# Patient Record
Sex: FEMALE | Race: WHITE | NOT HISPANIC OR LATINO | Employment: FULL TIME | URBAN - METROPOLITAN AREA
[De-identification: names, ages, dates, MRNs, and addresses within clinical notes are randomized per-mention and may not be internally consistent; named-entity substitution may affect disease eponyms.]

---

## 2017-01-18 ENCOUNTER — LAB VISIT (OUTPATIENT)
Dept: LAB | Facility: HOSPITAL | Age: 52
End: 2017-01-18
Payer: COMMERCIAL

## 2017-01-18 DIAGNOSIS — Z76.82 AWAITING ORGAN TRANSPLANT STATUS: ICD-10-CM

## 2017-01-18 PROCEDURE — 86832 HLA CLASS I HIGH DEFIN QUAL: CPT | Mod: PO

## 2017-02-02 LAB — HPRA INTERPRETATION: NORMAL

## 2017-02-03 LAB
CLASS I ANTIBODIES - LUMINEX: NORMAL
CLASS I ANTIBODY COMMENTS - LUMINEX: NORMAL
CPRA %: 71
SERUM COLLECTION DT - LUMINEX CLASS I: NORMAL
SPCL1 TESTING DATE: NORMAL

## 2017-02-15 ENCOUNTER — LAB VISIT (OUTPATIENT)
Dept: LAB | Facility: HOSPITAL | Age: 52
End: 2017-02-15
Payer: COMMERCIAL

## 2017-02-15 DIAGNOSIS — Z76.82 AWAITING ORGAN TRANSPLANT STATUS: ICD-10-CM

## 2017-02-15 PROCEDURE — 86829 HLA CLASS I/II ANTIBODY QUAL: CPT | Mod: PO

## 2017-02-15 PROCEDURE — 86829 HLA CLASS I/II ANTIBODY QUAL: CPT | Mod: 91,PO

## 2017-02-23 LAB — HPRA INTERPRETATION: NORMAL

## 2017-03-17 ENCOUNTER — LAB VISIT (OUTPATIENT)
Dept: LAB | Facility: HOSPITAL | Age: 52
End: 2017-03-17
Payer: COMMERCIAL

## 2017-03-17 DIAGNOSIS — Z76.82 AWAITING ORGAN TRANSPLANT STATUS: ICD-10-CM

## 2017-03-22 PROCEDURE — 86832 HLA CLASS I HIGH DEFIN QUAL: CPT | Mod: PO

## 2017-03-22 PROCEDURE — 86833 HLA CLASS II HIGH DEFIN QUAL: CPT | Mod: PO

## 2017-03-27 LAB
CLASS I ANTIBODIES - LUMINEX: NORMAL
CLASS II ANTIBODIES - LUMINEX: NORMAL
CPRA %: 71
SERUM COLLECTION DT - LUMINEX CLASS I: NORMAL
SERUM COLLECTION DT - LUMINEX CLASS II: NORMAL
SPCL1 TESTING DATE: NORMAL
SPCL2 TESTING DATE: NORMAL

## 2017-03-28 ENCOUNTER — CLINICAL SUPPORT (OUTPATIENT)
Dept: INFECTIOUS DISEASES | Facility: CLINIC | Age: 52
End: 2017-03-28
Payer: COMMERCIAL

## 2017-03-28 DIAGNOSIS — Z76.82 KIDNEY TRANSPLANT CANDIDATE: ICD-10-CM

## 2017-03-28 DIAGNOSIS — Z23 IMMUNIZATION DUE: ICD-10-CM

## 2017-03-28 PROCEDURE — 90632 HEPA VACCINE ADULT IM: CPT | Mod: S$GLB,,, | Performed by: INTERNAL MEDICINE

## 2017-03-28 PROCEDURE — 99999 PR PBB SHADOW E&M-EST. PATIENT-LVL I: CPT | Mod: PBBFAC,,,

## 2017-03-28 PROCEDURE — 90471 IMMUNIZATION ADMIN: CPT | Mod: S$GLB,,, | Performed by: INTERNAL MEDICINE

## 2017-04-17 LAB — HPRA INTERPRETATION: NORMAL

## 2017-04-17 PROCEDURE — 86829 HLA CLASS I/II ANTIBODY QUAL: CPT | Mod: 91,PO,TXP

## 2017-04-17 PROCEDURE — 86829 HLA CLASS I/II ANTIBODY QUAL: CPT | Mod: PO,TXP

## 2017-04-24 ENCOUNTER — TELEPHONE (OUTPATIENT)
Dept: TRANSPLANT | Facility: CLINIC | Age: 52
End: 2017-04-24

## 2017-04-25 ENCOUNTER — LAB VISIT (OUTPATIENT)
Dept: LAB | Facility: HOSPITAL | Age: 52
End: 2017-04-25
Payer: COMMERCIAL

## 2017-04-25 DIAGNOSIS — Z76.82 AWAITING ORGAN TRANSPLANT STATUS: ICD-10-CM

## 2017-04-25 PROCEDURE — 86829 HLA CLASS I/II ANTIBODY QUAL: CPT | Mod: PO,TXP

## 2017-04-25 PROCEDURE — 86829 HLA CLASS I/II ANTIBODY QUAL: CPT | Mod: 91,PO,TXP

## 2017-04-28 DIAGNOSIS — Z76.82 ORGAN TRANSPLANT CANDIDATE: Primary | ICD-10-CM

## 2017-05-04 ENCOUNTER — LAB VISIT (OUTPATIENT)
Dept: LAB | Facility: HOSPITAL | Age: 52
End: 2017-05-04
Payer: COMMERCIAL

## 2017-05-04 DIAGNOSIS — Z76.82 ORGAN TRANSPLANT CANDIDATE: ICD-10-CM

## 2017-05-04 PROCEDURE — 86826 HLA X-MATCH NONCYTOTOXC ADDL: CPT | Mod: PO,TXP

## 2017-05-04 PROCEDURE — 86825 HLA X-MATH NON-CYTOTOXIC: CPT | Mod: PO,TXP

## 2017-05-04 PROCEDURE — 86825 HLA X-MATH NON-CYTOTOXIC: CPT | Mod: 91,PO,TXP

## 2017-05-04 PROCEDURE — 86826 HLA X-MATCH NONCYTOTOXC ADDL: CPT | Mod: 91,PO,TXP

## 2017-05-11 ENCOUNTER — LAB VISIT (OUTPATIENT)
Dept: LAB | Facility: HOSPITAL | Age: 52
End: 2017-05-11
Payer: COMMERCIAL

## 2017-05-11 DIAGNOSIS — Z76.82 AWAITING ORGAN TRANSPLANT STATUS: ICD-10-CM

## 2017-05-18 LAB
B CELL RESULTS - XM ALLO: POSITIVE
B MCS AVERAGE - XM ALLO: 119.5
B MCS AVERAGE - XM ALLO: 122.5
B MCS AVERAGE - XM ALLO: 125.5
DONOR MRN: NORMAL
FXMAL TESTING DATE: NORMAL
HLA FLOW CROSSMATCH (ALLO) INTERPRETATION: NORMAL
SERUM COLLECTION DT - XM ALLO: NORMAL
T CELL RESULTS - XM ALLO: POSITIVE
T MCS AVERAGE - XM ALLO: 187
T MCS AVERAGE - XM ALLO: 193.5
T MCS AVERAGE - XM ALLO: 194.5

## 2017-05-22 LAB — HPRA INTERPRETATION: NORMAL

## 2017-06-09 DIAGNOSIS — Z76.82 ORGAN TRANSPLANT CANDIDATE: Primary | ICD-10-CM

## 2017-07-07 LAB — HPRA INTERPRETATION: NORMAL

## 2017-07-07 PROCEDURE — 86833 HLA CLASS II HIGH DEFIN QUAL: CPT | Mod: PO,TXP

## 2017-07-07 PROCEDURE — 86832 HLA CLASS I HIGH DEFIN QUAL: CPT | Mod: PO,TXP

## 2017-07-19 LAB
CLASS I ANTIBODIES - LUMINEX: NORMAL
CLASS II ANTIBODY COMMENTS - LUMINEX: NORMAL
CPRA %: 71
SERUM COLLECTION DT - LUMINEX CLASS I: NORMAL
SERUM COLLECTION DT - LUMINEX CLASS II: NORMAL
SPCL1 TESTING DATE: NORMAL
SPCL2 TESTING DATE: NORMAL

## 2017-07-31 DIAGNOSIS — Z76.82 ORGAN TRANSPLANT CANDIDATE: ICD-10-CM

## 2017-08-14 ENCOUNTER — LAB VISIT (OUTPATIENT)
Dept: LAB | Facility: HOSPITAL | Age: 52
End: 2017-08-14
Payer: COMMERCIAL

## 2017-08-14 DIAGNOSIS — Z76.82 AWAITING ORGAN TRANSPLANT STATUS: ICD-10-CM

## 2017-08-14 PROCEDURE — 86832 HLA CLASS I HIGH DEFIN QUAL: CPT | Mod: PO,TXP

## 2017-08-14 PROCEDURE — 86833 HLA CLASS II HIGH DEFIN QUAL: CPT | Mod: PO,TXP

## 2017-08-29 ENCOUNTER — OFFICE VISIT (OUTPATIENT)
Dept: TRANSPLANT | Facility: CLINIC | Age: 52
End: 2017-08-29
Payer: COMMERCIAL

## 2017-08-29 ENCOUNTER — HOSPITAL ENCOUNTER (OUTPATIENT)
Dept: RADIOLOGY | Facility: HOSPITAL | Age: 52
Discharge: HOME | End: 2017-08-29
Attending: NURSE PRACTITIONER
Payer: COMMERCIAL

## 2017-08-29 ENCOUNTER — HOSPITAL ENCOUNTER (OUTPATIENT)
Dept: CARDIOLOGY | Facility: CLINIC | Age: 52
Discharge: HOME | End: 2017-08-29
Payer: COMMERCIAL

## 2017-08-29 VITALS
HEART RATE: 52 BPM | WEIGHT: 134.5 LBS | HEIGHT: 61 IN | BODY MASS INDEX: 25.39 KG/M2 | RESPIRATION RATE: 16 BRPM | TEMPERATURE: 98 F | DIASTOLIC BLOOD PRESSURE: 67 MMHG | OXYGEN SATURATION: 99 % | SYSTOLIC BLOOD PRESSURE: 143 MMHG

## 2017-08-29 DIAGNOSIS — I37.1 NONRHEUMATIC PULMONARY VALVE INSUFFICIENCY: ICD-10-CM

## 2017-08-29 DIAGNOSIS — Z76.82 ORGAN TRANSPLANT CANDIDATE: ICD-10-CM

## 2017-08-29 DIAGNOSIS — I10 ESSENTIAL HYPERTENSION: Chronic | ICD-10-CM

## 2017-08-29 DIAGNOSIS — Z99.2 ESRD ON DIALYSIS: Primary | Chronic | ICD-10-CM

## 2017-08-29 DIAGNOSIS — N18.9 ANEMIA OF RENAL DISEASE: Chronic | ICD-10-CM

## 2017-08-29 DIAGNOSIS — D63.1 ANEMIA OF RENAL DISEASE: Chronic | ICD-10-CM

## 2017-08-29 DIAGNOSIS — Z76.82 AWAITING ORGAN TRANSPLANT STATUS: ICD-10-CM

## 2017-08-29 DIAGNOSIS — N18.6 ESRD ON DIALYSIS: Primary | Chronic | ICD-10-CM

## 2017-08-29 DIAGNOSIS — N25.81 SECONDARY HYPERPARATHYROIDISM OF RENAL ORIGIN: Chronic | ICD-10-CM

## 2017-08-29 LAB
DIASTOLIC DYSFUNCTION: NO
ESTIMATED PA SYSTOLIC PRESSURE: 38.47
MITRAL VALVE MOBILITY: NORMAL
RETIRED EF AND QEF - SEE NOTES: 65 (ref 55–65)
TRICUSPID VALVE REGURGITATION: NORMAL

## 2017-08-29 PROCEDURE — 99999 PR PBB SHADOW E&M-EST. PATIENT-LVL III: CPT | Mod: PBBFAC,TXP,, | Performed by: INTERNAL MEDICINE

## 2017-08-29 PROCEDURE — 76770 US EXAM ABDO BACK WALL COMP: CPT | Mod: 26,TXP,, | Performed by: RADIOLOGY

## 2017-08-29 PROCEDURE — 76770 US EXAM ABDO BACK WALL COMP: CPT | Mod: TC,TXP

## 2017-08-29 PROCEDURE — 71020 XR CHEST PA AND LATERAL: CPT | Mod: TC,TXP

## 2017-08-29 PROCEDURE — 93306 TTE W/DOPPLER COMPLETE: CPT | Mod: S$GLB,TXP,, | Performed by: INTERNAL MEDICINE

## 2017-08-29 PROCEDURE — 99215 OFFICE O/P EST HI 40 MIN: CPT | Mod: S$GLB,TXP,, | Performed by: INTERNAL MEDICINE

## 2017-08-29 PROCEDURE — 71020 XR CHEST PA AND LATERAL: CPT | Mod: 26,TXP,, | Performed by: RADIOLOGY

## 2017-08-29 PROCEDURE — 3008F BODY MASS INDEX DOCD: CPT | Mod: S$GLB,TXP,, | Performed by: INTERNAL MEDICINE

## 2017-08-29 RX ORDER — LANTHANUM CARBONATE 1000 MG/1
1000 TABLET, CHEWABLE ORAL
COMMUNITY
End: 2018-08-30

## 2017-08-29 NOTE — LETTER
Date: 8/29/2017    To: Dialysis Unit  and Charge RN From: [facility] Workers and      Kidney Transplant Nurse Coordinators    RE: Tala Rivera, 1965, [id number]     At [facility], we conduct adherence checks as an important part of transplant care. Initial and listed patient assessments are not complete without adherence information.        Please complete the following information:     Current Dry Weight: ___________         Most Recent Pre-Treatment Weight: ___________ /date: _________                    Data from the last 1-3 months:  (data from last 3 months preferred):    Number of AMAs with dates, time, and reasons: ____________________________________________________    ______________________________________________________________________________________________    ______________________________________________________________________________________________    Number of No-Shows with dates and reasons: ______________________________________________________      ______________________________________________________________________________________________    Last intact PTH:  ___________/date: __________      Any concerns with Labs:  YES / NO      If yes, please explain:  ___________________________________________________________________________    ______________________________________________________________________________________________    Any concerns with Caregivers:  YES / NO    If yes, please explain:  ___________________________________________________________________________    ______________________________________________________________________________________________     Any concerns with Transportation:  YES / NO    If yes, please explain:  ___________________________________________________________________________    ______________________________________________________________________________________________    Any  Psychiatric and/or Psychosocial concerns:  YES / NO     If yes, please explain: ___________________________________________________________________________    ______________________________________________________________________________________________      PLEASE RETURN TO: FAX: [phone]     Thank you for collaborating with us in the care of this patient.           [address]  ?  [place] [address]  ?  phone [phone]  ?  fax [phone] ?  [email or web address]  Confidentiality notice: The accompanying facsimile is intended solely for the use of the recipient designated above. Document(s) transmitted herewith may contain information that is confidential and privileged. Delivery, distribution or dissemination of this communication other than to the intended recipient is strictly prohibited. If you have received this facsimile in error, please notify us immediately by telephone.

## 2017-08-29 NOTE — PROGRESS NOTES
LISTED PATIENT EDUCATION NOTE    Ms. Tala Rivera was seen in pre-kidney transplant clinic for evaluation for kidney, kidney/pancreas or pancreas only transplant.  The patient attended a group education session that discussed/reviewed the following aspects of transplantation: evaluation and selection committee process, UNOS waitlist management/multiple listings, types of organs offered (KDPI < 85%, KDPI > 85%, PHS increased risk, DCD), financial aspects, surgical procedures, dietary instruction pre- and post-transplant, health maintenance pre- and post-transplant, post-transplant hospitalization and outpatient follow-up, potential to participate in a research protocol, and medication management and side effects.  A question and answer session was provided after the presentation.    The patient was seen by all members of the multi-disciplinary team to include: Nephrologist/PA, Surgeon, , Transplant Coordinator, , Pharmacist and Dietician (if applicable).    The patient reviewed and signed all consents for evaluation which were witnessed and sent to scanning into the EPIC chart.    The patient was given an education book and plan for further evaluation based on her individual assessment.      The patient was encouraged to call with any questions or concerns.

## 2017-08-29 NOTE — LETTER
August 29, 2017        Elias Jon  [address] [place] [address]  Phone: [phone]  Fax: [phone]             [facility]  [address]  [facility] [address]  Phone: [phone]   Patient: Tala Rivera   MR Number: [id number]   YOB: 1965   Date of Visit: 8/29/2017       Dear Dr. Elias Jon    Thank you for referring Tala Rivera to me for evaluation. Attached you will find relevant portions of my assessment and plan of care.    If you have questions, please do not hesitate to call me. I look forward to following Tala Rivera along with you.    Sincerely,    Karena Colin MD    Enclosure    If you would like to receive this communication electronically, please contact [email or web address] or [phone] to request [employer] access.    [employer] Link is a tool which provides read-only access to select patient information with whom you have a relationship. Its easy to use and provides real time access to review your patients record including encounter summaries, notes, results, and demographic information.    If you feel you have received this communication in error or would no longer like to receive these types of communications, please e-mail [email or web address]

## 2017-08-29 NOTE — PATIENT INSTRUCTIONS
1. Continue to stay active   2. Have any potential living donors contact the living donor coordinator

## 2017-09-08 LAB — HPRA INTERPRETATION: NORMAL

## 2017-09-11 LAB
CLASS I ANTIBODIES - LUMINEX: NORMAL
CLASS II ANTIBODY COMMENTS - LUMINEX: NORMAL
CPRA %: 71
SERUM COLLECTION DT - LUMINEX CLASS I: NORMAL
SERUM COLLECTION DT - LUMINEX CLASS II: NORMAL
SPCL1 TESTING DATE: NORMAL
SPCL2 TESTING DATE: NORMAL
SPCLU TESTING DATE: NORMAL

## 2017-09-19 ENCOUNTER — LAB VISIT (OUTPATIENT)
Dept: LAB | Facility: HOSPITAL | Age: 52
End: 2017-09-19
Payer: COMMERCIAL

## 2017-09-19 DIAGNOSIS — Z76.82 AWAITING ORGAN TRANSPLANT STATUS: ICD-10-CM

## 2017-09-19 PROCEDURE — 99001 SPECIMEN HANDLING PT-LAB: CPT | Mod: PO,TXP

## 2017-09-28 DIAGNOSIS — Z76.82 ORGAN TRANSPLANT CANDIDATE: Primary | ICD-10-CM

## 2017-09-29 DIAGNOSIS — Z76.82 AWAITING ORGAN TRANSPLANT STATUS: Primary | ICD-10-CM

## 2017-10-04 ENCOUNTER — LAB VISIT (OUTPATIENT)
Dept: LAB | Facility: HOSPITAL | Age: 52
End: 2017-10-04
Payer: COMMERCIAL

## 2017-10-04 DIAGNOSIS — Z76.82 ORGAN TRANSPLANT CANDIDATE: ICD-10-CM

## 2017-10-04 PROCEDURE — 86825 HLA X-MATH NON-CYTOTOXIC: CPT | Mod: PO,TXP

## 2017-10-04 PROCEDURE — 86826 HLA X-MATCH NONCYTOTOXC ADDL: CPT | Mod: PO,TXP

## 2017-10-04 PROCEDURE — 86825 HLA X-MATH NON-CYTOTOXIC: CPT | Mod: 91,PO,TXP

## 2017-10-04 PROCEDURE — 86826 HLA X-MATCH NONCYTOTOXC ADDL: CPT | Mod: 91,PO,TXP

## 2017-10-05 LAB
B CELL RESULTS - XM ALLO: NEGATIVE
B CELL RESULTS - XM ALLO: NEGATIVE
B MCS AVERAGE - XM ALLO: 1.5
B MCS AVERAGE - XM ALLO: 6.5
DONOR MRN: NORMAL
DONOR MRN: NORMAL
FXMAL TESTING DATE: NORMAL
FXMAL TESTING DATE: NORMAL
HLA FLOW CROSSMATCH (ALLO) INTERPRETATION: NORMAL
SERUM COLLECTION DT - XM ALLO: NORMAL
SERUM COLLECTION DT - XM ALLO: NORMAL
T CELL RESULTS - XM ALLO: NEGATIVE
T CELL RESULTS - XM ALLO: NEGATIVE
T MCS AVERAGE - XM ALLO: 23
T MCS AVERAGE - XM ALLO: 24

## 2017-10-16 LAB — HPRA INTERPRETATION: NORMAL

## 2017-10-19 NOTE — PROGRESS NOTES
Transplant Recipient Adult Psychosocial Assessment (Last Assessment completed on 16)    Tala Rivera  [address]  [place] [address]    Telephone Information:   Mobile [phone]   Home  [phone] (home)  Work  [phone] (work)  E-mail  [email or web address]    Sex: female  YOB: 1965  Age: 51 y.o.    Encounter Date: 2017  U[place] Citizen: yes  Primary Language: English   Needed: no    Emergency Contact:  Name: Kathy Rivera Jr  Relationship:   Address: same as above  Phone Numbers:  [phone] (home), n/a (work), [phone] (mobile)    Family/Social Support:   Number of dependents/: Pt reports 2 minor sons: Simone and Gabo who would be cared for by their brother and his wife: Damien and Kayleigh or pt's step son and his wife: Anson and Fauzia  Marital history:  once for 22 yrs, three sons  Other family dynamics: Pt's parents and sister: Ghazala are .  She has two living sisters and one brother.  Claire [place] [phone],  [place] [phone]  Sonya Merazrer, number unavailable. Pt's son: Simone has Down's Syndrome.    Household Composition:  Name: Tala Rivera  Age: 51  Relationship: patient  Does person drive? yes    Name: Kathy Rivera  Age: 71  Relationship:   Does person drive? yes    Name: Simone and Gabo Rivera   Age: 15 and 14 (respectively)  Relationship: sons  Does person drive? no    Do you and your caregivers have access to reliable transportation? yes  PRIMARY CAREGIVER: Kathy Rivera (rigo) will be primary caregiver, phone number [phone].      provided in-depth information to patient and caregiver regarding pre- and post-transplant caregiver role.   strongly encourages patient and caregiver to have concrete plan regarding post-transplant care giving, including back-up caregiver(s) to ensure care giving needs are met as needed.    Patient and Caregiver states  understanding all aspects of caregiver role/commitment and is able/willing/committed to being caregiver to the fullest extent necessary.    Patient and Caregiver verbalizes understanding of the education provided today and caregiver responsibilities.         remains available. Patient and Caregiver agree to contact  in a timely manner if concerns arise.      Able to take time off work without financial concerns: yes.     Additional Significant Others who will Assist with Transplant:  Name: Damien Rivera  Age: 19  Phone: [phone] (home) [phone] (cell)  City: [place] State: [place]  Relationship: son  Does person drive? yes    Name: Dayami Rivera  Age: 19  Phone: [phone] (home) [phone]  City: [place] State: [place]  Relationship: daughter-in-law  Does person drive? yes    Name: Fauzia Rivera  Age: 38  Phone: [phone]  City: [place] State: [place]  Relationship: daughter-in-law  Does person drive? yes     Living Will: no Education and information provided   Healthcare Power of : no Education and information provided   Advance Directives on file: <<no information> per medical record.  Verbally reviewed LW/HCPA information.   provided patient with copy of LW/HCPA documents and provided education on completion of forms.    Living Donors: No. Education and resource information given to patient.    Highest Education Level: High School (9-12) or GED  Reading Ability: 12th grade  Reports difficulty with: Pt reports no difficulties  Learns Best By:  Pt reports pt learns best by a combination of verbal, written, and tactile instructions.     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    Spouse/Significant Other Employment: Pt's  reports that he is retired from the army and 100%disabled. He reports that he  can still act as caregiver.    Disabled: no    Monthly Income:  Salary/Wages: $2400.00  Other household members total: $4675.00     Able to afford all costs now and if transplanted, including medications: yes Pt's  reports that pt can get her medications through the [employer] as well  Patient and Caregiver verbalizes understanding of personal responsibilities related to transplant costs and the importance of having a financial plan to ensure that patients transplant costs are fully covered.       provided fundraising information/education. Patient and Caregiververbalizes understanding.   remains available.    Insurance:   Payor/Plan Subscr  Sex Relation Sub. Ins. ID Effective Group Num   1. [facility]* MAGO MERA 1965 Female  [id number]                                   [address]   2. [facility]* MAGO MERA 1965 Female  [id number]                                   [address] [address]     Primary Insurance (for UNOS reporting): Private Insurance  Secondary Insurance (for UNOS reporting): Public Insurance - Other Government ( per pt report)  Patient and Caregiver verbalizes clear understanding that patient may experience difficulty obtaining and/or be denied insurance coverage post-surgery. This includes and is not limited to disability insurance, life insurance, health insurance, burial insurance, long term care insurance, and other insurances.      Patient and Caregiver also reports understanding that future health concerns related to or unrelated to transplantation may not be covered by patient's insurance.  Resources and information provided and reviewed.     Patient and Caregiver provides verbal permission to release any necessary information to outside resources for patient care and discharge planning.  Resources and information provided are reviewed.      Dialysis Adherence: Patient reports being on hemodialysis  in center attending all dialysis appointments and staying for the entire course of treatment. SW received a faxed adherence form from pt's dialysis unit which indicates over last three months that the patient has had no AMAs, not had any no-shows, and  reports that pt has not had any issues with labs, fluid gains, or other psychosocial issues.    Infusion Service: patient utilizing? no  Home Health: patient utilizing? no  DME: yes BP Cuff and glucometer  Pulmonary/Cardiac Rehab: Pt denies   ADLS:  Pt states ability to independently accomplish all adls.    Adherence:   Pt states current and expected compliance with all healthcare recommendations..  Adherence education and counseling provided.     Per History Section:  Past Medical History:   Diagnosis Date    Anemia of renal disease     ESRD on dialysis      Essential hypertension     Secondary hyperparathyroidism of renal origin      Social History   Substance Use Topics    Smoking status: Never Smoker    Smokeless tobacco: Not on file    Alcohol use No     History   Drug Use No     History   Sexual Activity    Sexual activity: Not on file       Per Today's Psychosocial:  Tobacco: none, patient denies any use.  Alcohol: none, patient denies any use.  Illicit Drugs/Non-prescribed Medications: none, patient denies any use.    Patient and Caregiver states clear understanding of the potential impact of substance use as it relates to transplant candidacy and is aware of possible random substance screening.  Substance abstinence/cessation counseling, education and resources provided and reviewed.     Arrests/DWI/Treatment/Rehab: patient denies    Psychiatric History:    Mental Health: pt denies mental health concerns. Pt's  reports diminished sex drive. Pt reports having vaginal dryness and pain during sex. SW encouraged pt to discuss these concerns with her PCP. Pt reports that she is not ready yet.  Psychiatrist/Counselor: Pt denies seeing a mental health  professional and reports being open to seeing the psych department for talk therapy if necessary.  Medications: Pt denies taking medications for mental health reasons.  Suicide/Homicide Issues: Pt denies current or past suicidal/homicidal ideation.   Safety at home: Pt reports no current or history of safety concerns in household; including mental, physical, verbal, or sexual abuse.    Knowledge: Patient and Caregiver states having clear understanding and realistic expectations regarding the potential risks and potential benefits of organ transplantation and organ donation and agrees to discuss with health care team members and support system members, as well as to utilize available resources and express questions and/or concerns in order to further facilitate the pt informed decision-making.  Resources and information provided and reviewed.    Patient and Caregiver is aware of [facility]'s affiliation and/or partnership with agencies in home health care, LTAC, SNF, [facility], and other hospitals and clinics.    Understanding: Patient and Caregiver reports having a clear understanding of the many lifetime commitments involved with being a transplant recipient, including costs, compliance, medications, lab work, procedures, appointments, concrete and financial planning, preparedness, timely and appropriate communication of concerns, abstinence (ETOH, tobacco, illicit non-prescribed drugs), adherence to all health care team recommendations, support system and caregiver involvement, appropriate and timely resource utilization and follow-through, mental health counseling as needed/recommended, and patient and caregiver responsibilities.  Social Service Handbook, resources and detailed educational information provided and reviewed.  Educational information provided.    Patient and Caregiver also reports current and expected compliance with health care regime and states having a clear understanding of the importance of compliance.       Patient and Caregiver reports a clear understanding that risks and benefits may be involved with organ transplantation and with organ donation.       Patient and Caregiver also reports clear understanding that psychosocial risk factors may affect patient, and include but are not limited to feelings of depression, generalized anxiety, anxiety regarding dependence on others, post traumatic stress disorder, feelings of guilt and other emotional and/or mental concerns, and/or exacerbation of existing mental health concerns.  Detailed resources provided and discussed.      Patient and Caregiver agrees to access appropriate resources in a timely manner as needed and/or as recommended, and to communicate concerns appropriately.  Patient and Caregiver also reports a clear understanding of treatment options available.     Patient and Caregiver received education in a group setting.   reviewed education, provided additional information, and answered questions.    Feelings or Concerns: Patient and Caregiver did not express any concerns at this time.    Coping: Pt states her jose and Confucianism family help her cope, job, being around people, mowing the lawn, traveling when she can. Pt reports Confucianism home as [facility] in [place], [place] with Garrett Ayon  presiding.     Goals: Stop dialysis, continue to work, travel more freely.  Patient referred to Vocational Rehabilitation.    Interview Behavior: Patient and Caregiver presents as alert and oriented x 4, pleasant, good eye contact, well groomed, recall good, concentration/judgement good, average intelligence, calm, communicative, cooperative and asking and answering questions appropriately. She was accompanied by her  who was very talkative and appeared supportive of pt's pursuit of transplant.        Transplant Social Work - Candidacy  Assessment/Plan:     Psychosocial Suitability: Patient presents as a suitable candidate for kidney transplant  at this time. Based on psychosocial risk factors, patient presents as low risk, due to suitable caregiver plan for self and minor children, finnacial plan in place, adequate insurance, and suitable dialysis adherence.    Recommendations/Additional Comments: SW recommends that pt conduct fundraising to assist pt with pay for cost of medications, food, gas, and other transplant related needs. SW recommends that pt remain aware of potential mental health concerns and contact the team if any concerns arise. SW recommends that pt remain abstinent from tobacco, ETOH, and drug use. SW supports pt's continued dialysis adherence. SW remains available to answer any questions or concerns that arise as the pt moves through the transplant process.       Joselyn Jimenez, ARACELI

## 2017-10-27 ENCOUNTER — LAB VISIT (OUTPATIENT)
Dept: LAB | Facility: HOSPITAL | Age: 52
End: 2017-10-27
Payer: COMMERCIAL

## 2017-10-27 DIAGNOSIS — Z76.82 AWAITING ORGAN TRANSPLANT STATUS: ICD-10-CM

## 2017-10-27 PROCEDURE — 99001 SPECIMEN HANDLING PT-LAB: CPT | Mod: PO,TXP

## 2017-11-13 LAB
HLA FREEZE AND HOLD INTERPRETATION: NORMAL
HPRA INTERPRETATION: NORMAL

## 2017-11-16 ENCOUNTER — HOSPITAL ENCOUNTER (OUTPATIENT)
Dept: RADIOLOGY | Facility: HOSPITAL | Age: 52
Discharge: HOME | End: 2017-11-16
Attending: INTERNAL MEDICINE
Payer: COMMERCIAL

## 2017-11-16 ENCOUNTER — OFFICE VISIT (OUTPATIENT)
Dept: OBSTETRICS AND GYNECOLOGY | Facility: CLINIC | Age: 52
End: 2017-11-16
Payer: COMMERCIAL

## 2017-11-16 VITALS
HEIGHT: 61 IN | BODY MASS INDEX: 25.27 KG/M2 | DIASTOLIC BLOOD PRESSURE: 60 MMHG | WEIGHT: 133.81 LBS | SYSTOLIC BLOOD PRESSURE: 124 MMHG

## 2017-11-16 DIAGNOSIS — Z01.419 VISIT FOR GYNECOLOGIC EXAMINATION: Primary | ICD-10-CM

## 2017-11-16 DIAGNOSIS — Z78.0 POSTMENOPAUSE: ICD-10-CM

## 2017-11-16 DIAGNOSIS — N95.2 VAGINAL ATROPHY: ICD-10-CM

## 2017-11-16 DIAGNOSIS — Z76.82 AWAITING ORGAN TRANSPLANT STATUS: ICD-10-CM

## 2017-11-16 PROCEDURE — 77067 SCR MAMMO BI INCL CAD: CPT | Mod: TC,TXP

## 2017-11-16 PROCEDURE — 77067 SCR MAMMO BI INCL CAD: CPT | Mod: 26,TXP,, | Performed by: RADIOLOGY

## 2017-11-16 PROCEDURE — 99396 PREV VISIT EST AGE 40-64: CPT | Mod: S$GLB,,, | Performed by: NURSE PRACTITIONER

## 2017-11-16 PROCEDURE — 88175 CYTOPATH C/V AUTO FLUID REDO: CPT

## 2017-11-16 PROCEDURE — 99999 PR PBB SHADOW E&M-EST. PATIENT-LVL III: CPT | Mod: PBBFAC,,, | Performed by: NURSE PRACTITIONER

## 2017-11-16 NOTE — PROGRESS NOTES
HISTORY OF PRESENT ILLNESS:    Tala Rivera is a 51 y.o. female, , No LMP recorded (lmp unknown).,  presents with her  for a pre-kidney transplant routine exam and c/o burning with intercourse despite use of water based lubricants.  -Denies hot flashes and states no period since at least age 41.  -Wants a pap.   -Has 16 year old son with Down Syndrome with birthday today.     Past Medical History:   Diagnosis Date    Anemia of renal disease     ESRD on dialysis      Essential hypertension     Secondary hyperparathyroidism of renal origin        Past Surgical History:   Procedure Laterality Date    AV FISTULA PLACEMENT Left     never matured    AV graft Right 2015     SECTION      x3; 1997; , and        MEDICATIONS AND ALLERGIES:      Current Outpatient Prescriptions:     calcium acetate 667 mg (169 mg calcium)/5 mL Soln, Take 667 mg by mouth 3 (three) times daily after meals., Disp: , Rfl:     lanthanum (FOSRENOL) 1000 MG chewable tablet, Take 1,000 mg by mouth 3 (three) times daily with meals., Disp: , Rfl:     lidocaine-prilocaine (EMLA) cream, APPLY SMALL AMOUNT TO THE AFFECTED AREA PRIOR TO dialysis THREE TIMES A WEEK, Disp: , Rfl: 3    conjugated estrogens (PREMARIN) vaginal cream, 1/2 applicator vaginally twice weekly H.S., Disp: 45 g, Rfl: 12    Review of patient's allergies indicates:   Allergen Reactions    Sulfa (sulfonamide antibiotics) Hives       Family History   Problem Relation Age of Onset    Kidney disease Mother     Diabetes Mother     Heart disease Mother     Hypertension Mother     Lupus Sister     Down syndrome Son     Cancer Neg Hx     Stroke Neg Hx        Social History     Social History    Marital status:      Spouse name: N/A    Number of children: N/A    Years of education: N/A     Occupational History    Not on file.     Social History Main Topics    Smoking status: Never Smoker    Smokeless tobacco: Never Used     "Alcohol use No    Drug use: No    Sexual activity: Yes     Partners: Male     Other Topics Concern    Not on file     Social History Narrative    She lives with  and 2 dogs and 1 cat     She works as  at [employer]        OB HISTORY: Number of C/S:3    COMPREHENSIVE GYN HISTORY:  PAP History: Denies abnormal Paps. LAST PAP 9-15-16 NORMAL.  Infection History: Denies STDs. Denies PID.  Benign History: Denies uterine fibroids. Denies ovarian cysts. Denies endometriosis. Denies other conditions.  Cancer History: Denies cervical cancer. Denies uterine cancer or hyperplasia. Denies ovarian cancer. Denies vulvar cancer or pre-cancer. Denies vaginal cancer or pre-cancer. Denies breast cancer. Denies colon cancer.  Sexual Activity History: Reports currently being sexually active  Menstrual History: Denies menses. Pt is  age 41, not on HRT.      ROS:  GENERAL: No weight changes. No swelling. + FATIGUE. No fever.  CARDIOVASCULAR: No chest pain. No shortness of breath. No leg cramps.   NEUROLOGICAL: No headaches. No vision changes.  BREASTS: No pain. No lumps. No discharge.  ABDOMEN: No pain. No nausea. No vomiting. No diarrhea. No constipation.  REPRODUCTIVE: No abnormal bleeding.   VULVA: No pain. No lesions. No itching.  VAGINA: No relaxation. No itching. No odor. No discharge. No lesions.  URINARY: No incontinence. No nocturia. No frequency. No dysuria.    /60   Ht 5' 1" (1.549 m)   Wt 60.7 kg (133 lb 13.1 oz)   LMP  (LMP Unknown)   BMI 25.28 kg/m²     PE:  APPEARANCE: Well nourished, well developed, in no acute distress.  AFFECT: WNL, alert and oriented x 3.  SKIN: No acne or hirsutism.  NECK: Neck symmetric, without masses or thyromegaly.  NODES: No inguinal, cervical, axillary or femoral lymph node enlargement.  CHEST: Good respiratory effort.   ABDOMEN: Soft. No tenderness or masses. DISTENDED.  BREASTS: Symmetrical, no skin changes, visible lesions, palpable masses or nipple " discharge bilaterally.  PELVIC: ATROPHIC external female genitalia without lesions.  Female hair distribution. Adequate perineal body, Normal urethral meatus. VAGINA DRY / ATROPHIC without lesions or discharge.  No significant cystocele or rectocele present. Cervix pink without lesions, discharge or tenderness. Uterus is 8 week size, regular, mobile and nontender. Adnexa without masses or tenderness.  EXTREMITIES: No edema    DIAGNOSIS:  1. Visit for gynecologic examination    2. Postmenopause    3. Vaginal atrophy        PLAN:    Orders Placed This Encounter    Liquid-based pap smear, screening    conjugated estrogens (PREMARIN) vaginal cream   Had mammogram today - results pending    COUNSELING:  The patient was counseled today on:  -options for treatment of atrophic vaginitis with water based vaginal lubricants vs vaginal estrogen cream, tablets, ring vs Osphena and she chose cream;  -Premarin cream use and potential side effects;  -osteoporosis prevention, (to ask her transplant team about calcium supplementation), regular weight bearing exercise;  -A.C.S. Pap and pelvic exam guidelines (pap every 3 years), recomendations for yearly mammogram;  -to follow up with her PCP for other health maintenance.    FOLLOW-UP with me annually.

## 2017-11-16 NOTE — LETTER
November 16, 2017      Caity Cagle, CHAS  [address] [facility] [address]           [facility] - OB/GYN 5th Floor  [address]  [place] [address]  Phone: [phone]          Patient: Tala Rivera   MR Number: [id number]   YOB: 1965   Date of Visit: 11/16/2017       Dear Caity Cagle:    Thank you for referring Tala Rivera to me for evaluation. Attached you will find relevant portions of my assessment and plan of care.    If you have questions, please do not hesitate to call me. I look forward to following Tala Rivera along with you.    Sincerely,    GIUSEPPE Gonzalez NP    Enclosure  CC:  No Recipients    If you would like to receive this communication electronically, please contact [email or web address] or [phone] to request more information on [employer] Link access.    For providers and/or their staff who would like to refer a patient to [facility], please contact us through our one-stop-shop provider referral line, [facility], at [phone].    If you feel you have received this communication in error or would no longer like to receive these types of communications, please e-mail [email or web address]

## 2017-11-17 ENCOUNTER — LAB VISIT (OUTPATIENT)
Dept: LAB | Facility: HOSPITAL | Age: 52
End: 2017-11-17
Payer: COMMERCIAL

## 2017-11-17 DIAGNOSIS — Z76.82 AWAITING ORGAN TRANSPLANT STATUS: ICD-10-CM

## 2017-12-19 NOTE — PROGRESS NOTES
YEARLY LIST MANAGEMENT NOTE    Tala Nathjanell's kidney transplant listing status reviewed.  Patient is due for follow-up appointments on July 2018.  Appointments will be scheduled per protocol.

## 2017-12-20 LAB — HPRA INTERPRETATION: NORMAL

## 2017-12-20 PROCEDURE — 86833 HLA CLASS II HIGH DEFIN QUAL: CPT | Mod: PO,TXP

## 2017-12-20 PROCEDURE — 86832 HLA CLASS I HIGH DEFIN QUAL: CPT | Mod: PO,TXP

## 2018-01-02 LAB
CLASS I ANTIBODIES - LUMINEX: NORMAL
CLASS II ANTIBODIES - LUMINEX: NORMAL
CLASS II ANTIBODY COMMENTS - LUMINEX: NORMAL
CPRA %: 81
SERUM COLLECTION DT - LUMINEX CLASS I: NORMAL
SERUM COLLECTION DT - LUMINEX CLASS II: NORMAL
SPCL1 TESTING DATE: NORMAL
SPCL2 TESTING DATE: NORMAL
SPCLU TESTING DATE: NORMAL

## 2018-01-24 ENCOUNTER — LAB VISIT (OUTPATIENT)
Dept: LAB | Facility: HOSPITAL | Age: 53
End: 2018-01-24
Payer: COMMERCIAL

## 2018-01-24 DIAGNOSIS — Z76.82 AWAITING ORGAN TRANSPLANT STATUS: ICD-10-CM

## 2018-01-24 PROCEDURE — 99001 SPECIMEN HANDLING PT-LAB: CPT | Mod: PO,TXP

## 2018-01-31 LAB
HLA FREEZE AND HOLD INTERPRETATION: NORMAL
HLAFH COLLECTION DATE: NORMAL
HPRA INTERPRETATION: NORMAL

## 2018-03-23 ENCOUNTER — TELEPHONE (OUTPATIENT)
Dept: TRANSPLANT | Facility: CLINIC | Age: 53
End: 2018-03-23

## 2018-03-23 NOTE — TELEPHONE ENCOUNTER
"Spoke with  at length regarding Tala's wait time on the transplant list, at 25 months and her 81% PRA. MR Nicole verbalized understanding of the reason for a potentiallly longer wait time and also the reason for potential donors that "do not match"   "

## 2018-04-12 ENCOUNTER — LAB VISIT (OUTPATIENT)
Dept: LAB | Facility: HOSPITAL | Age: 53
End: 2018-04-12
Payer: COMMERCIAL

## 2018-04-12 DIAGNOSIS — Z76.82 ORGAN TRANSPLANT CANDIDATE: ICD-10-CM

## 2018-04-12 PROCEDURE — 86833 HLA CLASS II HIGH DEFIN QUAL: CPT | Mod: PO,TXP

## 2018-04-12 PROCEDURE — 86832 HLA CLASS I HIGH DEFIN QUAL: CPT | Mod: PO,TXP

## 2018-04-27 LAB — HPRA INTERPRETATION: NORMAL

## 2018-05-08 LAB
CLASS I ANTIBODIES - LUMINEX: NORMAL
CLASS I ANTIBODY COMMENTS - LUMINEX: NORMAL
CLASS II ANTIBODY COMMENTS - LUMINEX: NORMAL
CPRA %: 61
SERUM COLLECTION DT - LUMINEX CLASS I: NORMAL
SERUM COLLECTION DT - LUMINEX CLASS II: NORMAL
SPCL1 TESTING DATE: NORMAL
SPCL2 TESTING DATE: NORMAL
SPCLU TESTING DATE: NORMAL

## 2018-06-19 ENCOUNTER — LAB VISIT (OUTPATIENT)
Dept: LAB | Facility: HOSPITAL | Age: 53
End: 2018-06-19
Payer: COMMERCIAL

## 2018-06-19 DIAGNOSIS — Z76.82 ORGAN TRANSPLANT CANDIDATE: ICD-10-CM

## 2018-06-19 PROCEDURE — 99001 SPECIMEN HANDLING PT-LAB: CPT | Mod: PO,TXP

## 2018-07-18 ENCOUNTER — LAB VISIT (OUTPATIENT)
Dept: LAB | Facility: HOSPITAL | Age: 53
End: 2018-07-18
Payer: COMMERCIAL

## 2018-07-18 DIAGNOSIS — Z76.82 ORGAN TRANSPLANT CANDIDATE: ICD-10-CM

## 2018-07-18 PROCEDURE — 86832 HLA CLASS I HIGH DEFIN QUAL: CPT | Mod: PO,TXP

## 2018-07-18 PROCEDURE — 86833 HLA CLASS II HIGH DEFIN QUAL: CPT | Mod: PO,TXP

## 2018-08-02 LAB — HPRA INTERPRETATION: NORMAL

## 2018-08-09 DIAGNOSIS — Z76.82 ORGAN TRANSPLANT CANDIDATE: Primary | ICD-10-CM

## 2018-08-10 ENCOUNTER — LAB VISIT (OUTPATIENT)
Dept: LAB | Facility: HOSPITAL | Age: 53
End: 2018-08-10
Payer: COMMERCIAL

## 2018-08-10 DIAGNOSIS — Z76.82 ORGAN TRANSPLANT CANDIDATE: ICD-10-CM

## 2018-08-10 PROCEDURE — 99001 SPECIMEN HANDLING PT-LAB: CPT | Mod: PO,TXP

## 2018-08-15 LAB
CLASS I ANTIBODIES - LUMINEX: NORMAL
CLASS I ANTIBODY COMMENTS - LUMINEX: NORMAL
CLASS II ANTIBODY COMMENTS - LUMINEX: NORMAL
CPRA %: 0
CPRA %: 67
CPRA %: 67
SERUM COLLECTION DT - LUMINEX CLASS I: NORMAL
SERUM COLLECTION DT - LUMINEX CLASS II: NORMAL
SPCL1 TESTING DATE: NORMAL
SPCL2 TESTING DATE: NORMAL
SPCLU TESTING DATE: NORMAL

## 2018-09-13 ENCOUNTER — LAB VISIT (OUTPATIENT)
Dept: LAB | Facility: HOSPITAL | Age: 53
End: 2018-09-13
Payer: COMMERCIAL

## 2018-09-13 DIAGNOSIS — Z76.82 ORGAN TRANSPLANT CANDIDATE: ICD-10-CM

## 2018-09-13 PROCEDURE — 99001 SPECIMEN HANDLING PT-LAB: CPT | Mod: PO,TXP

## 2018-09-27 ENCOUNTER — HOSPITAL ENCOUNTER (OUTPATIENT)
Dept: RADIOLOGY | Facility: HOSPITAL | Age: 53
Discharge: HOME | End: 2018-09-27
Attending: NURSE PRACTITIONER
Payer: COMMERCIAL

## 2018-09-27 ENCOUNTER — OFFICE VISIT (OUTPATIENT)
Dept: TRANSPLANT | Facility: CLINIC | Age: 53
End: 2018-09-27
Payer: COMMERCIAL

## 2018-09-27 VITALS
DIASTOLIC BLOOD PRESSURE: 49 MMHG | WEIGHT: 142.88 LBS | OXYGEN SATURATION: 97 % | RESPIRATION RATE: 18 BRPM | BODY MASS INDEX: 26.98 KG/M2 | HEIGHT: 61 IN | HEART RATE: 62 BPM | SYSTOLIC BLOOD PRESSURE: 102 MMHG | TEMPERATURE: 98 F

## 2018-09-27 DIAGNOSIS — Z76.82 ORGAN TRANSPLANT CANDIDATE: ICD-10-CM

## 2018-09-27 DIAGNOSIS — Z76.82 AWAITING ORGAN TRANSPLANT STATUS: ICD-10-CM

## 2018-09-27 DIAGNOSIS — N18.9 ANEMIA OF RENAL DISEASE: Chronic | ICD-10-CM

## 2018-09-27 DIAGNOSIS — Z99.2 ESRD ON DIALYSIS: Primary | Chronic | ICD-10-CM

## 2018-09-27 DIAGNOSIS — D63.1 ANEMIA OF RENAL DISEASE: Chronic | ICD-10-CM

## 2018-09-27 DIAGNOSIS — I10 ESSENTIAL HYPERTENSION: Chronic | ICD-10-CM

## 2018-09-27 DIAGNOSIS — N18.6 ESRD ON DIALYSIS: Primary | Chronic | ICD-10-CM

## 2018-09-27 DIAGNOSIS — N25.81 SECONDARY HYPERPARATHYROIDISM OF RENAL ORIGIN: Chronic | ICD-10-CM

## 2018-09-27 PROCEDURE — 99999 PR PBB SHADOW E&M-EST. PATIENT-LVL IV: CPT | Mod: PBBFAC,TXP,, | Performed by: INTERNAL MEDICINE

## 2018-09-27 PROCEDURE — 76770 US EXAM ABDO BACK WALL COMP: CPT | Mod: 26,TXP,, | Performed by: RADIOLOGY

## 2018-09-27 PROCEDURE — 99215 OFFICE O/P EST HI 40 MIN: CPT | Mod: S$GLB,TXP,, | Performed by: INTERNAL MEDICINE

## 2018-09-27 PROCEDURE — 76770 US EXAM ABDO BACK WALL COMP: CPT | Mod: TC,TXP

## 2018-09-27 PROCEDURE — 71046 X-RAY EXAM CHEST 2 VIEWS: CPT | Mod: 26,TXP,, | Performed by: RADIOLOGY

## 2018-09-27 PROCEDURE — 71046 X-RAY EXAM CHEST 2 VIEWS: CPT | Mod: TC,TXP

## 2018-09-27 NOTE — PROGRESS NOTES
Transplant Recipient Adult Psychosocial Assessment (Update)    Tala Rivera  [address]  [place] [address]    Telephone Information:   Mobile [phone]   Home  [phone] (home)  Work  [phone] (work)  E-mail  [email or web address]    Sex: female  YOB: 1965  Age: 52 y.o.    Encounter Date: 2018  U[place] Citizen: yes  Primary Language: English   Needed: no    Emergency Contact:  Name: Kathy Rivera Jr  Relationship:   Address: same as above  Phone Numbers:  [phone] (home), n/a (work), [phone] (mobile)    Family/Social Support:   Number of dependents/: Pt reports 2 minor sons: Simone and Gabo who would be cared for by their brother and his wife: Damien and Kayleigh or pt's step son and his wife: Anson and Fauzia ([phone])  Marital history:  once for 22 yrs, three sons  Other family dynamics: Pt's parents and sister: Ghazala are .  She has two living sisters and one brother.  Claire Key [phone],  [place] [phone]  Sonya Alvarez Hasmukh, number unavailable. Pt's son: Simone has Down's Syndrome.    Household Composition:  Name: Tala Rivera  Age: 51  Relationship: patient  Does person drive? yes    Name: Kathy Rivera  Age: 71  Relationship:   Does person drive? yes    Name: Simone and Gabo Rivera   Age: 16 and 15 (respectively)  Relationship: sons  Does person drive? no    Do you and your caregivers have access to reliable transportation? yes  PRIMARY CAREGIVER: Kathy Rivera (rigo) will be primary caregiver, phone number [phone].      provided in-depth information to patient and caregiver regarding pre- and post-transplant caregiver role.   strongly encourages patient and caregiver to have concrete plan regarding post-transplant care giving, including back-up caregiver(s) to ensure care giving needs are met as needed.    Patient and Caregiver states understanding all  aspects of caregiver role/commitment and is able/willing/committed to being caregiver to the fullest extent necessary.    Patient and Caregiver verbalizes understanding of the education provided today and caregiver responsibilities.         remains available. Patient and Caregiver agree to contact  in a timely manner if concerns arise.      Able to take time off work without financial concerns: yes.     Additional Significant Others who will Assist with Transplant:  Name: Damien Rivera  Age: 19  Phone: [phone] (home) [phone] (cell)  City: [place] State: [place]  Relationship: son  Does person drive? yes    Name: Dayami Rivera  Age: 20  Phone: [phone] (home) [phone]  City: [place] State: [place]  Relationship: daughter-in-law  Does person drive? yes    Name: Fauzia Rivera  Age: 38  Phone: [phone]  City: [place] State: [place]  Relationship: daughter-in-law  Does person drive? yes     Living Will: no Education and information provided   Healthcare Power of : no Education and information provided   Advance Directives on file: <<no information> per medical record.  Verbally reviewed LW/HCPA information.   provided patient with copy of LW/HCPA documents and provided education on completion of forms.    Living Donors: No. Education and resource information given to patient.    Highest Education Level: High School (9-12) or GED  Reading Ability: 12th grade  Reports difficulty with: Pt reports no difficulties  Learns Best By:  Pt reports pt learns best by a combination of verbal, written, and tactile instructions.     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    Spouse/Significant Other Employment: Pt's  reports that he is retired from the army and 100%disabled. He reports that he can still act as  caregiver.    Disabled: no    Monthly Income:  Salary/Wages: $2400.00  Other household members total: $4675.00     Able to afford all costs now and if transplanted, including medications: yes Pt's  reports that pt can get her medications through the [employer] as well  Patient and Caregiver verbalizes understanding of personal responsibilities related to transplant costs and the importance of having a financial plan to ensure that patients transplant costs are fully covered.       provided fundraising information/education. Patient and Caregiververbalizes understanding.   remains available.    Insurance:   Payor/Plan Subscr  Sex Relation Sub. Ins. ID Effective Group Num   1. Basic6* MAGO MERA 1965 Female  [id number]                                   [address]   2. [place] - [place]* MAGO MERA 1965 Female  [id number]                                    [address]       Primary Insurance (for UNOS reporting): Private Insurance  Secondary Insurance (for UNOS reporting): Public Insurance - Other Government ( per pt report. Pt receives Spousal benefits) Pt also has Medicare. Pt reports that on  her Medicare will become primary.   Patient and Caregiver verbalizes clear understanding that patient may experience difficulty obtaining and/or be denied insurance coverage post-surgery. This includes and is not limited to disability insurance, life insurance, health insurance, burial insurance, long term care insurance, and other insurances.      Patient and Caregiver also reports understanding that future health concerns related to or unrelated to transplantation may not be covered by patient's insurance.  Resources and information provided and reviewed.     Patient and Caregiver provides verbal permission to release any necessary information to outside resources for patient care and discharge planning.  Resources and  information provided are reviewed.      Dialysis Adherence: Patient reports being on hemodialysis in center attending all dialysis appointments and staying for the entire course of treatment. SW received a faxed adherence form from pt's dialysis unit which indicates over last three months that the patient has had no AMAs, not had any no-shows, and  reports that pt has not had any issues with labs, fluid gains, or other psychosocial issues.    Infusion Service: patient utilizing? no  Home Health: patient utilizing? no  DME: yes BP Cuff and glucometer  Pulmonary/Cardiac Rehab: Pt denies   ADLS:  Pt states ability to independently accomplish all adls.    Adherence:   Pt states current and expected compliance with all healthcare recommendations..  Adherence education and counseling provided.     Per History Section:  Past Medical History:   Diagnosis Date    Anemia of renal disease     ESRD on dialysis      Essential hypertension     Secondary hyperparathyroidism of renal origin      Social History     Tobacco Use    Smoking status: Never Smoker    Smokeless tobacco: Never Used   Substance Use Topics    Alcohol use: No     Social History     Substance and Sexual Activity   Drug Use No     Social History     Substance and Sexual Activity   Sexual Activity Yes    Partners: Male       Per Today's Psychosocial:  Tobacco: none, patient denies any use.  Alcohol: none, patient denies any use.  Illicit Drugs/Non-prescribed Medications: none, patient denies any use.    Patient and Caregiver states clear understanding of the potential impact of substance use as it relates to transplant candidacy and is aware of possible random substance screening.  Substance abstinence/cessation counseling, education and resources provided and reviewed.     Arrests/DWI/Treatment/Rehab: patient denies    Psychiatric History:    Mental Health: pt denies any overwhelming feelings of sadness or anxiety at this time. Patient denies being dx  with any mental health disorders of any kind.  Psychiatrist/Counselor: Pt denies seeing a mental health professional and reports being open to seeing the psych department for talk therapy if necessary.  Medications: Pt denies taking medications for mental health reasons.  Suicide/Homicide Issues: Pt denies current or past suicidal/homicidal ideation.   Safety at home: Pt reports no current or history of safety concerns in household; including mental, physical, verbal, or sexual abuse.    Knowledge: Patient and Caregiver states having clear understanding and realistic expectations regarding the potential risks and potential benefits of organ transplantation and organ donation and agrees to discuss with health care team members and support system members, as well as to utilize available resources and express questions and/or concerns in order to further facilitate the pt informed decision-making.  Resources and information provided and reviewed.    Patient and Caregiver is aware of [facility]'s affiliation and/or partnership with agencies in home health care, LTAC, SNF, [facility], and other hospitals and clinics.    Understanding: Patient and Caregiver reports having a clear understanding of the many lifetime commitments involved with being a transplant recipient, including costs, compliance, medications, lab work, procedures, appointments, concrete and financial planning, preparedness, timely and appropriate communication of concerns, abstinence (ETOH, tobacco, illicit non-prescribed drugs), adherence to all health care team recommendations, support system and caregiver involvement, appropriate and timely resource utilization and follow-through, mental health counseling as needed/recommended, and patient and caregiver responsibilities.  Social Service Handbook, resources and detailed educational information provided and reviewed.  Educational information provided.    Patient and Caregiver also reports current and expected  compliance with health care regime and states having a clear understanding of the importance of compliance.      Patient and Caregiver reports a clear understanding that risks and benefits may be involved with organ transplantation and with organ donation.       Patient and Caregiver also reports clear understanding that psychosocial risk factors may affect patient, and include but are not limited to feelings of depression, generalized anxiety, anxiety regarding dependence on others, post traumatic stress disorder, feelings of guilt and other emotional and/or mental concerns, and/or exacerbation of existing mental health concerns.  Detailed resources provided and discussed.      Patient and Caregiver agrees to access appropriate resources in a timely manner as needed and/or as recommended, and to communicate concerns appropriately.  Patient and Caregiver also reports a clear understanding of treatment options available.     Patient and Caregiver received education in a group setting.   reviewed education, provided additional information, and answered questions.    Feelings or Concerns: Patient and Caregiver did not express any concerns at this time.    Coping: Pt states her jose and Rastafari family help her cope, job, being around people, mowing the lawn, traveling when she can. Pt reports Rastafari home as [facility] in [place], [place] with Garrett Ayon  presiding.     Goals: Stop dialysis, continue to work, travel more freely.  Patient referred to Vocational Rehabilitation.    Interview Behavior: Patient and Caregiver presents as alert and oriented x 4, pleasant, good eye contact, well groomed, recall good, concentration/judgement good, average intelligence, calm, communicative, cooperative and asking and answering questions appropriately. She was accompanied by her .        Transplant Social Work - Candidacy  Assessment/Plan:     Psychosocial Suitability: Patient presents as a suitable  candidate for kidney transplant at this time. Based on psychosocial risk factors, patient presents as low risk, due to suitable caregiver plan for self and minor children, finnacial plan in place, adequate insurance, and suitable dialysis adherence.    Recommendations/Additional Comments: SW recommends that pt conduct fundraising to assist pt with pay for cost of medications, food, gas, and other transplant related needs. SW recommends that pt remain aware of potential mental health concerns and contact the team if any concerns arise. SW recommends that pt remain abstinent from tobacco, ETOH, and drug use. SW supports pt's continued dialysis adherence. SW remains available to answer any questions or concerns that arise as the pt moves through the transplant process.       Paula Hall LMSW

## 2018-09-27 NOTE — PROGRESS NOTES
Kidney Transplant Recipient Reevalulation    Referring Physician: Elias Jon  Current Nephrologist: Elias Jon  Waitlist Status: active  Dialysis Start Date: 2/5/2016    Subjective:     CC:  Annual reassessment of kidney transplant candidacy.    HPI:  Ms. Rivera is a 52 y.o. year old White female with history of HTN, gastric bypass surgery on 7/28/16, ESRD presumed secondary to HTN.  She has been on the wait list for a kidney transplant at [facility] since 2/5/2016. Patient is currently on hemodialysis started on 2/5/16. Patient is dialyzing on MWF schedule.  Patient reports that she is tolerating dialysis well. States her BP will drop to the 80-90s systolic during dialysis. She has a RUE AV graft. Patient was last seen in listed RR clinic on 8/29/17. She denies any recent hospitalizations or ED visits. She had umbilical hernia repair (no mesh) and PD catheter placement on 9/4/18. States she is to start PD training on 10/3/18.      She had CXR and renal US earlier today which all acceptable.     TTE (8/23/18):      She hasn't gone to the gym for a while. No stairs to climb at home. She is the  at [employer] and works 40 hours a week. She will go grocery shopping and can walk around without difficulty. She will do household chores. With the activity that she does she denies symptoms of chest pain, SOB, claudication.     She is accompanied to visit by her     Review of Systems   Constitutional: Denies fever/chills, fatigue, night sweats  EENT: Denies vision problems, hearing problems, trouble swallowing.   Respiratory: Denies shortness of breath, dyspnea on exertion, orthopnea, wheezing, hemoptysis, denies known TB exposure or history of positive TB skin test  Cardiovascular: Denies chest pain, palpitations, history of MI, history of stroke, history of DVT  Gastrointestinal: +s/p gastric bypass 7/28/16; +ventral hernia repair and PD catheter on 9/4/18; Denies abdominal pain,  "nausea/vomiting/diarrhea/constipation. Denies history of GI bleeding or ulcers.   Genitourinary: +residual UOP >2L/day; Denies history of kidney stones, recurrent UTI's, history of urinary obstruction, hematuria, dysuria, urinary frequency, incontinence  Musculoskeletal: +leg cramps much improved; Denies trouble moving extremities, denies joint pain or swelling  Skin: Denies history of skin cancer, denies rash, ulcerations  Heme/onc: Denies any history of cancer, denies history of coagulopathies or bleeding disorders  Endocrine: +gained 8 lbs since last transplant clinic visit; ; Denies thyroid disease  Neurological: Denies tremors, seizures, dizziness, headaches, peripheral neuropathy  Psychiatric: Denies anxiety, depression. Denies hallucinations or delusions.    Potential Donors: no    Medications:  Current Outpatient Medications   Medication Sig Dispense Refill    lidocaine-prilocaine (EMLA) cream APPLY SMALL AMOUNT TO THE AFFECTED AREA PRIOR TO dialysis THREE TIMES A WEEK  3    VELPHORO 500 mg Chew Take 2 tablets by mouth 3 (three) times daily with meals.  3     Current Facility-Administered Medications   Medication Dose Route Frequency Provider Last Rate Last Dose    lidocaine (PF) 10 mg/ml (1%) injection 10 mg  1 mL Intradermal Once Hair Jimenez MD         Potential donors: son was seen earlier today for donor evaluation but he was denied secondary to weight     Objective:   body mass index is 27.32 kg/m².   BP (!) 102/49 (BP Location: Right arm, Patient Position: Sitting, BP Method: Medium (Automatic))   Pulse 62   Temp 97.8 °F (36.6 °C) (Oral)   Resp 18   Ht 5' 0.63" (1.54 m)   Wt 64.8 kg (142 lb 13.7 oz)   LMP  (LMP Unknown)   SpO2 97%   BMI 27.32 kg/m²     Physical Exam   General: No acute distress, well groomed, alert and oriented x 3  HEENT: Normocephalic, atraumatic, PERRLA, sclera anicteric, conjunctiva/corneas clear, EOM's intact bilaterally, external inspection of ears and nose normal, " moist mucous membranes, no oral ulcerations/lesions   Neck: Supple, symmetrical, trachea midline, no masses, no carotid bruits, no JVD, thyroid is normal without nodules or enlargement   Respiratory: Clear to auscultation bilaterally, respirations unlabored, no rales/rhonchi/wheezing   Cardiovacular: Regular rate and rhythm, S1, S2 normal, no murmurs, no rubs or gallops   Gastrointestinal: Soft, non-tender, bowel sounds normal, no masses, no palpable organomegaly; soft, incision healing from recent umbilical hernia repair  Extremities: No clubbing or cyanosis of upper extremities bilaterally, no pedal edema bilaterally; +2 bilateral radial pulses  Skin: warm and dry; no rash on exposed skin  Lymph nodes: Cervical and supraclavicular nodes normal   Neurologic: No focal neurologic deficits.   Musculoskeletal: moves all extremities without difficulty, FROM, 5/5 strength, ambulates without an assistive device  Psychiatric: Normal mood and affect. Responds appropriately to questions.  Access: RUE AVG +thrill/bruit; LUE AVF +thrill/bruit (per patient it is too deep for cannulation); PD catheter on right abdomen      Labs:  Lab Results   Component Value Date    WBC 4.17 07/19/2016    HGB 8.8 (L) 09/04/2018    HCT 37.3 07/19/2016     09/04/2018    K 4.8 09/04/2018     09/04/2018    CO2 21 (L) 09/04/2018    BUN 81 (H) 09/04/2018    CREATININE 7.29 (H) 09/04/2018    EGFRNONAA 6 (A) 09/04/2018    CALCIUM 8.9 09/04/2018    PHOS 2.7 07/19/2016    ALBUMIN 3.8 07/19/2016    AST 20 07/19/2016    ALT 26 07/19/2016    .0 (H) 09/27/2018       No results found for: PREALBUMIN, BILIRUBINUA, GGT, AMYLASE, LIPASE, PROTEINUA, NITRITE, RBCUA, WBCUA    No results found for: HLAABCTYPE    Lab Results   Component Value Date    CPRA 0 07/11/2018    CPRA 67 07/11/2018    CPRA 67 07/11/2018    LK9EKHW A1,B76,A23,A80,A24,A36,A11,A3 07/11/2018    CIABCLM WEAK A29 07/11/2018    CIIAB DR16,DP19,DP1,DR4,DP23 11/13/2017    ABCMT  ASHTYN-- YINA, DP6 07/11/2018       Labs were reviewed with the patient.    Pre-transplant Workup:   Reviewed with the patient.    Assessment:     1. ESRD on dialysis     2. Essential hypertension    3. Anemia of renal disease    4. Secondary hyperparathyroidism of renal origin    5. Awaiting organ transplant status        Plan:     Transplant Candidacy:   Ms. Rivera is a suitable kidney transplant candidate.  She remains in overall stable health, and will remain active on the transplant list. She needs updated cardiac stress test.     Patient is an acceptable candidate for high KDPI kidney from medical perspective if her BP is stable - clinic BP today 102/49. Would obtain BP logs from dialysis unit.     Discussed with patient and  regarding hypotension and risk for thrombosis - encouraged her to discussed with her nephrologist regarding possible need for midodrine/Florinef.     Exercise: reminded Tala of the importance of regular exercise for weight management, blood sugar and blood pressure management.  I also explained exercise has been shown to improve cardiovascular health, energy level, and sleep hygiene.  Lastly, I advised her that cardiovascular complications are leading cause of death and regular exercise can help lower this risk.    Encouraged her to have any other potential donors contact the living donor coordinator.     Karena Colin MD       Follow-up:   In addition to the tests noted in the plan, Ms. Rivera will continue to have reevaluation as per the standing pre-kidney transplant protocol:  1. Monthly blood for PRA  2. Annual return to clinic, except HIV positive, > 65 years of age, or pancreas transplant candidates who will be scheduled to see transplant every 6 months while in pre-transplant phase  3. Annual re-testing: CXR, EKG, yearly mammograms for women over 40 and PSA for males over 40, cardiology follow-up as recommended by initial cardiology pre-transplant evaluation  4. Renal  ultrasound every 2 years  5. Baseline colonoscopy after age 50 and repeated as recommended    UNOS Patient Status  Functional Status: 70% - Cares for self: unable to carry on normal activity or active work  Physical Capacity: No Limitations

## 2018-09-27 NOTE — PATIENT INSTRUCTIONS
1. Stay active   2. Have any potential donors contact the living donor coordinator   3. Talk to the living donor coordinator regarding the kidney paired exchange program   4. Discuss with your kidney doctor about the low blood pressure and whether you need a medication to boost up your blood pressure   5. Will need to get an updated stress test

## 2018-09-27 NOTE — LETTER
September 27, 2018        Elias Jon  [address] [place] [address]  Phone: [phone]  Fax: [phone]             [facility]  [address]  [facility] [address]  Phone: [phone]   Patient: Tala Rivera   MR Number: [id number]   YOB: 1965   Date of Visit: 9/27/2018       Dear Dr. Elias Jon    Thank you for referring Tala Rivera to me for evaluation. Attached you will find relevant portions of my assessment and plan of care.    If you have questions, please do not hesitate to call me. I look forward to following Tala Rivera along with you.    Sincerely,    Karena Colin MD    Enclosure    If you would like to receive this communication electronically, please contact [email or web address] or [phone] to request [employer] Link access.    [employer] Link is a tool which provides read-only access to select patient information with whom you have a relationship. Its easy to use and provides real time access to review your patients record including encounter summaries, notes, results, and demographic information.    If you feel you have received this communication in error or would no longer like to receive these types of communications, please e-mail [email or web address]

## 2018-10-04 ENCOUNTER — TELEPHONE (OUTPATIENT)
Dept: CARDIOLOGY | Facility: CLINIC | Age: 53
End: 2018-10-04

## 2018-10-04 DIAGNOSIS — Z76.82 ORGAN TRANSPLANT CANDIDATE: Primary | ICD-10-CM

## 2018-10-04 NOTE — TELEPHONE ENCOUNTER
Received call that patient needs Lexascan/nuc for kidney transplant clearance to be done end of November.Patient to be called to set up.Unable to reach.Left message with family appointment to be mailed to her with instructions.

## 2018-10-05 DIAGNOSIS — Z76.82 AWAITING ORGAN TRANSPLANT STATUS: Primary | ICD-10-CM

## 2018-10-10 DIAGNOSIS — Z76.82 ORGAN TRANSPLANT CANDIDATE: ICD-10-CM

## 2018-10-16 ENCOUNTER — LAB VISIT (OUTPATIENT)
Dept: LAB | Facility: HOSPITAL | Age: 53
End: 2018-10-16
Payer: COMMERCIAL

## 2018-10-16 DIAGNOSIS — Z76.82 ORGAN TRANSPLANT CANDIDATE: ICD-10-CM

## 2018-10-16 PROCEDURE — 86833 HLA CLASS II HIGH DEFIN QUAL: CPT | Mod: PO,TXP

## 2018-10-16 PROCEDURE — 86832 HLA CLASS I HIGH DEFIN QUAL: CPT | Mod: PO,TXP

## 2018-10-18 PROBLEM — T85.611A PD CATHETER DYSFUNCTION: Status: ACTIVE | Noted: 2018-10-18

## 2018-10-24 LAB — HPRA INTERPRETATION: NORMAL

## 2018-10-29 ENCOUNTER — OFFICE VISIT (OUTPATIENT)
Dept: OBSTETRICS AND GYNECOLOGY | Facility: CLINIC | Age: 53
End: 2018-10-29
Payer: COMMERCIAL

## 2018-10-29 VITALS
WEIGHT: 151.19 LBS | HEIGHT: 61 IN | SYSTOLIC BLOOD PRESSURE: 116 MMHG | BODY MASS INDEX: 28.55 KG/M2 | DIASTOLIC BLOOD PRESSURE: 52 MMHG

## 2018-10-29 DIAGNOSIS — Z01.419 PAPANICOLAOU TEST, AS PART OF ROUTINE GYNECOLOGICAL EXAMINATION: ICD-10-CM

## 2018-10-29 DIAGNOSIS — Z00.00 PREVENTATIVE HEALTH CARE: Primary | ICD-10-CM

## 2018-10-29 PROCEDURE — 99396 PREV VISIT EST AGE 40-64: CPT | Mod: S$PBB,,, | Performed by: NURSE PRACTITIONER

## 2018-10-29 PROCEDURE — 99999 PR PBB SHADOW E&M-EST. PATIENT-LVL II: CPT | Mod: PBBFAC,,, | Performed by: NURSE PRACTITIONER

## 2018-10-29 PROCEDURE — 99499 UNLISTED E&M SERVICE: CPT | Mod: S$GLB,,, | Performed by: NURSE PRACTITIONER

## 2018-10-29 PROCEDURE — 99212 OFFICE O/P EST SF 10 MIN: CPT | Mod: PBBFAC,PO | Performed by: NURSE PRACTITIONER

## 2018-10-29 PROCEDURE — 87624 HPV HI-RISK TYP POOLED RSLT: CPT

## 2018-10-29 PROCEDURE — 88175 CYTOPATH C/V AUTO FLUID REDO: CPT

## 2018-10-29 RX ORDER — CALCITRIOL 0.25 UG/1
0.25 CAPSULE ORAL DAILY
Refills: 6 | Status: ON HOLD | COMMUNITY
Start: 2018-10-23 | End: 2020-04-06 | Stop reason: HOSPADM

## 2018-10-29 NOTE — PROGRESS NOTES
CC: Well woman exam    Tala Rivera is a 52 y.o. female  presents for well woman exam.  LMP:.Patient has ESRF and is presenting for repeat pap exam for transplant team. Is sexually active. Has mammogram in 2018. No pelvic pain or AUB.    Past Medical History:   Diagnosis Date    Anemia of renal disease     ESRD on dialysis     Dr. Muhammad     Essential hypertension     Secondary hyperparathyroidism of renal origin      Past Surgical History:   Procedure Laterality Date    AV FISTULA PLACEMENT Left     never matured    AV graft Right 2015     SECTION      x3; ; , and     GASTRIC BYPASS  2016    Laparoscopic INSERTION, CATHETER, INTRAPERITONEAL N/A 2018    Performed by Hair [facility] at [facility]    Laparoscopic REPAIR,HERNIA,INCISIONAL WITHOUT HISTORY OF PRIOR REPAIR N/A 2018    Performed by Hair [facility] at [facility]    LAPAROSCOPIC REVISION OF PROCEDURE INVOLVING PERITONEAL DIALYSIS CATHETER N/A 10/18/2018    Procedure: REVISION OF PROCEDURE INVOLVING PERITONEAL DIALYSIS CATHETER, LAPAROSCOPIC;  Surgeon: Hair Jimenez MD;  Location: [facility];  Service: General;  Laterality: N/A;    PERITONEAL CATHETER INSERTION N/A 2018    Procedure: Laparoscopic INSERTION, CATHETER, INTRAPERITONEAL;  Surgeon: Hair Jimenez MD;  Location: [facility];  Service: General;  Laterality: N/A;    REVISION OF PROCEDURE INVOLVING PERITONEAL DIALYSIS CATHETER, LAPAROSCOPIC N/A 10/18/2018    Performed by Hair Jimenez MD at [facility]     Social History     Socioeconomic History    Marital status:      Spouse name: Not on file    Number of children: Not on file    Years of education: Not on file    Highest education level: Not on file   Social Needs    Financial resource strain: Not on file    Food insecurity - worry: Not on file    Food insecurity - inability: Not on file    Transportation needs - medical: Not on file    Transportation needs - non-medical:  "Not on file   Occupational History    Not on file   Tobacco Use    Smoking status: Never Smoker    Smokeless tobacco: Never Used   Substance and Sexual Activity    Alcohol use: No    Drug use: No    Sexual activity: Yes     Partners: Male   Other Topics Concern    Not on file   Social History Narrative    She lives with  and 2 dogs and 1 cat     She works as  at [employer]      Family History   Problem Relation Age of Onset    Kidney disease Mother     Diabetes Mother     Heart disease Mother     Hypertension Mother     Lupus Sister     Down syndrome Son     Cancer Neg Hx     Stroke Neg Hx      OB History      Para Term  AB Living    3 3 3          SAB TAB Ectopic Multiple Live Births                       BP (!) 116/52   Ht 5' 1" (1.549 m)   Wt 68.6 kg (151 lb 3.2 oz)   LMP  (LMP Unknown)   BMI 28.57 kg/m²       ROS:  GENERAL: Denies weight gain or weight loss. Feeling well overall.   SKIN: Denies rash or lesions.   HEAD: Denies head injury or headache.   NODES: Denies enlarged lymph nodes.   CHEST: Denies chest pain or shortness of breath.   CARDIOVASCULAR: Denies palpitations or left sided chest pain.   ABDOMEN: No abdominal pain, constipation, diarrhea, nausea, vomiting or rectal bleeding.   URINARY: No frequency, dysuria, hematuria, or burning on urination.  REPRODUCTIVE: See HPI.   BREASTS: The patient performs breast self-examination and denies pain, lumps, or nipple discharge.   HEMATOLOGIC: No easy bruisability or excessive bleeding.   MUSCULOSKELETAL: Denies joint pain or swelling.   NEUROLOGIC: Denies syncope or weakness.   PSYCHIATRIC: Denies depression, anxiety or mood swings.    PHYSICAL EXAM:  APPEARANCE: Well nourished, well developed, in no acute distress.  AFFECT: WNL, alert and oriented x 3  SKIN: No acne or hirsutism  NECK: Neck symmetric without masses or thyromegaly  NODES: No inguinal, cervical, axillary, or femoral lymph node " enlargement  CHEST: Good respiratory effect  ABDOMEN: Soft.  No tenderness or masses.  No hepatosplenomegaly.  No hernias.  BREASTS: Symmetrical, no skin changes or visible lesions.  No palpable masses, nipple discharge bilaterally.  PELVIC: Normal external genitalia without lesions.  Normal hair distribution.  Adequate perineal body, normal urethral meatus.  Vagina atrophy without lesions or discharge.  Cervix pink, without lesions, discharge or tenderness.  No significant cystocele or rectocele.  Bimanual exam shows uterus to be normal size, regular, mobile and nontender.  Adnexa without masses or tenderness.    EXTREMITIES: No edema.    1. Preventative health care  Liquid-based pap smear, screening    HPV High Risk Genotypes, PCR   2. Papanicolaou test, as part of routine gynecological examination  Liquid-based pap smear, screening    HPV High Risk Genotypes, PCR     PLAN:  Pap exam  Patient was counseled today on A.C.S. Pap guidelines and recommendations for yearly pelvic exams, mammograms and monthly self breast exams; to see her PCP for other health maintenance.

## 2018-11-02 ENCOUNTER — TELEPHONE (OUTPATIENT)
Dept: OBSTETRICS AND GYNECOLOGY | Facility: CLINIC | Age: 53
End: 2018-11-02

## 2018-11-02 LAB
HPV HR 12 DNA CVX QL NAA+PROBE: NEGATIVE
HPV16 AG SPEC QL: NEGATIVE
HPV18 DNA SPEC QL NAA+PROBE: NEGATIVE

## 2018-11-02 NOTE — TELEPHONE ENCOUNTER
----- Message from Jenny Delcid NP sent at 11/2/2018 11:11 AM CDT -----  Please call patient and inform her that pap exam results ar negative. Does indicated BV, will treat if she has symptoms.

## 2018-11-02 NOTE — TELEPHONE ENCOUNTER
Attempted to contact pt, no answer, received message stating that the wireless caller I am trying to reach is not available, please try again later.

## 2018-11-02 NOTE — PROGRESS NOTES
Please call patient and inform her that pap exam results ar negative. Does indicated BV, will treat if she has symptoms.

## 2018-11-05 ENCOUNTER — TELEPHONE (OUTPATIENT)
Dept: OBSTETRICS AND GYNECOLOGY | Facility: CLINIC | Age: 53
End: 2018-11-05

## 2018-11-05 LAB
CLASS I ANTIBODIES - LUMINEX: NORMAL
CLASS I ANTIBODY COMMENTS - LUMINEX: NORMAL
CLASS II ANTIBODY COMMENTS - LUMINEX: NORMAL
CPRA %: 45
SERUM COLLECTION DT - LUMINEX CLASS I: NORMAL
SERUM COLLECTION DT - LUMINEX CLASS II: NORMAL
SPCL1 TESTING DATE: NORMAL
SPCL2 TESTING DATE: NORMAL
SPCLU TESTING DATE: NORMAL

## 2018-11-05 NOTE — TELEPHONE ENCOUNTER
Spoke to patient and let her know that her pap exam results are negative. Also let her know that it does indicate BV (Bacterial vaginosis) and that we will treat it if she has symptoms. Patient denies symptoms. Let patient know to call us back if she has any questions or starts having symptoms. Patient verbalized understanding.

## 2018-11-13 ENCOUNTER — TELEPHONE (OUTPATIENT)
Dept: TRANSPLANT | Facility: CLINIC | Age: 53
End: 2018-11-13

## 2018-11-13 NOTE — TELEPHONE ENCOUNTER
PD unit updated and verified with clinic       ----- Message from Brenda Tristan sent at 11/13/2018  3:12 PM CST -----  Contact: Rhonda marx/[facility] [phone]/Fax [phone]  Calling to inform that pt has xferred to their clinic/pt now does home PD    Any questions contact [facility]

## 2018-12-04 ENCOUNTER — TELEPHONE (OUTPATIENT)
Dept: CARDIOLOGY | Facility: CLINIC | Age: 53
End: 2018-12-04

## 2018-12-04 NOTE — TELEPHONE ENCOUNTER
Called to speak with patient about stress test tomorrow.She was not feeling well.Her abdomin was hurting again and she is post op revision of her peritoneal cathter. She decided to cx and reschedule after following up with physician.

## 2018-12-21 ENCOUNTER — LAB VISIT (OUTPATIENT)
Dept: LAB | Facility: HOSPITAL | Age: 53
End: 2018-12-21
Payer: COMMERCIAL

## 2018-12-21 DIAGNOSIS — Z76.82 ORGAN TRANSPLANT CANDIDATE: ICD-10-CM

## 2018-12-21 PROCEDURE — 99001 SPECIMEN HANDLING PT-LAB: CPT | Mod: PO,TXP

## 2019-01-10 ENCOUNTER — LAB VISIT (OUTPATIENT)
Dept: LAB | Facility: HOSPITAL | Age: 54
End: 2019-01-10
Payer: COMMERCIAL

## 2019-01-10 DIAGNOSIS — Z76.82 ORGAN TRANSPLANT CANDIDATE: ICD-10-CM

## 2019-01-10 PROCEDURE — 86833 HLA CLASS II HIGH DEFIN QUAL: CPT | Mod: PO,TXP

## 2019-01-10 PROCEDURE — 86832 HLA CLASS I HIGH DEFIN QUAL: CPT | Mod: PO,TXP

## 2019-01-28 LAB — HPRA INTERPRETATION: NORMAL

## 2019-02-02 LAB
CLASS I ANTIBODIES - LUMINEX: NORMAL
CLASS II ANTIBODIES - LUMINEX: NEGATIVE
CPRA %: 71
SERUM COLLECTION DT - LUMINEX CLASS I: NORMAL
SERUM COLLECTION DT - LUMINEX CLASS II: NORMAL
SPCL1 TESTING DATE: NORMAL
SPCL2 TESTING DATE: NORMAL
SPCLU TESTING DATE: NORMAL

## 2019-02-11 ENCOUNTER — LAB VISIT (OUTPATIENT)
Dept: LAB | Facility: HOSPITAL | Age: 54
End: 2019-02-11
Payer: MEDICARE

## 2019-02-11 DIAGNOSIS — Z76.82 ORGAN TRANSPLANT CANDIDATE: ICD-10-CM

## 2019-02-11 PROCEDURE — 99001 SPECIMEN HANDLING PT-LAB: CPT | Mod: PO,TXP

## 2019-02-15 DIAGNOSIS — Z76.82 AWAITING ORGAN TRANSPLANT STATUS: Primary | ICD-10-CM

## 2019-03-21 ENCOUNTER — LAB VISIT (OUTPATIENT)
Dept: LAB | Facility: HOSPITAL | Age: 54
End: 2019-03-21
Payer: COMMERCIAL

## 2019-03-21 DIAGNOSIS — Z76.82 AWAITING ORGAN TRANSPLANT STATUS: ICD-10-CM

## 2019-03-21 PROCEDURE — 99001 SPECIMEN HANDLING PT-LAB: CPT | Mod: PO,TXP

## 2019-04-01 ENCOUNTER — TELEPHONE (OUTPATIENT)
Dept: TRANSPLANT | Facility: CLINIC | Age: 54
End: 2019-04-01

## 2019-04-01 DIAGNOSIS — Z76.82 ORGAN TRANSPLANT CANDIDATE: Primary | ICD-10-CM

## 2019-04-01 PROBLEM — R80.9 PROTEINURIA: Status: ACTIVE | Noted: 2019-04-01

## 2019-04-01 NOTE — TELEPHONE ENCOUNTER
BP records from dialysis unit sent. Scanned into media for review. Lowest documented BP is 88/49 while standing PRE session.

## 2019-04-02 ENCOUNTER — TELEPHONE (OUTPATIENT)
Dept: TRANSPLANT | Facility: CLINIC | Age: 54
End: 2019-04-02

## 2019-04-02 NOTE — TELEPHONE ENCOUNTER
Reviewed outside BP records with DR Frye. Random orthostatic drop when standing. OK for patient to remain active on the list.

## 2019-04-15 ENCOUNTER — TELEPHONE (OUTPATIENT)
Dept: CARDIOLOGY | Facility: CLINIC | Age: 54
End: 2019-04-15

## 2019-04-16 ENCOUNTER — HOSPITAL ENCOUNTER (OUTPATIENT)
Dept: RADIOLOGY | Facility: HOSPITAL | Age: 54
Discharge: HOME | End: 2019-04-16
Attending: NURSE PRACTITIONER
Payer: MEDICARE

## 2019-04-16 ENCOUNTER — CLINICAL SUPPORT (OUTPATIENT)
Dept: CARDIOLOGY | Facility: CLINIC | Age: 54
End: 2019-04-16
Attending: NURSE PRACTITIONER
Payer: MEDICARE

## 2019-04-16 DIAGNOSIS — Z76.82 ORGAN TRANSPLANT CANDIDATE: ICD-10-CM

## 2019-04-16 LAB
CV STRESS BASE HR: 52 BPM
DIASTOLIC BLOOD PRESSURE: 62 MMHG
END DIASTOLIC INDEX-HIGH: 171 ML/M2
END SYSTOLIC INDEX-HIGH: 70 ML/M2
NUC REST DIASTOLIC VOLUME INDEX: 87
NUC REST EJECTION FRACTION: 90
NUC REST SYSTOLIC VOLUME INDEX: 9
OHS CV CPX 85 PERCENT MAX PREDICTED HEART RATE MALE: 137
OHS CV CPX MAX PREDICTED HEART RATE: 161
OHS CV CPX PATIENT IS FEMALE: 1
OHS CV CPX PATIENT IS MALE: 0
OHS CV CPX PEAK DIASTOLIC BLOOD PRESSURE: 52 MMHG
OHS CV CPX PEAK HEAR RATE: 112 BPM
OHS CV CPX PEAK RATE PRESSURE PRODUCT: NORMAL
OHS CV CPX PEAK SYSTOLIC BLOOD PRESSURE: 136 MMHG
OHS CV CPX PERCENT MAX PREDICTED HEART RATE ACHIEVED: 70
OHS CV CPX RATE PRESSURE PRODUCT PRESENTING: 6188
RETIRED EF AND QEF - SEE NOTES: 51 %
SYSTOLIC BLOOD PRESSURE: 119 MMHG

## 2019-04-16 PROCEDURE — 93018 STRESS TEST WITH MYOCARDIAL PERFUSION (CUPID ONLY): ICD-10-PCS | Mod: TXP,,, | Performed by: INTERNAL MEDICINE

## 2019-04-16 PROCEDURE — 93018 CV STRESS TEST I&R ONLY: CPT | Mod: TXP,,, | Performed by: INTERNAL MEDICINE

## 2019-04-16 PROCEDURE — 77067 SCR MAMMO BI INCL CAD: CPT | Mod: 26,TXP,, | Performed by: RADIOLOGY

## 2019-04-16 PROCEDURE — A9502 TC99M TETROFOSMIN: HCPCS | Mod: PBBFAC,TXP | Performed by: INTERNAL MEDICINE

## 2019-04-16 PROCEDURE — 78452 HT MUSCLE IMAGE SPECT MULT: CPT | Mod: 26,TXP,, | Performed by: INTERNAL MEDICINE

## 2019-04-16 PROCEDURE — 78452 STRESS TEST WITH MYOCARDIAL PERFUSION (CUPID ONLY): ICD-10-PCS | Mod: 26,TXP,, | Performed by: INTERNAL MEDICINE

## 2019-04-16 PROCEDURE — 77067 SCR MAMMO BI INCL CAD: CPT | Mod: TC,TXP

## 2019-04-16 PROCEDURE — 77067 MAMMO DIGITAL SCREENING BILAT WITH CAD: ICD-10-PCS | Mod: 26,TXP,, | Performed by: RADIOLOGY

## 2019-04-16 PROCEDURE — 93016 CV STRESS TEST SUPVJ ONLY: CPT | Mod: TXP,,, | Performed by: INTERNAL MEDICINE

## 2019-04-16 PROCEDURE — 93016 STRESS TEST WITH MYOCARDIAL PERFUSION (CUPID ONLY): ICD-10-PCS | Mod: TXP,,, | Performed by: INTERNAL MEDICINE

## 2019-04-16 RX ORDER — REGADENOSON 0.08 MG/ML
0.4 INJECTION, SOLUTION INTRAVENOUS
Status: COMPLETED | OUTPATIENT
Start: 2019-04-16 | End: 2019-04-16

## 2019-04-16 RX ADMIN — REGADENOSON 0.4 MG: 0.08 INJECTION, SOLUTION INTRAVENOUS at 10:04

## 2019-04-18 ENCOUNTER — TELEPHONE (OUTPATIENT)
Dept: TRANSPLANT | Facility: CLINIC | Age: 54
End: 2019-04-18

## 2019-04-18 NOTE — PROGRESS NOTES
Per Patient 's request, stress test results faxed to Tala's cardiologist below for interpretation.       Tabby Chavez MDLocated within [facility]  Cardiology [place]  [address] [facility]  [place] [address]  Phone  [phone]  Fax  [phone]

## 2019-04-18 NOTE — TELEPHONE ENCOUNTER
"Spoke with patient. She denies any SOB or chest pain and states "I feel fine" instructed her to report to her nearest ED if she experiences any chest pain of SOB. Also informed her that her stress test result was routed to her local cardiologist for interpretation and follow up. Tala verbalized understanding of her results and plan.         ----- Message from Breann Wells sent at 4/18/2019 11:37 AM CDT -----  Contact: patient   This is a waitlist patient, please see wailisted coordinator box above. Thanks.     ----- Message -----  From: Lenin Jimenez  Sent: 4/18/2019  11:23 AM  To: [facility] Pre-Kidney Transplant Clinical    Patient returning a call from Irene Connors          Please call  or         Thanks!       "

## 2019-04-18 NOTE — TELEPHONE ENCOUNTER
Spoke with patient's , bc patient is at work.  Tala has a cardiologist, Dr Scott GASTELUM MD.  # [phone]. Fax # [phone].   Will send stress test results to him for interpretation.

## 2019-08-12 DIAGNOSIS — Z76.82 ORGAN TRANSPLANT CANDIDATE: Primary | ICD-10-CM

## 2019-08-28 ENCOUNTER — TELEPHONE (OUTPATIENT)
Dept: TRANSPLANT | Facility: CLINIC | Age: 54
End: 2019-08-28

## 2019-10-22 ENCOUNTER — OFFICE VISIT (OUTPATIENT)
Dept: TRANSPLANT | Facility: CLINIC | Age: 54
End: 2019-10-22
Payer: MEDICARE

## 2019-10-22 ENCOUNTER — HOSPITAL ENCOUNTER (OUTPATIENT)
Dept: CARDIOLOGY | Facility: CLINIC | Age: 54
Discharge: HOME | End: 2019-10-22
Attending: NURSE PRACTITIONER
Payer: MEDICARE

## 2019-10-22 ENCOUNTER — HOSPITAL ENCOUNTER (OUTPATIENT)
Dept: RADIOLOGY | Facility: HOSPITAL | Age: 54
Discharge: HOME | End: 2019-10-22
Attending: NURSE PRACTITIONER
Payer: COMMERCIAL

## 2019-10-22 ENCOUNTER — HOSPITAL ENCOUNTER (OUTPATIENT)
Dept: RADIOLOGY | Facility: HOSPITAL | Age: 54
Discharge: HOME | End: 2019-10-22
Attending: NURSE PRACTITIONER
Payer: MEDICARE

## 2019-10-22 VITALS
RESPIRATION RATE: 16 BRPM | SYSTOLIC BLOOD PRESSURE: 165 MMHG | HEART RATE: 59 BPM | DIASTOLIC BLOOD PRESSURE: 64 MMHG | WEIGHT: 144.19 LBS | BODY MASS INDEX: 29.07 KG/M2 | TEMPERATURE: 99 F | OXYGEN SATURATION: 99 % | HEIGHT: 59 IN

## 2019-10-22 VITALS
HEIGHT: 61 IN | BODY MASS INDEX: 26.43 KG/M2 | HEART RATE: 52 BPM | WEIGHT: 140 LBS | DIASTOLIC BLOOD PRESSURE: 64 MMHG | SYSTOLIC BLOOD PRESSURE: 120 MMHG

## 2019-10-22 DIAGNOSIS — N18.9 ANEMIA OF RENAL DISEASE: Chronic | ICD-10-CM

## 2019-10-22 DIAGNOSIS — Z76.82 PATIENT ON WAITING LIST FOR KIDNEY TRANSPLANT: Chronic | ICD-10-CM

## 2019-10-22 DIAGNOSIS — N25.81 SECONDARY HYPERPARATHYROIDISM OF RENAL ORIGIN: Chronic | ICD-10-CM

## 2019-10-22 DIAGNOSIS — Z76.82 ORGAN TRANSPLANT CANDIDATE: ICD-10-CM

## 2019-10-22 DIAGNOSIS — Z99.2 ESRD ON DIALYSIS: Primary | Chronic | ICD-10-CM

## 2019-10-22 DIAGNOSIS — N18.6 ESRD ON DIALYSIS: Primary | Chronic | ICD-10-CM

## 2019-10-22 DIAGNOSIS — I10 ESSENTIAL HYPERTENSION: Chronic | ICD-10-CM

## 2019-10-22 DIAGNOSIS — D63.1 ANEMIA OF RENAL DISEASE: Chronic | ICD-10-CM

## 2019-10-22 LAB
ASCENDING AORTA: 2.92 CM
AV INDEX (PROSTH): 0.63
AV MEAN GRADIENT: 10 MMHG
AV PEAK GRADIENT: 24 MMHG
AV VALVE AREA: 2.1 CM2
AV VELOCITY RATIO: 0.58
BSA FOR ECHO PROCEDURE: 1.65 M2
CV ECHO LV RWT: 0.39 CM
DOP CALC AO PEAK VEL: 2.43 M/S
DOP CALC AO VTI: 47.3 CM
DOP CALC LVOT AREA: 3.3 CM2
DOP CALC LVOT DIAMETER: 2.06 CM
DOP CALC LVOT PEAK VEL: 1.42 M/S
DOP CALC LVOT STROKE VOLUME: 99.44 CM3
DOP CALCLVOT PEAK VEL VTI: 29.85 CM
E WAVE DECELERATION TIME: 152.13 MSEC
E/A RATIO: 1.43
E/E' RATIO: 7.55 M/S
ECHO LV POSTERIOR WALL: 0.97 CM (ref 0.6–1.1)
FRACTIONAL SHORTENING: 35 % (ref 28–44)
INTERVENTRICULAR SEPTUM: 1.02 CM (ref 0.6–1.1)
LA MAJOR: 4.84 CM
LA MINOR: 4.87 CM
LA WIDTH: 4.16 CM
LEFT ATRIUM SIZE: 3.82 CM
LEFT ATRIUM VOLUME INDEX: 40.4 ML/M2
LEFT ATRIUM VOLUME: 65.58 CM3
LEFT INTERNAL DIMENSION IN SYSTOLE: 3.24 CM (ref 2.1–4)
LEFT VENTRICLE DIASTOLIC VOLUME INDEX: 72.82 ML/M2
LEFT VENTRICLE DIASTOLIC VOLUME: 118.21 ML
LEFT VENTRICLE MASS INDEX: 111 G/M2
LEFT VENTRICLE SYSTOLIC VOLUME INDEX: 26 ML/M2
LEFT VENTRICLE SYSTOLIC VOLUME: 42.26 ML
LEFT VENTRICULAR INTERNAL DIMENSION IN DIASTOLE: 5 CM (ref 3.5–6)
LEFT VENTRICULAR MASS: 180.75 G
LV LATERAL E/E' RATIO: 6.92 M/S
LV SEPTAL E/E' RATIO: 8.3 M/S
MV PEAK A VEL: 0.58 M/S
MV PEAK E VEL: 0.83 M/S
PISA TR MAX VEL: 2.58 M/S
PULM VEIN S/D RATIO: 1.27
PV PEAK D VEL: 0.51 M/S
PV PEAK S VEL: 0.65 M/S
RA MAJOR: 4.13 CM
RA PRESSURE: 3 MMHG
RA WIDTH: 3.5 CM
RIGHT VENTRICULAR END-DIASTOLIC DIMENSION: 2.87 CM
SINUS: 2.93 CM
STJ: 2.47 CM
TDI LATERAL: 0.12 M/S
TDI SEPTAL: 0.1 M/S
TDI: 0.11 M/S
TR MAX PG: 27 MMHG
TRICUSPID ANNULAR PLANE SYSTOLIC EXCURSION: 1.83 CM
TV REST PULMONARY ARTERY PRESSURE: 30 MMHG

## 2019-10-22 PROCEDURE — 99215 OFFICE O/P EST HI 40 MIN: CPT | Mod: S$PBB,TXP,, | Performed by: NURSE PRACTITIONER

## 2019-10-22 PROCEDURE — 99999 PR PBB SHADOW E&M-EST. PATIENT-LVL IV: CPT | Mod: PBBFAC,TXP,, | Performed by: NURSE PRACTITIONER

## 2019-10-22 PROCEDURE — 71046 XR CHEST PA AND LATERAL: ICD-10-PCS | Mod: 26,TXP,, | Performed by: RADIOLOGY

## 2019-10-22 PROCEDURE — 72170 XR PELVIS ROUTINE AP: ICD-10-PCS | Mod: 26,TXP,, | Performed by: RADIOLOGY

## 2019-10-22 PROCEDURE — 99215 PR OFFICE/OUTPT VISIT, EST, LEVL V, 40-54 MIN: ICD-10-PCS | Mod: S$PBB,TXP,, | Performed by: NURSE PRACTITIONER

## 2019-10-22 PROCEDURE — 93306 TTE W/DOPPLER COMPLETE: CPT | Mod: PBBFAC,TXP | Performed by: INTERNAL MEDICINE

## 2019-10-22 PROCEDURE — 99999 PR PBB SHADOW E&M-EST. PATIENT-LVL IV: ICD-10-PCS | Mod: PBBFAC,TXP,, | Performed by: NURSE PRACTITIONER

## 2019-10-22 PROCEDURE — 93306 TRANSTHORACIC ECHO (TTE) COMPLETE (CUPID ONLY): ICD-10-PCS | Mod: 26,S$PBB,TXP, | Performed by: INTERNAL MEDICINE

## 2019-10-22 PROCEDURE — 72170 X-RAY EXAM OF PELVIS: CPT | Mod: TC,TXP

## 2019-10-22 PROCEDURE — 71046 X-RAY EXAM CHEST 2 VIEWS: CPT | Mod: 26,TXP,, | Performed by: RADIOLOGY

## 2019-10-22 PROCEDURE — 72170 X-RAY EXAM OF PELVIS: CPT | Mod: 26,TXP,, | Performed by: RADIOLOGY

## 2019-10-22 PROCEDURE — 71046 X-RAY EXAM CHEST 2 VIEWS: CPT | Mod: TC,TXP

## 2019-10-22 NOTE — PROGRESS NOTES
LISTED PATIENT EDUCATION NOTE    Ms. Tala Rivera was seen in pre-kidney transplant clinic for evaluation for kidney, kidney/pancreas or pancreas only transplant.  The patient attended a group education session that discussed/reviewed the following aspects of transplantation: evaluation and selection committee process, [facility]OS waitlist management/multiple listings, types of organs offered (KDPI < 85%, KDPI > 85%, PHS increased risk, DCD, HCV+), financial aspects, surgical procedures, dietary instruction pre- and post-transplant, health maintenance pre- and post-transplant, post-transplant hospitalization and outpatient follow-up, potential to participate in a research protocol, and medication management and side effects.  A question and answer session was provided after the presentation.    The patient was seen by all members of the multi-disciplinary team to include: Nephrologist/PA, Surgeon, , Transplant Coordinator, , Pharmacist and Dietician (if applicable).    The patient reviewed and signed all consents for evaluation which were witnessed and sent to scanning into the EPIC chart.    The patient was given an education book and plan for further evaluation based on her individual assessment.      The patient was encouraged to call with any questions or concerns.

## 2019-10-22 NOTE — LETTER
October 24, 2019        Arthur Mcdonald  [address] [facility] [address]  Phone: [phone]  Fax: [phone]     Tabby Chavez  [address] [facility] [address]  Phone: [phone]  Fax: [phone]             [facility]  [address]  [facility] [address]  Phone: [phone]   Patient: Tala Rivera   MR Number: [id number]   YOB: 1965   Date of Visit: 10/22/2019       Dear Dr. Arthur Mcdonald, Tabby Chavez    Thank you for referring Tala Rivera to me for evaluation. Attached you will find relevant portions of my assessment and plan of care.    If you have questions, please do not hesitate to call me. I look forward to following Tala Rivera along with you.    Sincerely,    Caity Cagle, NP    Enclosure    If you would like to receive this communication electronically, please contact [email or web address] or [phone] to request Netsocket Link access.    Netsocket Link is a tool which provides read-only access to select patient information with whom you have a relationship. Its easy to use and provides real time access to review your patients record including encounter summaries, notes, results, and demographic information.    If you feel you have received this communication in error or would no longer like to receive these types of communications, please e-mail [email or web address]

## 2019-10-22 NOTE — PROGRESS NOTES
Kidney Transplant Recipient Reevalulation    Referring Physician: Tabby Velasco  Current Nephrologist: Arthur Mcdonald  Waitlist Status: active  Dialysis Start Date: 2/5/2016    Subjective:     CC:  Annual reassessment of kidney transplant candidacy.    HPI:  Ms. Rivera is a 53 y.o. year old White female with ESRD secondary to HTN.  She has been on the wait list for a kidney transplant at [facility] since 2/5/2016. Patient is currently on hemodialysis started on 2/5/2016. Patient is dialyzing on MWF schedule.  Patient reports that she is tolerating dialysis well.. She has a RUE AV graft. Patient denies any recent hospitalizations or ED visits.    BP log reviewed from dialysis . A few BP post dialysis noted to be ~ 98/59  Most BP avg 120/140/60-70s  BP today stable  Discussed PRA  She continues to work full time as a . She also helps watch her grand baby     10/22/2019 CXR  Impression     Stable radiographic appearance of the chest.  No acute disease.     10/22/2019 PXR  FINDINGS:  Osseous structures appear intact evidence of fracture or osseous destructive lesion.  No dislocation.  Mild atherosclerotic calcification of the pelvic vasculature, more conspicuous than the prior.      Impression     As above     MMG  4/16/2019 (-)      10/22/2019  TRANSTHORACIC ECHO (TTE) COMPLETE   Conclusion     · Eccentric left ventricular hypertrophy. Normal left ventricular systolic function. The estimated ejection fraction is 55%  · Normal LV diastolic function.  · Mild left atrial enlargement.  · Mild tricuspid regurgitation.  · Normal right ventricular systolic function.  · Normal central venous pressure (3 mm Hg).  · The estimated PA systolic pressure is 30 mm Hg            Past Medical History:   Diagnosis Date    Anemia of renal disease     ESRD on dialysis     Dr. Muhammad     Essential hypertension     Secondary hyperparathyroidism of renal origin        Review of Systems   Constitutional:  "Negative for activity change, appetite change, chills, fatigue, fever and unexpected weight change.   HENT: Negative for congestion, facial swelling, postnasal drip, rhinorrhea, sinus pressure, sore throat and trouble swallowing.    Eyes: Negative for pain, redness and visual disturbance.   Respiratory: Negative for cough, chest tightness, shortness of breath and wheezing.    Cardiovascular: Negative.  Negative for chest pain, palpitations and leg swelling.   Gastrointestinal: Negative for abdominal pain, diarrhea, nausea and vomiting.   Genitourinary: Negative for dysuria, flank pain and urgency.   Musculoskeletal: Negative for gait problem, neck pain and neck stiffness.   Skin: Negative for rash.   Allergic/Immunologic: Negative for environmental allergies, food allergies and immunocompromised state.   Neurological: Negative for dizziness, weakness, light-headedness and headaches.   Psychiatric/Behavioral: Negative for agitation and confusion. The patient is not nervous/anxious.        Objective:   body mass index is 28.68 kg/m².  BP (!) 165/64 (BP Location: Left arm, Patient Position: Sitting, BP Method: Medium (Automatic))   Pulse (!) 59   Temp 98.6 °F (37 °C) (Oral)   Resp 16   Ht 4' 11.45" (1.51 m)   Wt 65.4 kg (144 lb 2.9 oz)   LMP  (LMP Unknown)   SpO2 99%   BMI 28.68 kg/m²     Physical Exam   Constitutional: She is oriented to person, place, and time. She appears well-developed and well-nourished.   HENT:   Head: Normocephalic.   Mouth/Throat: Oropharynx is clear and moist. No oropharyngeal exudate.   Eyes: Pupils are equal, round, and reactive to light. Conjunctivae and EOM are normal. No scleral icterus.   Neck: Normal range of motion. Neck supple.   Cardiovascular: Normal rate, regular rhythm and normal heart sounds.   Pulmonary/Chest: Effort normal and breath sounds normal.   Abdominal: Soft. Normal appearance and bowel sounds are normal. She exhibits no distension and no mass. There is no " splenomegaly or hepatomegaly. There is no tenderness. There is no rebound, no guarding, no CVA tenderness, no tenderness at McBurney's point and negative Borjas's sign.   Musculoskeletal: Normal range of motion. She exhibits no edema.        Arms:  Lymphadenopathy:     She has no cervical adenopathy.   Neurological: She is alert and oriented to person, place, and time. She exhibits normal muscle tone. Coordination normal.   Skin: Skin is warm and dry.   Psychiatric: She has a normal mood and affect. Her behavior is normal.   Vitals reviewed.      Labs:  Lab Results   Component Value Date    WBC 4.88 11/24/2018    HGB 10.2 (L) 12/14/2018    HCT 30.8 (L) 11/24/2018     12/14/2018    K 3.2 (L) 12/14/2018     12/14/2018    CO2 30 12/14/2018    BUN 36 (H) 12/14/2018    CREATININE 4.17 (H) 12/14/2018    EGFRNONAA 12 (A) 12/14/2018    CALCIUM 7.3 (L) 12/14/2018    PHOS 2.7 07/19/2016    ALBUMIN 3.4 (L) 11/24/2018    AST 18 11/24/2018    ALT 20 11/24/2018    .0 (H) 10/22/2019       Lab Results   Component Value Date    BILIRUBINUA Negative 11/24/2018    PROTEINUA 1+ (A) 11/24/2018    NITRITE Negative 11/24/2018    RBCUA 0 11/24/2018    WBCUA 1 11/24/2018       No results found for: HLAABCTYPE    Lab Results   Component Value Date    CPRA 45 08/05/2019    HT2HVNY A1,A23,A24,A36,A80,B76 08/05/2019    CIABCLM B57, WEAK---A11 08/05/2019    CIIAB DP19 08/05/2019    ABCMT  08/05/2019     DP1, DR4, WEAK----DRB3*02:02, DQA1*05:05, DQA1*05:03, DR18       Labs were reviewed with the patient.    Pre-transplant Workup:   Reviewed with the patient.    Assessment:     1. ESRD on dialysis     2. Patient on waiting list for kidney transplant    3. Essential hypertension    4. Anemia of renal disease    5. Secondary hyperparathyroidism of renal origin        Plan:   Needs colonoscopy  If not already  Completed.       Transplant Candidacy:   Ms. Rivera is a suitable kidney transplant candidate.  Meets center eligibility  for accepting HCV+ donor offer - yes.  Patient educated on HCV+ donors. Tala is willing  to accept HCV+ donor offer -  yes   Patient is a candidate for KDPI > 85 kidney donor offer - yes.  She remains in overall stable health, and will remain active on the transplant list.    Caity Cagle NP       Follow-up:   In addition to the tests noted in the plan, Ms. Rivera will continue to have reevaluation as per the standing pre-kidney transplant protocol:  1. Monthly blood for PRA  2. Annual return to clinic, except HIV positive, > 65 years of age, or pancreas transplant candidates who will be scheduled to see transplant every 6 months while in pre-transplant phase  3. Annual re-testing: CXR, EKG, yearly mammograms for women over 40 and PSA for males over 40, cardiology follow-up as recommended by initial cardiology pre-transplant evaluation  4. Renal ultrasound every 2 years  5. Baseline colonoscopy after age 50 and repeated as recommended    UNOS Patient Status  Functional Status: 60% - Requires occasional assistance but is able to care for needs  Physical Capacity: No Limitations

## 2019-10-23 DIAGNOSIS — Z76.82 ORGAN TRANSPLANT CANDIDATE: Primary | ICD-10-CM

## 2019-10-23 NOTE — PROGRESS NOTES
YEARLY LIST MANAGEMENT NOTE    Tala Rivera's kidney transplant listing status reviewed.  Patient is due for follow-up appointments October 2020.  Appointments will be scheduled per protocol.

## 2019-10-23 NOTE — PROGRESS NOTES
Transplant Recipient Adult Psychosocial Assessment (Update)    Tala Rivera  [address]  [place] [address]  Telephone Information:   Mobile [phone]   Home  [phone] (home)  Work  [phone] (work)  E-mail  [email or web address]    Sex: female  YOB: 1965  Age: 53 y.o.    Encounter Date: 10/22/2019  [place] Citizen: yes  Primary Language: English   Needed: no    Emergency Contact:  Name: Kathy Rivera Jr  Relationship:   Address: same as above  Phone Numbers:  [phone] (home), n/a (work), [phone] (mobile)    Family/Social Support:   Number of dependents/: Pt reports 2 minor sons: Simone and Gabo who would be cared for by their brother and his wife: Damien and Kayleigh or pt's step son and his wife: Anson and Fauzia ([phone])  Marital history:  once for 23 yrs, three sons  Other family dynamics: Pt's parents and sister: Ghazala are .  She has two living sisters and one brother.  Claire Key [phone],  [place] [phone]  Sonya Alvarez Hasmukh, number unavailable. Pt's son: Simone has Down's Syndrome.    Household Composition:  Name: Tala Rivera  Age: 53  Relationship: patient  Does person drive? yes    Name: Kathy Rivera  Age: 74  Relationship:   Does person drive? yes    Name: Simone and Gabo Rivera   Age: 16 and 17 (respectively)  Relationship: sons  Does person drive? no    Do you and your caregivers have access to reliable transportation? yes  PRIMARY CAREGIVER: Kathy Rivera (rigo) will be primary caregiver, phone number [phone].      provided in-depth information to patient and caregiver regarding pre- and post-transplant caregiver role.   strongly encourages patient and caregiver to have concrete plan regarding post-transplant care giving, including back-up caregiver(s) to ensure care giving needs are met as needed.    Patient and Caregiver states understanding all  aspects of caregiver role/commitment and is able/willing/committed to being caregiver to the fullest extent necessary.    Patient and Caregiver verbalizes understanding of the education provided today and caregiver responsibilities.         remains available. Patient and Caregiver agree to contact  in a timely manner if concerns arise.      Able to take time off work without financial concerns: yes.     Additional Significant Others who will Assist with Transplant:  Name: Damien Rivera  Age: 19  Phone: [phone] (home) [phone] (cell)  City: [place] State: [place]  Relationship: son  Does person drive? yes    Name: Dayami Rivera  Age: 20  Phone: [phone] (home) [phone]  City: [place]: [place]  Relationship: daughter-in-law  Does person drive? yes    Name: Fauzia Rivera  Age: 38  Phone: [phone]  City: [place] State: [place]  Relationship: daughter-in-law  Does person drive? yes     Living Will: no Education and information provided   Healthcare Power of : no Education and information provided   Advance Directives on file: <<no information> per medical record.  Verbally reviewed LW/HCPA information.   provided patient with copy of LW/HCPA documents and provided education on completion of forms.    Living Donors: No. Education and resource information given to patient.    Highest Education Level: High School (9-12) or GED  Reading Ability: 12th grade  Reports difficulty with: Pt reports no difficulties  Learns Best By:  Pt reports pt learns best by a combination of verbal, written, and tactile instructions.     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    Spouse/Significant Other Employment: Pt's  reports that he is retired from the army and 100%disabled. He reports that he can still act as  caregiver.    Disabled: no    Monthly Income:  Salary/Wages: $2400.00  Other household members total: $4675.00     Able to afford all costs now and if transplanted, including medications: yes Pt's  reports that pt can get her medications through the [employer] base as well  Patient and Caregiver verbalizes understanding of personal responsibilities related to transplant costs and the importance of having a financial plan to ensure that patients transplant costs are fully covered.       provided fundraising information/education. Patient and Caregiververbalizes understanding.   remains available.    Insurance:   Payor/Plan Subscr  Sex Relation Sub. Ins. ID Effective Group Num   1. [employer]* MAGO MERA 1965 Female  [id number]                                   [address]   2. [place]* MAGO MERA 1965 Female  [id number]                                    [address]       Primary Insurance (for UNOS reporting): Private Insurance  Secondary Insurance (for UNOS reporting): Public Insurance - Other Government ( per pt report. Pt receives Spousal benefits) Pt also has Medicare. Pt reports that on  her Medicare will become primary.   Patient and Caregiver verbalizes clear understanding that patient may experience difficulty obtaining and/or be denied insurance coverage post-surgery. This includes and is not limited to disability insurance, life insurance, health insurance, burial insurance, long term care insurance, and other insurances.      Patient and Caregiver also reports understanding that future health concerns related to or unrelated to transplantation may not be covered by patient's insurance.  Resources and information provided and reviewed.     Patient and Caregiver provides verbal permission to release any necessary information to outside resources for patient care and discharge planning.  Resources and  information provided are reviewed.      Dialysis Adherence: Patient reports being on hemodialysis in center attending all dialysis appointments and staying for the entire course of treatment.     Infusion Service: patient utilizing? no  Home Health: patient utilizing? no  DME: yes BP Cuff and glucometer  Pulmonary/Cardiac Rehab: Pt denies   ADLS:  Pt states ability to independently accomplish all adls.    Adherence:   Pt states current and expected compliance with all healthcare recommendations.   Adherence education and counseling provided.     Per History Section:  Past Medical History:   Diagnosis Date    Anemia of renal disease     ESRD on dialysis     Dr. Muhammad     Essential hypertension     Secondary hyperparathyroidism of renal origin      Social History     Tobacco Use    Smoking status: Never Smoker    Smokeless tobacco: Never Used   Substance Use Topics    Alcohol use: No     Social History     Substance and Sexual Activity   Drug Use No     Social History     Substance and Sexual Activity   Sexual Activity Yes    Partners: Male       Per Today's Psychosocial:  Tobacco: none, patient denies any use.  Alcohol: none, patient denies any use.  Illicit Drugs/Non-prescribed Medications: none, patient denies any use.    Patient and Caregiver states clear understanding of the potential impact of substance use as it relates to transplant candidacy and is aware of possible random substance screening.  Substance abstinence/cessation counseling, education and resources provided and reviewed.     Arrests/DWI/Treatment/Rehab: patient denies    Psychiatric History:    Mental Health: pt denies any overwhelming feelings of sadness or anxiety at this time. Patient denies being dx with any mental health disorders of any kind.  Psychiatrist/Counselor: Pt denies seeing a mental health professional and reports being open to seeing the psych department for talk therapy if necessary.  Medications: Pt denies taking  medications for mental health reasons.  Suicide/Homicide Issues: Pt denies current or past suicidal/homicidal ideation.   Safety at home: Pt reports no current or history of safety concerns in household; including mental, physical, verbal, or sexual abuse.    Knowledge: Patient and Caregiver states having clear understanding and realistic expectations regarding the potential risks and potential benefits of organ transplantation and organ donation and agrees to discuss with health care team members and support system members, as well as to utilize available resources and express questions and/or concerns in order to further facilitate the pt informed decision-making.  Resources and information provided and reviewed.    Patient and Caregiver is aware of [facility]'s affiliation and/or partnership with agencies in home health care, LTAC, SNF, [facility], and other hospitals and clinics.    Understanding: Patient and Caregiver reports having a clear understanding of the many lifetime commitments involved with being a transplant recipient, including costs, compliance, medications, lab work, procedures, appointments, concrete and financial planning, preparedness, timely and appropriate communication of concerns, abstinence (ETOH, tobacco, illicit non-prescribed drugs), adherence to all health care team recommendations, support system and caregiver involvement, appropriate and timely resource utilization and follow-through, mental health counseling as needed/recommended, and patient and caregiver responsibilities.  Social Service Handbook, resources and detailed educational information provided and reviewed.  Educational information provided.    Patient and Caregiver also reports current and expected compliance with health care regime and states having a clear understanding of the importance of compliance.      Patient and Caregiver reports a clear understanding that risks and benefits may be involved with organ transplantation and  with organ donation.       Patient and Caregiver also reports clear understanding that psychosocial risk factors may affect patient, and include but are not limited to feelings of depression, generalized anxiety, anxiety regarding dependence on others, post traumatic stress disorder, feelings of guilt and other emotional and/or mental concerns, and/or exacerbation of existing mental health concerns.  Detailed resources provided and discussed.      Patient and Caregiver agrees to access appropriate resources in a timely manner as needed and/or as recommended, and to communicate concerns appropriately.  Patient and Caregiver also reports a clear understanding of treatment options available.     Patient and Caregiver received education in a group setting.   reviewed education, provided additional information, and answered questions.    Feelings or Concerns: Patient and Caregiver did not express any concerns at this time.    Coping: Pt states her jose and Taoist family help her cope, job, being around people, mowing the lawn, traveling when she can. Pt reports Taoist home as [facility] in [place], [place] with Garrett Ayon  presiding.     Goals: Stop dialysis, continue to work, travel more freely.  Patient referred to Vocational Rehabilitation.    Interview Behavior: Patient and Caregiver presents as alert and oriented x 4, pleasant, good eye contact, well groomed, recall good, concentration/judgement good, average intelligence, calm, communicative, cooperative and asking and answering questions appropriately. She was accompanied by her .        Transplant Social Work - Candidacy  Assessment/Plan:     Psychosocial Suitability: Patient presents as a suitable candidate for kidney transplant at this time. Based on psychosocial risk factors, patient presents as low risk, due to suitable caregiver plan for self and minor children, finnacial plan in place, adequate insurance, and suitable  dialysis adherence.    Recommendations/Additional Comments: SW recommends that pt conduct fundraising to assist pt with pay for cost of medications, food, gas, and other transplant related needs. SW recommends that pt remain aware of potential mental health concerns and contact the team if any concerns arise. SW recommends that pt remain abstinent from tobacco, ETOH, and drug use. SW supports pt's continued dialysis adherence. SW remains available to answer any questions or concerns that arise as the pt moves through the transplant process.       Eileen Evans, FREDI, LMSW

## 2019-11-05 ENCOUNTER — HOSPITAL ENCOUNTER (OUTPATIENT)
Dept: RADIOLOGY | Facility: HOSPITAL | Age: 54
Discharge: HOME | End: 2019-11-05
Attending: TRANSPLANT SURGERY
Payer: MEDICARE

## 2019-11-05 DIAGNOSIS — Z76.82 ORGAN TRANSPLANT CANDIDATE: ICD-10-CM

## 2019-11-05 PROCEDURE — 93978 US DOPP ILIACS BILATERAL: ICD-10-PCS | Mod: 26,TXP,, | Performed by: RADIOLOGY

## 2019-11-05 PROCEDURE — 93978 VASCULAR STUDY: CPT | Mod: TC,PO,TXP

## 2019-11-05 PROCEDURE — 93978 VASCULAR STUDY: CPT | Mod: 26,TXP,, | Performed by: RADIOLOGY

## 2020-01-06 DIAGNOSIS — Z76.82 ORGAN TRANSPLANT CANDIDATE: Primary | ICD-10-CM

## 2020-01-08 ENCOUNTER — TELEPHONE (OUTPATIENT)
Dept: GASTROENTEROLOGY | Facility: CLINIC | Age: 55
End: 2020-01-08

## 2020-01-09 ENCOUNTER — TELEPHONE (OUTPATIENT)
Dept: TRANSPLANT | Facility: CLINIC | Age: 55
End: 2020-01-09

## 2020-01-09 ENCOUNTER — TELEPHONE (OUTPATIENT)
Dept: GASTROENTEROLOGY | Facility: CLINIC | Age: 55
End: 2020-01-09

## 2020-01-09 DIAGNOSIS — Z76.82 ORGAN TRANSPLANT CANDIDATE: Primary | ICD-10-CM

## 2020-01-09 NOTE — TELEPHONE ENCOUNTER
Spoke with pt. Scheduled ordered c-scope. Pt verbalized understanding.   Prep instructions, rx, & reminder letter placed in mail.

## 2020-01-09 NOTE — TELEPHONE ENCOUNTER
----- Message from Zev Sears sent at 1/9/2020 10:21 AM CST -----  Contact: Pt   Patient calling to schedule colonoscopy. Please call patient at work [phone] she will be able to take the call.

## 2020-01-16 ENCOUNTER — HOSPITAL ENCOUNTER (OUTPATIENT)
Dept: RADIOLOGY | Facility: HOSPITAL | Age: 55
Discharge: HOME | End: 2020-01-16
Attending: NURSE PRACTITIONER
Payer: MEDICARE

## 2020-01-16 DIAGNOSIS — Z76.82 ORGAN TRANSPLANT CANDIDATE: ICD-10-CM

## 2020-01-16 PROCEDURE — 76770 US EXAM ABDO BACK WALL COMP: CPT | Mod: TC,PO,TXP

## 2020-01-16 PROCEDURE — 76770 US EXAM ABDO BACK WALL COMP: CPT | Mod: 26,TXP,, | Performed by: RADIOLOGY

## 2020-01-16 PROCEDURE — 76770 US RETROPERITONEAL COMPLETE: ICD-10-PCS | Mod: 26,TXP,, | Performed by: RADIOLOGY

## 2020-02-05 ENCOUNTER — ANESTHESIA EVENT (OUTPATIENT)
Dept: ENDOSCOPY | Facility: HOSPITAL | Age: 55
End: 2020-02-05
Payer: MEDICARE

## 2020-02-05 ENCOUNTER — HOSPITAL ENCOUNTER (OUTPATIENT)
Facility: HOSPITAL | Age: 55
Discharge: HOME | End: 2020-02-05
Attending: INTERNAL MEDICINE | Admitting: INTERNAL MEDICINE
Payer: MEDICARE

## 2020-02-05 ENCOUNTER — ANESTHESIA (OUTPATIENT)
Dept: ENDOSCOPY | Facility: HOSPITAL | Age: 55
End: 2020-02-05
Payer: MEDICARE

## 2020-02-05 VITALS
WEIGHT: 150 LBS | OXYGEN SATURATION: 99 % | SYSTOLIC BLOOD PRESSURE: 158 MMHG | RESPIRATION RATE: 17 BRPM | HEIGHT: 60 IN | TEMPERATURE: 97 F | HEART RATE: 56 BPM | BODY MASS INDEX: 29.45 KG/M2 | DIASTOLIC BLOOD PRESSURE: 67 MMHG

## 2020-02-05 DIAGNOSIS — Z12.11 SCREEN FOR COLON CANCER: ICD-10-CM

## 2020-02-05 PROCEDURE — 88305 TISSUE EXAM BY PATHOLOGIST: CPT | Mod: 26,TXP,, | Performed by: PATHOLOGY

## 2020-02-05 PROCEDURE — 45385 COLONOSCOPY W/LESION REMOVAL: CPT | Mod: TXP,,, | Performed by: INTERNAL MEDICINE

## 2020-02-05 PROCEDURE — 88305 TISSUE EXAM BY PATHOLOGIST: ICD-10-PCS | Mod: 26,TXP,, | Performed by: PATHOLOGY

## 2020-02-05 PROCEDURE — 37000009 HC ANESTHESIA EA ADD 15 MINS: Mod: PO,TXP | Performed by: INTERNAL MEDICINE

## 2020-02-05 PROCEDURE — D9220A PRA ANESTHESIA: Mod: TXP,,, | Performed by: ANESTHESIOLOGY

## 2020-02-05 PROCEDURE — 37000008 HC ANESTHESIA 1ST 15 MINUTES: Mod: PO,TXP | Performed by: INTERNAL MEDICINE

## 2020-02-05 PROCEDURE — 45385 PR COLONOSCOPY,REMV LESN,SNARE: ICD-10-PCS | Mod: TXP,,, | Performed by: INTERNAL MEDICINE

## 2020-02-05 PROCEDURE — 63600175 PHARM REV CODE 636 W HCPCS: Mod: PO,TXP | Performed by: NURSE ANESTHETIST, CERTIFIED REGISTERED

## 2020-02-05 PROCEDURE — 63600175 PHARM REV CODE 636 W HCPCS: Mod: PO,TXP | Performed by: ANESTHESIOLOGY

## 2020-02-05 PROCEDURE — 45385 COLONOSCOPY W/LESION REMOVAL: CPT | Mod: PO,TXP | Performed by: INTERNAL MEDICINE

## 2020-02-05 PROCEDURE — 88305 TISSUE EXAM BY PATHOLOGIST: CPT | Mod: TXP | Performed by: PATHOLOGY

## 2020-02-05 PROCEDURE — 27201089 HC SNARE, DISP (ANY): Mod: PO,TXP | Performed by: INTERNAL MEDICINE

## 2020-02-05 PROCEDURE — D9220A PRA ANESTHESIA: ICD-10-PCS | Mod: TXP,,, | Performed by: ANESTHESIOLOGY

## 2020-02-05 RX ORDER — PROPOFOL 10 MG/ML
VIAL (ML) INTRAVENOUS
Status: DISCONTINUED | OUTPATIENT
Start: 2020-02-05 | End: 2020-02-05

## 2020-02-05 RX ORDER — SODIUM CHLORIDE 0.9 % (FLUSH) 0.9 %
10 SYRINGE (ML) INJECTION
Status: DISCONTINUED | OUTPATIENT
Start: 2020-02-05 | End: 2020-02-05 | Stop reason: HOSPADM

## 2020-02-05 RX ORDER — SODIUM CHLORIDE 9 MG/ML
INJECTION, SOLUTION INTRAVENOUS CONTINUOUS
Status: DISCONTINUED | OUTPATIENT
Start: 2020-02-05 | End: 2020-02-05 | Stop reason: HOSPADM

## 2020-02-05 RX ORDER — LIDOCAINE HYDROCHLORIDE 20 MG/ML
INJECTION INTRAVENOUS
Status: DISCONTINUED | OUTPATIENT
Start: 2020-02-05 | End: 2020-02-05

## 2020-02-05 RX ORDER — SODIUM CHLORIDE, SODIUM LACTATE, POTASSIUM CHLORIDE, CALCIUM CHLORIDE 600; 310; 30; 20 MG/100ML; MG/100ML; MG/100ML; MG/100ML
INJECTION, SOLUTION INTRAVENOUS CONTINUOUS
Status: DISCONTINUED | OUTPATIENT
Start: 2020-02-05 | End: 2020-02-05 | Stop reason: HOSPADM

## 2020-02-05 RX ADMIN — PROPOFOL 50 MG: 10 INJECTION, EMULSION INTRAVENOUS at 08:02

## 2020-02-05 RX ADMIN — LIDOCAINE HYDROCHLORIDE 100 MG: 20 INJECTION, SOLUTION INTRAVENOUS at 08:02

## 2020-02-05 RX ADMIN — PROPOFOL 25 MG: 10 INJECTION, EMULSION INTRAVENOUS at 08:02

## 2020-02-05 RX ADMIN — SODIUM CHLORIDE: 0.9 INJECTION, SOLUTION INTRAVENOUS at 07:02

## 2020-02-05 RX ADMIN — PROPOFOL 75 MG: 10 INJECTION, EMULSION INTRAVENOUS at 08:02

## 2020-02-05 NOTE — ANESTHESIA PREPROCEDURE EVALUATION
02/05/2020  Tala Rivera is a 54 y.o., female.    Anesthesia Evaluation    I have reviewed the Patient Summary Reports.    I have reviewed the Nursing Notes.   I have reviewed the Medications.     Review of Systems  Anesthesia Hx:  Denies Family Hx of Anesthesia complications.   Denies Personal Hx of Anesthesia complications.   Hematology/Oncology:         -- Anemia:   Cardiovascular:   Hypertension    Renal/:   Chronic Renal Disease, ESRD        Physical Exam  General:  Well nourished    Airway/Jaw/Neck:  Airway Findings: Mouth Opening: Normal Tongue: Normal  General Airway Assessment: Adult  Mallampati: II  TM Distance: Normal, at least 6 cm        Eyes/Ears/Nose:  EYES/EARS/NOSE FINDINGS: Normal   Dental:  DENTAL FINDINGS: Normal   Chest/Lungs:  Chest/Lungs Clear    Heart/Vascular:  Heart Findings: Normal       Mental Status:  Mental Status Findings: Normal        Anesthesia Plan  Type of Anesthesia, risks & benefits discussed:  Anesthesia Type:  general  Patient's Preference:   Intra-op Monitoring Plan: standard ASA monitors  Intra-op Monitoring Plan Comments:   Post Op Pain Control Plan:   Post Op Pain Control Plan Comments:   Induction:   IV  Beta Blocker:  Patient is not currently on a Beta-Blocker (No further documentation required).       Informed Consent: Patient understands risks and agrees with Anesthesia plan.  Questions answered. Anesthesia consent signed with patient.  ASA Score: 3     Day of Surgery Review of History & Physical:    H&P update referred to the provider.         Ready For Surgery From Anesthesia Perspective.

## 2020-02-05 NOTE — ANESTHESIA POSTPROCEDURE EVALUATION
Anesthesia Post Evaluation    Patient: Tala Rivera    Procedure(s) Performed: Procedure(s) (LRB):  COLONOSCOPY (N/A)    Final Anesthesia Type: general    Patient location during evaluation: PACU  Patient participation: Yes- Able to Participate  Level of consciousness: awake and alert  Post-procedure vital signs: reviewed and stable  Pain management: adequate  Airway patency: patent    PONV status at discharge: No PONV  Anesthetic complications: no      Cardiovascular status: blood pressure returned to baseline  Respiratory status: unassisted  Hydration status: euvolemic  Follow-up not needed.          Vitals Value Taken Time   /67 2/5/2020  8:59 AM   Temp 36.3 °C (97.3 °F) 2/5/2020  8:31 AM   Pulse 56 2/5/2020  8:59 AM   Resp 17 2/5/2020  8:59 AM   SpO2 99 % 2/5/2020  8:59 AM         Event Time     Out of Recovery 09:01:12          Pain/Jesse Score: Jesse Score: 10 (2/5/2020  8:59 AM)

## 2020-02-05 NOTE — H&P
History & Physical - Short Stay  Gastroenterology      SUBJECTIVE:     Procedure: Colonoscopy    Chief Complaint/Indication for Procedure: Screening    PTA Medications   Medication Sig    VELPHORO 500 mg Chew Take 2 tablets by mouth 3 (three) times daily with meals.    calcitRIOL (ROCALTROL) 0.25 MCG Cap Take 0.25 mcg by mouth once daily.    chlorhexidine (PERIDEX) 0.12 % solution Use as directed 15 mLs in the mouth or throat 2 (two) times daily.    lidocaine-prilocaine (EMLA) cream APPLY SMALL AMOUNT TO THE AFFECTED AREA PRIOR TO dialysis THREE TIMES A WEEK    mupirocin calcium 2% nasl oint (BACTROBAN) 2 % Oint 1 application by Nasal route 2 (two) times daily.    ondansetron (ZOFRAN-ODT) 4 MG TbDL Take 1 tablet (4 mg total) by mouth every 6 (six) hours as needed. (Patient not taking: Reported on 10/22/2019)       Review of patient's allergies indicates:   Allergen Reactions    Sulfa (sulfonamide antibiotics) Hives        Past Medical History:   Diagnosis Date    Anemia of renal disease     ESRD on dialysis     Dr. Muhammad     Essential hypertension     Secondary hyperparathyroidism of renal origin      Past Surgical History:   Procedure Laterality Date    AV FISTULA PLACEMENT Left     never matured    AV graft Right 2015     SECTION      x3; ; , and     GASTRIC BYPASS  2016    LAPAROSCOPIC REVISION OF PROCEDURE INVOLVING PERITONEAL DIALYSIS CATHETER N/A 10/18/2018    Procedure: REVISION OF PROCEDURE INVOLVING PERITONEAL DIALYSIS CATHETER, LAPAROSCOPIC;  Surgeon: Hair Jimenez MD;  Location: [facility];  Service: General;  Laterality: N/A;    PERITONEAL CATHETER INSERTION N/A 2018    Procedure: Laparoscopic INSERTION, CATHETER, INTRAPERITONEAL;  Surgeon: Hair Jimenez MD;  Location: [facility];  Service: General;  Laterality: N/A;    PERITONEAL CATHETER REMOVAL N/A 2018    Procedure: REMOVAL, CATHETER, DIALYSIS, PERITONEAL;  Surgeon: Hair Jimenez MD;  Location: [facility]  OR;  Service: General;  Laterality: N/A;    WOUND EXPLORATION N/A 12/14/2018    Procedure: EXPLORATION, WOUND-Surgical Site Irrigation;  Surgeon: Hair Jimenez MD;  [facility];  Service: General;  Laterality: N/A;     Family History   Problem Relation Age of Onset    Kidney disease Mother     Diabetes Mother     Heart disease Mother     Hypertension Mother     Lupus Sister     Down syndrome Son     Cancer Neg Hx     Stroke Neg Hx      Social History     Tobacco Use    Smoking status: Never Smoker    Smokeless tobacco: Never Used   Substance Use Topics    Alcohol use: No    Drug use: No         OBJECTIVE:     Vital Signs (Most Recent)  Temp: 97.3 °F (36.3 °C) (02/05/20 0744)  Pulse: 61 (02/05/20 0744)  Resp: 16 (02/05/20 0744)  BP: (!) 161/72 (02/05/20 0744)  SpO2: 100 % (02/05/20 0744)    Physical Exam                                                        GENERAL:  Comfortable, in no acute distress.                                 HEENT EXAM:  Nonicteric.  No adenopathy.  Oropharynx is clear.               NECK:  Supple.                                                               LUNGS:  Clear.                                                               CARDIAC:  Regular rate and rhythm.  S1, S2.  No murmur.                      ABDOMEN:  Soft, positive bowel sounds, nontender.  No hepatosplenomegaly or masses.  No rebound or guarding.                                             EXTREMITIES:  No edema.     MENTAL STATUS:  Normal, alert and oriented.      ASSESSMENT/PLAN:     Assessment: Colorectal cancer screening    Plan: Colonoscopy    Anesthesia Plan: General    ASA Grade: ASA 2 - Patient with mild systemic disease with no functional limitations    MALLAMPATI SCORE:  I (soft palate, uvula, fauces, and tonsillar pillars visible)

## 2020-02-05 NOTE — DISCHARGE SUMMARY
Discharge Note  Short Stay      SUMMARY     Admit Date: 2/5/2020    Attending Physician: Darrell Golden MD     Discharge Physician: Darrell Golden MD    Discharge Date: 2/5/2020 8:33 AM    Final Diagnosis: Organ transplant candidate [Z76.82]    Disposition: HOME OR SELF CARE    Patient Instructions:   Current Discharge Medication List      CONTINUE these medications which have NOT CHANGED    Details   VELPHORO 500 mg Chew Take 2 tablets by mouth 3 (three) times daily with meals.  Refills: 3      calcitRIOL (ROCALTROL) 0.25 MCG Cap Take 0.25 mcg by mouth once daily.  Refills: 6      chlorhexidine (PERIDEX) 0.12 % solution Use as directed 15 mLs in the mouth or throat 2 (two) times daily.      lidocaine-prilocaine (EMLA) cream APPLY SMALL AMOUNT TO THE AFFECTED AREA PRIOR TO dialysis THREE TIMES A WEEK  Refills: 3      mupirocin calcium 2% nasl oint (BACTROBAN) 2 % Oint 1 application by Nasal route 2 (two) times daily.      ondansetron (ZOFRAN-ODT) 4 MG TbDL Take 1 tablet (4 mg total) by mouth every 6 (six) hours as needed.  Qty: 20 tablet, Refills: 0             Discharge Procedure Orders (must include Diet, Follow-up, Activity)    Follow Up:  Follow up with PCP as previously scheduled  Resume routine diet.  Activity as tolerated.    No driving day of procedure.

## 2020-02-05 NOTE — TRANSFER OF CARE
"Anesthesia Transfer of Care Note    Patient: Tala Rivera    Procedure(s) Performed: Procedure(s) (LRB):  COLONOSCOPY (N/A)    Patient location: PACU    Anesthesia Type: general    Transport from OR: Transported from OR on room air with adequate spontaneous ventilation    Post pain: adequate analgesia    Post assessment: no apparent anesthetic complications and tolerated procedure well    Post vital signs: stable    Level of consciousness: awake    Nausea/Vomiting: no nausea/vomiting    Complications: none    Transfer of care protocol was followed      Last vitals:   Visit Vitals  BP (!) 161/72 (BP Location: Left arm, Patient Position: Lying)   Pulse 61   Temp 36.3 °C (97.3 °F) (Skin)   Resp 16   Ht 4' 11.5" (1.511 m)   Wt 68 kg (150 lb)   LMP  (LMP Unknown)   SpO2 100%   Breastfeeding? No   BMI 29.79 kg/m²     "

## 2020-02-05 NOTE — PROVATION PATIENT INSTRUCTIONS
Discharge Summary/Instructions after an Endoscopic Procedure  Patient Name: Tala Rivera  Patient MRN: [id number]  Patient YOB: 1965 Wednesday, February 05, 2020  Darrell Golden MD  RESTRICTIONS:  During your procedure today, you received medications for sedation.  These   medications may affect your judgment, balance and coordination.  Therefore,   for 24 hours, you have the following restrictions:   - DO NOT drive a car, operate machinery, make legal/financial decisions,   sign important papers or drink alcohol.    ACTIVITY:  Today: no heavy lifting, straining or running due to procedural   sedation/anesthesia.  The following day: return to full activity including work.  DIET:  Eat and drink normally unless instructed otherwise.     TREATMENT FOR COMMON SIDE EFFECTS:  - Mild abdominal pain, nausea, belching, bloating or excessive gas:  rest,   eat lightly and use a heating pad.  - Sore Throat: treat with throat lozenges and/or gargle with warm salt   water.  - Because air was used during the procedure, expelling large amounts of air   from your rectum or belching is normal.  - If a bowel prep was taken, you may not have a bowel movement for 1-3 days.    This is normal.  SYMPTOMS TO WATCH FOR AND REPORT TO YOUR PHYSICIAN:  1. Abdominal pain or bloating, other than gas cramps.  2. Chest pain.  3. Back pain.  4. Signs of infection such as: chills or fever occurring within 24 hours   after the procedure.  5. Rectal bleeding, which would show as bright red, maroon, or black stools.   (A tablespoon of blood from the rectum is not serious, especially if   hemorrhoids are present.)  6. Vomiting.  7. Weakness or dizziness.  GO DIRECTLY TO THE NEAREST EMERGENCY ROOM IF YOU HAVE ANY OF THE FOLLOWING:      Difficulty breathing              Chills and/or fever over 101 F   Persistent vomiting and/or vomiting blood   Severe abdominal pain   Severe chest pain   Black, tarry stools   Bleeding- more than one  tablespoon   Any other symptom or condition that you feel may need urgent attention  Your doctor recommends these additional instructions:  If any biopsies were taken, your doctors clinic will contact you in 1 to 2   weeks with any results.  We are waiting for your pathology results.   Your physician has recommended a repeat colonoscopy in three years for   surveillance based on pathology results.   You are being discharged to home.  For questions, problems or results please call your physician - Darrell Golden MD at Work:  [phone].  EMERGENCY PHONE NUMBER: [phone], LAB RESULTS: [phone]  IF A COMPLICATION OR EMERGENCY SITUATION ARISES AND YOU ARE UNABLE TO REACH   YOUR PHYSICIAN - GO DIRECTLY TO THE EMERGENCY ROOM.  ___________________________________________  Nurse Signature  ___________________________________________  Patient/Designated Responsible Party Signature  Darrell Golden MD  2/5/2020 8:31:28 AM  This report has been verified and signed electronically.  [employer]ATION

## 2020-02-05 NOTE — DISCHARGE INSTRUCTIONS

## 2020-02-12 LAB — FINAL PATHOLOGIC DIAGNOSIS: NORMAL

## 2020-02-13 DIAGNOSIS — Z76.82 ORGAN TRANSPLANT CANDIDATE: Primary | ICD-10-CM

## 2020-03-06 ENCOUNTER — TELEPHONE (OUTPATIENT)
Dept: TRANSPLANT | Facility: CLINIC | Age: 55
End: 2020-03-06

## 2020-03-06 NOTE — TELEPHONE ENCOUNTER
ON CALL COORDINATOR NOTE:    Late Entry, patient called at 0830 this am and released from [facility] case # [id number],shut down due to poor organ quality.

## 2020-03-09 PROCEDURE — 86832 HLA CLASS I HIGH DEFIN QUAL: CPT | Mod: PO,TXP

## 2020-03-09 PROCEDURE — 99001 SPECIMEN HANDLING PT-LAB: CPT | Mod: PO,TXP

## 2020-03-09 PROCEDURE — 86833 HLA CLASS II HIGH DEFIN QUAL: CPT | Mod: PO,TXP

## 2020-03-09 PROCEDURE — 86977 RBC SERUM PRETX INCUBJ/INHIB: CPT | Mod: 59,PO

## 2020-03-13 ENCOUNTER — LAB VISIT (OUTPATIENT)
Dept: LAB | Facility: HOSPITAL | Age: 55
End: 2020-03-13
Payer: MEDICARE

## 2020-03-13 DIAGNOSIS — Z76.82 AWAITING ORGAN TRANSPLANT STATUS: ICD-10-CM

## 2020-04-04 ENCOUNTER — TELEPHONE (OUTPATIENT)
Dept: TRANSPLANT | Facility: CLINIC | Age: 55
End: 2020-04-04

## 2020-04-04 ENCOUNTER — HOSPITAL ENCOUNTER (INPATIENT)
Facility: HOSPITAL | Age: 55
LOS: 4 days | Discharge: HOME | DRG: 652 | End: 2020-04-08
Attending: TRANSPLANT SURGERY | Admitting: TRANSPLANT SURGERY
Payer: MEDICARE

## 2020-04-04 DIAGNOSIS — E83.51 HYPOCALCEMIA: ICD-10-CM

## 2020-04-04 DIAGNOSIS — I12.9 RENAL HYPERTENSION: ICD-10-CM

## 2020-04-04 DIAGNOSIS — Z76.82 AWAITING ORGAN TRANSPLANT STATUS: Primary | ICD-10-CM

## 2020-04-04 DIAGNOSIS — N18.6 ESRD (END STAGE RENAL DISEASE): ICD-10-CM

## 2020-04-04 DIAGNOSIS — Z94.0 S/P KIDNEY TRANSPLANT: Primary | ICD-10-CM

## 2020-04-04 DIAGNOSIS — Z99.2 ESRD ON DIALYSIS: Chronic | ICD-10-CM

## 2020-04-04 DIAGNOSIS — I10 ESSENTIAL HYPERTENSION: Chronic | ICD-10-CM

## 2020-04-04 DIAGNOSIS — Z01.811 PRE-OP CHEST EXAM: ICD-10-CM

## 2020-04-04 DIAGNOSIS — N18.6 ESRD ON DIALYSIS: Chronic | ICD-10-CM

## 2020-04-04 DIAGNOSIS — Z79.899 LONG TERM CURRENT USE OF IMMUNOSUPPRESSIVE DRUG: ICD-10-CM

## 2020-04-04 DIAGNOSIS — N18.9 ANEMIA OF RENAL DISEASE: Chronic | ICD-10-CM

## 2020-04-04 DIAGNOSIS — Z29.89 PROPHYLACTIC IMMUNOTHERAPY: ICD-10-CM

## 2020-04-04 DIAGNOSIS — D63.1 ANEMIA OF RENAL DISEASE: Chronic | ICD-10-CM

## 2020-04-04 DIAGNOSIS — Z91.89 AT RISK FOR OPPORTUNISTIC INFECTIONS: ICD-10-CM

## 2020-04-04 PROBLEM — T85.611A PD CATHETER DYSFUNCTION: Status: RESOLVED | Noted: 2018-10-18 | Resolved: 2020-04-04

## 2020-04-04 LAB
ALBUMIN SERPL BCP-MCNC: 3.8 G/DL (ref 3.5–5.2)
ALP SERPL-CCNC: 83 U/L (ref 55–135)
ALT SERPL W/O P-5'-P-CCNC: 24 U/L (ref 10–44)
ANION GAP SERPL CALC-SCNC: 12 MMOL/L (ref 8–16)
APTT BLDCRRT: 25.2 SEC (ref 21–32)
AST SERPL-CCNC: 26 U/L (ref 10–40)
BASOPHILS # BLD AUTO: 0.02 K/UL (ref 0–0.2)
BASOPHILS NFR BLD: 0.6 % (ref 0–1.9)
BILIRUB SERPL-MCNC: 0.6 MG/DL (ref 0.1–1)
BUN SERPL-MCNC: 37 MG/DL (ref 6–20)
CALCIUM SERPL-MCNC: 8.4 MG/DL (ref 8.7–10.5)
CHLORIDE SERPL-SCNC: 101 MMOL/L (ref 95–110)
CHOLEST SERPL-MCNC: 147 MG/DL (ref 120–199)
CHOLEST/HDLC SERPL: 3 {RATIO} (ref 2–5)
CO2 SERPL-SCNC: 29 MMOL/L (ref 23–29)
CREAT SERPL-MCNC: 4.2 MG/DL (ref 0.5–1.4)
DIFFERENTIAL METHOD: ABNORMAL
EOSINOPHIL # BLD AUTO: 0.1 K/UL (ref 0–0.5)
EOSINOPHIL NFR BLD: 2.1 % (ref 0–8)
ERYTHROCYTE [DISTWIDTH] IN BLOOD BY AUTOMATED COUNT: 15.5 % (ref 11.5–14.5)
EST. GFR  (AFRICAN AMERICAN): 13.2 ML/MIN/1.73 M^2
EST. GFR  (NON AFRICAN AMERICAN): 11.5 ML/MIN/1.73 M^2
GLUCOSE SERPL-MCNC: 94 MG/DL (ref 70–110)
HCT VFR BLD AUTO: 32.8 % (ref 37–48.5)
HDLC SERPL-MCNC: 49 MG/DL (ref 40–75)
HDLC SERPL: 33.3 % (ref 20–50)
HGB BLD-MCNC: 10.3 G/DL (ref 12–16)
IMM GRANULOCYTES # BLD AUTO: 0.01 K/UL (ref 0–0.04)
IMM GRANULOCYTES NFR BLD AUTO: 0.3 % (ref 0–0.5)
INR PPP: 1 (ref 0.8–1.2)
LDH SERPL L TO P-CCNC: 184 U/L (ref 110–260)
LDLC SERPL CALC-MCNC: 87 MG/DL (ref 63–159)
LYMPHOCYTES # BLD AUTO: 0.5 K/UL (ref 1–4.8)
LYMPHOCYTES NFR BLD: 14.5 % (ref 18–48)
MCH RBC QN AUTO: 33.1 PG (ref 27–31)
MCHC RBC AUTO-ENTMCNC: 31.4 G/DL (ref 32–36)
MCV RBC AUTO: 106 FL (ref 82–98)
MONOCYTES # BLD AUTO: 0.3 K/UL (ref 0.3–1)
MONOCYTES NFR BLD: 9.7 % (ref 4–15)
NEUTROPHILS # BLD AUTO: 2.4 K/UL (ref 1.8–7.7)
NEUTROPHILS NFR BLD: 72.8 % (ref 38–73)
NONHDLC SERPL-MCNC: 98 MG/DL
NRBC BLD-RTO: 0 /100 WBC
PHOSPHATE SERPL-MCNC: 4 MG/DL (ref 2.7–4.5)
PLATELET # BLD AUTO: 192 K/UL (ref 150–350)
PMV BLD AUTO: 11.4 FL (ref 9.2–12.9)
POTASSIUM SERPL-SCNC: 4.1 MMOL/L (ref 3.5–5.1)
PROT SERPL-MCNC: 6.6 G/DL (ref 6–8.4)
PROTHROMBIN TIME: 10.3 SEC (ref 9–12.5)
PTH-INTACT SERPL-MCNC: 503 PG/ML (ref 9–77)
RBC # BLD AUTO: 3.11 M/UL (ref 4–5.4)
SODIUM SERPL-SCNC: 142 MMOL/L (ref 136–145)
TRIGL SERPL-MCNC: 55 MG/DL (ref 30–150)
URATE SERPL-MCNC: 3.5 MG/DL (ref 2.4–5.7)
WBC # BLD AUTO: 3.31 K/UL (ref 3.9–12.7)

## 2020-04-04 PROCEDURE — 86920 COMPATIBILITY TEST SPIN: CPT

## 2020-04-04 PROCEDURE — 84100 ASSAY OF PHOSPHORUS: CPT | Mod: NTX

## 2020-04-04 PROCEDURE — 86706 HEP B SURFACE ANTIBODY: CPT | Mod: NTX

## 2020-04-04 PROCEDURE — 86705 HEP B CORE ANTIBODY IGM: CPT | Mod: NTX

## 2020-04-04 PROCEDURE — 85610 PROTHROMBIN TIME: CPT | Mod: NTX

## 2020-04-04 PROCEDURE — 93010 EKG 12-LEAD: ICD-10-PCS | Mod: ,,, | Performed by: INTERNAL MEDICINE

## 2020-04-04 PROCEDURE — 86901 BLOOD TYPING SEROLOGIC RH(D): CPT | Mod: NTX

## 2020-04-04 PROCEDURE — 83970 ASSAY OF PARATHORMONE: CPT | Mod: NTX

## 2020-04-04 PROCEDURE — 87522 HEPATITIS C REVRS TRNSCRPJ: CPT | Mod: NTX

## 2020-04-04 PROCEDURE — 82306 VITAMIN D 25 HYDROXY: CPT | Mod: NTX

## 2020-04-04 PROCEDURE — 87340 HEPATITIS B SURFACE AG IA: CPT | Mod: NTX

## 2020-04-04 PROCEDURE — 85025 COMPLETE CBC W/AUTO DIFF WBC: CPT | Mod: NTX

## 2020-04-04 PROCEDURE — 80053 COMPREHEN METABOLIC PANEL: CPT | Mod: NTX

## 2020-04-04 PROCEDURE — 86703 HIV-1/HIV-2 1 RESULT ANTBDY: CPT | Mod: NTX

## 2020-04-04 PROCEDURE — 80061 LIPID PANEL: CPT | Mod: NTX

## 2020-04-04 PROCEDURE — 20600001 HC STEP DOWN PRIVATE ROOM

## 2020-04-04 PROCEDURE — 93010 ELECTROCARDIOGRAM REPORT: CPT | Mod: ,,, | Performed by: INTERNAL MEDICINE

## 2020-04-04 PROCEDURE — 83615 LACTATE (LD) (LDH) ENZYME: CPT | Mod: NTX

## 2020-04-04 PROCEDURE — 85730 THROMBOPLASTIN TIME PARTIAL: CPT | Mod: NTX

## 2020-04-04 PROCEDURE — 36415 COLL VENOUS BLD VENIPUNCTURE: CPT | Mod: NTX

## 2020-04-04 PROCEDURE — 84550 ASSAY OF BLOOD/URIC ACID: CPT | Mod: NTX

## 2020-04-04 RX ORDER — MUPIROCIN 20 MG/G
OINTMENT TOPICAL
Status: DISCONTINUED | OUTPATIENT
Start: 2020-04-04 | End: 2020-04-05

## 2020-04-04 RX ORDER — HEPARIN SODIUM 5000 [USP'U]/ML
5000 INJECTION, SOLUTION INTRAVENOUS; SUBCUTANEOUS ONCE
Status: COMPLETED | OUTPATIENT
Start: 2020-04-04 | End: 2020-04-05

## 2020-04-04 RX ORDER — SODIUM BICARBONATE 650 MG/1
1300 TABLET ORAL 2 TIMES DAILY
Status: ON HOLD | COMMUNITY
End: 2020-04-08 | Stop reason: HOSPADM

## 2020-04-04 RX ORDER — CEFAZOLIN SODIUM 1 G/3ML
2 INJECTION, POWDER, FOR SOLUTION INTRAMUSCULAR; INTRAVENOUS
Status: COMPLETED | OUTPATIENT
Start: 2020-04-04 | End: 2020-04-05

## 2020-04-05 ENCOUNTER — ANESTHESIA (OUTPATIENT)
Dept: SURGERY | Facility: HOSPITAL | Age: 55
DRG: 652 | End: 2020-04-05
Payer: MEDICARE

## 2020-04-05 ENCOUNTER — ANESTHESIA EVENT (OUTPATIENT)
Dept: SURGERY | Facility: HOSPITAL | Age: 55
DRG: 652 | End: 2020-04-05
Payer: MEDICARE

## 2020-04-05 PROBLEM — N18.6 ESRD (END STAGE RENAL DISEASE): Status: ACTIVE | Noted: 2020-04-05

## 2020-04-05 LAB
25(OH)D3+25(OH)D2 SERPL-MCNC: 26 NG/ML (ref 30–96)
ABO + RH BLD: NORMAL
ALBUMIN SERPL BCP-MCNC: 3.7 G/DL (ref 3.5–5.2)
ANION GAP SERPL CALC-SCNC: 17 MMOL/L (ref 8–16)
BILIRUB UR QL STRIP: NEGATIVE
BLD GP AB SCN CELLS X3 SERPL QL: NORMAL
BUN SERPL-MCNC: 39 MG/DL (ref 6–20)
CALCIUM SERPL-MCNC: 8 MG/DL (ref 8.7–10.5)
CHLORIDE SERPL-SCNC: 102 MMOL/L (ref 95–110)
CLARITY UR REFRACT.AUTO: CLEAR
CO2 SERPL-SCNC: 22 MMOL/L (ref 23–29)
COLOR UR AUTO: ABNORMAL
CREAT SERPL-MCNC: 4.8 MG/DL (ref 0.5–1.4)
CREAT UR-MCNC: 14 MG/DL (ref 15–325)
CREAT UR-MCNC: 14 MG/DL (ref 15–325)
EST. GFR  (AFRICAN AMERICAN): 11.2 ML/MIN/1.73 M^2
EST. GFR  (NON AFRICAN AMERICAN): 9.7 ML/MIN/1.73 M^2
GLUCOSE SERPL-MCNC: 202 MG/DL (ref 70–110)
GLUCOSE UR QL STRIP: ABNORMAL
HCT VFR BLD AUTO: 37.6 % (ref 37–48.5)
HGB UR QL STRIP: NEGATIVE
KETONES UR QL STRIP: NEGATIVE
LEUKOCYTE ESTERASE UR QL STRIP: NEGATIVE
NITRITE UR QL STRIP: NEGATIVE
PH UR STRIP: 8 [PH] (ref 5–8)
PHOSPHATE SERPL-MCNC: 4.8 MG/DL (ref 2.7–4.5)
POCT GLUCOSE: 182 MG/DL (ref 70–110)
POCT GLUCOSE: 188 MG/DL (ref 70–110)
POTASSIUM SERPL-SCNC: 4.5 MMOL/L (ref 3.5–5.1)
PROT UR QL STRIP: NEGATIVE
PROT UR-MCNC: 12 MG/DL (ref 0–15)
PROT UR-MCNC: 12 MG/DL (ref 0–15)
PROT/CREAT UR: 0.86 MG/G{CREAT} (ref 0–0.2)
PROT/CREAT UR: 0.86 MG/G{CREAT} (ref 0–0.2)
SARS-COV-2 RNA RESP QL NAA+PROBE: NOT DETECTED
SODIUM SERPL-SCNC: 141 MMOL/L (ref 136–145)
SP GR UR STRIP: 1 (ref 1–1.03)
URN SPEC COLLECT METH UR: ABNORMAL

## 2020-04-05 PROCEDURE — D9220A PRA ANESTHESIA: Mod: CRNA,,, | Performed by: NURSE ANESTHETIST, CERTIFIED REGISTERED

## 2020-04-05 PROCEDURE — 63600175 PHARM REV CODE 636 W HCPCS: Mod: JG,NTX | Performed by: PHYSICIAN ASSISTANT

## 2020-04-05 PROCEDURE — 82962 GLUCOSE BLOOD TEST: CPT | Performed by: TRANSPLANT SURGERY

## 2020-04-05 PROCEDURE — 27201037 HC PRESSURE MONITORING SET UP: Mod: NTX

## 2020-04-05 PROCEDURE — 63600175 PHARM REV CODE 636 W HCPCS: Performed by: TRANSPLANT SURGERY

## 2020-04-05 PROCEDURE — 50360 RNL ALTRNSPLJ W/O RCP NFRCT: CPT | Mod: LT,GC,, | Performed by: TRANSPLANT SURGERY

## 2020-04-05 PROCEDURE — 36000931 HC OR TIME LEV VII EA ADD 15 MIN: Performed by: TRANSPLANT SURGERY

## 2020-04-05 PROCEDURE — 25000003 PHARM REV CODE 250: Performed by: NURSE ANESTHETIST, CERTIFIED REGISTERED

## 2020-04-05 PROCEDURE — 36000930 HC OR TIME LEV VII 1ST 15 MIN: Performed by: TRANSPLANT SURGERY

## 2020-04-05 PROCEDURE — 36620 ARTERIAL: ICD-10-PCS | Mod: 59,,, | Performed by: ANESTHESIOLOGY

## 2020-04-05 PROCEDURE — 50605 PR URETEROTOMY TO INSERT STENT: ICD-10-PCS | Mod: 51,LT,GC, | Performed by: TRANSPLANT SURGERY

## 2020-04-05 PROCEDURE — S5010 5% DEXTROSE AND 0.45% SALINE: HCPCS | Performed by: STUDENT IN AN ORGANIZED HEALTH CARE EDUCATION/TRAINING PROGRAM

## 2020-04-05 PROCEDURE — 25000003 PHARM REV CODE 250: Performed by: STUDENT IN AN ORGANIZED HEALTH CARE EDUCATION/TRAINING PROGRAM

## 2020-04-05 PROCEDURE — 36415 COLL VENOUS BLD VENIPUNCTURE: CPT

## 2020-04-05 PROCEDURE — 99223 PR INITIAL HOSPITAL CARE,LEVL III: ICD-10-PCS | Mod: NTX,,, | Performed by: PHYSICIAN ASSISTANT

## 2020-04-05 PROCEDURE — D9220A PRA ANESTHESIA: ICD-10-PCS | Mod: ANES,,, | Performed by: ANESTHESIOLOGY

## 2020-04-05 PROCEDURE — 71000016 HC POSTOP RECOV ADDL HR: Performed by: TRANSPLANT SURGERY

## 2020-04-05 PROCEDURE — U0002 COVID-19 LAB TEST NON-CDC: HCPCS

## 2020-04-05 PROCEDURE — 81200001 HC KIDNEY ACQUISITION - CADAVER

## 2020-04-05 PROCEDURE — 50605 INSERT URETERAL SUPPORT: CPT | Mod: 51,LT,GC, | Performed by: TRANSPLANT SURGERY

## 2020-04-05 PROCEDURE — 99223 1ST HOSP IP/OBS HIGH 75: CPT | Mod: NTX,,, | Performed by: PHYSICIAN ASSISTANT

## 2020-04-05 PROCEDURE — C2617 STENT, NON-COR, TEM W/O DEL: HCPCS | Performed by: TRANSPLANT SURGERY

## 2020-04-05 PROCEDURE — 80069 RENAL FUNCTION PANEL: CPT

## 2020-04-05 PROCEDURE — D9220A PRA ANESTHESIA: ICD-10-PCS | Mod: CRNA,,, | Performed by: NURSE ANESTHETIST, CERTIFIED REGISTERED

## 2020-04-05 PROCEDURE — 85014 HEMATOCRIT: CPT

## 2020-04-05 PROCEDURE — 81003 URINALYSIS AUTO W/O SCOPE: CPT | Mod: NTX

## 2020-04-05 PROCEDURE — D9220A PRA ANESTHESIA: Mod: ANES,,, | Performed by: ANESTHESIOLOGY

## 2020-04-05 PROCEDURE — 99900035 HC TECH TIME PER 15 MIN (STAT)

## 2020-04-05 PROCEDURE — 37000009 HC ANESTHESIA EA ADD 15 MINS: Performed by: TRANSPLANT SURGERY

## 2020-04-05 PROCEDURE — 63600175 PHARM REV CODE 636 W HCPCS: Mod: NTX | Performed by: PHYSICIAN ASSISTANT

## 2020-04-05 PROCEDURE — 63600175 PHARM REV CODE 636 W HCPCS: Performed by: STUDENT IN AN ORGANIZED HEALTH CARE EDUCATION/TRAINING PROGRAM

## 2020-04-05 PROCEDURE — 71000015 HC POSTOP RECOV 1ST HR: Performed by: TRANSPLANT SURGERY

## 2020-04-05 PROCEDURE — 81300002 HC KIDNEY TRANSPORT, GROUND 4-5 HOURS

## 2020-04-05 PROCEDURE — 27201423 OPTIME MED/SURG SUP & DEVICES STERILE SUPPLY: Performed by: TRANSPLANT SURGERY

## 2020-04-05 PROCEDURE — 82570 ASSAY OF URINE CREATININE: CPT | Mod: NTX

## 2020-04-05 PROCEDURE — 36620 INSERTION CATHETER ARTERY: CPT | Mod: 59,,, | Performed by: ANESTHESIOLOGY

## 2020-04-05 PROCEDURE — 63600175 PHARM REV CODE 636 W HCPCS: Performed by: NURSE ANESTHETIST, CERTIFIED REGISTERED

## 2020-04-05 PROCEDURE — 20600001 HC STEP DOWN PRIVATE ROOM

## 2020-04-05 PROCEDURE — 94761 N-INVAS EAR/PLS OXIMETRY MLT: CPT

## 2020-04-05 PROCEDURE — 37000008 HC ANESTHESIA 1ST 15 MINUTES: Performed by: TRANSPLANT SURGERY

## 2020-04-05 PROCEDURE — 50323 PR TRANSPLANT,PREP CADAVER RENAL GRAFT: ICD-10-PCS | Mod: 51,LT,GC, | Performed by: TRANSPLANT SURGERY

## 2020-04-05 PROCEDURE — 71000033 HC RECOVERY, INTIAL HOUR: Performed by: TRANSPLANT SURGERY

## 2020-04-05 PROCEDURE — 50360 PR TRANSPLANTATION OF KIDNEY: ICD-10-PCS | Mod: LT,GC,, | Performed by: TRANSPLANT SURGERY

## 2020-04-05 DEVICE — STENT DOUBLE J 7FRX12CM
Type: IMPLANTABLE DEVICE | Site: URETER | Status: NON-FUNCTIONAL
Removed: 2020-04-27

## 2020-04-05 RX ORDER — PREDNISONE 20 MG/1
20 TABLET ORAL DAILY
Status: DISCONTINUED | OUTPATIENT
Start: 2020-04-08 | End: 2020-04-08 | Stop reason: HOSPADM

## 2020-04-05 RX ORDER — CEFAZOLIN SODIUM 1 G/3ML
INJECTION, POWDER, FOR SOLUTION INTRAMUSCULAR; INTRAVENOUS
Status: DISCONTINUED | OUTPATIENT
Start: 2020-04-05 | End: 2020-04-05

## 2020-04-05 RX ORDER — METHYLPREDNISOLONE SOD SUCC 125 MG
125 VIAL (EA) INJECTION ONCE
Status: COMPLETED | OUTPATIENT
Start: 2020-04-07 | End: 2020-04-07

## 2020-04-05 RX ORDER — DEXTROSE MONOHYDRATE AND SODIUM CHLORIDE 5; .45 G/100ML; G/100ML
INJECTION, SOLUTION INTRAVENOUS CONTINUOUS
Status: DISCONTINUED | OUTPATIENT
Start: 2020-04-05 | End: 2020-04-06

## 2020-04-05 RX ORDER — ONDANSETRON 2 MG/ML
4 INJECTION INTRAMUSCULAR; INTRAVENOUS ONCE AS NEEDED
Status: DISCONTINUED | OUTPATIENT
Start: 2020-04-05 | End: 2020-04-08 | Stop reason: HOSPADM

## 2020-04-05 RX ORDER — SODIUM CHLORIDE 9 MG/ML
INJECTION, SOLUTION INTRAVENOUS CONTINUOUS
Status: DISCONTINUED | OUTPATIENT
Start: 2020-04-05 | End: 2020-04-06

## 2020-04-05 RX ORDER — ACETAMINOPHEN 325 MG/1
TABLET ORAL
Status: DISCONTINUED | OUTPATIENT
Start: 2020-04-05 | End: 2020-04-05

## 2020-04-05 RX ORDER — BISACODYL 5 MG
10 TABLET, DELAYED RELEASE (ENTERIC COATED) ORAL NIGHTLY
Status: DISCONTINUED | OUTPATIENT
Start: 2020-04-05 | End: 2020-04-08 | Stop reason: HOSPADM

## 2020-04-05 RX ORDER — MUPIROCIN 20 MG/G
1 OINTMENT TOPICAL 2 TIMES DAILY
Status: DISCONTINUED | OUTPATIENT
Start: 2020-04-05 | End: 2020-04-08 | Stop reason: HOSPADM

## 2020-04-05 RX ORDER — TACROLIMUS 1 MG/1
3 CAPSULE ORAL 2 TIMES DAILY
Status: DISCONTINUED | OUTPATIENT
Start: 2020-04-05 | End: 2020-04-06

## 2020-04-05 RX ORDER — GLUCAGON 1 MG
1 KIT INJECTION CONTINUOUS PRN
Status: DISCONTINUED | OUTPATIENT
Start: 2020-04-05 | End: 2020-04-07

## 2020-04-05 RX ORDER — LIDOCAINE HYDROCHLORIDE 20 MG/ML
INJECTION INTRAVENOUS
Status: DISCONTINUED | OUTPATIENT
Start: 2020-04-05 | End: 2020-04-05

## 2020-04-05 RX ORDER — CISATRACURIUM BESYLATE 10 MG/ML
INJECTION, SOLUTION INTRAVENOUS
Status: DISCONTINUED | OUTPATIENT
Start: 2020-04-05 | End: 2020-04-05

## 2020-04-05 RX ORDER — FUROSEMIDE 10 MG/ML
INJECTION INTRAMUSCULAR; INTRAVENOUS
Status: DISCONTINUED | OUTPATIENT
Start: 2020-04-05 | End: 2020-04-05

## 2020-04-05 RX ORDER — EPHEDRINE SULFATE 50 MG/ML
INJECTION, SOLUTION INTRAVENOUS
Status: DISCONTINUED | OUTPATIENT
Start: 2020-04-05 | End: 2020-04-05

## 2020-04-05 RX ORDER — HYDROMORPHONE HYDROCHLORIDE 1 MG/ML
0.2 INJECTION, SOLUTION INTRAMUSCULAR; INTRAVENOUS; SUBCUTANEOUS EVERY 5 MIN PRN
Status: DISCONTINUED | OUTPATIENT
Start: 2020-04-05 | End: 2020-04-05

## 2020-04-05 RX ORDER — HEPARIN SODIUM 5000 [USP'U]/ML
5000 INJECTION, SOLUTION INTRAVENOUS; SUBCUTANEOUS EVERY 8 HOURS
Status: DISCONTINUED | OUTPATIENT
Start: 2020-04-05 | End: 2020-04-08 | Stop reason: HOSPADM

## 2020-04-05 RX ORDER — MYCOPHENOLATE MOFETIL 250 MG/1
1000 CAPSULE ORAL 2 TIMES DAILY
Status: DISCONTINUED | OUTPATIENT
Start: 2020-04-05 | End: 2020-04-08 | Stop reason: HOSPADM

## 2020-04-05 RX ORDER — DOCUSATE SODIUM 100 MG/1
100 CAPSULE, LIQUID FILLED ORAL 3 TIMES DAILY
Status: DISCONTINUED | OUTPATIENT
Start: 2020-04-05 | End: 2020-04-08 | Stop reason: HOSPADM

## 2020-04-05 RX ORDER — ATOVAQUONE 750 MG/5ML
750 SUSPENSION ORAL DAILY
Status: DISCONTINUED | OUTPATIENT
Start: 2020-04-06 | End: 2020-04-06

## 2020-04-05 RX ORDER — OXYCODONE AND ACETAMINOPHEN 10; 325 MG/1; MG/1
1 TABLET ORAL EVERY 4 HOURS PRN
Status: DISCONTINUED | OUTPATIENT
Start: 2020-04-05 | End: 2020-04-08 | Stop reason: HOSPADM

## 2020-04-05 RX ORDER — NYSTATIN 100000 [USP'U]/ML
500000 SUSPENSION ORAL
Status: DISCONTINUED | OUTPATIENT
Start: 2020-04-05 | End: 2020-04-08 | Stop reason: HOSPADM

## 2020-04-05 RX ORDER — INSULIN ASPART 100 [IU]/ML
0-5 INJECTION, SOLUTION INTRAVENOUS; SUBCUTANEOUS
Status: DISCONTINUED | OUTPATIENT
Start: 2020-04-05 | End: 2020-04-07

## 2020-04-05 RX ORDER — OXYCODONE AND ACETAMINOPHEN 5; 325 MG/1; MG/1
1 TABLET ORAL EVERY 4 HOURS PRN
Status: DISCONTINUED | OUTPATIENT
Start: 2020-04-05 | End: 2020-04-08 | Stop reason: HOSPADM

## 2020-04-05 RX ORDER — IBUPROFEN 200 MG
24 TABLET ORAL
Status: DISCONTINUED | OUTPATIENT
Start: 2020-04-05 | End: 2020-04-07

## 2020-04-05 RX ORDER — CEFAZOLIN SODIUM 1 G/3ML
2 INJECTION, POWDER, FOR SOLUTION INTRAMUSCULAR; INTRAVENOUS
Status: DISPENSED | OUTPATIENT
Start: 2020-04-05 | End: 2020-04-06

## 2020-04-05 RX ORDER — DIPHENHYDRAMINE HYDROCHLORIDE 50 MG/ML
INJECTION INTRAMUSCULAR; INTRAVENOUS
Status: DISCONTINUED | OUTPATIENT
Start: 2020-04-05 | End: 2020-04-05

## 2020-04-05 RX ORDER — IBUPROFEN 200 MG
16 TABLET ORAL
Status: DISCONTINUED | OUTPATIENT
Start: 2020-04-05 | End: 2020-04-07

## 2020-04-05 RX ORDER — SODIUM CHLORIDE 0.9 % (FLUSH) 0.9 %
3 SYRINGE (ML) INJECTION EVERY 6 HOURS
Status: DISCONTINUED | OUTPATIENT
Start: 2020-04-05 | End: 2020-04-06

## 2020-04-05 RX ORDER — VALGANCICLOVIR 450 MG/1
450 TABLET, FILM COATED ORAL EVERY MORNING
Status: DISCONTINUED | OUTPATIENT
Start: 2020-04-15 | End: 2020-04-08 | Stop reason: HOSPADM

## 2020-04-05 RX ORDER — MANNITOL 250 MG/ML
INJECTION, SOLUTION INTRAVENOUS
Status: DISCONTINUED | OUTPATIENT
Start: 2020-04-05 | End: 2020-04-05

## 2020-04-05 RX ORDER — ONDANSETRON 2 MG/ML
INJECTION INTRAMUSCULAR; INTRAVENOUS
Status: DISCONTINUED | OUTPATIENT
Start: 2020-04-05 | End: 2020-04-05

## 2020-04-05 RX ORDER — FENTANYL CITRATE 50 UG/ML
INJECTION, SOLUTION INTRAMUSCULAR; INTRAVENOUS
Status: DISCONTINUED | OUTPATIENT
Start: 2020-04-05 | End: 2020-04-05

## 2020-04-05 RX ORDER — HYDROMORPHONE HYDROCHLORIDE 1 MG/ML
0.5 INJECTION, SOLUTION INTRAMUSCULAR; INTRAVENOUS; SUBCUTANEOUS
Status: DISCONTINUED | OUTPATIENT
Start: 2020-04-05 | End: 2020-04-06

## 2020-04-05 RX ORDER — FAMOTIDINE 20 MG/1
20 TABLET, FILM COATED ORAL NIGHTLY
Status: DISCONTINUED | OUTPATIENT
Start: 2020-04-05 | End: 2020-04-08 | Stop reason: HOSPADM

## 2020-04-05 RX ORDER — PROPOFOL 10 MG/ML
VIAL (ML) INTRAVENOUS
Status: DISCONTINUED | OUTPATIENT
Start: 2020-04-05 | End: 2020-04-05

## 2020-04-05 RX ORDER — MIDAZOLAM HYDROCHLORIDE 1 MG/ML
INJECTION, SOLUTION INTRAMUSCULAR; INTRAVENOUS
Status: DISCONTINUED | OUTPATIENT
Start: 2020-04-05 | End: 2020-04-05

## 2020-04-05 RX ORDER — LABETALOL HYDROCHLORIDE 5 MG/ML
INJECTION, SOLUTION INTRAVENOUS
Status: DISCONTINUED | OUTPATIENT
Start: 2020-04-05 | End: 2020-04-05

## 2020-04-05 RX ADMIN — LIDOCAINE HYDROCHLORIDE 100 MG: 20 INJECTION, SOLUTION INTRAVENOUS at 11:04

## 2020-04-05 RX ADMIN — CISATRACURIUM BESYLATE 4 MG: 10 INJECTION INTRAVENOUS at 01:04

## 2020-04-05 RX ADMIN — CISATRACURIUM BESYLATE 2 MG: 10 INJECTION INTRAVENOUS at 03:04

## 2020-04-05 RX ADMIN — MYCOPHENOLATE MOFETIL 1000 MG: 250 CAPSULE ORAL at 09:04

## 2020-04-05 RX ADMIN — ONDANSETRON 4 MG: 2 INJECTION, SOLUTION INTRAMUSCULAR; INTRAVENOUS at 03:04

## 2020-04-05 RX ADMIN — CISATRACURIUM BESYLATE 16 MG: 10 INJECTION INTRAVENOUS at 11:04

## 2020-04-05 RX ADMIN — EPHEDRINE SULFATE 5 MG: 50 INJECTION INTRAVENOUS at 02:04

## 2020-04-05 RX ADMIN — SODIUM CHLORIDE 250 ML: 0.9 INJECTION, SOLUTION INTRAVENOUS at 06:04

## 2020-04-05 RX ADMIN — SODIUM CHLORIDE 300 ML: 0.9 INJECTION, SOLUTION INTRAVENOUS at 05:04

## 2020-04-05 RX ADMIN — EPHEDRINE SULFATE 5 MG: 50 INJECTION INTRAVENOUS at 12:04

## 2020-04-05 RX ADMIN — OXYCODONE HYDROCHLORIDE AND ACETAMINOPHEN 1 TABLET: 5; 325 TABLET ORAL at 05:04

## 2020-04-05 RX ADMIN — HEPARIN SODIUM 5000 UNITS: 5000 INJECTION, SOLUTION INTRAVENOUS; SUBCUTANEOUS at 09:04

## 2020-04-05 RX ADMIN — MIDAZOLAM HYDROCHLORIDE 2 MG: 1 INJECTION, SOLUTION INTRAMUSCULAR; INTRAVENOUS at 11:04

## 2020-04-05 RX ADMIN — LABETALOL HYDROCHLORIDE 5 MG: 5 INJECTION, SOLUTION INTRAVENOUS at 04:04

## 2020-04-05 RX ADMIN — HEPARIN SODIUM 5000 UNITS: 5000 INJECTION, SOLUTION INTRAVENOUS; SUBCUTANEOUS at 12:04

## 2020-04-05 RX ADMIN — FENTANYL CITRATE 50 MCG: 50 INJECTION, SOLUTION INTRAMUSCULAR; INTRAVENOUS at 01:04

## 2020-04-05 RX ADMIN — BISACODYL 10 MG: 5 TABLET, COATED ORAL at 09:04

## 2020-04-05 RX ADMIN — NYSTATIN 500000 UNITS: 100000 SUSPENSION ORAL at 09:04

## 2020-04-05 RX ADMIN — FENTANYL CITRATE 50 MCG: 50 INJECTION, SOLUTION INTRAMUSCULAR; INTRAVENOUS at 02:04

## 2020-04-05 RX ADMIN — TACROLIMUS 3 MG: 1 CAPSULE ORAL at 06:04

## 2020-04-05 RX ADMIN — FENTANYL CITRATE 50 MCG: 50 INJECTION, SOLUTION INTRAMUSCULAR; INTRAVENOUS at 12:04

## 2020-04-05 RX ADMIN — CEFAZOLIN 2 G: 1 INJECTION, POWDER, FOR SOLUTION INTRAMUSCULAR; INTRAVENOUS at 09:04

## 2020-04-05 RX ADMIN — SODIUM CHLORIDE 20 MG: 9 INJECTION, SOLUTION INTRAVENOUS at 01:04

## 2020-04-05 RX ADMIN — DIPHENHYDRAMINE HYDROCHLORIDE 50 MG: 50 INJECTION, SOLUTION INTRAMUSCULAR; INTRAVENOUS at 11:04

## 2020-04-05 RX ADMIN — SODIUM CHLORIDE, SODIUM GLUCONATE, SODIUM ACETATE, POTASSIUM CHLORIDE, MAGNESIUM CHLORIDE, SODIUM PHOSPHATE, DIBASIC, AND POTASSIUM PHOSPHATE: .53; .5; .37; .037; .03; .012; .00082 INJECTION, SOLUTION INTRAVENOUS at 11:04

## 2020-04-05 RX ADMIN — NYSTATIN 500000 UNITS: 100000 SUSPENSION ORAL at 06:04

## 2020-04-05 RX ADMIN — FUROSEMIDE 100 MG: 10 INJECTION, SOLUTION INTRAVENOUS at 02:04

## 2020-04-05 RX ADMIN — SODIUM CHLORIDE, SODIUM GLUCONATE, SODIUM ACETATE, POTASSIUM CHLORIDE, MAGNESIUM CHLORIDE, SODIUM PHOSPHATE, DIBASIC, AND POTASSIUM PHOSPHATE: .53; .5; .37; .037; .03; .012; .00082 INJECTION, SOLUTION INTRAVENOUS at 12:04

## 2020-04-05 RX ADMIN — FENTANYL CITRATE 50 MCG: 50 INJECTION, SOLUTION INTRAMUSCULAR; INTRAVENOUS at 11:04

## 2020-04-05 RX ADMIN — DOCUSATE SODIUM 100 MG: 100 CAPSULE, LIQUID FILLED ORAL at 09:04

## 2020-04-05 RX ADMIN — SODIUM CHLORIDE 200 ML/HR: 9 INJECTION, SOLUTION INTRAVENOUS at 12:04

## 2020-04-05 RX ADMIN — DEXTROSE AND SODIUM CHLORIDE 50 ML/HR: 5; .45 INJECTION, SOLUTION INTRAVENOUS at 04:04

## 2020-04-05 RX ADMIN — ACETAMINOPHEN 650 MG: 325 TABLET ORAL at 11:04

## 2020-04-05 RX ADMIN — CEFAZOLIN 2 G: 330 INJECTION, POWDER, FOR SOLUTION INTRAMUSCULAR; INTRAVENOUS at 01:04

## 2020-04-05 RX ADMIN — MUPIROCIN 1 G: 20 OINTMENT TOPICAL at 09:04

## 2020-04-05 RX ADMIN — PROPOFOL 150 MG: 10 INJECTION, EMULSION INTRAVENOUS at 11:04

## 2020-04-05 RX ADMIN — SODIUM CHLORIDE, SODIUM GLUCONATE, SODIUM ACETATE, POTASSIUM CHLORIDE, MAGNESIUM CHLORIDE, SODIUM PHOSPHATE, DIBASIC, AND POTASSIUM PHOSPHATE: .53; .5; .37; .037; .03; .012; .00082 INJECTION, SOLUTION INTRAVENOUS at 03:04

## 2020-04-05 RX ADMIN — MANNITOL 25 G: 12.5 INJECTION, SOLUTION INTRAVENOUS at 02:04

## 2020-04-05 RX ADMIN — SODIUM CHLORIDE 200 ML: 0.9 INJECTION, SOLUTION INTRAVENOUS at 04:04

## 2020-04-05 RX ADMIN — FAMOTIDINE 20 MG: 20 TABLET, FILM COATED ORAL at 09:04

## 2020-04-05 NOTE — CARE UPDATE
RT CALLED TO ROOM BY PA @ 0512 TO PERFORM EKG/EKG DONE BY RN @ 8461/NO SIGNS OF RESPIRATORY DISTRESS NOTED/WILL CONTINUE TO MONITOR.

## 2020-04-05 NOTE — PROGRESS NOTES
Patient admitted to recovery see [facility] for complete assessment pacu bcg's maintained safety measures verified patient waking from sedation no distress noted. Labs drawn and sent to lab. Also called patient's  but went to voice mail and also text him to let him know his wife is in the recovery room will continue to monitor.

## 2020-04-05 NOTE — PLAN OF CARE
CALLED , BEN, TO UPDATE ON SURGERY PROGRESS AS TEXT UPDATES NOT WORKING.   NOTIFIED THAT SURGERY IS GOING WELL.  CATHY VÁSQUEZ RN

## 2020-04-05 NOTE — PROGRESS NOTES
[facility] pacu to pre-op for kidney transplant.

## 2020-04-05 NOTE — PLAN OF CARE
AOx4, VSS, denies pain. Pt only needs consents and shower prior to departure for transplant.  Pt remains free from falls. Bed locked and lowered. Personal items & call light w/in reach. Frequency checks completed for safety. Fall risk reviewed with pt. Pt verbalized understanding. WCTM.

## 2020-04-05 NOTE — ASSESSMENT & PLAN NOTE
- ESRD 2/2 presumed HTN admitted for KTx  - HD 2x/week via RUE AV graft. Tolerating well. Last HD 4/4.   - Dry weight ~67.5 kg.  - Native UOP ~1.8L  - Pre-op CXR, EKG, labs reviewed (some still pending).  - EKG sinus bradycardia, pt reports BL HR 40s-50s (consistent with chart review). CXR pending.  - Tentative OR time 1100 4/5 with Dr. Garcia as primary surgeon.   - Consents to be obtained in AM prior to surgery.

## 2020-04-05 NOTE — TELEPHONE ENCOUNTER
ON-CALL NOTE    UNOS# [id number]    Notified by Marshall Phelan, , that Tala Rivera is eligible for kidney offer.  Spoke with patient and identified no acute medical issues in telephone assessment. Protocol script read to patient regarding N/A, standard donor offer. Patient verbalized understanding, all questions answered, patient accepts organ offer.  Current sample of blood is available for crossmatch.  Patient reports no sensitizing event since last blood sample for PRA received. Patient denies contact with COVID-19 + patient to her knowledge.  Patient stated last HD earlier today.    Notified by Marshall Phelan that crossmatch is negative.  Patient notified of test results and verbalized understanding.

## 2020-04-05 NOTE — ANESTHESIA PROCEDURE NOTES
Arterial    Diagnosis: ESRD    Patient location during procedure: done in OR  Procedure start time: 4/5/2020 12:00 PM  Timeout: 4/5/2020 11:57 AM  Procedure end time: 4/5/2020 12:05 PM    Staffing  Authorizing Provider: Manan Hanna MD  Performing Provider: Manan Hanna MD    Anesthesiologist was present at the time of the procedure.    Preanesthetic Checklist  Completed: patient identified, site marked, surgical consent, pre-op evaluation, timeout performed, IV checked, risks and benefits discussed, monitors and equipment checked and anesthesia consent givenArterial  Skin Prep: chlorhexidine gluconate  Local Infiltration: none  Orientation: left  Location: radial  Catheter Size: 20 G  Catheter placement by Anatomical landmarks. Heme positive aspiration all ports.Insertion Attempts: 1  Assessment  Dressing: secured with tape and tegaderm  Patient: Tolerated well

## 2020-04-05 NOTE — NURSING
Pt arrived to unit ambulatory for w/u for kidney transplant planned for AM. VSS, all admit questions addressed. PIV, EKG, CXR, LABS completed. Awaiting consents and shower.     JEFFY Hassan notified of pts arrival and reported to bedside.    NAD, WCTM.

## 2020-04-05 NOTE — SUBJECTIVE & OBJECTIVE
Subjective:     Chief Complaint/Reason for Admission: ESRD admitted for KTx    History of Present Illness:  53 yo WF with history of HTN, gastric bypass surgery on 7/28/16, umbilical hernia repair and PD catheter placement on 9/4/18, ESRD presumed secondary to HTN on HD since 2/5/16; PD catheter was removed in Dec 2018 due to chronic abdominal pain; Now on HD 2x/wk via RUE AV graft (usually MF, but rescheduled to Tues/Sat this week) admitted for kidney transplant. She reports tolerating HD well. Last HD today 4/4. Native UOP ~1.8L/day. Dry weight ~67.5 kg. She denies recent illnesses or hospitalizations. Tentatively scheduled for 1100 4/5 with Dr. Garcia as primary surgeon. Induction will be Simulect. Pre-op labs and imaging pending. Transplant surgery fellow to consent patient in AM.     Dialysis History: Ms. Edgar with ESRD, requiring chronic dialysis who is on hemodialysis started on 2/5/16 (initially on PD). Patient is dialyzing on 2x/week.  Patient reports that she is tolerating dialysis well.  Date of Last Dialysis: 4/4/20    Native urine output per day: 1800 mL    Previous Transplant: no    PTA Medications   Medication Sig    calcitRIOL (ROCALTROL) 0.25 MCG Cap Take 0.25 mcg by mouth once daily.    ferric citrate (AURYXIA) 210 mg iron Tab Take 420 mg by mouth 3 (three) times daily.    ferric citrate (AURYXIA) 210 mg iron Tab Take 420 mg by mouth as needed. Take two tablets w/ a snack twice daily.    lidocaine-prilocaine (EMLA) cream APPLY SMALL AMOUNT TO THE AFFECTED AREA PRIOR TO dialysis THREE TIMES A WEEK    sodium bicarbonate 650 MG tablet Take 1,300 mg by mouth 2 (two) times daily.    [DISCONTINUED] chlorhexidine (PERIDEX) 0.12 % solution Use as directed 15 mLs in the mouth or throat 2 (two) times daily.    [DISCONTINUED] mupirocin calcium 2% nasl oint (BACTROBAN) 2 % Oint 1 application by Nasal route 2 (two) times daily.    [DISCONTINUED] ondansetron (ZOFRAN-ODT) 4 MG TbDL Take 1 tablet (4 mg  total) by mouth every 6 (six) hours as needed. (Patient not taking: Reported on 10/22/2019)    [DISCONTINUED] VELPHORO 500 mg Chew Take 2 tablets by mouth 3 (three) times daily with meals.       Review of patient's allergies indicates:   Allergen Reactions    Sulfa (sulfonamide antibiotics) Hives       Past Medical History:   Diagnosis Date    Anemia of renal disease     ESRD on dialysis     Dr. Muhammad     Essential hypertension     PD catheter dysfunction 10/18/2018    Secondary hyperparathyroidism of renal origin      Past Surgical History:   Procedure Laterality Date    AV FISTULA PLACEMENT Left     never matured    AV graft Right 2015     SECTION      x3; ; , and     COLONOSCOPY N/A 2020    Procedure: COLONOSCOPY;  Surgeon: Darrell Golden MD;  [facility];  Service: Endoscopy;  Laterality: N/A;    GASTRIC BYPASS      LAPAROSCOPIC REVISION OF PROCEDURE INVOLVING PERITONEAL DIALYSIS CATHETER N/A 10/18/2018    Procedure: REVISION OF PROCEDURE INVOLVING PERITONEAL DIALYSIS CATHETER, LAPAROSCOPIC;  Surgeon: Hair Jimenez MD;  Location: [facility];  Service: General;  Laterality: N/A;    PERITONEAL CATHETER INSERTION N/A 2018    Procedure: Laparoscopic INSERTION, CATHETER, INTRAPERITONEAL;  Surgeon: Hair Jimenez MD;  Location: [facility];  Service: General;  Laterality: N/A;    PERITONEAL CATHETER REMOVAL N/A 2018    Procedure: REMOVAL, CATHETER, DIALYSIS, PERITONEAL;  Surgeon: Hair Jimenez MD;  Location: [facility];  Service: General;  Laterality: N/A;    WOUND EXPLORATION N/A 2018    Procedure: EXPLORATION, WOUND-Surgical Site Irrigation;  Surgeon: Hair Jimenez MD;  Location: [facility];  Service: General;  Laterality: N/A;     Family History     Problem Relation (Age of Onset)    Diabetes Mother    Down syndrome Son    Heart disease Mother    Hypertension Mother    Kidney disease Mother    Lupus Sister        Tobacco Use    Smoking status:  "Never Smoker    Smokeless tobacco: Never Used   Substance and Sexual Activity    Alcohol use: No    Drug use: No    Sexual activity: Yes     Partners: Male        Review of Systems   Constitutional: Negative for activity change, appetite change, chills and fever.   HENT: Negative for facial swelling and trouble swallowing.    Eyes: Negative for photophobia and redness.   Respiratory: Negative for cough, shortness of breath, wheezing and stridor.    Cardiovascular: Negative for chest pain, palpitations and leg swelling.   Gastrointestinal: Negative for abdominal distention, abdominal pain, constipation, diarrhea, nausea and vomiting.   Genitourinary: Negative for difficulty urinating and dysuria.   Musculoskeletal: Negative for neck pain and neck stiffness.   Skin: Negative for wound.   Neurological: Negative for dizziness, tremors, syncope and light-headedness.   Psychiatric/Behavioral: Negative for agitation, behavioral problems, confusion and decreased concentration.     Objective:     Vital Signs (Most Recent):  Pulse: (!) 48 (04/04/20 2215)  Resp: 16 (04/04/20 2345)  BP: (!) 143/79 (04/04/20 2215)  SpO2: 98 % (04/04/20 2215)  Height: 5' 1" (154.9 cm)  Weight: 66.7 kg (147 lb 2.5 oz)  Body mass index is 27.81 kg/m².     Physical Exam   Constitutional: She is oriented to person, place, and time. No distress.   HENT:   Head: Normocephalic and atraumatic.   Eyes: Pupils are equal, round, and reactive to light. No scleral icterus.   Neck: Normal range of motion. Neck supple. No JVD present.   Cardiovascular: Normal rate, regular rhythm and intact distal pulses.   No murmur heard.  Pulmonary/Chest: Effort normal. No stridor. No respiratory distress. She has no wheezes. She has no rales.   Abdominal: Soft. Bowel sounds are normal. She exhibits no distension. There is no tenderness. There is no guarding.   Musculoskeletal: She exhibits no edema or tenderness.   RUE AV graft +/+   Neurological: She is alert and " oriented to person, place, and time.   Skin: Skin is warm and dry. She is not diaphoretic.   Psychiatric: She has a normal mood and affect. Her behavior is normal. Thought content normal.   Nursing note and vitals reviewed.      Laboratory  Pre-op labs pending will review    Diagnostic Results:  Pre-op EKG and CXR reviewed.

## 2020-04-05 NOTE — ANESTHESIA PREPROCEDURE EVALUATION
04/05/2020  Tala Rivera is a 54 y.o., female.     Active Ambulatory Problems     Diagnosis Date Noted    ESRD on dialysis      Anemia of renal disease     Secondary hyperparathyroidism of renal origin     Proteinuria 04/01/2019    Patient on waiting list for kidney transplant 10/22/2019    Screen for colon cancer 02/05/2020     Resolved Ambulatory Problems     Diagnosis Date Noted    Essential hypertension     PD catheter dysfunction 10/18/2018     No Additional Past Medical History     10/2019 TTE  · Eccentric left ventricular hypertrophy. Normal left ventricular systolic function. The estimated ejection fraction is 55%  · Normal LV diastolic function.  · Mild left atrial enlargement.  · Mild tricuspid regurgitation.  · Normal right ventricular systolic function.  · Normal central venous pressure (3 mm Hg).  · The estimated PA systolic pressure is 30 mm Hg         Anesthesia Evaluation    I have reviewed the Patient Summary Reports.    I have reviewed the Nursing Notes.      Review of Systems  Anesthesia Hx:  Denies Hx of Anesthetic complications    Social:  Non-Smoker    Hematology/Oncology:         -- Anemia:   Cardiovascular:   Exercise tolerance: good Hypertension  Denies Angina.        Pulmonary:   Denies Shortness of breath.    Renal/:   Chronic Renal Disease    Hepatic/GI:   Denies GERD. Denies Liver Disease.    Neurological:   Denies CVA. Denies Seizures.    Endocrine:   Denies Diabetes. Denies Hypothyroidism.        Physical Exam  General:  Well nourished    Airway/Jaw/Neck:  Airway Findings: General Airway Assessment: Adult Jaw/Neck Findings:  Neck ROM: Normal ROM       Chest/Lungs:  Chest/Lungs Clear    Heart/Vascular:  Heart Findings: Normal       Mental Status:  Mental Status Findings:  Cooperative, Alert and Oriented         Anesthesia Plan  Type of Anesthesia, risks & benefits  discussed:  Anesthesia Type:  general  Patient's Preference: GA  Intra-op Monitoring Plan: standard ASA monitors  Intra-op Monitoring Plan Comments:   Post Op Pain Control Plan: multimodal analgesia, IV/PO Opiods PRN and per primary service following discharge from PACU  Post Op Pain Control Plan Comments:   Induction:   IV  Beta Blocker:  Patient is not currently on a Beta-Blocker (No further documentation required).       Informed Consent: Patient understands risks and agrees with Anesthesia plan.  Questions answered. Anesthesia consent signed with patient.  ASA Score: 3     Day of Surgery Review of History & Physical:    H&P update referred to the surgeon.     Anesthesia Plan Notes: The patient's PMH was reviewed and PE was performed  Plan for GETA  Patient has AV grafts in both arms. Only the one on the right is being used for HD        Ready For Surgery From Anesthesia Perspective.

## 2020-04-05 NOTE — OP NOTE
Operative Report    Date of Procedure: 4/5/2020  Date of Transplant (UNOS): 4/5/20    Surgeons:  Surgeon(s) and Role:     * Megan Garcia MD - Primary     * Zhou Montana MD - Resident    First Assistant Attestation:  The indicated resident served as first assistant for this procedure.    Pre-operative Diagnosis: ESRD, requiring chronic dialysis secondary to Hypertensive Nephrosclerosis  Post-operative Diagnosis: Same    Procedure(s) Performed:   1. Back Table Preparation of Left Kidney    2. Donation after Brain Death Kidney transplant    Anesthesia: General endotracheal    Preamble  Indications and Patient Counseling: The patient is a 54 y.o. year-old female with end-stage kidney disease secondary to Hypertensive Nephrosclerosis who has been evaluated for a kidney transplant.  The procedure was thoroughly discussed with the patient, including potential risks, complications, and alternatives.  Specific complications mentioned included death, graft non-function, bleeding, infection, and rejection, as well as the possibility of other complications not specifically mentioned.    Donor Risk Factors: Prior to the operation, the patient was advised of any donor-specific risk factors requiring specific disclosure.  Factors in this case included nothing that required specific disclosure.      Specific PHS Increased Risk Behavior criteria for the organ donor include:  None    All questions were answered, the patient voiced appropriate understanding, and she agreed to proceed with the planned procedure.    ABO Confirmation: Immediately following arrival of the donor organ and prior to implantation, a formal ABO confirmation was done according to hospital and UNOS policies.  I confirmed the UNOS ID number ([id number]) of the donor organ and the donor and recipient ABO types, directly verifying these data by comparison with the UNOS Match Run report ([id number]).  This confirmation was personally done by an attending surgeon  and circulating nurse, and is officially documented elsewhere.    Time-Out: A complete time out was carried out prior to incision, with confirmation of patient identity, correct procedure, correct operative site, appropriate antibiotic prophylaxis, review of any known allergies, and presence of all needed equipment.    Procedure in Detail  Prior to starting the operation, the left kidney  was prepared on the back table. Arterial anatomy was single. Venous anatomy was single. Ureteral anatomy was single. Back table vascular reconstruction was not required .  Unneeded fat was removed from the kidney, the vessels were cleaned of adherent tissue and tested for leaks, and the kidney was maintained at ice temperature in organ preservation solution until it was brought to the operative field.     The patient was brought into the operating room and placed in a supine position on the OR table.  After the induction of general endotracheal anesthesia, lines were placed by the anesthesiologist.  The urinary bladder was catheterized and irrigated with antibiotic solution.  There was no tension on the axillae and all pressure points were padded.  Sequential compression boots were used as were Cami Huggers.  The abdomen was prepped and draped in the usual sterile fashion.  Skin was incised over the right with a knife and deepened with electrocautery.  The peritoneum and its contents were swept medially, exposing the right external iliac artery and the right external iliac vein.  The Bookwalter retractor was used to provide exposure.  Overlying lymphatics were ligated or cauterized and the vessels were dissected free for a length compatible with anastomosis.  The kidney was brought to the OR table at 4/5/2020  2:22 PM.  Venous control was obtained with a vascular clamp.  A venotomy was made, the vein irrigated, and an end renal to right external iliac vein anastomosis was created with 5-0 polypropylene.  Arterial control was  obtained with a vascular clamp.  Arteriotomy was made, the artery irrigated, and an end renal to right external iliac artery anastomosis was created with 6-0 polypropylene.  The kidney was unclamped and reperfused at 4/5/2020  2:50 PM.  Reperfusion quality was good. Intraoperative urine production was not observed.  After hemostasis was obtained, a Lich uretero-neocystostomy was created.  The bladder was filled and identified, opened, and the anastomosis created using 6-0 PDS.  The bladder muscle was closed over the distal ureter to create an antireflux tunnel.  A ureteral stent was used.  With the kidney well perfused and sitting appropriately without tension on the anastomoses, viscera were replaced in their usual position.  The wound was closed after a final check for hemostasis.  Overall, the graft quality was assessed to be good. At the end of the case the needle, sponge and instrument counts were all correct.  Sterile dressings were applied and the patient was brought to the recovery room/ICU in good condition.    Estimated Blood Loss: 100 mL  Fluids Administered:    Drains: none  Specimens: none    Findings:    Organ Transplanted: Left Kidney    Arterial Anatomy: single  Number of Arteries: 1  Configuration of Multiple Arteries: not applicable  Venous Anatomy: single  Number of Veins: 1  Ureteral Anatomy: single  Number of Ureters: 1  Reperfusion Quality: good  Overall Graft Quality: good  Intraoperative Urine Production: no  Mas: not to be removed before 2 days.  Ureteral Stent: Yes    Ischemic Times:   Anastomosis (warm ischemia) time: 28 minutes   Cold ischemia time: 438 minutes  Total ischemia time: 466 minutes    Donor Data:  UNOS ID: [id number]   UNOS Match Run: [id number]   Donor Type: Donation after Brain Death   Donor CMV Status: Positive   Donor HBcAB: Negative   Donor HCV Status: Negative

## 2020-04-05 NOTE — PLAN OF CARE
Pre-operative Discussion Note  Kidney Transplant Surgery    Tala Rivera is a 54 y.o. female with ESRD, requiring chronic dialysis admitted for kidney transplant.  I discussed the planned procedure in detail, including expected hospital course and outcomes, benefits, risks, and potential complications.  Complications discussed included death, graft failure, bleeding, infection, vascular thrombosis, and rejection.  I discussed the risks of anesthesia, as well as the potential need for re-operation.  The possibility of other complications not specifically mentioned was also discussed.  Also, I discussed the need for lifelong immunosuppression and the possibility of serious complications from immunosuppressive drugs.    The discussion included the risks that the patient will incur if she elects to not have the proposed procedure.    Relevant donor-specific risk factors were disclosed and discussed with the patient, including:   none    Specific PHS Increased Risk Behavior criteria for the organ donor include:  None    HCV: n/a    COVID-19: I discussed the possibility of COVID-19 transmission with the patient. Although KEDAR testing is available for the virus using a technique which in theory should be very accurate, there is no data yet regarding the likelihood of a  false-negative test leading to virus transmission. Based on accuracy of testing for other viruses, it is expected that this risk is extremely small.     I also discussed that transplant immunosuppression will increase susceptibility to COVID-19 and other viruses, and that although we use stringent precautions to protect patients from infection, it is possible for a transplant recipient to contract this infection. If COVID-19 infection should occur, it would be a serious matter with a significant risk of death.    All questions were answered.  The patient and available family members voice understanding and agree to proceed with the transplant.    UNOS  Patient Status  Note on scores:  ICU = 10 = total assistance  TSU = 20-30 = partial assistance  Outpatient admitted for transplant requiring medical care in last year = 40-50 = partial assistance  Scores 60 or higher indicate no assistance, meaning no need for medical care in last year. This would be very unusual for a transplant candidate.    UNOS Patient Status  Functional Status: 50% - Requires considerable assistance and frequent medical care  Physical Capacity: No Limitations

## 2020-04-05 NOTE — HPI
53 yo WF with history of HTN, gastric bypass surgery on 7/28/16, umbilical hernia repair and PD catheter placement on 9/4/18, ESRD presumed secondary to HTN on HD since 2/5/16; PD catheter was removed in Dec 2018 due to chronic abdominal pain; Now on HD 2x/wk via RUE AV graft (usually MF, but rescheduled to Tues/Sat this week) admitted for kidney transplant. She reports tolerating HD well. Last HD today 4/4. Native UOP ~1.8L/day. Dry weight ~67.5 kg. She denies recent illnesses or hospitalizations. Tentatively scheduled for 1100 4/5 with Dr. Garcia as primary surgeon. Induction will be Simulect. Pre-op labs and imaging pending. Transplant surgery fellow to consent patient in AM.       Interval history: no acute events overnight. Pt reports feeling well this am. Progressing well post-op at this time. UOP increasing and Cr level noted with improvement. Will cont with s/p KTX clinical pathway. Recent protein/Cr ratio reviewed. Mas cath removed today and pt able to void independently. No drain present and no central line present. Simulect induction and will need second dose in am. Pain well controlled, ambulating, having BMs, and tolerating diet. Monitor.

## 2020-04-05 NOTE — TRANSFER OF CARE
"Anesthesia Transfer of Care Note    Patient: Tala Rivera    Procedure(s) Performed: Procedure(s) (LRB):  TRANSPLANT, KIDNEY (N/A)    Patient location: PACU    Anesthesia Type: general    Transport from OR: Transported from OR on 6-10 L/min O2 by face mask with adequate spontaneous ventilation    Post pain: adequate analgesia    Post assessment: no apparent anesthetic complications and tolerated procedure well    Post vital signs: stable    Level of consciousness: sedated and responds to stimulation    Nausea/Vomiting: no nausea/vomiting    Complications: none    Transfer of care protocol was followed      Last vitals:   Visit Vitals  BP (!) 144/78   Pulse (!) 54   Temp 36.7 °C (98 °F) (Oral)   Resp 11   Ht 5' 1" (1.549 m)   Wt 66.7 kg (147 lb 2.5 oz)   LMP  (LMP Unknown)   SpO2 98%   Breastfeeding? No   BMI 27.81 kg/m²     "

## 2020-04-05 NOTE — NURSING
VSS.  No signs of evident distress or complaint of pain.    Consents signed, witnessed and placed in chart.  Pt. Showering now.  Will place MADHU hose and SCD prior to departure.    Pt. States she has no jewelry on or removable dentures.  IV flushed well.  Pt. In clean hospital gown and fresh non slip socks.    Will continue to monitor.

## 2020-04-05 NOTE — H&P
"Requested Prescriptions   Pending Prescriptions Disp Refills     oxybutynin (DITROPAN) 5 MG tablet [Pharmacy Med Name: OXYBUTYNIN 5 MG TABLET] 60 tablet 0     Sig: TAKE 1 TABLET BY MOUTH TWICE A DAY       Muscarinic Antagonists (Urinary Incontinence Agents) Passed - 10/14/2019  9:27 AM        Passed - Recent (12 mo) or future (30 days) visit within the authorizing provider's specialty     Patient has had an office visit with the authorizing provider or a provider within the authorizing providers department within the previous 12 mos or has a future within next 30 days. See \"Patient Info\" tab in inbasket, or \"Choose Columns\" in Meds & Orders section of the refill encounter.              Passed - Medication is Oxybutynin and patient is 5 years of age or older        Passed - Patient does not have a diagnosis of glaucoma on the problem list     If glaucoma diagnosis is new, refer refill to physician.          Passed - Medication is active on med list        Passed - Patient is 18 years of age or older        traZODone (DESYREL) 50 MG tablet [Pharmacy Med Name: TRAZODONE 50 MG TABLET] 90 tablet 0     Sig: TAKE 1 TABLET (50 MG) BY MOUTH AT BEDTIME       Serotonin Modulators Passed - 10/14/2019  9:27 AM        Passed - Recent (12 mo) or future (30 days) visit within the authorizing provider's specialty     Patient has had an office visit with the authorizing provider or a provider within the authorizing providers department within the previous 12 mos or has a future within next 30 days. See \"Patient Info\" tab in ineVendor Checket, or \"Choose Columns\" in Meds & Orders section of the refill encounter.              Passed - Medication is active on med list        Passed - Patient is age 18 or older        Passed - No active pregnancy on record        Passed - No positive pregnancy test in past 12 months        " [facility]  Kidney Transplant  H&P      Subjective:     Chief Complaint/Reason for Admission: ESRD admitted for KTx    History of Present Illness:  55 yo WF with history of HTN, gastric bypass surgery on 7/28/16, umbilical hernia repair and PD catheter placement on 9/4/18, ESRD presumed secondary to HTN on HD since 2/5/16; PD catheter was removed in Dec 2018 due to chronic abdominal pain; Now on HD 2x/wk via RUE AV graft (usually MF, but rescheduled to Tues/Sat this week) admitted for kidney transplant. She reports tolerating HD well. Last HD today 4/4. Native UOP ~1.8L/day. Dry weight ~67.5 kg. She denies recent illnesses or hospitalizations. Tentatively scheduled for 1100 4/5 with Dr. Garcia as primary surgeon. Induction will be Simulect. Pre-op labs and imaging pending. Transplant surgery fellow to consent patient in AM.     Dialysis History: Ms. Edgar with ESRD, requiring chronic dialysis who is on hemodialysis started on 2/5/16 (initially on PD). Patient is dialyzing on 2x/week.  Patient reports that she is tolerating dialysis well.  Date of Last Dialysis: 4/4/20    Native urine output per day: 1800 mL    Previous Transplant: no    PTA Medications   Medication Sig    calcitRIOL (ROCALTROL) 0.25 MCG Cap Take 0.25 mcg by mouth once daily.    ferric citrate (AURYXIA) 210 mg iron Tab Take 420 mg by mouth 3 (three) times daily.    ferric citrate (AURYXIA) 210 mg iron Tab Take 420 mg by mouth as needed. Take two tablets w/ a snack twice daily.    lidocaine-prilocaine (EMLA) cream APPLY SMALL AMOUNT TO THE AFFECTED AREA PRIOR TO dialysis THREE TIMES A WEEK    sodium bicarbonate 650 MG tablet Take 1,300 mg by mouth 2 (two) times daily.    [DISCONTINUED] chlorhexidine (PERIDEX) 0.12 % solution Use as directed 15 mLs in the mouth or throat 2 (two) times daily.    [DISCONTINUED] mupirocin calcium 2% nasl oint (BACTROBAN) 2 % Oint 1 application by Nasal route 2 (two) times daily.    [DISCONTINUED] ondansetron  (ZOFRAN-ODT) 4 MG TbDL Take 1 tablet (4 mg total) by mouth every 6 (six) hours as needed. (Patient not taking: Reported on 10/22/2019)    [DISCONTINUED] VELPHORO 500 mg Chew Take 2 tablets by mouth 3 (three) times daily with meals.       Review of patient's allergies indicates:   Allergen Reactions    Sulfa (sulfonamide antibiotics) Hives       Past Medical History:   Diagnosis Date    Anemia of renal disease     ESRD on dialysis     Dr. Muhammad     Essential hypertension     PD catheter dysfunction 10/18/2018    Secondary hyperparathyroidism of renal origin      Past Surgical History:   Procedure Laterality Date    AV FISTULA PLACEMENT Left     never matured    AV graft Right 2015     SECTION      x3; ; , and     COLONOSCOPY N/A 2020    Procedure: COLONOSCOPY;  Surgeon: Darrell Golden MD;  Location[facility];  Service: Endoscopy;  Laterality: N/A;    GASTRIC BYPASS      LAPAROSCOPIC REVISION OF PROCEDURE INVOLVING PERITONEAL DIALYSIS CATHETER N/A 10/18/2018    Procedure: REVISION OF PROCEDURE INVOLVING PERITONEAL DIALYSIS CATHETER, LAPAROSCOPIC;  Surgeon: Hair Jimenez MD;  Location: [facility];  Service: General;  Laterality: N/A;    PERITONEAL CATHETER INSERTION N/A 2018    Procedure: Laparoscopic INSERTION, CATHETER, INTRAPERITONEAL;  Surgeon: Hair Jimenez MD;  Location: [facility];  Service: General;  Laterality: N/A;    PERITONEAL CATHETER REMOVAL N/A 2018    Procedure: REMOVAL, CATHETER, DIALYSIS, PERITONEAL;  Surgeon: Hair Jimenez MD;  Location: [facility];  Service: General;  Laterality: N/A;    WOUND EXPLORATION N/A 2018    Procedure: EXPLORATION, WOUND-Surgical Site Irrigation;  Surgeon: Hair Jimenez MD;  Location: [facility];  Service: General;  Laterality: N/A;     Family History     Problem Relation (Age of Onset)    Diabetes Mother    Down syndrome Son    Heart disease Mother    Hypertension Mother    Kidney disease Mother    Lupus  "Sister        Tobacco Use    Smoking status: Never Smoker    Smokeless tobacco: Never Used   Substance and Sexual Activity    Alcohol use: No    Drug use: No    Sexual activity: Yes     Partners: Male        Review of Systems   Constitutional: Negative for activity change, appetite change, chills and fever.   HENT: Negative for facial swelling and trouble swallowing.    Eyes: Negative for photophobia and redness.   Respiratory: Negative for cough, shortness of breath, wheezing and stridor.    Cardiovascular: Negative for chest pain, palpitations and leg swelling.   Gastrointestinal: Negative for abdominal distention, abdominal pain, constipation, diarrhea, nausea and vomiting.   Genitourinary: Negative for difficulty urinating and dysuria.   Musculoskeletal: Negative for neck pain and neck stiffness.   Skin: Negative for wound.   Neurological: Negative for dizziness, tremors, syncope and light-headedness.   Psychiatric/Behavioral: Negative for agitation, behavioral problems, confusion and decreased concentration.     Objective:     Vital Signs (Most Recent):  Pulse: (!) 48 (04/04/20 2215)  Resp: 16 (04/04/20 2345)  BP: (!) 143/79 (04/04/20 2215)  SpO2: 98 % (04/04/20 2215)  Height: 5' 1" (154.9 cm)  Weight: 66.7 kg (147 lb 2.5 oz)  Body mass index is 27.81 kg/m².     Physical Exam   Constitutional: She is oriented to person, place, and time. No distress.   HENT:   Head: Normocephalic and atraumatic.   Eyes: Pupils are equal, round, and reactive to light. No scleral icterus.   Neck: Normal range of motion. Neck supple. No JVD present.   Cardiovascular: Normal rate, regular rhythm and intact distal pulses.   No murmur heard.  Pulmonary/Chest: Effort normal. No stridor. No respiratory distress. She has no wheezes. She has no rales.   Abdominal: Soft. Bowel sounds are normal. She exhibits no distension. There is no tenderness. There is no guarding.   Musculoskeletal: She exhibits no edema or tenderness.   NICOLEE KALIE " graft +/+   Neurological: She is alert and oriented to person, place, and time.   Skin: Skin is warm and dry. She is not diaphoretic.   Psychiatric: She has a normal mood and affect. Her behavior is normal. Thought content normal.   Nursing note and vitals reviewed.      Laboratory  Pre-op labs pending will review    Diagnostic Results:  Pre-op EKG and CXR reviewed.    Assessment/Plan:     * ESRD on dialysis   - ESRD 2/2 presumed HTN admitted for KTx  - HD 2x/week via RUE AV graft. Tolerating well. Last HD 4/4.   - Dry weight ~67.5 kg.  - Native UOP ~1.8L  - Pre-op CXR, EKG, labs reviewed (some still pending).  - EKG sinus bradycardia, pt reports BL HR 40s-50s (consistent with chart review). CXR unremarkable.  - Tentative OR time 1100 4/5 with Dr. Garcia as primary surgeon.   - Consents to be obtained in AM prior to surgery.     Proteinuria  - Hx proteinuria  - UPC pending    Anemia of renal disease  - CBC pending. No s/s acute anemia on admit.          The patient presents for kidney transplant.  There are no apparent contraindications to proceeding with the planned transplant.  The patient understands that the transplant could potentially be cancelled pending detailed assessment of the donor organ.  She will receive Simulect induction.  A complete discussion of the transplant procedure, including risks, complications, and alternatives, as well as any donor-specific risk factors requiring specific disclosure, will be carried out by the responsible staff surgeon prior to the procedure.       Sonya Jackson PA-C  Kidney Transplant  [facility]

## 2020-04-06 PROBLEM — N18.6 ESRD (END STAGE RENAL DISEASE): Status: RESOLVED | Noted: 2020-04-05 | Resolved: 2020-04-06

## 2020-04-06 PROBLEM — R80.9 PROTEINURIA: Status: RESOLVED | Noted: 2019-04-01 | Resolved: 2020-04-06

## 2020-04-06 PROBLEM — Z94.0 KIDNEY TRANSPLANT STATUS: Status: ACTIVE | Noted: 2020-04-05

## 2020-04-06 PROBLEM — I12.9 RENAL HYPERTENSION: Status: ACTIVE | Noted: 2020-04-06

## 2020-04-06 PROBLEM — Z29.89 PROPHYLACTIC IMMUNOTHERAPY: Status: ACTIVE | Noted: 2020-04-06

## 2020-04-06 PROBLEM — E83.51 HYPOCALCEMIA: Status: ACTIVE | Noted: 2020-04-06

## 2020-04-06 PROBLEM — Z91.89 AT RISK FOR OPPORTUNISTIC INFECTIONS: Status: ACTIVE | Noted: 2020-04-06

## 2020-04-06 PROBLEM — Z79.899 LONG TERM CURRENT USE OF IMMUNOSUPPRESSIVE DRUG: Status: ACTIVE | Noted: 2020-04-06

## 2020-04-06 LAB
ALBUMIN SERPL BCP-MCNC: 3.2 G/DL (ref 3.5–5.2)
ALBUMIN SERPL BCP-MCNC: 3.2 G/DL (ref 3.5–5.2)
ANION GAP SERPL CALC-SCNC: 15 MMOL/L (ref 8–16)
ANION GAP SERPL CALC-SCNC: 9 MMOL/L (ref 8–16)
BASOPHILS # BLD AUTO: 0.01 K/UL (ref 0–0.2)
BASOPHILS # BLD AUTO: 0.01 K/UL (ref 0–0.2)
BASOPHILS NFR BLD: 0.1 % (ref 0–1.9)
BASOPHILS NFR BLD: 0.1 % (ref 0–1.9)
BUN SERPL-MCNC: 32 MG/DL (ref 6–20)
BUN SERPL-MCNC: 35 MG/DL (ref 6–20)
CALCIUM SERPL-MCNC: 7.9 MG/DL (ref 8.7–10.5)
CALCIUM SERPL-MCNC: 7.9 MG/DL (ref 8.7–10.5)
CHLORIDE SERPL-SCNC: 106 MMOL/L (ref 95–110)
CHLORIDE SERPL-SCNC: 107 MMOL/L (ref 95–110)
CO2 SERPL-SCNC: 22 MMOL/L (ref 23–29)
CO2 SERPL-SCNC: 23 MMOL/L (ref 23–29)
CREAT SERPL-MCNC: 3.4 MG/DL (ref 0.5–1.4)
CREAT SERPL-MCNC: 4 MG/DL (ref 0.5–1.4)
DIFFERENTIAL METHOD: ABNORMAL
DIFFERENTIAL METHOD: ABNORMAL
EOSINOPHIL # BLD AUTO: 0 K/UL (ref 0–0.5)
EOSINOPHIL # BLD AUTO: 0 K/UL (ref 0–0.5)
EOSINOPHIL NFR BLD: 0.1 % (ref 0–8)
EOSINOPHIL NFR BLD: 0.1 % (ref 0–8)
ERYTHROCYTE [DISTWIDTH] IN BLOOD BY AUTOMATED COUNT: 15.4 % (ref 11.5–14.5)
ERYTHROCYTE [DISTWIDTH] IN BLOOD BY AUTOMATED COUNT: 15.8 % (ref 11.5–14.5)
EST. GFR  (AFRICAN AMERICAN): 14 ML/MIN/1.73 M^2
EST. GFR  (AFRICAN AMERICAN): 17 ML/MIN/1.73 M^2
EST. GFR  (NON AFRICAN AMERICAN): 12.1 ML/MIN/1.73 M^2
EST. GFR  (NON AFRICAN AMERICAN): 14.8 ML/MIN/1.73 M^2
GLUCOSE SERPL-MCNC: 136 MG/DL (ref 70–110)
GLUCOSE SERPL-MCNC: 178 MG/DL (ref 70–110)
HBV CORE IGM SERPL QL IA: NEGATIVE
HBV SURFACE AG SERPL QL IA: NEGATIVE
HCT VFR BLD AUTO: 33.6 % (ref 37–48.5)
HCT VFR BLD AUTO: 34.1 % (ref 37–48.5)
HGB BLD-MCNC: 10.3 G/DL (ref 12–16)
HGB BLD-MCNC: 10.5 G/DL (ref 12–16)
HIV 1+2 AB+HIV1 P24 AG SERPL QL IA: NEGATIVE
IMM GRANULOCYTES # BLD AUTO: 0.01 K/UL (ref 0–0.04)
IMM GRANULOCYTES # BLD AUTO: 0.01 K/UL (ref 0–0.04)
IMM GRANULOCYTES NFR BLD AUTO: 0.1 % (ref 0–0.5)
IMM GRANULOCYTES NFR BLD AUTO: 0.1 % (ref 0–0.5)
LYMPHOCYTES # BLD AUTO: 0.1 K/UL (ref 1–4.8)
LYMPHOCYTES # BLD AUTO: 0.3 K/UL (ref 1–4.8)
LYMPHOCYTES NFR BLD: 1.4 % (ref 18–48)
LYMPHOCYTES NFR BLD: 4.3 % (ref 18–48)
MAGNESIUM SERPL-MCNC: 2.2 MG/DL (ref 1.6–2.6)
MCH RBC QN AUTO: 33.2 PG (ref 27–31)
MCH RBC QN AUTO: 34.1 PG (ref 27–31)
MCHC RBC AUTO-ENTMCNC: 30.2 G/DL (ref 32–36)
MCHC RBC AUTO-ENTMCNC: 31.3 G/DL (ref 32–36)
MCV RBC AUTO: 109 FL (ref 82–98)
MCV RBC AUTO: 110 FL (ref 82–98)
MONOCYTES # BLD AUTO: 0.1 K/UL (ref 0.3–1)
MONOCYTES # BLD AUTO: 0.5 K/UL (ref 0.3–1)
MONOCYTES NFR BLD: 1.6 % (ref 4–15)
MONOCYTES NFR BLD: 6.7 % (ref 4–15)
NEUTROPHILS # BLD AUTO: 6.1 K/UL (ref 1.8–7.7)
NEUTROPHILS # BLD AUTO: 6.7 K/UL (ref 1.8–7.7)
NEUTROPHILS NFR BLD: 88.7 % (ref 38–73)
NEUTROPHILS NFR BLD: 96.7 % (ref 38–73)
NRBC BLD-RTO: 0 /100 WBC
NRBC BLD-RTO: 0 /100 WBC
PHOSPHATE SERPL-MCNC: 3.5 MG/DL (ref 2.7–4.5)
PHOSPHATE SERPL-MCNC: 5.1 MG/DL (ref 2.7–4.5)
PHOSPHATE SERPL-MCNC: 5.1 MG/DL (ref 2.7–4.5)
PLATELET # BLD AUTO: 152 K/UL (ref 150–350)
PLATELET # BLD AUTO: 160 K/UL (ref 150–350)
PMV BLD AUTO: 11.2 FL (ref 9.2–12.9)
PMV BLD AUTO: 11.7 FL (ref 9.2–12.9)
POCT GLUCOSE: 101 MG/DL (ref 70–110)
POCT GLUCOSE: 153 MG/DL (ref 70–110)
POCT GLUCOSE: 155 MG/DL (ref 70–110)
POCT GLUCOSE: 89 MG/DL (ref 70–110)
POTASSIUM SERPL-SCNC: 3.9 MMOL/L (ref 3.5–5.1)
POTASSIUM SERPL-SCNC: 4.2 MMOL/L (ref 3.5–5.1)
RBC # BLD AUTO: 3.08 M/UL (ref 4–5.4)
RBC # BLD AUTO: 3.1 M/UL (ref 4–5.4)
SODIUM SERPL-SCNC: 138 MMOL/L (ref 136–145)
SODIUM SERPL-SCNC: 144 MMOL/L (ref 136–145)
TACROLIMUS BLD-MCNC: 2.8 NG/ML (ref 5–15)
WBC # BLD AUTO: 6.9 K/UL (ref 3.9–12.7)
WBC # BLD AUTO: 6.9 K/UL (ref 3.9–12.7)

## 2020-04-06 PROCEDURE — 99233 PR SUBSEQUENT HOSPITAL CARE,LEVL III: ICD-10-PCS | Mod: 24,,, | Performed by: NURSE PRACTITIONER

## 2020-04-06 PROCEDURE — 80069 RENAL FUNCTION PANEL: CPT

## 2020-04-06 PROCEDURE — 25000003 PHARM REV CODE 250: Performed by: STUDENT IN AN ORGANIZED HEALTH CARE EDUCATION/TRAINING PROGRAM

## 2020-04-06 PROCEDURE — 20600001 HC STEP DOWN PRIVATE ROOM

## 2020-04-06 PROCEDURE — 83735 ASSAY OF MAGNESIUM: CPT

## 2020-04-06 PROCEDURE — 99900035 HC TECH TIME PER 15 MIN (STAT)

## 2020-04-06 PROCEDURE — 80197 ASSAY OF TACROLIMUS: CPT

## 2020-04-06 PROCEDURE — 25000003 PHARM REV CODE 250: Performed by: PHYSICIAN ASSISTANT

## 2020-04-06 PROCEDURE — 63600175 PHARM REV CODE 636 W HCPCS: Performed by: STUDENT IN AN ORGANIZED HEALTH CARE EDUCATION/TRAINING PROGRAM

## 2020-04-06 PROCEDURE — 85025 COMPLETE CBC W/AUTO DIFF WBC: CPT | Mod: 91

## 2020-04-06 PROCEDURE — 63600175 PHARM REV CODE 636 W HCPCS: Performed by: NURSE PRACTITIONER

## 2020-04-06 PROCEDURE — 99233 SBSQ HOSP IP/OBS HIGH 50: CPT | Mod: 24,,, | Performed by: NURSE PRACTITIONER

## 2020-04-06 PROCEDURE — 25000003 PHARM REV CODE 250: Performed by: NURSE PRACTITIONER

## 2020-04-06 PROCEDURE — 80069 RENAL FUNCTION PANEL: CPT | Mod: 91

## 2020-04-06 RX ORDER — TACROLIMUS 1 MG/1
6 CAPSULE ORAL EVERY 12 HOURS
Qty: 360 CAPSULE | Refills: 11 | Status: SHIPPED | OUTPATIENT
Start: 2020-04-06 | End: 2020-04-16 | Stop reason: SDUPTHER

## 2020-04-06 RX ORDER — BISACODYL 5 MG
10 TABLET, DELAYED RELEASE (ENTERIC COATED) ORAL DAILY PRN
Refills: 0 | COMMUNITY
Start: 2020-04-06 | End: 2020-06-15

## 2020-04-06 RX ORDER — SODIUM CHLORIDE 9 MG/ML
INJECTION, SOLUTION INTRAVENOUS CONTINUOUS
Status: DISCONTINUED | OUTPATIENT
Start: 2020-04-06 | End: 2020-04-07

## 2020-04-06 RX ORDER — HEPARIN 100 UNIT/ML
500 SYRINGE INTRAVENOUS
Status: CANCELLED | OUTPATIENT
Start: 2020-04-09

## 2020-04-06 RX ORDER — PREDNISONE 5 MG/1
TABLET ORAL
Qty: 120 TABLET | Refills: 11 | Status: SHIPPED | OUTPATIENT
Start: 2020-04-06 | End: 2021-04-12 | Stop reason: SDUPTHER

## 2020-04-06 RX ORDER — TACROLIMUS 1 MG/1
4 CAPSULE ORAL 2 TIMES DAILY
Status: DISCONTINUED | OUTPATIENT
Start: 2020-04-06 | End: 2020-04-07

## 2020-04-06 RX ORDER — CALCITRIOL 0.5 UG/1
0.5 CAPSULE ORAL DAILY
Qty: 30 CAPSULE | Refills: 3 | Status: SHIPPED | OUTPATIENT
Start: 2020-04-07 | End: 2020-05-05 | Stop reason: SDUPTHER

## 2020-04-06 RX ORDER — MYCOPHENOLATE MOFETIL 250 MG/1
1000 CAPSULE ORAL 2 TIMES DAILY
Qty: 240 CAPSULE | Refills: 11 | Status: SHIPPED | OUTPATIENT
Start: 2020-04-06 | End: 2020-05-18 | Stop reason: SINTOL

## 2020-04-06 RX ORDER — CALCITRIOL 0.5 UG/1
0.5 CAPSULE ORAL DAILY
Status: DISCONTINUED | OUTPATIENT
Start: 2020-04-06 | End: 2020-04-08 | Stop reason: HOSPADM

## 2020-04-06 RX ORDER — ATOVAQUONE 750 MG/5ML
1500 SUSPENSION ORAL DAILY
Qty: 300 ML | Refills: 11 | Status: SHIPPED | OUTPATIENT
Start: 2020-04-05 | End: 2021-04-05

## 2020-04-06 RX ORDER — TACROLIMUS 1 MG/1
1 CAPSULE ORAL ONCE
Status: COMPLETED | OUTPATIENT
Start: 2020-04-06 | End: 2020-04-06

## 2020-04-06 RX ORDER — ATOVAQUONE 750 MG/5ML
1500 SUSPENSION ORAL DAILY
Status: DISCONTINUED | OUTPATIENT
Start: 2020-04-07 | End: 2020-04-08 | Stop reason: HOSPADM

## 2020-04-06 RX ORDER — DOCUSATE SODIUM 100 MG/1
100 CAPSULE, LIQUID FILLED ORAL 3 TIMES DAILY PRN
Refills: 0 | COMMUNITY
Start: 2020-04-06

## 2020-04-06 RX ORDER — NYSTATIN 100000 [USP'U]/ML
500000 SUSPENSION ORAL
Qty: 480 ML | Refills: 0 | Status: SHIPPED | OUTPATIENT
Start: 2020-04-06 | End: 2020-05-09

## 2020-04-06 RX ORDER — VALGANCICLOVIR 450 MG/1
900 TABLET, FILM COATED ORAL DAILY
Qty: 60 TABLET | Refills: 2 | Status: SHIPPED | OUTPATIENT
Start: 2020-04-05 | End: 2020-04-08

## 2020-04-06 RX ORDER — FAMOTIDINE 20 MG/1
20 TABLET, FILM COATED ORAL NIGHTLY
Qty: 30 TABLET | Refills: 2 | COMMUNITY
Start: 2020-04-06 | End: 2020-06-29

## 2020-04-06 RX ORDER — OXYCODONE AND ACETAMINOPHEN 5; 325 MG/1; MG/1
1 TABLET ORAL EVERY 6 HOURS PRN
Qty: 21 TABLET | Refills: 0 | Status: SHIPPED | OUTPATIENT
Start: 2020-04-06 | End: 2020-06-15

## 2020-04-06 RX ADMIN — FAMOTIDINE 20 MG: 20 TABLET, FILM COATED ORAL at 09:04

## 2020-04-06 RX ADMIN — HEPARIN SODIUM 5000 UNITS: 5000 INJECTION, SOLUTION INTRAVENOUS; SUBCUTANEOUS at 02:04

## 2020-04-06 RX ADMIN — HEPARIN SODIUM 5000 UNITS: 5000 INJECTION, SOLUTION INTRAVENOUS; SUBCUTANEOUS at 09:04

## 2020-04-06 RX ADMIN — SODIUM CHLORIDE 1000 ML: 0.9 INJECTION, SOLUTION INTRAVENOUS at 05:04

## 2020-04-06 RX ADMIN — NYSTATIN 500000 UNITS: 100000 SUSPENSION ORAL at 09:04

## 2020-04-06 RX ADMIN — DOCUSATE SODIUM 100 MG: 100 CAPSULE, LIQUID FILLED ORAL at 03:04

## 2020-04-06 RX ADMIN — MUPIROCIN 1 G: 20 OINTMENT TOPICAL at 09:04

## 2020-04-06 RX ADMIN — SODIUM CHLORIDE: 0.9 INJECTION, SOLUTION INTRAVENOUS at 01:04

## 2020-04-06 RX ADMIN — MYCOPHENOLATE MOFETIL 1000 MG: 250 CAPSULE ORAL at 10:04

## 2020-04-06 RX ADMIN — TACROLIMUS 4 MG: 1 CAPSULE ORAL at 06:04

## 2020-04-06 RX ADMIN — BISACODYL 10 MG: 5 TABLET, COATED ORAL at 09:04

## 2020-04-06 RX ADMIN — ATOVAQUONE 750 MG: 750 SUSPENSION ORAL at 09:04

## 2020-04-06 RX ADMIN — SODIUM CHLORIDE: 0.9 INJECTION, SOLUTION INTRAVENOUS at 10:04

## 2020-04-06 RX ADMIN — MYCOPHENOLATE MOFETIL 1000 MG: 250 CAPSULE ORAL at 09:04

## 2020-04-06 RX ADMIN — CALCITRIOL 0.5 MCG: 0.5 CAPSULE, LIQUID FILLED ORAL at 10:04

## 2020-04-06 RX ADMIN — TACROLIMUS 1 MG: 1 CAPSULE ORAL at 03:04

## 2020-04-06 RX ADMIN — TACROLIMUS 3 MG: 1 CAPSULE ORAL at 09:04

## 2020-04-06 RX ADMIN — NYSTATIN 500000 UNITS: 100000 SUSPENSION ORAL at 06:04

## 2020-04-06 RX ADMIN — DOCUSATE SODIUM 100 MG: 100 CAPSULE, LIQUID FILLED ORAL at 09:04

## 2020-04-06 RX ADMIN — DEXTROSE 250 MG: 50 INJECTION, SOLUTION INTRAVENOUS at 10:04

## 2020-04-06 RX ADMIN — NYSTATIN 500000 UNITS: 100000 SUSPENSION ORAL at 02:04

## 2020-04-06 RX ADMIN — HEPARIN SODIUM 5000 UNITS: 5000 INJECTION, SOLUTION INTRAVENOUS; SUBCUTANEOUS at 05:04

## 2020-04-06 RX ADMIN — THERA TABS 1 TABLET: TAB at 09:04

## 2020-04-06 NOTE — CARE UPDATE
"PATIENT ON ROOM AIR/"IS" INSTRUCT WILL BE DONE BY AM RT/NO SIGNS OF RESPIRATORY DISTRESS NOTED/WILL CONTINUE TO MONITOR.  "

## 2020-04-06 NOTE — PROGRESS NOTES
Admit Note     Met with patient to assess needs. Patient is a 54 y.o.  female, admitted for for kidney transplant.      Patient admitted from home on 4/4/2020 .  At this time, patient presents as alert and oriented x 4, pleasant, recall good, concentration/judgement good, average intelligence, calm, communicative, cooperative and asking and answering questions appropriately.  At this time, patients caregiver presents as at home as part of COVID-19 protocol.    Household/Family Systems     Patient resides with patient's  and sons, [place] [address]  [place] [address].  Support system includes pt's large family.  Patient does not have dependents that are need of being cared for.     Patients primary caregiver is Kathy Rivera Jr., patients , phone number [phone].  Confirmed patients contact information is [phone] (home);   Telephone Information:   Mobile [phone]   .    During admission, patient's caregiver plans to stay at home.  Confirmed patient and patients caregivers do have access to reliable transportation.    Cognitive Status/Learning     Patient reports reading ability as 12th grade and states patient does not have difficulty with reading, writing, seeing, hearing, comprehension, learning and memory.  Patient reports patient learns best by a combination of verbal, written and hands on demonstration.   Needed: No.   Highest education level: High School (9-12) or GED    Vocation/Disability   .  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     Adherence     Patient reports a high level of adherence to patients health care regimen.  Adherence counseling and education provided. Patient verbalizes understanding.    Substance Use    Patient reports the following substance usage.    Tobacco: none, patient denies any  use.  Alcohol: none, patient denies any use.  Illicit Drugs/Non-prescribed Medications: none, patient denies any use.  Patient states clear understanding of the potential impact of substance use.  Substance abstinence/cessation counseling, education and resources provided and reviewed.     Services Utilizing/ADLS    Infusion Service: Prior to admission, patient utilizing? no  Home Health: Prior to admission, patient utilizing? yes Pt reports having HH after her C section for wound care.   DME: Prior to admission, yes BP cuff and glucometer  Pulmonary/Cardiac Rehab: Prior to admission, no  Dialysis:  Prior to admission, yes hemodialysis on 2020 morning  Transplant Specialty Pharmacy:  Prior to admission, no.    Prior to admission, patient reports patient was independent with ADLS and was driving.  Patient reports patient is not able to care for self at this time due to compromised medical condition (as documented in medical record) and physical weakness..  Patient indicates a willingness to care for self once medically cleared to do so.    Insurance/Medications    Insured by   Payor/Plan Subscr  Sex Relation Sub. Ins. ID Effective Group Num   1. [place]* MAGO EMRA 1965 Female  [id number]                                    [address]   2. [facility]* MAGO MERA 1965 Female  [id number]                                   [address]      Primary Insurance (for UNOS reporting): Public Insurance - Medicare FFS (Fee For Service)  Secondary Insurance (for UNOS reporting): Private Insurance   And  [employer] through pt's FDC.     Patient reports patient is able to obtain and afford medications at this time and at time of discharge.    Living Will/Healthcare Power of     Patient states patient does not have a LW and/or HCPA.   provided education regarding LW and HCPA and the completion of forms.    Coping/Mental Health    Patient  is coping adequately with the aid of  family members. Pt reports feeling well today.  Patient denies mental health difficulties.     Discharge Planning    At time of discharge, patient plans to return to pt's home under the care of pt's . Pt reports feeling more comfortable discharging home than staying locally. Pt verbalized understanding and agreeing to to drive back and forth for labs and appointments. Pt verbalized  Patients  will transport patient.  Per rounds today, expected discharge date is tentatively tomorrow 4/7/2020. Patient and patients caregiver  verbalize understanding and are involved in treatment planning and discharge process.    Additional Concerns     providing ongoing psychosocial support, education, resources and d/c planning as needed.  SW remains available.  provided resource list, patient choice, psychosocial and supportive counseling, resources, education, assistance and discharge planning with patient and caregiver involvement, ongoing SW availability and services as appropriate.  remains available. Patient denies additional needs and/or concerns at this time.

## 2020-04-06 NOTE — PLAN OF CARE
Patient AAOx4, vital WDL with BP running slightly high, afebrile.   R lower quadrant incision CINTHYA with staples intact, painted with betadine, no leaking or bleeding.  JOZEF fistula ++  L forearm fistula ++, never before accessed.  2 L forearm 18g, both with dressing clean dry and intact. Upper IV saline locked. Lower IV infusing NS at 100 mL/hr.  Mas in place and intact, secured to thigh, clipped to bed with no kinks/loops. Output for shift adequate. Mas ordered to be DC in morning.   Patient able to get up to chair with assistance. Patient denies pain and is in good spirits. Self-med teaching began, patient was receptive and correctly pulled meds for day.  Remains free from injury for shift.

## 2020-04-06 NOTE — NURSING TRANSFER
Nursing Transfer Note      4/5/2020     Transfer To: [facility]    Transfer via stretcher    Transfer with portable monitor, hr, pulse ox and b/p     Transported by ambulance and 2 [employer] workers and ethel rn and bernard rn    Medicines sent: iv fluids infusing    Chart send with patient: Yes    Notified: [facility] nurse gave report and updated her again.    Patient reassessed at: see epic (date, time)    Upon arrival to floor:transporting patient to [facility] facility.

## 2020-04-06 NOTE — SUBJECTIVE & OBJECTIVE
Subjective:     History of Present Illness:   55 yo WF with history of HTN, gastric bypass surgery on 7/28/16, umbilical hernia repair and PD catheter placement on 9/4/18, ESRD presumed secondary to HTN on HD since 2/5/16; PD catheter was removed in Dec 2018 due to chronic abdominal pain; Now on HD 2x/wk via RUE AV graft (usually MF, but rescheduled to Tues/Sat this week) admitted for kidney transplant. She reports tolerating HD well. Last HD today 4/4. Native UOP ~1.8L/day. Dry weight ~67.5 kg. She denies recent illnesses or hospitalizations. Tentatively scheduled for 1100 4/5 with Dr. Garcia as primary surgeon. Induction will be Simulect. Pre-op labs and imaging pending. Transplant surgery fellow to consent patient in AM.       Interval history: no acute events overnight. Pt reports feeling well this am. Progressing well post-op at this time. UOP increasing and Cr level noted with improvement. Will cont with s/p KTX clinical pathway. Recent protein/Cr ratio reviewed. Pt currently with a 2 day underwood cath and will plan for d/c in am. No drain present and no central line present. Simulect induction and will need dose on 4/9. Plan for repeat lab work this afternoon. Monitor.      Ms. Rivera is a 54 y.o. year old female with ESRD. She received induction therapy with Simulect and her maintenance immunosuppression consists of:   Immunosuppressants (From admission, onward)    Start     Stop Route Frequency Ordered    04/09/20 0900  basiliximab (SIMULECT) 20 mg in sodium chloride 0.9% 50 mL infusion  (IP TXP KIDNEY POST-OP SIMULECT)      -- IV Once 04/05/20 1605    04/05/20 2200  mycophenolate capsule 1,000 mg  (IP TXP KIDNEY POST-OP SIMULECT)      -- Oral 2 times daily 04/05/20 1612    04/05/20 1800  tacrolimus capsule 3 mg  (IP TXP KIDNEY POST-OP SIMULECT)      -- Oral 2 times daily 04/05/20 1612            Past Medical and Surgical History: Ms. Rivera has a past medical history of Anemia of renal disease, ESRD on  dialysis, Essential hypertension, PD catheter dysfunction (10/18/2018), and Secondary hyperparathyroidism of renal origin.  She has a past surgical history that includes  section; AV fistula placement (Left); AV graft (Right, 2015); Gastric bypass (2016); Peritoneal catheter insertion (N/A, 2018); Laparoscopic revision of procedure involving peritoneal dialysis catheter (N/A, 10/18/2018); Wound exploration (N/A, 2018); Peritoneal catheter removal (N/A, 2018); Colonoscopy (N/A, 2020); and Kidney transplant (N/A, 2020).    Past Social and Family History: Ms. Rivera reports that she has never smoked. She has never used smokeless tobacco. She reports that she does not drink alcohol or use drugs.Her family history includes Diabetes in her mother; Down syndrome in her son; Heart disease in her mother; Hypertension in her mother; Kidney disease in her mother; Lupus in her sister.    Intake/Output - Last 3 Shifts       700 -  0659 700 -  0659  07 -  0659    P.O. 0 600     I.V. (mL/kg) 0 (0) 4502.5 (60) 525 (7)    Other 0      IV Piggyback  500     Total Intake(mL/kg) 0 (0) 5602.5 (74.6) 525 (7)    Urine (mL/kg/hr) 825 3545 (2) 500 (1.6)    Emesis/NG output 0      Other 0      Stool 0      Blood 0 100     Total Output 825 3645 500    Net -825 +1957.5 +25           Urine Occurrence 1 x      Stool Occurrence 0 x      Emesis Occurrence 0 x             Review of Systems   Constitutional: Positive for activity change and appetite change. Negative for fever.   HENT: Negative.  Negative for congestion and facial swelling.    Eyes: Negative.    Respiratory: Negative.  Negative for chest tightness, shortness of breath and wheezing.    Cardiovascular: Negative.  Negative for chest pain, palpitations and leg swelling.   Gastrointestinal: Positive for abdominal pain. Negative for abdominal distention, constipation, diarrhea, nausea and vomiting.   Genitourinary:  "Negative.  Negative for decreased urine volume, difficulty urinating, dysuria, hematuria and urgency.   Musculoskeletal: Negative.  Negative for back pain, neck pain and neck stiffness.   Skin: Positive for wound. Negative for color change, pallor and rash.   Allergic/Immunologic: Positive for immunocompromised state.   Neurological: Negative.  Negative for dizziness, seizures, speech difficulty, weakness, light-headedness and headaches.   Psychiatric/Behavioral: Negative for behavioral problems, hallucinations and suicidal ideas. The patient is nervous/anxious.      Objective:     Vital Signs (Most Recent):  Temp: 98.1 °F (36.7 °C) (04/06/20 0800)  Pulse: 68 (04/06/20 0800)  Resp: 18 (04/06/20 0800)  BP: 135/80 (04/06/20 0800)  SpO2: 96 % (04/06/20 0800) Vital Signs (24h Range):  Temp:  [97.7 °F (36.5 °C)-98.6 °F (37 °C)] 98.1 °F (36.7 °C)  Pulse:  [54-68] 68  Resp:  [12-20] 18  SpO2:  [95 %-100 %] 96 %  BP: (121-143)/(56-80) 135/80  Arterial Line BP: (137-143)/(54-57) 143/57     Weight: 75.1 kg (165 lb 9.1 oz)  Height: 5' 1" (154.9 cm)  Body mass index is 31.28 kg/m².    Physical Exam   Constitutional: She is oriented to person, place, and time. She appears well-developed and well-nourished. No distress.   HENT:   Head: Normocephalic and atraumatic.   Neck: Normal range of motion. Neck supple.   Cardiovascular: Normal rate, regular rhythm and normal heart sounds.   No murmur heard.  Pulmonary/Chest: Effort normal. No respiratory distress. She has decreased breath sounds in the right lower field and the left lower field. She has no wheezes. She exhibits no tenderness.   Abdominal: Soft. Bowel sounds are normal. She exhibits no distension. There is tenderness.   Musculoskeletal: Normal range of motion. She exhibits no edema or tenderness.   Neurological: She is alert and oriented to person, place, and time.   Skin: Skin is warm and dry. She is not diaphoretic.   Psychiatric: She has a normal mood and affect. Her " behavior is normal. Judgment and thought content normal.   Nursing note and vitals reviewed.      Significant Labs:  CBC:   Recent Labs   Lab 04/04/20 2243 04/05/20 1700 04/06/20  0443   WBC 3.31*  --  6.90   RBC 3.11*  --  3.10*   HGB 10.3*  --  10.3*   HCT 32.8* 37.6 34.1*     --  160   *  --  110*   MCH 33.1*  --  33.2*   MCHC 31.4*  --  30.2*     CMP:   Recent Labs   Lab 04/04/20 2243 04/05/20 1700 04/06/20 0443   GLU 94 202* 136*   CALCIUM 8.4* 8.0* 7.9*   ALBUMIN 3.8 3.7 3.2*   PROT 6.6  --   --     141 144   K 4.1 4.5 4.2   CO2 29 22* 23    102 106   BUN 37* 39* 35*   CREATININE 4.2* 4.8* 4.0*   ALKPHOS 83  --   --    ALT 24  --   --    AST 26  --   --      Coagulation:   Recent Labs   Lab 04/04/20 2243   APTT 25.2     Labs within the past 24 hours have been reviewed.    Diagnostics:  None

## 2020-04-06 NOTE — PROGRESS NOTES
Pt received from PACU via EMS transport. Pt oriented to call bell and room, verbalized understanding. Pt has underwood in place post op,  for 2000, scheduled I=O. SCDs hooked up and active. IS introduced. Continuous infusions of D5 1/2 @50 and NS @175. Pt complained of mild abdominal pain but denied pain meds. Fall risk precaution maintained and reviewed with pt.

## 2020-04-06 NOTE — ASSESSMENT & PLAN NOTE
- progressing well post-op  - Cr level improving and pt with good UOP.  - 2 day underwood cath and will plan to d/c in am.   - pain controlled, ambulating, tolerating diet, and passing gas.   - Simulect induction and will plan for dose on 4/9.  - BP stable.   - repeat lab work this afternoon.   - Cont with s/p KTX clinical pathway

## 2020-04-06 NOTE — CONSULTS
[facility]  Kidney Transplant  Consult Note    Inpatient consult to Transplant Nephrology (KTM)  Consult performed by: Stalin Jalloh NP  Consult ordered by: Abner Montana MD            Subjective:     History of Present Illness:   55 yo WF with history of HTN, gastric bypass surgery on 7/28/16, umbilical hernia repair and PD catheter placement on 9/4/18, ESRD presumed secondary to HTN on HD since 2/5/16; PD catheter was removed in Dec 2018 due to chronic abdominal pain; Now on HD 2x/wk via RUE AV graft (usually MF, but rescheduled to Tues/Sat this week) admitted for kidney transplant. She reports tolerating HD well. Last HD today 4/4. Native UOP ~1.8L/day. Dry weight ~67.5 kg. She denies recent illnesses or hospitalizations. Tentatively scheduled for 1100 4/5 with Dr. Garcia as primary surgeon. Induction will be Simulect. Pre-op labs and imaging pending. Transplant surgery fellow to consent patient in AM.       Interval history: no acute events overnight. Pt reports feeling well this am. Progressing well post-op at this time. UOP increasing and Cr level noted with improvement. Will cont with s/p KTX clinical pathway. Recent protein/Cr ratio reviewed. Pt currently with a 2 day underwood cath and will plan for d/c in am. No drain present and no central line present. Simulect induction and will need dose on 4/9. Plan for repeat lab work this afternoon. Monitor.      Ms. Rivera is a 54 y.o. year old female with ESRD. She received induction therapy with Simulect and her maintenance immunosuppression consists of:   Immunosuppressants (From admission, onward)    Start     Stop Route Frequency Ordered    04/09/20 0900  basiliximab (SIMULECT) 20 mg in sodium chloride 0.9% 50 mL infusion  (IP TXP KIDNEY POST-OP SIMULECT)      -- IV Once 04/05/20 1605    04/05/20 2200  mycophenolate capsule 1,000 mg  (IP TXP KIDNEY POST-OP SIMULECT)      -- Oral 2 times daily 04/05/20 1612    04/05/20 1800  tacrolimus capsule 3 mg   (IP TXP KIDNEY POST-OP SIMULECT)      -- Oral 2 times daily 20 1612            Past Medical and Surgical History: Ms. Rivera has a past medical history of Anemia of renal disease, ESRD on dialysis, Essential hypertension, PD catheter dysfunction (10/18/2018), and Secondary hyperparathyroidism of renal origin.  She has a past surgical history that includes  section; AV fistula placement (Left); AV graft (Right, 2015); Gastric bypass (2016); Peritoneal catheter insertion (N/A, 2018); Laparoscopic revision of procedure involving peritoneal dialysis catheter (N/A, 10/18/2018); Wound exploration (N/A, 2018); Peritoneal catheter removal (N/A, 2018); Colonoscopy (N/A, 2020); and Kidney transplant (N/A, 2020).    Past Social and Family History: Ms. Rivera reports that she has never smoked. She has never used smokeless tobacco. She reports that she does not drink alcohol or use drugs.Her family history includes Diabetes in her mother; Down syndrome in her son; Heart disease in her mother; Hypertension in her mother; Kidney disease in her mother; Lupus in her sister.    Intake/Output - Last 3 Shifts        07 -  0659  07 -  0659  07 -  0659    P.O. 0 600     I.V. (mL/kg) 0 (0) 4502.5 (60) 525 (7)    Other 0      IV Piggyback  500     Total Intake(mL/kg) 0 (0) 5602.5 (74.6) 525 (7)    Urine (mL/kg/hr) 825 3545 (2) 500 (1.6)    Emesis/NG output 0      Other 0      Stool 0      Blood 0 100     Total Output 825 3645 500    Net -825 +1957.5 +25           Urine Occurrence 1 x      Stool Occurrence 0 x      Emesis Occurrence 0 x             Review of Systems   Constitutional: Positive for activity change and appetite change. Negative for fever.   HENT: Negative.  Negative for congestion and facial swelling.    Eyes: Negative.    Respiratory: Negative.  Negative for chest tightness, shortness of breath and wheezing.    Cardiovascular: Negative.   "Negative for chest pain, palpitations and leg swelling.   Gastrointestinal: Positive for abdominal pain. Negative for abdominal distention, constipation, diarrhea, nausea and vomiting.   Genitourinary: Negative.  Negative for decreased urine volume, difficulty urinating, dysuria, hematuria and urgency.   Musculoskeletal: Negative.  Negative for back pain, neck pain and neck stiffness.   Skin: Positive for wound. Negative for color change, pallor and rash.   Allergic/Immunologic: Positive for immunocompromised state.   Neurological: Negative.  Negative for dizziness, seizures, speech difficulty, weakness, light-headedness and headaches.   Psychiatric/Behavioral: Negative for behavioral problems, hallucinations and suicidal ideas. The patient is nervous/anxious.      Objective:     Vital Signs (Most Recent):  Temp: 98.1 °F (36.7 °C) (04/06/20 0800)  Pulse: 68 (04/06/20 0800)  Resp: 18 (04/06/20 0800)  BP: 135/80 (04/06/20 0800)  SpO2: 96 % (04/06/20 0800) Vital Signs (24h Range):  Temp:  [97.7 °F (36.5 °C)-98.6 °F (37 °C)] 98.1 °F (36.7 °C)  Pulse:  [54-68] 68  Resp:  [12-20] 18  SpO2:  [95 %-100 %] 96 %  BP: (121-143)/(56-80) 135/80  Arterial Line BP: (137-143)/(54-57) 143/57     Weight: 75.1 kg (165 lb 9.1 oz)  Height: 5' 1" (154.9 cm)  Body mass index is 31.28 kg/m².    Physical Exam   Constitutional: She is oriented to person, place, and time. She appears well-developed and well-nourished. No distress.   HENT:   Head: Normocephalic and atraumatic.   Neck: Normal range of motion. Neck supple.   Cardiovascular: Normal rate, regular rhythm and normal heart sounds.   No murmur heard.  Pulmonary/Chest: Effort normal. No respiratory distress. She has decreased breath sounds in the right lower field and the left lower field. She has no wheezes. She exhibits no tenderness.   Abdominal: Soft. Bowel sounds are normal. She exhibits no distension. There is tenderness.   Musculoskeletal: Normal range of motion. She exhibits no " edema or tenderness.   Neurological: She is alert and oriented to person, place, and time.   Skin: Skin is warm and dry. She is not diaphoretic.   Psychiatric: She has a normal mood and affect. Her behavior is normal. Judgment and thought content normal.   Nursing note and vitals reviewed.      Significant Labs:  CBC:   Recent Labs   Lab 04/04/20 2243 04/05/20 1700 04/06/20  0443   WBC 3.31*  --  6.90   RBC 3.11*  --  3.10*   HGB 10.3*  --  10.3*   HCT 32.8* 37.6 34.1*     --  160   *  --  110*   MCH 33.1*  --  33.2*   MCHC 31.4*  --  30.2*     CMP:   Recent Labs   Lab 04/04/20 2243 04/05/20 1700 04/06/20  0443   GLU 94 202* 136*   CALCIUM 8.4* 8.0* 7.9*   ALBUMIN 3.8 3.7 3.2*   PROT 6.6  --   --     141 144   K 4.1 4.5 4.2   CO2 29 22* 23    102 106   BUN 37* 39* 35*   CREATININE 4.2* 4.8* 4.0*   ALKPHOS 83  --   --    ALT 24  --   --    AST 26  --   --      Coagulation:   Recent Labs   Lab 04/04/20 2243   APTT 25.2     Labs within the past 24 hours have been reviewed.    Diagnostics:  None    Assessment/Plan:     Renal hypertension  - ESRD 2/2 HTN  - BP readings stable at this time.   - Will monitor closely for potential need of BP meds.       Hypocalcemia  - Calcitriol started. Monitor.       Long term current use of immunosuppressive drug  - cont with prograf. Draw prograf level daily and adjust dose as needed to maintain a therapeutic level.       Prophylactic immunotherapy  - see long term immunosuppression.       At risk for opportunistic infections  - cont with OI prophylaxis as per protocol.       S/P kidney transplant  - progressing well post-op  - Cr level improving and pt with good UOP.  - 2 day underwood cath and will plan to d/c in am.   - pain controlled, ambulating, tolerating diet, and passing gas.   - Simulect induction and will plan for dose on 4/9.  - BP stable.   - repeat lab work this afternoon.   - Cont with s/p KTX clinical pathway         Anemia of renal disease  -  H&H stable.   - Monitor.           Discharge Planning:  Not a candidate for d/c at this time       Stalin Jalloh, CHAS  Kidney Transplant  [facility] [place] [facility]

## 2020-04-06 NOTE — ASSESSMENT & PLAN NOTE
- cont with prograf. Draw prograf level daily and adjust dose as needed to maintain a therapeutic level.

## 2020-04-06 NOTE — PROGRESS NOTES
TRANSPLANT NOTE:      ORGAN: LEFT KIDNEY    Disease Etiology: Hypertensive Nephrosclerosis  Donor CMV Status: Positive  Donor HCV Status: Negative  Donor HBcAb: Negative  Donor HBV KEDAR: Negative  Donor HCV KEDAR: Negative  cPRA %: 45    Tala Rivera is a 54 y.o. female s/p  Donation after Brain Death kidney transplant on 4/5/2020 (Kidney) for Hypertensive Nephrosclerosis.   This patient received methylprednisolone with basiliximab for induction.  This patients immunosuppression will include a steroid taper per protocol to 5mg daily, Prograf, and Cellcept for maintenance.  Opportunistic infection prophylaxis will include Valcyte for 3 months (CMV D + R + ), Bactrim for 1 year, and nystatin for 4 weeks.  Patient is to begin self medications today, and I plan to meet with this patient and his/her support person on POD #2 to review the medication section of the Kidney Transplant Education Manual.  I have reviewed the pre-op medications and have restarted those, as appropriate.

## 2020-04-06 NOTE — ASSESSMENT & PLAN NOTE
- ESRD 2/2 HTN  - BP readings stable at this time.   - Will monitor closely for potential need of BP meds.

## 2020-04-06 NOTE — PLAN OF CARE
Pt AAO and repositions self independently.  VSS. Pt has LRQ incision with island border dressing, unable to visualize. Pt has underwood in place post op, UOP range  for shift, pink tinged. I=O, Continuous infusion of D5 1/2 @50 and NS @75. 500ml bolus NS given @ 0500. Self med box set up and reach for teaching. IS introduced.  Pt complained of mild abd pain, denied pain medication. , SSI not indicated. Fall risk precautions maintained and reviewed with pt.  Plan of care reviewed with pt. Look at flowsheet for assessment.

## 2020-04-07 DIAGNOSIS — Z94.0 KIDNEY REPLACED BY TRANSPLANT: Primary | ICD-10-CM

## 2020-04-07 PROBLEM — Z71.3 ENCOUNTER FOR DIETARY CONSULTATION: Status: ACTIVE | Noted: 2020-04-07

## 2020-04-07 LAB
ALBUMIN SERPL BCP-MCNC: 3 G/DL (ref 3.5–5.2)
ANION GAP SERPL CALC-SCNC: 10 MMOL/L (ref 8–16)
BASOPHILS # BLD AUTO: 0.03 K/UL (ref 0–0.2)
BASOPHILS NFR BLD: 0.4 % (ref 0–1.9)
BUN SERPL-MCNC: 31 MG/DL (ref 6–20)
CALCIUM SERPL-MCNC: 8.2 MG/DL (ref 8.7–10.5)
CHLORIDE SERPL-SCNC: 109 MMOL/L (ref 95–110)
CLASS I ANTIBODIES - LUMINEX: NORMAL
CLASS II ANTIBODY COMMENTS - LUMINEX: NORMAL
CO2 SERPL-SCNC: 21 MMOL/L (ref 23–29)
CPRA %: 71
CREAT SERPL-MCNC: 2.5 MG/DL (ref 0.5–1.4)
DIFFERENTIAL METHOD: ABNORMAL
EOSINOPHIL # BLD AUTO: 0.1 K/UL (ref 0–0.5)
EOSINOPHIL NFR BLD: 0.7 % (ref 0–8)
ERYTHROCYTE [DISTWIDTH] IN BLOOD BY AUTOMATED COUNT: 14.9 % (ref 11.5–14.5)
EST. GFR  (AFRICAN AMERICAN): 24.7 ML/MIN/1.73 M^2
EST. GFR  (NON AFRICAN AMERICAN): 21.4 ML/MIN/1.73 M^2
GLUCOSE SERPL-MCNC: 114 MG/DL (ref 70–110)
HCT VFR BLD AUTO: 32.4 % (ref 37–48.5)
HGB BLD-MCNC: 9.9 G/DL (ref 12–16)
IMM GRANULOCYTES # BLD AUTO: 0.02 K/UL (ref 0–0.04)
IMM GRANULOCYTES NFR BLD AUTO: 0.3 % (ref 0–0.5)
LYMPHOCYTES # BLD AUTO: 0.3 K/UL (ref 1–4.8)
LYMPHOCYTES NFR BLD: 4.7 % (ref 18–48)
MAGNESIUM SERPL-MCNC: 2.1 MG/DL (ref 1.6–2.6)
MCH RBC QN AUTO: 33.1 PG (ref 27–31)
MCHC RBC AUTO-ENTMCNC: 30.6 G/DL (ref 32–36)
MCV RBC AUTO: 108 FL (ref 82–98)
MONOCYTES # BLD AUTO: 0.4 K/UL (ref 0.3–1)
MONOCYTES NFR BLD: 5.7 % (ref 4–15)
NEUTROPHILS # BLD AUTO: 6 K/UL (ref 1.8–7.7)
NEUTROPHILS NFR BLD: 88.2 % (ref 38–73)
NRBC BLD-RTO: 0 /100 WBC
PHOSPHATE SERPL-MCNC: 3.5 MG/DL (ref 2.7–4.5)
PLATELET # BLD AUTO: 128 K/UL (ref 150–350)
PMV BLD AUTO: 11.4 FL (ref 9.2–12.9)
POCT GLUCOSE: 108 MG/DL (ref 70–110)
POTASSIUM SERPL-SCNC: 3.6 MMOL/L (ref 3.5–5.1)
RBC # BLD AUTO: 2.99 M/UL (ref 4–5.4)
SERUM COLLECTION DT - LUMINEX CLASS I: NORMAL
SERUM COLLECTION DT - LUMINEX CLASS II: NORMAL
SODIUM SERPL-SCNC: 140 MMOL/L (ref 136–145)
SPCL1 TESTING DATE: NORMAL
SPCL2 TESTING DATE: NORMAL
SPLUA TESTING DATE: NORMAL
TACROLIMUS BLD-MCNC: 3.4 NG/ML (ref 5–15)
WBC # BLD AUTO: 6.85 K/UL (ref 3.9–12.7)

## 2020-04-07 PROCEDURE — 20600001 HC STEP DOWN PRIVATE ROOM

## 2020-04-07 PROCEDURE — 94761 N-INVAS EAR/PLS OXIMETRY MLT: CPT

## 2020-04-07 PROCEDURE — 25000003 PHARM REV CODE 250: Performed by: STUDENT IN AN ORGANIZED HEALTH CARE EDUCATION/TRAINING PROGRAM

## 2020-04-07 PROCEDURE — 80069 RENAL FUNCTION PANEL: CPT

## 2020-04-07 PROCEDURE — 85025 COMPLETE CBC W/AUTO DIFF WBC: CPT

## 2020-04-07 PROCEDURE — 83735 ASSAY OF MAGNESIUM: CPT

## 2020-04-07 PROCEDURE — 99233 PR SUBSEQUENT HOSPITAL CARE,LEVL III: ICD-10-PCS | Mod: 24,,, | Performed by: NURSE PRACTITIONER

## 2020-04-07 PROCEDURE — 63600175 PHARM REV CODE 636 W HCPCS: Performed by: NURSE PRACTITIONER

## 2020-04-07 PROCEDURE — 99233 SBSQ HOSP IP/OBS HIGH 50: CPT | Mod: 24,,, | Performed by: NURSE PRACTITIONER

## 2020-04-07 PROCEDURE — 80197 ASSAY OF TACROLIMUS: CPT

## 2020-04-07 PROCEDURE — 25000003 PHARM REV CODE 250: Performed by: NURSE PRACTITIONER

## 2020-04-07 PROCEDURE — 63600175 PHARM REV CODE 636 W HCPCS: Performed by: STUDENT IN AN ORGANIZED HEALTH CARE EDUCATION/TRAINING PROGRAM

## 2020-04-07 PROCEDURE — 25000003 PHARM REV CODE 250: Performed by: INTERNAL MEDICINE

## 2020-04-07 RX ORDER — TACROLIMUS 1 MG/1
2 CAPSULE ORAL ONCE
Status: COMPLETED | OUTPATIENT
Start: 2020-04-07 | End: 2020-04-07

## 2020-04-07 RX ORDER — TACROLIMUS 1 MG/1
6 CAPSULE ORAL 2 TIMES DAILY
Status: DISCONTINUED | OUTPATIENT
Start: 2020-04-07 | End: 2020-04-08 | Stop reason: HOSPADM

## 2020-04-07 RX ORDER — NIFEDIPINE 30 MG/1
30 TABLET, EXTENDED RELEASE ORAL DAILY
Status: DISCONTINUED | OUTPATIENT
Start: 2020-04-07 | End: 2020-04-08 | Stop reason: HOSPADM

## 2020-04-07 RX ADMIN — NIFEDIPINE 30 MG: 30 TABLET, FILM COATED, EXTENDED RELEASE ORAL at 11:04

## 2020-04-07 RX ADMIN — HEPARIN SODIUM 5000 UNITS: 5000 INJECTION, SOLUTION INTRAVENOUS; SUBCUTANEOUS at 01:04

## 2020-04-07 RX ADMIN — ATOVAQUONE 750 MG: 750 SUSPENSION ORAL at 07:04

## 2020-04-07 RX ADMIN — DOCUSATE SODIUM 100 MG: 100 CAPSULE, LIQUID FILLED ORAL at 08:04

## 2020-04-07 RX ADMIN — MYCOPHENOLATE MOFETIL 1000 MG: 250 CAPSULE ORAL at 07:04

## 2020-04-07 RX ADMIN — MUPIROCIN 1 G: 20 OINTMENT TOPICAL at 07:04

## 2020-04-07 RX ADMIN — TACROLIMUS 4 MG: 1 CAPSULE ORAL at 07:04

## 2020-04-07 RX ADMIN — BISACODYL 10 MG: 5 TABLET, COATED ORAL at 08:04

## 2020-04-07 RX ADMIN — MYCOPHENOLATE MOFETIL 1000 MG: 250 CAPSULE ORAL at 08:04

## 2020-04-07 RX ADMIN — NYSTATIN 500000 UNITS: 100000 SUSPENSION ORAL at 01:04

## 2020-04-07 RX ADMIN — THERA TABS 1 TABLET: TAB at 07:04

## 2020-04-07 RX ADMIN — TACROLIMUS 2 MG: 1 CAPSULE ORAL at 12:04

## 2020-04-07 RX ADMIN — NYSTATIN 500000 UNITS: 100000 SUSPENSION ORAL at 07:04

## 2020-04-07 RX ADMIN — MUPIROCIN 1 G: 20 OINTMENT TOPICAL at 08:04

## 2020-04-07 RX ADMIN — SODIUM CHLORIDE: 0.9 INJECTION, SOLUTION INTRAVENOUS at 07:04

## 2020-04-07 RX ADMIN — HEPARIN SODIUM 5000 UNITS: 5000 INJECTION, SOLUTION INTRAVENOUS; SUBCUTANEOUS at 06:04

## 2020-04-07 RX ADMIN — NYSTATIN 500000 UNITS: 100000 SUSPENSION ORAL at 08:04

## 2020-04-07 RX ADMIN — DOCUSATE SODIUM 100 MG: 100 CAPSULE, LIQUID FILLED ORAL at 01:04

## 2020-04-07 RX ADMIN — HEPARIN SODIUM 5000 UNITS: 5000 INJECTION, SOLUTION INTRAVENOUS; SUBCUTANEOUS at 10:04

## 2020-04-07 RX ADMIN — CALCITRIOL 0.5 MCG: 0.5 CAPSULE, LIQUID FILLED ORAL at 07:04

## 2020-04-07 RX ADMIN — TACROLIMUS 6 MG: 1 CAPSULE ORAL at 05:04

## 2020-04-07 RX ADMIN — DOCUSATE SODIUM 100 MG: 100 CAPSULE, LIQUID FILLED ORAL at 07:04

## 2020-04-07 RX ADMIN — METHYLPREDNISOLONE SODIUM SUCCINATE 125 MG: 125 INJECTION, POWDER, FOR SOLUTION INTRAMUSCULAR; INTRAVENOUS at 10:04

## 2020-04-07 RX ADMIN — NYSTATIN 500000 UNITS: 100000 SUSPENSION ORAL at 05:04

## 2020-04-07 RX ADMIN — FAMOTIDINE 20 MG: 20 TABLET, FILM COATED ORAL at 08:04

## 2020-04-07 NOTE — DISCHARGE SUMMARY
[facility] [place] [facility]  Kidney Transplant  Discharge Summary    Patient Name: Tala Rivera  MRN: [id number]  Admission Date: 4/4/2020  Hospital Length of Stay: 4 days  Discharge Date and Time:  04/08/2020 10:42 AM  Attending Physician: Jennifer att. providers found   Discharging Provider: Bridgette Neumann PA-C  Primary Care Provider: ANUJA Castellon MD        Hospital Course:     Ms. Tala Rivera is a 55 yo female with history of HTN, gastric bypass surgery on 7/28/16, umbilical hernia repair and PD catheter placement on 9/4/18, and ESRD presumed secondary to HTN on HD since 2/5/16 (PD catheter was removed in Dec 2018 due to chronic abdominal pain; then transitioned to HD 2x/wk via RUE AV graftis week)). She is now s/p KTX from 4/5/20. Of note, her Native UOP is approximately 1.8L/day. Pt was Simulect induction and received second dose on 4/8 which was tolerated well. Pt progressed well post-op. Pt noted with good pain control, tolerating diet, ambulating independently and has had a BM. Cr level noted with improvement and pt remains with good UOP. 2 day underwood cath removed on 4/7 and pt voiding well at this time. No ELVIA drain present from surgery and pt noted with RLQ inc CINTHYA w/ staples. Post-op kidney u/s performed on 4/6 and reviewed per staff surgeon. Will plan to repeat kidney u/s in 2 weeks for continued surveillance. Pt reports feeling well at this time and will plan for d/c home today. F/u with transplant clinic and labs tomorrow 4/9/20 as scheduled per transplant coordinator.     Procedure(s) (LRB):  TRANSPLANT, KIDNEY (N/A)         Final Active Diagnoses:    Diagnosis Date Noted POA    Encounter for dietary consultation [Z71.3] 04/07/2020 Not Applicable    At risk for opportunistic infections [Z91.89] 04/06/2020 No    Prophylactic immunotherapy [Z29.8] 04/06/2020 Not Applicable    Long term current use of immunosuppressive drug [Z79.899] 04/06/2020 Not Applicable    Hypocalcemia [E83.51] 04/06/2020 Yes     Renal hypertension [I12.9] 04/06/2020 Yes    S/P kidney transplant [Z94.0] 04/05/2020 Not Applicable    Anemia of renal disease [N18.9, D63.1]  Yes     Chronic      Problems Resolved During this Admission:    Diagnosis Date Noted Date Resolved POA    PRINCIPAL PROBLEM:  ESRD on dialysis  [N18.6, Z99.2]  04/06/2020 Not Applicable     Chronic    ESRD (end stage renal disease) [N18.6] 04/05/2020 04/06/2020 Yes    Proteinuria [R80.9] 04/01/2019 04/06/2020 Yes    Essential hypertension [I10]  04/04/2020 Yes     Chronic       Treatments: as above    Consults (From admission, onward)        Status Ordering Provider     Inpatient consult to Registered Dietitian/Nutritionist  Once     Provider:  (Not yet assigned)    Completed MICHAEL MARTIN     Inpatient consult to Transplant Nephrology (KTM)  Once     Provider:  (Not yet assigned)    Completed MICHAEL MARTIN          Pending Diagnostic Studies:     Procedure Component Value Units Date/Time    Hepatitis C RNA, quantitative, PCR [[id number]] Collected:  04/04/20 2243    Order Status:  Sent Lab Status:  In process Updated:  04/04/20 2317    Specimen:  Blood         Significant Diagnostic Studies: Labs:   CMP   Recent Labs   Lab 04/06/20  1416 04/07/20  0631 04/08/20  0447    140 137   K 3.9 3.6 3.5    109 107   CO2 22* 21* 20*   * 114* 88   BUN 32* 31* 36*   CREATININE 3.4* 2.5* 2.2*   CALCIUM 7.9* 8.2* 8.7   ALBUMIN 3.2* 3.0* 3.1*   ANIONGAP 9 10 10   ESTGFRAFRICA 17.0* 24.7* 28.9*   EGFRNONAA 14.8* 21.4* 25.0*   , CBC   Recent Labs   Lab 04/06/20  1416 04/07/20  0631 04/08/20  0447   WBC 6.90 6.85 5.34   HGB 10.5* 9.9* 10.2*   HCT 33.6* 32.4* 33.2*    128* 136*   , INR   Lab Results   Component Value Date    INR 1.0 04/04/2020    INR 0.9 07/19/2016    and All labs within the past 24 hours have been reviewed    Discharged Condition: good    Disposition: Home    Follow Up: As above.     Patient Instructions:      Diet Adult Regular      Lifting restrictions   Order Comments: Do not lift greater than 10 lbs for 6 weeks from time of transplant     Notify your health care provider if you experience any of the following:  temperature >100.4     Notify your health care provider if you experience any of the following:  persistent nausea and vomiting or diarrhea     Notify your health care provider if you experience any of the following:  severe uncontrolled pain     Notify your health care provider if you experience any of the following:  redness, tenderness, or signs of infection (pain, swelling, redness, odor or green/yellow discharge around incision site)     Notify your health care provider if you experience any of the following:  difficulty breathing or increased cough     Notify your health care provider if you experience any of the following:  severe persistent headache     Notify your health care provider if you experience any of the following:  worsening rash     Notify your health care provider if you experience any of the following:  persistent dizziness, light-headedness, or visual disturbances     Notify your health care provider if you experience any of the following:  increased confusion or weakness     Notify your health care provider if you experience any of the following:   Order Comments: For any other concerning signs or symptoms     Weight bearing restrictions (specify):   Order Comments: Do not lift greater than 10 lbs for 6 weeks from time of transplant     Medications:  Reconciled Home Medications:      Medication List      START taking these medications    atovaquone 750 mg/5 mL Susp  Commonly known as:  MEPRON  Take 10 mLs (1,500 mg total) by mouth once daily. Stop: 4/5/2021     bisacodyL 5 mg EC tablet  Commonly known as:  DULCOLAX  Take 2 tablets (10 mg total) by mouth daily as needed for Constipation.     docusate sodium 100 MG capsule  Commonly known as:  COLACE  Take 1 capsule (100 mg total) by mouth 3 (three) times  daily as needed for Constipation.     famotidine 20 MG tablet  Commonly known as:  PEPCID  Take 1 tablet (20 mg total) by mouth every evening.     k phos di & mono-sod phos mono 250 mg Tab  Commonly known as:  K-PHOS-NEUTRAL  Take 1 tablet by mouth 2 (two) times daily.     mycophenolate 250 mg Cap  Commonly known as:  CELLCEPT  Take 4 capsules (1,000 mg total) by mouth 2 (two) times daily.     NIFEdipine 30 MG (OSM) 24 hr tablet  Commonly known as:  PROCARDIA-XL  Take 1 tablet (30 mg total) by mouth once daily.  Start taking on:  April 9, 2020     nystatin 100,000 unit/mL suspension  Commonly known as:  MYCOSTATIN  Take 5 mLs (500,000 Units total) by mouth 3 (three) times daily after meals. Stop: 5/8/2020     oxyCODONE-acetaminophen 5-325 mg per tablet  Commonly known as:  PERCOCET  Take 1 tablet by mouth every 6 (six) hours as needed for Pain.     predniSONE 5 MG tablet  Commonly known as:  DELTASONE  Take 20mg by mouth daily 4/8-5/7;   Take 15mg daily 5/8-6/7;  Take 10mg daily 6/8-7/7;  then 5mg daily thereafter starting 7/8/2020     tacrolimus 1 MG Cap  Commonly known as:  PROGRAF  Take 6 capsules (6 mg total) by mouth every 12 (twelve) hours.     valGANciclovir 450 mg Tab  Commonly known as:  VALCYTE  Take 1 tablet (450 mg total) by mouth once daily. Stop: 7/4/2020        CHANGE how you take these medications    calcitRIOL 0.5 MCG Cap  Commonly known as:  ROCALTROL  Take 1 capsule (0.5 mcg total) by mouth once daily.  What changed:    · medication strength  · how much to take        STOP taking these medications    chlorhexidine 0.12 % solution  Commonly known as:  PERIDEX     ferric citrate 210 mg iron Tab  Commonly known as:  AURYXIA     lidocaine-prilocaine cream  Commonly known as:  EMLA     mupirocin calcium 2% nasl oint 2 % Oint  Commonly known as:  BACTROBAN     ondansetron 4 MG Tbdl  Commonly known as:  ZOFRAN-ODT     sodium bicarbonate 650 MG tablet     VELPHORO 500 mg Chew  Generic drug:  sucroferric  oxyhydroxide          Time spent caring for patient (Greater than 1/2 spent in direct face-to-face contact): > 30 minutes    Bridgette Neumann, PA-C  Kidney Transplant  [facility]

## 2020-04-07 NOTE — PROGRESS NOTES
Pt admitted for cadaveric kidney transplant. ESRD 2/2 HTN. Simulect induction with continued steroids. Dose #2 will be given in-pt on POD3. Pt CMV ++, KDPI 83%, cPRA 45%---retrospective CXM with low level DSA noted.    Post-op course has been uncomplicated.   Cr trending down to 2.5 today, Good UOP (pt did make 1-1.5L UOP daily prior to tx)    RLQ incision CINTHYA with staples  2 day underwood removed. No ELVIA  IVF d/c today,SBP~140-150.    Pt will d/c home to [place] and return for labs/appts  Labs scheduled for 4/9/2020 and RTC to meet with coordinator/pharmD

## 2020-04-07 NOTE — PROGRESS NOTES
[facility] [place] [facility]  Kidney Transplant  Progress Note      Reason for Follow-up: Reassessment of Kidney Transplant - 4/5/2020  (#1) recipient and management of immunosuppression.    ORGAN: LEFT KIDNEY    Donor Type: Donation after Brain Death    Donor CMV Status: Positive  Donor HBcAB:Negative  Donor HCV Status:Negative  Donor HBV KEDAR: Negative  Donor HCV KEDAR: Negative        Subjective:     History of Present Illness:   55 yo WF with history of HTN, gastric bypass surgery on 7/28/16, umbilical hernia repair and PD catheter placement on 9/4/18, ESRD presumed secondary to HTN on HD since 2/5/16; PD catheter was removed in Dec 2018 due to chronic abdominal pain; Now on HD 2x/wk via RUE AV graft (usually MF, but rescheduled to Tues/Sat this week) admitted for kidney transplant. She reports tolerating HD well. Last HD today 4/4. Native UOP ~1.8L/day. Dry weight ~67.5 kg. She denies recent illnesses or hospitalizations. Tentatively scheduled for 1100 4/5 with Dr. Garcia as primary surgeon. Induction will be Simulect. Pre-op labs and imaging pending. Transplant surgery fellow to consent patient in AM.       Interval history: no acute events overnight. Pt reports feeling well this am. Progressing well post-op at this time. UOP increasing and Cr level noted with improvement. Will cont with s/p KTX clinical pathway. Recent protein/Cr ratio reviewed. Mas cath removed today and pt able to void independently. No drain present and no central line present. Simulect induction and will need second dose in am. Pain well controlled, ambulating, having BMs, and tolerating diet. Monitor.      Ms. Rivera is a 54 y.o. year old female with ESRD. She received induction therapy with Simulect and her maintenance immunosuppression consists of:   Immunosuppressants (From admission, onward)    Start     Stop Route Frequency Ordered    04/08/20 0800  basiliximab (SIMULECT) 20 mg in sodium chloride 0.9% 50 mL infusion  (IP TXP KIDNEY POST-OP  SIMULECT)      -- IV Once 20 1244    20 1800  tacrolimus capsule 6 mg  (IP TXP KIDNEY POST-OP SIMULECT)      -- Oral 2 times daily 20 1118    20 2200  mycophenolate capsule 1,000 mg  (IP TXP KIDNEY POST-OP SIMULECT)      -- Oral 2 times daily 20 1612            Past Medical and Surgical History: Ms. Rivera has a past medical history of Anemia of renal disease, ESRD on dialysis, Essential hypertension, PD catheter dysfunction (10/18/2018), and Secondary hyperparathyroidism of renal origin.  She has a past surgical history that includes  section; AV fistula placement (Left); AV graft (Right, 2015); Gastric bypass (); Peritoneal catheter insertion (N/A, 2018); Laparoscopic revision of procedure involving peritoneal dialysis catheter (N/A, 10/18/2018); Wound exploration (N/A, 2018); Peritoneal catheter removal (N/A, 2018); Colonoscopy (N/A, 2020); and Kidney transplant (N/A, 2020).    Past Social and Family History: Ms. Rivera reports that she has never smoked. She has never used smokeless tobacco. She reports that she does not drink alcohol or use drugs.Her family history includes Diabetes in her mother; Down syndrome in her son; Heart disease in her mother; Hypertension in her mother; Kidney disease in her mother; Lupus in her sister.    Intake/Output - Last 3 Shifts       700 -  0659 700 -  0659 700 -  0659    P.O. 600 2220 750    I.V. (mL/kg) 4502.5 (60) 2525 (33.7) 400 (5.3)    Other       IV Piggyback 500      Total Intake(mL/kg) 5602.5 (74.6) 4745 (63.3) 1150 (15.3)    Urine (mL/kg/hr) 3545 (2) 2850 (1.6) 450 (0.9)    Emesis/NG output       Other       Stool   0    Blood 100      Total Output 3645 2850 450    Net +1957.5 +1895 +700           Stool Occurrence   2 x           Review of Systems   Constitutional: Negative for activity change, appetite change and fever.   HENT: Negative.  Negative for  "congestion and facial swelling.    Eyes: Negative.    Respiratory: Negative.  Negative for chest tightness, shortness of breath and wheezing.    Cardiovascular: Negative.  Negative for chest pain, palpitations and leg swelling.   Gastrointestinal: Positive for abdominal pain. Negative for abdominal distention, constipation, diarrhea, nausea and vomiting.   Genitourinary: Negative.  Negative for decreased urine volume, difficulty urinating, dysuria, hematuria and urgency.   Musculoskeletal: Negative.  Negative for back pain, neck pain and neck stiffness.   Skin: Positive for wound. Negative for color change, pallor and rash.   Allergic/Immunologic: Positive for immunocompromised state.   Neurological: Negative.  Negative for dizziness, seizures, speech difficulty, weakness, light-headedness and headaches.   Psychiatric/Behavioral: Negative for behavioral problems, hallucinations and suicidal ideas. The patient is not nervous/anxious.      Objective:     Vital Signs (Most Recent):  Temp: 97.9 °F (36.6 °C) (04/07/20 1124)  Pulse: 68 (04/07/20 1130)  Resp: 20 (04/07/20 1130)  BP: (!) 151/83 (04/07/20 1130)  SpO2: 99 % (04/07/20 1130) Vital Signs (24h Range):  Temp:  [97.9 °F (36.6 °C)-98.5 °F (36.9 °C)] 97.9 °F (36.6 °C)  Pulse:  [58-83] 68  Resp:  [18-21] 20  SpO2:  [91 %-99 %] 99 %  BP: (140-151)/(78-87) 151/83     Weight: 75 kg (165 lb 5.5 oz)  Height: 5' 1" (154.9 cm)  Body mass index is 31.24 kg/m².    Physical Exam   Constitutional: She is oriented to person, place, and time. She appears well-developed and well-nourished. No distress.   HENT:   Head: Normocephalic and atraumatic.   Neck: Normal range of motion. Neck supple.   Cardiovascular: Normal rate, regular rhythm and normal heart sounds.   No murmur heard.  Pulmonary/Chest: Effort normal. No respiratory distress. She has decreased breath sounds in the right lower field and the left lower field. She has no wheezes. She exhibits no tenderness.   Abdominal: " Soft. Bowel sounds are normal. She exhibits no distension. There is tenderness.   Musculoskeletal: Normal range of motion. She exhibits no edema or tenderness.   Neurological: She is alert and oriented to person, place, and time.   Skin: Skin is warm and dry. She is not diaphoretic.   Psychiatric: She has a normal mood and affect. Her behavior is normal. Judgment and thought content normal.   Nursing note and vitals reviewed.      Significant Labs:  CBC:   Recent Labs   Lab 04/06/20 0443 04/06/20 1416 04/07/20  0631   WBC 6.90 6.90 6.85   RBC 3.10* 3.08* 2.99*   HGB 10.3* 10.5* 9.9*   HCT 34.1* 33.6* 32.4*    152 128*   * 109* 108*   MCH 33.2* 34.1* 33.1*   MCHC 30.2* 31.3* 30.6*     CMP:   Recent Labs   Lab 04/04/20 2243 04/06/20 0443 04/06/20 1416 04/07/20  0631   GLU 94   < > 136* 178* 114*   CALCIUM 8.4*   < > 7.9* 7.9* 8.2*   ALBUMIN 3.8   < > 3.2* 3.2* 3.0*   PROT 6.6  --   --   --   --       < > 144 138 140   K 4.1   < > 4.2 3.9 3.6   CO2 29   < > 23 22* 21*      < > 106 107 109   BUN 37*   < > 35* 32* 31*   CREATININE 4.2*   < > 4.0* 3.4* 2.5*   ALKPHOS 83  --   --   --   --    ALT 24  --   --   --   --    AST 26  --   --   --   --     < > = values in this interval not displayed.     Coagulation:   Recent Labs   Lab 04/04/20  2243   APTT 25.2     Labs within the past 24 hours have been reviewed.    Diagnostics:  None    Assessment/Plan:     Renal hypertension  - ESRD 2/2 HTN  - BP slightly elevated at this time.    - Will monitor closely for potential need of BP meds.       Hypocalcemia  - Calcitriol started. Monitor.       Long term current use of immunosuppressive drug  - cont with prograf. Draw prograf level daily and adjust dose as needed to maintain a therapeutic level.       Prophylactic immunotherapy  - see long term immunosuppression.       At risk for opportunistic infections  - cont with OI prophylaxis as per protocol.       S/P kidney transplant  - progressing well  post-op  - Cr level improving and pt with good UOP.  - 2 day underwood cath removed today and pt voiding.   - pain controlled, ambulating, tolerating diet, and having BMs.   - Simulect induction and will plan for second dose in am.   - Cont with s/p KTX clinical pathway     - IVF d/c'd today as pt with good PO intake.     Anemia of renal disease  - H&H stable.   - Monitor.           Discharge Planning:  Not a candidate for d/c in am.     Stalin Jalloh NP  Kidney Transplant  [facility] [place] [facility]

## 2020-04-07 NOTE — NURSING
Mas catheter d/c per md order. 28cc aspirated from balloon. Catheter tip patent/intact. And pt tolerated well. Pt instructed to urine in hat and measure urine.

## 2020-04-07 NOTE — ASSESSMENT & PLAN NOTE
Contributing Nutrition Diagnosis  Dietary encounter     Related to (etiology):   Healthy diet for discharge    Signs and Symptoms (as evidenced by):   S/p kidney transplant     Interventions/Recommendations (treatment strategy):  Post transplant education provided.     Nutrition Diagnosis Status:   Improving

## 2020-04-07 NOTE — PROGRESS NOTES
Update    TRU attempted to speak with pt at [phone] to assess for coping, continuity of care and any needs. Pt didn't have a voice mailbox set up yet. SW to follow up tomorrow.     TRU remains available at [phone].

## 2020-04-07 NOTE — CONSULTS
"  [facility] [place] [facility]  Adult Nutrition  Consult Note    SUMMARY     Recommendations    Recommendation/Intervention: Continue regular diet as tolerated. Consider Nepro as supplement for home use.  Goals: Tolerate 75% meals.   Nutrition Goal Status: progressing towards goal    Nutrition Discharge Planning  Post transplant nutrition education provided.  Food safety/ food/ drug interactions emphasized.  General healthy diet recommended.RD name/ contact information, education information given. No other needs identified.    Reason for Assessment    Reason For Assessment: consult  Diagnosis: other (see comments)(kidney transplant)  Relevant Medical History: HTN, ESRD, anemia of chronic disease, gastric bypass 7/28/16, HD 2/5/16   Interdisciplinary Rounds: did not attend  General Information Comments: Patient had dry weight of 67.5 kg previously. Patient has been tolerating 50- 75% regular diet in the hospital.    Nutrition Risk Screen    Nutrition Risk Screen: no indicators present    Nutrition/Diet History    Patient Reported Diet/Restrictions/Preferences: renal  Spiritual, Cultural Beliefs, Synagogue Practices, Values that Affect Care: yes  Factors Affecting Nutritional Intake: decreased appetite    Anthropometrics    Temp: 97.9 °F (36.6 °C)  Height Method: Stated  Height: 5' 1" (154.9 cm)  Height (inches): 61 in  Weight Method: Bed Scale  Weight: 75 kg (165 lb 5.5 oz)  Weight (lb): 165.35 lb  Ideal Body Weight (IBW), Female: 105 lb  % Ideal Body Weight, Female (lb): 157.48 %  BMI (Calculated): 31.3  BMI Grade: 30 - 34.9- obesity - grade I       Lab/Procedures/Meds    Pertinent Labs Reviewed: reviewed  Pertinent Labs Comments: BUN 31, CREATININE 2.5, HGB 9.9, HCT 32.4  Pertinent Medications Reviewed: reviewed  Pertinent Medications Comments: MVI, heparin    Physical Findings/Assessment     Patient does not meet malnutrition criteria.    Estimated/Assessed Needs    Weight Used For Calorie Calculations: 75 kg (165 lb 5.5 " oz)  Energy Calorie Requirements (kcal): 1621([place]- St. Jeor x 1.25 )  Energy Need Method: [place]-St Jeor  Protein Requirements: 84- 98 gms (1.2- 1.4 gms pro/kg)  Weight Used For Protein Calculations: 70 kg (154 lb 5.2 oz)  Fluid Requirements (mL): 1621     RDA Method (mL): 1621  CHO Requirement: NA      Nutrition Prescription Ordered    Current Diet Order: regular    Evaluation of Received Nutrient/Fluid Intake    Energy Calories Required: not meeting needs  Protein Required: not meeting needs  Fluid Required: not meeting needs  Tolerance: tolerating  % Intake of Estimated Energy Needs: 50 - 75 %  % Meal Intake: 50 - 75 %    Nutrition Risk    Level of Risk/Frequency of Follow-up: low - moderate     Assessment and Plan    Encounter for dietary consultation  Contributing Nutrition Diagnosis  Dietary encounter     Related to (etiology):   Healthy diet for discharge    Signs and Symptoms (as evidenced by):   S/p kidney transplant     Interventions/Recommendations (treatment strategy):  Post transplant education provided.     Nutrition Diagnosis Status:   Improving           Monitor and Evaluation    Food and Nutrient Intake: food and beverage intake  Food and Nutrient Adminstration: diet order  Knowledge/Beliefs/Attitudes: food and nutrition knowledge/skill  Anthropometric Measurements: height/length, weight, body mass index  Biochemical Data, Medical Tests and Procedures: electrolyte and renal panel, gastrointestinal profile  Nutrition-Focused Physical Findings: overall appearance     Malnutrition Assessment   not meeting malnutrition criteria                    Nutrition Follow-Up     RD follow up

## 2020-04-07 NOTE — PLAN OF CARE
Patient AAOx4, vitals WDL, afebrile.  RLQ incision CINTHYA with staples, painted with betadine.  2 left arm 18 g in place, both saline locked with dressings clean, dry and intact.  R arm dialysis graft ++  L arm dialysis graft ++ never accessed  No complaints of pain or discomfort today.  Ambulating independently in room.   Self-meds done perfectly today, as well as measuring/reporting output.  Accuchecks DC today per verbal order from NP due to BG being WDL.  IS independently done several times today.  Patient in good spirits and receptive to all teaching done today to prepare for discharge tomorrow after dose of simulect.   Patient remains free from injury for shift.

## 2020-04-07 NOTE — SUBJECTIVE & OBJECTIVE
Subjective:     History of Present Illness:   53 yo WF with history of HTN, gastric bypass surgery on 7/28/16, umbilical hernia repair and PD catheter placement on 9/4/18, ESRD presumed secondary to HTN on HD since 2/5/16; PD catheter was removed in Dec 2018 due to chronic abdominal pain; Now on HD 2x/wk via RUE AV graft (usually MF, but rescheduled to Tues/Sat this week) admitted for kidney transplant. She reports tolerating HD well. Last HD today 4/4. Native UOP ~1.8L/day. Dry weight ~67.5 kg. She denies recent illnesses or hospitalizations. Tentatively scheduled for 1100 4/5 with Dr. Garcia as primary surgeon. Induction will be Simulect. Pre-op labs and imaging pending. Transplant surgery fellow to consent patient in AM.       Interval history: no acute events overnight. Pt reports feeling well this am. Progressing well post-op at this time. UOP increasing and Cr level noted with improvement. Will cont with s/p KTX clinical pathway. Recent protein/Cr ratio reviewed. Mas cath removed today and pt able to void independently. No drain present and no central line present. Simulect induction and will need second dose in am. Pain well controlled, ambulating, having BMs, and tolerating diet. Monitor.      Ms. Rivera is a 54 y.o. year old female with ESRD. She received induction therapy with Simulect and her maintenance immunosuppression consists of:   Immunosuppressants (From admission, onward)    Start     Stop Route Frequency Ordered    04/08/20 0800  basiliximab (SIMULECT) 20 mg in sodium chloride 0.9% 50 mL infusion  (IP TXP KIDNEY POST-OP SIMULECT)      -- IV Once 04/07/20 1244    04/07/20 1800  tacrolimus capsule 6 mg  (IP TXP KIDNEY POST-OP SIMULECT)      -- Oral 2 times daily 04/07/20 1118    04/05/20 2200  mycophenolate capsule 1,000 mg  (IP TXP KIDNEY POST-OP SIMULECT)      -- Oral 2 times daily 04/05/20 1612            Past Medical and Surgical History: Ms. Rivera has a past medical history of Anemia  of renal disease, ESRD on dialysis, Essential hypertension, PD catheter dysfunction (10/18/2018), and Secondary hyperparathyroidism of renal origin.  She has a past surgical history that includes  section; AV fistula placement (Left); AV graft (Right, 2015); Gastric bypass (2016); Peritoneal catheter insertion (N/A, 2018); Laparoscopic revision of procedure involving peritoneal dialysis catheter (N/A, 10/18/2018); Wound exploration (N/A, 2018); Peritoneal catheter removal (N/A, 2018); Colonoscopy (N/A, 2020); and Kidney transplant (N/A, 2020).    Past Social and Family History: Ms. Rivera reports that she has never smoked. She has never used smokeless tobacco. She reports that she does not drink alcohol or use drugs.Her family history includes Diabetes in her mother; Down syndrome in her son; Heart disease in her mother; Hypertension in her mother; Kidney disease in her mother; Lupus in her sister.    Intake/Output - Last 3 Shifts        07 -  0659 700 -  0659  07 -  0659    P.O. 600 2220 750    I.V. (mL/kg) 4502.5 (60) 2525 (33.7) 400 (5.3)    Other       IV Piggyback 500      Total Intake(mL/kg) 5602.5 (74.6) 4745 (63.3) 1150 (15.3)    Urine (mL/kg/hr) 3545 (2) 2850 (1.6) 450 (0.9)    Emesis/NG output       Other       Stool   0    Blood 100      Total Output 3645 2850 450    Net +1957.5 +1895 +700           Stool Occurrence   2 x           Review of Systems   Constitutional: Negative for activity change, appetite change and fever.   HENT: Negative.  Negative for congestion and facial swelling.    Eyes: Negative.    Respiratory: Negative.  Negative for chest tightness, shortness of breath and wheezing.    Cardiovascular: Negative.  Negative for chest pain, palpitations and leg swelling.   Gastrointestinal: Positive for abdominal pain. Negative for abdominal distention, constipation, diarrhea, nausea and vomiting.   Genitourinary: Negative.   "Negative for decreased urine volume, difficulty urinating, dysuria, hematuria and urgency.   Musculoskeletal: Negative.  Negative for back pain, neck pain and neck stiffness.   Skin: Positive for wound. Negative for color change, pallor and rash.   Allergic/Immunologic: Positive for immunocompromised state.   Neurological: Negative.  Negative for dizziness, seizures, speech difficulty, weakness, light-headedness and headaches.   Psychiatric/Behavioral: Negative for behavioral problems, hallucinations and suicidal ideas. The patient is not nervous/anxious.      Objective:     Vital Signs (Most Recent):  Temp: 97.9 °F (36.6 °C) (04/07/20 1124)  Pulse: 68 (04/07/20 1130)  Resp: 20 (04/07/20 1130)  BP: (!) 151/83 (04/07/20 1130)  SpO2: 99 % (04/07/20 1130) Vital Signs (24h Range):  Temp:  [97.9 °F (36.6 °C)-98.5 °F (36.9 °C)] 97.9 °F (36.6 °C)  Pulse:  [58-83] 68  Resp:  [18-21] 20  SpO2:  [91 %-99 %] 99 %  BP: (140-151)/(78-87) 151/83     Weight: 75 kg (165 lb 5.5 oz)  Height: 5' 1" (154.9 cm)  Body mass index is 31.24 kg/m².    Physical Exam   Constitutional: She is oriented to person, place, and time. She appears well-developed and well-nourished. No distress.   HENT:   Head: Normocephalic and atraumatic.   Neck: Normal range of motion. Neck supple.   Cardiovascular: Normal rate, regular rhythm and normal heart sounds.   No murmur heard.  Pulmonary/Chest: Effort normal. No respiratory distress. She has decreased breath sounds in the right lower field and the left lower field. She has no wheezes. She exhibits no tenderness.   Abdominal: Soft. Bowel sounds are normal. She exhibits no distension. There is tenderness.   Musculoskeletal: Normal range of motion. She exhibits no edema or tenderness.   Neurological: She is alert and oriented to person, place, and time.   Skin: Skin is warm and dry. She is not diaphoretic.   Psychiatric: She has a normal mood and affect. Her behavior is normal. Judgment and thought content " normal.   Nursing note and vitals reviewed.      Significant Labs:  CBC:   Recent Labs   Lab 04/06/20 0443 04/06/20 1416 04/07/20  0631   WBC 6.90 6.90 6.85   RBC 3.10* 3.08* 2.99*   HGB 10.3* 10.5* 9.9*   HCT 34.1* 33.6* 32.4*    152 128*   * 109* 108*   MCH 33.2* 34.1* 33.1*   MCHC 30.2* 31.3* 30.6*     CMP:   Recent Labs   Lab 04/04/20 2243 04/06/20 0443 04/06/20 1416 04/07/20  0631   GLU 94   < > 136* 178* 114*   CALCIUM 8.4*   < > 7.9* 7.9* 8.2*   ALBUMIN 3.8   < > 3.2* 3.2* 3.0*   PROT 6.6  --   --   --   --       < > 144 138 140   K 4.1   < > 4.2 3.9 3.6   CO2 29   < > 23 22* 21*      < > 106 107 109   BUN 37*   < > 35* 32* 31*   CREATININE 4.2*   < > 4.0* 3.4* 2.5*   ALKPHOS 83  --   --   --   --    ALT 24  --   --   --   --    AST 26  --   --   --   --     < > = values in this interval not displayed.     Coagulation:   Recent Labs   Lab 04/04/20 2243   APTT 25.2     Labs within the past 24 hours have been reviewed.    Diagnostics:  None

## 2020-04-07 NOTE — PLAN OF CARE
Pt aaox4 vswnl and no c/o pain. Bed in lowm position and callbell within reach. Pt pulled self meds 100% accu ck at 2536=535. Rt lower quad incision kyung with staples intact. Ns @ 100cc/hr infusing. Ziyad ++ and Soniya ++. Underwood patent intact draining good amounts urine clear yellow. Plan to d/c underwood 0600 in am \.  Pt ambulates with 1 assist.

## 2020-04-07 NOTE — ASSESSMENT & PLAN NOTE
- progressing well post-op  - Cr level improving and pt with good UOP.  - 2 day underwood cath removed today and pt voiding.   - pain controlled, ambulating, tolerating diet, and having BMs.   - Simulect induction and will plan for second dose in am.   - Cont with s/p KTX clinical pathway     - IVF d/c'd today as pt with good PO intake.

## 2020-04-07 NOTE — PROGRESS NOTES
Patient admitted for Kidney transplant.Transplant coordinator met with the patient on rounds to introduce self and explain the coordinator role. The post-transplant teaching book was given. Transplant Coordinator explained that she will follow the patient while in the hospital and assist with discharge.     ESRD 2/2 HTN   SCD, KDPI 83%  Current cPRA 45%  SImulect induction with continued steroids  CMV ++

## 2020-04-07 NOTE — ASSESSMENT & PLAN NOTE
- ESRD 2/2 HTN  - BP slightly elevated at this time.    - Will monitor closely for potential need of BP meds.

## 2020-04-07 NOTE — PROGRESS NOTES
EDUCATION NOTE:    Met with Tala Rivera and her caregivers to provide teaching re: immunosuppressant medications.  Reviewed medication section of the Kidney Transplant Education book that was provided.  Emphasized the importance of compliance, role of the blue medication card, concerns for drug interactions, and process of obtaining refills.  Counseled regarding Prograf, Cellcept , prednisone, including directions for use, monitoring, how to handle missed doses, and side effects.  She and  via phone verbalized understanding and had the opportunity to ask questions.

## 2020-04-07 NOTE — PROGRESS NOTES
Transplant Teaching Book given to patient, Tala Rivera, on 4/6/2020.  During the course of the hospital stay the patient received information regarding kidney transplant. Teaching and instruction were completed.  Areas that were discussed included: how to contact the Transplant Team, the importance of measuring intake of fluids and urine output, and monitoring vital signs such as blood pressure, temperature, and daily weights.  Parameters for which to report abnormal findings were given.  Appointment were provided along with the rational for the importance of lab work and clinic visits.  A written medication list was provided.  The importance of immunosuppressive medications, their common side effects, and treatment to prevent or minimize side effects has been reviewed.  Signs and symptoms of rejection and infection along with various treatments were reviewed.  The need to avoid infection was discussed.  Wound care and special consideration regarding activities of daily living were explained.  Written and verbal teaching of the above information was given.

## 2020-04-08 VITALS
HEART RATE: 66 BPM | SYSTOLIC BLOOD PRESSURE: 155 MMHG | DIASTOLIC BLOOD PRESSURE: 74 MMHG | HEIGHT: 61 IN | OXYGEN SATURATION: 99 % | WEIGHT: 165.56 LBS | TEMPERATURE: 98 F | RESPIRATION RATE: 17 BRPM | BODY MASS INDEX: 31.26 KG/M2

## 2020-04-08 DIAGNOSIS — E55.9 VITAMIN D DEFICIENCY: ICD-10-CM

## 2020-04-08 DIAGNOSIS — Z94.0 KIDNEY REPLACED BY TRANSPLANT: Primary | ICD-10-CM

## 2020-04-08 DIAGNOSIS — N18.9 ANEMIA OF RENAL DISEASE: ICD-10-CM

## 2020-04-08 DIAGNOSIS — D63.1 ANEMIA OF RENAL DISEASE: ICD-10-CM

## 2020-04-08 LAB
ALBUMIN SERPL BCP-MCNC: 3.1 G/DL (ref 3.5–5.2)
ANION GAP SERPL CALC-SCNC: 10 MMOL/L (ref 8–16)
BASOPHILS # BLD AUTO: 0.02 K/UL (ref 0–0.2)
BASOPHILS NFR BLD: 0.4 % (ref 0–1.9)
BLD PROD TYP BPU: NORMAL
BLD PROD TYP BPU: NORMAL
BLOOD UNIT EXPIRATION DATE: NORMAL
BLOOD UNIT EXPIRATION DATE: NORMAL
BLOOD UNIT TYPE CODE: 5100
BLOOD UNIT TYPE CODE: 5100
BLOOD UNIT TYPE: NORMAL
BLOOD UNIT TYPE: NORMAL
BUN SERPL-MCNC: 36 MG/DL (ref 6–20)
CALCIUM SERPL-MCNC: 8.7 MG/DL (ref 8.7–10.5)
CHLORIDE SERPL-SCNC: 107 MMOL/L (ref 95–110)
CO2 SERPL-SCNC: 20 MMOL/L (ref 23–29)
CODING SYSTEM: NORMAL
CODING SYSTEM: NORMAL
CREAT SERPL-MCNC: 2.2 MG/DL (ref 0.5–1.4)
DIFFERENTIAL METHOD: ABNORMAL
DISPENSE STATUS: NORMAL
DISPENSE STATUS: NORMAL
EOSINOPHIL # BLD AUTO: 0.1 K/UL (ref 0–0.5)
EOSINOPHIL NFR BLD: 2.2 % (ref 0–8)
ERYTHROCYTE [DISTWIDTH] IN BLOOD BY AUTOMATED COUNT: 14.5 % (ref 11.5–14.5)
EST. GFR  (AFRICAN AMERICAN): 28.9 ML/MIN/1.73 M^2
EST. GFR  (NON AFRICAN AMERICAN): 25 ML/MIN/1.73 M^2
GLUCOSE SERPL-MCNC: 88 MG/DL (ref 70–110)
HBV SURFACE AB SER QL IA: POSITIVE
HBV SURFACE AB SERPL IA-ACNC: NORMAL MIU/ML
HCT VFR BLD AUTO: 33.2 % (ref 37–48.5)
HGB BLD-MCNC: 10.2 G/DL (ref 12–16)
IMM GRANULOCYTES # BLD AUTO: 0.01 K/UL (ref 0–0.04)
IMM GRANULOCYTES NFR BLD AUTO: 0.2 % (ref 0–0.5)
LYMPHOCYTES # BLD AUTO: 0.4 K/UL (ref 1–4.8)
LYMPHOCYTES NFR BLD: 6.9 % (ref 18–48)
MAGNESIUM SERPL-MCNC: 2.2 MG/DL (ref 1.6–2.6)
MCH RBC QN AUTO: 32.8 PG (ref 27–31)
MCHC RBC AUTO-ENTMCNC: 30.7 G/DL (ref 32–36)
MCV RBC AUTO: 107 FL (ref 82–98)
MONOCYTES # BLD AUTO: 0.4 K/UL (ref 0.3–1)
MONOCYTES NFR BLD: 6.6 % (ref 4–15)
NEUTROPHILS # BLD AUTO: 4.5 K/UL (ref 1.8–7.7)
NEUTROPHILS NFR BLD: 83.7 % (ref 38–73)
NRBC BLD-RTO: 0 /100 WBC
NUM UNITS TRANS PACKED RBC: NORMAL
NUM UNITS TRANS PACKED RBC: NORMAL
PHOSPHATE SERPL-MCNC: 2.9 MG/DL (ref 2.7–4.5)
PLATELET # BLD AUTO: 136 K/UL (ref 150–350)
PMV BLD AUTO: 11.4 FL (ref 9.2–12.9)
POTASSIUM SERPL-SCNC: 3.5 MMOL/L (ref 3.5–5.1)
RBC # BLD AUTO: 3.11 M/UL (ref 4–5.4)
SODIUM SERPL-SCNC: 137 MMOL/L (ref 136–145)
TACROLIMUS BLD-MCNC: 5.4 NG/ML (ref 5–15)
WBC # BLD AUTO: 5.34 K/UL (ref 3.9–12.7)

## 2020-04-08 PROCEDURE — 99239 PR HOSPITAL DISCHARGE DAY,>30 MIN: ICD-10-PCS | Mod: 24,,, | Performed by: PHYSICIAN ASSISTANT

## 2020-04-08 PROCEDURE — 80069 RENAL FUNCTION PANEL: CPT

## 2020-04-08 PROCEDURE — 99900035 HC TECH TIME PER 15 MIN (STAT)

## 2020-04-08 PROCEDURE — 25000003 PHARM REV CODE 250: Performed by: NURSE PRACTITIONER

## 2020-04-08 PROCEDURE — 63600175 PHARM REV CODE 636 W HCPCS: Performed by: STUDENT IN AN ORGANIZED HEALTH CARE EDUCATION/TRAINING PROGRAM

## 2020-04-08 PROCEDURE — 63600175 PHARM REV CODE 636 W HCPCS: Performed by: NURSE PRACTITIONER

## 2020-04-08 PROCEDURE — 94761 N-INVAS EAR/PLS OXIMETRY MLT: CPT

## 2020-04-08 PROCEDURE — 83735 ASSAY OF MAGNESIUM: CPT

## 2020-04-08 PROCEDURE — 99239 HOSP IP/OBS DSCHRG MGMT >30: CPT | Mod: 24,,, | Performed by: PHYSICIAN ASSISTANT

## 2020-04-08 PROCEDURE — 25000003 PHARM REV CODE 250: Performed by: STUDENT IN AN ORGANIZED HEALTH CARE EDUCATION/TRAINING PROGRAM

## 2020-04-08 PROCEDURE — 25000003 PHARM REV CODE 250: Performed by: PHYSICIAN ASSISTANT

## 2020-04-08 PROCEDURE — 80197 ASSAY OF TACROLIMUS: CPT

## 2020-04-08 PROCEDURE — 25000003 PHARM REV CODE 250: Performed by: INTERNAL MEDICINE

## 2020-04-08 PROCEDURE — 85025 COMPLETE CBC W/AUTO DIFF WBC: CPT

## 2020-04-08 PROCEDURE — 36415 COLL VENOUS BLD VENIPUNCTURE: CPT

## 2020-04-08 RX ORDER — VALGANCICLOVIR 450 MG/1
450 TABLET, FILM COATED ORAL DAILY
Qty: 30 TABLET | Refills: 2 | Status: SHIPPED | OUTPATIENT
Start: 2020-04-08 | End: 2020-05-18 | Stop reason: SINTOL

## 2020-04-08 RX ORDER — NIFEDIPINE 30 MG/1
30 TABLET, EXTENDED RELEASE ORAL DAILY
Qty: 30 TABLET | Refills: 4 | Status: SHIPPED | OUTPATIENT
Start: 2020-04-09 | End: 2020-06-29

## 2020-04-08 RX ADMIN — TACROLIMUS 6 MG: 1 CAPSULE ORAL at 08:04

## 2020-04-08 RX ADMIN — CALCITRIOL 0.5 MCG: 0.5 CAPSULE, LIQUID FILLED ORAL at 08:04

## 2020-04-08 RX ADMIN — NYSTATIN 500000 UNITS: 100000 SUSPENSION ORAL at 08:04

## 2020-04-08 RX ADMIN — ATOVAQUONE 1500 MG: 750 SUSPENSION ORAL at 08:04

## 2020-04-08 RX ADMIN — HEPARIN SODIUM 5000 UNITS: 5000 INJECTION, SOLUTION INTRAVENOUS; SUBCUTANEOUS at 04:04

## 2020-04-08 RX ADMIN — DIBASIC SODIUM PHOSPHATE, MONOBASIC POTASSIUM PHOSPHATE AND MONOBASIC SODIUM PHOSPHATE 1 TABLET: 852; 155; 130 TABLET ORAL at 09:04

## 2020-04-08 RX ADMIN — NYSTATIN 500000 UNITS: 100000 SUSPENSION ORAL at 12:04

## 2020-04-08 RX ADMIN — DOCUSATE SODIUM 100 MG: 100 CAPSULE, LIQUID FILLED ORAL at 08:04

## 2020-04-08 RX ADMIN — THERA TABS 1 TABLET: TAB at 08:04

## 2020-04-08 RX ADMIN — MYCOPHENOLATE MOFETIL 1000 MG: 250 CAPSULE ORAL at 08:04

## 2020-04-08 RX ADMIN — MUPIROCIN 1 G: 20 OINTMENT TOPICAL at 08:04

## 2020-04-08 RX ADMIN — PREDNISONE 20 MG: 20 TABLET ORAL at 08:04

## 2020-04-08 RX ADMIN — NIFEDIPINE 30 MG: 30 TABLET, FILM COATED, EXTENDED RELEASE ORAL at 08:04

## 2020-04-08 RX ADMIN — SODIUM CHLORIDE 20 MG: 9 INJECTION, SOLUTION INTRAVENOUS at 08:04

## 2020-04-08 NOTE — NURSING
Discharge instructions given and reviewed. Pharm D provided and reviewed blue card. Pt has all needed meds and supplies for home. Verbalized understanding.

## 2020-04-08 NOTE — PROGRESS NOTES
DISCHARGE NOTE:    Tala Rivera is a 54 y.o. 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      Past Medical History:   Diagnosis Date    Anemia of renal disease     ESRD on dialysis     Dr. Muhammad     Essential hypertension     PD catheter dysfunction 10/18/2018    Secondary hyperparathyroidism of renal origin        Hospital Course: Uncomplicated hospital course - patient progressing well with good UOP and SCr downtrended to 2.0. Patient received Simulect induction- received 2nd dose on POD#3 (SCD, KDPI 83%, CIT: 438 min; CMV +/+).     Allergies:   Review of patient's allergies indicates:   Allergen Reactions    Sulfa (sulfonamide antibiotics) Hives       [facility]: [facility]    Discharge Medications:   Tala Rivera   Home Medication Instructions CARLIN:[id number]    Printed on:04/08/20 0640   Medication Information                      atovaquone (MEPRON) 750 mg/5 mL Susp  Take 10 mLs (1,500 mg total) by mouth once daily. Stop: 4/5/2021             bisacodyL (DULCOLAX) 5 mg EC tablet  Take 2 tablets (10 mg total) by mouth daily as needed for Constipation.             calcitRIOL (ROCALTROL) 0.5 MCG Cap  Take 1 capsule (0.5 mcg total) by mouth once daily.             docusate sodium (COLACE) 100 MG capsule  Take 1 capsule (100 mg total) by mouth 3 (three) times daily as needed for Constipation.             famotidine (PEPCID) 20 MG tablet  Take 1 tablet (20 mg total) by mouth every evening.             k phos di & mono-sod phos mono (K-PHOS-NEUTRAL) 250 mg Tab  Take 1 tablet by mouth 2 (two) times daily.             mycophenolate (CELLCEPT) 250 mg Cap  Take 4 capsules (1,000 mg total) by mouth 2 (two) times daily.             NIFEdipine (PROCARDIA-XL) 30 MG (OSM) 24 hr tablet  Take 1 tablet (30 mg total) by mouth once daily.             nystatin (MYCOSTATIN) 100,000 unit/mL suspension  Take 5 mLs (500,000 Units  total) by mouth 3 (three) times daily after meals. Stop: 5/8/2020             oxyCODONE-acetaminophen (PERCOCET) 5-325 mg per tablet  Take 1 tablet by mouth every 6 (six) hours as needed for Pain.             predniSONE (DELTASONE) 5 MG tablet  Take 20mg by mouth daily 4/8-5/7;   Take 15mg daily 5/8-6/7;  Take 10mg daily 6/8-7/7;  then 5mg daily thereafter starting 7/8/2020             tacrolimus (PROGRAF) 1 MG Cap  Take 6 capsules (6 mg total) by mouth every 12 (twelve) hours.             valGANciclovir (VALCYTE) 450 mg Tab  Take 1 tablet (450 mg total) by mouth once daily. Stop: 7/4/2020                 Pharmacy Interventions/Recommendations:  1) Transplant Immunosuppression: Prograf 6/6, Cellcept 1000 mg PO BID and Prednisone taper. cPRA % 71 and received Simulect induction.     2) Opportunistic Infection prophylaxis: Mepron daily x 1 year (Sulfa allergy), Valcyte 450 mg PO daily (x 3 months) and Nystatin x 1 month (until 5/8/2020)     3) Patient Counseling/Education:  Demonstrated the use of the BP cuff, thermometer.    4) Follow-Up/Discharge Needs:  N/A    5) Patient received prescriptions for:      E-rx's:  [facility]         Printed rx's:  n/a      Faxed / Phoned in rx's:  n/a  To n/a pharmacy per patient request.    6) Patient Assistance Information: n/a    7) The following medications have been placed on HOLD and should be restarted in the outpatient setting (when appropriate): n/a    Tala and her caregiver verbalized their understanding and had the opportunity to ask questions.

## 2020-04-08 NOTE — PLAN OF CARE
Hospice RN visit. No changes, placement still pending. Spoke with unit nurse, Eduardo and noted that patient feels amazing today. Hospice will touch base with case management in the morning. Hospice to continue rounding until discharged.   Pt aaox4 vswnl and no c/o pain. Bed in low position and callbell within reach. Pt pulled self meds 100% correctly. Pt ambulates independently.

## 2020-04-08 NOTE — ANESTHESIA POSTPROCEDURE EVALUATION
Anesthesia Post Evaluation    Patient: Tala Rivera    Procedure(s) Performed: Procedure(s) (LRB):  TRANSPLANT, KIDNEY (N/A)    Final Anesthesia Type: general    Patient location during evaluation: PACU  Patient participation: Yes- Able to Participate  Level of consciousness: awake and alert and oriented  Post-procedure vital signs: reviewed and stable  Pain management: adequate  Airway patency: patent  DAVID mitigation strategies: Intraoperative administration of CPAP, nasopharyngeal airway, or oral appliance during sedation, Multimodal analgesia, Extubation while patient is awake, Verification of full reversal of neuromuscular block and Extubation and recovery carried out in lateral, semiupright, or other nonsupine position  PONV status at discharge: No PONV  Anesthetic complications: no      Cardiovascular status: hemodynamically stable  Respiratory status: unassisted  Hydration status: euvolemic  Follow-up not needed.          Vitals Value Taken Time   /87 4/8/2020  6:42 AM   Temp 36.7 °C (98 °F) 4/8/2020  4:00 AM   Pulse 65 4/8/2020  6:42 AM   Resp 20 4/8/2020  6:42 AM   SpO2 98 % 4/8/2020  6:42 AM   Vitals shown include unvalidated device data.      Event Time     Out of Recovery 04/05/2020 19:54:53          Pain/Jesse Score: No data recorded

## 2020-04-08 NOTE — PROGRESS NOTES
"Discharge Note:    Pt was alert and oriented x4, communicative and in high spirits. Pt presented alone per COVID-19 protocol, and reports her , Kathy Rivera Jr. #[phone], will serve as primary caregiver. SW spoke with pt to assess discharge plan and any concerns or needs.    Pt reports agreeing with the discharge plan and has no psychosocial concerns. Pt will discharge today to home with no needs. Pt's  will transport patient. Pt reports being able to afford medications at this time and having  pay for them over the phone. Pt reports feeling "excited" to go home. Patient verbalizes understanding and is involved in treatment planning and discharge process. Pt denied concerns or questions. Pt verbalized understanding and agreeing to call SW should any concerns or needs arise.     SW remains available at [phone].     "

## 2020-04-08 NOTE — PROGRESS NOTES
"[facility] [place] [facility]  Adult Nutrition  Progress Note    SUMMARY       Recommendations    Recommendation/Intervention: Continue regular diet as tolerated. Consider Nepro as supplement for home use.  Goals: Tolerate 75% meals.   Nutrition Goal Status: progressing towards goal    Nutrition Discharge planning:  Post transplant nutrition education provided. Food safety/ food interactions emphasized. General healthy/low salt diet recommended. RD name contact information, education material left. No other needs identified.    Reason for Assessment    Reason For Assessment: consult  Diagnosis: other (see comments)(kidney transplant)  Relevant Medical History: HTN, ESRD, anemia of chronic disease, gastric bypass 7/28/16, HD 2/5/16   Interdisciplinary Rounds: did not attend  General Information Comments: Patient had dry weight of 67.5 kg previously. Patient has been tolerating 50- 75% regular diet in the hospital.    Nutrition Risk Screen    Nutrition Risk Screen: no indicators present    Nutrition/Diet History    Patient Reported Diet/Restrictions/Preferences: renal  Spiritual, Cultural Beliefs, Jehovah's witness Practices, Values that Affect Care: yes  Factors Affecting Nutritional Intake: decreased appetite    Anthropometrics    Temp: 98.4 °F (36.9 °C)  Height Method: Stated  Height: 5' 1" (154.9 cm)  Height (inches): 61 in  Weight Method: Bed Scale  Weight: 75.1 kg (165 lb 9.1 oz)  Weight (lb): 165.57 lb  Ideal Body Weight (IBW), Female: 105 lb  % Ideal Body Weight, Female (lb): 157.48 %  BMI (Calculated): 31.3  BMI Grade: 30 - 34.9- obesity - grade I       Lab/Procedures/Meds    Labs reviewed : BUN 36, creatinine 2.2, hgb 10.2, hct 33.2     Physical Findings/Assessment     does not meet malnutrition criteria    Estimated/Assessed Needs    Weight Used For Calorie Calculations: 75 kg (165 lb 5.5 oz)  Energy Calorie Requirements (kcal): 1621([place]- St. Jeor x 1.25 )  Energy Need Method: [place]-St Jeor  Protein Requirements: 84- 98 " gms (1.2- 1.4 gms pro/kg)  Weight Used For Protein Calculations: 70 kg (154 lb 5.2 oz)  Fluid Requirements (mL): 1621     RDA Method (mL): 1621  CHO Requirement: NA      Nutrition Prescription Ordered    Current Diet Order: regular    Evaluation of Received Nutrient/Fluid Intake    Energy Calories Required: not meeting needs  Protein Required: not meeting needs  Fluid Required: not meeting needs  Tolerance: tolerating  % Intake of Estimated Energy Needs: 50 - 75 %  % Meal Intake: 50 - 75 %    Nutrition Risk    Level of Risk/Frequency of Follow-up: low - moderate     Assessment and Plan    Post transplant nutrition education provided. Food safety/drug interactions emphasized. General healthy low salt diet recommended. RD name, contact information, education given.       Monitor and Evaluation    Food and Nutrient Intake: food and beverage intake  Food and Nutrient Adminstration: diet order  Knowledge/Beliefs/Attitudes: food and nutrition knowledge/skill  Anthropometric Measurements: height/length, weight, body mass index  Biochemical Data, Medical Tests and Procedures: electrolyte and renal panel, gastrointestinal profile  Nutrition-Focused Physical Findings: overall appearance     Malnutrition Assessment       does not meet malnutrition criteria                  Nutrition Follow-Up     RD follow up

## 2020-04-08 NOTE — PLAN OF CARE
Cr decreased to 2.2 from 2.5 yesterday. Tolerating po well. UOP adequate. Sat in chair this am. Afebrile. Prepared self-meds correctly this am. Demonstrated correctly she can paint incision with Betadine. Has supplies needed for after discharge. Plan to discharge today.

## 2020-04-09 ENCOUNTER — PATIENT MESSAGE (OUTPATIENT)
Dept: TRANSPLANT | Facility: CLINIC | Age: 55
End: 2020-04-09

## 2020-04-09 ENCOUNTER — DOCUMENTATION ONLY (OUTPATIENT)
Dept: TRANSPLANT | Facility: CLINIC | Age: 55
End: 2020-04-09

## 2020-04-09 ENCOUNTER — TELEPHONE (OUTPATIENT)
Dept: TRANSPLANT | Facility: CLINIC | Age: 55
End: 2020-04-09

## 2020-04-09 ENCOUNTER — CLINICAL SUPPORT (OUTPATIENT)
Dept: TRANSPLANT | Facility: CLINIC | Age: 55
End: 2020-04-09
Payer: COMMERCIAL

## 2020-04-09 ENCOUNTER — LAB VISIT (OUTPATIENT)
Dept: LAB | Facility: HOSPITAL | Age: 55
End: 2020-04-09
Attending: INTERNAL MEDICINE
Payer: MEDICARE

## 2020-04-09 VITALS
HEART RATE: 65 BPM | OXYGEN SATURATION: 100 % | HEIGHT: 61 IN | BODY MASS INDEX: 28.97 KG/M2 | SYSTOLIC BLOOD PRESSURE: 149 MMHG | SYSTOLIC BLOOD PRESSURE: 149 MMHG | WEIGHT: 153.44 LBS | BODY MASS INDEX: 28.97 KG/M2 | TEMPERATURE: 98 F | WEIGHT: 153.44 LBS | DIASTOLIC BLOOD PRESSURE: 71 MMHG | HEART RATE: 65 BPM | TEMPERATURE: 98 F | DIASTOLIC BLOOD PRESSURE: 71 MMHG | HEIGHT: 61 IN | OXYGEN SATURATION: 100 %

## 2020-04-09 DIAGNOSIS — Z94.0 KIDNEY REPLACED BY TRANSPLANT: ICD-10-CM

## 2020-04-09 LAB
ALBUMIN SERPL BCP-MCNC: 3.3 G/DL (ref 3.5–5.2)
ANION GAP SERPL CALC-SCNC: 7 MMOL/L (ref 8–16)
BASOPHILS # BLD AUTO: 0.03 K/UL (ref 0–0.2)
BASOPHILS NFR BLD: 0.8 % (ref 0–1.9)
BUN SERPL-MCNC: 41 MG/DL (ref 6–20)
CALCIUM SERPL-MCNC: 8.8 MG/DL (ref 8.7–10.5)
CHLORIDE SERPL-SCNC: 108 MMOL/L (ref 95–110)
CO2 SERPL-SCNC: 22 MMOL/L (ref 23–29)
CREAT SERPL-MCNC: 2.1 MG/DL (ref 0.5–1.4)
DIFFERENTIAL METHOD: ABNORMAL
EOSINOPHIL # BLD AUTO: 0.1 K/UL (ref 0–0.5)
EOSINOPHIL NFR BLD: 3.6 % (ref 0–8)
ERYTHROCYTE [DISTWIDTH] IN BLOOD BY AUTOMATED COUNT: 14.2 % (ref 11.5–14.5)
EST. GFR  (AFRICAN AMERICAN): 30.5 ML/MIN/1.73 M^2
EST. GFR  (NON AFRICAN AMERICAN): 26.5 ML/MIN/1.73 M^2
GLUCOSE SERPL-MCNC: 104 MG/DL (ref 70–110)
HCT VFR BLD AUTO: 34 % (ref 37–48.5)
HCV RNA SERPL NAA+PROBE-LOG IU: <1.08 LOG (10) IU/ML
HCV RNA SERPL QL NAA+PROBE: NOT DETECTED IU/ML
HCV RNA SPEC NAA+PROBE-ACNC: <12 IU/ML
HGB BLD-MCNC: 10.5 G/DL (ref 12–16)
IMM GRANULOCYTES # BLD AUTO: 0.01 K/UL (ref 0–0.04)
IMM GRANULOCYTES NFR BLD AUTO: 0.3 % (ref 0–0.5)
LYMPHOCYTES # BLD AUTO: 0.3 K/UL (ref 1–4.8)
LYMPHOCYTES NFR BLD: 7.5 % (ref 18–48)
MAGNESIUM SERPL-MCNC: 2.3 MG/DL (ref 1.6–2.6)
MCH RBC QN AUTO: 32.5 PG (ref 27–31)
MCHC RBC AUTO-ENTMCNC: 30.9 G/DL (ref 32–36)
MCV RBC AUTO: 105 FL (ref 82–98)
MONOCYTES # BLD AUTO: 0.3 K/UL (ref 0.3–1)
MONOCYTES NFR BLD: 8.3 % (ref 4–15)
NEUTROPHILS # BLD AUTO: 2.9 K/UL (ref 1.8–7.7)
NEUTROPHILS NFR BLD: 79.5 % (ref 38–73)
NRBC BLD-RTO: 0 /100 WBC
PHOSPHATE SERPL-MCNC: 2.9 MG/DL (ref 2.7–4.5)
PLATELET # BLD AUTO: 146 K/UL (ref 150–350)
PMV BLD AUTO: 11.5 FL (ref 9.2–12.9)
POTASSIUM SERPL-SCNC: 3.5 MMOL/L (ref 3.5–5.1)
RBC # BLD AUTO: 3.23 M/UL (ref 4–5.4)
SODIUM SERPL-SCNC: 137 MMOL/L (ref 136–145)
TACROLIMUS BLD-MCNC: 6.9 NG/ML (ref 5–15)
WBC # BLD AUTO: 3.62 K/UL (ref 3.9–12.7)

## 2020-04-09 PROCEDURE — 99999 PR PBB SHADOW E&M-EST. PATIENT-LVL III: ICD-10-PCS | Mod: PBBFAC,,,

## 2020-04-09 PROCEDURE — 99999 PR PBB SHADOW E&M-EST. PATIENT-LVL III: CPT | Mod: PBBFAC,,,

## 2020-04-09 PROCEDURE — 83735 ASSAY OF MAGNESIUM: CPT

## 2020-04-09 PROCEDURE — 85025 COMPLETE CBC W/AUTO DIFF WBC: CPT

## 2020-04-09 PROCEDURE — 36415 COLL VENOUS BLD VENIPUNCTURE: CPT

## 2020-04-09 PROCEDURE — 80197 ASSAY OF TACROLIMUS: CPT

## 2020-04-09 PROCEDURE — 80069 RENAL FUNCTION PANEL: CPT

## 2020-04-09 NOTE — PROGRESS NOTES
Clinic Note: First Return to Clinic Post-  Kidney Transplant    Tala Rivera  is a 54 y.o. female  S/p LEFT KIDNEY   transplant on 4/5/2020 (Kidney) for Hypertensive Nephrosclerosis.      Discharge Course (Issues/Concerns): Pt is s/p kidney transplant from 4/5/20.  Post transplant course uncomplicated, patient presents to clinic today from home and reports no issues or complaints. Patient received basiliximab induction.    Objective:   Vitals:    04/09/20 0905   BP: (!) 149/71   Pulse: 65   Temp: 97.9 °F (36.6 °C)       Met with patient and her caregiver in the clinic to review current medication list.     Current Outpatient Medications   Medication Sig Dispense Refill    atovaquone (MEPRON) 750 mg/5 mL Susp Take 10 mLs (1,500 mg total) by mouth once daily. Stop: 4/5/2021 300 mL 11    bisacodyL (DULCOLAX) 5 mg EC tablet Take 2 tablets (10 mg total) by mouth daily as needed for Constipation.  0    calcitRIOL (ROCALTROL) 0.5 MCG Cap Take 1 capsule (0.5 mcg total) by mouth once daily. 30 capsule 3    docusate sodium (COLACE) 100 MG capsule Take 1 capsule (100 mg total) by mouth 3 (three) times daily as needed for Constipation.  0    famotidine (PEPCID) 20 MG tablet Take 1 tablet (20 mg total) by mouth every evening. 30 tablet 2    k phos di & mono-sod phos mono (K-PHOS-NEUTRAL) 250 mg Tab Take 1 tablet by mouth 2 (two) times daily. 60 tablet 3    mycophenolate (CELLCEPT) 250 mg Cap Take 4 capsules (1,000 mg total) by mouth 2 (two) times daily. 240 capsule 11    NIFEdipine (PROCARDIA-XL) 30 MG (OSM) 24 hr tablet Take 1 tablet (30 mg total) by mouth once daily. 30 tablet 4    nystatin (MYCOSTATIN) 100,000 unit/mL suspension Take 5 mLs (500,000 Units total) by mouth 3 (three) times daily after meals. Stop: 5/8/2020 480 mL 0    oxyCODONE-acetaminophen (PERCOCET) 5-325 mg per tablet Take 1 tablet by mouth every 6 (six) hours as needed for Pain. 21 tablet 0    predniSONE (DELTASONE) 5 MG tablet Take 20mg by mouth  daily 4/8-5/7;   Take 15mg daily 5/8-6/7;  Take 10mg daily 6/8-7/7;  then 5mg daily thereafter starting 7/8/2020 120 tablet 11    tacrolimus (PROGRAF) 1 MG Cap Take 6 capsules (6 mg total) by mouth every 12 (twelve) hours. 360 capsule 11    valGANciclovir (VALCYTE) 450 mg Tab Take 1 tablet (450 mg total) by mouth once daily. Stop: 7/4/2020 30 tablet 2     No current facility-administered medications for this visit.      Facility-Administered Medications Ordered in Other Visits   Medication Dose Route Frequency Provider Last Rate Last Dose    0.9%  NaCl infusion   Intravenous Continuous Richard Awad MD 20 mL/hr at 12/14/18 6650         Pharmacy Interventions/Recommendations:     1) Graft Function & Immunosuppression Issues: tacrolimus 6/6, mmf, prednisone taper    2) Opportunistic Infection prophylaxis:   PCP ppx: atovaquone STOP 4/5/21  CMV ppx: valcyte STOP 7/4/20  Fungal ppx: nystatin STOP 5/8/20    3) Donor Serologies & Monitoring:     Donor CMV Status:   Donor HCV Status:   Donor HBcAb:   Donor HBV KEDAR: Organ record is missing.  Donor HCV KEDAR: Organ record is missing.      4) Pain Management & Bowel Regimen: Patient reports that she has not required pain medications but has them in case she needs them.  Patient using OTC bowel regimen as needed    5) Blood Pressure Management: BP today 149/71, home BPs similar, on nifedipine 30mg daily - no changes today, discussed that we may need to increase in the future    6) Blood Sugar Management & Follow-up: n/a    7) Electrolyte Management: Patient on KPN, discussed increasing dietary phos, today phos level 2.9 (stable)    8) OTHER medication follow-up (patient assistance, held medications, etc):   - Patient would like to use [facility] pharmacy in the future, discussed that when she wants to switch meds over to let us know and we can send written Rx    9) Reinforced medication education conducted in the hospital, including medication indications, dosing,  administration, side effects, monitoring-- including timing of immunosuppressant levels.     Patient received their FIRST fill of medications from [facility].  Discussed the process for obtaining refills of medications, including verifying the dose and mailing address to have medications delivered.     Tala and her caregivers verbalized understanding and had the opportunity to ask questions.

## 2020-04-09 NOTE — TELEPHONE ENCOUNTER
Post Clinic Note:    SW spoke with patient to follow up regarding any needs post transplant and assess coping. Pt is a kidney transplant recipient from 4/4/2020. Patient reports currently staying in patient's home. Patient reports that she has been doing well since getting out of the hospital, Pt reports that she was able to stay in contact with family and friends via video chat. Pt did report receiving a letter from the [employer] ([employer]) stating that they would stop paying for pt's Medicare premiums. SW strongly encouraged pt to stay on harman of it because pt could have a lapse in coverage due to non-payment. Patient verbalized understanding and agreement. Patient expressed no psychosocial needs or concerns at this time, reports no issues with obtaining medications, and reports receiving medications from [facility]. Patient reports knowing how to contact SW team if any additional needs arise and SW remains available at [phone].       CoVid-19 precaution - Transplant SW update conducted with patient by phone.

## 2020-04-09 NOTE — TELEPHONE ENCOUNTER
VORB No change 4/9/2020 lab results reviewed.  ________________  Patient repeated back and voice a understanding of orders. Next labs 4/13/2020.

## 2020-04-09 NOTE — PROGRESS NOTES
"1ST NURSING VISIT POST DISCHARGE NOTE    1st RN appointment with Talagabriel Rivera post discharge 4/8/2020 s/p kidney transplant 4/5/20.  Due to COVID19 precautions no caregivers accompanied her.  Patient reports none.  Patient says that she that she is sleeping well.  Incision intact with staples.  Patient that she is able to explain daily incision care and showering instructions.  Reviewed I&O monitoring, measuring, and recording, and the need for hydration (i.e., at least 2 liters of water daily with minimal caffeine and no grapefruit products).  Medication list and rationale were reviewed.  Patient did bring blue medication card and medication bottles for review.  Patient reports that she has stopped Dulcolax and has not continued Colace, she will continue Senokot nightly as taken prior to transplant and approved by pharmD.  Patient has had a bowel movement.  Patient expressed understanding of daily care including BID VS, medications, and I&O documentation.  Patient made aware of today's creatinine level: 2.1  Patient aware that coordinator will review today's labs with a transplant physician and call the patient with any dose changes indicated.  Next lab appointment scheduled for Monday 4/13/20 pending prograf level today.  First post-operative transplant team appointment with labs scheduled for 4/17/20.    Using the Kidney Transplant Patient Reference Manual, the patient submitted her open book "Self-assessment of Kidney Transplant Patient Knowledge" test, which was completed in the transplant clinic this morning before 1st nursing visit.  This test includes questions regarding critical dose medications commonly used after kidney transplant, medication dosing and side effects, importance of timed lab draws, important signs and symptoms to report 24/7 immediately post-transplant as well as how to contact the transplant team 24/7.    Test Score: 19/25    After completing the test, the patient was given a copy of the " Self-assessment Answer Key to reinforce accurate learning of test content.  Patient expressed her understanding of the value of the information included in the self-assessment test.

## 2020-04-13 ENCOUNTER — LAB VISIT (OUTPATIENT)
Dept: LAB | Facility: HOSPITAL | Age: 55
End: 2020-04-13
Attending: INTERNAL MEDICINE
Payer: MEDICARE

## 2020-04-13 DIAGNOSIS — Z94.0 KIDNEY REPLACED BY TRANSPLANT: ICD-10-CM

## 2020-04-13 LAB
ALBUMIN SERPL BCP-MCNC: 3.2 G/DL (ref 3.5–5.2)
ANION GAP SERPL CALC-SCNC: 9 MMOL/L (ref 8–16)
BASOPHILS # BLD AUTO: 0.03 K/UL (ref 0–0.2)
BASOPHILS NFR BLD: 0.9 % (ref 0–1.9)
BUN SERPL-MCNC: 36 MG/DL (ref 6–20)
CALCIUM SERPL-MCNC: 8.5 MG/DL (ref 8.7–10.5)
CHLORIDE SERPL-SCNC: 110 MMOL/L (ref 95–110)
CO2 SERPL-SCNC: 20 MMOL/L (ref 23–29)
CREAT SERPL-MCNC: 1.6 MG/DL (ref 0.5–1.4)
DIFFERENTIAL METHOD: ABNORMAL
EOSINOPHIL # BLD AUTO: 0.1 K/UL (ref 0–0.5)
EOSINOPHIL NFR BLD: 3.3 % (ref 0–8)
ERYTHROCYTE [DISTWIDTH] IN BLOOD BY AUTOMATED COUNT: 13.3 % (ref 11.5–14.5)
EST. GFR  (AFRICAN AMERICAN): 42.4 ML/MIN/1.73 M^2
EST. GFR  (NON AFRICAN AMERICAN): 36.8 ML/MIN/1.73 M^2
GLUCOSE SERPL-MCNC: 98 MG/DL (ref 70–110)
HCT VFR BLD AUTO: 32.2 % (ref 37–48.5)
HGB BLD-MCNC: 10.2 G/DL (ref 12–16)
IMM GRANULOCYTES # BLD AUTO: 0.02 K/UL (ref 0–0.04)
IMM GRANULOCYTES NFR BLD AUTO: 0.6 % (ref 0–0.5)
LYMPHOCYTES # BLD AUTO: 0.4 K/UL (ref 1–4.8)
LYMPHOCYTES NFR BLD: 12.7 % (ref 18–48)
MAGNESIUM SERPL-MCNC: 2.1 MG/DL (ref 1.6–2.6)
MCH RBC QN AUTO: 32.8 PG (ref 27–31)
MCHC RBC AUTO-ENTMCNC: 31.7 G/DL (ref 32–36)
MCV RBC AUTO: 104 FL (ref 82–98)
MONOCYTES # BLD AUTO: 0.3 K/UL (ref 0.3–1)
MONOCYTES NFR BLD: 9.7 % (ref 4–15)
NEUTROPHILS # BLD AUTO: 2.4 K/UL (ref 1.8–7.7)
NEUTROPHILS NFR BLD: 72.8 % (ref 38–73)
NRBC BLD-RTO: 0 /100 WBC
PHOSPHATE SERPL-MCNC: 3.1 MG/DL (ref 2.7–4.5)
PLATELET # BLD AUTO: 142 K/UL (ref 150–350)
PMV BLD AUTO: 11.3 FL (ref 9.2–12.9)
POTASSIUM SERPL-SCNC: 3.6 MMOL/L (ref 3.5–5.1)
RBC # BLD AUTO: 3.11 M/UL (ref 4–5.4)
SODIUM SERPL-SCNC: 139 MMOL/L (ref 136–145)
TACROLIMUS BLD-MCNC: 8.8 NG/ML (ref 5–15)
WBC # BLD AUTO: 3.3 K/UL (ref 3.9–12.7)

## 2020-04-13 PROCEDURE — 80069 RENAL FUNCTION PANEL: CPT

## 2020-04-13 PROCEDURE — 83735 ASSAY OF MAGNESIUM: CPT

## 2020-04-13 PROCEDURE — 36415 COLL VENOUS BLD VENIPUNCTURE: CPT

## 2020-04-13 PROCEDURE — 85025 COMPLETE CBC W/AUTO DIFF WBC: CPT

## 2020-04-13 PROCEDURE — 80197 ASSAY OF TACROLIMUS: CPT

## 2020-04-13 RX ORDER — SODIUM BICARBONATE 650 MG/1
1300 TABLET ORAL 2 TIMES DAILY
Qty: 120 TABLET | Refills: 11 | Status: SHIPPED | OUTPATIENT
Start: 2020-04-13 | End: 2021-04-12 | Stop reason: SDUPTHER

## 2020-04-13 NOTE — TELEPHONE ENCOUNTER
Patient repeated back and voice a understanding of orders.Next labs 4/16/2020.    ----- Message from Ondina Frye MD sent at 4/13/2020  1:16 PM CDT -----  Please NaHCO3 1300 mg BID

## 2020-04-15 ENCOUNTER — NURSE TRIAGE (OUTPATIENT)
Dept: ADMINISTRATIVE | Facility: CLINIC | Age: 55
End: 2020-04-15

## 2020-04-15 ENCOUNTER — HOSPITAL ENCOUNTER (OUTPATIENT)
Dept: RADIOLOGY | Facility: HOSPITAL | Age: 55
Discharge: HOME | End: 2020-04-15
Attending: SURGERY
Payer: MEDICARE

## 2020-04-15 ENCOUNTER — OFFICE VISIT (OUTPATIENT)
Dept: TRANSPLANT | Facility: CLINIC | Age: 55
End: 2020-04-15
Payer: MEDICARE

## 2020-04-15 VITALS
BODY MASS INDEX: 29.51 KG/M2 | SYSTOLIC BLOOD PRESSURE: 153 MMHG | TEMPERATURE: 98 F | OXYGEN SATURATION: 100 % | DIASTOLIC BLOOD PRESSURE: 71 MMHG | RESPIRATION RATE: 18 BRPM | HEART RATE: 84 BPM | HEIGHT: 61 IN | WEIGHT: 156.31 LBS

## 2020-04-15 DIAGNOSIS — T81.72XA COMPLICATION OF VEIN FOLLOWING A PROCEDURE, NOT ELSEWHERE CLASSIFIED, INITIAL ENCOUNTER: ICD-10-CM

## 2020-04-15 DIAGNOSIS — M79.89 LEG SWELLING: Primary | ICD-10-CM

## 2020-04-15 DIAGNOSIS — Z94.0 KIDNEY REPLACED BY TRANSPLANT: ICD-10-CM

## 2020-04-15 DIAGNOSIS — Z51.81 ENCOUNTER FOR THERAPEUTIC DRUG MONITORING: Primary | ICD-10-CM

## 2020-04-15 DIAGNOSIS — M79.89 LEG SWELLING: ICD-10-CM

## 2020-04-15 PROCEDURE — 93971 EXTREMITY STUDY: CPT | Mod: 26,RT,, | Performed by: RADIOLOGY

## 2020-04-15 PROCEDURE — 93971 EXTREMITY STUDY: CPT | Mod: TC,RT

## 2020-04-15 PROCEDURE — 99213 OFFICE O/P EST LOW 20 MIN: CPT | Mod: 24,S$PBB,, | Performed by: SURGERY

## 2020-04-15 PROCEDURE — 99213 PR OFFICE/OUTPT VISIT, EST, LEVL III, 20-29 MIN: ICD-10-PCS | Mod: 24,S$PBB,, | Performed by: SURGERY

## 2020-04-15 PROCEDURE — 93971 US LOWER EXTREMITY VEINS RIGHT: ICD-10-PCS | Mod: 26,RT,, | Performed by: RADIOLOGY

## 2020-04-15 PROCEDURE — 99999 PR PBB SHADOW E&M-EST. PATIENT-LVL III: CPT | Mod: PBBFAC,,, | Performed by: SURGERY

## 2020-04-15 PROCEDURE — 99999 PR PBB SHADOW E&M-EST. PATIENT-LVL III: ICD-10-PCS | Mod: PBBFAC,,, | Performed by: SURGERY

## 2020-04-15 PROCEDURE — 99213 OFFICE O/P EST LOW 20 MIN: CPT | Mod: PBBFAC,25 | Performed by: SURGERY

## 2020-04-15 NOTE — TELEPHONE ENCOUNTER
Kidney Txp 4/5/20. Woke up at 2 am. When wiping right side of vagina was puffy. Was up a lot on yesterday. Has Labia swelling and looks like a ballon with water. Right leg is a little big than left but not that much. Urinating ok but denies any other complaints. On call provider Dr. Cabrera contacted and notified. MD advised to tell pt that a coordinator will call her back within an hour to advise her on what is planned to be done.  Pt verbalized understanding.  MD contacted BP#16 used.    Reason for Disposition   Nursing judgment    Protocols used: NO GUIDELINE OR REFERENCE [id number]

## 2020-04-15 NOTE — PROGRESS NOTES
Transplant Surgery  Kidney Transplant Recipient Follow-up    Referring Physician: Tabby Velasco  Current Nephrologist: Arthur Mcdonald    Subjective:     Chief Complaint: Tala Rivera is a 54 y.o. year old White female who is status post Kidney transplant performed on 4/5/2020.    ORGAN: LEFT KIDNEY   Disease Etiology: Hypertensive Nephrosclerosis  Donor Type: Donation after Brain Death   Donor CMV Status: Positive   Donor HBcAB: Negative   Donor HCV Status: Negative     History of Present Illness: She reports slight swelling of right leg and intermittent swelling of right vuvla..  From a transplant perspective, she reports normal urination.  Tala is here for management of her immunosuppression medication.  Tala states that her immunosuppression is being well tolerated.      Review of Systems   Constitutional: Negative for fatigue.   HENT: Negative for drooling, postnasal drip and sore throat.    Eyes: Negative for discharge and itching.   Respiratory: Negative for choking and stridor.    Gastrointestinal: Negative for rectal pain.   Endocrine: Negative for polydipsia.   Genitourinary: Negative for enuresis and genital sores.   Musculoskeletal: Negative for back pain, neck pain and neck stiffness.   Allergic/Immunologic: Positive for immunocompromised state.   Neurological: Negative for facial asymmetry and numbness.   Hematological: Negative for adenopathy.   Psychiatric/Behavioral: Negative for behavioral problems, self-injury and suicidal ideas.       Objective:     Physical Exam   Abdominal:       Wound healing well, no evidence of infection or seroma     Lab Results   Component Value Date    CREATININE 1.6 (H) 04/13/2020    BUN 36 (H) 04/13/2020     Lab Results   Component Value Date    WBC 3.30 (L) 04/13/2020    HGB 10.2 (L) 04/13/2020    HCT 32.2 (L) 04/13/2020     (L) 04/13/2020     Lab Results   Component Value Date    TACROLIMUS 8.8 04/13/2020         Assessment and Plan:         · S/P Kidney transplant.  · Chronic immunosuppressive medications for rejection prophylaxis at target.  Plan: no adjustment needed.  · Continue monitoring symptoms, labs and drug levels for drug-related toxicity and side effects.  Due to right leg being slightly more swollen with evidence of chronic varicosities - we will perform duplex of extremity to rule out DVT.    Swelling at vulva looks to be within normal limit of kidney transplant.        Follow-up: Patient reminded to call with any health changes, since these can be early signs of significant complications.  Also, I advised the patient to be sure any new medications or changes of old medications are discussed with either a pharmacist, or physician knowledgeable with transplant to avoid rejection/drug toxicity related to significant drug interactions.    Wes Mathias MD       [facility] Patient Status  Functional Status: 80% - Normal activity with effort: some symptoms of disease  Physical Capacity: Limited Mobility

## 2020-04-15 NOTE — LETTER
April 15, 2020        Arthur Mcdonald  [address] [facility] [address]  Phone: [phone]  Fax: [phone]     Tabby Chavez  [address] [facility] [address]  Phone: [phone]  Fax: [phone]             [facility]  [address]  [facility] [address]  Phone: [phone]   Patient: Tala Rivera   MR Number: [id number]   YOB: 1965   Date of Visit: 4/15/2020       Dear Dr. Arthur Mcdonald, Tabby Chavez    Thank you for referring Tala Rivera to me for evaluation. Attached you will find relevant portions of my assessment and plan of care.    If you have questions, please do not hesitate to call me. I look forward to following Tala Rivera along with you.    Sincerely,    Wes Mathias MD    Enclosure    If you would like to receive this communication electronically, please contact [email or web address] or [phone] to request "[employer] Link access.    "[employer] Link is a tool which provides read-only access to select patient information with whom you have a relationship. Its easy to use and provides real time access to review your patients record including encounter summaries, notes, results, and demographic information.    If you feel you have received this communication in error or would no longer like to receive these types of communications, please e-mail [email or web address]

## 2020-04-16 ENCOUNTER — LAB VISIT (OUTPATIENT)
Dept: LAB | Facility: HOSPITAL | Age: 55
End: 2020-04-16
Attending: INTERNAL MEDICINE
Payer: MEDICARE

## 2020-04-16 ENCOUNTER — TELEPHONE (OUTPATIENT)
Dept: TRANSPLANT | Facility: CLINIC | Age: 55
End: 2020-04-16

## 2020-04-16 DIAGNOSIS — Z94.0 KIDNEY REPLACED BY TRANSPLANT: Primary | ICD-10-CM

## 2020-04-16 DIAGNOSIS — Z94.0 KIDNEY REPLACED BY TRANSPLANT: ICD-10-CM

## 2020-04-16 LAB
ALBUMIN SERPL BCP-MCNC: 3.4 G/DL (ref 3.5–5.2)
ANION GAP SERPL CALC-SCNC: 8 MMOL/L (ref 8–16)
BASOPHILS # BLD AUTO: 0.05 K/UL (ref 0–0.2)
BASOPHILS NFR BLD: 1.3 % (ref 0–1.9)
BUN SERPL-MCNC: 32 MG/DL (ref 6–20)
CALCIUM SERPL-MCNC: 8.9 MG/DL (ref 8.7–10.5)
CHLORIDE SERPL-SCNC: 112 MMOL/L (ref 95–110)
CO2 SERPL-SCNC: 22 MMOL/L (ref 23–29)
CREAT SERPL-MCNC: 1.4 MG/DL (ref 0.5–1.4)
DIFFERENTIAL METHOD: ABNORMAL
EOSINOPHIL # BLD AUTO: 0.1 K/UL (ref 0–0.5)
EOSINOPHIL NFR BLD: 1.3 % (ref 0–8)
ERYTHROCYTE [DISTWIDTH] IN BLOOD BY AUTOMATED COUNT: 13.2 % (ref 11.5–14.5)
EST. GFR  (AFRICAN AMERICAN): 49.8 ML/MIN/1.73 M^2
EST. GFR  (NON AFRICAN AMERICAN): 43.2 ML/MIN/1.73 M^2
GLUCOSE SERPL-MCNC: 108 MG/DL (ref 70–110)
HCT VFR BLD AUTO: 33.5 % (ref 37–48.5)
HGB BLD-MCNC: 10.5 G/DL (ref 12–16)
IMM GRANULOCYTES # BLD AUTO: 0.01 K/UL (ref 0–0.04)
IMM GRANULOCYTES NFR BLD AUTO: 0.3 % (ref 0–0.5)
LYMPHOCYTES # BLD AUTO: 0.5 K/UL (ref 1–4.8)
LYMPHOCYTES NFR BLD: 13.8 % (ref 18–48)
MAGNESIUM SERPL-MCNC: 2.1 MG/DL (ref 1.6–2.6)
MCH RBC QN AUTO: 32.6 PG (ref 27–31)
MCHC RBC AUTO-ENTMCNC: 31.3 G/DL (ref 32–36)
MCV RBC AUTO: 104 FL (ref 82–98)
MONOCYTES # BLD AUTO: 0.2 K/UL (ref 0.3–1)
MONOCYTES NFR BLD: 5.7 % (ref 4–15)
NEUTROPHILS # BLD AUTO: 3 K/UL (ref 1.8–7.7)
NEUTROPHILS NFR BLD: 77.6 % (ref 38–73)
NRBC BLD-RTO: 0 /100 WBC
PHOSPHATE SERPL-MCNC: 2.4 MG/DL (ref 2.7–4.5)
PLATELET # BLD AUTO: 151 K/UL (ref 150–350)
PMV BLD AUTO: 11.7 FL (ref 9.2–12.9)
POTASSIUM SERPL-SCNC: 3.9 MMOL/L (ref 3.5–5.1)
RBC # BLD AUTO: 3.22 M/UL (ref 4–5.4)
SODIUM SERPL-SCNC: 142 MMOL/L (ref 136–145)
TACROLIMUS BLD-MCNC: 10 NG/ML (ref 5–15)
WBC # BLD AUTO: 3.85 K/UL (ref 3.9–12.7)

## 2020-04-16 PROCEDURE — 80197 ASSAY OF TACROLIMUS: CPT

## 2020-04-16 PROCEDURE — 83735 ASSAY OF MAGNESIUM: CPT

## 2020-04-16 PROCEDURE — 85025 COMPLETE CBC W/AUTO DIFF WBC: CPT

## 2020-04-16 PROCEDURE — 80069 RENAL FUNCTION PANEL: CPT

## 2020-04-16 PROCEDURE — 36415 COLL VENOUS BLD VENIPUNCTURE: CPT

## 2020-04-16 RX ORDER — TACROLIMUS 1 MG/1
CAPSULE ORAL
Qty: 330 CAPSULE | Refills: 11 | Status: SHIPPED | OUTPATIENT
Start: 2020-04-16 | End: 2020-05-15 | Stop reason: SDUPTHER

## 2020-04-16 NOTE — TELEPHONE ENCOUNTER
Called pt today to check readiness of video visit tomorrow, asked pt to call us back with any issues.

## 2020-04-16 NOTE — TELEPHONE ENCOUNTER
Patient repeated back and voice a understanding of orders. Next labs 4/20/2020.    ----- Message from Ondina Frye MD sent at 4/16/2020 10:50 AM CDT -----  Please tac to 6/5 mg

## 2020-04-17 ENCOUNTER — OFFICE VISIT (OUTPATIENT)
Dept: TRANSPLANT | Facility: CLINIC | Age: 55
End: 2020-04-17
Payer: MEDICARE

## 2020-04-17 DIAGNOSIS — D63.1 ANEMIA OF RENAL DISEASE: Chronic | ICD-10-CM

## 2020-04-17 DIAGNOSIS — Z79.899 LONG TERM CURRENT USE OF IMMUNOSUPPRESSIVE DRUG: ICD-10-CM

## 2020-04-17 DIAGNOSIS — Z94.0 S/P KIDNEY TRANSPLANT: ICD-10-CM

## 2020-04-17 DIAGNOSIS — N25.81 SECONDARY HYPERPARATHYROIDISM OF RENAL ORIGIN: Chronic | ICD-10-CM

## 2020-04-17 DIAGNOSIS — N18.9 ANEMIA OF RENAL DISEASE: Chronic | ICD-10-CM

## 2020-04-17 DIAGNOSIS — I12.9 RENAL HYPERTENSION: Primary | ICD-10-CM

## 2020-04-17 PROBLEM — Z71.3 ENCOUNTER FOR DIETARY CONSULTATION: Status: RESOLVED | Noted: 2020-04-07 | Resolved: 2020-04-17

## 2020-04-17 PROBLEM — Z76.82 PATIENT ON WAITING LIST FOR KIDNEY TRANSPLANT: Chronic | Status: RESOLVED | Noted: 2019-10-22 | Resolved: 2020-04-17

## 2020-04-17 PROCEDURE — 99214 OFFICE O/P EST MOD 30 MIN: CPT | Mod: 95,,, | Performed by: INTERNAL MEDICINE

## 2020-04-17 PROCEDURE — 99214 PR OFFICE/OUTPT VISIT, EST, LEVL IV, 30-39 MIN: ICD-10-PCS | Mod: 95,,, | Performed by: INTERNAL MEDICINE

## 2020-04-17 NOTE — LETTER
April 17, 2020        Arthur Mcdonald  [address] [facility] [address]  Phone: [phone]  Fax: [phone]     Tabby Chavez  [address] [facility] [address]  Phone: [phone]  Fax: [phone]             [facility] Trans[facility]  [address]  [facility] [address]  Phone: [phone]   Patient: Tala Rivera   MR Number: [id number]   YOB: 1965   Date of Visit: 4/17/2020       Dear Dr. Arthur Mcdonald, Tabby Chavez    Thank you for referring Tala Rivera to me for evaluation. Attached you will find relevant portions of my assessment and plan of care.    If you have questions, please do not hesitate to call me. I look forward to following Tala Rivera along with you.    Sincerely,    Ondina Frye MD    Enclosure    If you would like to receive this communication electronically, please contact [email or web address] or [phone] to request [employer] Link access.    [employer] Link is a tool which provides read-only access to select patient information with whom you have a relationship. Its easy to use and provides real time access to review your patients record including encounter summaries, notes, results, and demographic information.    If you feel you have received this communication in error or would no longer like to receive these types of communications, please e-mail [email or web address]

## 2020-04-17 NOTE — PROGRESS NOTES
.     Kidney Post-Transplant Assessment    Referring Physician: Tabby Velasco  Current Nephrologist: Arthur Mcdonald    ORGAN: LEFT KIDNEY  Donor Type: donation after brain death  PHS Increased Risk: no  Cold Ischemia: 438 mins  Induction Medications: simulect - basiliximab    Subjective:     The patient location is:  Patient Home   The chief complaint leading to consultation is: follow up  Visit type: Virtual visit with synchronous audio and video  Total time spent with patient: 15  Each patient to whom he or she provides medical services by telemedicine is:  (1) informed of the relationship between the physician and patient and the respective role of any other health care provider with respect to management of the patient; and (2) notified that he or she may decline to receive medical services by telemedicine and may withdraw from such care at any time.         Kidney History:  Ms. Tala Rivera is a 53 yo female with history of HTN, gastric bypass surgery on 7/28/16, umbilical hernia repair and PD catheter placement on 9/4/18, and ESRD presumed secondary to HTN on HD since 2/5/16 is now s/p KTX from 4/5/20. her Native UOP is approximately 1.8L/day. Pt was Simulect induction  on 4/5/20.     Kidney US: Decreased perfusion within the transplanted kidney.  Overall resistive indices are normal to upper normal.. Will repeat US in 2 weeks.        Interval History: Patient is doing fine. She had right lower Ext swelling for which she had lower ext doppler which was negative for DVT.    She states her incision has healed well, she is tolerating her immunosuppression w/o problems or financial concerns, she has no pain over allograft, and voids without problems . Her SBP runs around 130-140 but with couple higher readings.       Current Medication  Current Outpatient Medications   Medication Sig Dispense Refill    atovaquone (MEPRON) 750 mg/5 mL Susp Take 10 mLs (1,500 mg total) by mouth once daily.  Stop: 4/5/2021 300 mL 11    bisacodyL (DULCOLAX) 5 mg EC tablet Take 2 tablets (10 mg total) by mouth daily as needed for Constipation.  0    calcitRIOL (ROCALTROL) 0.5 MCG Cap Take 1 capsule (0.5 mcg total) by mouth once daily. 30 capsule 3    docusate sodium (COLACE) 100 MG capsule Take 1 capsule (100 mg total) by mouth 3 (three) times daily as needed for Constipation.  0    famotidine (PEPCID) 20 MG tablet Take 1 tablet (20 mg total) by mouth every evening. 30 tablet 2    k phos di & mono-sod phos mono (K-PHOS-NEUTRAL) 250 mg Tab Take 1 tablet by mouth 2 (two) times daily. 60 tablet 3    mycophenolate (CELLCEPT) 250 mg Cap Take 4 capsules (1,000 mg total) by mouth 2 (two) times daily. 240 capsule 11    NIFEdipine (PROCARDIA-XL) 30 MG (OSM) 24 hr tablet Take 1 tablet (30 mg total) by mouth once daily. 30 tablet 4    nystatin (MYCOSTATIN) 100,000 unit/mL suspension Take 5 mLs (500,000 Units total) by mouth 3 (three) times daily after meals. Stop: 5/8/2020 480 mL 0    oxyCODONE-acetaminophen (PERCOCET) 5-325 mg per tablet Take 1 tablet by mouth every 6 (six) hours as needed for Pain. (Patient not taking: Reported on 4/15/2020) 21 tablet 0    predniSONE (DELTASONE) 5 MG tablet Take 20mg by mouth daily 4/8-5/7;   Take 15mg daily 5/8-6/7;  Take 10mg daily 6/8-7/7;  then 5mg daily thereafter starting 7/8/2020 120 tablet 11    sodium bicarbonate 650 MG tablet Take 2 tablets (1,300 mg total) by mouth 2 (two) times daily. 120 tablet 11    tacrolimus (PROGRAF) 1 MG Cap Take 6 capsules (6 mg total) by mouth every morning AND 5 capsules (5 mg total) every evening. 330 capsule 11    valGANciclovir (VALCYTE) 450 mg Tab Take 1 tablet (450 mg total) by mouth once daily. Stop: 7/4/2020 30 tablet 2     No current facility-administered medications for this visit.      Facility-Administered Medications Ordered in Other Visits   Medication Dose Route Frequency Provider Last Rate Last Dose    0.9%  NaCl infusion    Intravenous Continuous Richard Awad MD 20 mL/hr at 12/14/18 1432           Review of Systems    Constitutional: Negative for fever, appetite change and fatigue.   Respiratory: Negative for cough, chest tightness, shortness of breath and wheezing.   Cardiovascular: Negative for chest pain, palpitations.  + mild right leg swelling.   Gastrointestinal: Negative for nausea, vomiting, abdominal pain, diarrhea, constipation, blood in stool and abdominal distention.  Right lower quadrant surgical incision  Genitourinary: Negative for dysuria, urgency, frequency, hematuria, decreased urine volume, difficulty urinating  Musculoskeletal: Negative for back pain, joint swelling, arthralgias and gait problem.   Psychiatric/Behavioral: Negative for confusion, sleep disturbance and dysphoric mood. The patient is not nervous/anxious.       Labs:  Lab Results   Component Value Date    WBC 3.85 (L) 04/16/2020    HGB 10.5 (L) 04/16/2020    HCT 33.5 (L) 04/16/2020     04/16/2020    K 3.9 04/16/2020     (H) 04/16/2020    CO2 22 (L) 04/16/2020    BUN 32 (H) 04/16/2020    CREATININE 1.4 04/16/2020    EGFRNONAA 43.2 (A) 04/16/2020    CALCIUM 8.9 04/16/2020    PHOS 2.4 (L) 04/16/2020    MG 2.1 04/16/2020    ALBUMIN 3.4 (L) 04/16/2020    AST 26 04/04/2020    ALT 24 04/04/2020    UTPCR 0.86 (H) 04/05/2020    UTPCR 0.86 (H) 04/05/2020    .0 (H) 04/04/2020    TACROLIMUS 10.0 04/16/2020       No results found for: EXTANC, EXTWBC, EXTSEGS, EXTPLATELETS, EXTHEMOGLOBI, EXTHEMATOCRI, EXTCREATININ, EXTSODIUM, EXTPOTASSIUM, EXTBUN, EXTCO2, EXTCALCIUM, EXTPHOSPHORU, EXTGLUCOSE, EXTALBUMIN, EXTAST, EXTALT, EXTBILITOTAL, EXTLIPASE, EXTAMYLASE    No results found for: EXTCYCLOSLVL, EXTSIROLIMUS, EXTTACROLVL, EXTPROTCRE, EXTPTHINTACT, EXTPROTEINUA, EXTWBCUA, EXTRBCUA    Labs were reviewed with the patient    Assessment/Plan:     1. Renal hypertension    2. S/P kidney transplant    3. Secondary hyperparathyroidism of renal origin     4. Long term current use of immunosuppressive drug    5. Anemia of renal disease        Ms. Rivera is a 54 y.o. female with:       # History of ESRD presumed secondary to HTN  - s/p DDKT 4/5/20  - baseline Cr 1.4  - last Cr 1.4    # Immunosuppression:   - continue Prograf 6/5 mg   - continue  mg BID  - continue prednisone  - will monitor closely for toxicities    # Antimicrobial Prophylaxis:   - continue mepron  for PJP prophylaxis  - continue Valcyte for CMV prophylaxis   - continue nystatin for thrush prophylaxis for 1 month       # HTN:   - BPs well controlled   - continue with home blood pressure monitoring  - low salt and healthy life discussed with the patient      # Metabolic Bone Disease/Secondary Hyperparathyroidism:  - on calcitriol  - will monitor PTH, calcium, and phosphorus as per our center protocol        # Anemia of chronic disease:   - Hb stable  - will continue monitoring as per our center guidelines. No indication for acute intervention today         Plan:  - Stent and staples removal  - Home BP monitoring

## 2020-04-20 ENCOUNTER — LAB VISIT (OUTPATIENT)
Dept: LAB | Facility: HOSPITAL | Age: 55
End: 2020-04-20
Attending: INTERNAL MEDICINE
Payer: MEDICARE

## 2020-04-20 DIAGNOSIS — Z94.0 KIDNEY REPLACED BY TRANSPLANT: ICD-10-CM

## 2020-04-20 LAB
ALBUMIN SERPL BCP-MCNC: 3.4 G/DL (ref 3.5–5.2)
ANION GAP SERPL CALC-SCNC: 9 MMOL/L (ref 8–16)
BASOPHILS # BLD AUTO: 0.05 K/UL (ref 0–0.2)
BASOPHILS NFR BLD: 1.2 % (ref 0–1.9)
BUN SERPL-MCNC: 30 MG/DL (ref 6–20)
CALCIUM SERPL-MCNC: 8.6 MG/DL (ref 8.7–10.5)
CHLORIDE SERPL-SCNC: 110 MMOL/L (ref 95–110)
CO2 SERPL-SCNC: 21 MMOL/L (ref 23–29)
CREAT SERPL-MCNC: 1.4 MG/DL (ref 0.5–1.4)
DIFFERENTIAL METHOD: ABNORMAL
EOSINOPHIL # BLD AUTO: 0.1 K/UL (ref 0–0.5)
EOSINOPHIL NFR BLD: 1.4 % (ref 0–8)
ERYTHROCYTE [DISTWIDTH] IN BLOOD BY AUTOMATED COUNT: 13.3 % (ref 11.5–14.5)
EST. GFR  (AFRICAN AMERICAN): 49.8 ML/MIN/1.73 M^2
EST. GFR  (NON AFRICAN AMERICAN): 43.2 ML/MIN/1.73 M^2
GLUCOSE SERPL-MCNC: 93 MG/DL (ref 70–110)
HCT VFR BLD AUTO: 32.6 % (ref 37–48.5)
HGB BLD-MCNC: 10.2 G/DL (ref 12–16)
IMM GRANULOCYTES # BLD AUTO: 0.01 K/UL (ref 0–0.04)
IMM GRANULOCYTES NFR BLD AUTO: 0.2 % (ref 0–0.5)
LYMPHOCYTES # BLD AUTO: 0.5 K/UL (ref 1–4.8)
LYMPHOCYTES NFR BLD: 11 % (ref 18–48)
MAGNESIUM SERPL-MCNC: 1.8 MG/DL (ref 1.6–2.6)
MCH RBC QN AUTO: 32.7 PG (ref 27–31)
MCHC RBC AUTO-ENTMCNC: 31.3 G/DL (ref 32–36)
MCV RBC AUTO: 105 FL (ref 82–98)
MONOCYTES # BLD AUTO: 0.1 K/UL (ref 0.3–1)
MONOCYTES NFR BLD: 3.3 % (ref 4–15)
NEUTROPHILS # BLD AUTO: 3.6 K/UL (ref 1.8–7.7)
NEUTROPHILS NFR BLD: 82.9 % (ref 38–73)
NRBC BLD-RTO: 0 /100 WBC
PHOSPHATE SERPL-MCNC: 2.6 MG/DL (ref 2.7–4.5)
PLATELET # BLD AUTO: 142 K/UL (ref 150–350)
PMV BLD AUTO: 12 FL (ref 9.2–12.9)
POTASSIUM SERPL-SCNC: 3.9 MMOL/L (ref 3.5–5.1)
RBC # BLD AUTO: 3.12 M/UL (ref 4–5.4)
SODIUM SERPL-SCNC: 140 MMOL/L (ref 136–145)
WBC # BLD AUTO: 4.29 K/UL (ref 3.9–12.7)

## 2020-04-20 PROCEDURE — 80069 RENAL FUNCTION PANEL: CPT

## 2020-04-20 PROCEDURE — 80197 ASSAY OF TACROLIMUS: CPT

## 2020-04-20 PROCEDURE — 36415 COLL VENOUS BLD VENIPUNCTURE: CPT | Mod: PO

## 2020-04-20 PROCEDURE — 83735 ASSAY OF MAGNESIUM: CPT

## 2020-04-20 PROCEDURE — 85025 COMPLETE CBC W/AUTO DIFF WBC: CPT

## 2020-04-21 ENCOUNTER — PATIENT MESSAGE (OUTPATIENT)
Dept: TRANSPLANT | Facility: CLINIC | Age: 55
End: 2020-04-21

## 2020-04-21 LAB — TACROLIMUS BLD-MCNC: 8.9 NG/ML (ref 5–15)

## 2020-04-22 DIAGNOSIS — Z94.0 S/P KIDNEY TRANSPLANT: Primary | ICD-10-CM

## 2020-04-23 ENCOUNTER — LAB VISIT (OUTPATIENT)
Dept: LAB | Facility: HOSPITAL | Age: 55
End: 2020-04-23
Attending: INTERNAL MEDICINE
Payer: MEDICARE

## 2020-04-23 DIAGNOSIS — Z94.0 KIDNEY REPLACED BY TRANSPLANT: ICD-10-CM

## 2020-04-23 LAB
ALBUMIN SERPL BCP-MCNC: 3.3 G/DL (ref 3.5–5.2)
ANION GAP SERPL CALC-SCNC: 7 MMOL/L (ref 8–16)
BASOPHILS # BLD AUTO: 0.06 K/UL (ref 0–0.2)
BASOPHILS NFR BLD: 1.7 % (ref 0–1.9)
BUN SERPL-MCNC: 36 MG/DL (ref 6–20)
CALCIUM SERPL-MCNC: 8.5 MG/DL (ref 8.7–10.5)
CHLORIDE SERPL-SCNC: 111 MMOL/L (ref 95–110)
CO2 SERPL-SCNC: 23 MMOL/L (ref 23–29)
CREAT SERPL-MCNC: 1.5 MG/DL (ref 0.5–1.4)
DIFFERENTIAL METHOD: ABNORMAL
EOSINOPHIL # BLD AUTO: 0.1 K/UL (ref 0–0.5)
EOSINOPHIL NFR BLD: 1.7 % (ref 0–8)
ERYTHROCYTE [DISTWIDTH] IN BLOOD BY AUTOMATED COUNT: 13.3 % (ref 11.5–14.5)
EST. GFR  (AFRICAN AMERICAN): 45.8 ML/MIN/1.73 M^2
EST. GFR  (NON AFRICAN AMERICAN): 39.8 ML/MIN/1.73 M^2
GLUCOSE SERPL-MCNC: 91 MG/DL (ref 70–110)
HCT VFR BLD AUTO: 32.1 % (ref 37–48.5)
HGB BLD-MCNC: 9.9 G/DL (ref 12–16)
IMM GRANULOCYTES # BLD AUTO: 0.01 K/UL (ref 0–0.04)
IMM GRANULOCYTES NFR BLD AUTO: 0.3 % (ref 0–0.5)
LYMPHOCYTES # BLD AUTO: 0.5 K/UL (ref 1–4.8)
LYMPHOCYTES NFR BLD: 14 % (ref 18–48)
MAGNESIUM SERPL-MCNC: 1.7 MG/DL (ref 1.6–2.6)
MCH RBC QN AUTO: 31.9 PG (ref 27–31)
MCHC RBC AUTO-ENTMCNC: 30.8 G/DL (ref 32–36)
MCV RBC AUTO: 104 FL (ref 82–98)
MONOCYTES # BLD AUTO: 0.1 K/UL (ref 0.3–1)
MONOCYTES NFR BLD: 3.4 % (ref 4–15)
NEUTROPHILS # BLD AUTO: 2.8 K/UL (ref 1.8–7.7)
NEUTROPHILS NFR BLD: 78.9 % (ref 38–73)
NRBC BLD-RTO: 0 /100 WBC
PHOSPHATE SERPL-MCNC: 2.6 MG/DL (ref 2.7–4.5)
PLATELET # BLD AUTO: 131 K/UL (ref 150–350)
PMV BLD AUTO: 12.4 FL (ref 9.2–12.9)
POTASSIUM SERPL-SCNC: 3.8 MMOL/L (ref 3.5–5.1)
RBC # BLD AUTO: 3.1 M/UL (ref 4–5.4)
SODIUM SERPL-SCNC: 141 MMOL/L (ref 136–145)
WBC # BLD AUTO: 3.5 K/UL (ref 3.9–12.7)

## 2020-04-23 PROCEDURE — 80197 ASSAY OF TACROLIMUS: CPT

## 2020-04-23 PROCEDURE — 80069 RENAL FUNCTION PANEL: CPT

## 2020-04-23 PROCEDURE — 83735 ASSAY OF MAGNESIUM: CPT

## 2020-04-23 PROCEDURE — 85025 COMPLETE CBC W/AUTO DIFF WBC: CPT

## 2020-04-23 PROCEDURE — 36415 COLL VENOUS BLD VENIPUNCTURE: CPT | Mod: PO

## 2020-04-24 LAB — TACROLIMUS BLD-MCNC: 7.4 NG/ML (ref 5–15)

## 2020-04-27 ENCOUNTER — TELEPHONE (OUTPATIENT)
Dept: TRANSPLANT | Facility: CLINIC | Age: 55
End: 2020-04-27

## 2020-04-27 ENCOUNTER — PROCEDURE VISIT (OUTPATIENT)
Dept: UROLOGY | Facility: CLINIC | Age: 55
End: 2020-04-27
Payer: MEDICARE

## 2020-04-27 ENCOUNTER — LAB VISIT (OUTPATIENT)
Dept: LAB | Facility: HOSPITAL | Age: 55
End: 2020-04-27
Attending: INTERNAL MEDICINE
Payer: MEDICARE

## 2020-04-27 VITALS
DIASTOLIC BLOOD PRESSURE: 74 MMHG | HEIGHT: 61 IN | RESPIRATION RATE: 17 BRPM | SYSTOLIC BLOOD PRESSURE: 158 MMHG | TEMPERATURE: 98 F | BODY MASS INDEX: 30.43 KG/M2 | HEART RATE: 69 BPM | WEIGHT: 161.19 LBS

## 2020-04-27 DIAGNOSIS — Z94.0 KIDNEY REPLACED BY TRANSPLANT: ICD-10-CM

## 2020-04-27 DIAGNOSIS — Z94.0 S/P KIDNEY TRANSPLANT: Primary | ICD-10-CM

## 2020-04-27 LAB
ALBUMIN SERPL BCP-MCNC: 3.4 G/DL (ref 3.5–5.2)
ANION GAP SERPL CALC-SCNC: 6 MMOL/L (ref 8–16)
BASOPHILS # BLD AUTO: 0.04 K/UL (ref 0–0.2)
BASOPHILS NFR BLD: 1.1 % (ref 0–1.9)
BUN SERPL-MCNC: 34 MG/DL (ref 6–20)
CALCIUM SERPL-MCNC: 8.7 MG/DL (ref 8.7–10.5)
CHLORIDE SERPL-SCNC: 111 MMOL/L (ref 95–110)
CO2 SERPL-SCNC: 23 MMOL/L (ref 23–29)
CREAT SERPL-MCNC: 1.3 MG/DL (ref 0.5–1.4)
DIFFERENTIAL METHOD: ABNORMAL
EOSINOPHIL # BLD AUTO: 0.1 K/UL (ref 0–0.5)
EOSINOPHIL NFR BLD: 1.3 % (ref 0–8)
ERYTHROCYTE [DISTWIDTH] IN BLOOD BY AUTOMATED COUNT: 13.2 % (ref 11.5–14.5)
EST. GFR  (AFRICAN AMERICAN): 54.5 ML/MIN/1.73 M^2
EST. GFR  (NON AFRICAN AMERICAN): 47.3 ML/MIN/1.73 M^2
GLUCOSE SERPL-MCNC: 94 MG/DL (ref 70–110)
HCT VFR BLD AUTO: 33.2 % (ref 37–48.5)
HGB BLD-MCNC: 10.3 G/DL (ref 12–16)
IMM GRANULOCYTES # BLD AUTO: 0.01 K/UL (ref 0–0.04)
IMM GRANULOCYTES NFR BLD AUTO: 0.3 % (ref 0–0.5)
LYMPHOCYTES # BLD AUTO: 0.5 K/UL (ref 1–4.8)
LYMPHOCYTES NFR BLD: 14 % (ref 18–48)
MAGNESIUM SERPL-MCNC: 1.7 MG/DL (ref 1.6–2.6)
MCH RBC QN AUTO: 32.1 PG (ref 27–31)
MCHC RBC AUTO-ENTMCNC: 31 G/DL (ref 32–36)
MCV RBC AUTO: 103 FL (ref 82–98)
MONOCYTES # BLD AUTO: 0.2 K/UL (ref 0.3–1)
MONOCYTES NFR BLD: 4 % (ref 4–15)
NEUTROPHILS # BLD AUTO: 3 K/UL (ref 1.8–7.7)
NEUTROPHILS NFR BLD: 79.3 % (ref 38–73)
NRBC BLD-RTO: 0 /100 WBC
PHOSPHATE SERPL-MCNC: 2.4 MG/DL (ref 2.7–4.5)
PLATELET # BLD AUTO: 130 K/UL (ref 150–350)
PMV BLD AUTO: 12.2 FL (ref 9.2–12.9)
POTASSIUM SERPL-SCNC: 3.9 MMOL/L (ref 3.5–5.1)
RBC # BLD AUTO: 3.21 M/UL (ref 4–5.4)
SODIUM SERPL-SCNC: 140 MMOL/L (ref 136–145)
WBC # BLD AUTO: 3.72 K/UL (ref 3.9–12.7)

## 2020-04-27 PROCEDURE — 36415 COLL VENOUS BLD VENIPUNCTURE: CPT | Mod: PO

## 2020-04-27 PROCEDURE — 52310 CYSTOSCOPY AND TREATMENT: CPT | Mod: PBBFAC | Performed by: UROLOGY

## 2020-04-27 PROCEDURE — 80069 RENAL FUNCTION PANEL: CPT

## 2020-04-27 PROCEDURE — 52310 CYSTOSCOPY AND TREATMENT: CPT | Mod: S$PBB,,, | Performed by: UROLOGY

## 2020-04-27 PROCEDURE — 52310 PR CYSTOSCOPY,REMV CALCULUS,SIMPLE: ICD-10-PCS | Mod: S$PBB,,, | Performed by: UROLOGY

## 2020-04-27 PROCEDURE — 85025 COMPLETE CBC W/AUTO DIFF WBC: CPT

## 2020-04-27 PROCEDURE — 80197 ASSAY OF TACROLIMUS: CPT

## 2020-04-27 PROCEDURE — 83735 ASSAY OF MAGNESIUM: CPT

## 2020-04-27 RX ORDER — LIDOCAINE HYDROCHLORIDE 20 MG/ML
JELLY TOPICAL ONCE
Status: COMPLETED | OUTPATIENT
Start: 2020-04-27 | End: 2020-04-27

## 2020-04-27 RX ORDER — CIPROFLOXACIN 500 MG/1
500 TABLET ORAL
Status: COMPLETED | OUTPATIENT
Start: 2020-04-27 | End: 2020-04-27

## 2020-04-27 RX ADMIN — LIDOCAINE HYDROCHLORIDE: 20 JELLY TOPICAL at 10:04

## 2020-04-27 RX ADMIN — CIPROFLOXACIN 500 MG: 500 TABLET ORAL at 11:04

## 2020-04-27 NOTE — PATIENT INSTRUCTIONS

## 2020-04-27 NOTE — TELEPHONE ENCOUNTER
SW returned pt's call. Pt reports that she had questions about the [employer] ([employer]) paying for her Medicare premiums. She reports that she was told by her dialysis unit that she would need to contact the transplant SW about her [employer] account. SW reviewed the [employer] website and transferred pt's profile, verified pt's address and e-mail. SW explained the process of claiming pt's access to her [employer] account and that pt can now make her own justino requests for premium payments. Patient verbalized understanding and agreement. SW remains available to pt, pt's family, and transplant team at [phone].      ----- Message from Christine Nettles sent at 4/27/2020  2:06 PM CDT -----  Contact: pt  Patient calling to speak with Social Workers      Call Back : [phone]

## 2020-04-27 NOTE — PROCEDURES
CYSTOSCOPY W/ STENT REMOVAL  Date/Time: 4/27/2020 1:15 PM  Performed by: Gurwinder Hawthorne Jr., MD  Authorized by: Megan Garcia MD   Preparation: Patient was prepped and draped in the usual sterile fashion.  Local anesthesia used: no    Anesthesia:  Local anesthesia used: no    Sedation:  Patient sedated: no    Patient tolerance: Patient tolerated the procedure well with no immediate complications      Procedure: Flexible cysto-uretheroscopy and stent removal     Pre Procedure Diagnosis:s/p kidney transplant     Post Procedure Diagnosis:same     Surgeon: Gurwinder Hawthorne MD    Anesthesia: 2% uro-jet lidocaine jelly for local analgesia     Flexible cysto-urethroscopy was performed after consent was obtained. The risks and benefits were explained.     2% lidocaine urojet was used for local analgesia.     The genitalia was prepped and draped in the sterile fashion with betadine.     The flexible scope was advanced into the urethra and into the bladder. The right stent was removed without difficulty.     The patient tolerated the procedure well without complication.     They will follow up with transplant.     Cipto x 2 days  See transplant on weds

## 2020-04-28 LAB — TACROLIMUS BLD-MCNC: 9.4 NG/ML (ref 5–15)

## 2020-04-29 ENCOUNTER — OFFICE VISIT (OUTPATIENT)
Dept: TRANSPLANT | Facility: CLINIC | Age: 55
End: 2020-04-29
Payer: MEDICARE

## 2020-04-29 ENCOUNTER — TELEPHONE (OUTPATIENT)
Dept: TRANSPLANT | Facility: CLINIC | Age: 55
End: 2020-04-29

## 2020-04-29 VITALS
BODY MASS INDEX: 31.29 KG/M2 | TEMPERATURE: 98 F | SYSTOLIC BLOOD PRESSURE: 170 MMHG | HEART RATE: 69 BPM | OXYGEN SATURATION: 100 % | WEIGHT: 159.38 LBS | HEIGHT: 60 IN | DIASTOLIC BLOOD PRESSURE: 72 MMHG

## 2020-04-29 DIAGNOSIS — Z94.0 S/P KIDNEY TRANSPLANT: ICD-10-CM

## 2020-04-29 DIAGNOSIS — Z79.60 LONG-TERM USE OF IMMUNOSUPPRESSANT MEDICATION: ICD-10-CM

## 2020-04-29 DIAGNOSIS — Z29.89 PROPHYLACTIC IMMUNOTHERAPY: Primary | ICD-10-CM

## 2020-04-29 PROCEDURE — 99213 OFFICE O/P EST LOW 20 MIN: CPT | Mod: PBBFAC | Performed by: TRANSPLANT SURGERY

## 2020-04-29 PROCEDURE — 99999 PR PBB SHADOW E&M-EST. PATIENT-LVL III: CPT | Mod: PBBFAC,,, | Performed by: TRANSPLANT SURGERY

## 2020-04-29 PROCEDURE — 99213 OFFICE O/P EST LOW 20 MIN: CPT | Mod: 24,S$PBB,, | Performed by: TRANSPLANT SURGERY

## 2020-04-29 PROCEDURE — 99999 PR PBB SHADOW E&M-EST. PATIENT-LVL III: ICD-10-PCS | Mod: PBBFAC,,, | Performed by: TRANSPLANT SURGERY

## 2020-04-29 PROCEDURE — 99213 PR OFFICE/OUTPT VISIT, EST, LEVL III, 20-29 MIN: ICD-10-PCS | Mod: 24,S$PBB,, | Performed by: TRANSPLANT SURGERY

## 2020-04-29 RX ORDER — FUROSEMIDE 20 MG/1
20 TABLET ORAL DAILY
Qty: 30 TABLET | Refills: 11 | Status: SHIPPED | OUTPATIENT
Start: 2020-04-29 | End: 2020-06-15

## 2020-04-29 NOTE — TELEPHONE ENCOUNTER
TRU spoke with pt regarding her questions regarding [facility] ([facility]). Pt reports that she attempted to put in a justino request but was having trouble navigating the system. SW walked pt through the process of doing a justino request. TRU remains available to pt, pt's family, and transplant team at [phone].        ----- Message -----  From: Luis Alberto Hurtado  Sent: 4/28/2020  12:16 PM CDT  To: [facility] Kidney Transplant Social Workers    Calling to speak with Joselyn Jimenez    Call back: [phone]

## 2020-04-29 NOTE — PROGRESS NOTES
Transplant Surgery  Kidney Transplant Recipient Follow-up    Referring Physician: Tabby Velasco  Current Nephrologist: Arthur Mcdonald    Subjective:     Chief Complaint: Tala Rivera is a 54 y.o. year old White female who is status post Kidney transplant performed on 4/5/2020.    ORGAN: LEFT KIDNEY   Disease Etiology: Hypertensive Nephrosclerosis  Donor Type: Donation after Brain Death   Donor CMV Status: Positive   Donor HBcAB: Negative   Donor HCV Status: Negative     History of Present Illness: She reports edema and wound drainage.  From a transplant perspective, she reports normal urination.  Tala is here for management of her immunosuppression medication.  Tala states that her immunosuppression is being well tolerated.  Hypertension is is reportedly well controlled.    Review of Systems   Constitutional: Negative.  Negative for appetite change, chills, fatigue, fever and unexpected weight change.   HENT: Negative for dental problem, facial swelling, mouth sores, nosebleeds, sore throat and voice change.    Eyes: Negative for photophobia, discharge, itching and visual disturbance.   Respiratory: Negative for cough, chest tightness, shortness of breath, wheezing and stridor.    Cardiovascular: Negative for chest pain, palpitations and leg swelling.   Gastrointestinal: Negative for abdominal distention, abdominal pain, blood in stool, constipation, diarrhea, nausea and vomiting.   Genitourinary: Negative for difficulty urinating, dysuria, hematuria, urgency, vaginal bleeding and vaginal discharge.   Musculoskeletal: Negative for arthralgias, back pain, joint swelling and myalgias.   Skin: Negative for color change, rash and wound.   Neurological: Negative for dizziness, tremors, seizures, syncope, speech difficulty, numbness and headaches.   Hematological: Negative for adenopathy. Does not bruise/bleed easily.   Psychiatric/Behavioral: Negative for agitation, confusion, dysphoric mood,  hallucinations and sleep disturbance. The patient is not nervous/anxious.        Objective:     Physical Exam   Constitutional: She is oriented to person, place, and time. She appears well-developed and well-nourished.   HENT:   Head: Normocephalic and atraumatic.   Right Ear: External ear normal.   Left Ear: External ear normal.   Nose: Nose normal.   Mouth/Throat: Oropharynx is clear and moist. No oropharyngeal exudate.   Eyes: Pupils are equal, round, and reactive to light. EOM are normal. No scleral icterus.   Neck: Normal range of motion. Neck supple. No JVD present. No tracheal deviation present. No thyromegaly present.   Cardiovascular: Normal rate, regular rhythm, normal heart sounds and intact distal pulses. Exam reveals no gallop and no friction rub.   No murmur heard.  Pulmonary/Chest: Effort normal and breath sounds normal. No respiratory distress. She has no wheezes. She has no rales.   Abdominal: Soft. Bowel sounds are normal. She exhibits no distension and no mass. There is no tenderness.       Musculoskeletal: Normal range of motion. She exhibits edema. She exhibits no tenderness.   Lymphadenopathy:     She has no cervical adenopathy.   Neurological: She is alert and oriented to person, place, and time. No cranial nerve deficit. Coordination normal.   Skin: Skin is warm and dry. No rash noted. No erythema.   Psychiatric: She has a normal mood and affect. Her behavior is normal. Judgment and thought content normal.     Lab Results   Component Value Date    CREATININE 1.3 04/27/2020    BUN 34 (H) 04/27/2020     Lab Results   Component Value Date    WBC 3.72 (L) 04/27/2020    HGB 10.3 (L) 04/27/2020    HCT 33.2 (L) 04/27/2020     (L) 04/27/2020     Lab Results   Component Value Date    TACROLIMUS 9.4 04/27/2020         Assessment and Plan:        · S/P Kidney transplant.  · She has lower abdominal and lower extremity edema, and is draining serous fluid through the medial ~ 1/3 of her incision. I  believe this part of the incision would separate if the staples were removed today. She probably will wind up with a wound vac, and I told her of this possibility. I think it is reasonable to start her on a modest dose of lasix and see if this dries up over the next week.  · Chronic immunosuppressive medications for rejection prophylaxis at target.  Plan: no adjustment needed.  · Continue monitoring symptoms, labs and drug levels for drug-related toxicity and side effects.  · Renal hypertension at target.    Follow-up: Patient reminded to call with any health changes, since these can be early signs of significant complications.  Also, I advised the patient to be sure any new medications or changes of old medications are discussed with either a pharmacist, or physician knowledgeable with transplant to avoid rejection/drug toxicity related to significant drug interactions.    Abelardo Rangel MD       [facility] Patient Status  Functional Status: 70% - Cares for self: unable to carry on normal activity or active work  Physical Capacity: No Limitations

## 2020-04-29 NOTE — LETTER
April 29, 2020        Arthur Mcdonald  [address] [facility] [address]  Phone: [phone]  Fax: [phone]     Tabby Chavez  [address] [facility] [address]  Phone: [phone]  Fax: [phone]             [facility]- Transplant  [address]  [facility] [address]  Phone: [phone]   Patient: Tala Rivera   MR Number: [id number]   YOB: 1965   Date of Visit: 4/29/2020       Dear Dr. Arthur Mcdonald, Tabby Chavez    Thank you for referring Tala Rivera to me for evaluation. Attached you will find relevant portions of my assessment and plan of care.    If you have questions, please do not hesitate to call me. I look forward to following Tala Rivera along with you.    Sincerely,    Abelardo Rangel MD    Enclosure    If you would like to receive this communication electronically, please contact [email or web address] or [phone] to request [employer] Link access.    [employer] Link is a tool which provides read-only access to select patient information with whom you have a relationship. Its easy to use and provides real time access to review your patients record including encounter summaries, notes, results, and demographic information.    If you feel you have received this communication in error or would no longer like to receive these types of communications, please e-mail [email or web address]

## 2020-04-30 ENCOUNTER — LAB VISIT (OUTPATIENT)
Dept: LAB | Facility: HOSPITAL | Age: 55
End: 2020-04-30
Attending: INTERNAL MEDICINE
Payer: MEDICARE

## 2020-04-30 DIAGNOSIS — Z94.0 KIDNEY REPLACED BY TRANSPLANT: ICD-10-CM

## 2020-04-30 LAB
ALBUMIN SERPL BCP-MCNC: 3.4 G/DL (ref 3.5–5.2)
ANION GAP SERPL CALC-SCNC: 7 MMOL/L (ref 8–16)
BASOPHILS # BLD AUTO: 0.03 K/UL (ref 0–0.2)
BASOPHILS NFR BLD: 0.8 % (ref 0–1.9)
BUN SERPL-MCNC: 26 MG/DL (ref 6–20)
CALCIUM SERPL-MCNC: 8.9 MG/DL (ref 8.7–10.5)
CHLORIDE SERPL-SCNC: 111 MMOL/L (ref 95–110)
CO2 SERPL-SCNC: 23 MMOL/L (ref 23–29)
CREAT SERPL-MCNC: 1.3 MG/DL (ref 0.5–1.4)
DIFFERENTIAL METHOD: ABNORMAL
EOSINOPHIL # BLD AUTO: 0 K/UL (ref 0–0.5)
EOSINOPHIL NFR BLD: 1.1 % (ref 0–8)
ERYTHROCYTE [DISTWIDTH] IN BLOOD BY AUTOMATED COUNT: 13.3 % (ref 11.5–14.5)
EST. GFR  (AFRICAN AMERICAN): 54.5 ML/MIN/1.73 M^2
EST. GFR  (NON AFRICAN AMERICAN): 47.3 ML/MIN/1.73 M^2
GLUCOSE SERPL-MCNC: 95 MG/DL (ref 70–110)
HCT VFR BLD AUTO: 33.6 % (ref 37–48.5)
HGB BLD-MCNC: 10.4 G/DL (ref 12–16)
IMM GRANULOCYTES # BLD AUTO: 0.01 K/UL (ref 0–0.04)
IMM GRANULOCYTES NFR BLD AUTO: 0.3 % (ref 0–0.5)
LYMPHOCYTES # BLD AUTO: 0.5 K/UL (ref 1–4.8)
LYMPHOCYTES NFR BLD: 14.1 % (ref 18–48)
MAGNESIUM SERPL-MCNC: 1.7 MG/DL (ref 1.6–2.6)
MCH RBC QN AUTO: 31.8 PG (ref 27–31)
MCHC RBC AUTO-ENTMCNC: 31 G/DL (ref 32–36)
MCV RBC AUTO: 103 FL (ref 82–98)
MONOCYTES # BLD AUTO: 0.2 K/UL (ref 0.3–1)
MONOCYTES NFR BLD: 4.5 % (ref 4–15)
NEUTROPHILS # BLD AUTO: 3 K/UL (ref 1.8–7.7)
NEUTROPHILS NFR BLD: 79.2 % (ref 38–73)
NRBC BLD-RTO: 0 /100 WBC
PHOSPHATE SERPL-MCNC: 2.3 MG/DL (ref 2.7–4.5)
PLATELET # BLD AUTO: 152 K/UL (ref 150–350)
PMV BLD AUTO: 11.9 FL (ref 9.2–12.9)
POTASSIUM SERPL-SCNC: 3.5 MMOL/L (ref 3.5–5.1)
RBC # BLD AUTO: 3.27 M/UL (ref 4–5.4)
SODIUM SERPL-SCNC: 141 MMOL/L (ref 136–145)
WBC # BLD AUTO: 3.76 K/UL (ref 3.9–12.7)

## 2020-04-30 PROCEDURE — 83735 ASSAY OF MAGNESIUM: CPT

## 2020-04-30 PROCEDURE — 80069 RENAL FUNCTION PANEL: CPT

## 2020-04-30 PROCEDURE — 80197 ASSAY OF TACROLIMUS: CPT

## 2020-04-30 PROCEDURE — 85025 COMPLETE CBC W/AUTO DIFF WBC: CPT

## 2020-04-30 PROCEDURE — 36415 COLL VENOUS BLD VENIPUNCTURE: CPT | Mod: PO

## 2020-05-01 LAB — TACROLIMUS BLD-MCNC: 9.5 NG/ML (ref 5–15)

## 2020-05-04 ENCOUNTER — LAB VISIT (OUTPATIENT)
Dept: LAB | Facility: HOSPITAL | Age: 55
End: 2020-05-04
Attending: INTERNAL MEDICINE
Payer: MEDICARE

## 2020-05-04 DIAGNOSIS — Z94.0 KIDNEY REPLACED BY TRANSPLANT: ICD-10-CM

## 2020-05-04 LAB
ALBUMIN SERPL BCP-MCNC: 3.7 G/DL (ref 3.5–5.2)
ALBUMIN SERPL BCP-MCNC: 3.7 G/DL (ref 3.5–5.2)
ALP SERPL-CCNC: 77 U/L (ref 55–135)
ALT SERPL W/O P-5'-P-CCNC: 16 U/L (ref 10–44)
ANION GAP SERPL CALC-SCNC: 8 MMOL/L (ref 8–16)
AST SERPL-CCNC: 10 U/L (ref 10–40)
BASOPHILS # BLD AUTO: 0.02 K/UL (ref 0–0.2)
BASOPHILS NFR BLD: 0.6 % (ref 0–1.9)
BILIRUB DIRECT SERPL-MCNC: 0.1 MG/DL (ref 0.1–0.3)
BILIRUB SERPL-MCNC: 0.4 MG/DL (ref 0.1–1)
BUN SERPL-MCNC: 37 MG/DL (ref 6–20)
CALCIUM SERPL-MCNC: 8.8 MG/DL (ref 8.7–10.5)
CHLORIDE SERPL-SCNC: 108 MMOL/L (ref 95–110)
CO2 SERPL-SCNC: 23 MMOL/L (ref 23–29)
CREAT SERPL-MCNC: 1.3 MG/DL (ref 0.5–1.4)
DIFFERENTIAL METHOD: ABNORMAL
EOSINOPHIL # BLD AUTO: 0.1 K/UL (ref 0–0.5)
EOSINOPHIL NFR BLD: 1.7 % (ref 0–8)
ERYTHROCYTE [DISTWIDTH] IN BLOOD BY AUTOMATED COUNT: 13.3 % (ref 11.5–14.5)
EST. GFR  (AFRICAN AMERICAN): 54.5 ML/MIN/1.73 M^2
EST. GFR  (NON AFRICAN AMERICAN): 47.3 ML/MIN/1.73 M^2
GLUCOSE SERPL-MCNC: 95 MG/DL (ref 70–110)
HCT VFR BLD AUTO: 34.3 % (ref 37–48.5)
HGB BLD-MCNC: 10.8 G/DL (ref 12–16)
IMM GRANULOCYTES # BLD AUTO: 0.01 K/UL (ref 0–0.04)
IMM GRANULOCYTES NFR BLD AUTO: 0.3 % (ref 0–0.5)
LYMPHOCYTES # BLD AUTO: 0.5 K/UL (ref 1–4.8)
LYMPHOCYTES NFR BLD: 13.9 % (ref 18–48)
MAGNESIUM SERPL-MCNC: 1.7 MG/DL (ref 1.6–2.6)
MCH RBC QN AUTO: 32.3 PG (ref 27–31)
MCHC RBC AUTO-ENTMCNC: 31.5 G/DL (ref 32–36)
MCV RBC AUTO: 103 FL (ref 82–98)
MONOCYTES # BLD AUTO: 0.2 K/UL (ref 0.3–1)
MONOCYTES NFR BLD: 4.6 % (ref 4–15)
NEUTROPHILS # BLD AUTO: 2.7 K/UL (ref 1.8–7.7)
NEUTROPHILS NFR BLD: 78.9 % (ref 38–73)
NRBC BLD-RTO: 0 /100 WBC
PHOSPHATE SERPL-MCNC: 2.3 MG/DL (ref 2.7–4.5)
PLATELET # BLD AUTO: 157 K/UL (ref 150–350)
PMV BLD AUTO: 11.9 FL (ref 9.2–12.9)
POTASSIUM SERPL-SCNC: 3.9 MMOL/L (ref 3.5–5.1)
PROT SERPL-MCNC: 5.8 G/DL (ref 6–8.4)
PTH-INTACT SERPL-MCNC: 109 PG/ML (ref 9–77)
RBC # BLD AUTO: 3.34 M/UL (ref 4–5.4)
SODIUM SERPL-SCNC: 139 MMOL/L (ref 136–145)
URATE SERPL-MCNC: 5.4 MG/DL (ref 2.4–5.7)
WBC # BLD AUTO: 3.45 K/UL (ref 3.9–12.7)

## 2020-05-04 PROCEDURE — 84075 ASSAY ALKALINE PHOSPHATASE: CPT

## 2020-05-04 PROCEDURE — 86833 HLA CLASS II HIGH DEFIN QUAL: CPT

## 2020-05-04 PROCEDURE — 83735 ASSAY OF MAGNESIUM: CPT

## 2020-05-04 PROCEDURE — 84550 ASSAY OF BLOOD/URIC ACID: CPT

## 2020-05-04 PROCEDURE — 86832 HLA CLASS I HIGH DEFIN QUAL: CPT

## 2020-05-04 PROCEDURE — 36415 COLL VENOUS BLD VENIPUNCTURE: CPT | Mod: PO

## 2020-05-04 PROCEDURE — 86977 RBC SERUM PRETX INCUBJ/INHIB: CPT

## 2020-05-04 PROCEDURE — 80197 ASSAY OF TACROLIMUS: CPT

## 2020-05-04 PROCEDURE — 87799 DETECT AGENT NOS DNA QUANT: CPT

## 2020-05-04 PROCEDURE — 83970 ASSAY OF PARATHORMONE: CPT

## 2020-05-04 PROCEDURE — 85025 COMPLETE CBC W/AUTO DIFF WBC: CPT

## 2020-05-04 PROCEDURE — 80069 RENAL FUNCTION PANEL: CPT

## 2020-05-05 LAB — TACROLIMUS BLD-MCNC: 9.7 NG/ML (ref 5–15)

## 2020-05-05 RX ORDER — CALCITRIOL 0.25 UG/1
0.25 CAPSULE ORAL DAILY
Qty: 30 CAPSULE | Refills: 11 | Status: SHIPPED | OUTPATIENT
Start: 2020-05-05 | End: 2020-07-07 | Stop reason: SDUPTHER

## 2020-05-05 NOTE — TELEPHONE ENCOUNTER
Patient repeated back and voice a understanding orders .    ----- Message from Ondina Frye MD sent at 5/5/2020  8:32 AM CDT -----  Please calcitriol to 0.25 mcg daily.

## 2020-05-06 ENCOUNTER — OFFICE VISIT (OUTPATIENT)
Dept: TRANSPLANT | Facility: CLINIC | Age: 55
End: 2020-05-06
Payer: MEDICARE

## 2020-05-06 VITALS
HEIGHT: 60 IN | DIASTOLIC BLOOD PRESSURE: 79 MMHG | OXYGEN SATURATION: 100 % | HEART RATE: 61 BPM | SYSTOLIC BLOOD PRESSURE: 160 MMHG | TEMPERATURE: 98 F | BODY MASS INDEX: 29.56 KG/M2 | WEIGHT: 150.56 LBS

## 2020-05-06 DIAGNOSIS — Z51.81 ENCOUNTER FOR THERAPEUTIC DRUG MONITORING: Primary | ICD-10-CM

## 2020-05-06 DIAGNOSIS — Z94.0 KIDNEY REPLACED BY TRANSPLANT: ICD-10-CM

## 2020-05-06 LAB
CLASS I ANTIBODY COMMENTS - LUMINEX: NORMAL
CLASS II ANTIBODY COMMENTS - LUMINEX: NORMAL
DSA1 TESTING DATE: NORMAL
DSA12 TESTING DATE: NORMAL
DSA2 TESTING DATE: NORMAL
SERUM COLLECTION DT - LUMINEX CLASS I: NORMAL
SERUM COLLECTION DT - LUMINEX CLASS II: NORMAL

## 2020-05-06 PROCEDURE — 99213 PR OFFICE/OUTPT VISIT, EST, LEVL III, 20-29 MIN: ICD-10-PCS | Mod: 24,S$PBB,, | Performed by: SURGERY

## 2020-05-06 PROCEDURE — 99213 OFFICE O/P EST LOW 20 MIN: CPT | Mod: 24,S$PBB,, | Performed by: SURGERY

## 2020-05-06 PROCEDURE — 99999 PR PBB SHADOW E&M-EST. PATIENT-LVL III: CPT | Mod: PBBFAC,,, | Performed by: SURGERY

## 2020-05-06 PROCEDURE — 99999 PR PBB SHADOW E&M-EST. PATIENT-LVL III: ICD-10-PCS | Mod: PBBFAC,,, | Performed by: SURGERY

## 2020-05-06 PROCEDURE — 99213 OFFICE O/P EST LOW 20 MIN: CPT | Mod: PBBFAC | Performed by: SURGERY

## 2020-05-06 NOTE — LETTER
May 6, 2020        Arthur Mcdonald  [address] [facility] [address]  Phone: [phone]  Fax: [phone]     Tabby Chavez  [address] [facility] [address]  Phone: [phone]  Fax: [phone]             Attila Mcdu[facility]- Transplant  [address]  [facility] [address]  Phone: [phone]   Patient: Tala Rivera   MR Number: [id number]   YOB: 1965   Date of Visit: 5/6/2020       Dear Dr. Arthur Mcdonald, Tabby Chavez    Thank you for referring Tala Rivera to me for evaluation. Attached you will find relevant portions of my assessment and plan of care.    If you have questions, please do not hesitate to call me. I look forward to following Tala Rivera along with you.    Sincerely,    Wes Mathias MD    Enclosure    If you would like to receive this communication electronically, please contact [email or web address] or [phone] to request [employer] Link access.    [email or web address] Link is a tool which provides read-only access to select patient information with whom you have a relationship. Its easy to use and provides real time access to review your patients record including encounter summaries, notes, results, and demographic information.    If you feel you have received this communication in error or would no longer like to receive these types of communications, please e-mail [email or web address]

## 2020-05-06 NOTE — PROGRESS NOTES
STAPLE REMOVAL NOTE    Staples removed from kidney transplant incision, steri-strips applied with Benzoin per Dr. Mathias's order.  Patient tolerated procedure well.  Skin dry and intact.  Patient instructed to shower with back to water spray and to pat dry incision and to let the steri-strips wear off on their own.  Patient instructed to report any redness, warmth, or drainage from incision to transplant coordinators.  All questions answered.

## 2020-05-06 NOTE — PROGRESS NOTES
Transplant Surgery  Kidney Transplant Recipient Follow-up    Referring Physician: Tabby Velasco  Current Nephrologist: Arthur Mcdonald    Subjective:     Chief Complaint: Tala Rivera is a 54 y.o. year old White female who is status post Kidney transplant performed on 4/5/2020.    ORGAN: LEFT KIDNEY   Disease Etiology: Hypertensive Nephrosclerosis  Donor Type: Donation after Brain Death   Donor CMV Status: Positive   Donor HBcAB: Negative   Donor HCV Status: Negative     History of Present Illness: She reports no concerns.  From a transplant perspective, she reports normal urination.  Tala is here for management of her immunosuppression medication.  Tala states that her immunosuppression is being well tolerated.  Hypertension is not present.    Review of Systems   Constitutional: Negative for fatigue.   HENT: Negative for drooling, postnasal drip and sore throat.    Eyes: Negative for discharge and itching.   Respiratory: Negative for choking and stridor.    Gastrointestinal: Negative for rectal pain.   Endocrine: Negative for polydipsia.   Genitourinary: Negative for enuresis and genital sores.   Musculoskeletal: Negative for back pain, neck pain and neck stiffness.   Allergic/Immunologic: Positive for immunocompromised state.   Neurological: Negative for facial asymmetry and numbness.   Hematological: Negative for adenopathy.   Psychiatric/Behavioral: Negative for behavioral problems, self-injury and suicidal ideas.       Objective:     Physical Exam   Abdominal:       Wound healing well     Lab Results   Component Value Date    CREATININE 1.3 05/04/2020    BUN 37 (H) 05/04/2020     Lab Results   Component Value Date    WBC 3.45 (L) 05/04/2020    HGB 10.8 (L) 05/04/2020    HCT 34.3 (L) 05/04/2020     05/04/2020     Lab Results   Component Value Date    TACROLIMUS 9.7 05/04/2020         Assessment and Plan:        · S/P Kidney transplant.  · Chronic immunosuppressive medications for  rejection prophylaxis at target.  Plan: no adjustment needed.  · Continue monitoring symptoms, labs and drug levels for drug-related toxicity and side effects.  · Renal hypertension at target.  · Staples ready to be removed    Follow-up: Patient reminded to call with any health changes, since these can be early signs of significant complications.  Also, I advised the patient to be sure any new medications or changes of old medications are discussed with either a pharmacist, or physician knowledgeable with transplant to avoid rejection/drug toxicity related to significant drug interactions.    Wes Mathias MD       [facility] Patient Status  Functional Status: 90% - Able to carry on normal activity: minor symptoms of disease  Physical Capacity: No Limitations

## 2020-05-07 LAB
BKV DNA SERPL NAA+PROBE-ACNC: <125 COPIES/ML
BKV DNA SERPL NAA+PROBE-LOG#: <2.1 LOG (10) COPIES/ML
BKV DNA SERPL QL NAA+PROBE: NOT DETECTED

## 2020-05-11 ENCOUNTER — TELEPHONE (OUTPATIENT)
Dept: TRANSPLANT | Facility: CLINIC | Age: 55
End: 2020-05-11

## 2020-05-11 ENCOUNTER — LAB VISIT (OUTPATIENT)
Dept: LAB | Facility: HOSPITAL | Age: 55
End: 2020-05-11
Attending: INTERNAL MEDICINE
Payer: MEDICARE

## 2020-05-11 DIAGNOSIS — Z94.0 KIDNEY REPLACED BY TRANSPLANT: ICD-10-CM

## 2020-05-11 LAB
ALBUMIN SERPL BCP-MCNC: 3.6 G/DL (ref 3.5–5.2)
ANION GAP SERPL CALC-SCNC: 8 MMOL/L (ref 8–16)
BASOPHILS # BLD AUTO: 0.02 K/UL (ref 0–0.2)
BASOPHILS NFR BLD: 0.7 % (ref 0–1.9)
BUN SERPL-MCNC: 34 MG/DL (ref 6–20)
CALCIUM SERPL-MCNC: 8.4 MG/DL (ref 8.7–10.5)
CHLORIDE SERPL-SCNC: 110 MMOL/L (ref 95–110)
CO2 SERPL-SCNC: 24 MMOL/L (ref 23–29)
CREAT SERPL-MCNC: 1.5 MG/DL (ref 0.5–1.4)
DIFFERENTIAL METHOD: ABNORMAL
EOSINOPHIL # BLD AUTO: 0 K/UL (ref 0–0.5)
EOSINOPHIL NFR BLD: 1.4 % (ref 0–8)
ERYTHROCYTE [DISTWIDTH] IN BLOOD BY AUTOMATED COUNT: 13.3 % (ref 11.5–14.5)
EST. GFR  (AFRICAN AMERICAN): 45.8 ML/MIN/1.73 M^2
EST. GFR  (NON AFRICAN AMERICAN): 39.8 ML/MIN/1.73 M^2
GLUCOSE SERPL-MCNC: 93 MG/DL (ref 70–110)
HCT VFR BLD AUTO: 35.1 % (ref 37–48.5)
HGB BLD-MCNC: 10.7 G/DL (ref 12–16)
IMM GRANULOCYTES # BLD AUTO: 0.03 K/UL (ref 0–0.04)
IMM GRANULOCYTES NFR BLD AUTO: 1.1 % (ref 0–0.5)
LYMPHOCYTES # BLD AUTO: 0.4 K/UL (ref 1–4.8)
LYMPHOCYTES NFR BLD: 15.9 % (ref 18–48)
MAGNESIUM SERPL-MCNC: 1.5 MG/DL (ref 1.6–2.6)
MCH RBC QN AUTO: 31.2 PG (ref 27–31)
MCHC RBC AUTO-ENTMCNC: 30.5 G/DL (ref 32–36)
MCV RBC AUTO: 102 FL (ref 82–98)
MONOCYTES # BLD AUTO: 0.2 K/UL (ref 0.3–1)
MONOCYTES NFR BLD: 6.5 % (ref 4–15)
NEUTROPHILS # BLD AUTO: 2.1 K/UL (ref 1.8–7.7)
NEUTROPHILS NFR BLD: 74.4 % (ref 38–73)
NRBC BLD-RTO: 0 /100 WBC
PHOSPHATE SERPL-MCNC: 2.3 MG/DL (ref 2.7–4.5)
PLATELET # BLD AUTO: 146 K/UL (ref 150–350)
PMV BLD AUTO: 11.8 FL (ref 9.2–12.9)
POTASSIUM SERPL-SCNC: 3.7 MMOL/L (ref 3.5–5.1)
RBC # BLD AUTO: 3.43 M/UL (ref 4–5.4)
SODIUM SERPL-SCNC: 142 MMOL/L (ref 136–145)
WBC # BLD AUTO: 2.76 K/UL (ref 3.9–12.7)

## 2020-05-11 PROCEDURE — 80197 ASSAY OF TACROLIMUS: CPT

## 2020-05-11 PROCEDURE — 83735 ASSAY OF MAGNESIUM: CPT

## 2020-05-11 PROCEDURE — 36415 COLL VENOUS BLD VENIPUNCTURE: CPT | Mod: PO

## 2020-05-11 PROCEDURE — 85025 COMPLETE CBC W/AUTO DIFF WBC: CPT

## 2020-05-11 PROCEDURE — 80069 RENAL FUNCTION PANEL: CPT

## 2020-05-11 NOTE — TELEPHONE ENCOUNTER
Returned call, pt reports that her grandson received MMR, Varicella and hep b vaccines, she will avoid close contact for one week as previously instructed.    ----- Message from Prince Quiros sent at 5/11/2020  1:40 PM CDT -----  Contact: Pt   Pt calling coord to see if she can be around grandchild with newly acquired shots      [phone](h)

## 2020-05-11 NOTE — TELEPHONE ENCOUNTER
----- Message from Caity Cagle NP sent at 5/11/2020  4:40 PM CDT -----  Result reviewed, Action needed.  WBC trending down, add CMV PCR and continue weekly     [facility] 2100

## 2020-05-12 LAB — TACROLIMUS BLD-MCNC: 13 NG/ML (ref 5–15)

## 2020-05-12 NOTE — TELEPHONE ENCOUNTER
Patient repeated back and voice a understanding of orders.  12 hour trough reviewed with patient and will repeat tacrolimus dose on 5/14 .    ----- Message from Karena Colin MD sent at 5/12/2020 10:49 AM CDT -----  Prograf level higher than goal but previously stable --> arrange for repeat

## 2020-05-14 ENCOUNTER — LAB VISIT (OUTPATIENT)
Dept: LAB | Facility: HOSPITAL | Age: 55
End: 2020-05-14
Attending: INTERNAL MEDICINE
Payer: MEDICARE

## 2020-05-14 ENCOUNTER — HOSPITAL ENCOUNTER (OUTPATIENT)
Dept: RADIOLOGY | Facility: HOSPITAL | Age: 55
Discharge: HOME | End: 2020-05-14
Attending: NURSE PRACTITIONER
Payer: MEDICARE

## 2020-05-14 DIAGNOSIS — Z12.31 BREAST CANCER SCREENING BY MAMMOGRAM: ICD-10-CM

## 2020-05-14 DIAGNOSIS — Z76.82 ORGAN TRANSPLANT CANDIDATE: ICD-10-CM

## 2020-05-14 DIAGNOSIS — Z94.0 KIDNEY REPLACED BY TRANSPLANT: ICD-10-CM

## 2020-05-14 PROCEDURE — 77067 MAMMO DIGITAL SCREENING BILAT WITH TOMOSYNTHESIS_CAD: ICD-10-PCS | Mod: 26,,, | Performed by: RADIOLOGY

## 2020-05-14 PROCEDURE — 80197 ASSAY OF TACROLIMUS: CPT

## 2020-05-14 PROCEDURE — 77063 MAMMO DIGITAL SCREENING BILAT WITH TOMOSYNTHESIS_CAD: ICD-10-PCS | Mod: 26,,, | Performed by: RADIOLOGY

## 2020-05-14 PROCEDURE — 77067 SCR MAMMO BI INCL CAD: CPT | Mod: TC,PO

## 2020-05-14 PROCEDURE — 77063 BREAST TOMOSYNTHESIS BI: CPT | Mod: 26,,, | Performed by: RADIOLOGY

## 2020-05-14 PROCEDURE — 77067 SCR MAMMO BI INCL CAD: CPT | Mod: 26,,, | Performed by: RADIOLOGY

## 2020-05-14 PROCEDURE — 36415 COLL VENOUS BLD VENIPUNCTURE: CPT | Mod: PO

## 2020-05-15 DIAGNOSIS — Z94.0 KIDNEY REPLACED BY TRANSPLANT: ICD-10-CM

## 2020-05-15 LAB
CMV DNA SERPL NAA+PROBE-ACNC: NORMAL IU/ML
TACROLIMUS BLD-MCNC: 11.5 NG/ML (ref 5–15)

## 2020-05-15 RX ORDER — TACROLIMUS 1 MG/1
CAPSULE ORAL
Qty: 270 CAPSULE | Refills: 11 | Status: SHIPPED | OUTPATIENT
Start: 2020-05-15 | End: 2020-06-02 | Stop reason: SDUPTHER

## 2020-05-15 NOTE — TELEPHONE ENCOUNTER
Patient repeated back and voice a understanding of orders. Next labs 5/18/2020.    ----- Message from Anitha Inman MD sent at 5/15/2020  1:12 PM CDT -----  Results reviewed.  Please decrease tacrolimus from 06/05 to 5 mg every morning and 4 mg nightly.

## 2020-05-18 ENCOUNTER — TELEPHONE (OUTPATIENT)
Dept: TRANSPLANT | Facility: CLINIC | Age: 55
End: 2020-05-18

## 2020-05-18 ENCOUNTER — LAB VISIT (OUTPATIENT)
Dept: LAB | Facility: HOSPITAL | Age: 55
End: 2020-05-18
Attending: INTERNAL MEDICINE
Payer: MEDICARE

## 2020-05-18 DIAGNOSIS — Z94.0 KIDNEY REPLACED BY TRANSPLANT: ICD-10-CM

## 2020-05-18 LAB
ALBUMIN SERPL BCP-MCNC: 3.5 G/DL (ref 3.5–5.2)
ANION GAP SERPL CALC-SCNC: 9 MMOL/L (ref 8–16)
ANISOCYTOSIS BLD QL SMEAR: SLIGHT
BASOPHILS NFR BLD: 3 % (ref 0–1.9)
BUN SERPL-MCNC: 29 MG/DL (ref 6–20)
CALCIUM SERPL-MCNC: 8.4 MG/DL (ref 8.7–10.5)
CHLORIDE SERPL-SCNC: 109 MMOL/L (ref 95–110)
CO2 SERPL-SCNC: 23 MMOL/L (ref 23–29)
CREAT SERPL-MCNC: 1.3 MG/DL (ref 0.5–1.4)
DIFFERENTIAL METHOD: ABNORMAL
EOSINOPHIL NFR BLD: 2 % (ref 0–8)
ERYTHROCYTE [DISTWIDTH] IN BLOOD BY AUTOMATED COUNT: 13.4 % (ref 11.5–14.5)
EST. GFR  (AFRICAN AMERICAN): 54.5 ML/MIN/1.73 M^2
EST. GFR  (NON AFRICAN AMERICAN): 47.3 ML/MIN/1.73 M^2
GLUCOSE SERPL-MCNC: 98 MG/DL (ref 70–110)
HCT VFR BLD AUTO: 34.1 % (ref 37–48.5)
HGB BLD-MCNC: 10.4 G/DL (ref 12–16)
IMM GRANULOCYTES # BLD AUTO: ABNORMAL K/UL (ref 0–0.04)
IMM GRANULOCYTES NFR BLD AUTO: ABNORMAL % (ref 0–0.5)
LYMPHOCYTES NFR BLD: 28 % (ref 18–48)
MAGNESIUM SERPL-MCNC: 1.5 MG/DL (ref 1.6–2.6)
MCH RBC QN AUTO: 31.1 PG (ref 27–31)
MCHC RBC AUTO-ENTMCNC: 30.5 G/DL (ref 32–36)
MCV RBC AUTO: 102 FL (ref 82–98)
MONOCYTES NFR BLD: 9 % (ref 4–15)
MYELOCYTES NFR BLD MANUAL: 1 %
NEUTROPHILS NFR BLD: 54 % (ref 38–73)
NEUTS BAND NFR BLD MANUAL: 3 %
NRBC BLD-RTO: 0 /100 WBC
OVALOCYTES BLD QL SMEAR: ABNORMAL
PHOSPHATE SERPL-MCNC: 2.3 MG/DL (ref 2.7–4.5)
PLATELET # BLD AUTO: 137 K/UL (ref 150–350)
PLATELET BLD QL SMEAR: ABNORMAL
PMV BLD AUTO: 12.2 FL (ref 9.2–12.9)
POTASSIUM SERPL-SCNC: 4 MMOL/L (ref 3.5–5.1)
RBC # BLD AUTO: 3.34 M/UL (ref 4–5.4)
SODIUM SERPL-SCNC: 141 MMOL/L (ref 136–145)
WBC # BLD AUTO: 1.68 K/UL (ref 3.9–12.7)

## 2020-05-18 PROCEDURE — 85027 COMPLETE CBC AUTOMATED: CPT

## 2020-05-18 PROCEDURE — 36415 COLL VENOUS BLD VENIPUNCTURE: CPT | Mod: PO

## 2020-05-18 PROCEDURE — 83735 ASSAY OF MAGNESIUM: CPT

## 2020-05-18 PROCEDURE — 80069 RENAL FUNCTION PANEL: CPT

## 2020-05-18 PROCEDURE — 80197 ASSAY OF TACROLIMUS: CPT

## 2020-05-18 PROCEDURE — 85007 BL SMEAR W/DIFF WBC COUNT: CPT

## 2020-05-18 NOTE — TELEPHONE ENCOUNTER
VORB Stop MMF and Valcyte.  5/18 WBC 1.68.  Neupogen 480mcg x 1 tomorrow   Repeat CBC on Thursday 5/21.  ________________  As previously agreed I left voice message with orders and to call coordinator with any questions.

## 2020-05-19 ENCOUNTER — INFUSION (OUTPATIENT)
Dept: INFUSION THERAPY | Facility: HOSPITAL | Age: 55
End: 2020-05-19
Attending: INTERNAL MEDICINE
Payer: MEDICARE

## 2020-05-19 ENCOUNTER — DOCUMENTATION ONLY (OUTPATIENT)
Dept: INFUSION THERAPY | Facility: HOSPITAL | Age: 55
End: 2020-05-19

## 2020-05-19 VITALS
BODY MASS INDEX: 29.86 KG/M2 | DIASTOLIC BLOOD PRESSURE: 71 MMHG | SYSTOLIC BLOOD PRESSURE: 153 MMHG | HEIGHT: 60 IN | TEMPERATURE: 98 F | WEIGHT: 152.13 LBS | RESPIRATION RATE: 18 BRPM | HEART RATE: 72 BPM

## 2020-05-19 DIAGNOSIS — Z94.0 S/P KIDNEY TRANSPLANT: Primary | ICD-10-CM

## 2020-05-19 LAB — TACROLIMUS BLD-MCNC: 9.6 NG/ML (ref 5–15)

## 2020-05-19 PROCEDURE — 63600175 PHARM REV CODE 636 W HCPCS: Mod: JG,PN | Performed by: INTERNAL MEDICINE

## 2020-05-19 PROCEDURE — 96372 THER/PROPH/DIAG INJ SC/IM: CPT | Mod: PN

## 2020-05-19 RX ORDER — HEPARIN 100 UNIT/ML
500 SYRINGE INTRAVENOUS
Status: CANCELLED | OUTPATIENT
Start: 2020-05-19

## 2020-05-19 RX ORDER — ERGOCALCIFEROL 1.25 MG/1
50000 CAPSULE ORAL
Qty: 4 CAPSULE | Refills: 11 | Status: SHIPPED | OUTPATIENT
Start: 2020-05-19 | End: 2020-07-07 | Stop reason: SDUPTHER

## 2020-05-19 RX ADMIN — FILGRASTIM 480 MCG: 480 INJECTION, SOLUTION INTRAVENOUS; SUBCUTANEOUS at 01:05

## 2020-05-19 NOTE — TELEPHONE ENCOUNTER
Patient repeated back and voice a understanding of orders.  Neutropenic precautions reviewed with patient.  Plan injection at [place] infusion today.  Rx sent to [facility] pharmacy per patient request.    ----- Message from Ondina Frye MD sent at 5/19/2020  8:22 AM CDT -----  Low Ca and low vitamin D : please start ergo 85525 units weekly

## 2020-05-19 NOTE — PROGRESS NOTES
.     Kidney Post-Transplant Assessment    Referring Physician: Tabby Velasco  Current Nephrologist: Arthur Mcdonald    ORGAN: LEFT KIDNEY  Donor Type: donation after brain death  PHS Increased Risk: no  Cold Ischemia: 438 mins  Induction Medications: simulect - basiliximab    Subjective:       Kidney History:  Ms. Tala Rivera is a 55 yo female with history of HTN, gastric bypass surgery on 7/28/16, umbilical hernia repair and PD catheter placement on 9/4/18, and ESRD presumed secondary to HTN on HD since 2/5/16 is now s/p KTX from 4/5/20. her Native UOP is approximately 1.8L/day. Pt was Simulect induction  on 4/5/20.     Kidney US: Decreased perfusion within the transplanted kidney.  Overall resistive indices are normal to upper normal.        Interval History: Patient is doing fine.  She states her incision has healed well, she is tolerating her immunosuppression w/o problems or financial concerns, she has no pain over allograft, and voids without problems . Her SBP runs around 120-130       Current Medication  Current Outpatient Medications   Medication Sig Dispense Refill    atovaquone (MEPRON) 750 mg/5 mL Susp Take 10 mLs (1,500 mg total) by mouth once daily. Stop: 4/5/2021 300 mL 11    bisacodyL (DULCOLAX) 5 mg EC tablet Take 2 tablets (10 mg total) by mouth daily as needed for Constipation.  0    calcitRIOL (ROCALTROL) 0.25 MCG Cap Take 1 capsule (0.25 mcg total) by mouth once daily. 30 capsule 11    docusate sodium (COLACE) 100 MG capsule Take 1 capsule (100 mg total) by mouth 3 (three) times daily as needed for Constipation.  0    ergocalciferol (ERGOCALCIFEROL) 50,000 unit Cap Take 1 capsule (50,000 Units total) by mouth every 7 days. 4 capsule 11    famotidine (PEPCID) 20 MG tablet Take 1 tablet (20 mg total) by mouth every evening. 30 tablet 2    furosemide (LASIX) 20 MG tablet Take 1 tablet (20 mg total) by mouth once daily. 30 tablet 11    k phos di & mono-sod phos mono  (K-PHOS-NEUTRAL) 250 mg Tab Take 1 tablet by mouth 2 (two) times daily. 60 tablet 3    NIFEdipine (PROCARDIA-XL) 30 MG (OSM) 24 hr tablet Take 1 tablet (30 mg total) by mouth once daily. 30 tablet 4    oxyCODONE-acetaminophen (PERCOCET) 5-325 mg per tablet Take 1 tablet by mouth every 6 (six) hours as needed for Pain. 21 tablet 0    predniSONE (DELTASONE) 5 MG tablet Take 20mg by mouth daily 4/8-5/7;   Take 15mg daily 5/8-6/7;  Take 10mg daily 6/8-7/7;  then 5mg daily thereafter starting 7/8/2020 120 tablet 11    sodium bicarbonate 650 MG tablet Take 2 tablets (1,300 mg total) by mouth 2 (two) times daily. 120 tablet 11    tacrolimus (PROGRAF) 1 MG Cap Take 5 capsules (5 mg total) by mouth every morning AND 4 capsules (4 mg total) every evening. 270 capsule 11     No current facility-administered medications for this visit.      Facility-Administered Medications Ordered in Other Visits   Medication Dose Route Frequency Provider Last Rate Last Dose    0.9%  NaCl infusion   Intravenous Continuous Richard Awad MD 20 mL/hr at 12/14/18 1432           Review of Systems    Constitutional: Negative for fever, appetite change and fatigue.   Respiratory: Negative for cough, chest tightness, shortness of breath and wheezing.   Cardiovascular: Negative for chest pain, palpitations.   Gastrointestinal: Negative for nausea, vomiting, abdominal pain, diarrhea, constipation, blood in stool and abdominal distention.   Genitourinary: Negative for dysuria, urgency, frequency, hematuria, decreased urine volume, difficulty urinating  Musculoskeletal: Negative for back pain, joint swelling, arthralgias and gait problem.   Psychiatric/Behavioral: Negative for confusion, sleep disturbance and dysphoric mood. The patient is not nervous/anxious.       Physical Exam  Head: normocephalic  Neck: No JVD, cervical axillary, or femoral adenopathies  Heart: no murmurs, Normal s1 and s2, No gallops, no rubs, No murmurs  Lungs; CTA,  good respiratory effort, no crackles  Abdomen: soft, non tender, no splenomegaly or hepatomegaly,   Right lower quadrant surgical scar  Extremities: No edema, skin rash, joint pain  SNC: awake, alert oriented. Cranial nerves are intact, no focalized, sensitivity and strength preserved    Labs:  Lab Results   Component Value Date    WBC 1.70 (LL) 05/21/2020    HGB 10.9 (L) 05/21/2020    HCT 36.2 (L) 05/21/2020     05/18/2020    K 4.0 05/18/2020     05/18/2020    CO2 23 05/18/2020    BUN 29 (H) 05/18/2020    CREATININE 1.3 05/18/2020    EGFRNONAA 47.3 (A) 05/18/2020    CALCIUM 8.4 (L) 05/18/2020    PHOS 2.3 (L) 05/18/2020    MG 1.5 (L) 05/18/2020    ALBUMIN 3.5 05/18/2020    AST 10 05/04/2020    ALT 16 05/04/2020    UTPCR 0.17 05/04/2020    .0 (H) 05/04/2020    TACROLIMUS 9.6 05/18/2020       No results found for: EXTANC, EXTWBC, EXTSEGS, EXTPLATELETS, EXTHEMOGLOBI, EXTHEMATOCRI, EXTCREATININ, EXTSODIUM, EXTPOTASSIUM, EXTBUN, EXTCO2, EXTCALCIUM, EXTPHOSPHORU, EXTGLUCOSE, EXTALBUMIN, EXTAST, EXTALT, EXTBILITOTAL, EXTLIPASE, EXTAMYLASE    No results found for: EXTCYCLOSLVL, EXTSIROLIMUS, EXTTACROLVL, EXTPROTCRE, EXTPTHINTACT, EXTPROTEINUA, EXTWBCUA, EXTRBCUA    Labs were reviewed with the patient    Assessment/Plan:     1. Long term current use of immunosuppressive drug    2. Secondary hyperparathyroidism of renal origin    3. S/P kidney transplant    4. Renal hypertension        Ms. Rivera is a 54 y.o. female with:       # History of ESRD presumed secondary to HTN  - s/p DDKT 4/5/20  - baseline Cr 1.3-1.5  - last Cr 1.3  - Decreased perfusion within the transplanted kidney.  Overall resistive indices are normal to upper normal. Will repeat kidney US next week.         # Immunosuppression:   - continue Prograf 5/4 mg   - Off MMF due to leukopenia  - continue prednisone  - will monitor closely for toxicities      # Leukopenia   - CMV weekly  - Neupogen today and Monday     # Antimicrobial Prophylaxis:    - continue mepron  for PJP prophylaxis      # HTN:   - BPs well controlled   - continue with home blood pressure monitoring  - low salt and healthy life discussed with the patient      # Metabolic Bone Disease/Secondary Hyperparathyroidism:  - on calcitriol   - will monitor PTH, calcium, and phosphorus as per our center protocol        # Anemia of chronic disease:   - Hb stable  - will continue monitoring as per our center guidelines. No indication for acute intervention today         Plan:  - Neupogen 480 mcg today and Monday   - CBC and CMV on Monday   - Home BP checking

## 2020-05-21 ENCOUNTER — LAB VISIT (OUTPATIENT)
Dept: LAB | Facility: HOSPITAL | Age: 55
End: 2020-05-21
Attending: INTERNAL MEDICINE
Payer: MEDICARE

## 2020-05-21 DIAGNOSIS — Z94.0 KIDNEY REPLACED BY TRANSPLANT: ICD-10-CM

## 2020-05-21 LAB
ANISOCYTOSIS BLD QL SMEAR: SLIGHT
BASOPHILS # BLD AUTO: ABNORMAL K/UL (ref 0–0.2)
BASOPHILS NFR BLD: 0 % (ref 0–1.9)
BURR CELLS BLD QL SMEAR: ABNORMAL
DIFFERENTIAL METHOD: ABNORMAL
EOSINOPHIL # BLD AUTO: ABNORMAL K/UL (ref 0–0.5)
EOSINOPHIL NFR BLD: 3 % (ref 0–8)
ERYTHROCYTE [DISTWIDTH] IN BLOOD BY AUTOMATED COUNT: 13.6 % (ref 11.5–14.5)
HCT VFR BLD AUTO: 36.2 % (ref 37–48.5)
HGB BLD-MCNC: 10.9 G/DL (ref 12–16)
HYPOCHROMIA BLD QL SMEAR: ABNORMAL
IMM GRANULOCYTES # BLD AUTO: ABNORMAL K/UL (ref 0–0.04)
IMM GRANULOCYTES NFR BLD AUTO: ABNORMAL % (ref 0–0.5)
LYMPHOCYTES # BLD AUTO: ABNORMAL K/UL (ref 1–4.8)
LYMPHOCYTES NFR BLD: 24 % (ref 18–48)
MCH RBC QN AUTO: 31.1 PG (ref 27–31)
MCHC RBC AUTO-ENTMCNC: 30.1 G/DL (ref 32–36)
MCV RBC AUTO: 103 FL (ref 82–98)
MONOCYTES # BLD AUTO: ABNORMAL K/UL (ref 0.3–1)
MONOCYTES NFR BLD: 9 % (ref 4–15)
NEUTROPHILS NFR BLD: 63 % (ref 38–73)
NEUTS BAND NFR BLD MANUAL: 1 %
NRBC BLD-RTO: 0 /100 WBC
PLATELET # BLD AUTO: 152 K/UL (ref 150–350)
PLATELET BLD QL SMEAR: ABNORMAL
PMV BLD AUTO: 12.1 FL (ref 9.2–12.9)
POIKILOCYTOSIS BLD QL SMEAR: SLIGHT
RBC # BLD AUTO: 3.51 M/UL (ref 4–5.4)
WBC # BLD AUTO: 1.7 K/UL (ref 3.9–12.7)

## 2020-05-21 PROCEDURE — 85007 BL SMEAR W/DIFF WBC COUNT: CPT

## 2020-05-21 PROCEDURE — 85027 COMPLETE CBC AUTOMATED: CPT

## 2020-05-22 ENCOUNTER — CLINICAL SUPPORT (OUTPATIENT)
Dept: TRANSPLANT | Facility: CLINIC | Age: 55
End: 2020-05-22
Payer: MEDICARE

## 2020-05-22 ENCOUNTER — OFFICE VISIT (OUTPATIENT)
Dept: TRANSPLANT | Facility: CLINIC | Age: 55
End: 2020-05-22
Payer: MEDICARE

## 2020-05-22 VITALS
SYSTOLIC BLOOD PRESSURE: 144 MMHG | OXYGEN SATURATION: 100 % | WEIGHT: 147.69 LBS | BODY MASS INDEX: 28.99 KG/M2 | TEMPERATURE: 98 F | HEART RATE: 65 BPM | DIASTOLIC BLOOD PRESSURE: 69 MMHG | HEIGHT: 60 IN

## 2020-05-22 VITALS
HEIGHT: 60 IN | WEIGHT: 147.69 LBS | BODY MASS INDEX: 28.99 KG/M2 | HEART RATE: 65 BPM | SYSTOLIC BLOOD PRESSURE: 144 MMHG | OXYGEN SATURATION: 100 % | DIASTOLIC BLOOD PRESSURE: 69 MMHG | TEMPERATURE: 98 F

## 2020-05-22 DIAGNOSIS — Z94.0 S/P KIDNEY TRANSPLANT: Primary | ICD-10-CM

## 2020-05-22 DIAGNOSIS — N25.81 SECONDARY HYPERPARATHYROIDISM OF RENAL ORIGIN: Chronic | ICD-10-CM

## 2020-05-22 DIAGNOSIS — Z94.0 S/P KIDNEY TRANSPLANT: ICD-10-CM

## 2020-05-22 DIAGNOSIS — I12.9 RENAL HYPERTENSION: ICD-10-CM

## 2020-05-22 DIAGNOSIS — Z94.0 KIDNEY REPLACED BY TRANSPLANT: Primary | ICD-10-CM

## 2020-05-22 DIAGNOSIS — Z79.899 LONG TERM CURRENT USE OF IMMUNOSUPPRESSIVE DRUG: Primary | ICD-10-CM

## 2020-05-22 PROBLEM — E83.51 HYPOCALCEMIA: Status: RESOLVED | Noted: 2020-04-06 | Resolved: 2020-05-22

## 2020-05-22 PROCEDURE — 99215 OFFICE O/P EST HI 40 MIN: CPT | Mod: S$PBB,,, | Performed by: INTERNAL MEDICINE

## 2020-05-22 PROCEDURE — 99999 PR PBB SHADOW E&M-EST. PATIENT-LVL III: ICD-10-PCS | Mod: PBBFAC,,,

## 2020-05-22 PROCEDURE — 99999 PR PBB SHADOW E&M-EST. PATIENT-LVL IV: ICD-10-PCS | Mod: PBBFAC,,, | Performed by: INTERNAL MEDICINE

## 2020-05-22 PROCEDURE — 99999 PR PBB SHADOW E&M-EST. PATIENT-LVL III: CPT | Mod: PBBFAC,,,

## 2020-05-22 PROCEDURE — 99214 OFFICE O/P EST MOD 30 MIN: CPT | Mod: PBBFAC,25 | Performed by: INTERNAL MEDICINE

## 2020-05-22 PROCEDURE — 96372 THER/PROPH/DIAG INJ SC/IM: CPT | Mod: PBBFAC

## 2020-05-22 PROCEDURE — 99999 PR PBB SHADOW E&M-EST. PATIENT-LVL IV: CPT | Mod: PBBFAC,,, | Performed by: INTERNAL MEDICINE

## 2020-05-22 PROCEDURE — 99215 PR OFFICE/OUTPT VISIT, EST, LEVL V, 40-54 MIN: ICD-10-PCS | Mod: S$PBB,,, | Performed by: INTERNAL MEDICINE

## 2020-05-22 RX ORDER — HEPARIN 100 UNIT/ML
500 SYRINGE INTRAVENOUS
Status: CANCELLED | OUTPATIENT
Start: 2020-05-22

## 2020-05-22 RX ADMIN — FILGRASTIM 480 MCG: 480 INJECTION, SOLUTION INTRAVENOUS; SUBCUTANEOUS at 10:05

## 2020-05-22 NOTE — LETTER
May 22, 2020        Arthur Mcdonald  [address] [facility] [address]  Phone: [phone]  Fax: [phone]     Tabby Chavez  [address] [facility] [address]  Phone: [phone]  Fax: [phone]             [facility]a Mc[facility]- Transplant  [address]  [facility] [address]  Phone: [phone]   Patient: Tala Rivera   MR Number: [id number]   YOB: 1965   Date of Visit: 5/22/2020       Dear Dr. Arthur Mcdonald, Tabby Chavez    Thank you for referring Tala Rivera to me for evaluation. Attached you will find relevant portions of my assessment and plan of care.    If you have questions, please do not hesitate to call me. I look forward to following Tala Rivera along with you.    Sincerely,    Ondina Frye MD    Enclosure    If you would like to receive this communication electronically, please contact [email or web address] or [phone] to request [employer] Link access.    [employer] Link is a tool which provides read-only access to select patient information with whom you have a relationship. Its easy to use and provides real time access to review your patients record including encounter summaries, notes, results, and demographic information.    If you feel you have received this communication in error or would no longer like to receive these types of communications, please e-mail [email or web address]

## 2020-05-22 NOTE — PROGRESS NOTES
.Neupogen 480 mcg administered sq to left posterior arm per MD order. Patient tolerated with no complaints. Patient informed of side effects including bone pain, muscle aches, tiredness and temporary flu-like symptoms (fever, chills, shivering, muscle or joint pain), pain to injection site treat w/OTC pain relievers. Advise patient to sit in lobby for 10-15 min in case of any sudden side effect. Verbalized acknowledgment.

## 2020-05-22 NOTE — PATIENT INSTRUCTIONS
Thank you for entrusting your transplant care to us, and visiting me today. Please:      - Feel free to call us with any concerns regarding your health care.  - Monitor your blood pressure and aim to keep < 140/90, lower if a specific target has been given to you.  - Follow a low sodium diet. It is hard to do, but helps keep blood pressure controlled and prevents swelling (edema).   - Avoid nonsteroidal anti-inflamatory drugs (NSAIDS): ibuprofen (Advil, Motrin), naproxen (Aleve, Naprosyn), Indomethacin (Indocin).

## 2020-05-22 NOTE — PROGRESS NOTES
Please Neupogen 480 mcg daily X 2 doses ( today and Monday). Check CBC on Monday. Check CMV with next lab.

## 2020-05-24 LAB — CMV DNA SERPL NAA+PROBE-ACNC: NORMAL IU/ML

## 2020-05-25 ENCOUNTER — LAB VISIT (OUTPATIENT)
Dept: LAB | Facility: HOSPITAL | Age: 55
End: 2020-05-25
Attending: INTERNAL MEDICINE
Payer: MEDICARE

## 2020-05-25 ENCOUNTER — INFUSION (OUTPATIENT)
Dept: INFUSION THERAPY | Facility: HOSPITAL | Age: 55
End: 2020-05-25
Attending: INTERNAL MEDICINE
Payer: MEDICARE

## 2020-05-25 VITALS
DIASTOLIC BLOOD PRESSURE: 62 MMHG | RESPIRATION RATE: 20 BRPM | HEART RATE: 71 BPM | SYSTOLIC BLOOD PRESSURE: 138 MMHG | TEMPERATURE: 98 F

## 2020-05-25 DIAGNOSIS — Z94.0 KIDNEY REPLACED BY TRANSPLANT: ICD-10-CM

## 2020-05-25 DIAGNOSIS — Z94.0 S/P KIDNEY TRANSPLANT: Primary | ICD-10-CM

## 2020-05-25 LAB
ALBUMIN SERPL BCP-MCNC: 3.3 G/DL (ref 3.5–5.2)
ANION GAP SERPL CALC-SCNC: 8 MMOL/L (ref 8–16)
ANISOCYTOSIS BLD QL SMEAR: SLIGHT
BASOPHILS # BLD AUTO: 0.03 K/UL (ref 0–0.2)
BASOPHILS NFR BLD: 2.7 % (ref 0–1.9)
BUN SERPL-MCNC: 32 MG/DL (ref 6–20)
CALCIUM SERPL-MCNC: 8.6 MG/DL (ref 8.7–10.5)
CHLORIDE SERPL-SCNC: 109 MMOL/L (ref 95–110)
CO2 SERPL-SCNC: 26 MMOL/L (ref 23–29)
CREAT SERPL-MCNC: 1.3 MG/DL (ref 0.5–1.4)
DIFFERENTIAL METHOD: ABNORMAL
EOSINOPHIL # BLD AUTO: 0.1 K/UL (ref 0–0.5)
EOSINOPHIL NFR BLD: 4.4 % (ref 0–8)
ERYTHROCYTE [DISTWIDTH] IN BLOOD BY AUTOMATED COUNT: 13.4 % (ref 11.5–14.5)
EST. GFR  (AFRICAN AMERICAN): 54.5 ML/MIN/1.73 M^2
EST. GFR  (NON AFRICAN AMERICAN): 47.3 ML/MIN/1.73 M^2
GLUCOSE SERPL-MCNC: 95 MG/DL (ref 70–110)
HCT VFR BLD AUTO: 34.8 % (ref 37–48.5)
HGB BLD-MCNC: 10.7 G/DL (ref 12–16)
IMM GRANULOCYTES # BLD AUTO: 0.01 K/UL (ref 0–0.04)
IMM GRANULOCYTES NFR BLD AUTO: 0.9 % (ref 0–0.5)
LYMPHOCYTES # BLD AUTO: 0.4 K/UL (ref 1–4.8)
LYMPHOCYTES NFR BLD: 31 % (ref 18–48)
MAGNESIUM SERPL-MCNC: 1.7 MG/DL (ref 1.6–2.6)
MCH RBC QN AUTO: 31.7 PG (ref 27–31)
MCHC RBC AUTO-ENTMCNC: 30.7 G/DL (ref 32–36)
MCV RBC AUTO: 103 FL (ref 82–98)
MONOCYTES # BLD AUTO: 0.3 K/UL (ref 0.3–1)
MONOCYTES NFR BLD: 29.2 % (ref 4–15)
NEUTROPHILS # BLD AUTO: 0.4 K/UL (ref 1.8–7.7)
NEUTROPHILS NFR BLD: 31.8 % (ref 38–73)
NRBC BLD-RTO: 0 /100 WBC
OVALOCYTES BLD QL SMEAR: ABNORMAL
PHOSPHATE SERPL-MCNC: 2.7 MG/DL (ref 2.7–4.5)
PLATELET # BLD AUTO: 155 K/UL (ref 150–350)
PLATELET BLD QL SMEAR: ABNORMAL
PMV BLD AUTO: 11.8 FL (ref 9.2–12.9)
POTASSIUM SERPL-SCNC: 3.4 MMOL/L (ref 3.5–5.1)
RBC # BLD AUTO: 3.38 M/UL (ref 4–5.4)
SODIUM SERPL-SCNC: 143 MMOL/L (ref 136–145)
WBC # BLD AUTO: 1.13 K/UL (ref 3.9–12.7)

## 2020-05-25 PROCEDURE — 85025 COMPLETE CBC W/AUTO DIFF WBC: CPT

## 2020-05-25 PROCEDURE — 63600175 PHARM REV CODE 636 W HCPCS: Mod: JG,PN | Performed by: INTERNAL MEDICINE

## 2020-05-25 PROCEDURE — 96372 THER/PROPH/DIAG INJ SC/IM: CPT | Mod: PN

## 2020-05-25 PROCEDURE — 80197 ASSAY OF TACROLIMUS: CPT

## 2020-05-25 PROCEDURE — 80069 RENAL FUNCTION PANEL: CPT

## 2020-05-25 PROCEDURE — 83735 ASSAY OF MAGNESIUM: CPT

## 2020-05-25 RX ORDER — HEPARIN 100 UNIT/ML
500 SYRINGE INTRAVENOUS
Status: CANCELLED | OUTPATIENT
Start: 2020-05-25

## 2020-05-25 RX ADMIN — FILGRASTIM 480 MCG: 480 INJECTION, SOLUTION INTRAVENOUS; SUBCUTANEOUS at 10:05

## 2020-05-26 ENCOUNTER — INFUSION (OUTPATIENT)
Dept: INFUSION THERAPY | Facility: HOSPITAL | Age: 55
End: 2020-05-26
Attending: INTERNAL MEDICINE
Payer: MEDICARE

## 2020-05-26 ENCOUNTER — DOCUMENTATION ONLY (OUTPATIENT)
Dept: INFUSION THERAPY | Facility: HOSPITAL | Age: 55
End: 2020-05-26

## 2020-05-26 ENCOUNTER — TELEPHONE (OUTPATIENT)
Dept: TRANSPLANT | Facility: CLINIC | Age: 55
End: 2020-05-26

## 2020-05-26 VITALS
RESPIRATION RATE: 20 BRPM | HEART RATE: 68 BPM | SYSTOLIC BLOOD PRESSURE: 140 MMHG | DIASTOLIC BLOOD PRESSURE: 78 MMHG | TEMPERATURE: 98 F

## 2020-05-26 DIAGNOSIS — Z94.0 S/P KIDNEY TRANSPLANT: Primary | ICD-10-CM

## 2020-05-26 LAB — TACROLIMUS BLD-MCNC: 7.1 NG/ML (ref 5–15)

## 2020-05-26 PROCEDURE — 96372 THER/PROPH/DIAG INJ SC/IM: CPT | Mod: PN

## 2020-05-26 PROCEDURE — 63600175 PHARM REV CODE 636 W HCPCS: Mod: JG,PN | Performed by: INTERNAL MEDICINE

## 2020-05-26 RX ORDER — HEPARIN 100 UNIT/ML
500 SYRINGE INTRAVENOUS
Status: CANCELLED | OUTPATIENT
Start: 2020-05-26

## 2020-05-26 RX ADMIN — FILGRASTIM 480 MCG: 480 INJECTION, SOLUTION INTRAVENOUS; SUBCUTANEOUS at 01:05

## 2020-05-26 NOTE — TELEPHONE ENCOUNTER
Patient repeated back and voice a understanding of orders.  Neutropenic precautions reviewed , Injections schedule for today and tomorrow 5/27/2020, repeat CBC schedule for 5/28/2020.    ----- Message from Ondina Frye MD sent at 5/26/2020  8:26 AM CDT -----  Please neupogen 480 mcg daily X 2 doses. Check CBC on Thursday. Thank you!

## 2020-05-27 ENCOUNTER — INFUSION (OUTPATIENT)
Dept: INFUSION THERAPY | Facility: HOSPITAL | Age: 55
End: 2020-05-27
Attending: INTERNAL MEDICINE
Payer: MEDICARE

## 2020-05-27 VITALS
DIASTOLIC BLOOD PRESSURE: 76 MMHG | RESPIRATION RATE: 20 BRPM | SYSTOLIC BLOOD PRESSURE: 132 MMHG | HEART RATE: 72 BPM | TEMPERATURE: 98 F

## 2020-05-27 DIAGNOSIS — Z94.0 S/P KIDNEY TRANSPLANT: Primary | ICD-10-CM

## 2020-05-27 PROCEDURE — 96372 THER/PROPH/DIAG INJ SC/IM: CPT | Mod: PN

## 2020-05-27 PROCEDURE — 63600175 PHARM REV CODE 636 W HCPCS: Mod: JG,PN | Performed by: INTERNAL MEDICINE

## 2020-05-27 RX ORDER — HEPARIN 100 UNIT/ML
500 SYRINGE INTRAVENOUS
Status: CANCELLED | OUTPATIENT
Start: 2020-05-27

## 2020-05-27 RX ADMIN — FILGRASTIM 480 MCG: 480 INJECTION, SOLUTION INTRAVENOUS; SUBCUTANEOUS at 01:05

## 2020-05-28 ENCOUNTER — HOSPITAL ENCOUNTER (OUTPATIENT)
Dept: RADIOLOGY | Facility: HOSPITAL | Age: 55
Discharge: HOME | End: 2020-05-28
Attending: INTERNAL MEDICINE
Payer: MEDICARE

## 2020-05-28 DIAGNOSIS — Z94.0 KIDNEY REPLACED BY TRANSPLANT: ICD-10-CM

## 2020-05-28 LAB — CMV DNA SERPL NAA+PROBE-ACNC: NORMAL IU/ML

## 2020-05-28 PROCEDURE — 76776 US EXAM K TRANSPL W/DOPPLER: CPT | Mod: TC,PO

## 2020-05-28 PROCEDURE — 76776 US EXAM K TRANSPL W/DOPPLER: CPT | Mod: 26,,, | Performed by: RADIOLOGY

## 2020-05-28 PROCEDURE — 76776 US TRANSPLANT KIDNEY WITH DOPPLER: ICD-10-PCS | Mod: 26,,, | Performed by: RADIOLOGY

## 2020-06-01 ENCOUNTER — LAB VISIT (OUTPATIENT)
Dept: LAB | Facility: HOSPITAL | Age: 55
End: 2020-06-01
Attending: INTERNAL MEDICINE
Payer: MEDICARE

## 2020-06-01 DIAGNOSIS — Z94.0 KIDNEY REPLACED BY TRANSPLANT: ICD-10-CM

## 2020-06-01 LAB
ALBUMIN SERPL BCP-MCNC: 3.5 G/DL (ref 3.5–5.2)
ANION GAP SERPL CALC-SCNC: 8 MMOL/L (ref 8–16)
BASOPHILS # BLD AUTO: ABNORMAL K/UL (ref 0–0.2)
BASOPHILS NFR BLD: 0 % (ref 0–1.9)
BUN SERPL-MCNC: 37 MG/DL (ref 6–20)
CALCIUM SERPL-MCNC: 8.7 MG/DL (ref 8.7–10.5)
CHLORIDE SERPL-SCNC: 111 MMOL/L (ref 95–110)
CO2 SERPL-SCNC: 23 MMOL/L (ref 23–29)
CREAT SERPL-MCNC: 1.3 MG/DL (ref 0.5–1.4)
DIFFERENTIAL METHOD: ABNORMAL
EOSINOPHIL # BLD AUTO: ABNORMAL K/UL (ref 0–0.5)
EOSINOPHIL NFR BLD: 1 % (ref 0–8)
ERYTHROCYTE [DISTWIDTH] IN BLOOD BY AUTOMATED COUNT: 13.3 % (ref 11.5–14.5)
EST. GFR  (AFRICAN AMERICAN): 54.5 ML/MIN/1.73 M^2
EST. GFR  (NON AFRICAN AMERICAN): 47.3 ML/MIN/1.73 M^2
GLUCOSE SERPL-MCNC: 88 MG/DL (ref 70–110)
HCT VFR BLD AUTO: 37.1 % (ref 37–48.5)
HGB BLD-MCNC: 11.2 G/DL (ref 12–16)
IMM GRANULOCYTES # BLD AUTO: ABNORMAL K/UL (ref 0–0.04)
IMM GRANULOCYTES NFR BLD AUTO: ABNORMAL % (ref 0–0.5)
LYMPHOCYTES # BLD AUTO: ABNORMAL K/UL (ref 1–4.8)
LYMPHOCYTES NFR BLD: 9 % (ref 18–48)
MAGNESIUM SERPL-MCNC: 2.1 MG/DL (ref 1.6–2.6)
MCH RBC QN AUTO: 31.1 PG (ref 27–31)
MCHC RBC AUTO-ENTMCNC: 30.2 G/DL (ref 32–36)
MCV RBC AUTO: 103 FL (ref 82–98)
METAMYELOCYTES NFR BLD MANUAL: 6 %
MONOCYTES # BLD AUTO: ABNORMAL K/UL (ref 0.3–1)
MONOCYTES NFR BLD: 11 % (ref 4–15)
MYELOCYTES NFR BLD MANUAL: 1 %
NEUTROPHILS NFR BLD: 66 % (ref 38–73)
NEUTS BAND NFR BLD MANUAL: 6 %
NRBC BLD-RTO: 0 /100 WBC
PHOSPHATE SERPL-MCNC: 2.8 MG/DL (ref 2.7–4.5)
PLATELET # BLD AUTO: 114 K/UL (ref 150–350)
PLATELET BLD QL SMEAR: ABNORMAL
PMV BLD AUTO: 12.1 FL (ref 9.2–12.9)
POTASSIUM SERPL-SCNC: 4.1 MMOL/L (ref 3.5–5.1)
RBC # BLD AUTO: 3.6 M/UL (ref 4–5.4)
SODIUM SERPL-SCNC: 142 MMOL/L (ref 136–145)
WBC # BLD AUTO: 4.86 K/UL (ref 3.9–12.7)

## 2020-06-01 PROCEDURE — 85007 BL SMEAR W/DIFF WBC COUNT: CPT

## 2020-06-01 PROCEDURE — 85027 COMPLETE CBC AUTOMATED: CPT

## 2020-06-01 PROCEDURE — 83735 ASSAY OF MAGNESIUM: CPT

## 2020-06-01 PROCEDURE — 80197 ASSAY OF TACROLIMUS: CPT

## 2020-06-01 PROCEDURE — 80069 RENAL FUNCTION PANEL: CPT

## 2020-06-01 PROCEDURE — 36415 COLL VENOUS BLD VENIPUNCTURE: CPT | Mod: PO

## 2020-06-02 DIAGNOSIS — Z94.0 KIDNEY REPLACED BY TRANSPLANT: ICD-10-CM

## 2020-06-02 LAB — TACROLIMUS BLD-MCNC: 6.7 NG/ML (ref 5–15)

## 2020-06-02 RX ORDER — TACROLIMUS 1 MG/1
5 CAPSULE ORAL EVERY 12 HOURS
Qty: 300 CAPSULE | Refills: 11 | Status: SHIPPED | OUTPATIENT
Start: 2020-06-02 | End: 2020-06-23 | Stop reason: SDUPTHER

## 2020-06-02 NOTE — TELEPHONE ENCOUNTER
Patient repeated back and voice a understanding of orders.    ----- Message from Ondina Frye MD sent at 6/2/2020 10:24 AM CDT -----  Please tac to 5 mg BID.

## 2020-06-03 LAB — CMV DNA SERPL NAA+PROBE-ACNC: NORMAL IU/ML

## 2020-06-08 ENCOUNTER — LAB VISIT (OUTPATIENT)
Dept: LAB | Facility: HOSPITAL | Age: 55
End: 2020-06-08
Attending: INTERNAL MEDICINE
Payer: MEDICARE

## 2020-06-08 DIAGNOSIS — Z94.0 KIDNEY REPLACED BY TRANSPLANT: ICD-10-CM

## 2020-06-08 LAB
ALBUMIN SERPL BCP-MCNC: 3.4 G/DL (ref 3.5–5.2)
ALBUMIN SERPL BCP-MCNC: 3.4 G/DL (ref 3.5–5.2)
ALP SERPL-CCNC: 92 U/L (ref 55–135)
ALT SERPL W/O P-5'-P-CCNC: 15 U/L (ref 10–44)
ANION GAP SERPL CALC-SCNC: 7 MMOL/L (ref 8–16)
AST SERPL-CCNC: 13 U/L (ref 10–40)
BASOPHILS # BLD AUTO: 0.13 K/UL (ref 0–0.2)
BASOPHILS NFR BLD: 2.7 % (ref 0–1.9)
BILIRUB DIRECT SERPL-MCNC: 0.3 MG/DL (ref 0.1–0.3)
BILIRUB SERPL-MCNC: 0.7 MG/DL (ref 0.1–1)
BUN SERPL-MCNC: 39 MG/DL (ref 6–20)
CALCIUM SERPL-MCNC: 8.6 MG/DL (ref 8.7–10.5)
CHLORIDE SERPL-SCNC: 109 MMOL/L (ref 95–110)
CO2 SERPL-SCNC: 24 MMOL/L (ref 23–29)
CREAT SERPL-MCNC: 1.2 MG/DL (ref 0.5–1.4)
DIFFERENTIAL METHOD: ABNORMAL
EOSINOPHIL # BLD AUTO: 0.1 K/UL (ref 0–0.5)
EOSINOPHIL NFR BLD: 1.3 % (ref 0–8)
ERYTHROCYTE [DISTWIDTH] IN BLOOD BY AUTOMATED COUNT: 13.8 % (ref 11.5–14.5)
EST. GFR  (AFRICAN AMERICAN): >60 ML/MIN/1.73 M^2
EST. GFR  (NON AFRICAN AMERICAN): 52.1 ML/MIN/1.73 M^2
GLUCOSE SERPL-MCNC: 91 MG/DL (ref 70–110)
HCT VFR BLD AUTO: 35.9 % (ref 37–48.5)
HGB BLD-MCNC: 11 G/DL (ref 12–16)
IMM GRANULOCYTES # BLD AUTO: 0.07 K/UL (ref 0–0.04)
IMM GRANULOCYTES NFR BLD AUTO: 1.5 % (ref 0–0.5)
LYMPHOCYTES # BLD AUTO: 0.6 K/UL (ref 1–4.8)
LYMPHOCYTES NFR BLD: 12.6 % (ref 18–48)
MAGNESIUM SERPL-MCNC: 2 MG/DL (ref 1.6–2.6)
MCH RBC QN AUTO: 31.1 PG (ref 27–31)
MCHC RBC AUTO-ENTMCNC: 30.6 G/DL (ref 32–36)
MCV RBC AUTO: 101 FL (ref 82–98)
MONOCYTES # BLD AUTO: 0.4 K/UL (ref 0.3–1)
MONOCYTES NFR BLD: 8.8 % (ref 4–15)
NEUTROPHILS # BLD AUTO: 3.5 K/UL (ref 1.8–7.7)
NEUTROPHILS NFR BLD: 73.1 % (ref 38–73)
NRBC BLD-RTO: 0 /100 WBC
PHOSPHATE SERPL-MCNC: 3.2 MG/DL (ref 2.7–4.5)
PLATELET # BLD AUTO: 180 K/UL (ref 150–350)
PMV BLD AUTO: 12.1 FL (ref 9.2–12.9)
POTASSIUM SERPL-SCNC: 4.1 MMOL/L (ref 3.5–5.1)
PROT SERPL-MCNC: 5.9 G/DL (ref 6–8.4)
RBC # BLD AUTO: 3.54 M/UL (ref 4–5.4)
SODIUM SERPL-SCNC: 140 MMOL/L (ref 136–145)
WBC # BLD AUTO: 4.78 K/UL (ref 3.9–12.7)

## 2020-06-08 PROCEDURE — 84075 ASSAY ALKALINE PHOSPHATASE: CPT

## 2020-06-08 PROCEDURE — 85025 COMPLETE CBC W/AUTO DIFF WBC: CPT

## 2020-06-08 PROCEDURE — 87799 DETECT AGENT NOS DNA QUANT: CPT

## 2020-06-08 PROCEDURE — 83735 ASSAY OF MAGNESIUM: CPT

## 2020-06-08 PROCEDURE — 80197 ASSAY OF TACROLIMUS: CPT

## 2020-06-08 PROCEDURE — 82247 BILIRUBIN TOTAL: CPT

## 2020-06-08 PROCEDURE — 80069 RENAL FUNCTION PANEL: CPT

## 2020-06-09 LAB — TACROLIMUS BLD-MCNC: 7.8 NG/ML (ref 5–15)

## 2020-06-10 ENCOUNTER — TELEPHONE (OUTPATIENT)
Dept: TRANSPLANT | Facility: CLINIC | Age: 55
End: 2020-06-10

## 2020-06-10 LAB — CMV DNA SERPL NAA+PROBE-ACNC: NORMAL IU/ML

## 2020-06-10 NOTE — TELEPHONE ENCOUNTER
Results reviewed with patient. 12 hour trough confirmed. Next labs 6/15/2020      ----- Message from Caity Cagle NP sent at 6/9/2020  3:53 PM CDT -----  Result reviewed, Action needed.  Repeat trough to reassess ~ 1 week

## 2020-06-13 NOTE — PROGRESS NOTES
.     Kidney Post-Transplant Assessment    Referring Physician: Tabby Velasco  Current Nephrologist: Arthur Mcdonald    ORGAN: LEFT KIDNEY  Donor Type: donation after brain death  PHS Increased Risk: no  Cold Ischemia: 438 mins  Induction Medications: simulect - basiliximab    Subjective:       Kidney History:  Ms. Tala Rivera is a 55 yo female with history of HTN, gastric bypass surgery on 7/28/16, umbilical hernia repair and PD catheter placement on 9/4/18, and ESRD presumed secondary to HTN on HD since 2/5/16 is now s/p KTX from 4/5/20. her Native UOP is approximately 1.8L/day. Pt was Simulect induction  on 4/5/20.     - Kidney US: Decreased perfusion within the transplanted kidney.  Overall resistive indices are normal to upper normal.  - Repeated kidney US: huge fluid collection 05/22/20        Interval History: Patient is doing fine.  No complaints today. she is tolerating her immunosuppression w/o problems or financial concerns, she has no pain over allograft, and voids without problems . Her SBP runs around 120-130       Current Medication  Current Outpatient Medications   Medication Sig Dispense Refill    atovaquone (MEPRON) 750 mg/5 mL Susp Take 10 mLs (1,500 mg total) by mouth once daily. Stop: 4/5/2021 300 mL 11    calcitRIOL (ROCALTROL) 0.25 MCG Cap Take 1 capsule (0.25 mcg total) by mouth once daily. 30 capsule 11    docusate sodium (COLACE) 100 MG capsule Take 1 capsule (100 mg total) by mouth 3 (three) times daily as needed for Constipation.  0    ergocalciferol (ERGOCALCIFEROL) 50,000 unit Cap Take 1 capsule (50,000 Units total) by mouth every 7 days. 4 capsule 11    famotidine (PEPCID) 20 MG tablet Take 1 tablet (20 mg total) by mouth every evening. 30 tablet 2    k phos di & mono-sod phos mono (K-PHOS-NEUTRAL) 250 mg Tab Take 1 tablet by mouth 2 (two) times daily. 60 tablet 3    mycophenolate (CELLCEPT) 250 mg Cap Take 750 mg by mouth 2 (two) times daily.       NIFEdipine (PROCARDIA-XL) 30 MG (OSM) 24 hr tablet Take 1 tablet (30 mg total) by mouth once daily. 30 tablet 4    predniSONE (DELTASONE) 5 MG tablet Take 20mg by mouth daily 4/8-5/7;   Take 15mg daily 5/8-6/7;  Take 10mg daily 6/8-7/7;  then 5mg daily thereafter starting 7/8/2020 120 tablet 11    sodium bicarbonate 650 MG tablet Take 2 tablets (1,300 mg total) by mouth 2 (two) times daily. 120 tablet 11    tacrolimus (PROGRAF) 1 MG Cap Take 5 capsules (5 mg total) by mouth every 12 (twelve) hours. 300 capsule 11     No current facility-administered medications for this visit.      Facility-Administered Medications Ordered in Other Visits   Medication Dose Route Frequency Provider Last Rate Last Dose    0.9%  NaCl infusion   Intravenous Continuous Richard Awad MD 20 mL/hr at 12/14/18 1432           Review of Systems    Constitutional: Negative for fever, appetite change and fatigue.   Respiratory: Negative for cough, chest tightness, shortness of breath and wheezing.   Cardiovascular: Negative for chest pain, palpitations.   Gastrointestinal: Negative for nausea, vomiting, abdominal pain, diarrhea, constipation, blood in stool and abdominal distention.   Genitourinary: Negative for dysuria, urgency, frequency, hematuria, decreased urine volume, difficulty urinating  Musculoskeletal: Negative for back pain, joint swelling, arthralgias and gait problem.   Psychiatric/Behavioral: Negative for confusion, sleep disturbance and dysphoric mood. The patient is not nervous/anxious.       Physical Exam  Head: normocephalic  Neck: No JVD, cervical axillary, or femoral adenopathies  Heart: no murmurs, Normal s1 and s2, No gallops, no rubs, No murmurs  Lungs; CTA, good respiratory effort, no crackles  Abdomen: soft, non tender, no splenomegaly or hepatomegaly,   Right lower quadrant surgical scar  Extremities: No edema, skin rash, joint pain  SNC: awake, alert oriented. Cranial nerves are intact, no focalized,  sensitivity and strength preserved    Labs:  Lab Results   Component Value Date    WBC 5.26 06/15/2020    HGB 11.3 (L) 06/15/2020    HCT 36.7 (L) 06/15/2020     06/15/2020    K 4.0 06/15/2020     (H) 06/15/2020    CO2 23 06/15/2020    BUN 35 (H) 06/15/2020    CREATININE 1.2 06/15/2020    EGFRNONAA 52.1 (A) 06/15/2020    CALCIUM 8.9 06/15/2020    PHOS 2.8 06/15/2020    MG 1.8 06/15/2020    ALBUMIN 3.4 (L) 06/15/2020    AST 13 06/08/2020    ALT 15 06/08/2020    UTPCR 0.20 06/08/2020    .0 (H) 05/04/2020    TACROLIMUS 8.7 06/15/2020       No results found for: EXTANC, EXTWBC, EXTSEGS, EXTPLATELETS, EXTHEMOGLOBI, EXTHEMATOCRI, EXTCREATININ, EXTSODIUM, EXTPOTASSIUM, EXTBUN, EXTCO2, EXTCALCIUM, EXTPHOSPHORU, EXTGLUCOSE, EXTALBUMIN, EXTAST, EXTALT, EXTBILITOTAL, EXTLIPASE, EXTAMYLASE    No results found for: EXTCYCLOSLVL, EXTSIROLIMUS, EXTTACROLVL, EXTPROTCRE, EXTPTHINTACT, EXTPROTEINUA, EXTWBCUA, EXTRBCUA    Labs were reviewed with the patient    Assessment/Plan:     1. Long term current use of immunosuppressive drug    2. Secondary hyperparathyroidism of renal origin    3. Anemia of renal disease    4. S/P kidney transplant    5. Renal hypertension        Ms. Rivera is a 54 y.o. female with:       # History of ESRD presumed secondary to HTN  - s/p DDKT 4/5/20  - baseline Cr 1.3-1.5  - last Cr 1.2  - Decreased perfusion within the transplanted kidney.  Overall resistive indices are normal to upper normal.   - repeat kidney US today      # Immunosuppression:   - continue Prograf 5/5  mg   - resume  mg BID   - continue prednisone  - will monitor closely for toxicities      # Leukopenia: resolved  - CMV weekly    # Antimicrobial Prophylaxis:   - continue mepron  for PJP prophylaxis      # HTN:   - BPs well controlled   - continue with home blood pressure monitoring  - low salt and healthy life discussed with the patient      # Metabolic Bone Disease/Secondary Hyperparathyroidism:  - on calcitriol    - will monitor PTH, calcium, and phosphorus as per our center protocol        # Anemia of chronic disease:   - Hb stable  - will continue monitoring as per our center guidelines. No indication for acute intervention today         Plan:  - stop lasix  - start  mg BID  - pending prograf today  - kidney US today

## 2020-06-15 ENCOUNTER — HOSPITAL ENCOUNTER (OUTPATIENT)
Dept: RADIOLOGY | Facility: HOSPITAL | Age: 55
Discharge: HOME | End: 2020-06-15
Attending: INTERNAL MEDICINE
Payer: MEDICARE

## 2020-06-15 ENCOUNTER — OFFICE VISIT (OUTPATIENT)
Dept: TRANSPLANT | Facility: CLINIC | Age: 55
End: 2020-06-15
Payer: MEDICARE

## 2020-06-15 ENCOUNTER — LAB VISIT (OUTPATIENT)
Dept: LAB | Facility: HOSPITAL | Age: 55
End: 2020-06-15
Attending: INTERNAL MEDICINE
Payer: MEDICARE

## 2020-06-15 VITALS
HEIGHT: 60 IN | OXYGEN SATURATION: 100 % | BODY MASS INDEX: 29.22 KG/M2 | WEIGHT: 148.81 LBS | SYSTOLIC BLOOD PRESSURE: 130 MMHG | HEART RATE: 59 BPM | TEMPERATURE: 98 F | DIASTOLIC BLOOD PRESSURE: 73 MMHG

## 2020-06-15 DIAGNOSIS — Z94.0 KIDNEY REPLACED BY TRANSPLANT: Primary | ICD-10-CM

## 2020-06-15 DIAGNOSIS — Z94.0 KIDNEY REPLACED BY TRANSPLANT: ICD-10-CM

## 2020-06-15 DIAGNOSIS — N25.81 SECONDARY HYPERPARATHYROIDISM OF RENAL ORIGIN: Chronic | ICD-10-CM

## 2020-06-15 DIAGNOSIS — N18.9 ANEMIA OF RENAL DISEASE: Chronic | ICD-10-CM

## 2020-06-15 DIAGNOSIS — D63.1 ANEMIA OF RENAL DISEASE: Chronic | ICD-10-CM

## 2020-06-15 DIAGNOSIS — Z94.0 S/P KIDNEY TRANSPLANT: ICD-10-CM

## 2020-06-15 DIAGNOSIS — I12.9 RENAL HYPERTENSION: ICD-10-CM

## 2020-06-15 DIAGNOSIS — Z79.899 LONG TERM CURRENT USE OF IMMUNOSUPPRESSIVE DRUG: Primary | ICD-10-CM

## 2020-06-15 LAB
ALBUMIN SERPL BCP-MCNC: 3.4 G/DL (ref 3.5–5.2)
ANION GAP SERPL CALC-SCNC: 8 MMOL/L (ref 8–16)
BASOPHILS # BLD AUTO: 0.08 K/UL (ref 0–0.2)
BASOPHILS NFR BLD: 1.5 % (ref 0–1.9)
BUN SERPL-MCNC: 35 MG/DL (ref 6–20)
CALCIUM SERPL-MCNC: 8.9 MG/DL (ref 8.7–10.5)
CHLORIDE SERPL-SCNC: 111 MMOL/L (ref 95–110)
CO2 SERPL-SCNC: 23 MMOL/L (ref 23–29)
CREAT SERPL-MCNC: 1.2 MG/DL (ref 0.5–1.4)
DIFFERENTIAL METHOD: ABNORMAL
EOSINOPHIL # BLD AUTO: 0.1 K/UL (ref 0–0.5)
EOSINOPHIL NFR BLD: 1.3 % (ref 0–8)
ERYTHROCYTE [DISTWIDTH] IN BLOOD BY AUTOMATED COUNT: 13.6 % (ref 11.5–14.5)
EST. GFR  (AFRICAN AMERICAN): >60 ML/MIN/1.73 M^2
EST. GFR  (NON AFRICAN AMERICAN): 52.1 ML/MIN/1.73 M^2
GLUCOSE SERPL-MCNC: 95 MG/DL (ref 70–110)
HCT VFR BLD AUTO: 36.7 % (ref 37–48.5)
HGB BLD-MCNC: 11.3 G/DL (ref 12–16)
IMM GRANULOCYTES # BLD AUTO: 0.02 K/UL (ref 0–0.04)
IMM GRANULOCYTES NFR BLD AUTO: 0.4 % (ref 0–0.5)
LYMPHOCYTES # BLD AUTO: 0.3 K/UL (ref 1–4.8)
LYMPHOCYTES NFR BLD: 6.5 % (ref 18–48)
MAGNESIUM SERPL-MCNC: 1.8 MG/DL (ref 1.6–2.6)
MCH RBC QN AUTO: 31.7 PG (ref 27–31)
MCHC RBC AUTO-ENTMCNC: 30.8 G/DL (ref 32–36)
MCV RBC AUTO: 103 FL (ref 82–98)
MONOCYTES # BLD AUTO: 0.5 K/UL (ref 0.3–1)
MONOCYTES NFR BLD: 9.5 % (ref 4–15)
NEUTROPHILS # BLD AUTO: 4.3 K/UL (ref 1.8–7.7)
NEUTROPHILS NFR BLD: 80.8 % (ref 38–73)
NRBC BLD-RTO: 0 /100 WBC
PHOSPHATE SERPL-MCNC: 2.8 MG/DL (ref 2.7–4.5)
PLATELET # BLD AUTO: 192 K/UL (ref 150–350)
PMV BLD AUTO: 11.2 FL (ref 9.2–12.9)
POTASSIUM SERPL-SCNC: 4 MMOL/L (ref 3.5–5.1)
RBC # BLD AUTO: 3.57 M/UL (ref 4–5.4)
SODIUM SERPL-SCNC: 142 MMOL/L (ref 136–145)
TACROLIMUS BLD-MCNC: 8.7 NG/ML (ref 5–15)
WBC # BLD AUTO: 5.26 K/UL (ref 3.9–12.7)

## 2020-06-15 PROCEDURE — 85025 COMPLETE CBC W/AUTO DIFF WBC: CPT

## 2020-06-15 PROCEDURE — 76776 US EXAM K TRANSPL W/DOPPLER: CPT | Mod: 26,,, | Performed by: RADIOLOGY

## 2020-06-15 PROCEDURE — 76776 US EXAM K TRANSPL W/DOPPLER: CPT | Mod: TC

## 2020-06-15 PROCEDURE — 99999 PR PBB SHADOW E&M-EST. PATIENT-LVL IV: ICD-10-PCS | Mod: PBBFAC,,, | Performed by: INTERNAL MEDICINE

## 2020-06-15 PROCEDURE — 99215 OFFICE O/P EST HI 40 MIN: CPT | Mod: S$PBB,,, | Performed by: INTERNAL MEDICINE

## 2020-06-15 PROCEDURE — 36415 COLL VENOUS BLD VENIPUNCTURE: CPT

## 2020-06-15 PROCEDURE — 99214 OFFICE O/P EST MOD 30 MIN: CPT | Mod: PBBFAC | Performed by: INTERNAL MEDICINE

## 2020-06-15 PROCEDURE — 80069 RENAL FUNCTION PANEL: CPT

## 2020-06-15 PROCEDURE — 99215 PR OFFICE/OUTPT VISIT, EST, LEVL V, 40-54 MIN: ICD-10-PCS | Mod: S$PBB,,, | Performed by: INTERNAL MEDICINE

## 2020-06-15 PROCEDURE — 76776 US TRANSPLANT KIDNEY WITH DOPPLER: ICD-10-PCS | Mod: 26,,, | Performed by: RADIOLOGY

## 2020-06-15 PROCEDURE — 99999 PR PBB SHADOW E&M-EST. PATIENT-LVL IV: CPT | Mod: PBBFAC,,, | Performed by: INTERNAL MEDICINE

## 2020-06-15 PROCEDURE — 83735 ASSAY OF MAGNESIUM: CPT

## 2020-06-15 PROCEDURE — 80197 ASSAY OF TACROLIMUS: CPT

## 2020-06-15 RX ORDER — VALGANCICLOVIR 450 MG/1
900 TABLET, FILM COATED ORAL DAILY
COMMUNITY
End: 2020-06-15

## 2020-06-15 RX ORDER — MYCOPHENOLATE MOFETIL 250 MG/1
750 CAPSULE ORAL 2 TIMES DAILY
COMMUNITY
End: 2020-06-29

## 2020-06-15 NOTE — LETTER
Elizabeth 15, 2020        Arthur Mcdonald  [address] [facility] [address]  Phone: [phone]  Fax: [phone]     Tabby Chavez  [address] [facility] [address]  Phone: [phone]  Fax: [phone]             [facility]  [address]  [facility] [address]  Phone: [phone]   Patient: Tala Rivera   MR Number: [id number]   YOB: 1965   Date of Visit: 6/15/2020       Dear Dr. Arthur Mcdonald, Tabby Chavez    Thank you for referring Tala Rivera to me for evaluation. Attached you will find relevant portions of my assessment and plan of care.    If you have questions, please do not hesitate to call me. I look forward to following Tala Rivera along with you.    Sincerely,    Ondina Frye MD    Enclosure    If you would like to receive this communication electronically, please contact [email or web address] or [phone] to request [employer] Link access.    [employer] Link is a tool which provides read-only access to select patient information with whom you have a relationship. Its easy to use and provides real time access to review your patients record including encounter summaries, notes, results, and demographic information.    If you feel you have received this communication in error or would no longer like to receive these types of communications, please e-mail [email or web address]

## 2020-06-15 NOTE — PATIENT INSTRUCTIONS
Thank you for entrusting your transplant care to us, and visiting me today. Please:      - Feel free to call us with any concerns regarding your health care.  - Monitor your blood pressure and aim to keep < 140/90  - stop lasix  - start cellcept 750 mg BID  - kidney US today

## 2020-06-16 ENCOUNTER — TELEPHONE (OUTPATIENT)
Dept: TRANSPLANT | Facility: CLINIC | Age: 55
End: 2020-06-16

## 2020-06-16 DIAGNOSIS — Z94.0 KIDNEY REPLACED BY TRANSPLANT: Primary | ICD-10-CM

## 2020-06-16 DIAGNOSIS — N28.89 PERINEPHRIC FLUID COLLECTION OF KIDNEY TRANSPLANT: ICD-10-CM

## 2020-06-16 DIAGNOSIS — T86.19 PERINEPHRIC FLUID COLLECTION OF KIDNEY TRANSPLANT: ICD-10-CM

## 2020-06-16 NOTE — TELEPHONE ENCOUNTER
Results reviewed with patient. Plan in place.     ----- Message from Ondina Frye MD sent at 6/16/2020 10:03 AM CDT -----  No underwood. Fluid drainage as soon as possible. Repeat kidney US after drainage.

## 2020-06-18 ENCOUNTER — TELEPHONE (OUTPATIENT)
Dept: INTERVENTIONAL RADIOLOGY/VASCULAR | Facility: HOSPITAL | Age: 55
End: 2020-06-18

## 2020-06-18 DIAGNOSIS — Z94.0 KIDNEY REPLACED BY TRANSPLANT: Primary | ICD-10-CM

## 2020-06-22 ENCOUNTER — LAB VISIT (OUTPATIENT)
Dept: LAB | Facility: HOSPITAL | Age: 55
End: 2020-06-22
Attending: INTERNAL MEDICINE
Payer: MEDICARE

## 2020-06-22 DIAGNOSIS — Z94.0 KIDNEY REPLACED BY TRANSPLANT: ICD-10-CM

## 2020-06-22 LAB
ALBUMIN SERPL BCP-MCNC: 3.4 G/DL (ref 3.5–5.2)
ANION GAP SERPL CALC-SCNC: 6 MMOL/L (ref 8–16)
BASOPHILS # BLD AUTO: 0.06 K/UL (ref 0–0.2)
BASOPHILS NFR BLD: 1.4 % (ref 0–1.9)
BUN SERPL-MCNC: 36 MG/DL (ref 6–20)
CALCIUM SERPL-MCNC: 8.7 MG/DL (ref 8.7–10.5)
CHLORIDE SERPL-SCNC: 113 MMOL/L (ref 95–110)
CO2 SERPL-SCNC: 25 MMOL/L (ref 23–29)
CREAT SERPL-MCNC: 1.5 MG/DL (ref 0.5–1.4)
DIFFERENTIAL METHOD: ABNORMAL
EOSINOPHIL # BLD AUTO: 0.1 K/UL (ref 0–0.5)
EOSINOPHIL NFR BLD: 2.3 % (ref 0–8)
ERYTHROCYTE [DISTWIDTH] IN BLOOD BY AUTOMATED COUNT: 13.2 % (ref 11.5–14.5)
EST. GFR  (AFRICAN AMERICAN): 45.8 ML/MIN/1.73 M^2
EST. GFR  (NON AFRICAN AMERICAN): 39.8 ML/MIN/1.73 M^2
GLUCOSE SERPL-MCNC: 98 MG/DL (ref 70–110)
HCT VFR BLD AUTO: 36 % (ref 37–48.5)
HGB BLD-MCNC: 10.8 G/DL (ref 12–16)
IMM GRANULOCYTES # BLD AUTO: 0.01 K/UL (ref 0–0.04)
IMM GRANULOCYTES NFR BLD AUTO: 0.2 % (ref 0–0.5)
LYMPHOCYTES # BLD AUTO: 0.5 K/UL (ref 1–4.8)
LYMPHOCYTES NFR BLD: 11.7 % (ref 18–48)
MAGNESIUM SERPL-MCNC: 1.8 MG/DL (ref 1.6–2.6)
MCH RBC QN AUTO: 31 PG (ref 27–31)
MCHC RBC AUTO-ENTMCNC: 30 G/DL (ref 32–36)
MCV RBC AUTO: 103 FL (ref 82–98)
MONOCYTES # BLD AUTO: 0.4 K/UL (ref 0.3–1)
MONOCYTES NFR BLD: 8 % (ref 4–15)
NEUTROPHILS # BLD AUTO: 3.3 K/UL (ref 1.8–7.7)
NEUTROPHILS NFR BLD: 76.4 % (ref 38–73)
NRBC BLD-RTO: 0 /100 WBC
PHOSPHATE SERPL-MCNC: 3.1 MG/DL (ref 2.7–4.5)
PLATELET # BLD AUTO: 179 K/UL (ref 150–350)
PMV BLD AUTO: 11.5 FL (ref 9.2–12.9)
POTASSIUM SERPL-SCNC: 3.9 MMOL/L (ref 3.5–5.1)
RBC # BLD AUTO: 3.48 M/UL (ref 4–5.4)
SODIUM SERPL-SCNC: 144 MMOL/L (ref 136–145)
WBC # BLD AUTO: 4.35 K/UL (ref 3.9–12.7)

## 2020-06-22 PROCEDURE — 80069 RENAL FUNCTION PANEL: CPT

## 2020-06-22 PROCEDURE — 83735 ASSAY OF MAGNESIUM: CPT

## 2020-06-22 PROCEDURE — 80197 ASSAY OF TACROLIMUS: CPT

## 2020-06-22 PROCEDURE — 85025 COMPLETE CBC W/AUTO DIFF WBC: CPT

## 2020-06-22 PROCEDURE — 36415 COLL VENOUS BLD VENIPUNCTURE: CPT | Mod: PO

## 2020-06-23 DIAGNOSIS — Z94.0 KIDNEY REPLACED BY TRANSPLANT: ICD-10-CM

## 2020-06-23 LAB
CMV DNA SERPL NAA+PROBE-ACNC: NORMAL IU/ML
TACROLIMUS BLD-MCNC: 12.2 NG/ML (ref 5–15)

## 2020-06-23 RX ORDER — TACROLIMUS 1 MG/1
4 CAPSULE ORAL EVERY 12 HOURS
Qty: 240 CAPSULE | Refills: 11 | Status: SHIPPED | OUTPATIENT
Start: 2020-06-23 | End: 2020-07-22 | Stop reason: SDUPTHER

## 2020-06-23 NOTE — PROGRESS NOTES
Na: 142 : More water intake . Please Decrease prograf to 4 mg BID.  Check renal panel and prograf level  in a week

## 2020-06-23 NOTE — TELEPHONE ENCOUNTER
Patient repeated back and voice a understanding of orders.  Next labs 6/25/2020.    ----- Message from Ondina Frye MD sent at 6/23/2020 12:30 PM CDT -----  Na: 142 : More water intake . Please Decrease prograf to 4 mg BID.  Check renal panel and prograf level  in a week

## 2020-06-24 DIAGNOSIS — Z94.0 KIDNEY REPLACED BY TRANSPLANT: Primary | ICD-10-CM

## 2020-06-24 RX ORDER — FENTANYL CITRATE 50 UG/ML
50 INJECTION, SOLUTION INTRAMUSCULAR; INTRAVENOUS
Status: CANCELLED | OUTPATIENT
Start: 2020-06-24

## 2020-06-24 RX ORDER — MIDAZOLAM HYDROCHLORIDE 1 MG/ML
1 INJECTION INTRAMUSCULAR; INTRAVENOUS
Status: CANCELLED | OUTPATIENT
Start: 2020-06-24

## 2020-06-25 ENCOUNTER — HOSPITAL ENCOUNTER (OUTPATIENT)
Facility: HOSPITAL | Age: 55
Discharge: HOME | End: 2020-06-25
Attending: INTERNAL MEDICINE | Admitting: INTERNAL MEDICINE
Payer: MEDICARE

## 2020-06-25 VITALS
SYSTOLIC BLOOD PRESSURE: 123 MMHG | OXYGEN SATURATION: 99 % | HEIGHT: 60 IN | BODY MASS INDEX: 29.25 KG/M2 | RESPIRATION RATE: 16 BRPM | HEART RATE: 62 BPM | TEMPERATURE: 98 F | DIASTOLIC BLOOD PRESSURE: 66 MMHG | WEIGHT: 149 LBS

## 2020-06-25 DIAGNOSIS — N28.89 PERINEPHRIC FLUID COLLECTION OF KIDNEY TRANSPLANT: ICD-10-CM

## 2020-06-25 DIAGNOSIS — T86.19 PERINEPHRIC FLUID COLLECTION OF KIDNEY TRANSPLANT: ICD-10-CM

## 2020-06-25 DIAGNOSIS — Z94.0 KIDNEY REPLACED BY TRANSPLANT: ICD-10-CM

## 2020-06-25 LAB
BODY FLUID SOURCE, CREATININE: NORMAL
CMV DNA SERPL NAA+PROBE-ACNC: NORMAL IU/ML
CREAT FLD-MCNC: 1.2 MG/DL

## 2020-06-25 PROCEDURE — 63600175 PHARM REV CODE 636 W HCPCS: Performed by: RADIOLOGY

## 2020-06-25 PROCEDURE — 82570 ASSAY OF URINE CREATININE: CPT

## 2020-06-25 RX ORDER — SODIUM CHLORIDE 9 MG/ML
500 INJECTION, SOLUTION INTRAVENOUS ONCE
Status: DISCONTINUED | OUTPATIENT
Start: 2020-06-25 | End: 2020-06-25 | Stop reason: HOSPADM

## 2020-06-25 RX ORDER — MIDAZOLAM HYDROCHLORIDE 1 MG/ML
INJECTION INTRAMUSCULAR; INTRAVENOUS CODE/TRAUMA/SEDATION MEDICATION
Status: COMPLETED | OUTPATIENT
Start: 2020-06-25 | End: 2020-06-25

## 2020-06-25 RX ORDER — FENTANYL CITRATE 50 UG/ML
INJECTION, SOLUTION INTRAMUSCULAR; INTRAVENOUS CODE/TRAUMA/SEDATION MEDICATION
Status: COMPLETED | OUTPATIENT
Start: 2020-06-25 | End: 2020-06-25

## 2020-06-25 RX ADMIN — MIDAZOLAM HYDROCHLORIDE 1 MG: 1 INJECTION, SOLUTION INTRAMUSCULAR; INTRAVENOUS at 12:06

## 2020-06-25 RX ADMIN — MIDAZOLAM HYDROCHLORIDE 0.5 MG: 1 INJECTION, SOLUTION INTRAMUSCULAR; INTRAVENOUS at 12:06

## 2020-06-25 RX ADMIN — FENTANYL CITRATE 25 MCG: 50 INJECTION, SOLUTION INTRAMUSCULAR; INTRAVENOUS at 12:06

## 2020-06-25 RX ADMIN — FENTANYL CITRATE 50 MCG: 50 INJECTION, SOLUTION INTRAMUSCULAR; INTRAVENOUS at 12:06

## 2020-06-25 NOTE — PROGRESS NOTES
Informed Dawn in IR of pt's rechecking of BP and HR. Informed of the reading noted below.      06/25/20 1021   Vital Signs   Pulse (!) 58   Heart Rate Source Monitor   BP (!) 180/84   MAP (mmHg) 121   BP Location Left arm   BP Method Automatic   Patient Position Lying     Also informed Dawn that pt and pt's  were inquiring of the delay. Dawn verbalized acknowledgment and states she will inform the doctor. Also reports that the room that pt's case will be in is currently occupied but they are working on pt's case next. Updated pt and pt's  of delay and awaiting a room. Pt and pt's  verbalized understanding.

## 2020-06-25 NOTE — DISCHARGE SUMMARY
Radiology Discharge Summary      Hospital Course: No complications    Admit Date: 6/25/2020  Discharge Date: 06/25/2020     Instructions Given to Patient: Yes  Diet: Resume prior diet  Activity: activity as tolerated    Description of Condition on Discharge: Stable  Vital Signs (Most Recent): Temp: 98 °F (36.7 °C) (06/25/20 1230)  Pulse: 62 (06/25/20 1315)  Resp: 16 (06/25/20 1315)  BP: 123/66 (06/25/20 1315)  SpO2: 99 % (06/25/20 1315)    Discharge Disposition: Home    Discharge Diagnosis: Post renal transplant fluid collection.      Follow-up: as per ordering provider.     Procedure: US and CT guided placement of drainage catheter into RLQ fluid collection.     Alonso Carlton MD  PGY - 4  Department of Radiology

## 2020-06-25 NOTE — H&P
Radiology History & Physical      SUBJECTIVE:     Chief Complaint: brandon-renal transplant fluid collection    History of Present Illness:  Tala Rivera is a 54 y.o. female status post renal transplant 2020 who presents for US-guided drainage of a fluid collection between the renal transplant and bladder.    Past Medical History:   Diagnosis Date    Anemia of renal disease     ESRD on dialysis     Dr. Muhammad     Essential hypertension     PD catheter dysfunction 10/18/2018    Secondary hyperparathyroidism of renal origin      Past Surgical History:   Procedure Laterality Date    AV FISTULA PLACEMENT Left     never matured    AV graft Right 2015     SECTION      x3; ; , and     COLONOSCOPY N/A 2020    Procedure: COLONOSCOPY;  Surgeon: Darrell Golden MD;  Location: [facility];  Service: Endoscopy;  Laterality: N/A;    GASTRIC BYPASS      KIDNEY TRANSPLANT N/A 2020    Procedure: TRANSPLANT, KIDNEY;  Surgeon: Megan Garcia MD;  Location: [address];  Service: Transplant;  Laterality: N/A;    LAPAROSCOPIC REVISION OF PROCEDURE INVOLVING PERITONEAL DIALYSIS CATHETER N/A 10/18/2018    Procedure: REVISION OF PROCEDURE INVOLVING PERITONEAL DIALYSIS CATHETER, LAPAROSCOPIC;  Surgeon: Hair Jimenez MD;  Location: [facility];  Service: General;  Laterality: N/A;    PERITONEAL CATHETER INSERTION N/A 2018    Procedure: Laparoscopic INSERTION, CATHETER, INTRAPERITONEAL;  Surgeon: Hair Jimenez MD;  Location: [facility];  Service: General;  Laterality: N/A;    PERITONEAL CATHETER REMOVAL N/A 2018    Procedure: REMOVAL, CATHETER, DIALYSIS, PERITONEAL;  Surgeon: Hair Jimenez MD;  Location: [facility];  Service: General;  Laterality: N/A;    WOUND EXPLORATION N/A 2018    Procedure: EXPLORATION, WOUND-Surgical Site Irrigation;  Surgeon: Hair Jimenez MD;  Location: [facility];  Service: General;  Laterality: N/A;       Home Meds:   Prior to Admission  medications    Medication Sig Start Date End Date Taking? Authorizing Provider   mycophenolate (CELLCEPT) 250 mg Cap Take 750 mg by mouth 2 (two) times daily.   Yes Historical Provider, MD   NIFEdipine (PROCARDIA-XL) 30 MG (OSM) 24 hr tablet Take 1 tablet (30 mg total) by mouth once daily. 4/9/20  Yes Ondina Frye MD   tacrolimus (PROGRAF) 1 MG Cap Take 4 capsules (4 mg total) by mouth every 12 (twelve) hours. 6/23/20 6/23/21 Yes Ondina Frye MD   atovaquone (MEPRON) 750 mg/5 mL Susp Take 10 mLs (1,500 mg total) by mouth once daily. Stop: 4/5/2021 4/5/20 4/5/21  Ondina Frye MD   calcitRIOL (ROCALTROL) 0.25 MCG Cap Take 1 capsule (0.25 mcg total) by mouth once daily. 5/5/20 5/5/21  Ondina Frye MD   docusate sodium (COLACE) 100 MG capsule Take 1 capsule (100 mg total) by mouth 3 (three) times daily as needed for Constipation. 4/6/20   Ondina Frye MD   ergocalciferol (ERGOCALCIFEROL) 50,000 unit Cap Take 1 capsule (50,000 Units total) by mouth every 7 days. 5/19/20 5/19/21  Ondina Frye MD   famotidine (PEPCID) 20 MG tablet Take 1 tablet (20 mg total) by mouth every evening. 4/6/20   Ondina Frye MD   k phos di & mono-sod phos mono (K-PHOS-NEUTRAL) 250 mg Tab Take 1 tablet by mouth 2 (two) times daily. 4/8/20   Ondina Frye MD   predniSONE (DELTASONE) 5 MG tablet Take 20mg by mouth daily 4/8-5/7;   Take 15mg daily 5/8-6/7;  Take 10mg daily 6/8-7/7;  then 5mg daily thereafter starting 7/8/2020 4/6/20   Ondina Frye MD   sodium bicarbonate 650 MG tablet Take 2 tablets (1,300 mg total) by mouth 2 (two) times daily. 4/13/20 4/13/21  Ondina Frye MD     Anticoagulants/Antiplatelets: no anticoagulation    Allergies:   Review of patient's allergies indicates:   Allergen Reactions    Sulfa (sulfonamide antibiotics) Hives     Sedation History:  no adverse reactions    Review of Systems:   Hematological: no known coagulopathies  Respiratory: no shortness of  breath  Cardiovascular: no chest pain  Gastrointestinal: no abdominal pain  Genito-Urinary: no dysuria  Musculoskeletal: negative  Neurological: no TIA or stroke symptoms         OBJECTIVE:     Vital Signs (Most Recent)       Physical Exam:  ASA: III  Mallampati: III    General: no acute distress  Mental Status: alert and oriented to person, place and time  HEENT: normocephalic, atraumatic  Chest: unlabored breathing  Heart: regular heart rate  Abdomen: nondistended  Extremity: moves all extremities    Laboratory  Lab Results   Component Value Date    INR 1.0 06/25/2020       Lab Results   Component Value Date    WBC 5.88 06/25/2020    HGB 11.4 (L) 06/25/2020    HCT 37.4 06/25/2020     (H) 06/25/2020     06/25/2020      Lab Results   Component Value Date     06/25/2020     06/25/2020    K 4.0 06/25/2020     (H) 06/25/2020    CO2 22 (L) 06/25/2020    BUN 30 (H) 06/25/2020    CREATININE 1.3 06/25/2020    CALCIUM 8.8 06/25/2020    MG 1.7 06/25/2020    ALT 15 06/08/2020    AST 13 06/08/2020    ALBUMIN 3.4 (L) 06/25/2020    BILITOT 0.7 06/08/2020    BILIDIR 0.3 06/08/2020       ASSESSMENT/PLAN:     Sedation Plan: moderate   Patient will undergo US-guided drainage of a fluid collection between the renal transplant and bladder.    Mercedes Patrick, PGY-3

## 2020-06-25 NOTE — PROGRESS NOTES
Pt arrived to ROCU bed 1 for post procedure recovery. Report received from [employer] [facility]y. Pt oriented to unit and staff.  Stretcher locked and placed in lowest position. Calming environment promoted. Comfort measures provided. Pt resting comfortably. Dressing CDI. VSS. No acute events. See flow sheets for post procedure monitoring. Pt denies pain/discomfort.  Will continue to monitor.

## 2020-06-25 NOTE — PROGRESS NOTES
Recovery complete. Discharge instructions and handouts provided. Pt verbalized understanding. Pt discharged in wheelchair to family

## 2020-06-25 NOTE — PROGRESS NOTES
Dr Patrick present at bedside. MD is aware of pt's BP noted below.      06/25/20 0857   Vitals   Pulse 62   Heart Rate Source Monitor   BP (!) 185/61   MAP (mmHg) 117   BP Location Left arm   BP Method Automatic   Patient Position Lying     Informed MD of pt reporting that she took her BP medication this morning. Also informed MD that this BP was a retake. 1st BP reading taken was 183/87, HR 61 that was taken before PIV insertion so retook to see d/t pt stating she was nervous about PIV. Pt denying any symptoms just reports being a little nervous about the procedure. MD verbalized acknowledgement and states that she will update me after notifying her attending.

## 2020-06-25 NOTE — PLAN OF CARE
US-guided drainage of a fluid collection between the renal transplant and bladder and drain placement complete. Pt tolerated well. VSS. No signs or symptoms of distress noted.  Dsg to rlq CDI, bulb suction intact.  Pt will be transferred to ROCU bed and report to be given at bedside.

## 2020-06-25 NOTE — PLAN OF CARE
Plan of care reviewed with patient, patient verbalized understanding.  Pt arrived to Interventional Radiology room 173 via stretcher for US-guided drainage of a fluid collection between the renal transplant and bladder..  Name verified using two identifiers.  Allergies verified.  Labs, orders and consent reviewed on chart.  Pt oriented to unit and staff.  See flow sheet for documentation, monitoring and medication administration. Will continue to monitor.

## 2020-06-26 DIAGNOSIS — Z94.0 KIDNEY REPLACED BY TRANSPLANT: Primary | ICD-10-CM

## 2020-06-26 NOTE — PROGRESS NOTES
Kidney Post-Transplant Assessment    Referring Physician: Tabby Velasco  Current Nephrologist: Arthur Mcdonald    ORGAN: LEFT KIDNEY  Donor Type: donation after brain death  PHS Increased Risk: no  Cold Ischemia: 438 mins  Induction Medications: simulect - basiliximab    Subjective:       Kidney History:  Ms. Tala Rivera is a 55 yo female with history of HTN, gastric bypass surgery on 7/28/16, umbilical hernia repair and PD catheter placement on 9/4/18, and ESRD presumed secondary to HTN on HD since 2/5/16 is now s/p KTX from 4/5/20. her Native UOP is approximately 1.8L/day. Pt was Simulect induction  on 4/5/20.     - Kidney US: Decreased perfusion within the transplanted kidney.  Overall resistive indices are normal to upper normal.  - Repeated kidney US: huge fluid collection 05/22/20 s/p drain placement.        Interval History: PDoing well at home. Still has L sided drain, drains ~100cc/day she say. Non painful and clear effluent. Urinating well. Reports BP at home ranges from 120-160 SBP/~60. Adherent w/ all meds, no fevers, chills, nausea, vomiting, LE swelling    Current Medication  Current Outpatient Medications   Medication Sig Dispense Refill    atovaquone (MEPRON) 750 mg/5 mL Susp Take 10 mLs (1,500 mg total) by mouth once daily. Stop: 4/5/2021 300 mL 11    calcitRIOL (ROCALTROL) 0.25 MCG Cap Take 1 capsule (0.25 mcg total) by mouth once daily. 30 capsule 11    docusate sodium (COLACE) 100 MG capsule Take 1 capsule (100 mg total) by mouth 3 (three) times daily as needed for Constipation.  0    ergocalciferol (ERGOCALCIFEROL) 50,000 unit Cap Take 1 capsule (50,000 Units total) by mouth every 7 days. 4 capsule 11    k phos di & mono-sod phos mono (K-PHOS-NEUTRAL) 250 mg Tab Take 1 tablet by mouth 2 (two) times daily. 60 tablet 3    mycophenolate (CELLCEPT) 250 mg Cap Take 3 capsules (750 mg total) by mouth 2 (two) times daily. 240 capsule 11    NIFEdipine (PROCARDIA-XL) 30 MG  (OSM) 24 hr tablet Take 2 tablets (60 mg total) by mouth once daily. 30 tablet 4    predniSONE (DELTASONE) 5 MG tablet Take 20mg by mouth daily 4/8-5/7;   Take 15mg daily 5/8-6/7;  Take 10mg daily 6/8-7/7;  then 5mg daily thereafter starting 7/8/2020 120 tablet 11    sodium bicarbonate 650 MG tablet Take 2 tablets (1,300 mg total) by mouth 2 (two) times daily. 120 tablet 11    tacrolimus (PROGRAF) 1 MG Cap Take 4 capsules (4 mg total) by mouth every 12 (twelve) hours. 240 capsule 11     No current facility-administered medications for this visit.      Facility-Administered Medications Ordered in Other Visits   Medication Dose Route Frequency Provider Last Rate Last Dose    0.9%  NaCl infusion   Intravenous Continuous Richard Awad MD 20 mL/hr at 12/14/18 1432           Review of Systems    Constitutional: Negative for fever, appetite change and fatigue.   Respiratory: Negative for cough, chest tightness, shortness of breath and wheezing.   Cardiovascular: Negative for chest pain, palpitations.   Gastrointestinal: Negative for nausea, vomiting, abdominal pain, diarrhea, constipation, blood in stool and abdominal distention. RLE drain, non painful  Genitourinary: Negative for dysuria, urgency, frequency, hematuria, decreased urine volume, difficulty urinating  Musculoskeletal: Negative for back pain, joint swelling, arthralgias and gait problem.   Psychiatric/Behavioral: Negative for confusion, sleep disturbance and dysphoric mood. The patient is not nervous/anxious.       Physical Exam  Head: normocephalic  Neck: No JVD, cervical axillary, or femoral adenopathies  Heart: no murmurs, Normal s1 and s2, No gallops, no rubs, No murmurs  Lungs; CTA, good respiratory effort, no crackles  Abdomen: soft, non tender, no splenomegaly or hepatomegaly,   Right lower quadrant surgical scar with drain in place and clear fluid  Extremities: No edema, skin rash, joint pain  SNC: awake, alert oriented. Cranial nerves are  intact, no focalized, sensitivity and strength preserved    Labs:  Lab Results   Component Value Date    WBC 5.88 06/25/2020    HGB 11.4 (L) 06/25/2020    HCT 37.4 06/25/2020     06/25/2020    K 4.0 06/25/2020     (H) 06/25/2020    CO2 22 (L) 06/25/2020    BUN 30 (H) 06/25/2020    CREATININE 1.3 06/25/2020    EGFRNONAA 47.3 (A) 06/25/2020    CALCIUM 8.8 06/25/2020    PHOS 3.0 06/25/2020    MG 1.7 06/25/2020    ALBUMIN 3.4 (L) 06/25/2020    AST 13 06/08/2020    ALT 15 06/08/2020    UTPCR 0.20 06/08/2020    .0 (H) 05/04/2020    TACROLIMUS 9.2 06/25/2020       No results found for: EXTANC, EXTWBC, EXTSEGS, EXTPLATELETS, EXTHEMOGLOBI, EXTHEMATOCRI, EXTCREATININ, EXTSODIUM, EXTPOTASSIUM, EXTBUN, EXTCO2, EXTCALCIUM, EXTPHOSPHORU, EXTGLUCOSE, EXTALBUMIN, EXTAST, EXTALT, EXTBILITOTAL, EXTLIPASE, EXTAMYLASE    No results found for: EXTCYCLOSLVL, EXTSIROLIMUS, EXTTACROLVL, EXTPROTCRE, EXTPTHINTACT, EXTPROTEINUA, EXTWBCUA, EXTRBCUA    Labs were reviewed with the patient    Assessment/Plan:     1. At risk for opportunistic infections    2. Long term current use of immunosuppressive drug    3. Secondary hyperparathyroidism of renal origin    4. Kidney replaced by transplant        Ms. Rivera is a 54 y.o. female with:       # History of ESRD presumed secondary to HTN  - S/P DDKT 4/5/20  - baseline Cr 1.3-1.5  - last Cr 1.2  - Large lobulated fluid collection between the kidney the bladder measuring 12.4 x 15.3 by 9.3 cm This appears to compress the bladder and ureter on 6/15/20  - S/P drain placement on 6/16/20. Drain still putting out ~100cc/day. Will get US Thursday to assess resolution/improvement of fluid collection. Tx surgery to follow drain      # Immunosuppression:   - now on Prograf 4/4, level pending today  - cont  mg BID   - continue prednisone  - will monitor closely for toxicities      # Leukopenia: resolved  - CMV per protocol    # Antimicrobial Prophylaxis:   - continue mepron  for PJP  prophylaxis  -Stopped valcyte    # HTN:   - BPs above goal at home of 130/80  - Will change nifedipine to 30mg bid  - low salt and healthy life discussed with the patient      # Metabolic Bone Disease/Secondary Hyperparathyroidism:  - on calcitriol   - will monitor PTH, calcium, and phosphorus as per our center protocol        # Anemia of chronic disease:   - Hb stable  - will continue monitoring as per our center guidelines. No indication for acute intervention today         Plan:  - Nifedipine 30 mg twice a day  - Stop pepcid if no GI problem   - Stop Valcyte   - Kidney US on Thursday  - Tx surgery to follow the drain

## 2020-06-29 ENCOUNTER — OFFICE VISIT (OUTPATIENT)
Dept: TRANSPLANT | Facility: CLINIC | Age: 55
End: 2020-06-29
Payer: MEDICARE

## 2020-06-29 ENCOUNTER — LAB VISIT (OUTPATIENT)
Dept: LAB | Facility: HOSPITAL | Age: 55
End: 2020-06-29
Attending: INTERNAL MEDICINE
Payer: MEDICARE

## 2020-06-29 VITALS
WEIGHT: 147.25 LBS | BODY MASS INDEX: 28.91 KG/M2 | RESPIRATION RATE: 18 BRPM | HEIGHT: 60 IN | OXYGEN SATURATION: 100 % | TEMPERATURE: 98 F | HEART RATE: 64 BPM | SYSTOLIC BLOOD PRESSURE: 147 MMHG | DIASTOLIC BLOOD PRESSURE: 70 MMHG

## 2020-06-29 DIAGNOSIS — Z79.899 LONG TERM CURRENT USE OF IMMUNOSUPPRESSIVE DRUG: ICD-10-CM

## 2020-06-29 DIAGNOSIS — Z94.9 HYPERTENSION ASSOCIATED WITH TRANSPLANTATION: ICD-10-CM

## 2020-06-29 DIAGNOSIS — Z94.0 KIDNEY REPLACED BY TRANSPLANT: ICD-10-CM

## 2020-06-29 DIAGNOSIS — N25.81 SECONDARY HYPERPARATHYROIDISM OF RENAL ORIGIN: Chronic | ICD-10-CM

## 2020-06-29 DIAGNOSIS — Z94.0 KIDNEY REPLACED BY TRANSPLANT: Primary | ICD-10-CM

## 2020-06-29 DIAGNOSIS — Z91.89 AT RISK FOR OPPORTUNISTIC INFECTIONS: Primary | ICD-10-CM

## 2020-06-29 DIAGNOSIS — I15.8 HYPERTENSION ASSOCIATED WITH TRANSPLANTATION: ICD-10-CM

## 2020-06-29 LAB
ALBUMIN SERPL BCP-MCNC: 3.3 G/DL (ref 3.5–5.2)
ANION GAP SERPL CALC-SCNC: 6 MMOL/L (ref 8–16)
BASOPHILS # BLD AUTO: 0.05 K/UL (ref 0–0.2)
BASOPHILS NFR BLD: 1 % (ref 0–1.9)
BUN SERPL-MCNC: 42 MG/DL (ref 6–20)
CALCIUM SERPL-MCNC: 8.6 MG/DL (ref 8.7–10.5)
CHLORIDE SERPL-SCNC: 109 MMOL/L (ref 95–110)
CO2 SERPL-SCNC: 25 MMOL/L (ref 23–29)
CREAT SERPL-MCNC: 1.3 MG/DL (ref 0.5–1.4)
DIFFERENTIAL METHOD: ABNORMAL
EOSINOPHIL # BLD AUTO: 0.1 K/UL (ref 0–0.5)
EOSINOPHIL NFR BLD: 2.5 % (ref 0–8)
ERYTHROCYTE [DISTWIDTH] IN BLOOD BY AUTOMATED COUNT: 12.8 % (ref 11.5–14.5)
EST. GFR  (AFRICAN AMERICAN): 54.5 ML/MIN/1.73 M^2
EST. GFR  (NON AFRICAN AMERICAN): 47.3 ML/MIN/1.73 M^2
GLUCOSE SERPL-MCNC: 87 MG/DL (ref 70–110)
HCT VFR BLD AUTO: 34.7 % (ref 37–48.5)
HGB BLD-MCNC: 10.8 G/DL (ref 12–16)
IMM GRANULOCYTES # BLD AUTO: 0.01 K/UL (ref 0–0.04)
IMM GRANULOCYTES NFR BLD AUTO: 0.2 % (ref 0–0.5)
LYMPHOCYTES # BLD AUTO: 0.5 K/UL (ref 1–4.8)
LYMPHOCYTES NFR BLD: 9.4 % (ref 18–48)
MAGNESIUM SERPL-MCNC: 1.7 MG/DL (ref 1.6–2.6)
MCH RBC QN AUTO: 30.8 PG (ref 27–31)
MCHC RBC AUTO-ENTMCNC: 31.1 G/DL (ref 32–36)
MCV RBC AUTO: 99 FL (ref 82–98)
MONOCYTES # BLD AUTO: 0.3 K/UL (ref 0.3–1)
MONOCYTES NFR BLD: 6.4 % (ref 4–15)
NEUTROPHILS # BLD AUTO: 3.9 K/UL (ref 1.8–7.7)
NEUTROPHILS NFR BLD: 80.5 % (ref 38–73)
NRBC BLD-RTO: 0 /100 WBC
PHOSPHATE SERPL-MCNC: 2.8 MG/DL (ref 2.7–4.5)
PLATELET # BLD AUTO: 139 K/UL (ref 150–350)
PMV BLD AUTO: 11.8 FL (ref 9.2–12.9)
POTASSIUM SERPL-SCNC: 3.9 MMOL/L (ref 3.5–5.1)
RBC # BLD AUTO: 3.51 M/UL (ref 4–5.4)
SODIUM SERPL-SCNC: 140 MMOL/L (ref 136–145)
WBC # BLD AUTO: 4.87 K/UL (ref 3.9–12.7)

## 2020-06-29 PROCEDURE — 99215 OFFICE O/P EST HI 40 MIN: CPT | Mod: S$PBB,,, | Performed by: INTERNAL MEDICINE

## 2020-06-29 PROCEDURE — 36415 COLL VENOUS BLD VENIPUNCTURE: CPT | Mod: PO

## 2020-06-29 PROCEDURE — 85025 COMPLETE CBC W/AUTO DIFF WBC: CPT

## 2020-06-29 PROCEDURE — 99999 PR PBB SHADOW E&M-EST. PATIENT-LVL III: CPT | Mod: PBBFAC,,, | Performed by: INTERNAL MEDICINE

## 2020-06-29 PROCEDURE — 99999 PR PBB SHADOW E&M-EST. PATIENT-LVL III: ICD-10-PCS | Mod: PBBFAC,,, | Performed by: INTERNAL MEDICINE

## 2020-06-29 PROCEDURE — 80197 ASSAY OF TACROLIMUS: CPT

## 2020-06-29 PROCEDURE — 99215 PR OFFICE/OUTPT VISIT, EST, LEVL V, 40-54 MIN: ICD-10-PCS | Mod: S$PBB,,, | Performed by: INTERNAL MEDICINE

## 2020-06-29 PROCEDURE — 99213 OFFICE O/P EST LOW 20 MIN: CPT | Mod: PBBFAC | Performed by: INTERNAL MEDICINE

## 2020-06-29 PROCEDURE — 83735 ASSAY OF MAGNESIUM: CPT

## 2020-06-29 PROCEDURE — 80069 RENAL FUNCTION PANEL: CPT

## 2020-06-29 RX ORDER — MYCOPHENOLATE MOFETIL 250 MG/1
750 CAPSULE ORAL 2 TIMES DAILY
Qty: 240 CAPSULE | Refills: 11 | Status: SHIPPED | OUTPATIENT
Start: 2020-06-29 | End: 2020-07-23 | Stop reason: SINTOL

## 2020-06-29 RX ORDER — NIFEDIPINE 30 MG/1
60 TABLET, EXTENDED RELEASE ORAL DAILY
Qty: 30 TABLET | Refills: 4 | Status: SHIPPED | OUTPATIENT
Start: 2020-06-29 | End: 2020-06-29

## 2020-06-29 RX ORDER — MYCOPHENOLATE MOFETIL 250 MG/1
750 CAPSULE ORAL 2 TIMES DAILY
Qty: 240 CAPSULE | Refills: 11 | Status: SHIPPED | OUTPATIENT
Start: 2020-06-29 | End: 2020-06-29

## 2020-06-29 RX ORDER — MYCOPHENOLATE MOFETIL 250 MG/1
CAPSULE ORAL
Qty: 240 CAPSULE | Refills: 11 | Status: SHIPPED | OUTPATIENT
Start: 2020-06-29 | End: 2020-06-29

## 2020-06-29 NOTE — LETTER
June 29, 2020        Arthur Mcdonald  [address] [facility] [address]  Phone: [phone]  Fax: [phone]     Tabby Chavez  [address] [facility] [address]  Phone: [phone]  Fax: [phone]             Attila Mcduffie- Transplant  [address]  [facility] [address]  Phone: [phone]   Patient: Tala Rivera   MR Number: [id number]   YOB: 1965   Date of Visit: 6/29/2020       Dear Dr. Arthur Mcdonald, Tabby Chavez    Thank you for referring Tala Rivera to me for evaluation. Attached you will find relevant portions of my assessment and plan of care.    If you have questions, please do not hesitate to call me. I look forward to following Tala Rivera along with you.    Sincerely,    Ondina Frye MD    Enclosure    If you would like to receive this communication electronically, please contact [email or web address] or [phone] to request [employer] Link access.    [employer] Link is a tool which provides read-only access to select patient information with whom you have a relationship. Its easy to use and provides real time access to review your patients record including encounter summaries, notes, results, and demographic information.    If you feel you have received this communication in error or would no longer like to receive these types of communications, please e-mail [email or web address]

## 2020-06-29 NOTE — PATIENT INSTRUCTIONS
Thank you for entrusting your transplant care to us, and visiting me today. Please:      - Feel free to call us with any concerns regarding your health care.  - Monitor your blood pressure and aim to keep < 140/90  - Follow a low sodium diet. It is hard to do, but helps keep blood pressure controlled and prevents swelling (edema).   - Avoid nonsteroidal anti-inflamatory drugs (NSAIDS): ibuprofen (Advil, Motrin), naproxen (Aleve, Naprosyn), Indomethacin (Indocin).   - Check any new medications (over the counter or prescription) to make sure they will not affect your new kidney.   - Nifedipine 30 mg twice a day  - Stop pepcid if no GI problem   - Stop Valcyte

## 2020-06-30 LAB — TACROLIMUS BLD-MCNC: 8.6 NG/ML (ref 5–15)

## 2020-06-30 RX ORDER — NIFEDIPINE 30 MG/1
30 TABLET, EXTENDED RELEASE ORAL 2 TIMES DAILY
Qty: 60 TABLET | Refills: 11 | Status: SHIPPED | OUTPATIENT
Start: 2020-06-30 | End: 2020-08-08 | Stop reason: SDUPTHER

## 2020-07-01 LAB — CMV DNA SERPL NAA+PROBE-ACNC: <35 IU/ML

## 2020-07-01 NOTE — PROGRESS NOTES
She had drain in place for her perinephric fluid collection causing hydro. Are you okay to bring the patient to clinic for drain removal?  Fluid collection is almost gone.

## 2020-07-02 ENCOUNTER — HOSPITAL ENCOUNTER (OUTPATIENT)
Dept: RADIOLOGY | Facility: HOSPITAL | Age: 55
Discharge: HOME | End: 2020-07-02
Attending: INTERNAL MEDICINE
Payer: MEDICARE

## 2020-07-02 DIAGNOSIS — Z94.0 KIDNEY REPLACED BY TRANSPLANT: ICD-10-CM

## 2020-07-02 PROCEDURE — 76776 US TRANSPLANT KIDNEY WITH DOPPLER: ICD-10-PCS | Mod: 26,,, | Performed by: RADIOLOGY

## 2020-07-02 PROCEDURE — 76776 US EXAM K TRANSPL W/DOPPLER: CPT | Mod: 26,,, | Performed by: RADIOLOGY

## 2020-07-02 PROCEDURE — 76776 US EXAM K TRANSPL W/DOPPLER: CPT | Mod: TC,PO

## 2020-07-06 ENCOUNTER — TELEPHONE (OUTPATIENT)
Dept: TRANSPLANT | Facility: CLINIC | Age: 55
End: 2020-07-06

## 2020-07-06 ENCOUNTER — LAB VISIT (OUTPATIENT)
Dept: LAB | Facility: HOSPITAL | Age: 55
End: 2020-07-06
Attending: INTERNAL MEDICINE
Payer: COMMERCIAL

## 2020-07-06 DIAGNOSIS — Z94.0 KIDNEY REPLACED BY TRANSPLANT: ICD-10-CM

## 2020-07-06 DIAGNOSIS — E55.9 VITAMIN D DEFICIENCY: ICD-10-CM

## 2020-07-06 LAB
ALBUMIN SERPL BCP-MCNC: 3.3 G/DL (ref 3.5–5.2)
ALBUMIN SERPL BCP-MCNC: 3.3 G/DL (ref 3.5–5.2)
ALP SERPL-CCNC: 128 U/L (ref 55–135)
ALT SERPL W/O P-5'-P-CCNC: 11 U/L (ref 10–44)
ANION GAP SERPL CALC-SCNC: 5 MMOL/L (ref 8–16)
AST SERPL-CCNC: 10 U/L (ref 10–40)
BASOPHILS # BLD AUTO: 0.03 K/UL (ref 0–0.2)
BASOPHILS NFR BLD: 0.7 % (ref 0–1.9)
BILIRUB DIRECT SERPL-MCNC: 0.3 MG/DL (ref 0.1–0.3)
BILIRUB SERPL-MCNC: 0.6 MG/DL (ref 0.1–1)
BUN SERPL-MCNC: 31 MG/DL (ref 6–20)
CALCIUM SERPL-MCNC: 8.6 MG/DL (ref 8.7–10.5)
CHLORIDE SERPL-SCNC: 109 MMOL/L (ref 95–110)
CHOLEST SERPL-MCNC: 129 MG/DL (ref 120–199)
CHOLEST/HDLC SERPL: 2.7 {RATIO} (ref 2–5)
CO2 SERPL-SCNC: 26 MMOL/L (ref 23–29)
CREAT SERPL-MCNC: 1.1 MG/DL (ref 0.5–1.4)
DIFFERENTIAL METHOD: ABNORMAL
EOSINOPHIL # BLD AUTO: 0.1 K/UL (ref 0–0.5)
EOSINOPHIL NFR BLD: 2.1 % (ref 0–8)
ERYTHROCYTE [DISTWIDTH] IN BLOOD BY AUTOMATED COUNT: 12.8 % (ref 11.5–14.5)
EST. GFR  (AFRICAN AMERICAN): >60 ML/MIN/1.73 M^2
EST. GFR  (NON AFRICAN AMERICAN): 57.9 ML/MIN/1.73 M^2
GLUCOSE SERPL-MCNC: 88 MG/DL (ref 70–110)
HCT VFR BLD AUTO: 36.2 % (ref 37–48.5)
HDLC SERPL-MCNC: 48 MG/DL (ref 40–75)
HDLC SERPL: 37.2 % (ref 20–50)
HGB BLD-MCNC: 10.8 G/DL (ref 12–16)
IMM GRANULOCYTES # BLD AUTO: 0.01 K/UL (ref 0–0.04)
IMM GRANULOCYTES NFR BLD AUTO: 0.2 % (ref 0–0.5)
LDLC SERPL CALC-MCNC: 70 MG/DL (ref 63–159)
LYMPHOCYTES # BLD AUTO: 0.3 K/UL (ref 1–4.8)
LYMPHOCYTES NFR BLD: 8 % (ref 18–48)
MAGNESIUM SERPL-MCNC: 1.7 MG/DL (ref 1.6–2.6)
MCH RBC QN AUTO: 30.8 PG (ref 27–31)
MCHC RBC AUTO-ENTMCNC: 29.8 G/DL (ref 32–36)
MCV RBC AUTO: 103 FL (ref 82–98)
MONOCYTES # BLD AUTO: 0.3 K/UL (ref 0.3–1)
MONOCYTES NFR BLD: 6.1 % (ref 4–15)
NEUTROPHILS # BLD AUTO: 3.5 K/UL (ref 1.8–7.7)
NEUTROPHILS NFR BLD: 82.9 % (ref 38–73)
NONHDLC SERPL-MCNC: 81 MG/DL
NRBC BLD-RTO: 0 /100 WBC
PHOSPHATE SERPL-MCNC: 2.7 MG/DL (ref 2.7–4.5)
PLATELET # BLD AUTO: 139 K/UL (ref 150–350)
PMV BLD AUTO: 11.5 FL (ref 9.2–12.9)
POTASSIUM SERPL-SCNC: 4 MMOL/L (ref 3.5–5.1)
PROT SERPL-MCNC: 5.7 G/DL (ref 6–8.4)
PTH-INTACT SERPL-MCNC: 133 PG/ML (ref 9–77)
RBC # BLD AUTO: 3.51 M/UL (ref 4–5.4)
SODIUM SERPL-SCNC: 140 MMOL/L (ref 136–145)
TRIGL SERPL-MCNC: 55 MG/DL (ref 30–150)
WBC # BLD AUTO: 4.25 K/UL (ref 3.9–12.7)

## 2020-07-06 PROCEDURE — 82306 VITAMIN D 25 HYDROXY: CPT

## 2020-07-06 PROCEDURE — 80061 LIPID PANEL: CPT

## 2020-07-06 PROCEDURE — 36415 COLL VENOUS BLD VENIPUNCTURE: CPT | Mod: PO

## 2020-07-06 PROCEDURE — 83735 ASSAY OF MAGNESIUM: CPT

## 2020-07-06 PROCEDURE — 80069 RENAL FUNCTION PANEL: CPT

## 2020-07-06 PROCEDURE — 87799 DETECT AGENT NOS DNA QUANT: CPT

## 2020-07-06 PROCEDURE — 80197 ASSAY OF TACROLIMUS: CPT

## 2020-07-06 PROCEDURE — 84075 ASSAY ALKALINE PHOSPHATASE: CPT

## 2020-07-06 PROCEDURE — 85025 COMPLETE CBC W/AUTO DIFF WBC: CPT

## 2020-07-06 PROCEDURE — 83970 ASSAY OF PARATHORMONE: CPT

## 2020-07-07 DIAGNOSIS — Z94.0 KIDNEY REPLACED BY TRANSPLANT: ICD-10-CM

## 2020-07-07 DIAGNOSIS — E83.51 HYPOCALCEMIA: Primary | ICD-10-CM

## 2020-07-07 DIAGNOSIS — E55.9 VITAMIN D DEFICIENCY: ICD-10-CM

## 2020-07-07 LAB
25(OH)D3+25(OH)D2 SERPL-MCNC: 20 NG/ML (ref 30–96)
TACROLIMUS BLD-MCNC: 7.1 NG/ML (ref 5–15)

## 2020-07-07 RX ORDER — ERGOCALCIFEROL 1.25 MG/1
50000 CAPSULE ORAL
Qty: 4 CAPSULE | Refills: 11 | Status: SHIPPED | OUTPATIENT
Start: 2020-07-07 | End: 2021-04-08 | Stop reason: SDUPTHER

## 2020-07-07 RX ORDER — CALCITRIOL 0.25 UG/1
0.5 CAPSULE ORAL DAILY
Qty: 60 CAPSULE | Refills: 11 | Status: SHIPPED | OUTPATIENT
Start: 2020-07-07 | End: 2021-04-12 | Stop reason: SDUPTHER

## 2020-07-07 NOTE — TELEPHONE ENCOUNTER
Results reviewed with patient. Pt to continue ergo once weekly and pt to increase calcitriol to 0.5mcg daily. Pt scheduled to be seen in clinic 7/8/2020. Next labs 7/21/2020    ----- Message from Ondina Frye MD sent at 7/7/2020  9:19 AM CDT -----  Continue ergo 13343 units weekly X 12 months. Increase calcitriol to 0.5 mcg daily. Thank you!

## 2020-07-08 ENCOUNTER — TELEPHONE (OUTPATIENT)
Dept: TRANSPLANT | Facility: CLINIC | Age: 55
End: 2020-07-08

## 2020-07-08 ENCOUNTER — OFFICE VISIT (OUTPATIENT)
Dept: TRANSPLANT | Facility: CLINIC | Age: 55
End: 2020-07-08
Payer: MEDICARE

## 2020-07-08 VITALS
DIASTOLIC BLOOD PRESSURE: 76 MMHG | OXYGEN SATURATION: 100 % | HEIGHT: 60 IN | TEMPERATURE: 98 F | WEIGHT: 147.5 LBS | RESPIRATION RATE: 18 BRPM | BODY MASS INDEX: 28.96 KG/M2 | SYSTOLIC BLOOD PRESSURE: 147 MMHG | HEART RATE: 62 BPM

## 2020-07-08 DIAGNOSIS — Z94.0 KIDNEY REPLACED BY TRANSPLANT: Primary | ICD-10-CM

## 2020-07-08 DIAGNOSIS — Z94.0 S/P KIDNEY TRANSPLANT: ICD-10-CM

## 2020-07-08 DIAGNOSIS — Z79.899 LONG TERM CURRENT USE OF IMMUNOSUPPRESSIVE DRUG: ICD-10-CM

## 2020-07-08 PROCEDURE — 99213 OFFICE O/P EST LOW 20 MIN: CPT | Mod: 24,S$PBB,GC, | Performed by: SURGERY

## 2020-07-08 PROCEDURE — 99214 OFFICE O/P EST MOD 30 MIN: CPT | Mod: PBBFAC | Performed by: SURGERY

## 2020-07-08 PROCEDURE — 99213 PR OFFICE/OUTPT VISIT, EST, LEVL III, 20-29 MIN: ICD-10-PCS | Mod: 24,S$PBB,GC, | Performed by: SURGERY

## 2020-07-08 PROCEDURE — 99999 PR PBB SHADOW E&M-EST. PATIENT-LVL IV: CPT | Mod: PBBFAC,,, | Performed by: SURGERY

## 2020-07-08 PROCEDURE — 99999 PR PBB SHADOW E&M-EST. PATIENT-LVL IV: ICD-10-PCS | Mod: PBBFAC,,, | Performed by: SURGERY

## 2020-07-08 NOTE — LETTER
July 8, 2020        Arthur Mcdonald  [address] [facility] [address]  Phone: [phone]  Fax: [phone]     Tabby Chavez  [address] [facility] [address]  Phone: [phone]  Fax: [phone]             Attila Mcduffie- Transplant  [address]  [facility] [address]  Phone: [phone]   Patient: Tala Rivera   MR Number: [id number]   YOB: 1965   Date of Visit: 7/8/2020       Dear Dr. Arthur Mcdonald, Tabby Chavez    Thank you for referring Tala Rivera to me for evaluation. Attached you will find relevant portions of my assessment and plan of care.    If you have questions, please do not hesitate to call me. I look forward to following Tala Rivera along with you.    Sincerely,    Wes Mathias MD    Enclosure    If you would like to receive this communication electronically, please contact [email or web address] or [phone] to request [employer] Link access.    [employer] Link is a tool which provides read-only access to select patient information with whom you have a relationship. Its easy to use and provides real time access to review your patients record including encounter summaries, notes, results, and demographic information.    If you feel you have received this communication in error or would no longer like to receive these types of communications, please e-mail [email or web address]

## 2020-07-08 NOTE — TELEPHONE ENCOUNTER
----- Message from Nette Saldana LCSW sent at 7/6/2020  4:58 PM CDT -----  Regarding: moved from patient call back  Christine RIOS [facility] Kidney Transplant Social Workers  Caller: jermaine (Today,  9:18 AM)         Calling to speak with Pb Jimenez about medicaid bill         Call [phone]

## 2020-07-08 NOTE — TELEPHONE ENCOUNTER
SW met with pt in clinic in an attempt to address pt's phone message.  Pt stated she had questions about her AKF justino which another  had been handling for her.  EMR indicates this is resolved and pt stated she had thought it was resolved as well until she received a bill for her Medicare premium.  Pt and  agreed that pt would wait for the initiating  to return from vacation.  She said she had paid the premium, so it could wait.  SW remains available.

## 2020-07-08 NOTE — PROGRESS NOTES
Transplant Surgery  Kidney Transplant Recipient Follow-up    Referring Physician: Tabby Velasco  Current Nephrologist: Arthur Mcdonald    Subjective:     Chief Complaint: Tala Rivera is a 54 y.o. year old White female who is status post Kidney transplant performed on 4/5/2020.    ORGAN: LEFT KIDNEY   Disease Etiology: Hypertensive Nephrosclerosis  Donor Type: Donation after Brain Death   Donor CMV Status: Positive   Donor HBcAB: Negative   Donor HCV Status: Negative     History of Present Illness: She reports no concerns.  From a transplant perspective, she reports normal urination.  Tala is here for management of her immunosuppression medication.  Tala states that her immunosuppression is being well tolerated.  Hypertension is is reportedly well controlled.    Review of Systems   Constitutional: Negative for chills, diaphoresis and fever.   HENT: Negative for congestion, nosebleeds, sinus pressure, sneezing, sore throat and trouble swallowing.    Eyes: Negative for discharge and visual disturbance.   Respiratory: Negative for cough, chest tightness and shortness of breath.    Cardiovascular: Negative for chest pain.   Gastrointestinal: Negative for abdominal distention, abdominal pain, constipation, diarrhea and nausea.   Genitourinary: Negative for difficulty urinating, dysuria, frequency and hematuria.   Skin: Negative for color change and pallor.   Neurological: Negative for tremors, seizures and syncope.   Psychiatric/Behavioral: Negative for confusion, self-injury and suicidal ideas.       Objective:     Physical Exam  Constitutional:       Appearance: She is well-developed.   HENT:      Head: Normocephalic and atraumatic.   Eyes:      General: No scleral icterus.     Conjunctiva/sclera: Conjunctivae normal.      Pupils: Pupils are equal, round, and reactive to light.   Neck:      Musculoskeletal: Normal range of motion and neck supple.   Cardiovascular:      Rate and Rhythm: Normal  rate and regular rhythm.   Pulmonary:      Effort: Pulmonary effort is normal.      Breath sounds: Normal breath sounds.   Abdominal:      General: Bowel sounds are normal. There is no distension.      Palpations: Abdomen is soft.      Tenderness: There is no abdominal tenderness. There is no guarding or rebound.      Comments: Surgical incision is well healed. RLQ drain with scant output in bulb. No local erythema or drainage around ELVIA site.     Musculoskeletal: Normal range of motion.   Skin:     General: Skin is warm and dry.   Neurological:      Mental Status: She is alert and oriented to person, place, and time.       Lab Results   Component Value Date    CREATININE 1.1 07/06/2020    BUN 31 (H) 07/06/2020     Lab Results   Component Value Date    WBC 4.25 07/06/2020    HGB 10.8 (L) 07/06/2020    HCT 36.2 (L) 07/06/2020     (L) 07/06/2020     Lab Results   Component Value Date    TACROLIMUS 7.1 07/06/2020         Assessment and Plan:        · S/P Kidney transplant and prior pancreas transplant .  · S/p IR drain placement on 6/18/2020 for fluid collection seen on US.  Drain creatinine was consistent with serum.  Patient reports intermittent clear drainage in bulb. Scant drain output in last several days. IR drain removed during today's visit.   · Serum Cr 1.1  · Chronic immunosuppressive medications for rejection prophylaxis at target.  Plan: no adjustment needed.  · Continue monitoring symptoms, labs and drug levels for drug-related toxicity and side effects.  · Renal hypertension at target.    Follow-up: Patient reminded to call with any health changes, since these can be early signs of significant complications.  Also, I advised the patient to be sure any new medications or changes of old medications are discussed with either a pharmacist, or physician knowledgeable with transplant to avoid rejection/drug toxicity related to significant drug interactions.    Yeni Escamilla MD       [facility] Patient  Status  Functional Status: 100% - Normal, no complaints, no evidence of disease  Physical Capacity: No Limitations

## 2020-07-10 LAB — CMV DNA SERPL NAA+PROBE-ACNC: NORMAL IU/ML

## 2020-07-10 NOTE — PROGRESS NOTES
TRU met with pt regarding her concerns about AKF application.  She stated she got a bill for her Medicare coverage and is concerned that her AKF application was not approved.  Explained to pt that another sw has been following this and, according to notes, it appears to be resolved, but this sw will alert ANUJA Jimenez who is out of the office for the week, regarding her concerns.  Pt stated she paid the premium, because she did not want to lose insurance.  SW agreed she did the proper thing.  Pt verbalized thanks for the visit and awaits call back from ANUJA Jimenez.  TRU remains available.

## 2020-07-21 ENCOUNTER — LAB VISIT (OUTPATIENT)
Dept: LAB | Facility: HOSPITAL | Age: 55
End: 2020-07-21
Attending: INTERNAL MEDICINE
Payer: MEDICARE

## 2020-07-21 DIAGNOSIS — Z94.0 KIDNEY REPLACED BY TRANSPLANT: ICD-10-CM

## 2020-07-21 LAB
ALBUMIN SERPL BCP-MCNC: 3.4 G/DL (ref 3.5–5.2)
ANION GAP SERPL CALC-SCNC: 6 MMOL/L (ref 8–16)
BASOPHILS # BLD AUTO: 0.01 K/UL (ref 0–0.2)
BASOPHILS NFR BLD: 0.3 % (ref 0–1.9)
BUN SERPL-MCNC: 22 MG/DL (ref 6–20)
CALCIUM SERPL-MCNC: 8.4 MG/DL (ref 8.7–10.5)
CHLORIDE SERPL-SCNC: 108 MMOL/L (ref 95–110)
CO2 SERPL-SCNC: 24 MMOL/L (ref 23–29)
CREAT SERPL-MCNC: 1.4 MG/DL (ref 0.5–1.4)
DIFFERENTIAL METHOD: ABNORMAL
EOSINOPHIL # BLD AUTO: 0 K/UL (ref 0–0.5)
EOSINOPHIL NFR BLD: 0 % (ref 0–8)
ERYTHROCYTE [DISTWIDTH] IN BLOOD BY AUTOMATED COUNT: 12.4 % (ref 11.5–14.5)
EST. GFR  (AFRICAN AMERICAN): 49.8 ML/MIN/1.73 M^2
EST. GFR  (NON AFRICAN AMERICAN): 43.2 ML/MIN/1.73 M^2
GLUCOSE SERPL-MCNC: 89 MG/DL (ref 70–110)
HCT VFR BLD AUTO: 37.3 % (ref 37–48.5)
HGB BLD-MCNC: 11.2 G/DL (ref 12–16)
IMM GRANULOCYTES # BLD AUTO: 0.01 K/UL (ref 0–0.04)
IMM GRANULOCYTES NFR BLD AUTO: 0.3 % (ref 0–0.5)
LYMPHOCYTES # BLD AUTO: 0.3 K/UL (ref 1–4.8)
LYMPHOCYTES NFR BLD: 9.7 % (ref 18–48)
MAGNESIUM SERPL-MCNC: 1.8 MG/DL (ref 1.6–2.6)
MCH RBC QN AUTO: 30.9 PG (ref 27–31)
MCHC RBC AUTO-ENTMCNC: 30 G/DL (ref 32–36)
MCV RBC AUTO: 103 FL (ref 82–98)
MONOCYTES # BLD AUTO: 0.2 K/UL (ref 0.3–1)
MONOCYTES NFR BLD: 7.6 % (ref 4–15)
NEUTROPHILS # BLD AUTO: 2.4 K/UL (ref 1.8–7.7)
NEUTROPHILS NFR BLD: 82.1 % (ref 38–73)
NRBC BLD-RTO: 0 /100 WBC
PHOSPHATE SERPL-MCNC: 2.5 MG/DL (ref 2.7–4.5)
PLATELET # BLD AUTO: 151 K/UL (ref 150–350)
PMV BLD AUTO: 11.9 FL (ref 9.2–12.9)
POTASSIUM SERPL-SCNC: 4.2 MMOL/L (ref 3.5–5.1)
RBC # BLD AUTO: 3.63 M/UL (ref 4–5.4)
SODIUM SERPL-SCNC: 138 MMOL/L (ref 136–145)
WBC # BLD AUTO: 2.88 K/UL (ref 3.9–12.7)

## 2020-07-21 PROCEDURE — 83735 ASSAY OF MAGNESIUM: CPT

## 2020-07-21 PROCEDURE — 36415 COLL VENOUS BLD VENIPUNCTURE: CPT | Mod: PO

## 2020-07-21 PROCEDURE — 80069 RENAL FUNCTION PANEL: CPT

## 2020-07-21 PROCEDURE — 80197 ASSAY OF TACROLIMUS: CPT

## 2020-07-21 PROCEDURE — 85025 COMPLETE CBC W/AUTO DIFF WBC: CPT

## 2020-07-22 ENCOUNTER — NURSE TRIAGE (OUTPATIENT)
Dept: ADMINISTRATIVE | Facility: CLINIC | Age: 55
End: 2020-07-22

## 2020-07-22 ENCOUNTER — HOSPITAL ENCOUNTER (EMERGENCY)
Facility: HOSPITAL | Age: 55
Discharge: HOME | End: 2020-07-22
Attending: EMERGENCY MEDICINE
Payer: MEDICARE

## 2020-07-22 ENCOUNTER — TELEPHONE (OUTPATIENT)
Dept: TRANSPLANT | Facility: CLINIC | Age: 55
End: 2020-07-22

## 2020-07-22 VITALS
BODY MASS INDEX: 26.43 KG/M2 | SYSTOLIC BLOOD PRESSURE: 166 MMHG | HEIGHT: 61 IN | TEMPERATURE: 98 F | WEIGHT: 140 LBS | HEART RATE: 82 BPM | RESPIRATION RATE: 18 BRPM | DIASTOLIC BLOOD PRESSURE: 81 MMHG | OXYGEN SATURATION: 97 %

## 2020-07-22 DIAGNOSIS — Z94.0 KIDNEY REPLACED BY TRANSPLANT: ICD-10-CM

## 2020-07-22 DIAGNOSIS — Z20.822 COVID-19 VIRUS NOT DETECTED: Primary | ICD-10-CM

## 2020-07-22 LAB
SARS-COV-2 RDRP RESP QL NAA+PROBE: NEGATIVE
TACROLIMUS BLD-MCNC: 5.6 NG/ML (ref 5–15)

## 2020-07-22 PROCEDURE — 99285 EMERGENCY DEPT VISIT HI MDM: CPT | Mod: ,,, | Performed by: EMERGENCY MEDICINE

## 2020-07-22 PROCEDURE — U0002 COVID-19 LAB TEST NON-CDC: HCPCS

## 2020-07-22 PROCEDURE — 99283 EMERGENCY DEPT VISIT LOW MDM: CPT | Mod: 25

## 2020-07-22 PROCEDURE — 99285 PR EMERGENCY DEPT VISIT,LEVEL V: ICD-10-PCS | Mod: ,,, | Performed by: EMERGENCY MEDICINE

## 2020-07-22 RX ORDER — TACROLIMUS 1 MG/1
CAPSULE ORAL
Qty: 270 CAPSULE | Refills: 11 | Status: ON HOLD | OUTPATIENT
Start: 2020-07-22 | End: 2020-07-30 | Stop reason: SDUPTHER

## 2020-07-22 NOTE — DISCHARGE INSTRUCTIONS
Your COVID swab was negative.    Tests today showed:   Labs Reviewed   SARS-COV-2 RNA AMPLIFICATION, QUAL     X-Ray Chest 1 View   Final Result      1. Interstitial findings may reflect interstitial edema or other nonspecific pneumonitis, correlation is advised.         Electronically signed by: Gabo Munoz MD   Date:    07/22/2020   Time:    18:37          Treatments you had today:   Medications - No data to display    Follow-Up Plan:  - Follow-up with primary care doctor within 3 - 5 days  - Additional testing and/or evaluation as directed by your primary doctor    Return to the Emergency Department for symptoms including but not limited to: worsening symptoms, shortness of breath or chest pain, vomiting with inability to hold down fluids, fevers greater than 100.4°F, passing out/fainting/unconsciousness, or other concerning symptoms.

## 2020-07-22 NOTE — ED PROVIDER NOTES
Source of History:  Patient    Chief complaint:  COVID-19 Concerns (family member +) and Kidney Transplant    HPI:  Tala Rivera is a 54 y.o. female with history of kidney transplant on immuno suppressive drugs, hypertension, anemia of renal disease presenting to emergency department with complaint of exposure to COVID-19 patient.  Patient states that her son was diagnosed with COVID several days ago and she lives next door to him.  She does not believe that she was within 6 ft of him but she is not sure.  She complains of some occasional breath heaviness but no fevers, chills, shortness of breath, or cough.  No abdominal pain, or vomiting.  No other complaints.    ROS: As per HPI and below:  Review of Systems   Constitutional: Negative for chills and fever.   Respiratory: Negative for cough and shortness of breath.    Cardiovascular: Negative for chest pain.   Gastrointestinal: Negative for abdominal pain.     Review of patient's allergies indicates:   Allergen Reactions    Sulfa (sulfonamide antibiotics) Hives       Current Facility-Administered Medications on File Prior to Encounter   Medication Dose Route Frequency Provider Last Rate Last Dose    0.9%  NaCl infusion   Intravenous Continuous Richard Awad MD 20 mL/hr at 12/14/18 1432       Current Outpatient Medications on File Prior to Encounter   Medication Sig Dispense Refill    atovaquone (MEPRON) 750 mg/5 mL Susp Take 10 mLs (1,500 mg total) by mouth once daily. Stop: 4/5/2021 300 mL 11    calcitRIOL (ROCALTROL) 0.25 MCG Cap Take 2 capsules (0.5 mcg total) by mouth once daily. 60 capsule 11    docusate sodium (COLACE) 100 MG capsule Take 1 capsule (100 mg total) by mouth 3 (three) times daily as needed for Constipation.  0    ergocalciferol (ERGOCALCIFEROL) 50,000 unit Cap Take 1 capsule (50,000 Units total) by mouth every 7 days. 4 capsule 11    k phos di & mono-sod phos mono (K-PHOS-NEUTRAL) 250 mg Tab Take 1 tablet by mouth 2 (two)  times daily. 60 tablet 3    mycophenolate (CELLCEPT) 250 mg Cap Take 3 capsules (750 mg total) by mouth 2 (two) times daily. 240 capsule 11    NIFEdipine (PROCARDIA-XL) 30 MG (OSM) 24 hr tablet Take 1 tablet (30 mg total) by mouth 2 (two) times a day. 60 tablet 11    predniSONE (DELTASONE) 5 MG tablet Take 20mg by mouth daily -;   Take 15mg daily -;  Take 10mg daily -;  then 5mg daily thereafter starting 2020 120 tablet 11    sodium bicarbonate 650 MG tablet Take 2 tablets (1,300 mg total) by mouth 2 (two) times daily. 120 tablet 11    tacrolimus (PROGRAF) 1 MG Cap Take 5 capsules (5 mg total) by mouth every morning AND 4 capsules (4 mg total) every evening. 270 capsule 11    [DISCONTINUED] k phos di & mono-sod phos mono (K-PHOS-NEUTRAL) 250 mg Tab Take 1 tablet by mouth 2 (two) times daily. 60 tablet 3    [DISCONTINUED] tacrolimus (PROGRAF) 1 MG Cap Take 4 capsules (4 mg total) by mouth every 12 (twelve) hours. 240 capsule 11       PMH:  As per HPI and below:  Past Medical History:   Diagnosis Date    Anemia of renal disease     ESRD on dialysis     Dr. Muhammad     Essential hypertension     PD catheter dysfunction 10/18/2018    Secondary hyperparathyroidism of renal origin      Past Surgical History:   Procedure Laterality Date    AV FISTULA PLACEMENT Left     never matured    AV graft Right 2015     SECTION      x3; ; , and     COLONOSCOPY N/A 2020    Procedure: COLONOSCOPY;  Surgeon: Darrell Golden MD;  Location: [facility];  Service: Endoscopy;  Laterality: N/A;    GASTRIC BYPASS  2016    KIDNEY TRANSPLANT N/A 2020    Procedure: TRANSPLANT, KIDNEY;  Surgeon: Megan Garcia MD;  Location: [address];  Service: Transplant;  Laterality: N/A;    LAPAROSCOPIC REVISION OF PROCEDURE INVOLVING PERITONEAL DIALYSIS CATHETER N/A 10/18/2018    Procedure: REVISION OF PROCEDURE INVOLVING PERITONEAL DIALYSIS CATHETER, LAPAROSCOPIC;  Surgeon:  Hair Jimenez MD;  Location: [facility];  Service: General;  Laterality: N/A;    PERITONEAL CATHETER INSERTION N/A 9/4/2018    Procedure: Laparoscopic INSERTION, CATHETER, INTRAPERITONEAL;  Surgeon: Hair Jimenez MD;  Location: [facility];  Service: General;  Laterality: N/A;    PERITONEAL CATHETER REMOVAL N/A 12/14/2018    Procedure: REMOVAL, CATHETER, DIALYSIS, PERITONEAL;  Surgeon: Hair Jimenez MD;  Location: [facility] OR;  Service: General;  Laterality: N/A;    WOUND EXPLORATION N/A 12/14/2018    Procedure: EXPLORATION, WOUND-Surgical Site Irrigation;  Surgeon: Hair Jimenez MD;  Location: [facility] OR;  Service: General;  Laterality: N/A;       Social History     Socioeconomic History    Marital status:      Spouse name: Not on file    Number of children: Not on file    Years of education: Not on file    Highest education level: Not on file   Occupational History    Not on file   Social Needs    Financial resource strain: Not on file    Food insecurity     Worry: Not on file     Inability: Not on file    Transportation needs     Medical: Not on file     Non-medical: Not on file   Tobacco Use    Smoking status: Never Smoker    Smokeless tobacco: Never Used   Substance and Sexual Activity    Alcohol use: No    Drug use: No    Sexual activity: Yes     Partners: Male   Lifestyle    Physical activity     Days per week: Not on file     Minutes per session: Not on file    Stress: Not on file   Relationships    Social connections     Talks on phone: Not on file     Gets together: Not on file     Attends [employer] service: Not on file     Active member of club or organization: Not on file     Attends meetings of clubs or organizations: Not on file     Relationship status: Not on file   Other Topics Concern    Not on file   Social History Narrative    She lives with  and 2 dogs and 1 cat     She works as  at [employer]        Family History   Problem Relation Age of Onset     Kidney disease Mother     Diabetes Mother     Heart disease Mother     Hypertension Mother     Lupus Sister     Down syndrome Son     Cancer Neg Hx     Stroke Neg Hx        Physical Exam:      Vitals:    07/22/20 1741   BP: (!) 166/81   Pulse: 82   Resp: 18   Temp: 98.1 °F (36.7 °C)     Gen: No acute distress.  Mental Status:  Alert and oriented x 3.  Appropriate, conversant.  Skin: Warm, dry. No rashes seen.  Eyes: No conjunctival injection.  Pulm: No increased work of breathing.  No significant tachypnea.  No audible stridor or wheezing.  No conversational dyspnea.  Auscultation limited secondary to PPE.  CV: Regular rate. Regular rhythm. Normal peripheral perfusion. No lower extremity edema.  MSK: Good range of motion all joints.  No deformities.    Neuro: Awake. Speech normal. No focal neuro deficit observed.    Laboratory Studies:  Labs Reviewed   SARS-COV-2 RNA AMPLIFICATION, QUAL     X-rays (independently interpreted by me):  No acute abnormality    Chart reviewed.     Imaging Results          X-Ray Chest 1 View (Final result)  Result time 07/22/20 18:37:09    Final result by Gabo Munoz MD (07/22/20 18:37:09)                 Impression:      1. Interstitial findings may reflect interstitial edema or other nonspecific pneumonitis, correlation is advised.      Electronically signed by: Gabo Munoz MD  Date:    07/22/2020  Time:    18:37             Narrative:    EXAMINATION:  XR CHEST 1 VIEW    CLINICAL HISTORY:  Shortness of breath    TECHNIQUE:  Single frontal view of the chest was performed.    COMPARISON:  04/04/2020    FINDINGS:  The cardiomediastinal silhouette is prominent, similar to the previous examination noting calcification of the aorta..  There is no pleural effusion.  The trachea is midline.  The lungs are symmetrically expanded bilaterally with coarse interstitial attenuation bilaterally.  No large focal consolidation seen.  There is no pneumothorax.  The osseous structures  are remarkable for degenerative changes..                                Medications Given:  Medications - No data to display    MDM:    54 y.o. female with history of kidney transplant, requesting COVID screening.  She has COVID negative.  Chest x-ray does not demonstrate acute abnormality on my read.  She was instructed to stay way from her son, and any other individuals who may be infected with COVID.  Will discharge home with return precautions.    Diagnostic Impression:    1. Covid-19 Virus not Detected         ED Disposition Condition    Discharge Stable        ED Prescriptions     None        Follow-up Information     Follow up With Specialties Details Why Contact Info    CECE Castellon MD Family Medicine Schedule an appointment as soon as possible for a visit in 1 week  [address] [place] [address] [phone]                          Patient and/or family understands the plan and is in agreement, verbalized understanding, questions answered    Johana Christian MD  Emergency Medicine       Johana Christian MD  07/22/20 2010

## 2020-07-22 NOTE — ED TRIAGE NOTES
Pt reports exposure to covid-19 from her son. Pt is a post kidney transplant pt in April. Denies any sx

## 2020-07-22 NOTE — TELEPHONE ENCOUNTER
Results reviewed with patient. Pt to increase tacro dose to 5/4. Will have labs drawn 7/27. Pt repeated back and verbalized understanding of orders.     ----- Message from Ondina Frye MD sent at 7/22/2020  9:05 AM CDT -----  Please tac to 5/4 mg . Check CMV with next lab.

## 2020-07-22 NOTE — ED NOTES
LOC: The patient is awake, alert, and oriented to place, time, situation. Affect is appropriate.  Speech is appropriate and clear.     APPEARANCE: Patient resting comfortably in no acute distress.  Patient is clean and well groomed.    SKIN: The skin is warm and dry; color consistent with ethnicity.  Patient has normal skin turgor and moist mucus membranes.  Skin intact; no breakdown or bruising noted.     MUSCULOSKELETAL: Patient moving upper and lower extremities without difficulty.  Denies weakness.     RESPIRATORY: Airway is open and patent. Respirations spontaneous, even, easy, and non-labored.  Patient has a normal effort and rate.  No accessory muscle use noted. Denies cough.     CARDIAC:  Normal rhythm and rate noted.  No peripheral edema noted. No complaints of chest pain. Old dialysis fistula on right arm.       ABDOMEN: Soft and non tender to palpation.  No distention noted.     NEUROLOGIC: Eyes open spontaneously.  Behavior appropriate to situation.  Follows commands; facial expression symmetrical.  Purposeful motor response noted; normal sensation in all extremities.

## 2020-07-22 NOTE — TELEPHONE ENCOUNTER
Received phone call from patient  stating that patient C/O fever with SOB with exertion x several days that's is progressively getting worse and their son has been tested COVID 19 positive. Patient  voice a understanding to present to the ER now with patient . All appropriate personnel notified.

## 2020-07-23 ENCOUNTER — TELEPHONE (OUTPATIENT)
Dept: TRANSPLANT | Facility: CLINIC | Age: 55
End: 2020-07-23

## 2020-07-23 ENCOUNTER — TELEPHONE (OUTPATIENT)
Dept: TRANSPLANT | Facility: HOSPITAL | Age: 55
End: 2020-07-23

## 2020-07-23 ENCOUNTER — DOCUMENTATION ONLY (OUTPATIENT)
Dept: TRANSPLANT | Facility: CLINIC | Age: 55
End: 2020-07-23

## 2020-07-23 DIAGNOSIS — Z94.0 KIDNEY REPLACED BY TRANSPLANT: ICD-10-CM

## 2020-07-23 DIAGNOSIS — U07.1 COVID-19: Primary | ICD-10-CM

## 2020-07-23 DIAGNOSIS — U07.1 COVID-19: ICD-10-CM

## 2020-07-23 DIAGNOSIS — U07.1 COVID-19 VIRUS DETECTED: Primary | ICD-10-CM

## 2020-07-23 DIAGNOSIS — Z94.0 KIDNEY REPLACED BY TRANSPLANT: Primary | ICD-10-CM

## 2020-07-23 NOTE — TELEPHONE ENCOUNTER
VORD Hold MMF x 2 weeks, ID consult today.  Present to the ER if SOB gets worse.  Reviewed with Dr Frye after receiving phone call from patient stating that she just got her COVID-19 (positive) results back from [facility] obtain on Sunday 7/19/2020. Patient seen in the ER here yesterday 7/22 and COVID results (Negative)  __________________  Plan reviewed with patient and she will keep coordinator posted.

## 2020-07-23 NOTE — TELEPHONE ENCOUNTER
Pt to be seen by [facility] on Monday for labs. TRU set up HH with Joan with [facility] ([id number]). Pt to be seen two times a week.     TRU remains available at [phone].

## 2020-07-23 NOTE — TELEPHONE ENCOUNTER
"SW contacted pt to inquire of home health preference. Pt denies having an agency preference and reports "I'll just go with any of them".     TRU contacted Joan at [facility] to request admit for pt. Joan reports agency may not be able to accommodate pt. Joan reports she will follow up with TRU. SW will also assess other options for pt. TRU remains available to pt and family.       ----- Message from Mor oRss RN sent at 7/23/2020 12:05 PM CDT -----  Regarding: RE: COVID POSITIVE HOME HEALTH ORDER FOR LABS >  Orders have been completed  .  Thanks  ----- Message -----  From: Eileen Egan, MSW, LMSW  Sent: 7/23/2020  11:15 AM CDT  To: Mor Ross RN  Subject: RE: COVID POSITIVE HOME HEALTH ORDER FOR LAB#    You would note the covid status....  ----- Message -----  From: Mor Ross RN  Sent: 7/23/2020  11:03 AM CDT  To: [facility] Kidney Transplant Social Workers  Subject: COVID POSITIVE HOME HEALTH ORDER FOR LABS >      I need to set up Home Health for labs 2 x weekly and V/S for Mrs ARNALDO Rivera.  She is new COVID +  What do I need to do ?  Routine vrs special HH orders.    Thanks  K            "

## 2020-07-23 NOTE — TELEPHONE ENCOUNTER
Patient was tested today for covid since son tested positive. Patient test was negative. Patient ws advised to stay away form positive son. Patient has an 18 yr old son with down syndrome who stayed with positive son (mask in place) while she got testing done. She is wondering should 20 yr old come home or not. Advised the longer he stays with positive son the higher he is at risk of getting covid. He should come back home and touch base with PCP about being tested. Both patient and son should remove clothing at the door and shower once they return home.     Reason for Disposition   COVID -19, questions about    Protocols used: CORONAVIRUS (COVID-19) - EXPOSURE-A-AH

## 2020-07-24 ENCOUNTER — TELEPHONE (OUTPATIENT)
Dept: TRANSPLANT | Facility: CLINIC | Age: 55
End: 2020-07-24

## 2020-07-24 ENCOUNTER — NURSE TRIAGE (OUTPATIENT)
Dept: ADMINISTRATIVE | Facility: CLINIC | Age: 55
End: 2020-07-24

## 2020-07-24 NOTE — TELEPHONE ENCOUNTER
TRU attempted to secure referral admit for pt through [facility]. TRU was informed by Kristine in admissions, who reports agency will not have an aide available for pt. TRU remains available.

## 2020-07-24 NOTE — TELEPHONE ENCOUNTER
Called patient to review COVID-19 Surveillance Program enrollment process, no answer at all numbers.  Will send text message.

## 2020-07-24 NOTE — TELEPHONE ENCOUNTER
"TRU faxed referral to [facility]  [phone] (P:[phone]). TRU spoke with Kristine in admissions who reports "agency may be able to take pt". TRU remains available and will follow up.   "

## 2020-07-24 NOTE — TELEPHONE ENCOUNTER
Reason for Disposition   No answer.  First attempt to contact caller.  Follow-up call scheduled within 15 minutes.   Second attempt to contact family AND no contact made. Phone number verified.    Protocols used: NO CONTACT OR DUPLICATE CONTACT CALL-A-OH

## 2020-07-26 ENCOUNTER — HOSPITAL ENCOUNTER (INPATIENT)
Facility: HOSPITAL | Age: 55
LOS: 4 days | Discharge: HOME HEALTH | DRG: 177 | End: 2020-07-30
Attending: EMERGENCY MEDICINE | Admitting: TRANSPLANT SURGERY
Payer: MEDICARE

## 2020-07-26 DIAGNOSIS — J12.82 PNEUMONIA DUE TO COVID-19 VIRUS: Primary | ICD-10-CM

## 2020-07-26 DIAGNOSIS — R50.9 FEVER: ICD-10-CM

## 2020-07-26 DIAGNOSIS — D61.818 PANCYTOPENIA: ICD-10-CM

## 2020-07-26 DIAGNOSIS — U07.1 COVID-19 VIRUS DETECTED: ICD-10-CM

## 2020-07-26 DIAGNOSIS — D63.1 ANEMIA OF RENAL DISEASE: Chronic | ICD-10-CM

## 2020-07-26 DIAGNOSIS — U07.1 PNEUMONIA DUE TO COVID-19 VIRUS: Primary | ICD-10-CM

## 2020-07-26 DIAGNOSIS — D70.8 OTHER NEUTROPENIA: ICD-10-CM

## 2020-07-26 DIAGNOSIS — Z94.0 S/P KIDNEY TRANSPLANT: ICD-10-CM

## 2020-07-26 DIAGNOSIS — E87.6 HYPOKALEMIA: ICD-10-CM

## 2020-07-26 DIAGNOSIS — E87.20 ACIDOSIS: ICD-10-CM

## 2020-07-26 DIAGNOSIS — R09.02 HYPOXEMIA: ICD-10-CM

## 2020-07-26 DIAGNOSIS — Z79.60 LONG-TERM USE OF IMMUNOSUPPRESSANT MEDICATION: ICD-10-CM

## 2020-07-26 DIAGNOSIS — Z91.89 AT RISK FOR OPPORTUNISTIC INFECTIONS: ICD-10-CM

## 2020-07-26 DIAGNOSIS — R50.9 FEVER, UNSPECIFIED FEVER CAUSE: ICD-10-CM

## 2020-07-26 DIAGNOSIS — N18.9 ANEMIA OF RENAL DISEASE: Chronic | ICD-10-CM

## 2020-07-26 DIAGNOSIS — N25.81 SECONDARY HYPERPARATHYROIDISM OF RENAL ORIGIN: Chronic | ICD-10-CM

## 2020-07-26 DIAGNOSIS — Z94.0 KIDNEY TRANSPLANT STATUS: ICD-10-CM

## 2020-07-26 DIAGNOSIS — Z94.0 KIDNEY REPLACED BY TRANSPLANT: ICD-10-CM

## 2020-07-26 DIAGNOSIS — Z29.89 PROPHYLACTIC IMMUNOTHERAPY: ICD-10-CM

## 2020-07-26 DIAGNOSIS — E83.39 HYPOPHOSPHATEMIA: ICD-10-CM

## 2020-07-26 DIAGNOSIS — I12.9 RENAL HYPERTENSION: ICD-10-CM

## 2020-07-26 LAB
ALBUMIN SERPL BCP-MCNC: 3.3 G/DL (ref 3.5–5.2)
ALP SERPL-CCNC: 88 U/L (ref 55–135)
ALT SERPL W/O P-5'-P-CCNC: 20 U/L (ref 10–44)
ANION GAP SERPL CALC-SCNC: 9 MMOL/L (ref 8–16)
AST SERPL-CCNC: 26 U/L (ref 10–40)
BACTERIA #/AREA URNS AUTO: NORMAL /HPF
BASOPHILS # BLD AUTO: 0 K/UL (ref 0–0.2)
BASOPHILS NFR BLD: 0 % (ref 0–1.9)
BILIRUB SERPL-MCNC: 0.5 MG/DL (ref 0.1–1)
BILIRUB UR QL STRIP: NEGATIVE
BUN SERPL-MCNC: 25 MG/DL (ref 6–20)
CALCIUM SERPL-MCNC: 8.7 MG/DL (ref 8.7–10.5)
CHLORIDE SERPL-SCNC: 109 MMOL/L (ref 95–110)
CLARITY UR REFRACT.AUTO: ABNORMAL
CO2 SERPL-SCNC: 24 MMOL/L (ref 23–29)
COLOR UR AUTO: YELLOW
CREAT SERPL-MCNC: 1.4 MG/DL (ref 0.5–1.4)
CRP SERPL-MCNC: 33.2 MG/L (ref 0–8.2)
D DIMER PPP IA.FEU-MCNC: 0.93 MG/L FEU
DIFFERENTIAL METHOD: ABNORMAL
EOSINOPHIL # BLD AUTO: 0 K/UL (ref 0–0.5)
EOSINOPHIL NFR BLD: 0 % (ref 0–8)
ERYTHROCYTE [DISTWIDTH] IN BLOOD BY AUTOMATED COUNT: 11.8 % (ref 11.5–14.5)
ERYTHROCYTE [SEDIMENTATION RATE] IN BLOOD BY WESTERGREN METHOD: 50 MM/HR (ref 0–36)
EST. GFR  (AFRICAN AMERICAN): 49.8 ML/MIN/1.73 M^2
EST. GFR  (NON AFRICAN AMERICAN): 43.2 ML/MIN/1.73 M^2
FERRITIN SERPL-MCNC: 3192 NG/ML (ref 20–300)
GLUCOSE SERPL-MCNC: 86 MG/DL (ref 70–110)
GLUCOSE UR QL STRIP: NEGATIVE
HCT VFR BLD AUTO: 36.9 % (ref 37–48.5)
HGB BLD-MCNC: 11.8 G/DL (ref 12–16)
HGB UR QL STRIP: ABNORMAL
HYALINE CASTS UR QL AUTO: 1 /LPF
IMM GRANULOCYTES # BLD AUTO: 0.02 K/UL (ref 0–0.04)
IMM GRANULOCYTES NFR BLD AUTO: 0.8 % (ref 0–0.5)
KETONES UR QL STRIP: ABNORMAL
LDH SERPL L TO P-CCNC: 192 U/L (ref 110–260)
LEUKOCYTE ESTERASE UR QL STRIP: NEGATIVE
LYMPHOCYTES # BLD AUTO: 0.2 K/UL (ref 1–4.8)
LYMPHOCYTES NFR BLD: 6.8 % (ref 18–48)
MCH RBC QN AUTO: 30.7 PG (ref 27–31)
MCHC RBC AUTO-ENTMCNC: 32 G/DL (ref 32–36)
MCV RBC AUTO: 96 FL (ref 82–98)
MICROSCOPIC COMMENT: NORMAL
MONOCYTES # BLD AUTO: 0.1 K/UL (ref 0.3–1)
MONOCYTES NFR BLD: 4.9 % (ref 4–15)
NEUTROPHILS # BLD AUTO: 2.3 K/UL (ref 1.8–7.7)
NEUTROPHILS NFR BLD: 87.5 % (ref 38–73)
NITRITE UR QL STRIP: NEGATIVE
NRBC BLD-RTO: 0 /100 WBC
PH UR STRIP: 5 [PH] (ref 5–8)
PLATELET # BLD AUTO: 141 K/UL (ref 150–350)
PMV BLD AUTO: 12.3 FL (ref 9.2–12.9)
POTASSIUM SERPL-SCNC: 3.7 MMOL/L (ref 3.5–5.1)
PROCALCITONIN SERPL IA-MCNC: 0.04 NG/ML
PROT SERPL-MCNC: 6.5 G/DL (ref 6–8.4)
PROT UR QL STRIP: ABNORMAL
RBC # BLD AUTO: 3.84 M/UL (ref 4–5.4)
RBC #/AREA URNS AUTO: 2 /HPF (ref 0–4)
SARS-COV-2 RDRP RESP QL NAA+PROBE: POSITIVE
SODIUM SERPL-SCNC: 142 MMOL/L (ref 136–145)
SP GR UR STRIP: 1.01 (ref 1–1.03)
SQUAMOUS #/AREA URNS AUTO: 1 /HPF
TACROLIMUS BLD-MCNC: 9.9 NG/ML (ref 5–15)
URN SPEC COLLECT METH UR: ABNORMAL
WBC # BLD AUTO: 2.64 K/UL (ref 3.9–12.7)
WBC #/AREA URNS AUTO: 2 /HPF (ref 0–5)

## 2020-07-26 PROCEDURE — 99900035 HC TECH TIME PER 15 MIN (STAT)

## 2020-07-26 PROCEDURE — 85379 FIBRIN DEGRADATION QUANT: CPT

## 2020-07-26 PROCEDURE — 87040 BLOOD CULTURE FOR BACTERIA: CPT

## 2020-07-26 PROCEDURE — 99285 PR EMERGENCY DEPT VISIT,LEVEL V: ICD-10-PCS | Mod: ,,, | Performed by: PHYSICIAN ASSISTANT

## 2020-07-26 PROCEDURE — 27000221 HC OXYGEN, UP TO 24 HOURS

## 2020-07-26 PROCEDURE — 99223 1ST HOSP IP/OBS HIGH 75: CPT | Mod: ,,, | Performed by: INTERNAL MEDICINE

## 2020-07-26 PROCEDURE — 25000003 PHARM REV CODE 250: Performed by: PHYSICIAN ASSISTANT

## 2020-07-26 PROCEDURE — 94761 N-INVAS EAR/PLS OXIMETRY MLT: CPT

## 2020-07-26 PROCEDURE — 20600001 HC STEP DOWN PRIVATE ROOM

## 2020-07-26 PROCEDURE — 99223 PR INITIAL HOSPITAL CARE,LEVL III: ICD-10-PCS | Mod: AI,,, | Performed by: PHYSICIAN ASSISTANT

## 2020-07-26 PROCEDURE — 99223 PR INITIAL HOSPITAL CARE,LEVL III: ICD-10-PCS | Mod: ,,, | Performed by: INTERNAL MEDICINE

## 2020-07-26 PROCEDURE — 82728 ASSAY OF FERRITIN: CPT

## 2020-07-26 PROCEDURE — 25000003 PHARM REV CODE 250: Performed by: INTERNAL MEDICINE

## 2020-07-26 PROCEDURE — 85025 COMPLETE CBC W/AUTO DIFF WBC: CPT

## 2020-07-26 PROCEDURE — 80053 COMPREHEN METABOLIC PANEL: CPT

## 2020-07-26 PROCEDURE — 99223 1ST HOSP IP/OBS HIGH 75: CPT | Mod: AI,,, | Performed by: PHYSICIAN ASSISTANT

## 2020-07-26 PROCEDURE — 63600175 PHARM REV CODE 636 W HCPCS: Performed by: PHYSICIAN ASSISTANT

## 2020-07-26 PROCEDURE — 81001 URINALYSIS AUTO W/SCOPE: CPT

## 2020-07-26 PROCEDURE — 85652 RBC SED RATE AUTOMATED: CPT

## 2020-07-26 PROCEDURE — 99285 EMERGENCY DEPT VISIT HI MDM: CPT | Mod: ,,, | Performed by: PHYSICIAN ASSISTANT

## 2020-07-26 PROCEDURE — 96374 THER/PROPH/DIAG INJ IV PUSH: CPT

## 2020-07-26 PROCEDURE — 83615 LACTATE (LD) (LDH) ENZYME: CPT

## 2020-07-26 PROCEDURE — 99285 EMERGENCY DEPT VISIT HI MDM: CPT | Mod: 25

## 2020-07-26 PROCEDURE — 86140 C-REACTIVE PROTEIN: CPT

## 2020-07-26 PROCEDURE — U0002 COVID-19 LAB TEST NON-CDC: HCPCS

## 2020-07-26 PROCEDURE — 84145 PROCALCITONIN (PCT): CPT

## 2020-07-26 PROCEDURE — 80197 ASSAY OF TACROLIMUS: CPT

## 2020-07-26 RX ORDER — ACETAMINOPHEN 325 MG/1
650 TABLET ORAL EVERY 8 HOURS PRN
Status: DISCONTINUED | OUTPATIENT
Start: 2020-07-26 | End: 2020-07-26

## 2020-07-26 RX ORDER — ATOVAQUONE 750 MG/5ML
1500 SUSPENSION ORAL DAILY
Status: DISCONTINUED | OUTPATIENT
Start: 2020-07-26 | End: 2020-07-30 | Stop reason: HOSPADM

## 2020-07-26 RX ORDER — ACETAMINOPHEN 325 MG/1
650 TABLET ORAL EVERY 6 HOURS PRN
Status: DISCONTINUED | OUTPATIENT
Start: 2020-07-26 | End: 2020-07-30 | Stop reason: HOSPADM

## 2020-07-26 RX ORDER — TACROLIMUS 1 MG/1
4 CAPSULE ORAL EVERY EVENING
Status: DISCONTINUED | OUTPATIENT
Start: 2020-07-26 | End: 2020-07-27

## 2020-07-26 RX ORDER — NIFEDIPINE 30 MG/1
30 TABLET, EXTENDED RELEASE ORAL 2 TIMES DAILY
Status: DISCONTINUED | OUTPATIENT
Start: 2020-07-26 | End: 2020-07-30 | Stop reason: HOSPADM

## 2020-07-26 RX ORDER — PREDNISONE 5 MG/1
5 TABLET ORAL DAILY
Status: DISCONTINUED | OUTPATIENT
Start: 2020-07-26 | End: 2020-07-30 | Stop reason: HOSPADM

## 2020-07-26 RX ORDER — CALCITRIOL 0.25 UG/1
0.5 CAPSULE ORAL DAILY
Status: DISCONTINUED | OUTPATIENT
Start: 2020-07-26 | End: 2020-07-30 | Stop reason: HOSPADM

## 2020-07-26 RX ORDER — FAMOTIDINE 20 MG/1
20 TABLET, FILM COATED ORAL DAILY
Status: ON HOLD | COMMUNITY
End: 2020-07-30 | Stop reason: HOSPADM

## 2020-07-26 RX ORDER — TACROLIMUS 1 MG/1
5 CAPSULE ORAL EVERY MORNING
Status: DISCONTINUED | OUTPATIENT
Start: 2020-07-26 | End: 2020-07-27

## 2020-07-26 RX ORDER — SODIUM BICARBONATE 650 MG/1
1300 TABLET ORAL 2 TIMES DAILY
Status: DISCONTINUED | OUTPATIENT
Start: 2020-07-26 | End: 2020-07-30 | Stop reason: HOSPADM

## 2020-07-26 RX ORDER — SODIUM CHLORIDE 0.9 % (FLUSH) 0.9 %
10 SYRINGE (ML) INJECTION
Status: DISCONTINUED | OUTPATIENT
Start: 2020-07-26 | End: 2020-07-30 | Stop reason: HOSPADM

## 2020-07-26 RX ORDER — ONDANSETRON 8 MG/1
8 TABLET, ORALLY DISINTEGRATING ORAL EVERY 8 HOURS PRN
Status: DISCONTINUED | OUTPATIENT
Start: 2020-07-26 | End: 2020-07-30 | Stop reason: HOSPADM

## 2020-07-26 RX ORDER — ACETAMINOPHEN 325 MG/1
650 TABLET ORAL
Status: COMPLETED | OUTPATIENT
Start: 2020-07-26 | End: 2020-07-26

## 2020-07-26 RX ADMIN — TACROLIMUS 4 MG: 1 CAPSULE ORAL at 06:07

## 2020-07-26 RX ADMIN — SODIUM BICARBONATE 650 MG TABLET 1300 MG: at 08:07

## 2020-07-26 RX ADMIN — CALCITRIOL CAPSULES 0.25 MCG 0.5 MCG: 0.25 CAPSULE ORAL at 09:07

## 2020-07-26 RX ADMIN — NIFEDIPINE 30 MG: 30 TABLET, FILM COATED, EXTENDED RELEASE ORAL at 09:07

## 2020-07-26 RX ADMIN — AZITHROMYCIN MONOHYDRATE 500 MG: 500 INJECTION, POWDER, LYOPHILIZED, FOR SOLUTION INTRAVENOUS at 05:07

## 2020-07-26 RX ADMIN — CEFTRIAXONE 2 G: 2 INJECTION, SOLUTION INTRAVENOUS at 06:07

## 2020-07-26 RX ADMIN — ATOVAQUONE 1500 MG: 750 SUSPENSION ORAL at 10:07

## 2020-07-26 RX ADMIN — PREDNISONE 5 MG: 5 TABLET ORAL at 09:07

## 2020-07-26 RX ADMIN — NIFEDIPINE 30 MG: 30 TABLET, FILM COATED, EXTENDED RELEASE ORAL at 08:07

## 2020-07-26 RX ADMIN — TACROLIMUS 5 MG: 1 CAPSULE ORAL at 09:07

## 2020-07-26 RX ADMIN — ACETAMINOPHEN 650 MG: 325 TABLET ORAL at 05:07

## 2020-07-26 RX ADMIN — SODIUM BICARBONATE 650 MG TABLET 1300 MG: at 09:07

## 2020-07-26 RX ADMIN — SODIUM CHLORIDE 200 MG: 9 INJECTION, SOLUTION INTRAVENOUS at 06:07

## 2020-07-26 NOTE — SUBJECTIVE & OBJECTIVE
Past Medical History:   Diagnosis Date    Anemia of renal disease     ESRD on dialysis     Dr. Muhammad     Essential hypertension     PD catheter dysfunction 10/18/2018    Secondary hyperparathyroidism of renal origin        Past Surgical History:   Procedure Laterality Date    AV FISTULA PLACEMENT Left     never matured    AV graft Right 2015     SECTION      x3; ; , and     COLONOSCOPY N/A 2020    Procedure: COLONOSCOPY;  Surgeon: Darrell Golden MD;  Location: [facility];  Service: Endoscopy;  Laterality: N/A;    GASTRIC BYPASS  2016    KIDNEY TRANSPLANT N/A 2020    Procedure: TRANSPLANT, KIDNEY;  Surgeon: Megan Garcia MD;  Location: [address];  Service: Transplant;  Laterality: N/A;    KIDNEY TRANSPLANT Right 2020    LAPAROSCOPIC REVISION OF PROCEDURE INVOLVING PERITONEAL DIALYSIS CATHETER N/A 10/18/2018    Procedure: REVISION OF PROCEDURE INVOLVING PERITONEAL DIALYSIS CATHETER, LAPAROSCOPIC;  Surgeon: Hair Jimenez MD;  Location: [facility];  Service: General;  Laterality: N/A;    PERITONEAL CATHETER INSERTION N/A 2018    Procedure: Laparoscopic INSERTION, CATHETER, INTRAPERITONEAL;  Surgeon: Hair Jimenez MD;  Location: [facility];  Service: General;  Laterality: N/A;    PERITONEAL CATHETER REMOVAL N/A 2018    Procedure: REMOVAL, CATHETER, DIALYSIS, PERITONEAL;  Surgeon: Hair Jimenez MD;  Location: [facility];  Service: General;  Laterality: N/A;    WOUND EXPLORATION N/A 2018    Procedure: EXPLORATION, WOUND-Surgical Site Irrigation;  Surgeon: Hair Jimenez MD;  Location: [facility];  Service: General;  Laterality: N/A;       Review of patient's allergies indicates:   Allergen Reactions    Sulfa (sulfonamide antibiotics) Hives       Medications:  Medications Prior to Admission   Medication Sig    famotidine (PEPCID) 20 MG tablet Take 20 mg by mouth once daily.    atovaquone (MEPRON) 750 mg/5 mL Susp Take 10 mLs (1,500 mg total) by  mouth once daily. Stop: 4/5/2021    calcitRIOL (ROCALTROL) 0.25 MCG Cap Take 2 capsules (0.5 mcg total) by mouth once daily.    docusate sodium (COLACE) 100 MG capsule Take 1 capsule (100 mg total) by mouth 3 (three) times daily as needed for Constipation.    ergocalciferol (ERGOCALCIFEROL) 50,000 unit Cap Take 1 capsule (50,000 Units total) by mouth every 7 days.    k phos di & mono-sod phos mono (K-PHOS-NEUTRAL) 250 mg Tab Take 1 tablet by mouth 2 (two) times daily.    NIFEdipine (PROCARDIA-XL) 30 MG (OSM) 24 hr tablet Take 1 tablet (30 mg total) by mouth 2 (two) times a day.    predniSONE (DELTASONE) 5 MG tablet Take 20mg by mouth daily 4/8-5/7;   Take 15mg daily 5/8-6/7;  Take 10mg daily 6/8-7/7;  then 5mg daily thereafter starting 7/8/2020    pulse oximeter (PULSE OXIMETER) device Use twice daily at 8 AM and 3 PM and [facility] the [facility] as directed.    sodium bicarbonate 650 MG tablet Take 2 tablets (1,300 mg total) by mouth 2 (two) times daily.    tacrolimus (PROGRAF) 1 MG Cap Take 5 capsules (5 mg total) by mouth every morning AND 4 capsules (4 mg total) every evening.     Antibiotics (From admission, onward)    None        Antifungals (From admission, onward)    None        Antivirals (From admission, onward)    None           Immunization History   Administered Date(s) Administered    Hepatitis A, Adult 09/27/2016, 03/28/2017    Pneumococcal Conjugate - 13 Valent 09/27/2016    Pneumococcal Polysaccharide - 23 Valent 11/28/2016    Tdap 09/27/2016       Family History     Problem Relation (Age of Onset)    Diabetes Mother    Down syndrome Son    Heart disease Mother    Hypertension Mother    Kidney disease Mother    Lupus Sister        Social History     Socioeconomic History    Marital status:      Spouse name: Not on file    Number of children: Not on file    Years of education: Not on file    Highest education level: Not on file   Occupational History    Not on file    Social Needs    Financial resource strain: Not on file    Food insecurity     Worry: Not on file     Inability: Not on file    Transportation needs     Medical: Not on file     Non-medical: Not on file   Tobacco Use    Smoking status: Never Smoker    Smokeless tobacco: Never Used   Substance and Sexual Activity    Alcohol use: No    Drug use: No    Sexual activity: Yes     Partners: Male   Lifestyle    Physical activity     Days per week: Not on file     Minutes per session: Not on file    Stress: Not on file   Relationships    Social connections     Talks on phone: Not on file     Gets together: Not on file     Attends [employer] service: Not on file     Active member of club or organization: Not on file     Attends meetings of clubs or organizations: Not on file     Relationship status: Not on file   Other Topics Concern    Not on file   Social History Narrative    She lives with  and 2 dogs and 1 cat     She works as  at [employer]      Review of Systems   Constitutional: Positive for fever. Negative for chills and diaphoresis.   HENT: Negative for rhinorrhea and sore throat.    Respiratory: Positive for cough, chest tightness and shortness of breath.    Cardiovascular: Negative for chest pain and leg swelling.   Gastrointestinal: Negative for abdominal pain, diarrhea, nausea and vomiting.   Genitourinary: Negative for dysuria and hematuria.   Musculoskeletal: Negative for arthralgias and myalgias.   Skin: Negative for rash.   Neurological: Negative for headaches.     Objective:     Vital Signs (Most Recent):  Temp: 98.2 °F (36.8 °C) (07/26/20 1115)  Pulse: 71 (07/26/20 1300)  Resp: 20 (07/26/20 1300)  BP: 137/67 (07/26/20 1300)  SpO2: 96 % (07/26/20 1300) Vital Signs (24h Range):  Temp:  [97.4 °F (36.3 °C)-100.4 °F (38 °C)] 98.2 °F (36.8 °C)  Pulse:  [63-91] 71  Resp:  [14-20] 20  SpO2:  [90 %-99 %] 96 %  BP: (124-156)/(62-74) 137/67     Weight: 62.7 kg (138 lb 3.7  oz)  Body mass index is 26.12 kg/m².    Estimated Creatinine Clearance: 39.9 mL/min (based on SCr of 1.4 mg/dL).    Physical Exam  Vitals signs reviewed.   Constitutional:       General: She is not in acute distress.     Appearance: She is well-developed. She is not diaphoretic.   HENT:      Head: Normocephalic and atraumatic.   Eyes:      Conjunctiva/sclera: Conjunctivae normal.   Neck:      Musculoskeletal: Normal range of motion and neck supple.   Pulmonary:      Effort: Pulmonary effort is normal. No respiratory distress.      Comments: On nasal cannula. Intermittent dry cough.  Abdominal:      General: There is no distension.      Palpations: Abdomen is soft.   Musculoskeletal: Normal range of motion.   Skin:     General: Skin is warm and dry.      Findings: No erythema or rash.   Neurological:      Mental Status: She is alert and oriented to person, place, and time.   Psychiatric:         Behavior: Behavior normal.         Significant Labs: All pertinent labs within the past 24 hours have been reviewed.    Significant Imaging: I have reviewed all pertinent imaging results/findings within the past 24 hours.

## 2020-07-26 NOTE — SUBJECTIVE & OBJECTIVE
Subjective:     History of Present Illness:   Ms. Tala Rivera is a 55 yo female with history of HTN, gastric bypass surgery on 16 and ESRD presumed secondary to HTN now s/p KTX from 20 (Simulect induction, CMV +/+) who presents to the ED with fever and worsening sob. She states that she went to [facility] in clinic on 20 for evaluation due to flu-like symptoms and noted to be COVID-19 positive (results called back on ). Patient reports having COVID-19 exposure from son. Patient had a repeat COVID test on  in the ED after presenting for exposure, covid test negative during this visit. Patient re-presents with worsening sob and reports decreased O2 sats of 88% at home. In the ED patient noted to be febrile with temp of 100.4. O2 sats 88-90% on RA. CXR with no focal consolidation, generalized edema but overall relatively unchanged from . Repeat COVID-19 positive on admit. Received Azithromycin and Rocephin the ED.  Kidney function at baseline. Plan to admit for further observation.     Ms. Rivera is a 54 y.o. year old female who is status post Kidney Transplant - 2020  (#1).    She received induction therapy with Simulect and her maintenance immunosuppression consists of:   Immunosuppressants (From admission, onward)    Start     Stop Route Frequency Ordered    20 1800  tacrolimus capsule 4 mg      -- Oral Daily 20 0532    20 0800  tacrolimus capsule 5 mg      -- Oral Daily 20 0532          Interval History:  Admitted for COVID infection    Past Medical and Surgical History: Ms. Rivera has a past medical history of Anemia of renal disease, ESRD on dialysis, Essential hypertension, PD catheter dysfunction (10/18/2018), and Secondary hyperparathyroidism of renal origin.  She has a past surgical history that includes  section; AV fistula placement (Left); AV graft (Right, 2015); Gastric bypass (2016); Peritoneal catheter insertion (N/A,  9/4/2018); Laparoscopic revision of procedure involving peritoneal dialysis catheter (N/A, 10/18/2018); Wound exploration (N/A, 12/14/2018); Peritoneal catheter removal (N/A, 12/14/2018); Colonoscopy (N/A, 2/5/2020); Kidney transplant (N/A, 4/5/2020); and Kidney transplant (Right, 04/05/2020).    Past Social and Family History: Ms. Rivera reports that she has never smoked. She has never used smokeless tobacco. She reports that she does not drink alcohol or use drugs.Her family history includes Diabetes in her mother; Down syndrome in her son; Heart disease in her mother; Hypertension in her mother; Kidney disease in her mother; Lupus in her sister.    Intake/Output - Last 3 Shifts     None           Review of Systems   Constitutional: Positive for activity change, appetite change, chills, fatigue and fever. Negative for diaphoresis and unexpected weight change.   HENT: Negative for congestion, dental problem, drooling, postnasal drip, rhinorrhea and voice change.    Eyes: Negative for discharge.   Respiratory: Positive for cough, chest tightness, shortness of breath and wheezing. Negative for apnea, choking and stridor.    Cardiovascular: Negative for chest pain, palpitations and leg swelling.   Gastrointestinal: Negative for abdominal distention, blood in stool, constipation, diarrhea, nausea, rectal pain and vomiting.   Endocrine: Negative for cold intolerance, heat intolerance, polydipsia and polyuria.   Genitourinary: Negative for decreased urine volume, difficulty urinating, dysuria, enuresis, flank pain, frequency, hematuria and urgency.   Musculoskeletal: Negative for arthralgias, back pain, gait problem and joint swelling.   Skin: Negative for rash.   Allergic/Immunologic: Negative for food allergies and immunocompromised state.   Neurological: Negative for dizziness, tremors, syncope, numbness and headaches.   Hematological: Does not bruise/bleed easily.   Psychiatric/Behavioral: Negative for agitation,  "behavioral problems and self-injury. The patient is not nervous/anxious and is not hyperactive.    All other systems reviewed and are negative.    Objective:     Vital Signs (Most Recent):  Temp: 97.4 °F (36.3 °C) (07/26/20 0717)  Pulse: 79 (07/26/20 0717)  Resp: 19 (07/26/20 0717)  BP: (!) 141/65 (07/26/20 0717)  SpO2: 96 % (07/26/20 0717) Vital Signs (24h Range):  Temp:  [97.4 °F (36.3 °C)-100.4 °F (38 °C)] 97.4 °F (36.3 °C)  Pulse:  [63-91] 79  Resp:  [14-20] 19  SpO2:  [90 %-99 %] 96 %  BP: (124-156)/(62-74) 141/65     Weight: 62.7 kg (138 lb 3.7 oz)  Height: 5' 1" (154.9 cm)  Body mass index is 26.12 kg/m².    Physical Exam  Vitals signs and nursing note reviewed.   Constitutional:       Appearance: She is well-developed.   Eyes:      Pupils: Pupils are equal, round, and reactive to light.   Neck:      Musculoskeletal: Normal range of motion.   Cardiovascular:      Rate and Rhythm: Normal rate and regular rhythm.      Heart sounds: No murmur.   Pulmonary:      Effort: Pulmonary effort is normal. No respiratory distress.      Breath sounds: Wheezing present.   Abdominal:      General: Bowel sounds are normal. There is no distension.      Palpations: Abdomen is soft.      Tenderness: There is no abdominal tenderness. There is no guarding.   Musculoskeletal: Normal range of motion.   Skin:     General: Skin is warm and dry.   Neurological:      Mental Status: She is alert and oriented to person, place, and time.         Significant Labs:  CBC:   Recent Labs   Lab 07/21/20 0753 07/26/20  0509   WBC 2.88* 2.64*   RBC 3.63* 3.84*   HGB 11.2* 11.8*   HCT 37.3 36.9*    141*   * 96   MCH 30.9 30.7   MCHC 30.0* 32.0     BMP:   Recent Labs   Lab 07/21/20 0753 07/26/20  0509   GLU 89 86    142   K 4.2 3.7    109   CO2 24 24   BUN 22* 25*   CREATININE 1.4 1.4   CALCIUM 8.4* 8.7       Diagnostics:  Chest X-Ray:  Bilateral lower lobe infiltrate  "

## 2020-07-26 NOTE — ED TRIAGE NOTES
COVID + patient since Sunday. C/o SOB and reports O2 sats 88% RA at home. Was told to come to ED if O2 was below 90%. Reports some diarrhea. Denies CP, n.v, abdominal pain. Hx of kidney transplant in April 2020.

## 2020-07-26 NOTE — H&P
[facility]  Kidney Transplant  H&P      Subjective:     Chief Complaint/Reason for Admission: SOB and fever    History of Present Illness:  Ms. Tala Rivera is a 53 yo female with history of HTN, gastric bypass surgery on 16 and ESRD presumed secondary to HTN now s/p KTX from 20 (Simulect induction, CMV +/+) who presents to the ED with fever and worsening sob. She states that she went to [facility] in clinic on 20 for evaluation due to flu-like symptoms and noted to be COVID-19 positive (results called back on ). Patient reports having COVID-19 exposure from son. Patient had a repeat COVID test on  in the ED after presenting for exposure, covid test negative during this visit. Patient re-presents with worsening sob and reports decreased O2 sats of 88% at home. In the ED patient noted to be febrile with temp of 100.4. O2 sats 88-90% on RA. CXR with no focal consolidation, generalized edema but overall relatively unchanged from . Repeat COVID-19 positive on admit. Received Azithromycin and Rocephin the ED.  Kidney function at baseline. Plan to admit for further observation.        (Not in a hospital admission)      Review of patient's allergies indicates:   Allergen Reactions    Sulfa (sulfonamide antibiotics) Hives       Past Medical History:   Diagnosis Date    Anemia of renal disease     ESRD on dialysis     Dr. Muhammad     Essential hypertension     PD catheter dysfunction 10/18/2018    Secondary hyperparathyroidism of renal origin      Past Surgical History:   Procedure Laterality Date    AV FISTULA PLACEMENT Left     never matured    AV graft Right 2015     SECTION      x3; ; , and     COLONOSCOPY N/A 2020    Procedure: COLONOSCOPY;  Surgeon: Darrell Golden MD;  [facility];  Service: Endoscopy;  Laterality: N/A;    GASTRIC BYPASS  2016    KIDNEY TRANSPLANT N/A 2020    Procedure: TRANSPLANT, KIDNEY;   Surgeon: Megan Garcia MD;  [facility];  Service: Transplant;  Laterality: N/A;    LAPAROSCOPIC REVISION OF PROCEDURE INVOLVING PERITONEAL DIALYSIS CATHETER N/A 10/18/2018    Procedure: REVISION OF PROCEDURE INVOLVING PERITONEAL DIALYSIS CATHETER, LAPAROSCOPIC;  Surgeon: Hair Jimenez MD;  Location: [facility];  Service: General;  Laterality: N/A;    PERITONEAL CATHETER INSERTION N/A 9/4/2018    Procedure: Laparoscopic INSERTION, CATHETER, INTRAPERITONEAL;  Surgeon: Hair Jimenez MD;  Location: [facility];  Service: General;  Laterality: N/A;    PERITONEAL CATHETER REMOVAL N/A 12/14/2018    Procedure: REMOVAL, CATHETER, DIALYSIS, PERITONEAL;  Surgeon: Hair Jimenez MD;  Location: [facility];  Service: General;  Laterality: N/A;    WOUND EXPLORATION N/A 12/14/2018    Procedure: EXPLORATION, WOUND-Surgical Site Irrigation;  Surgeon: Hair Jimenez MD;  Location: [facility];  Service: General;  Laterality: N/A;     Family History     Problem Relation (Age of Onset)    Diabetes Mother    Down syndrome Son    Heart disease Mother    Hypertension Mother    Kidney disease Mother    Lupus Sister        Tobacco Use    Smoking status: Never Smoker    Smokeless tobacco: Never Used   Substance and Sexual Activity    Alcohol use: No    Drug use: No    Sexual activity: Yes     Partners: Male        Review of Systems   Constitutional: Positive for fatigue and fever. Negative for activity change and chills.   Respiratory: Positive for shortness of breath.    Gastrointestinal: Positive for diarrhea. Negative for abdominal distention, abdominal pain, constipation, nausea and vomiting.   Genitourinary: Negative for decreased urine volume and difficulty urinating.   Allergic/Immunologic: Positive for immunocompromised state.   Neurological: Positive for weakness.   Psychiatric/Behavioral: Negative for confusion.     Objective:     Vital Signs (Most Recent):  Temp: (!) 100.4 °F (38 °C) (07/26/20 0404)  Pulse: 67  (07/26/20 0615)  Resp: 14 (07/26/20 0601)  BP: 124/62 (07/26/20 0615)  SpO2: 97 % (07/26/20 0615)        There is no height or weight on file to calculate BMI.     Physical Exam  Vitals signs and nursing note reviewed.   Constitutional:       Appearance: She is well-developed.   Eyes:      Pupils: Pupils are equal, round, and reactive to light.   Neck:      Musculoskeletal: Normal range of motion.   Cardiovascular:      Rate and Rhythm: Normal rate and regular rhythm.      Heart sounds: No murmur.   Pulmonary:      Effort: Pulmonary effort is normal. No respiratory distress.      Breath sounds: Normal breath sounds. No wheezing.   Abdominal:      General: Bowel sounds are normal. There is no distension.      Palpations: Abdomen is soft.      Tenderness: There is no abdominal tenderness. There is no guarding.   Musculoskeletal: Normal range of motion.   Skin:     General: Skin is warm and dry.   Neurological:      Mental Status: She is alert and oriented to person, place, and time.         Laboratory  CBC:   Recent Labs   Lab 07/21/20  0753 07/26/20  0509   WBC 2.88* 2.64*   RBC 3.63* 3.84*   HGB 11.2* 11.8*   HCT 37.3 36.9*    141*   * 96   MCH 30.9 30.7   MCHC 30.0* 32.0     CMP:   Recent Labs   Lab 07/21/20  0753 07/26/20  0509   GLU 89 86   CALCIUM 8.4* 8.7   ALBUMIN 3.4* 3.3*   PROT  --  6.5    142   K 4.2 3.7   CO2 24 24    109   BUN 22* 25*   CREATININE 1.4 1.4   ALKPHOS  --  88   ALT  --  20   AST  --  26     Labs within the past 24 hours have been reviewed.    Diagnostic Results:  Chest X-Ray: No results found for this or any previous visit.    Assessment/Plan:     * COVID-19 virus detected  - Covid-19 positive [facility]/19 at [facility]  - Repeat covid on 7/22 negative at [facility]-- patient presented for exposure from son. No admission as test negative and minimal symptoms  - Covid test on admit positive (7/26)  - a/w worsening sob and fever  - CXR with no focal consolidations, generalized  "edema      Fever  - COVID-19 positive 7/19 and 7/26  - infectious work-up pending  - cont to monitor      Renal hypertension  - cont Nifedipine       Long term current use of immunosuppressive drug  - Continue Prograf. Will monitor for signs of toxic side effects, check daily tacrolimus troughs, and change meds accordingly.  - Cellcept on hold for Covid     Prophylactic immunotherapy  - see "long term use of immunosuppression"      At risk for opportunistic infections  - Continue mepron for PCP prophylaxis       S/P kidney transplant  - s/p KTX from 4/5/20 (Simulect induction, CMV +/+). Native kidney UOP ~1.8L  - a/w worsening sob and fever s/p positive covid test  - Cr at baseline  - cont to monitor      Anemia of renal disease  - H/H stable. No overt signs of bleeding  - cont to monitor            Marcela Ceron PA-C  Kidney Transplant  [facility]  "

## 2020-07-26 NOTE — CONSULTS
[facility] - ICU 15   Kidney Transplant  Consult Note    Consults      Subjective:     History of Present Illness:   Ms. Tala Rivera is a 53 yo female with history of HTN, gastric bypass surgery on 16 and ESRD presumed secondary to HTN now s/p KTX from 20 (Simulect induction, CMV +/+) who presents to the ED with fever and worsening sob. She states that she went to [facility] in clinic on 20 for evaluation due to flu-like symptoms and noted to be COVID-19 positive (results called back on ). Patient reports having COVID-19 exposure from son. Patient had a repeat COVID test on  in the ED after presenting for exposure, covid test negative during this visit. Patient re-presents with worsening sob and reports decreased O2 sats of 88% at home. In the ED patient noted to be febrile with temp of 100.4. O2 sats 88-90% on RA. CXR with no focal consolidation, generalized edema but overall relatively unchanged from . Repeat COVID-19 positive on admit. Received Azithromycin and Rocephin the ED.  Kidney function at baseline. Plan to admit for further observation.     Ms. Rivera is a 54 y.o. year old female who is status post Kidney Transplant - 2020  (#1).    She received induction therapy with Simulect and her maintenance immunosuppression consists of:   Immunosuppressants (From admission, onward)    Start     Stop Route Frequency Ordered    20 1800  tacrolimus capsule 4 mg      -- Oral Daily 20 0532    20 0800  tacrolimus capsule 5 mg      -- Oral Daily 20 0532          Interval History:  Admitted for COVID infection    Past Medical and Surgical History: Ms. Rivera has a past medical history of Anemia of renal disease, ESRD on dialysis, Essential hypertension, PD catheter dysfunction (10/18/2018), and Secondary hyperparathyroidism of renal origin.  She has a past surgical history that includes  section; AV fistula placement (Left); AV graft  (Right, 11/17/2015); Gastric bypass (2016); Peritoneal catheter insertion (N/A, 9/4/2018); Laparoscopic revision of procedure involving peritoneal dialysis catheter (N/A, 10/18/2018); Wound exploration (N/A, 12/14/2018); Peritoneal catheter removal (N/A, 12/14/2018); Colonoscopy (N/A, 2/5/2020); Kidney transplant (N/A, 4/5/2020); and Kidney transplant (Right, 04/05/2020).    Past Social and Family History: Ms. Rivera reports that she has never smoked. She has never used smokeless tobacco. She reports that she does not drink alcohol or use drugs.Her family history includes Diabetes in her mother; Down syndrome in her son; Heart disease in her mother; Hypertension in her mother; Kidney disease in her mother; Lupus in her sister.    Intake/Output - Last 3 Shifts     None           Review of Systems   Constitutional: Positive for activity change, appetite change, chills, fatigue and fever. Negative for diaphoresis and unexpected weight change.   HENT: Negative for congestion, dental problem, drooling, postnasal drip, rhinorrhea and voice change.    Eyes: Negative for discharge.   Respiratory: Positive for cough, chest tightness, shortness of breath and wheezing. Negative for apnea, choking and stridor.    Cardiovascular: Negative for chest pain, palpitations and leg swelling.   Gastrointestinal: Negative for abdominal distention, blood in stool, constipation, diarrhea, nausea, rectal pain and vomiting.   Endocrine: Negative for cold intolerance, heat intolerance, polydipsia and polyuria.   Genitourinary: Negative for decreased urine volume, difficulty urinating, dysuria, enuresis, flank pain, frequency, hematuria and urgency.   Musculoskeletal: Negative for arthralgias, back pain, gait problem and joint swelling.   Skin: Negative for rash.   Allergic/Immunologic: Negative for food allergies and immunocompromised state.   Neurological: Negative for dizziness, tremors, syncope, numbness and headaches.   Hematological:  "Does not bruise/bleed easily.   Psychiatric/Behavioral: Negative for agitation, behavioral problems and self-injury. The patient is not nervous/anxious and is not hyperactive.    All other systems reviewed and are negative.    Objective:     Vital Signs (Most Recent):  Temp: 97.4 °F (36.3 °C) (07/26/20 0717)  Pulse: 79 (07/26/20 0717)  Resp: 19 (07/26/20 0717)  BP: (!) 141/65 (07/26/20 0717)  SpO2: 96 % (07/26/20 0717) Vital Signs (24h Range):  Temp:  [97.4 °F (36.3 °C)-100.4 °F (38 °C)] 97.4 °F (36.3 °C)  Pulse:  [63-91] 79  Resp:  [14-20] 19  SpO2:  [90 %-99 %] 96 %  BP: (124-156)/(62-74) 141/65     Weight: 62.7 kg (138 lb 3.7 oz)  Height: 5' 1" (154.9 cm)  Body mass index is 26.12 kg/m².    Physical Exam  Vitals signs and nursing note reviewed.   Constitutional:       Appearance: She is well-developed.   Eyes:      Pupils: Pupils are equal, round, and reactive to light.   Neck:      Musculoskeletal: Normal range of motion.   Cardiovascular:      Rate and Rhythm: Normal rate and regular rhythm.      Heart sounds: No murmur.   Pulmonary:      Effort: Pulmonary effort is normal. No respiratory distress.      Breath sounds: Wheezing present.   Abdominal:      General: Bowel sounds are normal. There is no distension.      Palpations: Abdomen is soft.      Tenderness: There is no abdominal tenderness. There is no guarding.   Musculoskeletal: Normal range of motion.   Skin:     General: Skin is warm and dry.   Neurological:      Mental Status: She is alert and oriented to person, place, and time.         Significant Labs:  CBC:   Recent Labs   Lab 07/21/20 0753 07/26/20  0509   WBC 2.88* 2.64*   RBC 3.63* 3.84*   HGB 11.2* 11.8*   HCT 37.3 36.9*    141*   * 96   MCH 30.9 30.7   MCHC 30.0* 32.0     BMP:   Recent Labs   Lab 07/21/20 0753 07/26/20  0509   GLU 89 86    142   K 4.2 3.7    109   CO2 24 24   BUN 22* 25*   CREATININE 1.4 1.4   CALCIUM 8.4* 8.7       Diagnostics:  Chest X-Ray:  " Bilateral lower lobe infiltrate    Assessment/Plan:     * COVID-19 virus detected  - Covid-19 positive 7/19 at OSH  - Repeat covid on 7/22 negative at [facility]-- patient presented for exposure from son. No admission as test negative and minimal symptoms  - Covid test on admit positive (7/26)  - a/w worsening sob and fever  - CXR with no focal consolidations, generalized edema  Infection disease consultation for consideration of dexamethasone/Remdesivir      Fever  - COVID-19 positive 7/19 and 7/26  - infectious work-up pending  - cont to monitor  No need for IV antibiotics at this time continue with COVID treatment.  Procalcitonin is 0.04      Long-term use of immunosuppressant medication  - Continue Prograf. Will monitor for signs of toxic side effects, check daily tacrolimus troughs, and change meds accordingly.  - Cellcept on hold for Covid     Prophylactic immunotherapy  Continue with prednisone and Prograf 5+4 hold CellCept    S/P kidney transplant  - s/p KTX from 4/5/20 (Simulect induction, CMV +/+). Native kidney UOP ~1.8L  - a/w worsening sob and fever s/p positive covid test  - Cr at baseline  - cont to monitor  7/26 creatinine stable at 1.4 continue with Prograf 5+4 or Cellcept.  Infectious disease recommendations pending          Discharge Planning:        Viji Tariq MD  Kidney Transplant  [facility]

## 2020-07-26 NOTE — ASSESSMENT & PLAN NOTE
- Covid-19 positive 7/19 at OSH  - Repeat covid on 7/22 negative at [facility]-- patient presented for exposure from son. No admission as test negative and minimal symptoms  - Covid test on admit positive (7/26)  - a/w worsening sob and fever  - CXR with no focal consolidations, generalized edema

## 2020-07-26 NOTE — CONSULTS
[facility] - ICU 15 [facility]  Infectious Disease  Consult Note    Patient Name: Tala Rivera  MRN: [id number]  Admission Date: 7/26/2020  Hospital Length of Stay: 0 days  Attending Physician: Abelardo Rangel MD  Primary Care Provider: ANUJA Castellon MD     Isolation Status: Airborne and Contact and Droplet    Patient information was obtained from patient, past medical records and ER records.      Inpatient consult to Infectious Diseases  Consult performed by: Manisha Ma MD  Consult ordered by: Yeni Justice MD        Assessment/Plan:     * COVID-19 virus detected  54-year-old female with history of HTN, gastric bypass 7/28/16, ESRD presumed 2/2 HTN s/p renal transplant 4/5/20 (simulect induction, CMV D+/R+, on maintainence tacro, MMF) who presented w/ fever & worsening SOB, found to have COVID-19.    Remdesivir FDA EUA Verbal Consent  The patient has been provided with the remdesivir Fact Sheet for Patients and Parents/Caregivers and has been counseled that the FDA has authorized the emergency use of remdesivir, which is not an FDA approved drug. The significant known and potential risks and benefits are unknown. The patient has been given the option to accept or refuse and has verbally agreed to receive remdesivir. Daily labs will be ordered and monitoring for Serious Adverse Events will be performed.          Thank you for your consult. I will follow-up with patient. Please contact us if you have any additional questions.    Manisha Ma MD  Infectious Disease  [facility] ICU 15 [facility]    Subjective:     Principal Problem: COVID-19 virus detected    HPI: 54-year-old female with history of HTN, gastric bypass 7/28/16, ESRD presumed 2/2 HTN s/p renal transplant 4/5/20 (simulect induction, CMV D+/R+, on maintainence tacro, MMF) who presented w/ fever & worsening SOB. Starting 7/19/2020, patient developed headache and diarrhea. She states that she went to [place] [facility] in  clinic on 2020 for evaluation due to flu-like symptoms and found to be COVID-19 positive (results not called back until ). Patient reports having COVID-19 exposure from son. COVID test 2020 in ED after presenting for exposure, covid test negative during this visit. Patient re-presents with worsening SOB and reports decreased O2 sats of 88% at home. In the ED patient noted to be febrile with temp of 100.4. O2 sats 88-90% on RA. CXR with no focal consolidation, generalized edema but overall relatively unchanged from . Repeat COVID-19 positive on admit. Received Azithromycin and Rocephin the ED. ID is consulted for COVID treatment recommendations. Patient reports ongoing dry cough, chest pressure. Reports headache and diarrhea have resolved.    Past Medical History:   Diagnosis Date    Anemia of renal disease     ESRD on dialysis     Dr. Muhammad     Essential hypertension     PD catheter dysfunction 10/18/2018    Secondary hyperparathyroidism of renal origin        Past Surgical History:   Procedure Laterality Date    AV FISTULA PLACEMENT Left     never matured    AV graft Right 2015     SECTION      x3; ; , and     COLONOSCOPY N/A 2020    Procedure: COLONOSCOPY;  Surgeon: Darrell Golden MD;  Location: [facility];  Service: Endoscopy;  Laterality: N/A;    GASTRIC BYPASS  2016    KIDNEY TRANSPLANT N/A 2020    Procedure: TRANSPLANT, KIDNEY;  Surgeon: Megan Garcia MD;  Location: [address];  Service: Transplant;  Laterality: N/A;    KIDNEY TRANSPLANT Right 2020    LAPAROSCOPIC REVISION OF PROCEDURE INVOLVING PERITONEAL DIALYSIS CATHETER N/A 10/18/2018    Procedure: REVISION OF PROCEDURE INVOLVING PERITONEAL DIALYSIS CATHETER, LAPAROSCOPIC;  Surgeon: Hair Jimenez MD;  Location: [facility];  Service: General;  Laterality: N/A;    PERITONEAL CATHETER INSERTION N/A 2018    Procedure: Laparoscopic INSERTION, CATHETER, INTRAPERITONEAL;   Surgeon: Hair Jimenez MD;  Location: [facility];  Service: General;  Laterality: N/A;    PERITONEAL CATHETER REMOVAL N/A 12/14/2018    Procedure: REMOVAL, CATHETER, DIALYSIS, PERITONEAL;  Surgeon: Hair Jimenez MD;  Location: [facility];  Service: General;  Laterality: N/A;    WOUND EXPLORATION N/A 12/14/2018    Procedure: EXPLORATION, WOUND-Surgical Site Irrigation;  Surgeon: Hair Jimenez MD;  Location: [facility] OR;  Service: General;  Laterality: N/A;       Review of patient's allergies indicates:   Allergen Reactions    Sulfa (sulfonamide antibiotics) Hives       Medications:  Medications Prior to Admission   Medication Sig    famotidine (PEPCID) 20 MG tablet Take 20 mg by mouth once daily.    atovaquone (MEPRON) 750 mg/5 mL Susp Take 10 mLs (1,500 mg total) by mouth once daily. Stop: 4/5/2021    calcitRIOL (ROCALTROL) 0.25 MCG Cap Take 2 capsules (0.5 mcg total) by mouth once daily.    docusate sodium (COLACE) 100 MG capsule Take 1 capsule (100 mg total) by mouth 3 (three) times daily as needed for Constipation.    ergocalciferol (ERGOCALCIFEROL) 50,000 unit Cap Take 1 capsule (50,000 Units total) by mouth every 7 days.    k phos di & mono-sod phos mono (K-PHOS-NEUTRAL) 250 mg Tab Take 1 tablet by mouth 2 (two) times daily.    NIFEdipine (PROCARDIA-XL) 30 MG (OSM) 24 hr tablet Take 1 tablet (30 mg total) by mouth 2 (two) times a day.    predniSONE (DELTASONE) 5 MG tablet Take 20mg by mouth daily 4/8-5/7;   Take 15mg daily 5/8-6/7;  Take 10mg daily 6/8-7/7;  then 5mg daily thereafter starting 7/8/2020    pulse oximeter (PULSE OXIMETER) device Use twice daily at 8 AM and 3 PM and record the value in MyChart as directed.    sodium bicarbonate 650 MG tablet Take 2 tablets (1,300 mg total) by mouth 2 (two) times daily.    tacrolimus (PROGRAF) 1 MG Cap Take 5 capsules (5 mg total) by mouth every morning AND 4 capsules (4 mg total) every evening.     Antibiotics (From admission, onward)    None         Antifungals (From admission, onward)    None        Antivirals (From admission, onward)    None           Immunization History   Administered Date(s) Administered    Hepatitis A, Adult 09/27/2016, 03/28/2017    Pneumococcal Conjugate - 13 Valent 09/27/2016    Pneumococcal Polysaccharide - 23 Valent 11/28/2016    Tdap 09/27/2016       Family History     Problem Relation (Age of Onset)    Diabetes Mother    Down syndrome Son    Heart disease Mother    Hypertension Mother    Kidney disease Mother    Lupus Sister        Social History     Socioeconomic History    Marital status:      Spouse name: Not on file    Number of children: Not on file    Years of education: Not on file    Highest education level: Not on file   Occupational History    Not on file   Social Needs    Financial resource strain: Not on file    Food insecurity     Worry: Not on file     Inability: Not on file    Transportation needs     Medical: Not on file     Non-medical: Not on file   Tobacco Use    Smoking status: Never Smoker    Smokeless tobacco: Never Used   Substance and Sexual Activity    Alcohol use: No    Drug use: No    Sexual activity: Yes     Partners: Male   Lifestyle    Physical activity     Days per week: Not on file     Minutes per session: Not on file    Stress: Not on file   Relationships    Social connections     Talks on phone: Not on file     Gets together: Not on file     Attends Gnosticism service: Not on file     Active member of club or organization: Not on file     Attends meetings of clubs or organizations: Not on file     Relationship status: Not on file   Other Topics Concern    Not on file   Social History Narrative    She lives with  and 2 dogs and 1 cat     She works as  at [employer]      Review of Systems   Constitutional: Positive for fever. Negative for chills and diaphoresis.   HENT: Negative for rhinorrhea and sore throat.    Respiratory: Positive for  cough, chest tightness and shortness of breath.    Cardiovascular: Negative for chest pain and leg swelling.   Gastrointestinal: Negative for abdominal pain, diarrhea, nausea and vomiting.   Genitourinary: Negative for dysuria and hematuria.   Musculoskeletal: Negative for arthralgias and myalgias.   Skin: Negative for rash.   Neurological: Negative for headaches.     Objective:     Vital Signs (Most Recent):  Temp: 98.2 °F (36.8 °C) (07/26/20 1115)  Pulse: 71 (07/26/20 1300)  Resp: 20 (07/26/20 1300)  BP: 137/67 (07/26/20 1300)  SpO2: 96 % (07/26/20 1300) Vital Signs (24h Range):  Temp:  [97.4 °F (36.3 °C)-100.4 °F (38 °C)] 98.2 °F (36.8 °C)  Pulse:  [63-91] 71  Resp:  [14-20] 20  SpO2:  [90 %-99 %] 96 %  BP: (124-156)/(62-74) 137/67     Weight: 62.7 kg (138 lb 3.7 oz)  Body mass index is 26.12 kg/m².    Estimated Creatinine Clearance: 39.9 mL/min (based on SCr of 1.4 mg/dL).    Physical Exam  Vitals signs reviewed.   Constitutional:       General: She is not in acute distress.     Appearance: She is well-developed. She is not diaphoretic.   HENT:      Head: Normocephalic and atraumatic.   Eyes:      Conjunctiva/sclera: Conjunctivae normal.   Neck:      Musculoskeletal: Normal range of motion and neck supple.   Pulmonary:      Effort: Pulmonary effort is normal. No respiratory distress.      Comments: On nasal cannula. Intermittent dry cough.  Abdominal:      General: There is no distension.      Palpations: Abdomen is soft.   Musculoskeletal: Normal range of motion.   Skin:     General: Skin is warm and dry.      Findings: No erythema or rash.   Neurological:      Mental Status: She is alert and oriented to person, place, and time.   Psychiatric:         Behavior: Behavior normal.         Significant Labs: All pertinent labs within the past 24 hours have been reviewed.    Significant Imaging: I have reviewed all pertinent imaging results/findings within the past 24 hours.

## 2020-07-26 NOTE — HOSPITAL COURSE
Interval history:  No acute events overnight. She remains stable on 1L NC. Continue Remdesivir. Cr 1.2. Electrolytes replaced. Encourage PO intake. Monitor strict I & O. Symptoms are improving.

## 2020-07-26 NOTE — ASSESSMENT & PLAN NOTE
- Covid-19 positive 7/19 at OSH  - Repeat covid on 7/22 negative at [facility]-- patient presented for exposure from son. No admission as test negative and minimal symptoms  - Covid test on admit positive (7/26)  - a/w worsening sob and fever  - CXR with no focal consolidations, generalized edema  Infection disease consultation for consideration of dexamethasone/Remdesivir

## 2020-07-26 NOTE — ASSESSMENT & PLAN NOTE
- s/p KTX from 4/5/20 (Simulect induction, CMV +/+). Native kidney UOP ~1.8L  - a/w worsening sob and fever s/p positive covid test  - Cr at baseline  - cont to monitor

## 2020-07-26 NOTE — ASSESSMENT & PLAN NOTE
54-year-old female with history of HTN, gastric bypass 7/28/16, ESRD presumed 2/2 HTN s/p renal transplant 4/5/20 (simulect induction, CMV D+/R+, on maintainence tacro, MMF) who presented w/ fever & worsening SOB, found to have COVID-19.    Remdesivir FDA EUA Verbal Consent  The patient has been provided with the remdesivir Fact Sheet for Patients and Parents/Caregivers and has been counseled that the FDA has authorized the emergency use of remdesivir, which is not an FDA approved drug. The significant known and potential risks and benefits are unknown. The patient has been given the option to accept or refuse and has verbally agreed to receive remdesivir. Daily labs will be ordered and monitoring for Serious Adverse Events will be performed.

## 2020-07-26 NOTE — ASSESSMENT & PLAN NOTE
- s/p KTX from 4/5/20 (Simulect induction, CMV +/+). Native kidney UOP ~1.8L  - a/w worsening sob and fever s/p positive covid test  - Cr at baseline  - cont to monitor  7/26 creatinine stable at 1.4 continue with Prograf 5+4 or Cellcept.  Infectious disease recommendations pending

## 2020-07-26 NOTE — SUBJECTIVE & OBJECTIVE
Subjective:     Chief Complaint/Reason for Admission: SOB and fever    History of Present Illness:  Ms. Tala Rivera is a 53 yo female with history of HTN, gastric bypass surgery on 16 and ESRD presumed secondary to HTN now s/p KTX from 20 (Simulect induction, CMV +/+) who presents to the ED with fever and worsening sob. She states that she went to [facility] in clinic on 20 for evaluation due to flu-like symptoms and noted to be COVID-19 positive (results called back on ). Patient reports having COVID-19 exposure from son. Patient had a repeat COVID test on  in the ED after presenting for exposure, covid test negative during this visit. Patient re-presents with worsening sob and reports decreased O2 sats of 88% at home. In the ED patient noted to be febrile with temp of 100.4. O2 sats 88-90% on RA. CXR with no focal consolidation, generalized edema but overall relatively unchanged from . Repeat COVID-19 positive on admit. Received Azithromycin and Rocephin the ED.  Kidney function at baseline. Plan to admit for further observation.        (Not in a hospital admission)      Review of patient's allergies indicates:   Allergen Reactions    Sulfa (sulfonamide antibiotics) Hives       Past Medical History:   Diagnosis Date    Anemia of renal disease     ESRD on dialysis     Dr. Muhammad     Essential hypertension     PD catheter dysfunction 10/18/2018    Secondary hyperparathyroidism of renal origin      Past Surgical History:   Procedure Laterality Date    AV FISTULA PLACEMENT Left     never matured    AV graft Right 2015     SECTION      x3; ; , and     COLONOSCOPY N/A 2020    Procedure: COLONOSCOPY;  Surgeon: Darrell Golden MD;  Location: [facility];  Service: Endoscopy;  Laterality: N/A;    GASTRIC BYPASS  2016    KIDNEY TRANSPLANT N/A 2020    Procedure: TRANSPLANT, KIDNEY;  Surgeon: Megan Garcia MD;  Location: [facility] OR [address] [place] [address];   Service: Transplant;  Laterality: N/A;    LAPAROSCOPIC REVISION OF PROCEDURE INVOLVING PERITONEAL DIALYSIS CATHETER N/A 10/18/2018    Procedure: REVISION OF PROCEDURE INVOLVING PERITONEAL DIALYSIS CATHETER, LAPAROSCOPIC;  Surgeon: Hair Jimenez MD;  Location: [facility];  Service: General;  Laterality: N/A;    PERITONEAL CATHETER INSERTION N/A 9/4/2018    Procedure: Laparoscopic INSERTION, CATHETER, INTRAPERITONEAL;  Surgeon: Hair Jimenez MD;  Location: [facility];  Service: General;  Laterality: N/A;    PERITONEAL CATHETER REMOVAL N/A 12/14/2018    Procedure: REMOVAL, CATHETER, DIALYSIS, PERITONEAL;  Surgeon: Hair Jimenez MD;  Location: [facility] OR;  Service: General;  Laterality: N/A;    WOUND EXPLORATION N/A 12/14/2018    Procedure: EXPLORATION, WOUND-Surgical Site Irrigation;  Surgeon: Hair Jimenez MD;  Location: [facility] OR;  Service: General;  Laterality: N/A;     Family History     Problem Relation (Age of Onset)    Diabetes Mother    Down syndrome Son    Heart disease Mother    Hypertension Mother    Kidney disease Mother    Lupus Sister        Tobacco Use    Smoking status: Never Smoker    Smokeless tobacco: Never Used   Substance and Sexual Activity    Alcohol use: No    Drug use: No    Sexual activity: Yes     Partners: Male        Review of Systems   Constitutional: Positive for fatigue and fever. Negative for activity change and chills.   Respiratory: Positive for shortness of breath.    Gastrointestinal: Positive for diarrhea. Negative for abdominal distention, abdominal pain, constipation, nausea and vomiting.   Genitourinary: Negative for decreased urine volume and difficulty urinating.   Allergic/Immunologic: Positive for immunocompromised state.   Neurological: Positive for weakness.   Psychiatric/Behavioral: Negative for confusion.     Objective:     Vital Signs (Most Recent):  Temp: (!) 100.4 °F (38 °C) (07/26/20 0404)  Pulse: 67 (07/26/20 0615)  Resp: 14 (07/26/20 0601)  BP: 124/62 (07/26/20  0615)  SpO2: 97 % (07/26/20 0615)        There is no height or weight on file to calculate BMI.     Physical Exam  Vitals signs and nursing note reviewed.   Constitutional:       Appearance: She is well-developed.   Eyes:      Pupils: Pupils are equal, round, and reactive to light.   Neck:      Musculoskeletal: Normal range of motion.   Cardiovascular:      Rate and Rhythm: Normal rate and regular rhythm.      Heart sounds: No murmur.   Pulmonary:      Effort: Pulmonary effort is normal. No respiratory distress.      Breath sounds: Normal breath sounds. No wheezing.   Abdominal:      General: Bowel sounds are normal. There is no distension.      Palpations: Abdomen is soft.      Tenderness: There is no abdominal tenderness. There is no guarding.   Musculoskeletal: Normal range of motion.   Skin:     General: Skin is warm and dry.   Neurological:      Mental Status: She is alert and oriented to person, place, and time.         Laboratory  CBC:   Recent Labs   Lab 07/21/20  0753 07/26/20  0509   WBC 2.88* 2.64*   RBC 3.63* 3.84*   HGB 11.2* 11.8*   HCT 37.3 36.9*    141*   * 96   MCH 30.9 30.7   MCHC 30.0* 32.0     CMP:   Recent Labs   Lab 07/21/20  0753 07/26/20  0509   GLU 89 86   CALCIUM 8.4* 8.7   ALBUMIN 3.4* 3.3*   PROT  --  6.5    142   K 4.2 3.7   CO2 24 24    109   BUN 22* 25*   CREATININE 1.4 1.4   ALKPHOS  --  88   ALT  --  20   AST  --  26     Labs within the past 24 hours have been reviewed.    Diagnostic Results:  Chest X-Ray: No results found for this or any previous visit.

## 2020-07-26 NOTE — ED PROVIDER NOTES
Encounter Date: 2020       History     Chief Complaint   Patient presents with    COVID-19 Concerns     pt diagnosed with COVID on . Reports O2 saturation 88% RA. Hx of kidney transplant in 2020     The patient is a 53 yo female who has a past medical history significant for HTN, gastric bypass, umbilical hernia, HTN, ESRD, and kidney transplant on 20. She states that she went to [facility] in clinic on 20 for evaluation due to flu-like symptoms. She states that she was swabbed for Covid. She states that she was notified on 20 that her Covid test was positive. She was given a home pulse oximeter. She presents to the ER this evening for an emergent evaluation due to fever and SOB. She states that her home oxygen level decreased to 88% earlier.         Review of patient's allergies indicates:   Allergen Reactions    Sulfa (sulfonamide antibiotics) Hives     Past Medical History:   Diagnosis Date    Anemia of renal disease     ESRD on dialysis     Dr. Muhammad     Essential hypertension     PD catheter dysfunction 10/18/2018    Secondary hyperparathyroidism of renal origin      Past Surgical History:   Procedure Laterality Date    AV FISTULA PLACEMENT Left     never matured    AV graft Right 2015     SECTION      x3; ; , and     COLONOSCOPY N/A 2020    Procedure: COLONOSCOPY;  Surgeon: Darrell Golden MD;  Location: [facility];  Service: Endoscopy;  Laterality: N/A;    GASTRIC BYPASS  2016    KIDNEY TRANSPLANT N/A 2020    Procedure: TRANSPLANT, KIDNEY;  Surgeon: Megan Garcia MD;  Location: [address];  Service: Transplant;  Laterality: N/A;    LAPAROSCOPIC REVISION OF PROCEDURE INVOLVING PERITONEAL DIALYSIS CATHETER N/A 10/18/2018    Procedure: REVISION OF PROCEDURE INVOLVING PERITONEAL DIALYSIS CATHETER, LAPAROSCOPIC;  Surgeon: Hair Jimenez MD;  Location: [facility];  Service: General;  Laterality: N/A;    PERITONEAL CATHETER  INSERTION N/A 9/4/2018    Procedure: Laparoscopic INSERTION, CATHETER, INTRAPERITONEAL;  Surgeon: Hair Jimenez MD;  Location: [facility];  Service: General;  Laterality: N/A;    PERITONEAL CATHETER REMOVAL N/A 12/14/2018    Procedure: REMOVAL, CATHETER, DIALYSIS, PERITONEAL;  Surgeon: Hair Jimenez MD;  Location: [facility] OR;  Service: General;  Laterality: N/A;    WOUND EXPLORATION N/A 12/14/2018    Procedure: EXPLORATION, WOUND-Surgical Site Irrigation;  Surgeon: Hair Jimenez MD;  Location: [facility] OR;  Service: General;  Laterality: N/A;     Family History   Problem Relation Age of Onset    Kidney disease Mother     Diabetes Mother     Heart disease Mother     Hypertension Mother     Lupus Sister     Down syndrome Son     Cancer Neg Hx     Stroke Neg Hx      Social History     Tobacco Use    Smoking status: Never Smoker    Smokeless tobacco: Never Used   Substance Use Topics    Alcohol use: No    Drug use: No     Review of Systems   Constitutional: Positive for chills and fever.   HENT: Negative for sore throat.    Respiratory: Positive for cough and shortness of breath. Negative for chest tightness and wheezing.    Cardiovascular: Negative for chest pain, palpitations and leg swelling.   Gastrointestinal: Positive for diarrhea. Negative for abdominal pain, blood in stool, nausea and vomiting.   Musculoskeletal: Negative for arthralgias and gait problem.   Skin: Negative for color change and rash.   Allergic/Immunologic: Positive for immunocompromised state.   Neurological: Negative for seizures, syncope, weakness and headaches.   Psychiatric/Behavioral: Negative for confusion.       Physical Exam     Initial Vitals [07/26/20 0404]   BP Pulse Resp Temp SpO2   (!) 144/73 91 20 (!) 100.4 °F (38 °C) (!) 90 %      MAP       --         Physical Exam    Nursing note and vitals reviewed.  Constitutional: She appears well-developed and well-nourished. She is not diaphoretic.   She is alert and interactive.     Eyes: Conjunctivae are normal.   Neck: Neck supple.   Cardiovascular: Normal rate and intact distal pulses.   Pulmonary/Chest:   Occasional cough. SaO2 90% RA.    Abdominal: Soft. She exhibits no distension. There is no abdominal tenderness. There is no rebound and no guarding.   Musculoskeletal: Normal range of motion. No tenderness or edema.   Neurological: She is alert and oriented to person, place, and time. She has normal strength. No sensory deficit.   Skin: Skin is warm and dry. No rash noted.   Psychiatric: She has a normal mood and affect. Her behavior is normal.         ED Course   Procedures  Labs Reviewed   SARS-COV-2 RNA AMPLIFICATION, QUAL - Abnormal; Notable for the following components:       Result Value    SARS-CoV-2 RNA, Amplification, Qual Positive (*)     All other components within normal limits   CBC W/ AUTO DIFFERENTIAL - Abnormal; Notable for the following components:    WBC 2.64 (*)     RBC 3.84 (*)     Hemoglobin 11.8 (*)     Hematocrit 36.9 (*)     Platelets 141 (*)     Immature Granulocytes 0.8 (*)     Lymph # 0.2 (*)     Mono # 0.1 (*)     Gran% 87.5 (*)     Lymph% 6.8 (*)     All other components within normal limits   D DIMER, QUANTITATIVE - Abnormal; Notable for the following components:    D-Dimer 0.93 (*)     All other components within normal limits   SEDIMENTATION RATE - Abnormal; Notable for the following components:    Sed Rate 50 (*)     All other components within normal limits   CULTURE, BLOOD   CULTURE, BLOOD   COMPREHENSIVE METABOLIC PANEL   FERRITIN   LACTATE DEHYDROGENASE   C-REACTIVE PROTEIN   TACROLIMUS LEVEL   PROCALCITONIN   URINALYSIS, REFLEX TO URINE CULTURE     Results for orders placed or performed during the hospital encounter of 07/26/20   COVID-19 Rapid Screening   Result Value Ref Range    SARS-CoV-2 RNA, Amplification, Qual Positive (A) Negative   CBC auto differential   Result Value Ref Range    WBC 2.64 (L) 3.90 - 12.70 K/uL    RBC 3.84 (L) 4.00 - 5.40  M/uL    Hemoglobin 11.8 (L) 12.0 - 16.0 g/dL    Hematocrit 36.9 (L) 37.0 - 48.5 %    Mean Corpuscular Volume 96 82 - 98 fL    Mean Corpuscular Hemoglobin 30.7 27.0 - 31.0 pg    Mean Corpuscular Hemoglobin Conc 32.0 32.0 - 36.0 g/dL    RDW 11.8 11.5 - 14.5 %    Platelets 141 (L) 150 - 350 K/uL    MPV 12.3 9.2 - 12.9 fL    Immature Granulocytes 0.8 (H) 0.0 - 0.5 %    Gran # (ANC) 2.3 1.8 - 7.7 K/uL    Immature Grans (Abs) 0.02 0.00 - 0.04 K/uL    Lymph # 0.2 (L) 1.0 - 4.8 K/uL    Mono # 0.1 (L) 0.3 - 1.0 K/uL    Eos # 0.0 0.0 - 0.5 K/uL    Baso # 0.00 0.00 - 0.20 K/uL    nRBC 0 0 /100 WBC    Gran% 87.5 (H) 38.0 - 73.0 %    Lymph% 6.8 (L) 18.0 - 48.0 %    Mono% 4.9 4.0 - 15.0 %    Eosinophil% 0.0 0.0 - 8.0 %    Basophil% 0.0 0.0 - 1.9 %    Differential Method Automated    D dimer, quantitative   Result Value Ref Range    D-Dimer 0.93 (H) <0.50 mg/L FEU   Sedimentation rate   Result Value Ref Range    Sed Rate 50 (H) 0 - 36 mm/Hr            Imaging Results          X-Ray Chest AP Portable (Final result)  Result time 07/26/20 05:12:27    Final result by Beverly Feliciano MD (07/26/20 05:12:27)                 Impression:      Unchanged interstitial findings which could again could relate to underlying infectious versus non infectious inflammatory process or pulmonary edema.  Clinical correlation is advised.      Electronically signed by: Beverly Feliciano MD  Date:    07/26/2020  Time:    05:12             Narrative:    EXAMINATION:  XR CHEST AP PORTABLE    CLINICAL HISTORY:  Fever, unspecified    TECHNIQUE:  Single frontal view of the chest was performed.    COMPARISON:  07/22/2020    FINDINGS:  The cardiomediastinal silhouette is prominent in size, similar to prior exam.  There is calcification of the thoracic aorta.  Trachea is midline.  The lungs are symmetrically expanded with coarse bilateral interstitial attenuation bilaterally with the few patchy basilar opacities.  Findings appear relatively unchanged from prior  examination.  No significant volume of pleural fluid or pneumothorax is identified.  Osseous structures are intact.                                 Medical Decision Making:   History:   Old Medical Records: I decided to obtain old medical records.  Initial Assessment:   Pt with hx of recent kidney transplant had positive Covid test earlier this week. Pt here due to fever and worsening SOB   Differential Diagnosis:   Covid, Pneumonia, Sepsis, pleural effusion, CHF, PE, Rejection, TERESA, etc   Clinical Tests:   Lab Tests: Ordered  Radiological Study: Ordered  ED Management:  I discussed the case in detail with kidney transplant team - states that they will evaluate the patient in the ER and admit                                    Clinical Impression:       ICD-10-CM ICD-9-CM   1. Pneumonia due to COVID-19 virus  U07.1     J12.89    2. Fever  R50.9 780.60   3. Long-term use of immunosuppressant medication  Z79.899 V58.69   4. Kidney transplant status  Z94.0 V42.0   5. Pancytopenia  D61.818 284.19   6. Hypoxemia  R09.02 799.02         Disposition:   Disposition: Admitted  Condition: Serious     ED Disposition Condition    Admit

## 2020-07-26 NOTE — ASSESSMENT & PLAN NOTE
- COVID-19 positive 7/19 and 7/26  - infectious work-up pending  - cont to monitor  No need for IV antibiotics at this time continue with COVID treatment.  Procalcitonin is 0.04

## 2020-07-26 NOTE — ASSESSMENT & PLAN NOTE
- Continue Prograf. Will monitor for signs of toxic side effects, check daily tacrolimus troughs, and change meds accordingly.  - Cellcept on hold for Covid

## 2020-07-27 LAB
ALBUMIN SERPL BCP-MCNC: 2.9 G/DL (ref 3.5–5.2)
ANION GAP SERPL CALC-SCNC: 10 MMOL/L (ref 8–16)
BASOPHILS # BLD AUTO: 0.01 K/UL (ref 0–0.2)
BASOPHILS NFR BLD: 0.3 % (ref 0–1.9)
BUN SERPL-MCNC: 18 MG/DL (ref 6–20)
CALCIUM SERPL-MCNC: 8.4 MG/DL (ref 8.7–10.5)
CHLORIDE SERPL-SCNC: 108 MMOL/L (ref 95–110)
CO2 SERPL-SCNC: 23 MMOL/L (ref 23–29)
CREAT SERPL-MCNC: 1.1 MG/DL (ref 0.5–1.4)
DIFFERENTIAL METHOD: ABNORMAL
EOSINOPHIL # BLD AUTO: 0 K/UL (ref 0–0.5)
EOSINOPHIL NFR BLD: 0 % (ref 0–8)
ERYTHROCYTE [DISTWIDTH] IN BLOOD BY AUTOMATED COUNT: 11.8 % (ref 11.5–14.5)
EST. GFR  (AFRICAN AMERICAN): >60 ML/MIN/1.73 M^2
EST. GFR  (NON AFRICAN AMERICAN): 57.9 ML/MIN/1.73 M^2
GLUCOSE SERPL-MCNC: 78 MG/DL (ref 70–110)
HCT VFR BLD AUTO: 37.4 % (ref 37–48.5)
HGB BLD-MCNC: 12.1 G/DL (ref 12–16)
IMM GRANULOCYTES # BLD AUTO: 0.05 K/UL (ref 0–0.04)
IMM GRANULOCYTES NFR BLD AUTO: 1.6 % (ref 0–0.5)
LYMPHOCYTES # BLD AUTO: 0.3 K/UL (ref 1–4.8)
LYMPHOCYTES NFR BLD: 9.6 % (ref 18–48)
MAGNESIUM SERPL-MCNC: 1.7 MG/DL (ref 1.6–2.6)
MCH RBC QN AUTO: 30.6 PG (ref 27–31)
MCHC RBC AUTO-ENTMCNC: 32.4 G/DL (ref 32–36)
MCV RBC AUTO: 95 FL (ref 82–98)
MONOCYTES # BLD AUTO: 0.2 K/UL (ref 0.3–1)
MONOCYTES NFR BLD: 6.7 % (ref 4–15)
NEUTROPHILS # BLD AUTO: 2.6 K/UL (ref 1.8–7.7)
NEUTROPHILS NFR BLD: 81.8 % (ref 38–73)
NRBC BLD-RTO: 0 /100 WBC
PHOSPHATE SERPL-MCNC: 2.3 MG/DL (ref 2.7–4.5)
PLATELET # BLD AUTO: 133 K/UL (ref 150–350)
PMV BLD AUTO: 12.5 FL (ref 9.2–12.9)
POTASSIUM SERPL-SCNC: 3.6 MMOL/L (ref 3.5–5.1)
RBC # BLD AUTO: 3.95 M/UL (ref 4–5.4)
SODIUM SERPL-SCNC: 141 MMOL/L (ref 136–145)
TACROLIMUS BLD-MCNC: 13.4 NG/ML (ref 5–15)
WBC # BLD AUTO: 3.13 K/UL (ref 3.9–12.7)

## 2020-07-27 PROCEDURE — 25000003 PHARM REV CODE 250: Performed by: NURSE PRACTITIONER

## 2020-07-27 PROCEDURE — 25000003 PHARM REV CODE 250: Performed by: PHYSICIAN ASSISTANT

## 2020-07-27 PROCEDURE — 20600001 HC STEP DOWN PRIVATE ROOM

## 2020-07-27 PROCEDURE — 36415 COLL VENOUS BLD VENIPUNCTURE: CPT

## 2020-07-27 PROCEDURE — 80197 ASSAY OF TACROLIMUS: CPT

## 2020-07-27 PROCEDURE — 83735 ASSAY OF MAGNESIUM: CPT

## 2020-07-27 PROCEDURE — 99233 PR SUBSEQUENT HOSPITAL CARE,LEVL III: ICD-10-PCS | Mod: ,,, | Performed by: NURSE PRACTITIONER

## 2020-07-27 PROCEDURE — 80069 RENAL FUNCTION PANEL: CPT

## 2020-07-27 PROCEDURE — 63600175 PHARM REV CODE 636 W HCPCS: Performed by: NURSE PRACTITIONER

## 2020-07-27 PROCEDURE — 63600175 PHARM REV CODE 636 W HCPCS: Performed by: PHYSICIAN ASSISTANT

## 2020-07-27 PROCEDURE — 27000221 HC OXYGEN, UP TO 24 HOURS

## 2020-07-27 PROCEDURE — 85025 COMPLETE CBC W/AUTO DIFF WBC: CPT

## 2020-07-27 PROCEDURE — 99900035 HC TECH TIME PER 15 MIN (STAT)

## 2020-07-27 PROCEDURE — 99233 SBSQ HOSP IP/OBS HIGH 50: CPT | Mod: ,,, | Performed by: NURSE PRACTITIONER

## 2020-07-27 PROCEDURE — 94761 N-INVAS EAR/PLS OXIMETRY MLT: CPT

## 2020-07-27 RX ORDER — TACROLIMUS 1 MG/1
3 CAPSULE ORAL 2 TIMES DAILY
Status: DISCONTINUED | OUTPATIENT
Start: 2020-07-27 | End: 2020-07-28

## 2020-07-27 RX ADMIN — PREDNISONE 5 MG: 5 TABLET ORAL at 08:07

## 2020-07-27 RX ADMIN — DIBASIC SODIUM PHOSPHATE, MONOBASIC POTASSIUM PHOSPHATE AND MONOBASIC SODIUM PHOSPHATE 1 TABLET: 852; 155; 130 TABLET ORAL at 10:07

## 2020-07-27 RX ADMIN — SODIUM BICARBONATE 650 MG TABLET 1300 MG: at 08:07

## 2020-07-27 RX ADMIN — ATOVAQUONE 1500 MG: 750 SUSPENSION ORAL at 10:07

## 2020-07-27 RX ADMIN — TACROLIMUS 3 MG: 1 CAPSULE ORAL at 06:07

## 2020-07-27 RX ADMIN — NIFEDIPINE 30 MG: 30 TABLET, FILM COATED, EXTENDED RELEASE ORAL at 08:07

## 2020-07-27 RX ADMIN — TACROLIMUS 5 MG: 1 CAPSULE ORAL at 08:07

## 2020-07-27 RX ADMIN — ACETAMINOPHEN 650 MG: 325 TABLET ORAL at 12:07

## 2020-07-27 RX ADMIN — CALCITRIOL CAPSULES 0.25 MCG 0.5 MCG: 0.25 CAPSULE ORAL at 08:07

## 2020-07-27 RX ADMIN — DIBASIC SODIUM PHOSPHATE, MONOBASIC POTASSIUM PHOSPHATE AND MONOBASIC SODIUM PHOSPHATE 1 TABLET: 852; 155; 130 TABLET ORAL at 08:07

## 2020-07-27 RX ADMIN — REMDESIVIR 100 MG: 100 INJECTION, POWDER, LYOPHILIZED, FOR SOLUTION INTRAVENOUS at 04:07

## 2020-07-27 NOTE — ASSESSMENT & PLAN NOTE
-  hold Prograf due to high level. Will monitor for signs of toxic side effects, check daily tacrolimus troughs, and change meds accordingly.  - Cellcept on hold for Covid

## 2020-07-27 NOTE — SUBJECTIVE & OBJECTIVE
Subjective:     Chief Complaint/Reason for Admission: SOB and fever    History of Present Illness:  Ms. Tala Rivera is a 55 yo female with history of HTN, gastric bypass surgery on 7/28/16 and ESRD presumed secondary to HTN now s/p KTX from 4/5/20 (Simulect induction, CMV +/+) who presents to the ED with fever and worsening sob. She states that she went to [facility] in clinic on 7/19/20 for evaluation due to flu-like symptoms and noted to be COVID-19 positive (results called back on 7/23). Patient reports having COVID-19 exposure from son. Patient had a repeat COVID test on 7/22 in the ED after presenting for exposure, covid test negative during this visit. Patient re-presents with worsening sob and reports decreased O2 sats of 88% at home. In the ED patient noted to be febrile with temp of 100.4. O2 sats 88-90% on RA. CXR with no focal consolidation, generalized edema but overall relatively unchanged from 7/22. Repeat COVID-19 positive on admit. Received Azithromycin and Rocephin the ED.  Kidney function at baseline. Plan to admit for further observation.        PTA Medications   Medication Sig    famotidine (PEPCID) 20 MG tablet Take 20 mg by mouth once daily.    atovaquone (MEPRON) 750 mg/5 mL Susp Take 10 mLs (1,500 mg total) by mouth once daily. Stop: 4/5/2021    calcitRIOL (ROCALTROL) 0.25 MCG Cap Take 2 capsules (0.5 mcg total) by mouth once daily.    docusate sodium (COLACE) 100 MG capsule Take 1 capsule (100 mg total) by mouth 3 (three) times daily as needed for Constipation.    ergocalciferol (ERGOCALCIFEROL) 50,000 unit Cap Take 1 capsule (50,000 Units total) by mouth every 7 days.    k phos di & mono-sod phos mono (K-PHOS-NEUTRAL) 250 mg Tab Take 1 tablet by mouth 2 (two) times daily.    NIFEdipine (PROCARDIA-XL) 30 MG (OSM) 24 hr tablet Take 1 tablet (30 mg total) by mouth 2 (two) times a day.    predniSONE (DELTASONE) 5 MG tablet Take 20mg by mouth daily 4/8-5/7;   Take 15mg daily  -;  Take 10mg daily -;  then 5mg daily thereafter starting 2020    pulse oximeter (PULSE OXIMETER) device Use twice daily at 8 AM and 3 PM and record the value in MyChart as directed.    sodium bicarbonate 650 MG tablet Take 2 tablets (1,300 mg total) by mouth 2 (two) times daily.    tacrolimus (PROGRAF) 1 MG Cap Take 5 capsules (5 mg total) by mouth every morning AND 4 capsules (4 mg total) every evening.       Review of patient's allergies indicates:   Allergen Reactions    Sulfa (sulfonamide antibiotics) Hives       Past Medical History:   Diagnosis Date    Anemia of renal disease     ESRD on dialysis     Dr. Muhammad     Essential hypertension     PD catheter dysfunction 10/18/2018    Secondary hyperparathyroidism of renal origin      Past Surgical History:   Procedure Laterality Date    AV FISTULA PLACEMENT Left     never matured    AV graft Right 2015     SECTION      x3; ; , and     COLONOSCOPY N/A 2020    Procedure: COLONOSCOPY;  Surgeon: Darrell Golden MD;  Location: [facility];  Service: Endoscopy;  Laterality: N/A;    GASTRIC BYPASS  2016    KIDNEY TRANSPLANT N/A 2020    Procedure: TRANSPLANT, KIDNEY;  Surgeon: Megan Garcia MD;  Location: [address];  Service: Transplant;  Laterality: N/A;    KIDNEY TRANSPLANT Right 2020    LAPAROSCOPIC REVISION OF PROCEDURE INVOLVING PERITONEAL DIALYSIS CATHETER N/A 10/18/2018    Procedure: REVISION OF PROCEDURE INVOLVING PERITONEAL DIALYSIS CATHETER, LAPAROSCOPIC;  Surgeon: Hair Jimenez MD;  Location: [facility];  Service: General;  Laterality: N/A;    PERITONEAL CATHETER INSERTION N/A 2018    Procedure: Laparoscopic INSERTION, CATHETER, INTRAPERITONEAL;  Surgeon: Hair Jimenez MD;  Location: [facility];  Service: General;  Laterality: N/A;    PERITONEAL CATHETER REMOVAL N/A 2018    Procedure: REMOVAL, CATHETER, DIALYSIS, PERITONEAL;  Surgeon: Hair Jimenez MD;  Location: [facility]  "OR;  Service: General;  Laterality: N/A;    WOUND EXPLORATION N/A 12/14/2018    Procedure: EXPLORATION, WOUND-Surgical Site Irrigation;  Surgeon: Hair Jimenez MD;  Location: [facility] OR;  Service: General;  Laterality: N/A;     Family History     Problem Relation (Age of Onset)    Diabetes Mother    Down syndrome Son    Heart disease Mother    Hypertension Mother    Kidney disease Mother    Lupus Sister        Tobacco Use    Smoking status: Never Smoker    Smokeless tobacco: Never Used   Substance and Sexual Activity    Alcohol use: No    Drug use: No    Sexual activity: Yes     Partners: Male        Review of Systems   Constitutional: Positive for fatigue and fever. Negative for activity change and chills.   Respiratory: Positive for cough and shortness of breath.    Gastrointestinal: Negative for abdominal distention, abdominal pain, constipation, nausea and vomiting.   Genitourinary: Negative for decreased urine volume and difficulty urinating.   Allergic/Immunologic: Positive for immunocompromised state.   Neurological: Positive for weakness.   Psychiatric/Behavioral: Negative for confusion.     Objective:     Vital Signs (Most Recent):  Temp: 98.1 °F (36.7 °C) (07/27/20 0857)  Pulse: 76 (07/27/20 1130)  Resp: (!) 26 (07/27/20 1018)  BP: 135/67 (07/27/20 0857)  SpO2: (!) 94 % (07/27/20 1130)  Height: 5' 1" (154.9 cm)  Weight: 62.7 kg (138 lb 3.7 oz)  Body mass index is 26.12 kg/m².     Physical Exam  Vitals signs and nursing note reviewed.   Constitutional:       Appearance: She is well-developed.   HENT:      Head: Normocephalic.   Eyes:      Pupils: Pupils are equal, round, and reactive to light.   Neck:      Musculoskeletal: Normal range of motion.   Cardiovascular:      Rate and Rhythm: Normal rate.      Heart sounds: No murmur.   Pulmonary:      Effort: Pulmonary effort is normal. No respiratory distress.      Breath sounds: No wheezing.   Abdominal:      General: There is no distension.      " Palpations: Abdomen is soft.      Tenderness: There is no abdominal tenderness. There is no guarding.   Musculoskeletal: Normal range of motion.   Skin:     General: Skin is warm and dry.   Neurological:      Mental Status: She is alert and oriented to person, place, and time.         Laboratory  CBC:   Recent Labs   Lab 07/21/20 0753 07/26/20 0509 07/27/20 0428   WBC 2.88* 2.64* 3.13*   RBC 3.63* 3.84* 3.95*   HGB 11.2* 11.8* 12.1   HCT 37.3 36.9* 37.4    141* 133*   * 96 95   MCH 30.9 30.7 30.6   MCHC 30.0* 32.0 32.4     CMP:   Recent Labs   Lab 07/21/20 0753 07/26/20 0509 07/27/20 0428   GLU 89 86 78   CALCIUM 8.4* 8.7 8.4*   ALBUMIN 3.4* 3.3* 2.9*   PROT  --  6.5  --     142 141   K 4.2 3.7 3.6   CO2 24 24 23    109 108   BUN 22* 25* 18   CREATININE 1.4 1.4 1.1   ALKPHOS  --  88  --    ALT  --  20  --    AST  --  26  --      Labs within the past 24 hours have been reviewed.    Diagnostic Results:  Chest X-Ray: No results found for this or any previous visit.

## 2020-07-27 NOTE — PLAN OF CARE
Problem: Adult Inpatient Plan of Care  Goal: Plan of Care Review  Outcome: Ongoing, Progressing    Pt reports not feeling bad despite fever throughout  shift. Fever alleviated with tylenol. See flowsheet. Safety maintained throughout shift. Pt calls for assistance to the BR when needed. No resp distress or acute respiratory incidents. All needs within reach. Labs taken this am to monitor.

## 2020-07-27 NOTE — PROGRESS NOTES
Admit Note     Completed admit note with pt's  due to pt not answering the phone and taking COVID precautions.     Met with  to assess needs. Patient is a 54 y.o.  female, admitted for Hypoxemia [R09.02]  S/P kidney transplant [Z94.0]  Fever [R50.9]  Prophylactic immunotherapy [Z29.8]  Anemia of renal disease [N18.9, D63.1]  At risk for opportunistic infections [Z91.89]  Long-term use of immunosuppressant medication [Z79.899]  Renal hypertension [I12.9]  Pancytopenia [D61.818]  Fever, unspecified fever cause [R50.9]  Kidney transplant status [Z94.0]  COVID-19 virus detected [U07.1]  Pneumonia due to COVID-19 virus [U07.1, J12.89] and is post kidney transplantation on 4/5/2020.      Patient admitted from home on 7/26/2020 .  At this time, patient presents as alert and oriented x 4 per pt's . SW completed admit with pt's  due to pt not answering the phone and taking COVID precautions.  At this time, patients caregiver presents as alert and oriented x 4, pleasant, recall good, concentration/judgement good, average intelligence, calm, communicative, cooperative, asking and answering questions appropriately and at home as part of COVID-19 protocol.    Household/Family Systems     Patient resides with patient's  and sons, at [address]  [place] [address].  Support system includes pt's large family.  Patient does not have dependents that are need of being cared for. Pt's  reports entire family including son, daughter-in-law and 15 month old grandchild are all positive for COVID.     Patients primary caregiver is Kathy Rivera Jr., patients , phone number [phone].  Confirmed patients contact information is [phone] (home);   Telephone Information:   Mobile [phone]   Mobile [phone]   .    During admission, patient's caregiver plans to stay at home.  Confirmed patient and patients caregivers do have access to reliable  transportation.    Cognitive Status/Learning     Patient reports reading ability as 12th grade and states patient does not have difficulty with reading, writing, seeing, hearing, comprehension, learning and memory.  Patient reports patient learns best by a combination of verbal, written and hands on demonstration.   Needed: No.   Highest education level: High School (9-12) or GED    Vocation/Disability   .  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 Pt's  reports pt was working from home prior to testing positive for COVID.     Adherence     Patient reports a high level of adherence to patients health care regimen.  Adherence counseling and education provided. Patient verbalizes understanding.    Substance Use    Patient reports the following substance usage.    Tobacco: none, patient denies any use.  Alcohol: none, patient denies any use.  Illicit Drugs/Non-prescribed Medications: none, patient denies any use.  Patient states clear understanding of the potential impact of substance use.  Substance abstinence/cessation counseling, education and resources provided and reviewed.     Services Utilizing/ADLS    Infusion Service: Prior to admission, patient utilizing? no  Home Health: Prior to admission, patient utilizing? yes Pt reports having HH after her C section for wound care. Pt was schedule to start HH this week but SW will alert Joan with [facility] at time of discharge (ph# [id number])  DME: Prior to admission, yes BP cuff and glucometer  Pulmonary/Cardiac Rehab: Prior to admission, no  Dialysis:  Prior to admission, no   Transplant Specialty Pharmacy:  Prior to admission, yes; [facility].    Prior to admission, patient reports patient was independent with ADLS and was driving.  Patient reports patient is not able to care for  self at this time due to compromised medical condition (as documented in medical record) and physical weakness..  Patient indicates a willingness to care for self once medically cleared to do so.    Insurance/Medications    Insured by   Payor/Plan Subscr  Sex Relation Sub. Ins. ID Effective Group Num   1. [place]* MAGO MERA 1965 Female  [id number]                                    [address]   2. [facility]* MAGO MERA 1965 Female  [id number]                                   [address]      Primary Insurance (for UNOS reporting): Public Insurance - Medicare FFS (Fee For Service)  Secondary Insurance (for UNOS reporting): Private Insurance   And  [employer] through pt's [employer].     Patient reports patient is able to obtain and afford medications at this time and at time of discharge.    Living Will/Healthcare Power of     Patient states patient does not have a LW and/or HCPA.   provided education regarding LW and HCPA and the completion of forms.    Coping/Mental Health    Patient is coping adequately with the aid of  family members. Pt's  reports pt has been stressed but feels she is managing mental health appropriately. SW to try to contact pt again later today.     Discharge Planning    At time of discharge, patient plans to return to pt's home under the care of pt's . Patients  will transport patient.  Per rounds today, expected discharge date has not been medically determined at this time. Patient and patients caregiver  verbalize understanding and are involved in treatment planning and discharge process.    Additional Concerns    Patient's caretaker denies additional needs and/or concerns at this time.  providing ongoing psychosocial support, education, resources and d/c planning as needed.  SW remains available.  provided resource list, patient choice, psychosocial and  supportive counseling, resources, education, assistance and discharge planning with patient and caregiver involvement, ongoing SW availability and services as appropriate.  remains available.

## 2020-07-27 NOTE — NURSING
Report received from MICHAEL Kwan RN. Patient lying in bed resting comfortably. Oxygen in place.  No acute cardiac or respiratory distress noted. Safety measures maintained; wheels locked, bed in lowest position, bed alarm active and engaged, and call light in reach. Will continue to monitor.

## 2020-07-27 NOTE — ASSESSMENT & PLAN NOTE
- Covid-19 positive 7/19 at OSH  - Repeat covid on 7/22 negative at C-- patient presented for exposure from son. No admission as test negative and minimal symptoms  - Covid test on admit positive (7/26)  - a/w worsening sob and fever  - CXR with no focal consolidations, generalized edema  Infection disease consultation - appreciate recs

## 2020-07-27 NOTE — NURSING
"Pt febrile at 100.4. Tylenol given as ordered.     Recheck @0213 99.5  Pt reports "feeling fine" Safety maintained. Will continue to monitor.  Standby Assist to bathroom    "

## 2020-07-27 NOTE — PROGRESS NOTES
[facility] - ICU 15 WT  Kidney Transplant  Progress Note      Reason for Follow-up: Reassessment of Kidney Transplant - 4/5/2020  (#1) recipient and management of immunosuppression.    ORGAN: LEFT KIDNEY    Donor Type: Donation after Brain Death    PHS Increased Risk: no   Cold Ischemia:   Induction Medications: Simulect      Subjective:     Chief Complaint/Reason for Admission: SOB and fever    History of Present Illness:  Ms. Tala Rivera is a 53 yo female with history of HTN, gastric bypass surgery on 7/28/16 and ESRD presumed secondary to HTN now s/p KTX from 4/5/20 (Simulect induction, CMV +/+) who presents to the ED with fever and worsening sob. She states that she went to [facility] in clinic on 7/19/20 for evaluation due to flu-like symptoms and noted to be COVID-19 positive (results called back on 7/23). Patient reports having COVID-19 exposure from son. Patient had a repeat COVID test on 7/22 in the ED after presenting for exposure, covid test negative during this visit. Patient re-presents with worsening sob and reports decreased O2 sats of 88% at home. In the ED patient noted to be febrile with temp of 100.4. O2 sats 88-90% on RA. CXR with no focal consolidation, generalized edema but overall relatively unchanged from 7/22. Repeat COVID-19 positive on admit. Received Azithromycin and Rocephin the ED.  Kidney function at baseline. Plan to admit for further observation.        PTA Medications   Medication Sig    famotidine (PEPCID) 20 MG tablet Take 20 mg by mouth once daily.    atovaquone (MEPRON) 750 mg/5 mL Susp Take 10 mLs (1,500 mg total) by mouth once daily. Stop: 4/5/2021    calcitRIOL (ROCALTROL) 0.25 MCG Cap Take 2 capsules (0.5 mcg total) by mouth once daily.    docusate sodium (COLACE) 100 MG capsule Take 1 capsule (100 mg total) by mouth 3 (three) times daily as needed for Constipation.    ergocalciferol (ERGOCALCIFEROL) 50,000 unit Cap Take 1 capsule (50,000 Units total)  by mouth every 7 days.    k phos di & mono-sod phos mono (K-PHOS-NEUTRAL) 250 mg Tab Take 1 tablet by mouth 2 (two) times daily.    NIFEdipine (PROCARDIA-XL) 30 MG (OSM) 24 hr tablet Take 1 tablet (30 mg total) by mouth 2 (two) times a day.    predniSONE (DELTASONE) 5 MG tablet Take 20mg by mouth daily -;   Take 15mg daily -;  Take 10mg daily -;  then 5mg daily thereafter starting 2020    pulse oximeter (PULSE OXIMETER) device Use twice daily at 8 AM and 3 PM and record the value in [employer] as directed.    sodium bicarbonate 650 MG tablet Take 2 tablets (1,300 mg total) by mouth 2 (two) times daily.    tacrolimus (PROGRAF) 1 MG Cap Take 5 capsules (5 mg total) by mouth every morning AND 4 capsules (4 mg total) every evening.       Review of patient's allergies indicates:   Allergen Reactions    Sulfa (sulfonamide antibiotics) Hives       Past Medical History:   Diagnosis Date    Anemia of renal disease     ESRD on dialysis     Dr. Muhammad     Essential hypertension     PD catheter dysfunction 10/18/2018    Secondary hyperparathyroidism of renal origin      Past Surgical History:   Procedure Laterality Date    AV FISTULA PLACEMENT Left     never matured    AV graft Right 2015     SECTION      x3; ; , and     COLONOSCOPY N/A 2020    Procedure: COLONOSCOPY;  Surgeon: Darrell Golden MD;  Location: [facility];  Service: Endoscopy;  Laterality: N/A;    GASTRIC BYPASS  2016    KIDNEY TRANSPLANT N/A 2020    Procedure: TRANSPLANT, KIDNEY;  Surgeon: Megan Garcia MD;  Location: [address];  Service: Transplant;  Laterality: N/A;    KIDNEY TRANSPLANT Right 2020    LAPAROSCOPIC REVISION OF PROCEDURE INVOLVING PERITONEAL DIALYSIS CATHETER N/A 10/18/2018    Procedure: REVISION OF PROCEDURE INVOLVING PERITONEAL DIALYSIS CATHETER, LAPAROSCOPIC;  Surgeon: Hair Jimenez MD;  [facility];  Service: General;  Laterality: N/A;     "PERITONEAL CATHETER INSERTION N/A 9/4/2018    Procedure: Laparoscopic INSERTION, CATHETER, INTRAPERITONEAL;  Surgeon: Hair Jimenez MD;  Location: [facility];  Service: General;  Laterality: N/A;    PERITONEAL CATHETER REMOVAL N/A 12/14/2018    Procedure: REMOVAL, CATHETER, DIALYSIS, PERITONEAL;  Surgeon: Hair Jimenez MD;  Location: [facility];  Service: General;  Laterality: N/A;    WOUND EXPLORATION N/A 12/14/2018    Procedure: EXPLORATION, WOUND-Surgical Site Irrigation;  Surgeon: Hair Jimenez MD;  Location: [facility];  Service: General;  Laterality: N/A;     Family History     Problem Relation (Age of Onset)    Diabetes Mother    Down syndrome Son    Heart disease Mother    Hypertension Mother    Kidney disease Mother    Lupus Sister        Tobacco Use    Smoking status: Never Smoker    Smokeless tobacco: Never Used   Substance and Sexual Activity    Alcohol use: No    Drug use: No    Sexual activity: Yes     Partners: Male        Review of Systems   Constitutional: Positive for fatigue and fever. Negative for activity change and chills.   Respiratory: Positive for cough and shortness of breath.    Gastrointestinal: Negative for abdominal distention, abdominal pain, constipation, nausea and vomiting.   Genitourinary: Negative for decreased urine volume and difficulty urinating.   Allergic/Immunologic: Positive for immunocompromised state.   Neurological: Positive for weakness.   Psychiatric/Behavioral: Negative for confusion.     Objective:     Vital Signs (Most Recent):  Temp: 98.1 °F (36.7 °C) (07/27/20 0857)  Pulse: 76 (07/27/20 1130)  Resp: (!) 26 (07/27/20 1018)  BP: 135/67 (07/27/20 0857)  SpO2: (!) 94 % (07/27/20 1130)  Height: 5' 1" (154.9 cm)  Weight: 62.7 kg (138 lb 3.7 oz)  Body mass index is 26.12 kg/m².     Physical Exam  Vitals signs and nursing note reviewed.   Constitutional:       Appearance: She is well-developed.   HENT:      Head: Normocephalic.   Eyes:      Pupils: Pupils are equal, " round, and reactive to light.   Neck:      Musculoskeletal: Normal range of motion.   Cardiovascular:      Rate and Rhythm: Normal rate.      Heart sounds: No murmur.   Pulmonary:      Effort: Pulmonary effort is normal. No respiratory distress.      Breath sounds: No wheezing.   Abdominal:      General: There is no distension.      Palpations: Abdomen is soft.      Tenderness: There is no abdominal tenderness. There is no guarding.   Musculoskeletal: Normal range of motion.   Skin:     General: Skin is warm and dry.   Neurological:      Mental Status: She is alert and oriented to person, place, and time.         Laboratory  CBC:   Recent Labs   Lab 07/21/20 0753 07/26/20  0509 07/27/20  0428   WBC 2.88* 2.64* 3.13*   RBC 3.63* 3.84* 3.95*   HGB 11.2* 11.8* 12.1   HCT 37.3 36.9* 37.4    141* 133*   * 96 95   MCH 30.9 30.7 30.6   MCHC 30.0* 32.0 32.4     CMP:   Recent Labs   Lab 07/21/20 0753 07/26/20  0509 07/27/20  0428   GLU 89 86 78   CALCIUM 8.4* 8.7 8.4*   ALBUMIN 3.4* 3.3* 2.9*   PROT  --  6.5  --     142 141   K 4.2 3.7 3.6   CO2 24 24 23    109 108   BUN 22* 25* 18   CREATININE 1.4 1.4 1.1   ALKPHOS  --  88  --    ALT  --  20  --    AST  --  26  --      Labs within the past 24 hours have been reviewed.    Diagnostic Results:  Chest X-Ray: No results found for this or any previous visit.    Assessment/Plan:     * COVID-19 virus detected  - Covid-19 positive 7/19 at OSH  - Repeat covid on 7/22 negative at C-- patient presented for exposure from son. No admission as test negative and minimal symptoms  - Covid test on admit positive (7/26)  - a/w worsening sob and fever  - CXR with no focal consolidations, generalized edema  Infection disease consultation - appreciate recs      Fever  - COVID-19 positive 7/19 and 7/26  - infectious work-up pending  - cont to monitor  No need for IV antibiotics at this time continue with COVID treatment.  Procalcitonin is 0.04      Renal  hypertension  - cont Nifedipine       Long-term use of immunosuppressant medication  -  hold Prograf due to high level. Will monitor for signs of toxic side effects, check daily tacrolimus troughs, and change meds accordingly.  - Cellcept on hold for Covid     Prophylactic immunotherapy  See long term immunosuppression    At risk for opportunistic infections  - Continue mepron for PCP prophylaxis       S/P kidney transplant  - s/p KTX from 4/5/20 (Simulect induction, CMV +/+). Native kidney UOP ~1.8L  - a/w worsening sob and fever s/p positive covid test  - Cr at baseline  - cont to monitor        Anemia of renal disease  - H/H stable. No overt signs of bleeding  - cont to monitor          Discharge Planning: not candidate at this time      Kylie Ocasio, CHAS  Kidney Transplant  [facility] - ICU 15 WT

## 2020-07-28 PROBLEM — E83.39 HYPOPHOSPHATEMIA: Status: ACTIVE | Noted: 2020-07-28

## 2020-07-28 PROBLEM — D70.8 OTHER NEUTROPENIA: Status: ACTIVE | Noted: 2020-07-28

## 2020-07-28 PROBLEM — E87.6 HYPOKALEMIA: Status: ACTIVE | Noted: 2020-07-28

## 2020-07-28 PROBLEM — E87.20 ACIDOSIS: Status: ACTIVE | Noted: 2020-07-28

## 2020-07-28 LAB
ALBUMIN SERPL BCP-MCNC: 2.8 G/DL (ref 3.5–5.2)
ANION GAP SERPL CALC-SCNC: 9 MMOL/L (ref 8–16)
BASOPHILS # BLD AUTO: 0.01 K/UL (ref 0–0.2)
BASOPHILS NFR BLD: 0.4 % (ref 0–1.9)
BUN SERPL-MCNC: 23 MG/DL (ref 6–20)
CALCIUM SERPL-MCNC: 8.2 MG/DL (ref 8.7–10.5)
CHLORIDE SERPL-SCNC: 107 MMOL/L (ref 95–110)
CO2 SERPL-SCNC: 26 MMOL/L (ref 23–29)
CREAT SERPL-MCNC: 1.3 MG/DL (ref 0.5–1.4)
DIFFERENTIAL METHOD: ABNORMAL
EOSINOPHIL # BLD AUTO: 0 K/UL (ref 0–0.5)
EOSINOPHIL NFR BLD: 0 % (ref 0–8)
ERYTHROCYTE [DISTWIDTH] IN BLOOD BY AUTOMATED COUNT: 11.7 % (ref 11.5–14.5)
EST. GFR  (AFRICAN AMERICAN): 54.5 ML/MIN/1.73 M^2
EST. GFR  (NON AFRICAN AMERICAN): 47.3 ML/MIN/1.73 M^2
GLUCOSE SERPL-MCNC: 87 MG/DL (ref 70–110)
HCT VFR BLD AUTO: 39.2 % (ref 37–48.5)
HGB BLD-MCNC: 12 G/DL (ref 12–16)
IMM GRANULOCYTES # BLD AUTO: 0.08 K/UL (ref 0–0.04)
IMM GRANULOCYTES NFR BLD AUTO: 3.1 % (ref 0–0.5)
LYMPHOCYTES # BLD AUTO: 0.4 K/UL (ref 1–4.8)
LYMPHOCYTES NFR BLD: 13.8 % (ref 18–48)
MAGNESIUM SERPL-MCNC: 1.8 MG/DL (ref 1.6–2.6)
MCH RBC QN AUTO: 29.8 PG (ref 27–31)
MCHC RBC AUTO-ENTMCNC: 30.6 G/DL (ref 32–36)
MCV RBC AUTO: 97 FL (ref 82–98)
MONOCYTES # BLD AUTO: 0.2 K/UL (ref 0.3–1)
MONOCYTES NFR BLD: 8.7 % (ref 4–15)
NEUTROPHILS # BLD AUTO: 1.9 K/UL (ref 1.8–7.7)
NEUTROPHILS NFR BLD: 74 % (ref 38–73)
NRBC BLD-RTO: 0 /100 WBC
PHOSPHATE SERPL-MCNC: 2.3 MG/DL (ref 2.7–4.5)
PLATELET # BLD AUTO: 155 K/UL (ref 150–350)
PMV BLD AUTO: 12.3 FL (ref 9.2–12.9)
POTASSIUM SERPL-SCNC: 3.3 MMOL/L (ref 3.5–5.1)
RBC # BLD AUTO: 4.03 M/UL (ref 4–5.4)
SODIUM SERPL-SCNC: 142 MMOL/L (ref 136–145)
TACROLIMUS BLD-MCNC: 13.6 NG/ML (ref 5–15)
WBC # BLD AUTO: 2.54 K/UL (ref 3.9–12.7)

## 2020-07-28 PROCEDURE — 27000221 HC OXYGEN, UP TO 24 HOURS

## 2020-07-28 PROCEDURE — 85025 COMPLETE CBC W/AUTO DIFF WBC: CPT

## 2020-07-28 PROCEDURE — 63600175 PHARM REV CODE 636 W HCPCS: Performed by: NURSE PRACTITIONER

## 2020-07-28 PROCEDURE — 63600175 PHARM REV CODE 636 W HCPCS: Performed by: PHYSICIAN ASSISTANT

## 2020-07-28 PROCEDURE — 80069 RENAL FUNCTION PANEL: CPT

## 2020-07-28 PROCEDURE — 80197 ASSAY OF TACROLIMUS: CPT

## 2020-07-28 PROCEDURE — 25000003 PHARM REV CODE 250: Performed by: PHYSICIAN ASSISTANT

## 2020-07-28 PROCEDURE — 20600001 HC STEP DOWN PRIVATE ROOM

## 2020-07-28 PROCEDURE — 83735 ASSAY OF MAGNESIUM: CPT

## 2020-07-28 PROCEDURE — 94761 N-INVAS EAR/PLS OXIMETRY MLT: CPT

## 2020-07-28 PROCEDURE — 99233 PR SUBSEQUENT HOSPITAL CARE,LEVL III: ICD-10-PCS | Mod: ,,, | Performed by: PHYSICIAN ASSISTANT

## 2020-07-28 PROCEDURE — 99233 SBSQ HOSP IP/OBS HIGH 50: CPT | Mod: ,,, | Performed by: PHYSICIAN ASSISTANT

## 2020-07-28 PROCEDURE — 36415 COLL VENOUS BLD VENIPUNCTURE: CPT

## 2020-07-28 PROCEDURE — 99900035 HC TECH TIME PER 15 MIN (STAT)

## 2020-07-28 RX ORDER — DOCUSATE SODIUM 100 MG/1
100 CAPSULE, LIQUID FILLED ORAL 2 TIMES DAILY PRN
Status: DISCONTINUED | OUTPATIENT
Start: 2020-07-28 | End: 2020-07-30 | Stop reason: HOSPADM

## 2020-07-28 RX ORDER — SENNOSIDES 8.6 MG/1
8.6 TABLET ORAL DAILY PRN
Status: DISCONTINUED | OUTPATIENT
Start: 2020-07-28 | End: 2020-07-30 | Stop reason: HOSPADM

## 2020-07-28 RX ORDER — TACROLIMUS 1 MG/1
3 CAPSULE ORAL 2 TIMES DAILY
Status: DISCONTINUED | OUTPATIENT
Start: 2020-07-29 | End: 2020-07-30

## 2020-07-28 RX ORDER — POTASSIUM CHLORIDE 20 MEQ/1
40 TABLET, EXTENDED RELEASE ORAL EVERY 4 HOURS
Status: COMPLETED | OUTPATIENT
Start: 2020-07-28 | End: 2020-07-28

## 2020-07-28 RX ADMIN — POTASSIUM CHLORIDE 40 MEQ: 1500 TABLET, EXTENDED RELEASE ORAL at 08:07

## 2020-07-28 RX ADMIN — PREDNISONE 5 MG: 5 TABLET ORAL at 08:07

## 2020-07-28 RX ADMIN — REMDESIVIR 100 MG: 100 INJECTION, POWDER, LYOPHILIZED, FOR SOLUTION INTRAVENOUS at 03:07

## 2020-07-28 RX ADMIN — SODIUM BICARBONATE 650 MG TABLET 1300 MG: at 08:07

## 2020-07-28 RX ADMIN — NIFEDIPINE 30 MG: 30 TABLET, FILM COATED, EXTENDED RELEASE ORAL at 08:07

## 2020-07-28 RX ADMIN — TACROLIMUS 3 MG: 1 CAPSULE ORAL at 08:07

## 2020-07-28 RX ADMIN — ATOVAQUONE 1500 MG: 750 SUSPENSION ORAL at 08:07

## 2020-07-28 RX ADMIN — POTASSIUM CHLORIDE 40 MEQ: 1500 TABLET, EXTENDED RELEASE ORAL at 02:07

## 2020-07-28 RX ADMIN — DIBASIC SODIUM PHOSPHATE, MONOBASIC POTASSIUM PHOSPHATE AND MONOBASIC SODIUM PHOSPHATE 2 TABLET: 852; 155; 130 TABLET ORAL at 08:07

## 2020-07-28 RX ADMIN — CALCITRIOL CAPSULES 0.25 MCG 0.5 MCG: 0.25 CAPSULE ORAL at 08:07

## 2020-07-28 NOTE — NURSING
Report received from FAUSTINO Cruz RN. Patient sitting on side of the bed. Nasal cannula in place.  No acute cardiac or respiratory distress noted. Safety measures maintained; wheels locked, bed in lowest position, bed alarm active and engaged, and call light in reach. Will continue to monitor.

## 2020-07-28 NOTE — PROGRESS NOTES
[facility] - ICU 15 WT  Kidney Transplant  Progress Note      Reason for Follow-up: Reassessment of Kidney Transplant - 4/5/2020  (#1) recipient and management of immunosuppression.    ORGAN: LEFT KIDNEY    Donor Type: Donation after Brain Death    Donor CMV Status: Positive  Donor HBcAB:Negative  Donor HCV Status:Negative  Donor HBV KEDAR: Negative  Donor HCV KEDAR: Negative      Subjective:     Chief Complaint/Reason for Admission: SOB and fever    History of Present Illness:  Ms. Tala Rivera is a 53 yo female with history of HTN, gastric bypass surgery on 7/28/16 and ESRD presumed secondary to HTN now s/p KTX from 4/5/20 (Simulect induction, CMV +/+) who presents to the ED with fever and worsening sob. She states that she went to [facility] in clinic on 7/19/20 for evaluation due to flu-like symptoms and noted to be COVID-19 positive (results called back on 7/23). Patient reports having COVID-19 exposure from son. Patient had a repeat COVID test on 7/22 in the ED after presenting for exposure, covid test negative during this visit. Patient re-presents with worsening sob and reports decreased O2 sats of 88% at home. In the ED patient noted to be febrile with temp of 100.4. O2 sats 88-90% on RA. CXR with no focal consolidation, generalized edema but overall relatively unchanged from 7/22. Repeat COVID-19 positive on admit. Received Azithromycin and Rocephin the ED.  Kidney function at baseline. Plan to admit for further observation.      Interval history:  No acute events overnight. She remains stable on 2L NC. Continue Remdesivir. Cr 1.3. Electrolytes replaced. Encourage PO intake. Monitor strict I & O.       PTA Medications   Medication Sig    famotidine (PEPCID) 20 MG tablet Take 20 mg by mouth once daily.    atovaquone (MEPRON) 750 mg/5 mL Susp Take 10 mLs (1,500 mg total) by mouth once daily. Stop: 4/5/2021    calcitRIOL (ROCALTROL) 0.25 MCG Cap Take 2 capsules (0.5 mcg total) by mouth once  daily.    docusate sodium (COLACE) 100 MG capsule Take 1 capsule (100 mg total) by mouth 3 (three) times daily as needed for Constipation.    ergocalciferol (ERGOCALCIFEROL) 50,000 unit Cap Take 1 capsule (50,000 Units total) by mouth every 7 days.    k phos di & mono-sod phos mono (K-PHOS-NEUTRAL) 250 mg Tab Take 1 tablet by mouth 2 (two) times daily.    NIFEdipine (PROCARDIA-XL) 30 MG (OSM) 24 hr tablet Take 1 tablet (30 mg total) by mouth 2 (two) times a day.    predniSONE (DELTASONE) 5 MG tablet Take 20mg by mouth daily -;   Take 15mg daily -;  Take 10mg daily -;  then 5mg daily thereafter starting 2020    pulse oximeter (PULSE OXIMETER) device Use twice daily at 8 AM and 3 PM and record the value in MyChart as directed.    sodium bicarbonate 650 MG tablet Take 2 tablets (1,300 mg total) by mouth 2 (two) times daily.    tacrolimus (PROGRAF) 1 MG Cap Take 5 capsules (5 mg total) by mouth every morning AND 4 capsules (4 mg total) every evening.       Review of patient's allergies indicates:   Allergen Reactions    Sulfa (sulfonamide antibiotics) Hives       Past Medical History:   Diagnosis Date    Anemia of renal disease     ESRD on dialysis     Dr. Muhammad     Essential hypertension     PD catheter dysfunction 10/18/2018    Secondary hyperparathyroidism of renal origin      Past Surgical History:   Procedure Laterality Date    AV FISTULA PLACEMENT Left     never matured    AV graft Right 2015     SECTION      x3; ; , and     COLONOSCOPY N/A 2020    Procedure: COLONOSCOPY;  Surgeon: Darrell Golden MD;  Location: [facility];  Service: Endoscopy;  Laterality: N/A;    GASTRIC BYPASS  2016    KIDNEY TRANSPLANT N/A 2020    Procedure: TRANSPLANT, KIDNEY;  Surgeon: Megan Garcia MD;  Location: [address];  Service: Transplant;  Laterality: N/A;    KIDNEY TRANSPLANT Right 2020    LAPAROSCOPIC REVISION OF PROCEDURE INVOLVING  PERITONEAL DIALYSIS CATHETER N/A 10/18/2018    Procedure: REVISION OF PROCEDURE INVOLVING PERITONEAL DIALYSIS CATHETER, LAPAROSCOPIC;  Surgeon: Hair Jimenez MD;  Location: [facility];  Service: General;  Laterality: N/A;    PERITONEAL CATHETER INSERTION N/A 9/4/2018    Procedure: Laparoscopic INSERTION, CATHETER, INTRAPERITONEAL;  Surgeon: Hair Jimenez MD;  Location: [facility];  Service: General;  Laterality: N/A;    PERITONEAL CATHETER REMOVAL N/A 12/14/2018    Procedure: REMOVAL, CATHETER, DIALYSIS, PERITONEAL;  Surgeon: Hair Jimenez MD;  [facility];  Service: General;  Laterality: N/A;    WOUND EXPLORATION N/A 12/14/2018    Procedure: EXPLORATION, WOUND-Surgical Site Irrigation;  Surgeon: Hair Jimenez MD;  Location: [facility] OR;  Service: General;  Laterality: N/A;     Family History     Problem Relation (Age of Onset)    Diabetes Mother    Down syndrome Son    Heart disease Mother    Hypertension Mother    Kidney disease Mother    Lupus Sister        Tobacco Use    Smoking status: Never Smoker    Smokeless tobacco: Never Used   Substance and Sexual Activity    Alcohol use: No    Drug use: No    Sexual activity: Yes     Partners: Male        Review of Systems   Constitutional: Positive for fatigue and fever. Negative for activity change and chills.   HENT: Negative for facial swelling and trouble swallowing.    Respiratory: Positive for cough and shortness of breath.    Gastrointestinal: Negative for abdominal distention, abdominal pain, constipation, nausea and vomiting.   Genitourinary: Negative for decreased urine volume and difficulty urinating.   Allergic/Immunologic: Positive for immunocompromised state.   Neurological: Positive for weakness.   Psychiatric/Behavioral: Negative for confusion.     Objective:     Vital Signs (Most Recent):  Temp: 98.2 °F (36.8 °C) (07/28/20 1502)  Pulse: 77 (07/28/20 1155)  Resp: (!) 34 (07/28/20 1502)  BP: 127/71 (07/28/20 1502)  SpO2: (!) 94 % (07/28/20  "4800)  Height: 5' 1" (154.9 cm)  Weight: 62.7 kg (138 lb 3.7 oz)  Body mass index is 26.12 kg/m².     Physical Exam  Vitals signs and nursing note reviewed.   Constitutional:       Appearance: She is well-developed.   HENT:      Head: Normocephalic.   Eyes:      Pupils: Pupils are equal, round, and reactive to light.   Neck:      Musculoskeletal: Normal range of motion.   Cardiovascular:      Rate and Rhythm: Normal rate.      Heart sounds: No murmur.   Pulmonary:      Effort: Pulmonary effort is normal. No respiratory distress.      Breath sounds: No wheezing.   Abdominal:      General: There is no distension.      Palpations: Abdomen is soft.      Tenderness: There is no abdominal tenderness. There is no guarding.      Comments: Well healed KTx incision   Musculoskeletal: Normal range of motion.   Skin:     General: Skin is warm and dry.   Neurological:      General: No focal deficit present.      Mental Status: She is alert and oriented to person, place, and time.         Laboratory  CBC:   Recent Labs   Lab 07/26/20  0509 07/27/20  0428 07/28/20  0604   WBC 2.64* 3.13* 2.54*   RBC 3.84* 3.95* 4.03   HGB 11.8* 12.1 12.0   HCT 36.9* 37.4 39.2   * 133* 155   MCV 96 95 97   MCH 30.7 30.6 29.8   MCHC 32.0 32.4 30.6*     CMP:   Recent Labs   Lab 07/26/20  0509 07/27/20  0428 07/28/20  0604   GLU 86 78 87   CALCIUM 8.7 8.4* 8.2*   ALBUMIN 3.3* 2.9* 2.8*   PROT 6.5  --   --     141 142   K 3.7 3.6 3.3*   CO2 24 23 26    108 107   BUN 25* 18 23*   CREATININE 1.4 1.1 1.3   ALKPHOS 88  --   --    ALT 20  --   --    AST 26  --   --      Labs within the past 24 hours have been reviewed.    Diagnostic Results:  Reviewed.    Assessment/Plan:     * COVID-19 virus detected  - Covid-19 positive 7/19 at OSH  - Repeat covid on 7/22 negative at [facility]-- patient presented for exposure from son. No admission as test negative and minimal symptoms  - Covid test on admit positive (7/26)  - a/w worsening sob and fever  - " CXR with no focal consolidations, generalized edema  Infection disease consultation - appreciate recs      Acidosis  - Continue PO bicarb  - Monitor      Hypokalemia  - K replaced 7/28 with 40 meq KCL x 2 doses  - Monitor      Hypophosphatemia  - Continue PO replacement (dose increased 7/28).  - Monitor.      Other neutropenia  - wbc decreased likely drug induced  - monitor      Fever  - COVID-19 positive 7/19 and 7/26  - Blood cx 7/26 NGTD. UA negative for infection.  - Cont to monitor  - No need for IV antibiotics at this time continue with COVID treatment.  Procalcitonin is 0.04      Kidney transplant status        Renal hypertension  - Continue Nifedipine       Long-term use of immunosuppressant medication  -  Hold Prograf due to high level. Will monitor for signs of toxic side effects, check daily tacrolimus troughs, and change meds accordingly.  - Cellcept on hold for Covid     Prophylactic immunotherapy  See long term immunosuppression    At risk for opportunistic infections  - Continue Mepron for PCP prophylaxis       S/P kidney transplant  - s/p KTX from 4/5/20 (Simulect induction, CMV +/+). Native kidney UOP ~1.8L  - a/w worsening sob and fever s/p positive covid test  - Cr at baseline  - Monitor strict Is and Os and daily weights.        Secondary hyperparathyroidism of renal origin  - Continue calcitriol.  - Monitor.      Anemia of renal disease  - H/H stable. No overt signs of bleeding  - Cont to monitor          Discharge Planning:  Not a candidate for dc at this time.    Sonya Jackson PA-C  Kidney Transplant  [facility] - ICU 15 WT

## 2020-07-28 NOTE — SUBJECTIVE & OBJECTIVE
Subjective:     Chief Complaint/Reason for Admission: SOB and fever    History of Present Illness:  Ms. Tala Rivera is a 53 yo female with history of HTN, gastric bypass surgery on 7/28/16 and ESRD presumed secondary to HTN now s/p KTX from 4/5/20 (Simulect induction, CMV +/+) who presents to the ED with fever and worsening sob. She states that she went to [facility] in clinic on 7/19/20 for evaluation due to flu-like symptoms and noted to be COVID-19 positive (results called back on 7/23). Patient reports having COVID-19 exposure from son. Patient had a repeat COVID test on 7/22 in the ED after presenting for exposure, covid test negative during this visit. Patient re-presents with worsening sob and reports decreased O2 sats of 88% at home. In the ED patient noted to be febrile with temp of 100.4. O2 sats 88-90% on RA. CXR with no focal consolidation, generalized edema but overall relatively unchanged from 7/22. Repeat COVID-19 positive on admit. Received Azithromycin and Rocephin the ED.  Kidney function at baseline. Plan to admit for further observation.        PTA Medications   Medication Sig    famotidine (PEPCID) 20 MG tablet Take 20 mg by mouth once daily.    atovaquone (MEPRON) 750 mg/5 mL Susp Take 10 mLs (1,500 mg total) by mouth once daily. Stop: 4/5/2021    calcitRIOL (ROCALTROL) 0.25 MCG Cap Take 2 capsules (0.5 mcg total) by mouth once daily.    docusate sodium (COLACE) 100 MG capsule Take 1 capsule (100 mg total) by mouth 3 (three) times daily as needed for Constipation.    ergocalciferol (ERGOCALCIFEROL) 50,000 unit Cap Take 1 capsule (50,000 Units total) by mouth every 7 days.    k phos di & mono-sod phos mono (K-PHOS-NEUTRAL) 250 mg Tab Take 1 tablet by mouth 2 (two) times daily.    NIFEdipine (PROCARDIA-XL) 30 MG (OSM) 24 hr tablet Take 1 tablet (30 mg total) by mouth 2 (two) times a day.    predniSONE (DELTASONE) 5 MG tablet Take 20mg by mouth daily 4/8-5/7;   Take 15mg daily  -;  Take 10mg daily -;  then 5mg daily thereafter starting 2020    pulse oximeter (PULSE OXIMETER) device Use twice daily at 8 AM and 3 PM and record the value in MyChart as directed.    sodium bicarbonate 650 MG tablet Take 2 tablets (1,300 mg total) by mouth 2 (two) times daily.    tacrolimus (PROGRAF) 1 MG Cap Take 5 capsules (5 mg total) by mouth every morning AND 4 capsules (4 mg total) every evening.       Review of patient's allergies indicates:   Allergen Reactions    Sulfa (sulfonamide antibiotics) Hives       Past Medical History:   Diagnosis Date    Anemia of renal disease     ESRD on dialysis     Dr. Muhammad     Essential hypertension     PD catheter dysfunction 10/18/2018    Secondary hyperparathyroidism of renal origin      Past Surgical History:   Procedure Laterality Date    AV FISTULA PLACEMENT Left     never matured    AV graft Right 2015     SECTION      x3; ; , and     COLONOSCOPY N/A 2020    Procedure: COLONOSCOPY;  Surgeon: Darrell Golden MD;  Location: [facility];  Service: Endoscopy;  Laterality: N/A;    GASTRIC BYPASS  2016    KIDNEY TRANSPLANT N/A 2020    Procedure: TRANSPLANT, KIDNEY;  Surgeon: Megan Garcia MD;  Location: [address];  Service: Transplant;  Laterality: N/A;    KIDNEY TRANSPLANT Right 2020    LAPAROSCOPIC REVISION OF PROCEDURE INVOLVING PERITONEAL DIALYSIS CATHETER N/A 10/18/2018    Procedure: REVISION OF PROCEDURE INVOLVING PERITONEAL DIALYSIS CATHETER, LAPAROSCOPIC;  Surgeon: Hair Jimenez MD;  Location: [facility];  Service: General;  Laterality: N/A;    PERITONEAL CATHETER INSERTION N/A 2018    Procedure: Laparoscopic INSERTION, CATHETER, INTRAPERITONEAL;  Surgeon: Hair Jimenez MD;  Location: [facility];  Service: General;  Laterality: N/A;    PERITONEAL CATHETER REMOVAL N/A 2018    Procedure: REMOVAL, CATHETER, DIALYSIS, PERITONEAL;  Surgeon: Hair Jimenez MD;  Location: [facility]  "OR;  Service: General;  Laterality: N/A;    WOUND EXPLORATION N/A 12/14/2018    Procedure: EXPLORATION, WOUND-Surgical Site Irrigation;  Surgeon: Hair Jimenez MD;  Location: [facility] OR;  Service: General;  Laterality: N/A;     Family History     Problem Relation (Age of Onset)    Diabetes Mother    Down syndrome Son    Heart disease Mother    Hypertension Mother    Kidney disease Mother    Lupus Sister        Tobacco Use    Smoking status: Never Smoker    Smokeless tobacco: Never Used   Substance and Sexual Activity    Alcohol use: No    Drug use: No    Sexual activity: Yes     Partners: Male        Review of Systems   Constitutional: Positive for fatigue and fever. Negative for activity change and chills.   HENT: Negative for facial swelling and trouble swallowing.    Respiratory: Positive for cough and shortness of breath.    Gastrointestinal: Negative for abdominal distention, abdominal pain, constipation, nausea and vomiting.   Genitourinary: Negative for decreased urine volume and difficulty urinating.   Allergic/Immunologic: Positive for immunocompromised state.   Neurological: Positive for weakness.   Psychiatric/Behavioral: Negative for confusion.     Objective:     Vital Signs (Most Recent):  Temp: 98.2 °F (36.8 °C) (07/28/20 1502)  Pulse: 77 (07/28/20 1155)  Resp: (!) 34 (07/28/20 1502)  BP: 127/71 (07/28/20 1502)  SpO2: (!) 94 % (07/28/20 1155)  Height: 5' 1" (154.9 cm)  Weight: 62.7 kg (138 lb 3.7 oz)  Body mass index is 26.12 kg/m².     Physical Exam  Vitals signs and nursing note reviewed.   Constitutional:       Appearance: She is well-developed.   HENT:      Head: Normocephalic.   Eyes:      Pupils: Pupils are equal, round, and reactive to light.   Neck:      Musculoskeletal: Normal range of motion.   Cardiovascular:      Rate and Rhythm: Normal rate.      Heart sounds: No murmur.   Pulmonary:      Effort: Pulmonary effort is normal. No respiratory distress.      Breath sounds: No wheezing. "   Abdominal:      General: There is no distension.      Palpations: Abdomen is soft.      Tenderness: There is no abdominal tenderness. There is no guarding.      Comments: Well healed KTx incision   Musculoskeletal: Normal range of motion.   Skin:     General: Skin is warm and dry.   Neurological:      General: No focal deficit present.      Mental Status: She is alert and oriented to person, place, and time.         Laboratory  CBC:   Recent Labs   Lab 07/26/20 0509 07/27/20 0428 07/28/20  0604   WBC 2.64* 3.13* 2.54*   RBC 3.84* 3.95* 4.03   HGB 11.8* 12.1 12.0   HCT 36.9* 37.4 39.2   * 133* 155   MCV 96 95 97   MCH 30.7 30.6 29.8   MCHC 32.0 32.4 30.6*     CMP:   Recent Labs   Lab 07/26/20 0509 07/27/20 0428 07/28/20  0604   GLU 86 78 87   CALCIUM 8.7 8.4* 8.2*   ALBUMIN 3.3* 2.9* 2.8*   PROT 6.5  --   --     141 142   K 3.7 3.6 3.3*   CO2 24 23 26    108 107   BUN 25* 18 23*   CREATININE 1.4 1.1 1.3   ALKPHOS 88  --   --    ALT 20  --   --    AST 26  --   --      Labs within the past 24 hours have been reviewed.    Diagnostic Results:  Chest X-Ray: No results found for this or any previous visit.

## 2020-07-28 NOTE — PLAN OF CARE
Problem: Adult Inpatient Plan of Care  Goal: Plan of Care Review  Outcome: Ongoing, Progressing   Patient remained free from falls, trauma, and injuries throughout shift. No complaints of pain, nausea, or vomiting. IV Remedesivir and medications administered as prescribed. Ambulated to and from bathroom independently. Vital Signs Stable; afebrile throughout shift. Oxygen therapy administered as prescribed. 2L nasal cannula; sating at 93%-96%. No acute distress noted.    Xenograft Text: The defect edges were debeveled with a #15 scalpel blade.  Given the location of the defect, shape of the defect and the proximity to free margins a xenograft was deemed most appropriate.  The graft was then trimmed to fit the size of the defect.  The graft was then placed in the primary defect and oriented appropriately.

## 2020-07-28 NOTE — ASSESSMENT & PLAN NOTE
- COVID-19 positive 7/19 and 7/26  - Blood cx 7/26 NGTD. UA negative for infection.  - Cont to monitor  - No need for IV antibiotics at this time continue with COVID treatment.  Procalcitonin is 0.04

## 2020-07-28 NOTE — ASSESSMENT & PLAN NOTE
- s/p KTX from 4/5/20 (Simulect induction, CMV +/+). Native kidney UOP ~1.8L  - a/w worsening sob and fever s/p positive covid test  - Cr at baseline  - Monitor strict Is and Os and daily weights.

## 2020-07-28 NOTE — PLAN OF CARE
POC reviewed with pt. VSS. A&Ox4. O2 stable on 2L NC. Denies any pain or SOB. No acute complications during shift. Will continue to monitor.

## 2020-07-29 PROBLEM — R50.9 FEVER: Status: RESOLVED | Noted: 2020-07-26 | Resolved: 2020-07-29

## 2020-07-29 LAB
ALBUMIN SERPL BCP-MCNC: 2.6 G/DL (ref 3.5–5.2)
ANION GAP SERPL CALC-SCNC: 7 MMOL/L (ref 8–16)
BASOPHILS # BLD AUTO: 0.01 K/UL (ref 0–0.2)
BASOPHILS NFR BLD: 0.4 % (ref 0–1.9)
BUN SERPL-MCNC: 23 MG/DL (ref 6–20)
CALCIUM SERPL-MCNC: 8.1 MG/DL (ref 8.7–10.5)
CHLORIDE SERPL-SCNC: 107 MMOL/L (ref 95–110)
CMV DNA SERPL NAA+PROBE-ACNC: <35 IU/ML
CO2 SERPL-SCNC: 27 MMOL/L (ref 23–29)
CREAT SERPL-MCNC: 1.2 MG/DL (ref 0.5–1.4)
DIFFERENTIAL METHOD: ABNORMAL
EOSINOPHIL # BLD AUTO: 0 K/UL (ref 0–0.5)
EOSINOPHIL NFR BLD: 0 % (ref 0–8)
ERYTHROCYTE [DISTWIDTH] IN BLOOD BY AUTOMATED COUNT: 11.7 % (ref 11.5–14.5)
EST. GFR  (AFRICAN AMERICAN): >60 ML/MIN/1.73 M^2
EST. GFR  (NON AFRICAN AMERICAN): 52.1 ML/MIN/1.73 M^2
GLUCOSE SERPL-MCNC: 87 MG/DL (ref 70–110)
HCT VFR BLD AUTO: 35.1 % (ref 37–48.5)
HGB BLD-MCNC: 11.4 G/DL (ref 12–16)
IMM GRANULOCYTES # BLD AUTO: 0.06 K/UL (ref 0–0.04)
IMM GRANULOCYTES NFR BLD AUTO: 2.4 % (ref 0–0.5)
LYMPHOCYTES # BLD AUTO: 0.4 K/UL (ref 1–4.8)
LYMPHOCYTES NFR BLD: 17 % (ref 18–48)
MAGNESIUM SERPL-MCNC: 1.8 MG/DL (ref 1.6–2.6)
MCH RBC QN AUTO: 30.6 PG (ref 27–31)
MCHC RBC AUTO-ENTMCNC: 32.5 G/DL (ref 32–36)
MCV RBC AUTO: 94 FL (ref 82–98)
MONOCYTES # BLD AUTO: 0.2 K/UL (ref 0.3–1)
MONOCYTES NFR BLD: 8.9 % (ref 4–15)
NEUTROPHILS # BLD AUTO: 1.8 K/UL (ref 1.8–7.7)
NEUTROPHILS NFR BLD: 71.3 % (ref 38–73)
NRBC BLD-RTO: 0 /100 WBC
PHOSPHATE SERPL-MCNC: 2.2 MG/DL (ref 2.7–4.5)
PLATELET # BLD AUTO: 156 K/UL (ref 150–350)
PMV BLD AUTO: 12.2 FL (ref 9.2–12.9)
POTASSIUM SERPL-SCNC: 4.1 MMOL/L (ref 3.5–5.1)
RBC # BLD AUTO: 3.72 M/UL (ref 4–5.4)
SODIUM SERPL-SCNC: 141 MMOL/L (ref 136–145)
TACROLIMUS BLD-MCNC: 8.4 NG/ML (ref 5–15)
WBC # BLD AUTO: 2.47 K/UL (ref 3.9–12.7)

## 2020-07-29 PROCEDURE — 25000003 PHARM REV CODE 250: Performed by: PHYSICIAN ASSISTANT

## 2020-07-29 PROCEDURE — 80069 RENAL FUNCTION PANEL: CPT

## 2020-07-29 PROCEDURE — 83735 ASSAY OF MAGNESIUM: CPT

## 2020-07-29 PROCEDURE — 99233 PR SUBSEQUENT HOSPITAL CARE,LEVL III: ICD-10-PCS | Mod: ,,, | Performed by: NURSE PRACTITIONER

## 2020-07-29 PROCEDURE — 80197 ASSAY OF TACROLIMUS: CPT

## 2020-07-29 PROCEDURE — 63600175 PHARM REV CODE 636 W HCPCS: Performed by: PHYSICIAN ASSISTANT

## 2020-07-29 PROCEDURE — 36415 COLL VENOUS BLD VENIPUNCTURE: CPT

## 2020-07-29 PROCEDURE — 94761 N-INVAS EAR/PLS OXIMETRY MLT: CPT

## 2020-07-29 PROCEDURE — 25000003 PHARM REV CODE 250: Performed by: INTERNAL MEDICINE

## 2020-07-29 PROCEDURE — 20600001 HC STEP DOWN PRIVATE ROOM

## 2020-07-29 PROCEDURE — 99233 SBSQ HOSP IP/OBS HIGH 50: CPT | Mod: ,,, | Performed by: NURSE PRACTITIONER

## 2020-07-29 PROCEDURE — 27000221 HC OXYGEN, UP TO 24 HOURS

## 2020-07-29 PROCEDURE — 63600175 PHARM REV CODE 636 W HCPCS: Performed by: NURSE PRACTITIONER

## 2020-07-29 PROCEDURE — 85025 COMPLETE CBC W/AUTO DIFF WBC: CPT

## 2020-07-29 RX ORDER — GUAIFENESIN/DEXTROMETHORPHAN 100-10MG/5
5 SYRUP ORAL EVERY 4 HOURS PRN
Status: CANCELLED | OUTPATIENT
Start: 2020-07-29

## 2020-07-29 RX ORDER — HEPARIN SODIUM 5000 [USP'U]/ML
5000 INJECTION, SOLUTION INTRAVENOUS; SUBCUTANEOUS EVERY 8 HOURS
Status: DISCONTINUED | OUTPATIENT
Start: 2020-07-29 | End: 2020-07-30 | Stop reason: HOSPADM

## 2020-07-29 RX ORDER — BENZONATATE 100 MG/1
100 CAPSULE ORAL 3 TIMES DAILY PRN
Status: DISCONTINUED | OUTPATIENT
Start: 2020-07-29 | End: 2020-07-30 | Stop reason: HOSPADM

## 2020-07-29 RX ORDER — GUAIFENESIN/DEXTROMETHORPHAN 100-10MG/5
5 SYRUP ORAL EVERY 4 HOURS PRN
Status: DISCONTINUED | OUTPATIENT
Start: 2020-07-29 | End: 2020-07-30 | Stop reason: HOSPADM

## 2020-07-29 RX ORDER — BENZONATATE 100 MG/1
100 CAPSULE ORAL 3 TIMES DAILY PRN
Status: CANCELLED | OUTPATIENT
Start: 2020-07-29

## 2020-07-29 RX ADMIN — TACROLIMUS 3 MG: 1 CAPSULE ORAL at 05:07

## 2020-07-29 RX ADMIN — SODIUM BICARBONATE 650 MG TABLET 1300 MG: at 09:07

## 2020-07-29 RX ADMIN — CALCITRIOL CAPSULES 0.25 MCG 0.5 MCG: 0.25 CAPSULE ORAL at 09:07

## 2020-07-29 RX ADMIN — PREDNISONE 5 MG: 5 TABLET ORAL at 09:07

## 2020-07-29 RX ADMIN — DIBASIC SODIUM PHOSPHATE, MONOBASIC POTASSIUM PHOSPHATE AND MONOBASIC SODIUM PHOSPHATE 2 TABLET: 852; 155; 130 TABLET ORAL at 09:07

## 2020-07-29 RX ADMIN — NIFEDIPINE 30 MG: 30 TABLET, FILM COATED, EXTENDED RELEASE ORAL at 09:07

## 2020-07-29 RX ADMIN — ATOVAQUONE 1500 MG: 750 SUSPENSION ORAL at 09:07

## 2020-07-29 RX ADMIN — REMDESIVIR 100 MG: 100 INJECTION, POWDER, LYOPHILIZED, FOR SOLUTION INTRAVENOUS at 05:07

## 2020-07-29 RX ADMIN — HEPARIN SODIUM 5000 UNITS: 5000 INJECTION INTRAVENOUS; SUBCUTANEOUS at 05:07

## 2020-07-29 RX ADMIN — TACROLIMUS 3 MG: 1 CAPSULE ORAL at 09:07

## 2020-07-29 NOTE — PROGRESS NOTES
[facility] - ICU 15 WT  Kidney Transplant  Progress Note      Reason for Follow-up: Reassessment of Kidney Transplant - 4/5/2020  (#1) recipient and management of immunosuppression.    ORGAN: LEFT KIDNEY    Donor Type: Donation after Brain Death    PHS Increased Risk: no   Cold Ischemia:   Induction Medications: Simulect      Subjective:     Chief Complaint/Reason for Admission: SOB and fever    History of Present Illness:  Ms. Tala Rivera is a 53 yo female with history of HTN, gastric bypass surgery on 7/28/16 and ESRD presumed secondary to HTN now s/p KTX from 4/5/20 (Simulect induction, CMV +/+) who presents to the ED with fever and worsening sob. She states that she went to [facility] in clinic on 7/19/20 for evaluation due to flu-like symptoms and noted to be COVID-19 positive (results called back on 7/23). Patient reports having COVID-19 exposure from son. Patient had a repeat COVID test on 7/22 in the ED after presenting for exposure, covid test negative during this visit. Patient re-presents with worsening sob and reports decreased O2 sats of 88% at home. In the ED patient noted to be febrile with temp of 100.4. O2 sats 88-90% on RA. CXR with no focal consolidation, generalized edema but overall relatively unchanged from 7/22. Repeat COVID-19 positive on admit. Received Azithromycin and Rocephin the ED.  Kidney function at baseline. Plan to admit for further observation.        PTA Medications   Medication Sig    famotidine (PEPCID) 20 MG tablet Take 20 mg by mouth once daily.    atovaquone (MEPRON) 750 mg/5 mL Susp Take 10 mLs (1,500 mg total) by mouth once daily. Stop: 4/5/2021    calcitRIOL (ROCALTROL) 0.25 MCG Cap Take 2 capsules (0.5 mcg total) by mouth once daily.    docusate sodium (COLACE) 100 MG capsule Take 1 capsule (100 mg total) by mouth 3 (three) times daily as needed for Constipation.    ergocalciferol (ERGOCALCIFEROL) 50,000 unit Cap Take 1 capsule (50,000 Units total)  by mouth every 7 days.    k phos di & mono-sod phos mono (K-PHOS-NEUTRAL) 250 mg Tab Take 1 tablet by mouth 2 (two) times daily.    NIFEdipine (PROCARDIA-XL) 30 MG (OSM) 24 hr tablet Take 1 tablet (30 mg total) by mouth 2 (two) times a day.    predniSONE (DELTASONE) 5 MG tablet Take 20mg by mouth daily -;   Take 15mg daily -;  Take 10mg daily -;  then 5mg daily thereafter starting 2020    pulse oximeter (PULSE OXIMETER) device Use twice daily at 8 AM and 3 PM and record the value in ampricehart as directed.    sodium bicarbonate 650 MG tablet Take 2 tablets (1,300 mg total) by mouth 2 (two) times daily.    tacrolimus (PROGRAF) 1 MG Cap Take 5 capsules (5 mg total) by mouth every morning AND 4 capsules (4 mg total) every evening.       Review of patient's allergies indicates:   Allergen Reactions    Sulfa (sulfonamide antibiotics) Hives       Past Medical History:   Diagnosis Date    Anemia of renal disease     ESRD on dialysis     Dr. Muhammad     Essential hypertension     PD catheter dysfunction 10/18/2018    Secondary hyperparathyroidism of renal origin      Past Surgical History:   Procedure Laterality Date    AV FISTULA PLACEMENT Left     never matured    AV graft Right 2015     SECTION      x3; ; , and     COLONOSCOPY N/A 2020    Procedure: COLONOSCOPY;  Surgeon: Darrell Golden MD;  Location: [facility];  Service: Endoscopy;  Laterality: N/A;    GASTRIC BYPASS  2016    KIDNEY TRANSPLANT N/A 2020    Procedure: TRANSPLANT, KIDNEY;  Surgeon: Megan Garcia MD;  Location: [address];  Service: Transplant;  Laterality: N/A;    KIDNEY TRANSPLANT Right 2020    LAPAROSCOPIC REVISION OF PROCEDURE INVOLVING PERITONEAL DIALYSIS CATHETER N/A 10/18/2018    Procedure: REVISION OF PROCEDURE INVOLVING PERITONEAL DIALYSIS CATHETER, LAPAROSCOPIC;  Surgeon: Hair Jimenez MD;  Location: [facility];  Service: General;  Laterality: N/A;     "PERITONEAL CATHETER INSERTION N/A 9/4/2018    Procedure: Laparoscopic INSERTION, CATHETER, INTRAPERITONEAL;  Surgeon: Hair Jimenez MD;  Location: [facility];  Service: General;  Laterality: N/A;    PERITONEAL CATHETER REMOVAL N/A 12/14/2018    Procedure: REMOVAL, CATHETER, DIALYSIS, PERITONEAL;  Surgeon: Hair Jimenez MD;  Location: [facility];  Service: General;  Laterality: N/A;    WOUND EXPLORATION N/A 12/14/2018    Procedure: EXPLORATION, WOUND-Surgical Site Irrigation;  Surgeon: Hair Jimenez MD;  Location: [facility];  Service: General;  Laterality: N/A;     Family History     Problem Relation (Age of Onset)    Diabetes Mother    Down syndrome Son    Heart disease Mother    Hypertension Mother    Kidney disease Mother    Lupus Sister        Tobacco Use    Smoking status: Never Smoker    Smokeless tobacco: Never Used   Substance and Sexual Activity    Alcohol use: No    Drug use: No    Sexual activity: Yes     Partners: Male        Review of Systems   Constitutional: Positive for fatigue and fever. Negative for activity change and chills.   HENT: Negative for facial swelling and trouble swallowing.    Respiratory: Positive for cough and shortness of breath.    Gastrointestinal: Negative for abdominal distention, abdominal pain, constipation, nausea and vomiting.   Genitourinary: Negative for decreased urine volume and difficulty urinating.   Allergic/Immunologic: Positive for immunocompromised state.   Neurological: Positive for weakness.   Psychiatric/Behavioral: Negative for confusion.     Objective:     Vital Signs (Most Recent):  Temp: 98.4 °F (36.9 °C) (07/29/20 0931)  Pulse: 77 (07/29/20 1125)  Resp: (!) 50 (07/29/20 0715)  BP: 113/66 (07/29/20 0931)  SpO2: 100 % (07/29/20 1540)  Height: 5' 1" (154.9 cm)  Weight: 62.7 kg (138 lb 3.7 oz)  Body mass index is 26.12 kg/m².     Physical Exam  Vitals signs and nursing note reviewed.   Constitutional:       Appearance: She is well-developed.   HENT:      " Head: Normocephalic.   Eyes:      Pupils: Pupils are equal, round, and reactive to light.   Neck:      Musculoskeletal: Normal range of motion.   Cardiovascular:      Rate and Rhythm: Normal rate.      Heart sounds: No murmur.   Pulmonary:      Effort: Pulmonary effort is normal. No respiratory distress.      Breath sounds: No wheezing.   Abdominal:      General: There is no distension.      Palpations: Abdomen is soft.      Tenderness: There is no abdominal tenderness. There is no guarding.      Comments: Well healed KTx incision   Musculoskeletal: Normal range of motion.   Skin:     General: Skin is warm and dry.   Neurological:      General: No focal deficit present.      Mental Status: She is alert and oriented to person, place, and time.         Laboratory  CBC:   Recent Labs   Lab 07/27/20  0428 07/28/20  0604 07/29/20  0340   WBC 3.13* 2.54* 2.47*   RBC 3.95* 4.03 3.72*   HGB 12.1 12.0 11.4*   HCT 37.4 39.2 35.1*   * 155 156   MCV 95 97 94   MCH 30.6 29.8 30.6   MCHC 32.4 30.6* 32.5     CMP:   Recent Labs   Lab 07/26/20  0509 07/27/20  0428 07/28/20  0604 07/29/20  0340   GLU 86 78 87 87   CALCIUM 8.7 8.4* 8.2* 8.1*   ALBUMIN 3.3* 2.9* 2.8* 2.6*   PROT 6.5  --   --   --     141 142 141   K 3.7 3.6 3.3* 4.1   CO2 24 23 26 27    108 107 107   BUN 25* 18 23* 23*   CREATININE 1.4 1.1 1.3 1.2   ALKPHOS 88  --   --   --    ALT 20  --   --   --    AST 26  --   --   --      Labs within the past 24 hours have been reviewed.    Diagnostic Results:  Chest X-Ray: No results found for this or any previous visit.    Assessment/Plan:     * COVID-19 virus detected  - Covid-19 positive 7/19 at OSH  - Repeat covid on 7/22 negative at C-- patient presented for exposure from son. No admission as test negative and minimal symptoms  - Covid test on admit positive (7/26)  - a/w worsening sob and fever  - CXR with no focal consolidations, generalized edema  Infection disease consultation - appreciate  recs      Acidosis  - Continue PO bicarb  - Monitor      Hypokalemia  - K replaced 7/28 with 40 meq KCL x 2 doses  - Monitor      Hypophosphatemia  - Continue PO replacement (dose increased 7/28).  - Monitor.      Other neutropenia  - wbc decreased likely drug induced  - monitor      Kidney transplant status        Renal hypertension  - Continue Nifedipine       Long-term use of immunosuppressant medication  -  Hold Prograf due to high level. Will monitor for signs of toxic side effects, check daily tacrolimus troughs, and change meds accordingly.  - Cellcept on hold for Covid     Prophylactic immunotherapy  See long term immunosuppression    At risk for opportunistic infections  - Continue Mepron for PCP prophylaxis       S/P kidney transplant  - s/p KTX from 4/5/20 (Simulect induction, CMV +/+). Native kidney UOP ~1.8L  - a/w worsening sob and fever s/p positive covid test  - Cr at baseline  - Monitor strict Is and Os and daily weights.      Secondary hyperparathyroidism of renal origin  - Continue calcitriol.  - Monitor.      Anemia of renal disease  - H/H stable. No overt signs of bleeding  - Cont to monitor          Discharge Planning: possible d/c tomorrow if off o2      Kylie Ocasio NP  Kidney Transplant  [facility] - ICU 15 WT

## 2020-07-29 NOTE — PLAN OF CARE
Problem: Adult Inpatient Plan of Care  Goal: Plan of Care Review  Outcome: Ongoing, Progressing  Flowsheets (Taken 7/28/2020 1800)  Plan of Care Reviewed With: patient   Patient remained free from falls, trauma, and injuries throughout shift. No complaints of pain, nausea, or vomiting. IV Remdesivir and medications administered as prescribed. Ambulated to and from bathroom independently. Sat up in chair for most of shift. Vital Signs Stable; afebrile throughout shift. Oxygen therapy administered as ordered; sating 93-95% on 2L nasal cannula. No acute distress noted.

## 2020-07-29 NOTE — PHYSICIAN QUERY
PT Name: Tala Rivera  MR #: [id number]    Emergency Medicine Diagnosis Clarification     CDS/: Harriet Vo RN, CCDS             Contact information: [email or web address]  This form is a permanent document in the medical record.     Query Date: July 29, 2020    By submitting this query, we are merely seeking further clarification of documentation.  Please utilize your independent clinical judgment when addressing the question(s) below.      The Medical Record reflects the following:  Clinical Information Location in Medical Record   She states that she was notified on 7/23/20 that her Covid test was positive  She presents to the ER this evening for an emergent evaluation due to fever and SOB. She states that her home oxygen level decreased to 88% earlier.     Pneumonia due to COVID-19 virus      In the ED patient noted to be febrile with temp of 100.4. O2 sats 88-90% on RA. CXR with no focal consolidation.  Repeat COVID-19 positive on admit. Received Azithromycin and Rocephin the ED  COVID-19 positive 7/19 and 7/26    Unchanged interstitial findings which could again could relate to underlying infectious versus non infectious inflammatory process or pulmonary edema 7/26 ed note                7/26 h/p            7/26 cxr     Please clarify/confirm the Emergency Medicine diagnosis of Pneumonia due to Covid-19 virus?     [  x ] Diagnosis ruled in   [   ] Diagnosis ruled out   [   ] Other diagnosis (please specify): _____________   [  ] Clinically undetermined

## 2020-07-29 NOTE — SUBJECTIVE & OBJECTIVE
Subjective:     Chief Complaint/Reason for Admission: SOB and fever    History of Present Illness:  Ms. Tala Rivera is a 55 yo female with history of HTN, gastric bypass surgery on 7/28/16 and ESRD presumed secondary to HTN now s/p KTX from 4/5/20 (Simulect induction, CMV +/+) who presents to the ED with fever and worsening sob. She states that she went to [facility] in clinic on 7/19/20 for evaluation due to flu-like symptoms and noted to be COVID-19 positive (results called back on 7/23). Patient reports having COVID-19 exposure from son. Patient had a repeat COVID test on 7/22 in the ED after presenting for exposure, covid test negative during this visit. Patient re-presents with worsening sob and reports decreased O2 sats of 88% at home. In the ED patient noted to be febrile with temp of 100.4. O2 sats 88-90% on RA. CXR with no focal consolidation, generalized edema but overall relatively unchanged from 7/22. Repeat COVID-19 positive on admit. Received Azithromycin and Rocephin the ED.  Kidney function at baseline. Plan to admit for further observation.        PTA Medications   Medication Sig    famotidine (PEPCID) 20 MG tablet Take 20 mg by mouth once daily.    atovaquone (MEPRON) 750 mg/5 mL Susp Take 10 mLs (1,500 mg total) by mouth once daily. Stop: 4/5/2021    calcitRIOL (ROCALTROL) 0.25 MCG Cap Take 2 capsules (0.5 mcg total) by mouth once daily.    docusate sodium (COLACE) 100 MG capsule Take 1 capsule (100 mg total) by mouth 3 (three) times daily as needed for Constipation.    ergocalciferol (ERGOCALCIFEROL) 50,000 unit Cap Take 1 capsule (50,000 Units total) by mouth every 7 days.    k phos di & mono-sod phos mono (K-PHOS-NEUTRAL) 250 mg Tab Take 1 tablet by mouth 2 (two) times daily.    NIFEdipine (PROCARDIA-XL) 30 MG (OSM) 24 hr tablet Take 1 tablet (30 mg total) by mouth 2 (two) times a day.    predniSONE (DELTASONE) 5 MG tablet Take 20mg by mouth daily 4/8-5/7;   Take 15mg daily  -;  Take 10mg daily -;  then 5mg daily thereafter starting 2020    pulse oximeter (PULSE OXIMETER) device Use twice daily at 8 AM and 3 PM and record the value in MyChart as directed.    sodium bicarbonate 650 MG tablet Take 2 tablets (1,300 mg total) by mouth 2 (two) times daily.    tacrolimus (PROGRAF) 1 MG Cap Take 5 capsules (5 mg total) by mouth every morning AND 4 capsules (4 mg total) every evening.       Review of patient's allergies indicates:   Allergen Reactions    Sulfa (sulfonamide antibiotics) Hives       Past Medical History:   Diagnosis Date    Anemia of renal disease     ESRD on dialysis     Dr. Muhammad     Essential hypertension     PD catheter dysfunction 10/18/2018    Secondary hyperparathyroidism of renal origin      Past Surgical History:   Procedure Laterality Date    AV FISTULA PLACEMENT Left     never matured    AV graft Right 2015     SECTION      x3; ; , and     COLONOSCOPY N/A 2020    Procedure: COLONOSCOPY;  Surgeon: Darrell Golden MD;  Location: [facility];  Service: Endoscopy;  Laterality: N/A;    GASTRIC BYPASS  2016    KIDNEY TRANSPLANT N/A 2020    Procedure: TRANSPLANT, KIDNEY;  Surgeon: Megan Garcia MD;  Location: [address];  Service: Transplant;  Laterality: N/A;    KIDNEY TRANSPLANT Right 2020    LAPAROSCOPIC REVISION OF PROCEDURE INVOLVING PERITONEAL DIALYSIS CATHETER N/A 10/18/2018    Procedure: REVISION OF PROCEDURE INVOLVING PERITONEAL DIALYSIS CATHETER, LAPAROSCOPIC;  Surgeon: Hair Jimenez MD;  [facility];  Service: General;  Laterality: N/A;    PERITONEAL CATHETER INSERTION N/A 2018    Procedure: Laparoscopic INSERTION, CATHETER, INTRAPERITONEAL;  Surgeon: Hair Jimenez MD;  [facility];  Service: General;  Laterality: N/A;    PERITONEAL CATHETER REMOVAL N/A 2018    Procedure: REMOVAL, CATHETER, DIALYSIS, PERITONEAL;  Surgeon: Hair Jimenez MD;  Location: [facility]  "OR;  Service: General;  Laterality: N/A;    WOUND EXPLORATION N/A 12/14/2018    Procedure: EXPLORATION, WOUND-Surgical Site Irrigation;  Surgeon: Hair Jimenez MD;  [facility];  Service: General;  Laterality: N/A;     Family History     Problem Relation (Age of Onset)    Diabetes Mother    Down syndrome Son    Heart disease Mother    Hypertension Mother    Kidney disease Mother    Lupus Sister        Tobacco Use    Smoking status: Never Smoker    Smokeless tobacco: Never Used   Substance and Sexual Activity    Alcohol use: No    Drug use: No    Sexual activity: Yes     Partners: Male        Review of Systems   Constitutional: Positive for fatigue and fever. Negative for activity change and chills.   HENT: Negative for facial swelling and trouble swallowing.    Respiratory: Positive for cough and shortness of breath.    Gastrointestinal: Negative for abdominal distention, abdominal pain, constipation, nausea and vomiting.   Genitourinary: Negative for decreased urine volume and difficulty urinating.   Allergic/Immunologic: Positive for immunocompromised state.   Neurological: Positive for weakness.   Psychiatric/Behavioral: Negative for confusion.     Objective:     Vital Signs (Most Recent):  Temp: 98.4 °F (36.9 °C) (07/29/20 0931)  Pulse: 77 (07/29/20 1125)  Resp: (!) 50 (07/29/20 0715)  BP: 113/66 (07/29/20 0931)  SpO2: 100 % (07/29/20 1540)  Height: 5' 1" (154.9 cm)  Weight: 62.7 kg (138 lb 3.7 oz)  Body mass index is 26.12 kg/m².     Physical Exam  Vitals signs and nursing note reviewed.   Constitutional:       Appearance: She is well-developed.   HENT:      Head: Normocephalic.   Eyes:      Pupils: Pupils are equal, round, and reactive to light.   Neck:      Musculoskeletal: Normal range of motion.   Cardiovascular:      Rate and Rhythm: Normal rate.      Heart sounds: No murmur.   Pulmonary:      Effort: Pulmonary effort is normal. No respiratory distress.      Breath sounds: No wheezing. "   Abdominal:      General: There is no distension.      Palpations: Abdomen is soft.      Tenderness: There is no abdominal tenderness. There is no guarding.      Comments: Well healed KTx incision   Musculoskeletal: Normal range of motion.   Skin:     General: Skin is warm and dry.   Neurological:      General: No focal deficit present.      Mental Status: She is alert and oriented to person, place, and time.         Laboratory  CBC:   Recent Labs   Lab 07/27/20 0428 07/28/20  0604 07/29/20  0340   WBC 3.13* 2.54* 2.47*   RBC 3.95* 4.03 3.72*   HGB 12.1 12.0 11.4*   HCT 37.4 39.2 35.1*   * 155 156   MCV 95 97 94   MCH 30.6 29.8 30.6   MCHC 32.4 30.6* 32.5     CMP:   Recent Labs   Lab 07/26/20  0509 07/27/20 0428 07/28/20  0604 07/29/20  0340   GLU 86 78 87 87   CALCIUM 8.7 8.4* 8.2* 8.1*   ALBUMIN 3.3* 2.9* 2.8* 2.6*   PROT 6.5  --   --   --     141 142 141   K 3.7 3.6 3.3* 4.1   CO2 24 23 26 27    108 107 107   BUN 25* 18 23* 23*   CREATININE 1.4 1.1 1.3 1.2   ALKPHOS 88  --   --   --    ALT 20  --   --   --    AST 26  --   --   --      Labs within the past 24 hours have been reviewed.    Diagnostic Results:  Chest X-Ray: No results found for this or any previous visit.

## 2020-07-29 NOTE — PLAN OF CARE
POC reviewed with pt. VSS. A&Ox4. O2 stable on 1L NC. No acute complications during shift. Will continue to monitor.

## 2020-07-30 VITALS
WEIGHT: 138.25 LBS | HEART RATE: 67 BPM | RESPIRATION RATE: 20 BRPM | OXYGEN SATURATION: 96 % | SYSTOLIC BLOOD PRESSURE: 114 MMHG | BODY MASS INDEX: 26.1 KG/M2 | DIASTOLIC BLOOD PRESSURE: 68 MMHG | HEIGHT: 61 IN | TEMPERATURE: 98 F

## 2020-07-30 LAB
ALBUMIN SERPL BCP-MCNC: 2.6 G/DL (ref 3.5–5.2)
ANION GAP SERPL CALC-SCNC: 7 MMOL/L (ref 8–16)
BASOPHILS # BLD AUTO: 0.01 K/UL (ref 0–0.2)
BASOPHILS NFR BLD: 0.3 % (ref 0–1.9)
BUN SERPL-MCNC: 23 MG/DL (ref 6–20)
CALCIUM SERPL-MCNC: 8.4 MG/DL (ref 8.7–10.5)
CHLORIDE SERPL-SCNC: 110 MMOL/L (ref 95–110)
CO2 SERPL-SCNC: 28 MMOL/L (ref 23–29)
CREAT SERPL-MCNC: 1 MG/DL (ref 0.5–1.4)
DIFFERENTIAL METHOD: ABNORMAL
EOSINOPHIL # BLD AUTO: 0 K/UL (ref 0–0.5)
EOSINOPHIL NFR BLD: 0 % (ref 0–8)
ERYTHROCYTE [DISTWIDTH] IN BLOOD BY AUTOMATED COUNT: 11.7 % (ref 11.5–14.5)
EST. GFR  (AFRICAN AMERICAN): >60 ML/MIN/1.73 M^2
EST. GFR  (NON AFRICAN AMERICAN): >60 ML/MIN/1.73 M^2
GLUCOSE SERPL-MCNC: 85 MG/DL (ref 70–110)
HCT VFR BLD AUTO: 35.6 % (ref 37–48.5)
HGB BLD-MCNC: 11 G/DL (ref 12–16)
IMM GRANULOCYTES # BLD AUTO: 0.06 K/UL (ref 0–0.04)
IMM GRANULOCYTES NFR BLD AUTO: 2 % (ref 0–0.5)
LYMPHOCYTES # BLD AUTO: 0.4 K/UL (ref 1–4.8)
LYMPHOCYTES NFR BLD: 15 % (ref 18–48)
MAGNESIUM SERPL-MCNC: 1.7 MG/DL (ref 1.6–2.6)
MCH RBC QN AUTO: 30.1 PG (ref 27–31)
MCHC RBC AUTO-ENTMCNC: 30.9 G/DL (ref 32–36)
MCV RBC AUTO: 98 FL (ref 82–98)
MONOCYTES # BLD AUTO: 0.3 K/UL (ref 0.3–1)
MONOCYTES NFR BLD: 8.8 % (ref 4–15)
NEUTROPHILS # BLD AUTO: 2.2 K/UL (ref 1.8–7.7)
NEUTROPHILS NFR BLD: 73.9 % (ref 38–73)
NRBC BLD-RTO: 0 /100 WBC
PHOSPHATE SERPL-MCNC: 2.8 MG/DL (ref 2.7–4.5)
PLATELET # BLD AUTO: 173 K/UL (ref 150–350)
PMV BLD AUTO: 11.9 FL (ref 9.2–12.9)
POTASSIUM SERPL-SCNC: 4.1 MMOL/L (ref 3.5–5.1)
RBC # BLD AUTO: 3.65 M/UL (ref 4–5.4)
SODIUM SERPL-SCNC: 145 MMOL/L (ref 136–145)
TACROLIMUS BLD-MCNC: 12.8 NG/ML (ref 5–15)
WBC # BLD AUTO: 2.94 K/UL (ref 3.9–12.7)

## 2020-07-30 PROCEDURE — 83735 ASSAY OF MAGNESIUM: CPT

## 2020-07-30 PROCEDURE — 27000221 HC OXYGEN, UP TO 24 HOURS

## 2020-07-30 PROCEDURE — 36415 COLL VENOUS BLD VENIPUNCTURE: CPT

## 2020-07-30 PROCEDURE — 25000003 PHARM REV CODE 250: Performed by: PHYSICIAN ASSISTANT

## 2020-07-30 PROCEDURE — 94761 N-INVAS EAR/PLS OXIMETRY MLT: CPT

## 2020-07-30 PROCEDURE — 80197 ASSAY OF TACROLIMUS: CPT

## 2020-07-30 PROCEDURE — 63600175 PHARM REV CODE 636 W HCPCS: Performed by: NURSE PRACTITIONER

## 2020-07-30 PROCEDURE — 63600175 PHARM REV CODE 636 W HCPCS: Performed by: PHYSICIAN ASSISTANT

## 2020-07-30 PROCEDURE — 99239 PR HOSPITAL DISCHARGE DAY,>30 MIN: ICD-10-PCS | Mod: ,,, | Performed by: NURSE PRACTITIONER

## 2020-07-30 PROCEDURE — 99239 HOSP IP/OBS DSCHRG MGMT >30: CPT | Mod: ,,, | Performed by: NURSE PRACTITIONER

## 2020-07-30 PROCEDURE — 80069 RENAL FUNCTION PANEL: CPT

## 2020-07-30 PROCEDURE — 85025 COMPLETE CBC W/AUTO DIFF WBC: CPT

## 2020-07-30 RX ORDER — TACROLIMUS 1 MG/1
2 CAPSULE ORAL 2 TIMES DAILY
Status: DISCONTINUED | OUTPATIENT
Start: 2020-07-31 | End: 2020-07-30 | Stop reason: HOSPADM

## 2020-07-30 RX ORDER — TACROLIMUS 1 MG/1
CAPSULE ORAL
Qty: 180 CAPSULE | Refills: 11 | Status: SHIPPED | OUTPATIENT
Start: 2020-07-30 | End: 2020-08-07 | Stop reason: SDUPTHER

## 2020-07-30 RX ADMIN — DIBASIC SODIUM PHOSPHATE, MONOBASIC POTASSIUM PHOSPHATE AND MONOBASIC SODIUM PHOSPHATE 2 TABLET: 852; 155; 130 TABLET ORAL at 08:07

## 2020-07-30 RX ADMIN — NIFEDIPINE 30 MG: 30 TABLET, FILM COATED, EXTENDED RELEASE ORAL at 08:07

## 2020-07-30 RX ADMIN — PREDNISONE 5 MG: 5 TABLET ORAL at 08:07

## 2020-07-30 RX ADMIN — SODIUM BICARBONATE 650 MG TABLET 1300 MG: at 08:07

## 2020-07-30 RX ADMIN — TACROLIMUS 3 MG: 1 CAPSULE ORAL at 08:07

## 2020-07-30 RX ADMIN — HEPARIN SODIUM 5000 UNITS: 5000 INJECTION INTRAVENOUS; SUBCUTANEOUS at 05:07

## 2020-07-30 RX ADMIN — CALCITRIOL CAPSULES 0.25 MCG 0.5 MCG: 0.25 CAPSULE ORAL at 08:07

## 2020-07-30 RX ADMIN — ATOVAQUONE 1500 MG: 750 SUSPENSION ORAL at 08:07

## 2020-07-30 NOTE — PROGRESS NOTES
Discharge Medication Note:    Hospital Course:  Ms Rivera is s/p kidney transplant from 4/5/20 admitted with COVID. ID consulted - remdesivir given 7/26-7/30. Patient initially with SOB and cough, requiring up to 2L - sxs now improved, pt declined prn cough suppressants at discharge. MMF already on hold for low WBC, continue to hold.  FK dose decreased due to elevated levels (potentially due to possible interaction with remdesivir)  - continue to monitor (patient to hold dose night, then 3mg BID). CMV 7/28 detected, but <35, continue to monitor weekly.     Met with Tala Rivera at discharge to review discharge medications and to update the blue medication card.       Tala Rivera   Home Medication Instructions CARLIN:[id number]    Printed on:07/30/20 4014   Medication Information                      atovaquone (MEPRON) 750 mg/5 mL Susp  Take 10 mLs (1,500 mg total) by mouth once daily. Stop: 4/5/2021             calcitRIOL (ROCALTROL) 0.25 MCG Cap  Take 2 capsules (0.5 mcg total) by mouth once daily.             docusate sodium (COLACE) 100 MG capsule  Take 1 capsule (100 mg total) by mouth 3 (three) times daily as needed for Constipation.             ergocalciferol (ERGOCALCIFEROL) 50,000 unit Cap  Take 1 capsule (50,000 Units total) by mouth every 7 days.             k phos di & mono-sod phos mono (K-PHOS-NEUTRAL) 250 mg Tab  Take 1 tablet by mouth 2 (two) times daily.             NIFEdipine (PROCARDIA-XL) 30 MG (OSM) 24 hr tablet  Take 1 tablet (30 mg total) by mouth 2 (two) times a day.             predniSONE (DELTASONE) 5 MG tablet  Take 20mg by mouth daily 4/8-5/7;   Take 15mg daily 5/8-6/7;  Take 10mg daily 6/8-7/7;  then 5mg daily thereafter starting 7/8/2020             pulse oximeter (PULSE OXIMETER) device  Use twice daily at 8 AM and 3 PM and record the value in MyChart as directed.             sodium bicarbonate 650 MG tablet  Take 2 tablets (1,300 mg total) by mouth 2 (two) times daily.              tacrolimus (PROGRAF) 1 MG Cap  Take 3 capsules (3 mg total) by mouth every morning AND 3 capsules (3 mg total) every evening.                 Pt was provided with prescriptions for the following meds:  E-rx: none  Printed rx: none  Phone-in or faxed rx: none to none pharmacy per pt request.    The following medications have been placed on HOLD and should be restarted in the outpatient setting (when appropriate): MMF (WBC, COVID)    Tala Rivera verbalized understanding and had the opportunity to ask questions.

## 2020-07-30 NOTE — DISCHARGE SUMMARY
[facility] - ICU 15 WT  Kidney Transplant  Discharge Summary    Patient Name: Tala Rivera  MRN: [id number]  Admission Date: 7/26/2020  Hospital Length of Stay: 4 days  Discharge Date and Time:  07/30/2020 2:08 PM  Attending Physician: Abelardo Rangel MD   Discharging Provider: Kylie Ocasio NP  Primary Care Provider: ANUJA Castellon MD    HPI:   Ms. Tala Rivera is a 55 yo female with history of HTN, gastric bypass surgery on 7/28/16 and ESRD presumed secondary to HTN now s/p KTX from 4/5/20 (Simulect induction, CMV +/+) who presents to the ED with fever and worsening sob. She states that she went to [facility] in clinic on 7/19/20 for evaluation due to flu-like symptoms and noted to be COVID-19 positive (results called back on 7/23). Patient reports having COVID-19 exposure from son. Patient had a repeat COVID test on 7/22 in the ED after presenting for exposure, covid test negative during this visit. Patient re-presents with worsening sob and reports decreased O2 sats of 88% at home. In the ED patient noted to be febrile with temp of 100.4. O2 sats 88-90% on RA. CXR with no focal consolidation, generalized edema but overall relatively unchanged from 7/22. Repeat COVID-19 positive on admit. Received Azithromycin and Rocephin the ED.  Kidney function at baseline. Plan to admit for further observation.     * No surgery found *     Hospital Course:    Patient re-presents with worsening sob and reports decreased O2 sats of 88% at home. In the ED patient noted to be febrile with temp of 100.4. O2 sats 88-90% on RA. CXR with no focal consolidation, generalized edema but overall relatively unchanged from 7/22. Repeat COVID-19 positive on 7/26. Received Azithromycin and Rocephin the ED - now discontinued.  ID consulted - started on remdesivir.  Kidney function at baseline.  Breathing improved, sats >92% on RA.  Coughing suppressed with medications.    Patient stable and ready for discharge.   Will have labs weekly with home health due to positive COVID.      Final Active Diagnoses:    Diagnosis Date Noted POA    PRINCIPAL PROBLEM:  COVID-19 virus detected [U07.1] 07/26/2020 Yes    Other neutropenia [D70.8] 07/28/2020 Yes    Hypophosphatemia [E83.39] 07/28/2020 Yes    Hypokalemia [E87.6] 07/28/2020 Yes    Acidosis [E87.2] 07/28/2020 Yes    Pancytopenia [D61.818]  Yes    Kidney replaced by transplant [Z94.0] 06/25/2020 Not Applicable    At risk for opportunistic infections [Z91.89] 04/06/2020 Yes    Long-term use of immunosuppressant medication [Z79.899] 04/06/2020 Not Applicable    Prophylactic immunotherapy [Z29.8] 04/06/2020 Not Applicable    Renal hypertension [I12.9] 04/06/2020 Yes    S/P kidney transplant [Z94.0] 04/05/2020 Not Applicable    Anemia of renal disease [N18.9, D63.1]  Yes     Chronic    Secondary hyperparathyroidism of renal origin [N25.81]  Yes     Chronic      Problems Resolved During this Admission:    Diagnosis Date Noted Date Resolved POA    Fever [R50.9] 07/26/2020 07/29/2020 Yes       Treatments: antibiotics: remdemisvir    Consults (From admission, onward)        Status Ordering Provider     Inpatient consult to Infectious Diseases  Once     Provider:  (Not yet assigned)    Completed MARCELA URBAN     Inpatient consult to Kidney/Pancreas Transplant Medicine  Once     Provider:  (Not yet assigned)    Acknowledged CAROLYN HONG          Pending Diagnostic Studies:     None        Significant Diagnostic Studies: Labs:   CMP   Recent Labs   Lab 07/29/20  0340 07/30/20  0410    145   K 4.1 4.1    110   CO2 27 28   GLU 87 85   BUN 23* 23*   CREATININE 1.2 1.0   CALCIUM 8.1* 8.4*   ALBUMIN 2.6* 2.6*   ANIONGAP 7* 7*   ESTGFRAFRICA >60.0 >60.0   EGFRNONAA 52.1* >60.0   , CBC   Recent Labs   Lab 07/29/20  0340 07/30/20  0410   WBC 2.47* 2.94*   HGB 11.4* 11.0*   HCT 35.1* 35.6*    173    and INR   Lab Results   Component Value Date    INR  1.0 06/25/2020    INR 1.0 04/04/2020    INR 0.9 07/19/2016       Discharged Condition: good    Disposition: Home or Self Care    Follow Up:    Patient Instructions:      Ambulatory referral/consult to Home Health   Standing Status: Future   Referral Priority: Routine Referral Type: Home Health Care   Referral Reason: Specialty Services Required   Requested Specialty: Home Health Services   Number of Visits Requested: 1     Diet Adult Regular     Notify your health care provider if you experience any of the following:  temperature >100.4     Notify your health care provider if you experience any of the following:  persistent nausea and vomiting or diarrhea     Notify your health care provider if you experience any of the following:  severe uncontrolled pain     Notify your health care provider if you experience any of the following:  difficulty breathing or increased cough     Notify your health care provider if you experience any of the following:  severe persistent headache     Notify your health care provider if you experience any of the following:  worsening rash     Notify your health care provider if you experience any of the following:  persistent dizziness, light-headedness, or visual disturbances     Notify your health care provider if you experience any of the following:  increased confusion or weakness     No dressing needed     COVID-19 Surveillance Program     Order Specific Question Answer Comments   Does patient have a smartphone? Yes    Does patient have the [facility] crystal on their smartphone? Yes    While in surveillance program, will patient be using home oxygen? No      Activity as tolerated     Medications:  Reconciled Home Medications:      Medication List      CHANGE how you take these medications    pulse oximeter device  Commonly known as: pulse oximeter  by Apply Externally route 2 (two) times a day. Use twice daily at 8 AM and 3 PM and record the value in Your[employer] as directed.  What changed:  additional instructions     tacrolimus 1 MG Cap  Commonly known as: PROGRAF  Take 3 capsules (3 mg total) by mouth every morning AND 3 capsules (3 mg total) every evening.  What changed: See the new instructions.        CONTINUE taking these medications    atovaquone 750 mg/5 mL Susp  Commonly known as: MEPRON  Take 10 mLs (1,500 mg total) by mouth once daily. Stop: 4/5/2021     calcitRIOL 0.25 MCG Cap  Commonly known as: ROCALTROL  Take 2 capsules (0.5 mcg total) by mouth once daily.     docusate sodium 100 MG capsule  Commonly known as: COLACE  Take 1 capsule (100 mg total) by mouth 3 (three) times daily as needed for Constipation.     NIFEdipine 30 MG (OSM) 24 hr tablet  Commonly known as: PROCARDIA-XL  Take 1 tablet (30 mg total) by mouth 2 (two) times a day.     PHOSPHA 250 NEUTRAL 250 mg Tab  Generic drug: k phos di & mono-sod phos mono  Take 1 tablet by mouth 2 (two) times daily.     predniSONE 5 MG tablet  Commonly known as: DELTASONE  Take 20mg by mouth daily 4/8-5/7;   Take 15mg daily 5/8-6/7;  Take 10mg daily 6/8-7/7;  then 5mg daily thereafter starting 7/8/2020     sodium bicarbonate 650 MG tablet  Take 2 tablets (1,300 mg total) by mouth 2 (two) times daily.     VITAMIN D2 50,000 unit Cap  Generic drug: ergocalciferol  Take 1 capsule (50,000 Units total) by mouth every 7 days.        STOP taking these medications    famotidine 20 MG tablet  Commonly known as: PEPCID          Time spent caring for patient (Greater than 1/2 spent in direct face-to-face contact): > 30 minutes    Kylie Ocasio NP  Kidney Transplant  [facility] - ICU 15 WT

## 2020-07-30 NOTE — PROGRESS NOTES
Pharmacy delivered refills, pt  arrived for pickup. Pt voiced no concerns, discharge instructions and teaching gone over, medications clarified with NP. Pt O2 sat @ discharge 99% on room air.

## 2020-07-30 NOTE — PROGRESS NOTES
Discharge Note:    Pt was alert and oriented x4, communicative via phone. Pt presented alone due to having COVID. TRU spoke with pt to assess discharge plan and any concerns or needs.Patients primary caregiver is Kathy Rivera Jr., patients , phone number [phone]    Pt reports agreeing with the discharge plan and has no psychosocial concerns. Pt will discharge today to home with home [facility] needs for twice weekly labs. [facility] will resume labs starting tomorrow and will be done around 8am. TRU resumed HH orders with Joan ([id number]). Pt's  will transport patient.  Patient verbalizes understanding and is involved in treatment planning and discharge process. Pt nor caregiver had concerns or questions.    TRU remains available at [phone].

## 2020-07-30 NOTE — PLAN OF CARE
Problem: Adult Inpatient Plan of Care  Goal: Plan of Care Review  Outcome: Ongoing, Progressing  Flowsheets (Taken 7/30/2020 0603)  Plan of Care Reviewed With: patient  Goal: Patient-Specific Goal (Individualization)  Outcome: Ongoing, Progressing  Goal: Optimal Comfort and Wellbeing  Outcome: Ongoing, Progressing  Goal: Readiness for Transition of Care  Outcome: Ongoing, Progressing  Goal: Rounds/Family Conference  Outcome: Ongoing, Progressing     Post renal transplant patient with adequate urine output. Pt VSS. A&Ox4. Pt independently ambulating in room with no complaints of shortness of breath. Continues to have intermittent coughing at rest. Pt goal is to wean O2 to 1L or to get back to baseline of no oxygen requirements.

## 2020-07-31 ENCOUNTER — NURSE TRIAGE (OUTPATIENT)
Dept: ADMINISTRATIVE | Facility: CLINIC | Age: 55
End: 2020-07-31

## 2020-07-31 ENCOUNTER — TELEPHONE (OUTPATIENT)
Dept: TRANSPLANT | Facility: HOSPITAL | Age: 55
End: 2020-07-31

## 2020-07-31 ENCOUNTER — PATIENT OUTREACH (OUTPATIENT)
Dept: ADMINISTRATIVE | Facility: CLINIC | Age: 55
End: 2020-07-31

## 2020-07-31 ENCOUNTER — LAB VISIT (OUTPATIENT)
Dept: LAB | Facility: HOSPITAL | Age: 55
End: 2020-07-31
Attending: INTERNAL MEDICINE
Payer: MEDICARE

## 2020-07-31 DIAGNOSIS — I12.9 PARENCHYMAL RENAL HYPERTENSION: ICD-10-CM

## 2020-07-31 DIAGNOSIS — J12.89 INFLUENZAL BRONCHOPNEUMONIA: ICD-10-CM

## 2020-07-31 DIAGNOSIS — J11.08 INFLUENZAL BRONCHOPNEUMONIA: ICD-10-CM

## 2020-07-31 DIAGNOSIS — Z94.0 KIDNEY REPLACED BY TRANSPLANT: Primary | ICD-10-CM

## 2020-07-31 LAB
ANION GAP SERPL CALC-SCNC: 11 MMOL/L (ref 8–16)
BACTERIA BLD CULT: NORMAL
BACTERIA BLD CULT: NORMAL
BASOPHILS # BLD AUTO: 0.01 K/UL (ref 0–0.2)
BASOPHILS NFR BLD: 0.2 % (ref 0–1.9)
BUN SERPL-MCNC: 26 MG/DL (ref 6–20)
CALCIUM SERPL-MCNC: 8.7 MG/DL (ref 8.7–10.5)
CHLORIDE SERPL-SCNC: 105 MMOL/L (ref 95–110)
CO2 SERPL-SCNC: 26 MMOL/L (ref 23–29)
CREAT SERPL-MCNC: 1.2 MG/DL (ref 0.5–1.4)
DIFFERENTIAL METHOD: ABNORMAL
EOSINOPHIL # BLD AUTO: 0 K/UL (ref 0–0.5)
EOSINOPHIL NFR BLD: 0.2 % (ref 0–8)
ERYTHROCYTE [DISTWIDTH] IN BLOOD BY AUTOMATED COUNT: 11.8 % (ref 11.5–14.5)
EST. GFR  (AFRICAN AMERICAN): >60 ML/MIN/1.73 M^2
EST. GFR  (NON AFRICAN AMERICAN): 52.1 ML/MIN/1.73 M^2
GLUCOSE SERPL-MCNC: 104 MG/DL (ref 70–110)
HCT VFR BLD AUTO: 38.1 % (ref 37–48.5)
HGB BLD-MCNC: 11.8 G/DL (ref 12–16)
IMM GRANULOCYTES # BLD AUTO: 0.05 K/UL (ref 0–0.04)
IMM GRANULOCYTES NFR BLD AUTO: 1.1 % (ref 0–0.5)
LYMPHOCYTES # BLD AUTO: 0.4 K/UL (ref 1–4.8)
LYMPHOCYTES NFR BLD: 9.7 % (ref 18–48)
MAGNESIUM SERPL-MCNC: 1.8 MG/DL (ref 1.6–2.6)
MCH RBC QN AUTO: 30.7 PG (ref 27–31)
MCHC RBC AUTO-ENTMCNC: 31 G/DL (ref 32–36)
MCV RBC AUTO: 99 FL (ref 82–98)
MONOCYTES # BLD AUTO: 0.3 K/UL (ref 0.3–1)
MONOCYTES NFR BLD: 6 % (ref 4–15)
NEUTROPHILS # BLD AUTO: 3.8 K/UL (ref 1.8–7.7)
NEUTROPHILS NFR BLD: 82.8 % (ref 38–73)
NRBC BLD-RTO: 0 /100 WBC
PLATELET # BLD AUTO: 224 K/UL (ref 150–350)
PMV BLD AUTO: 12.4 FL (ref 9.2–12.9)
POTASSIUM SERPL-SCNC: 4.1 MMOL/L (ref 3.5–5.1)
RBC # BLD AUTO: 3.84 M/UL (ref 4–5.4)
SODIUM SERPL-SCNC: 142 MMOL/L (ref 136–145)
WBC # BLD AUTO: 4.53 K/UL (ref 3.9–12.7)

## 2020-07-31 PROCEDURE — 80048 BASIC METABOLIC PNL TOTAL CA: CPT

## 2020-07-31 PROCEDURE — G0180 PR HOME HEALTH MD CERTIFICATION: ICD-10-PCS | Mod: ,,, | Performed by: INTERNAL MEDICINE

## 2020-07-31 PROCEDURE — G0180 MD CERTIFICATION HHA PATIENT: HCPCS | Mod: ,,, | Performed by: INTERNAL MEDICINE

## 2020-07-31 PROCEDURE — 83735 ASSAY OF MAGNESIUM: CPT

## 2020-07-31 PROCEDURE — 85025 COMPLETE CBC W/AUTO DIFF WBC: CPT

## 2020-07-31 PROCEDURE — 36415 COLL VENOUS BLD VENIPUNCTURE: CPT | Mod: PO

## 2020-07-31 NOTE — TELEPHONE ENCOUNTER
Patient initially escalated due to no response, however patient entered vitals prior to phone call. Vital signs and symptoms did not trigger a second escalation; no follow up call is needed at this time.     Additional Information   Negative: Caller has already spoken with the PCP (or office), and has no further questions   Negative: Caller has already spoken with another triager and has no further questions   Negative: Caller has already spoken with another triager or PCP (or office), and has further questions and triager able to answer questions.   Negative: Busy signal.  First attempt to contact caller.  Follow-up call scheduled within 15 minutes.   Negative: No answer.  First attempt to contact caller.  Follow-up call scheduled within 15 minutes.   Negative: Message left on identified voicemail   Negative: Message left on unidentified voice mail. Phone number verified.   Negative: Message left with person in household   Negative: Wrong number reached. Phone number verified.   Negative: Second attempt to contact family AND no contact made. Phone number verified.   Negative: Cell phone out of range. Phone number verified.   Negative: Patient already left for the hospital/clinic   Negative: Caller has cancelled the call before the first contact   Negative: Unable to complete triage due to phone connection issues    Protocols used: NO CONTACT OR DUPLICATE CONTACT CALL-A-[place]

## 2020-07-31 NOTE — TELEPHONE ENCOUNTER
TRU received call from Joan with [facility]. Joan reports [facility] in [place] had trouble reaching pt on the phone this morning so the  nurse isn't able to draw labs until 9:40am this morning. TRU tried to confirm new time with pt's nurse coordinator, Mor but unable to reach. TRU to follow up.    TRU remains available at [phone].

## 2020-07-31 NOTE — PATIENT INSTRUCTIONS
Instructions for Patients with Confirmed or Suspected COVID-19    If you are awaiting your test result, you will either be called or it will be released to the patient portal.  If you have any questions about your test, please visit [email or web address] or call our COVID-19 information line at [phone].      Instructions for non-hospitalized or discharged patients with confirmed or suspected COVID-19:       Stay home except to get medical care.    Separate yourself from other people and animals in your home.    Call ahead before visiting your doctor.    Wear a face mask.    Cover your coughs and sneezes.    Clean your hands often.    Avoid sharing personal household items.    Clean all high-touch surfaces every day.    Monitor your symptoms. Seek prompt medical attention if your illness is worsening (e.g., difficulty breathing). Before seeking care, call your healthcare provider.    If you have a medical emergency and must call 911, notify the dispatcher that you have or are being evaluated for COVID-19. If possible, put on a face mask before emergency medical services arrive.    Use the following symptom-based strategy to return to normal activity following a suspected or confirmed case of COVID-19. Continue isolation until:   o At least 3 days (72 hours) have passed since recovery defined as resolution of fever without the use of fever-reducing medications and improvement in respiratory symptoms (e.g. cough, shortness of breath), and   o At least 10 days have passed since the first positive test.       As one of the next steps, you will receive a call or text from the [place] Department of Health ([facility]) COVID-19 Tracing Team. See the contact information below so you know not to ignore the health departments call. It is important that you contact them back immediately so they can help.     Contact Tracer Number:  [phone]  Caller ID for most carriers: [place] Dept [facility]    What is  contact tracing?   Contact tracing is a process that helps identify everyone who has been in close contact with an infected person. Contact tracers let those people know they may have been exposed and guide them on next steps. Confidentiality is important for everyone; no one will be told who may have exposed them to the virus.   Your involvement is important. The more we know about where and how this virus is spreading, the better chance we have at stopping it from spreading further.  What does exposure mean?   Exposure means you have been within 6 feet for more than 15 minutes with a person who has or had COVID-19.  What kind of questions do the contact tracers ask?   A contact tracer will confirm your basic contact information including name, address, phone number, and next of kin, as well as asking about any symptoms you may have had. Theyll also ask you how you think you may have gotten sick, such as places where you may have been exposed to the virus, and people you were with. Those names will never be shared with anyone outside of that call, and will only be used to help trace and stop the spread of the virus.   I have privacy concerns. How will the state use my information?   Your privacy about your health is important. All calls are completed using call centers that use the appropriate health privacy protection measures (HIPAA compliance), meaning that your patient information is safe. No one will ever ask you any questions related to immigration status. Your health comes first.   Do I have to participate?   You do not have to participate, but we strongly encourage you to. Contact tracing can help us catch and control new outbreaks as theyre developing to keep your friends and family safe.   What if I dont hear from anyone?   If you dont receive a call within 24 hours, you can call the number above right away to inquire about your status. That line is open from 8:00 am - 8:00 p.m., 7 days a  week.  Contact tracing saves lives! Together, we have the power to beat this virus and keep our loved ones and neighbors safe.       Instructions for household members, intimate partners and caregivers in a non-healthcare setting of a patient with confirmed or suspected COVID-19:         Close contacts should monitor their health and call their healthcare provider right away if they develop symptoms suggestive of COVID-19 (e.g., fever, cough, shortness of breath).    Stay home except to get medical care. Separate yourself from other people and animals in the home.   Monitor the patients symptoms. If the patient is getting sicker, call his or her healthcare provider. If the patient has a medical emergency and you need to call 911, notify the dispatch personnel that the patient has or is being evaluated for COVID-19.    Wear a facemask when around other people such as sharing a room or vehicle and before entering a healthcare provider's office.   Cover coughs and sneezes with a tissue. Throw used tissues in a lined trash can immediately and wash hands.   Clean hands often with soap and water for at least 20 seconds or with an alcohol-based hand , rubbing hands together until they feel dry. Avoid touching your eyes, nose, and mouth with unwashed hands.   Clean all high-touch; surfaces every day, including counters, tabletops, doorknobs, bathroom fixtures, toilets, phones, keyboards, tablets, bedside tables, etc. Use a household cleaning spray or wipe according to label instructions.   Avoid sharing personal household items such as dishes, drinking glasses, cups, towels, bedding, etc. After these items are used, they should be washed thoroughly with soap and water.   Continue isolation until:   At least 3 days (72 hours) have passed since recovery defined as resolution of fever without the use of fever-reducing medications and improvement in respiratory symptoms (e.g. cough, shortness of breath),  and    At least 10 days have passed since the patients first positive test.    https://www.cdc.gov/coronavirus/2019-ncov/your-health/index.htm

## 2020-07-31 NOTE — TELEPHONE ENCOUNTER
Pt verified identity by spelling first and last names and verifying . Pt unable to access [employer] account at this time to complete consent. Help desk number provided at this time. Pt negative for wheezing or stridor, but does have 1-slight-SOB at rest. Pt states that home health nurse was just at her house and she was assessed then. COVID-19 assessment completed with all data WNLs per Surveillance policy and doesn't require an escalation at this time. Please see COVID-19 assessment and protocol evaluation for further details. An information only protocol is appropriate at this time and pt instructed that symptoms are mild and can be managed at home per Surveillance Program policy; pt voiced verbal understanding and agrees with advice. Instructed pt to consider self as infectious and to follow social distancing, vigorous handwashing, cover cough and sneezes, wipe down cell phone several times daily with an antibacterial wipe, and quarantine techniques; pt voiced verbal understanding and agrees with information. Instructed pt to call OOC back for further assistance if needed or if symptoms worsened, and to call 911 for all emergencies; pt voiced verbal understanding and agrees with goals.    Reason for Disposition   Information only question and nurse able to answer    Protocols used: ST NO PROTOCOL AVAILABLE - INFORMATION ONLY-A-OH

## 2020-07-31 NOTE — TELEPHONE ENCOUNTER
Called patient to review COVID-19 Surveillance Program enrollment process.    Smartphone: Yes    [facility] crystal: Yes    Program consent: No. Having issues with [employer].    Pulse oximeter status: Yes    Verified emergency contacts: Yes    Program Overview: Reviewed , no response process, and importance of correct emergency contacts in event that well-being check is warranted. Encouraged patient to call [phone] 24/7 to speak with an OnCall nurse, if needed and always call 911 for emergencies.    Patient had no further questions.

## 2020-08-03 ENCOUNTER — NURSE TRIAGE (OUTPATIENT)
Dept: ADMINISTRATIVE | Facility: CLINIC | Age: 55
End: 2020-08-03

## 2020-08-03 ENCOUNTER — LAB VISIT (OUTPATIENT)
Dept: LAB | Facility: HOSPITAL | Age: 55
End: 2020-08-03
Attending: INTERNAL MEDICINE
Payer: MEDICARE

## 2020-08-03 DIAGNOSIS — Z94.0 KIDNEY REPLACED BY TRANSPLANT: Primary | ICD-10-CM

## 2020-08-03 DIAGNOSIS — J12.89 INFLUENZAL BRONCHOPNEUMONIA: ICD-10-CM

## 2020-08-03 DIAGNOSIS — J11.08 INFLUENZAL BRONCHOPNEUMONIA: ICD-10-CM

## 2020-08-03 LAB
ALBUMIN SERPL BCP-MCNC: 3 G/DL (ref 3.5–5.2)
ALP SERPL-CCNC: 99 U/L (ref 55–135)
ALT SERPL W/O P-5'-P-CCNC: 16 U/L (ref 10–44)
ANION GAP SERPL CALC-SCNC: 9 MMOL/L (ref 8–16)
AST SERPL-CCNC: 17 U/L (ref 10–40)
BASOPHILS # BLD AUTO: 0.01 K/UL (ref 0–0.2)
BASOPHILS NFR BLD: 0.3 % (ref 0–1.9)
BILIRUB SERPL-MCNC: 0.7 MG/DL (ref 0.1–1)
BUN SERPL-MCNC: 29 MG/DL (ref 6–20)
CALCIUM SERPL-MCNC: 8.7 MG/DL (ref 8.7–10.5)
CHLORIDE SERPL-SCNC: 106 MMOL/L (ref 95–110)
CO2 SERPL-SCNC: 27 MMOL/L (ref 23–29)
CREAT SERPL-MCNC: 1.3 MG/DL (ref 0.5–1.4)
DIFFERENTIAL METHOD: ABNORMAL
EOSINOPHIL # BLD AUTO: 0 K/UL (ref 0–0.5)
EOSINOPHIL NFR BLD: 0.3 % (ref 0–8)
ERYTHROCYTE [DISTWIDTH] IN BLOOD BY AUTOMATED COUNT: 11.7 % (ref 11.5–14.5)
EST. GFR  (AFRICAN AMERICAN): 54.5 ML/MIN/1.73 M^2
EST. GFR  (NON AFRICAN AMERICAN): 47.3 ML/MIN/1.73 M^2
GLUCOSE SERPL-MCNC: 85 MG/DL (ref 70–110)
HCT VFR BLD AUTO: 37.3 % (ref 37–48.5)
HGB BLD-MCNC: 11.1 G/DL (ref 12–16)
IMM GRANULOCYTES # BLD AUTO: 0.05 K/UL (ref 0–0.04)
IMM GRANULOCYTES NFR BLD AUTO: 1.5 % (ref 0–0.5)
LYMPHOCYTES # BLD AUTO: 0.3 K/UL (ref 1–4.8)
LYMPHOCYTES NFR BLD: 9 % (ref 18–48)
MAGNESIUM SERPL-MCNC: 2.1 MG/DL (ref 1.6–2.6)
MCH RBC QN AUTO: 30.2 PG (ref 27–31)
MCHC RBC AUTO-ENTMCNC: 29.8 G/DL (ref 32–36)
MCV RBC AUTO: 102 FL (ref 82–98)
MONOCYTES # BLD AUTO: 0.3 K/UL (ref 0.3–1)
MONOCYTES NFR BLD: 9.3 % (ref 4–15)
NEUTROPHILS # BLD AUTO: 2.6 K/UL (ref 1.8–7.7)
NEUTROPHILS NFR BLD: 79.6 % (ref 38–73)
NRBC BLD-RTO: 0 /100 WBC
PLATELET # BLD AUTO: 216 K/UL (ref 150–350)
PMV BLD AUTO: 12.5 FL (ref 9.2–12.9)
POTASSIUM SERPL-SCNC: 4.6 MMOL/L (ref 3.5–5.1)
PROT SERPL-MCNC: 6.3 G/DL (ref 6–8.4)
RBC # BLD AUTO: 3.67 M/UL (ref 4–5.4)
SODIUM SERPL-SCNC: 142 MMOL/L (ref 136–145)
WBC # BLD AUTO: 3.32 K/UL (ref 3.9–12.7)

## 2020-08-03 PROCEDURE — 80053 COMPREHEN METABOLIC PANEL: CPT

## 2020-08-03 PROCEDURE — 87799 DETECT AGENT NOS DNA QUANT: CPT

## 2020-08-03 PROCEDURE — 85025 COMPLETE CBC W/AUTO DIFF WBC: CPT

## 2020-08-03 PROCEDURE — 83735 ASSAY OF MAGNESIUM: CPT

## 2020-08-03 PROCEDURE — 80197 ASSAY OF TACROLIMUS: CPT

## 2020-08-04 ENCOUNTER — TELEPHONE (OUTPATIENT)
Dept: TRANSPLANT | Facility: CLINIC | Age: 55
End: 2020-08-04

## 2020-08-04 DIAGNOSIS — Z94.0 KIDNEY REPLACED BY TRANSPLANT: ICD-10-CM

## 2020-08-04 LAB — TACROLIMUS BLD-MCNC: 5.2 NG/ML (ref 5–15)

## 2020-08-04 NOTE — TELEPHONE ENCOUNTER
Results reviewed with patient. Pt to increase prograf dose to 3/3. Next labs 8/6.     ----- Message from Ondina Frye MD sent at 8/4/2020 12:49 PM CDT -----  Please tac to 3 mg BID. Pending CMV.

## 2020-08-06 ENCOUNTER — LAB VISIT (OUTPATIENT)
Dept: LAB | Facility: HOSPITAL | Age: 55
End: 2020-08-06
Attending: INTERNAL MEDICINE
Payer: MEDICARE

## 2020-08-06 DIAGNOSIS — Z94.0 KIDNEY REPLACED BY TRANSPLANT: Primary | ICD-10-CM

## 2020-08-06 DIAGNOSIS — J11.08 INFLUENZAL BRONCHOPNEUMONIA: ICD-10-CM

## 2020-08-06 DIAGNOSIS — J12.89 INFLUENZAL BRONCHOPNEUMONIA: ICD-10-CM

## 2020-08-06 LAB
ALBUMIN SERPL BCP-MCNC: 3.1 G/DL (ref 3.5–5.2)
ALP SERPL-CCNC: 99 U/L (ref 55–135)
ALT SERPL W/O P-5'-P-CCNC: 14 U/L (ref 10–44)
ANION GAP SERPL CALC-SCNC: 10 MMOL/L (ref 8–16)
AST SERPL-CCNC: 17 U/L (ref 10–40)
BASOPHILS # BLD AUTO: 0.02 K/UL (ref 0–0.2)
BASOPHILS NFR BLD: 0.6 % (ref 0–1.9)
BILIRUB SERPL-MCNC: 0.7 MG/DL (ref 0.1–1)
BKV DNA SERPL NAA+PROBE-ACNC: <125 COPIES/ML
BKV DNA SERPL NAA+PROBE-LOG#: <2.1 LOG (10) COPIES/ML
BKV DNA SERPL QL NAA+PROBE: NOT DETECTED
BUN SERPL-MCNC: 28 MG/DL (ref 6–20)
CALCIUM SERPL-MCNC: 8.9 MG/DL (ref 8.7–10.5)
CHLORIDE SERPL-SCNC: 105 MMOL/L (ref 95–110)
CMV DNA SERPL NAA+PROBE-ACNC: NORMAL IU/ML
CO2 SERPL-SCNC: 25 MMOL/L (ref 23–29)
CREAT SERPL-MCNC: 1.3 MG/DL (ref 0.5–1.4)
DIFFERENTIAL METHOD: ABNORMAL
EOSINOPHIL # BLD AUTO: 0 K/UL (ref 0–0.5)
EOSINOPHIL NFR BLD: 0.3 % (ref 0–8)
ERYTHROCYTE [DISTWIDTH] IN BLOOD BY AUTOMATED COUNT: 11.9 % (ref 11.5–14.5)
EST. GFR  (AFRICAN AMERICAN): 54.5 ML/MIN/1.73 M^2
EST. GFR  (NON AFRICAN AMERICAN): 47.3 ML/MIN/1.73 M^2
GLUCOSE SERPL-MCNC: 78 MG/DL (ref 70–110)
HCT VFR BLD AUTO: 35.6 % (ref 37–48.5)
HGB BLD-MCNC: 10.5 G/DL (ref 12–16)
IMM GRANULOCYTES # BLD AUTO: 0.02 K/UL (ref 0–0.04)
IMM GRANULOCYTES NFR BLD AUTO: 0.6 % (ref 0–0.5)
LYMPHOCYTES # BLD AUTO: 0.4 K/UL (ref 1–4.8)
LYMPHOCYTES NFR BLD: 12.5 % (ref 18–48)
MAGNESIUM SERPL-MCNC: 2.2 MG/DL (ref 1.6–2.6)
MCH RBC QN AUTO: 29.7 PG (ref 27–31)
MCHC RBC AUTO-ENTMCNC: 29.5 G/DL (ref 32–36)
MCV RBC AUTO: 101 FL (ref 82–98)
MONOCYTES # BLD AUTO: 0.3 K/UL (ref 0.3–1)
MONOCYTES NFR BLD: 9.3 % (ref 4–15)
NEUTROPHILS # BLD AUTO: 2.4 K/UL (ref 1.8–7.7)
NEUTROPHILS NFR BLD: 76.7 % (ref 38–73)
NRBC BLD-RTO: 0 /100 WBC
PLATELET # BLD AUTO: 202 K/UL (ref 150–350)
PMV BLD AUTO: 12.4 FL (ref 9.2–12.9)
POTASSIUM SERPL-SCNC: 3.7 MMOL/L (ref 3.5–5.1)
PROT SERPL-MCNC: 6.3 G/DL (ref 6–8.4)
RBC # BLD AUTO: 3.53 M/UL (ref 4–5.4)
SODIUM SERPL-SCNC: 140 MMOL/L (ref 136–145)
WBC # BLD AUTO: 3.13 K/UL (ref 3.9–12.7)

## 2020-08-06 PROCEDURE — 80053 COMPREHEN METABOLIC PANEL: CPT

## 2020-08-06 PROCEDURE — 85025 COMPLETE CBC W/AUTO DIFF WBC: CPT

## 2020-08-06 PROCEDURE — 80197 ASSAY OF TACROLIMUS: CPT

## 2020-08-06 PROCEDURE — 83735 ASSAY OF MAGNESIUM: CPT

## 2020-08-07 ENCOUNTER — NURSE TRIAGE (OUTPATIENT)
Dept: ADMINISTRATIVE | Facility: CLINIC | Age: 55
End: 2020-08-07

## 2020-08-07 DIAGNOSIS — Z94.0 KIDNEY REPLACED BY TRANSPLANT: ICD-10-CM

## 2020-08-07 LAB — TACROLIMUS BLD-MCNC: 5.3 NG/ML (ref 5–15)

## 2020-08-07 RX ORDER — TACROLIMUS 1 MG/1
CAPSULE ORAL
Qty: 210 CAPSULE | Refills: 11 | Status: SHIPPED | OUTPATIENT
Start: 2020-08-07 | End: 2020-08-18 | Stop reason: SDUPTHER

## 2020-08-07 RX ORDER — MYCOPHENOLATE MOFETIL 250 MG/1
750 CAPSULE ORAL 2 TIMES DAILY
Qty: 180 CAPSULE | Refills: 11 | Status: SHIPPED | OUTPATIENT
Start: 2020-08-07 | End: 2020-10-19 | Stop reason: SDUPTHER

## 2020-08-07 NOTE — TELEPHONE ENCOUNTER
Reason for Disposition   Caller has cancelled the call before the first contact    Protocols used: NO CONTACT OR DUPLICATE CONTACT CALL-A-[place]

## 2020-08-07 NOTE — TELEPHONE ENCOUNTER
Pt escalated for 'no response' to push message this afternoon, but then entered vital statistics and answered Surveillance questions with no abnormalities noted and doesnt warrant outreach or triage at this time per Surveillance Program policy.

## 2020-08-07 NOTE — TELEPHONE ENCOUNTER
Patient repeated back and voice a understanding of orders.  Patient states she was to restart MMF today.    ----- Message from Ondina Frye MD sent at 8/7/2020 11:33 AM CDT -----  Please tac to 4/3 mg.

## 2020-08-08 ENCOUNTER — NURSE TRIAGE (OUTPATIENT)
Dept: ADMINISTRATIVE | Facility: CLINIC | Age: 55
End: 2020-08-08

## 2020-08-08 DIAGNOSIS — Z94.0 KIDNEY REPLACED BY TRANSPLANT: ICD-10-CM

## 2020-08-08 DIAGNOSIS — I15.8 HYPERTENSION ASSOCIATED WITH TRANSPLANTATION: ICD-10-CM

## 2020-08-08 DIAGNOSIS — Z94.9 HYPERTENSION ASSOCIATED WITH TRANSPLANTATION: ICD-10-CM

## 2020-08-08 RX ORDER — NIFEDIPINE 30 MG/1
30 TABLET, EXTENDED RELEASE ORAL 2 TIMES DAILY
Qty: 60 TABLET | Refills: 0 | Status: SHIPPED | OUTPATIENT
Start: 2020-08-08 | End: 2020-09-08 | Stop reason: SDUPTHER

## 2020-08-08 NOTE — TELEPHONE ENCOUNTER
Patient initially escalated due to no response, however patient entered vitals prior to phone call. Vital signs and symptoms did not trigger a second escalation; therefore, no follow up call is needed at this time.    Reason for Disposition   Caller has cancelled the call before the first contact    Protocols used: NO CONTACT OR DUPLICATE CONTACT CALL-A-[place]

## 2020-08-08 NOTE — TELEPHONE ENCOUNTER
"Patient states she needs a 30 day refill on her procardia 30 mg.  Spoke with Dr. Frye whom authorized 30 day refill to be sent in to [facility].  Patient notified prescription sent in.  Advised to call back with any further concerns.     Reason for Disposition   [1] Request for URGENT new prescription or refill of "essential" medication (i.e., likelihood of harm to patient if not taken) AND [2] triager unable to fill per unit policy    Protocols used: MEDICATION QUESTION CALL-A-AH      "

## 2020-08-10 ENCOUNTER — NURSE TRIAGE (OUTPATIENT)
Dept: ADMINISTRATIVE | Facility: CLINIC | Age: 55
End: 2020-08-10

## 2020-08-10 ENCOUNTER — EXTERNAL HOME HEALTH (OUTPATIENT)
Dept: HOME HEALTH SERVICES | Facility: HOSPITAL | Age: 55
End: 2020-08-10
Payer: MEDICARE

## 2020-08-10 ENCOUNTER — LAB VISIT (OUTPATIENT)
Dept: LAB | Facility: HOSPITAL | Age: 55
End: 2020-08-10
Attending: INTERNAL MEDICINE
Payer: MEDICARE

## 2020-08-10 DIAGNOSIS — N18.9 CHRONIC KIDNEY DISEASE, UNSPECIFIED: ICD-10-CM

## 2020-08-10 DIAGNOSIS — Z94.0 KIDNEY REPLACED BY TRANSPLANT: Primary | ICD-10-CM

## 2020-08-10 LAB
ALBUMIN SERPL BCP-MCNC: 3 G/DL (ref 3.5–5.2)
ALP SERPL-CCNC: 96 U/L (ref 55–135)
ALT SERPL W/O P-5'-P-CCNC: 10 U/L (ref 10–44)
ANION GAP SERPL CALC-SCNC: 6 MMOL/L (ref 8–16)
AST SERPL-CCNC: 13 U/L (ref 10–40)
BASOPHILS # BLD AUTO: 0.03 K/UL (ref 0–0.2)
BASOPHILS NFR BLD: 0.8 % (ref 0–1.9)
BILIRUB SERPL-MCNC: 0.6 MG/DL (ref 0.1–1)
BUN SERPL-MCNC: 28 MG/DL (ref 6–20)
CALCIUM SERPL-MCNC: 8.6 MG/DL (ref 8.7–10.5)
CHLORIDE SERPL-SCNC: 109 MMOL/L (ref 95–110)
CO2 SERPL-SCNC: 25 MMOL/L (ref 23–29)
CREAT SERPL-MCNC: 1.2 MG/DL (ref 0.5–1.4)
DIFFERENTIAL METHOD: ABNORMAL
EOSINOPHIL # BLD AUTO: 0 K/UL (ref 0–0.5)
EOSINOPHIL NFR BLD: 0.3 % (ref 0–8)
ERYTHROCYTE [DISTWIDTH] IN BLOOD BY AUTOMATED COUNT: 12.1 % (ref 11.5–14.5)
EST. GFR  (AFRICAN AMERICAN): >60 ML/MIN/1.73 M^2
EST. GFR  (NON AFRICAN AMERICAN): 52.1 ML/MIN/1.73 M^2
GLUCOSE SERPL-MCNC: 78 MG/DL (ref 70–110)
HCT VFR BLD AUTO: 35.9 % (ref 37–48.5)
HGB BLD-MCNC: 10.7 G/DL (ref 12–16)
IMM GRANULOCYTES # BLD AUTO: 0.01 K/UL (ref 0–0.04)
IMM GRANULOCYTES NFR BLD AUTO: 0.3 % (ref 0–0.5)
LYMPHOCYTES # BLD AUTO: 0.5 K/UL (ref 1–4.8)
LYMPHOCYTES NFR BLD: 11.8 % (ref 18–48)
MAGNESIUM SERPL-MCNC: 2 MG/DL (ref 1.6–2.6)
MCH RBC QN AUTO: 29.8 PG (ref 27–31)
MCHC RBC AUTO-ENTMCNC: 29.8 G/DL (ref 32–36)
MCV RBC AUTO: 100 FL (ref 82–98)
MONOCYTES # BLD AUTO: 0.4 K/UL (ref 0.3–1)
MONOCYTES NFR BLD: 9 % (ref 4–15)
NEUTROPHILS # BLD AUTO: 3 K/UL (ref 1.8–7.7)
NEUTROPHILS NFR BLD: 77.8 % (ref 38–73)
NRBC BLD-RTO: 0 /100 WBC
PLATELET # BLD AUTO: 177 K/UL (ref 150–350)
PMV BLD AUTO: 12.5 FL (ref 9.2–12.9)
POTASSIUM SERPL-SCNC: 4.5 MMOL/L (ref 3.5–5.1)
PROT SERPL-MCNC: 5.9 G/DL (ref 6–8.4)
RBC # BLD AUTO: 3.59 M/UL (ref 4–5.4)
SODIUM SERPL-SCNC: 140 MMOL/L (ref 136–145)
WBC # BLD AUTO: 3.89 K/UL (ref 3.9–12.7)

## 2020-08-10 PROCEDURE — 80053 COMPREHEN METABOLIC PANEL: CPT

## 2020-08-10 PROCEDURE — 80197 ASSAY OF TACROLIMUS: CPT

## 2020-08-10 PROCEDURE — 83735 ASSAY OF MAGNESIUM: CPT

## 2020-08-10 PROCEDURE — 85025 COMPLETE CBC W/AUTO DIFF WBC: CPT

## 2020-08-10 NOTE — TELEPHONE ENCOUNTER
Patient initially escalated due to no response, however patient entered vitals prior to phone call. Vital signs and symptoms did not trigger a second escalation; therefore, no follow up call is needed at this time.    Reason for Disposition   Caller has cancelled the call before the first contact     Patient initially escalated due to no response, however patient entered vitals prior to phone call. Vital signs and symptoms did not trigger a second escalation; therefore, no follow up call is needed at this time.    Protocols used: NO CONTACT OR DUPLICATE CONTACT CALL-A-OH

## 2020-08-11 ENCOUNTER — TELEPHONE (OUTPATIENT)
Dept: TRANSPLANT | Facility: CLINIC | Age: 55
End: 2020-08-11

## 2020-08-11 ENCOUNTER — NURSE TRIAGE (OUTPATIENT)
Dept: ADMINISTRATIVE | Facility: CLINIC | Age: 55
End: 2020-08-11

## 2020-08-11 LAB — TACROLIMUS BLD-MCNC: 8.1 NG/ML (ref 5–15)

## 2020-08-11 NOTE — TELEPHONE ENCOUNTER
Covid Home Surveillance   Vitals O2  98 HR 76 Temp 98.0   Fever Symptomns: No  Cough: No  SOB at rest: 1  SOB with activity: 1    Pt escalated through the Covid Home Surveillance program d/t no response.  Pt entered her v/s late.  All VSS.  Pt denies fever, cough or any difficulty breathing at this time. No contact made with this pt.  Per protocol, no additional action needed at this time.  Will continue to monitor at next push notification.    Additional Information   Negative: Caller has already spoken with the PCP (or office), and has no further questions   Negative: Caller has already spoken with another triager and has no further questions   Negative: Caller has already spoken with another triager or PCP (or office), and has further questions and triager able to answer questions.   Negative: Busy signal.  First attempt to contact caller.  Follow-up call scheduled within 15 minutes.   Negative: No answer.  First attempt to contact caller.  Follow-up call scheduled within 15 minutes.   Negative: Message left on identified voicemail   Negative: Message left on unidentified voice mail. Phone number verified.   Negative: Message left with person in household   Negative: Wrong number reached. Phone number verified.   Negative: Second attempt to contact family AND no contact made. Phone number verified.   Negative: Cell phone out of range. Phone number verified.   Negative: Patient already left for the hospital/clinic   Negative: Caller has cancelled the call before the first contact   Negative: Unable to complete triage due to phone connection issues    Protocols used: NO CONTACT OR DUPLICATE CONTACT CALL-A-OH

## 2020-08-11 NOTE — TELEPHONE ENCOUNTER
Results reviewed with patient and she voice a understanding that she nees to be drinking over 2 liters of H2O daily.    ----- Message from Ondina Frye MD sent at 8/11/2020 10:21 AM CDT -----  More water intake. Corrected ca is normal.

## 2020-08-11 NOTE — HPI
54-year-old female with history of HTN, gastric bypass 7/28/16, ESRD presumed 2/2 HTN s/p renal transplant 4/5/20 (simulect induction, CMV D+/R+, on maintainence tacro, MMF) who presented w/ fever & worsening SOB. Starting 7/19/2020, patient developed headache and diarrhea. She states that she went to [facility] in clinic on 7/19/2020 for evaluation due to flu-like symptoms and found to be COVID-19 positive (results not called back until 7/23). Patient reports having COVID-19 exposure from son. COVID test 7/22/2020 in ED after presenting for exposure, covid test negative during this visit. Patient re-presents with worsening SOB and reports decreased O2 sats of 88% at home. In the ED patient noted to be febrile with temp of 100.4. O2 sats 88-90% on RA. CXR with no focal consolidation, generalized edema but overall relatively unchanged from 7/22. Repeat COVID-19 positive on admit. Received Azithromycin and Rocephin the ED. ID is consulted for COVID treatment recommendations. Patient reports ongoing dry cough, chest pressure. Reports headache and diarrhea have resolved.  
Ms. Tala Rivera is a 55 yo female with history of HTN, gastric bypass surgery on 7/28/16 and ESRD presumed secondary to HTN now s/p KTX from 4/5/20 (Simulect induction, CMV +/+) who presents to the ED with fever and worsening sob. She states that she went to [facility] in clinic on 7/19/20 for evaluation due to flu-like symptoms and noted to be COVID-19 positive (results called back on 7/23). Patient reports having COVID-19 exposure from son. Patient had a repeat COVID test on 7/22 in the ED after presenting for exposure, covid test negative during this visit. Patient re-presents with worsening sob and reports decreased O2 sats of 88% at home. In the ED patient noted to be febrile with temp of 100.4. O2 sats 88-90% on RA. CXR with no focal consolidation, generalized edema but overall relatively unchanged from 7/22. Repeat COVID-19 positive on admit. Received Azithromycin and Rocephin the ED.  Kidney function at baseline. Plan to admit for further observation.   
DISCHARGE

## 2020-08-13 ENCOUNTER — LAB VISIT (OUTPATIENT)
Dept: LAB | Facility: HOSPITAL | Age: 55
End: 2020-08-13
Attending: INTERNAL MEDICINE
Payer: MEDICARE

## 2020-08-13 DIAGNOSIS — Z94.0 KIDNEY REPLACED BY TRANSPLANT: Primary | ICD-10-CM

## 2020-08-13 LAB
ALBUMIN SERPL BCP-MCNC: 3.1 G/DL (ref 3.5–5.2)
ALP SERPL-CCNC: 97 U/L (ref 55–135)
ALT SERPL W/O P-5'-P-CCNC: 8 U/L (ref 10–44)
ANION GAP SERPL CALC-SCNC: 6 MMOL/L (ref 8–16)
AST SERPL-CCNC: 13 U/L (ref 10–40)
BASOPHILS # BLD AUTO: 0.04 K/UL (ref 0–0.2)
BASOPHILS NFR BLD: 1.1 % (ref 0–1.9)
BILIRUB SERPL-MCNC: 0.7 MG/DL (ref 0.1–1)
BUN SERPL-MCNC: 38 MG/DL (ref 6–20)
CALCIUM SERPL-MCNC: 8.8 MG/DL (ref 8.7–10.5)
CHLORIDE SERPL-SCNC: 108 MMOL/L (ref 95–110)
CMV DNA SERPL NAA+PROBE-ACNC: <35 IU/ML
CO2 SERPL-SCNC: 26 MMOL/L (ref 23–29)
CREAT SERPL-MCNC: 1.2 MG/DL (ref 0.5–1.4)
DIFFERENTIAL METHOD: ABNORMAL
EOSINOPHIL # BLD AUTO: 0 K/UL (ref 0–0.5)
EOSINOPHIL NFR BLD: 0.5 % (ref 0–8)
ERYTHROCYTE [DISTWIDTH] IN BLOOD BY AUTOMATED COUNT: 12.4 % (ref 11.5–14.5)
EST. GFR  (AFRICAN AMERICAN): >60 ML/MIN/1.73 M^2
EST. GFR  (NON AFRICAN AMERICAN): 52.1 ML/MIN/1.73 M^2
GLUCOSE SERPL-MCNC: 78 MG/DL (ref 70–110)
HCT VFR BLD AUTO: 34.6 % (ref 37–48.5)
HGB BLD-MCNC: 10.3 G/DL (ref 12–16)
IMM GRANULOCYTES # BLD AUTO: 0.01 K/UL (ref 0–0.04)
IMM GRANULOCYTES NFR BLD AUTO: 0.3 % (ref 0–0.5)
LYMPHOCYTES # BLD AUTO: 0.5 K/UL (ref 1–4.8)
LYMPHOCYTES NFR BLD: 13.8 % (ref 18–48)
MAGNESIUM SERPL-MCNC: 2.2 MG/DL (ref 1.6–2.6)
MCH RBC QN AUTO: 29.9 PG (ref 27–31)
MCHC RBC AUTO-ENTMCNC: 29.8 G/DL (ref 32–36)
MCV RBC AUTO: 100 FL (ref 82–98)
MONOCYTES # BLD AUTO: 0.4 K/UL (ref 0.3–1)
MONOCYTES NFR BLD: 11.1 % (ref 4–15)
NEUTROPHILS # BLD AUTO: 2.7 K/UL (ref 1.8–7.7)
NEUTROPHILS NFR BLD: 73.2 % (ref 38–73)
NRBC BLD-RTO: 0 /100 WBC
PLATELET # BLD AUTO: 170 K/UL (ref 150–350)
PMV BLD AUTO: 12.7 FL (ref 9.2–12.9)
POTASSIUM SERPL-SCNC: 4.4 MMOL/L (ref 3.5–5.1)
PROT SERPL-MCNC: 6.1 G/DL (ref 6–8.4)
RBC # BLD AUTO: 3.45 M/UL (ref 4–5.4)
SODIUM SERPL-SCNC: 140 MMOL/L (ref 136–145)
WBC # BLD AUTO: 3.69 K/UL (ref 3.9–12.7)

## 2020-08-13 PROCEDURE — 85025 COMPLETE CBC W/AUTO DIFF WBC: CPT

## 2020-08-13 PROCEDURE — 80053 COMPREHEN METABOLIC PANEL: CPT

## 2020-08-13 PROCEDURE — 80197 ASSAY OF TACROLIMUS: CPT

## 2020-08-13 PROCEDURE — 83735 ASSAY OF MAGNESIUM: CPT

## 2020-08-14 LAB — TACROLIMUS BLD-MCNC: 6.3 NG/ML (ref 5–15)

## 2020-08-16 ENCOUNTER — NURSE TRIAGE (OUTPATIENT)
Dept: ADMINISTRATIVE | Facility: CLINIC | Age: 55
End: 2020-08-16

## 2020-08-16 NOTE — TELEPHONE ENCOUNTER
"Covid-19 surveillance escalation due to no response after 3 pm push notification. Follow up call, patient states "I forgot." Assessment as follows: O2 97%, HR 75, T 98.0, fever/chills N, Cough N, SOB at rest 0/with activity 1. VS are WNL, no need for triage at this time.    Reason for Disposition   [1] COVID-19 diagnosed by positive lab test AND [2] mild symptoms (e.g., cough, fever, others) AND [3] no complications or SOB    Additional Information   Negative: SEVERE difficulty breathing (e.g., struggling for each breath, speaks in single words)   Negative: Difficult to awaken or acting confused (e.g., disoriented, slurred speech)   Negative: Bluish (or gray) lips or face now   Negative: Shock suspected (e.g., cold/pale/clammy skin, too weak to stand, low BP, rapid pulse)   Negative: Sounds like a life-threatening emergency to the triager   Negative: [1] COVID-19 exposure AND [2] NO symptoms   Negative: COVID-19 and Breastfeeding, questions about   Negative: [1] Adult with possible COVID-19 symptoms AND [2] triager concerned about severity of symptoms or other causes   Negative: SEVERE or constant chest pain or pressure (Exception: mild central chest pain, present only when coughing)   Negative: MODERATE difficulty breathing (e.g., speaks in phrases, SOB even at rest, pulse 100-120)   Negative: Patient sounds very sick or weak to the triager   Negative: MILD difficulty breathing (e.g., minimal/no SOB at rest, SOB with walking, pulse <100)   Negative: Chest pain or pressure   Negative: Fever > 103 F (39.4 C)   Negative: [1] Fever > 101 F (38.3 C) AND [2] age > 60   Negative: [1] Fever > 100.0 F (37.8 C) AND [2] bedridden (e.g., nursing home patient, CVA, chronic illness, recovering from surgery)   Negative: HIGH RISK patient (e.g., age > 64 years, diabetes, heart or lung disease, weak immune system)   Negative: Fever present > 3 days (72 hours)   Negative: [1] Fever returns after gone for over 24 " hours AND [2] symptoms worse or not improved   Negative: [1] Continuous (nonstop) coughing interferes with work or school AND [2] no improvement using cough treatment per protocol   Negative: [1] COVID-19 infection suspected by caller or triager AND [2] mild symptoms (cough, fever, or others) AND [3] no complications or SOB   Negative: Cough present > 3 weeks    Protocols used: CORONAVIRUS (COVID-19) DIAGNOSED OR [id number]

## 2020-08-17 ENCOUNTER — LAB VISIT (OUTPATIENT)
Dept: LAB | Facility: HOSPITAL | Age: 55
End: 2020-08-17
Attending: INTERNAL MEDICINE
Payer: MEDICARE

## 2020-08-17 DIAGNOSIS — Z94.0 KIDNEY REPLACED BY TRANSPLANT: Primary | ICD-10-CM

## 2020-08-17 LAB
ALBUMIN SERPL BCP-MCNC: 3.5 G/DL (ref 3.5–5.2)
ALP SERPL-CCNC: 95 U/L (ref 55–135)
ALT SERPL W/O P-5'-P-CCNC: 11 U/L (ref 10–44)
ANION GAP SERPL CALC-SCNC: 10 MMOL/L (ref 8–16)
AST SERPL-CCNC: 16 U/L (ref 10–40)
BASOPHILS # BLD AUTO: 0.04 K/UL (ref 0–0.2)
BASOPHILS NFR BLD: 0.9 % (ref 0–1.9)
BILIRUB SERPL-MCNC: 0.8 MG/DL (ref 0.1–1)
BUN SERPL-MCNC: 34 MG/DL (ref 6–20)
CALCIUM SERPL-MCNC: 9.4 MG/DL (ref 8.7–10.5)
CHLORIDE SERPL-SCNC: 108 MMOL/L (ref 95–110)
CO2 SERPL-SCNC: 24 MMOL/L (ref 23–29)
CREAT SERPL-MCNC: 1.2 MG/DL (ref 0.5–1.4)
DIFFERENTIAL METHOD: ABNORMAL
EOSINOPHIL # BLD AUTO: 0 K/UL (ref 0–0.5)
EOSINOPHIL NFR BLD: 0.9 % (ref 0–8)
ERYTHROCYTE [DISTWIDTH] IN BLOOD BY AUTOMATED COUNT: 12.8 % (ref 11.5–14.5)
EST. GFR  (AFRICAN AMERICAN): >60 ML/MIN/1.73 M^2
EST. GFR  (NON AFRICAN AMERICAN): 52.1 ML/MIN/1.73 M^2
GLUCOSE SERPL-MCNC: 84 MG/DL (ref 70–110)
HCT VFR BLD AUTO: 36.7 % (ref 37–48.5)
HGB BLD-MCNC: 10.9 G/DL (ref 12–16)
IMM GRANULOCYTES # BLD AUTO: 0.03 K/UL (ref 0–0.04)
IMM GRANULOCYTES NFR BLD AUTO: 0.7 % (ref 0–0.5)
LYMPHOCYTES # BLD AUTO: 0.5 K/UL (ref 1–4.8)
LYMPHOCYTES NFR BLD: 11.7 % (ref 18–48)
MAGNESIUM SERPL-MCNC: 2 MG/DL (ref 1.6–2.6)
MCH RBC QN AUTO: 29.9 PG (ref 27–31)
MCHC RBC AUTO-ENTMCNC: 29.7 G/DL (ref 32–36)
MCV RBC AUTO: 101 FL (ref 82–98)
MONOCYTES # BLD AUTO: 0.4 K/UL (ref 0.3–1)
MONOCYTES NFR BLD: 7.9 % (ref 4–15)
NEUTROPHILS # BLD AUTO: 3.5 K/UL (ref 1.8–7.7)
NEUTROPHILS NFR BLD: 77.9 % (ref 38–73)
NRBC BLD-RTO: 0 /100 WBC
PLATELET # BLD AUTO: 209 K/UL (ref 150–350)
PMV BLD AUTO: 12.4 FL (ref 9.2–12.9)
POTASSIUM SERPL-SCNC: 4.1 MMOL/L (ref 3.5–5.1)
PROT SERPL-MCNC: 6.8 G/DL (ref 6–8.4)
RBC # BLD AUTO: 3.65 M/UL (ref 4–5.4)
SODIUM SERPL-SCNC: 142 MMOL/L (ref 136–145)
WBC # BLD AUTO: 4.44 K/UL (ref 3.9–12.7)

## 2020-08-17 PROCEDURE — 85025 COMPLETE CBC W/AUTO DIFF WBC: CPT

## 2020-08-17 PROCEDURE — 80197 ASSAY OF TACROLIMUS: CPT

## 2020-08-17 PROCEDURE — 83735 ASSAY OF MAGNESIUM: CPT

## 2020-08-17 PROCEDURE — 80053 COMPREHEN METABOLIC PANEL: CPT

## 2020-08-18 ENCOUNTER — NURSE TRIAGE (OUTPATIENT)
Dept: ADMINISTRATIVE | Facility: CLINIC | Age: 55
End: 2020-08-18

## 2020-08-18 DIAGNOSIS — Z94.0 KIDNEY REPLACED BY TRANSPLANT: ICD-10-CM

## 2020-08-18 LAB — TACROLIMUS BLD-MCNC: 6 NG/ML (ref 5–15)

## 2020-08-18 RX ORDER — TACROLIMUS 1 MG/1
4 CAPSULE ORAL EVERY 12 HOURS
Qty: 240 CAPSULE | Refills: 11 | Status: SHIPPED | OUTPATIENT
Start: 2020-08-18 | End: 2020-08-25 | Stop reason: SDUPTHER

## 2020-08-18 NOTE — TELEPHONE ENCOUNTER
Pt states that she's unavailable at the moment, but will enter her data now. Pt escalated for 'no response' to push message. No distress noted. Will look for pt data.

## 2020-08-18 NOTE — TELEPHONE ENCOUNTER
Patient repeated back and voice a understanding of orders.    ----- Message from Ondina Frye MD sent at 8/18/2020 11:27 AM CDT -----  Please tac to 4 mg BID.

## 2020-08-19 LAB — CMV DNA SERPL NAA+PROBE-ACNC: <35 IU/ML

## 2020-08-20 ENCOUNTER — TELEPHONE (OUTPATIENT)
Dept: TRANSPLANT | Facility: CLINIC | Age: 55
End: 2020-08-20

## 2020-08-20 NOTE — TELEPHONE ENCOUNTER
Received phone call from patient stating that  did not show up for lab draw this morning.  Patient also feels she no longer needs  and will start going to have labs drawn on 8/24.  Erica at   Notified of patient request and will D/C patient from .    ----- Message from Mor Ross RN sent at 8/20/2020  9:23 AM CDT -----  Regarding: FW: Speak with office  Contact: Tala    ----- Message -----  From: Kizzy Mckeon  Sent: 8/20/2020   9:04 AM CDT  To: [facility] Post-Kidney Transplant Clinical  Subject: Speak with office                                Pt is calling to speak with Mor.      [phone]

## 2020-08-24 ENCOUNTER — LAB VISIT (OUTPATIENT)
Dept: LAB | Facility: HOSPITAL | Age: 55
End: 2020-08-24
Attending: INTERNAL MEDICINE
Payer: MEDICARE

## 2020-08-24 DIAGNOSIS — Z94.0 KIDNEY REPLACED BY TRANSPLANT: ICD-10-CM

## 2020-08-24 LAB
ALBUMIN SERPL BCP-MCNC: 3.3 G/DL (ref 3.5–5.2)
ALBUMIN SERPL BCP-MCNC: 3.3 G/DL (ref 3.5–5.2)
ALP SERPL-CCNC: 89 U/L (ref 55–135)
ALT SERPL W/O P-5'-P-CCNC: 10 U/L (ref 10–44)
ANION GAP SERPL CALC-SCNC: 7 MMOL/L (ref 8–16)
AST SERPL-CCNC: 11 U/L (ref 10–40)
BASOPHILS # BLD AUTO: 0.04 K/UL (ref 0–0.2)
BASOPHILS NFR BLD: 0.9 % (ref 0–1.9)
BILIRUB DIRECT SERPL-MCNC: 0.3 MG/DL (ref 0.1–0.3)
BILIRUB SERPL-MCNC: 0.6 MG/DL (ref 0.1–1)
BUN SERPL-MCNC: 29 MG/DL (ref 6–20)
CALCIUM SERPL-MCNC: 9 MG/DL (ref 8.7–10.5)
CHLORIDE SERPL-SCNC: 111 MMOL/L (ref 95–110)
CO2 SERPL-SCNC: 24 MMOL/L (ref 23–29)
CREAT SERPL-MCNC: 1.1 MG/DL (ref 0.5–1.4)
DIFFERENTIAL METHOD: ABNORMAL
EOSINOPHIL # BLD AUTO: 0.1 K/UL (ref 0–0.5)
EOSINOPHIL NFR BLD: 1.9 % (ref 0–8)
ERYTHROCYTE [DISTWIDTH] IN BLOOD BY AUTOMATED COUNT: 13.1 % (ref 11.5–14.5)
EST. GFR  (AFRICAN AMERICAN): >60 ML/MIN/1.73 M^2
EST. GFR  (NON AFRICAN AMERICAN): 57.9 ML/MIN/1.73 M^2
GLUCOSE SERPL-MCNC: 85 MG/DL (ref 70–110)
HCT VFR BLD AUTO: 31.7 % (ref 37–48.5)
HGB BLD-MCNC: 9.6 G/DL (ref 12–16)
IMM GRANULOCYTES # BLD AUTO: 0.02 K/UL (ref 0–0.04)
IMM GRANULOCYTES NFR BLD AUTO: 0.5 % (ref 0–0.5)
LYMPHOCYTES # BLD AUTO: 0.5 K/UL (ref 1–4.8)
LYMPHOCYTES NFR BLD: 12.4 % (ref 18–48)
MAGNESIUM SERPL-MCNC: 2 MG/DL (ref 1.6–2.6)
MCH RBC QN AUTO: 30.5 PG (ref 27–31)
MCHC RBC AUTO-ENTMCNC: 30.3 G/DL (ref 32–36)
MCV RBC AUTO: 101 FL (ref 82–98)
MONOCYTES # BLD AUTO: 0.4 K/UL (ref 0.3–1)
MONOCYTES NFR BLD: 8.2 % (ref 4–15)
NEUTROPHILS # BLD AUTO: 3.3 K/UL (ref 1.8–7.7)
NEUTROPHILS NFR BLD: 76.1 % (ref 38–73)
NRBC BLD-RTO: 0 /100 WBC
PHOSPHATE SERPL-MCNC: 2.9 MG/DL (ref 2.7–4.5)
PLATELET # BLD AUTO: 199 K/UL (ref 150–350)
PMV BLD AUTO: 11.5 FL (ref 9.2–12.9)
POTASSIUM SERPL-SCNC: 4.6 MMOL/L (ref 3.5–5.1)
PROT SERPL-MCNC: 6.3 G/DL (ref 6–8.4)
RBC # BLD AUTO: 3.15 M/UL (ref 4–5.4)
SODIUM SERPL-SCNC: 142 MMOL/L (ref 136–145)
WBC # BLD AUTO: 4.28 K/UL (ref 3.9–12.7)

## 2020-08-24 PROCEDURE — 80197 ASSAY OF TACROLIMUS: CPT

## 2020-08-24 PROCEDURE — 82247 BILIRUBIN TOTAL: CPT

## 2020-08-24 PROCEDURE — 84075 ASSAY ALKALINE PHOSPHATASE: CPT

## 2020-08-24 PROCEDURE — 80069 RENAL FUNCTION PANEL: CPT

## 2020-08-24 PROCEDURE — 36415 COLL VENOUS BLD VENIPUNCTURE: CPT | Mod: PO

## 2020-08-24 PROCEDURE — 85025 COMPLETE CBC W/AUTO DIFF WBC: CPT

## 2020-08-24 PROCEDURE — 87799 DETECT AGENT NOS DNA QUANT: CPT

## 2020-08-24 PROCEDURE — 83735 ASSAY OF MAGNESIUM: CPT

## 2020-08-25 ENCOUNTER — DOCUMENT SCAN (OUTPATIENT)
Dept: HOME HEALTH SERVICES | Facility: HOSPITAL | Age: 55
End: 2020-08-25
Payer: MEDICARE

## 2020-08-25 DIAGNOSIS — Z94.0 KIDNEY REPLACED BY TRANSPLANT: ICD-10-CM

## 2020-08-25 DIAGNOSIS — E44.1 MILD MALNUTRITION: Primary | ICD-10-CM

## 2020-08-25 DIAGNOSIS — E45 RETARDED DEVELOPMENT FOLLOWING PROTEIN-CALORIE MALNUTRITION: ICD-10-CM

## 2020-08-25 LAB — TACROLIMUS BLD-MCNC: 6.1 NG/ML (ref 5–15)

## 2020-08-25 RX ORDER — TACROLIMUS 1 MG/1
CAPSULE ORAL
Qty: 270 CAPSULE | Refills: 11 | Status: SHIPPED | OUTPATIENT
Start: 2020-08-25 | End: 2020-09-01 | Stop reason: SDUPTHER

## 2020-08-25 NOTE — TELEPHONE ENCOUNTER
Patient repeated back and voice a understanding of orders.  Next labs 8/31/2020.    ----- Message from Ondina Frye MD sent at 8/25/2020  1:10 PM CDT -----  Please prograf to 5/4 mg . Check iron panel, ferritin, b12 and folate with next lab. Thank you!

## 2020-08-31 ENCOUNTER — LAB VISIT (OUTPATIENT)
Dept: LAB | Facility: HOSPITAL | Age: 55
End: 2020-08-31
Attending: INTERNAL MEDICINE
Payer: MEDICARE

## 2020-08-31 DIAGNOSIS — Z94.0 KIDNEY REPLACED BY TRANSPLANT: ICD-10-CM

## 2020-08-31 DIAGNOSIS — N18.9 ANEMIA OF RENAL DISEASE: ICD-10-CM

## 2020-08-31 DIAGNOSIS — E44.1 MILD MALNUTRITION: ICD-10-CM

## 2020-08-31 DIAGNOSIS — E45 RETARDED DEVELOPMENT FOLLOWING PROTEIN-CALORIE MALNUTRITION: ICD-10-CM

## 2020-08-31 DIAGNOSIS — D63.1 ANEMIA OF RENAL DISEASE: ICD-10-CM

## 2020-08-31 LAB
ALBUMIN SERPL BCP-MCNC: 3.5 G/DL (ref 3.5–5.2)
ANION GAP SERPL CALC-SCNC: 7 MMOL/L (ref 8–16)
BUN SERPL-MCNC: 29 MG/DL (ref 6–20)
CALCIUM SERPL-MCNC: 9.3 MG/DL (ref 8.7–10.5)
CHLORIDE SERPL-SCNC: 109 MMOL/L (ref 95–110)
CO2 SERPL-SCNC: 25 MMOL/L (ref 23–29)
CREAT SERPL-MCNC: 1.2 MG/DL (ref 0.5–1.4)
EST. GFR  (AFRICAN AMERICAN): >60 ML/MIN/1.73 M^2
EST. GFR  (NON AFRICAN AMERICAN): 52.1 ML/MIN/1.73 M^2
FERRITIN SERPL-MCNC: 1737 NG/ML (ref 20–300)
GLUCOSE SERPL-MCNC: 88 MG/DL (ref 70–110)
IRON SERPL-MCNC: 39 UG/DL (ref 30–160)
MAGNESIUM SERPL-MCNC: 2 MG/DL (ref 1.6–2.6)
PHOSPHATE SERPL-MCNC: 3.3 MG/DL (ref 2.7–4.5)
POTASSIUM SERPL-SCNC: 4.7 MMOL/L (ref 3.5–5.1)
SATURATED IRON: 17 % (ref 20–50)
SODIUM SERPL-SCNC: 141 MMOL/L (ref 136–145)
TOTAL IRON BINDING CAPACITY: 232 UG/DL (ref 250–450)
TRANSFERRIN SERPL-MCNC: 157 MG/DL (ref 200–375)

## 2020-08-31 PROCEDURE — 80197 ASSAY OF TACROLIMUS: CPT

## 2020-08-31 PROCEDURE — 80069 RENAL FUNCTION PANEL: CPT

## 2020-08-31 PROCEDURE — 83540 ASSAY OF IRON: CPT

## 2020-08-31 PROCEDURE — 36415 COLL VENOUS BLD VENIPUNCTURE: CPT | Mod: PO

## 2020-08-31 PROCEDURE — 82607 VITAMIN B-12: CPT

## 2020-08-31 PROCEDURE — 83735 ASSAY OF MAGNESIUM: CPT

## 2020-08-31 PROCEDURE — 82746 ASSAY OF FOLIC ACID SERUM: CPT

## 2020-08-31 PROCEDURE — 82728 ASSAY OF FERRITIN: CPT

## 2020-08-31 PROCEDURE — 85025 COMPLETE CBC W/AUTO DIFF WBC: CPT

## 2020-09-01 ENCOUNTER — TELEPHONE (OUTPATIENT)
Dept: TRANSPLANT | Facility: CLINIC | Age: 55
End: 2020-09-01

## 2020-09-01 DIAGNOSIS — Z94.0 KIDNEY REPLACED BY TRANSPLANT: ICD-10-CM

## 2020-09-01 LAB
BASOPHILS # BLD AUTO: 0.04 K/UL (ref 0–0.2)
BASOPHILS NFR BLD: 1 % (ref 0–1.9)
DIFFERENTIAL METHOD: ABNORMAL
EOSINOPHIL # BLD AUTO: 0 K/UL (ref 0–0.5)
EOSINOPHIL NFR BLD: 1 % (ref 0–8)
ERYTHROCYTE [DISTWIDTH] IN BLOOD BY AUTOMATED COUNT: 13.2 % (ref 11.5–14.5)
FOLATE SERPL-MCNC: 7 NG/ML (ref 4–24)
HCT VFR BLD AUTO: 31.4 % (ref 37–48.5)
HGB BLD-MCNC: 9.5 G/DL (ref 12–16)
IMM GRANULOCYTES # BLD AUTO: 0.01 K/UL (ref 0–0.04)
IMM GRANULOCYTES NFR BLD AUTO: 0.2 % (ref 0–0.5)
LYMPHOCYTES # BLD AUTO: 0.5 K/UL (ref 1–4.8)
LYMPHOCYTES NFR BLD: 10.9 % (ref 18–48)
MCH RBC QN AUTO: 30.4 PG (ref 27–31)
MCHC RBC AUTO-ENTMCNC: 30.3 G/DL (ref 32–36)
MCV RBC AUTO: 101 FL (ref 82–98)
MONOCYTES # BLD AUTO: 0.4 K/UL (ref 0.3–1)
MONOCYTES NFR BLD: 8.7 % (ref 4–15)
NEUTROPHILS # BLD AUTO: 3.2 K/UL (ref 1.8–7.7)
NEUTROPHILS NFR BLD: 78.2 % (ref 38–73)
NRBC BLD-RTO: 0 /100 WBC
PLATELET # BLD AUTO: 214 K/UL (ref 150–350)
PMV BLD AUTO: 11.5 FL (ref 9.2–12.9)
RBC # BLD AUTO: 3.12 M/UL (ref 4–5.4)
TACROLIMUS BLD-MCNC: 5.3 NG/ML (ref 5–15)
VIT B12 SERPL-MCNC: 204 PG/ML (ref 210–950)
WBC # BLD AUTO: 4.12 K/UL (ref 3.9–12.7)

## 2020-09-01 RX ORDER — TACROLIMUS 1 MG/1
5 CAPSULE ORAL EVERY 12 HOURS
Qty: 300 CAPSULE | Refills: 11 | Status: SHIPPED | OUTPATIENT
Start: 2020-09-01 | End: 2020-09-22 | Stop reason: SDUPTHER

## 2020-09-01 RX ORDER — CHOLECALCIFEROL (VITAMIN D3) 25 MCG
1000 TABLET,CHEWABLE ORAL DAILY
Qty: 30 CAPSULE | Refills: 11 | Status: SHIPPED | OUTPATIENT
Start: 2020-09-01 | End: 2021-04-12 | Stop reason: ALTCHOICE

## 2020-09-01 NOTE — TELEPHONE ENCOUNTER
Patient repeated back and voice a understanding of orders.  Next labs 9/8/2020.    ----- Message from Ondina Frye MD sent at 9/1/2020 11:04 AM CDT -----  Great!  Mor please check her B12 in 6 months. Thank you!  ----- Message -----  From: Christina Diane PharmD  Sent: 9/1/2020  10:36 AM CDT  To: Mor Ross RN, Ondina Frye MD    I would start her on cyanocobalamin 1000 mcg daily.     -christina  ----- Message -----  From: Ondina Frye MD  Sent: 9/1/2020   9:45 AM CDT  To: [facility], #    patient is severly B12 deficient. Pharm D please start B12 supplement

## 2020-09-01 NOTE — TELEPHONE ENCOUNTER
Patient repeated back and voice a understanding of orders.  Patient does not want to give herself injections daily and does not have a family member to give her the injections .   Next labs 9/8/2020.    ----- Message from Ondina Frye MD sent at 9/1/2020 11:04 AM CDT -----  Great!  Mor please check her B12 in 6 months. Thank you!  ----- Message -----  From: Christina Diane PharmD  Sent: 9/1/2020  10:36 AM CDT  To: Mor Ross RN, Ondina Frye MD    I would start her on cyanocobalamin 1000 mcg daily.     -christina  ----- Message -----  From: Ondina Frye MD  Sent: 9/1/2020   9:45 AM CDT  To: [facility], #    patient is severly B12 deficient. Pharm D please start B12 supplement

## 2020-09-08 ENCOUNTER — LAB VISIT (OUTPATIENT)
Dept: LAB | Facility: HOSPITAL | Age: 55
End: 2020-09-08
Attending: INTERNAL MEDICINE
Payer: MEDICARE

## 2020-09-08 DIAGNOSIS — Z94.0 KIDNEY REPLACED BY TRANSPLANT: ICD-10-CM

## 2020-09-08 DIAGNOSIS — I15.8 HYPERTENSION ASSOCIATED WITH TRANSPLANTATION: ICD-10-CM

## 2020-09-08 DIAGNOSIS — Z94.9 HYPERTENSION ASSOCIATED WITH TRANSPLANTATION: ICD-10-CM

## 2020-09-08 LAB
ALBUMIN SERPL BCP-MCNC: 3.4 G/DL (ref 3.5–5.2)
ALBUMIN SERPL BCP-MCNC: 3.4 G/DL (ref 3.5–5.2)
ALP SERPL-CCNC: 100 U/L (ref 55–135)
ALT SERPL W/O P-5'-P-CCNC: 9 U/L (ref 10–44)
ANION GAP SERPL CALC-SCNC: 7 MMOL/L (ref 8–16)
AST SERPL-CCNC: 10 U/L (ref 10–40)
BASOPHILS # BLD AUTO: 0.05 K/UL (ref 0–0.2)
BASOPHILS NFR BLD: 1.1 % (ref 0–1.9)
BILIRUB DIRECT SERPL-MCNC: 0.3 MG/DL (ref 0.1–0.3)
BILIRUB SERPL-MCNC: 0.5 MG/DL (ref 0.1–1)
BUN SERPL-MCNC: 31 MG/DL (ref 6–20)
CALCIUM SERPL-MCNC: 9 MG/DL (ref 8.7–10.5)
CHLORIDE SERPL-SCNC: 108 MMOL/L (ref 95–110)
CO2 SERPL-SCNC: 25 MMOL/L (ref 23–29)
CREAT SERPL-MCNC: 1.2 MG/DL (ref 0.5–1.4)
DIFFERENTIAL METHOD: ABNORMAL
EOSINOPHIL # BLD AUTO: 0.1 K/UL (ref 0–0.5)
EOSINOPHIL NFR BLD: 1.4 % (ref 0–8)
ERYTHROCYTE [DISTWIDTH] IN BLOOD BY AUTOMATED COUNT: 13 % (ref 11.5–14.5)
EST. GFR  (AFRICAN AMERICAN): >60 ML/MIN/1.73 M^2
EST. GFR  (NON AFRICAN AMERICAN): 52.1 ML/MIN/1.73 M^2
GLUCOSE SERPL-MCNC: 88 MG/DL (ref 70–110)
HCT VFR BLD AUTO: 32.1 % (ref 37–48.5)
HGB BLD-MCNC: 9.5 G/DL (ref 12–16)
IMM GRANULOCYTES # BLD AUTO: 0.02 K/UL (ref 0–0.04)
IMM GRANULOCYTES NFR BLD AUTO: 0.5 % (ref 0–0.5)
LYMPHOCYTES # BLD AUTO: 0.5 K/UL (ref 1–4.8)
LYMPHOCYTES NFR BLD: 11.1 % (ref 18–48)
MAGNESIUM SERPL-MCNC: 1.8 MG/DL (ref 1.6–2.6)
MCH RBC QN AUTO: 30.1 PG (ref 27–31)
MCHC RBC AUTO-ENTMCNC: 29.6 G/DL (ref 32–36)
MCV RBC AUTO: 102 FL (ref 82–98)
MONOCYTES # BLD AUTO: 0.3 K/UL (ref 0.3–1)
MONOCYTES NFR BLD: 7.5 % (ref 4–15)
NEUTROPHILS # BLD AUTO: 3.5 K/UL (ref 1.8–7.7)
NEUTROPHILS NFR BLD: 78.4 % (ref 38–73)
NRBC BLD-RTO: 0 /100 WBC
PHOSPHATE SERPL-MCNC: 3 MG/DL (ref 2.7–4.5)
PLATELET # BLD AUTO: 234 K/UL (ref 150–350)
PMV BLD AUTO: 11.3 FL (ref 9.2–12.9)
POTASSIUM SERPL-SCNC: 4.6 MMOL/L (ref 3.5–5.1)
PROT SERPL-MCNC: 6.5 G/DL (ref 6–8.4)
RBC # BLD AUTO: 3.16 M/UL (ref 4–5.4)
SODIUM SERPL-SCNC: 140 MMOL/L (ref 136–145)
WBC # BLD AUTO: 4.42 K/UL (ref 3.9–12.7)

## 2020-09-08 PROCEDURE — 84075 ASSAY ALKALINE PHOSPHATASE: CPT

## 2020-09-08 PROCEDURE — 80069 RENAL FUNCTION PANEL: CPT

## 2020-09-08 PROCEDURE — 87799 DETECT AGENT NOS DNA QUANT: CPT

## 2020-09-08 PROCEDURE — 85025 COMPLETE CBC W/AUTO DIFF WBC: CPT

## 2020-09-08 PROCEDURE — 83735 ASSAY OF MAGNESIUM: CPT

## 2020-09-08 PROCEDURE — 80197 ASSAY OF TACROLIMUS: CPT

## 2020-09-08 RX ORDER — NIFEDIPINE 30 MG/1
30 TABLET, EXTENDED RELEASE ORAL 2 TIMES DAILY
Qty: 60 TABLET | Refills: 11 | Status: SHIPPED | OUTPATIENT
Start: 2020-09-08 | End: 2021-04-12

## 2020-09-09 LAB — TACROLIMUS BLD-MCNC: 7.4 NG/ML (ref 5–15)

## 2020-09-21 ENCOUNTER — LAB VISIT (OUTPATIENT)
Dept: LAB | Facility: HOSPITAL | Age: 55
End: 2020-09-21
Attending: INTERNAL MEDICINE
Payer: MEDICARE

## 2020-09-21 DIAGNOSIS — Z94.0 KIDNEY REPLACED BY TRANSPLANT: ICD-10-CM

## 2020-09-21 LAB
ALBUMIN SERPL BCP-MCNC: 3.5 G/DL (ref 3.5–5.2)
ANION GAP SERPL CALC-SCNC: 9 MMOL/L (ref 8–16)
BASOPHILS # BLD AUTO: 0.04 K/UL (ref 0–0.2)
BASOPHILS NFR BLD: 0.9 % (ref 0–1.9)
BUN SERPL-MCNC: 33 MG/DL (ref 6–20)
CALCIUM SERPL-MCNC: 9 MG/DL (ref 8.7–10.5)
CHLORIDE SERPL-SCNC: 105 MMOL/L (ref 95–110)
CO2 SERPL-SCNC: 25 MMOL/L (ref 23–29)
CREAT SERPL-MCNC: 1.4 MG/DL (ref 0.5–1.4)
DIFFERENTIAL METHOD: ABNORMAL
EOSINOPHIL # BLD AUTO: 0.1 K/UL (ref 0–0.5)
EOSINOPHIL NFR BLD: 1.1 % (ref 0–8)
ERYTHROCYTE [DISTWIDTH] IN BLOOD BY AUTOMATED COUNT: 13.1 % (ref 11.5–14.5)
EST. GFR  (AFRICAN AMERICAN): 49.8 ML/MIN/1.73 M^2
EST. GFR  (NON AFRICAN AMERICAN): 43.2 ML/MIN/1.73 M^2
GLUCOSE SERPL-MCNC: 96 MG/DL (ref 70–110)
HCT VFR BLD AUTO: 32.5 % (ref 37–48.5)
HGB BLD-MCNC: 9.8 G/DL (ref 12–16)
IMM GRANULOCYTES # BLD AUTO: 0.01 K/UL (ref 0–0.04)
IMM GRANULOCYTES NFR BLD AUTO: 0.2 % (ref 0–0.5)
LYMPHOCYTES # BLD AUTO: 0.5 K/UL (ref 1–4.8)
LYMPHOCYTES NFR BLD: 10.4 % (ref 18–48)
MAGNESIUM SERPL-MCNC: 1.9 MG/DL (ref 1.6–2.6)
MCH RBC QN AUTO: 29.8 PG (ref 27–31)
MCHC RBC AUTO-ENTMCNC: 30.2 G/DL (ref 32–36)
MCV RBC AUTO: 99 FL (ref 82–98)
MONOCYTES # BLD AUTO: 0.4 K/UL (ref 0.3–1)
MONOCYTES NFR BLD: 8 % (ref 4–15)
NEUTROPHILS # BLD AUTO: 3.6 K/UL (ref 1.8–7.7)
NEUTROPHILS NFR BLD: 79.4 % (ref 38–73)
NRBC BLD-RTO: 0 /100 WBC
PHOSPHATE SERPL-MCNC: 3.3 MG/DL (ref 2.7–4.5)
PLATELET # BLD AUTO: 241 K/UL (ref 150–350)
PMV BLD AUTO: 11.3 FL (ref 9.2–12.9)
POTASSIUM SERPL-SCNC: 4.3 MMOL/L (ref 3.5–5.1)
RBC # BLD AUTO: 3.29 M/UL (ref 4–5.4)
SODIUM SERPL-SCNC: 139 MMOL/L (ref 136–145)
WBC # BLD AUTO: 4.52 K/UL (ref 3.9–12.7)

## 2020-09-21 PROCEDURE — 36415 COLL VENOUS BLD VENIPUNCTURE: CPT | Mod: PO

## 2020-09-21 PROCEDURE — 80197 ASSAY OF TACROLIMUS: CPT

## 2020-09-21 PROCEDURE — 80069 RENAL FUNCTION PANEL: CPT

## 2020-09-21 PROCEDURE — 85025 COMPLETE CBC W/AUTO DIFF WBC: CPT

## 2020-09-21 PROCEDURE — 83735 ASSAY OF MAGNESIUM: CPT

## 2020-09-22 DIAGNOSIS — Z94.0 KIDNEY REPLACED BY TRANSPLANT: ICD-10-CM

## 2020-09-22 LAB — TACROLIMUS BLD-MCNC: 5.7 NG/ML (ref 5–15)

## 2020-09-22 RX ORDER — TACROLIMUS 1 MG/1
CAPSULE ORAL
Qty: 330 CAPSULE | Refills: 11 | Status: SHIPPED | OUTPATIENT
Start: 2020-09-22 | End: 2020-10-20 | Stop reason: SDUPTHER

## 2020-10-05 ENCOUNTER — LAB VISIT (OUTPATIENT)
Dept: LAB | Facility: HOSPITAL | Age: 55
End: 2020-10-05
Attending: INTERNAL MEDICINE
Payer: MEDICARE

## 2020-10-05 DIAGNOSIS — E55.9 VITAMIN D DEFICIENCY: ICD-10-CM

## 2020-10-05 DIAGNOSIS — Z94.0 KIDNEY REPLACED BY TRANSPLANT: ICD-10-CM

## 2020-10-05 LAB
25(OH)D3+25(OH)D2 SERPL-MCNC: 30 NG/ML (ref 30–96)
ALBUMIN SERPL BCP-MCNC: 3.7 G/DL (ref 3.5–5.2)
ALBUMIN SERPL BCP-MCNC: 3.7 G/DL (ref 3.5–5.2)
ALP SERPL-CCNC: 109 U/L (ref 55–135)
ALT SERPL W/O P-5'-P-CCNC: 9 U/L (ref 10–44)
ANION GAP SERPL CALC-SCNC: 9 MMOL/L (ref 8–16)
AST SERPL-CCNC: 11 U/L (ref 10–40)
BASOPHILS # BLD AUTO: 0.05 K/UL (ref 0–0.2)
BASOPHILS NFR BLD: 1.1 % (ref 0–1.9)
BILIRUB DIRECT SERPL-MCNC: 0.3 MG/DL (ref 0.1–0.3)
BILIRUB SERPL-MCNC: 0.5 MG/DL (ref 0.1–1)
BUN SERPL-MCNC: 34 MG/DL (ref 6–20)
CALCIUM SERPL-MCNC: 9.3 MG/DL (ref 8.7–10.5)
CHLORIDE SERPL-SCNC: 107 MMOL/L (ref 95–110)
CO2 SERPL-SCNC: 25 MMOL/L (ref 23–29)
CREAT SERPL-MCNC: 1.3 MG/DL (ref 0.5–1.4)
DIFFERENTIAL METHOD: ABNORMAL
EOSINOPHIL # BLD AUTO: 0.1 K/UL (ref 0–0.5)
EOSINOPHIL NFR BLD: 1.1 % (ref 0–8)
ERYTHROCYTE [DISTWIDTH] IN BLOOD BY AUTOMATED COUNT: 12.8 % (ref 11.5–14.5)
EST. GFR  (AFRICAN AMERICAN): 54.5 ML/MIN/1.73 M^2
EST. GFR  (NON AFRICAN AMERICAN): 47.3 ML/MIN/1.73 M^2
GLUCOSE SERPL-MCNC: 99 MG/DL (ref 70–110)
HCT VFR BLD AUTO: 34.3 % (ref 37–48.5)
HGB BLD-MCNC: 10.1 G/DL (ref 12–16)
IMM GRANULOCYTES # BLD AUTO: 0.01 K/UL (ref 0–0.04)
IMM GRANULOCYTES NFR BLD AUTO: 0.2 % (ref 0–0.5)
LYMPHOCYTES # BLD AUTO: 0.4 K/UL (ref 1–4.8)
LYMPHOCYTES NFR BLD: 9.2 % (ref 18–48)
MAGNESIUM SERPL-MCNC: 2 MG/DL (ref 1.6–2.6)
MCH RBC QN AUTO: 29.4 PG (ref 27–31)
MCHC RBC AUTO-ENTMCNC: 29.4 G/DL (ref 32–36)
MCV RBC AUTO: 100 FL (ref 82–98)
MONOCYTES # BLD AUTO: 0.3 K/UL (ref 0.3–1)
MONOCYTES NFR BLD: 7.4 % (ref 4–15)
NEUTROPHILS # BLD AUTO: 3.6 K/UL (ref 1.8–7.7)
NEUTROPHILS NFR BLD: 81 % (ref 38–73)
NRBC BLD-RTO: 0 /100 WBC
PHOSPHATE SERPL-MCNC: 3.5 MG/DL (ref 2.7–4.5)
PLATELET # BLD AUTO: 222 K/UL (ref 150–350)
PMV BLD AUTO: 11.1 FL (ref 9.2–12.9)
POTASSIUM SERPL-SCNC: 5 MMOL/L (ref 3.5–5.1)
PROT SERPL-MCNC: 6.5 G/DL (ref 6–8.4)
PTH-INTACT SERPL-MCNC: 136 PG/ML (ref 9–77)
RBC # BLD AUTO: 3.43 M/UL (ref 4–5.4)
SODIUM SERPL-SCNC: 141 MMOL/L (ref 136–145)
WBC # BLD AUTO: 4.47 K/UL (ref 3.9–12.7)

## 2020-10-05 PROCEDURE — 82247 BILIRUBIN TOTAL: CPT

## 2020-10-05 PROCEDURE — 80069 RENAL FUNCTION PANEL: CPT

## 2020-10-05 PROCEDURE — 85025 COMPLETE CBC W/AUTO DIFF WBC: CPT

## 2020-10-05 PROCEDURE — 83970 ASSAY OF PARATHORMONE: CPT

## 2020-10-05 PROCEDURE — 84075 ASSAY ALKALINE PHOSPHATASE: CPT

## 2020-10-05 PROCEDURE — 80197 ASSAY OF TACROLIMUS: CPT

## 2020-10-05 PROCEDURE — 36415 COLL VENOUS BLD VENIPUNCTURE: CPT | Mod: PO

## 2020-10-05 PROCEDURE — 82306 VITAMIN D 25 HYDROXY: CPT

## 2020-10-05 PROCEDURE — 83735 ASSAY OF MAGNESIUM: CPT

## 2020-10-05 PROCEDURE — 87799 DETECT AGENT NOS DNA QUANT: CPT

## 2020-10-06 ENCOUNTER — TELEPHONE (OUTPATIENT)
Dept: TRANSPLANT | Facility: CLINIC | Age: 55
End: 2020-10-06

## 2020-10-06 LAB — TACROLIMUS BLD-MCNC: 8.9 NG/ML (ref 5–15)

## 2020-10-06 NOTE — TELEPHONE ENCOUNTER
Patient repeated back and voice a understanding of orders.  Patient not drinking 2 liters daily and voice a understanding that he needs to be drinking 2 liters of H2O daily.    ----- Message from Ondina Frye MD sent at 10/6/2020  8:35 AM CDT -----  More water intake.

## 2020-10-14 NOTE — PROGRESS NOTES
Kidney Post-Transplant Assessment    Referring Physician: Tabby Velasco  Current Nephrologist: Arthur Mcdonald    ORGAN: LEFT KIDNEY  Donor Type: donation after brain death  PHS Increased Risk: no  Cold Ischemia: 438 mins  Induction Medications: simulect - basiliximab    Subjective:       Kidney History:  Ms. Tala Rivera is a 53 yo female with history of HTN, gastric bypass surgery on 7/28/16, umbilical hernia repair and PD catheter placement on 9/4/18, and ESRD presumed secondary to HTN on HD since 2/5/16 is now s/p KTX from 4/5/20. her Native UOP is approximately 1.8L/day. Pt was Simulect induction  on 4/5/20.     - Kidney US: Decreased perfusion within the transplanted kidney.  Overall resistive indices are normal to upper normal.  - Repeated kidney US: huge fluid collection 05/22/20 s/p drain placement.  - Covid 19 in July 2020/ hospitalized for 1 week.         Interval History: Doing well at home. She recovered fully from COVID. Reports of mild leg swelling in evening time. Reports BP at home ranges from 120-160 SBP/~60. Adherent w/ all meds, no fevers, chills, nausea, vomiting, LE swelling    Current Medication  Current Outpatient Medications   Medication Sig Dispense Refill    atovaquone (MEPRON) 750 mg/5 mL Susp Take 10 mLs (1,500 mg total) by mouth once daily. Stop: 4/5/2021 300 mL 11    calcitRIOL (ROCALTROL) 0.25 MCG Cap Take 2 capsules (0.5 mcg total) by mouth once daily. 60 capsule 11    cyanocobalamin, vitamin B-12, 1,000 mcg Cap Take 1,000 mcg by mouth once daily. 30 capsule 11    docusate sodium (COLACE) 100 MG capsule Take 1 capsule (100 mg total) by mouth 3 (three) times daily as needed for Constipation.  0    ergocalciferol (ERGOCALCIFEROL) 50,000 unit Cap Take 1 capsule (50,000 Units total) by mouth every 7 days. 4 capsule 11    mycophenolate (CELLCEPT) 250 mg Cap Take 3 capsules (750 mg total) by mouth 2 (two) times daily. 180 capsule 11    NIFEdipine (PROCARDIA-XL)  30 MG (OSM) 24 hr tablet Take 1 tablet (30 mg total) by mouth 2 (two) times a day. 60 tablet 11    predniSONE (DELTASONE) 5 MG tablet Take 20mg by mouth daily 4/8-5/7;   Take 15mg daily 5/8-6/7;  Take 10mg daily 6/8-7/7;  then 5mg daily thereafter starting 7/8/2020 120 tablet 11    sodium bicarbonate 650 MG tablet Take 2 tablets (1,300 mg total) by mouth 2 (two) times daily. 120 tablet 11    tacrolimus (PROGRAF) 1 MG Cap Take 6 capsules (6 mg total) by mouth every morning AND 5 capsules (5 mg total) every evening. 330 capsule 11     No current facility-administered medications for this visit.      Facility-Administered Medications Ordered in Other Visits   Medication Dose Route Frequency Provider Last Rate Last Dose    0.9%  NaCl infusion   Intravenous Continuous Richard Awad MD 20 mL/hr at 12/14/18 1432           Review of Systems    Constitutional: Negative for fever, appetite change and fatigue.   Respiratory: Negative for cough, chest tightness, shortness of breath and wheezing.   Cardiovascular: Negative for chest pain, palpitations.   Gastrointestinal: Negative for nausea, vomiting, abdominal pain, diarrhea, constipation, blood in stool and abdominal distention. RLE drain, non painful  Genitourinary: Negative for dysuria, urgency, frequency, hematuria, decreased urine volume, difficulty urinating  Musculoskeletal: Negative for back pain, joint swelling, arthralgias and gait problem.   Psychiatric/Behavioral: Negative for confusion, sleep disturbance and dysphoric mood. The patient is not nervous/anxious.         Vitals:    10/16/20 1105   BP: (!) 164/71   Pulse: 60   Resp: 18   Temp: 97.9 °F (36.6 °C)       Physical Exam  Head: normocephalic  Neck: No JVD, cervical axillary, or femoral adenopathies  Heart: no murmurs, Normal s1 and s2, No gallops, no rubs, No murmurs  Lungs; CTA, good respiratory effort, no crackles  Abdomen: soft, non tender,   Extremities: + edema, skin rash, joint pain  SNC:  awake, alert oriented. Cranial nerves are intact, no focalized, sensitivity and strength preserved    Labs:  Lab Results   Component Value Date    WBC 4.47 10/05/2020    HGB 10.1 (L) 10/05/2020    HCT 34.3 (L) 10/05/2020     10/05/2020    K 5.0 10/05/2020     10/05/2020    CO2 25 10/05/2020    BUN 34 (H) 10/05/2020    CREATININE 1.3 10/05/2020    EGFRNONAA 47.3 (A) 10/05/2020    CALCIUM 9.3 10/05/2020    PHOS 3.5 10/05/2020    MG 2.0 10/05/2020    ALBUMIN 3.7 10/05/2020    ALBUMIN 3.7 10/05/2020    AST 11 10/05/2020    ALT 9 (L) 10/05/2020    UTPCR Unable to calculate 10/05/2020    .0 (H) 10/05/2020    TACROLIMUS 8.9 10/05/2020       No results found for: EXTANC, EXTWBC, EXTSEGS, EXTPLATELETS, EXTHEMOGLOBI, EXTHEMATOCRI, EXTCREATININ, EXTSODIUM, EXTPOTASSIUM, EXTBUN, EXTCO2, EXTCALCIUM, EXTPHOSPHORU, EXTGLUCOSE, EXTALBUMIN, EXTAST, EXTALT, EXTBILITOTAL, EXTLIPASE, EXTAMYLASE    No results found for: EXTCYCLOSLVL, EXTSIROLIMUS, EXTTACROLVL, EXTPROTCRE, EXTPTHINTACT, EXTPROTEINUA, EXTWBCUA, EXTRBCUA    Labs were reviewed with the patient    Assessment/Plan:     1. Secondary hyperparathyroidism of renal origin    2. Long-term use of immunosuppressant medication    3. S/P kidney transplant    4. Hypophosphatemia        Ms. Rivera is a 54 y.o. female with:       # History of ESRD presumed secondary to HTN  - S/P DDKT 4/5/20  - baseline Cr 1.3-1.5  - last Cr 1.4  - more fluid inatke      # Immunosuppression:   - now on Prograf 6/5 mg, level pending today  - cont  mg BID   - continue prednisone  - will monitor closely for toxicities      # Leukopenia: resolved  - CMV <35 x few times    # Antimicrobial Prophylaxis:   - continue mepron  for PJP prophylaxis        # HTN:   - Home BP acceptable range  - Nifedipine to 30mg bid  - low salt and healthy life discussed with the patient          # Metabolic Bone Disease/Secondary Hyperparathyroidism:  - on calcitriol , ergo   - will monitor PTH, vitamin D  in 6 months        # Anemia of chronic disease:   - Hb stable  - will continue monitoring as per our center guidelines. No indication for acute intervention today             Plan:  - Stop k-phos  - Low salt diet

## 2020-10-16 ENCOUNTER — OFFICE VISIT (OUTPATIENT)
Dept: TRANSPLANT | Facility: CLINIC | Age: 55
End: 2020-10-16
Payer: MEDICARE

## 2020-10-16 VITALS
WEIGHT: 151.88 LBS | SYSTOLIC BLOOD PRESSURE: 164 MMHG | DIASTOLIC BLOOD PRESSURE: 71 MMHG | OXYGEN SATURATION: 100 % | RESPIRATION RATE: 18 BRPM | HEART RATE: 60 BPM | BODY MASS INDEX: 29.82 KG/M2 | TEMPERATURE: 98 F | HEIGHT: 60 IN

## 2020-10-16 DIAGNOSIS — N25.81 SECONDARY HYPERPARATHYROIDISM OF RENAL ORIGIN: Primary | Chronic | ICD-10-CM

## 2020-10-16 DIAGNOSIS — Z94.0 S/P KIDNEY TRANSPLANT: ICD-10-CM

## 2020-10-16 DIAGNOSIS — E83.39 HYPOPHOSPHATEMIA: ICD-10-CM

## 2020-10-16 DIAGNOSIS — Z79.60 LONG-TERM USE OF IMMUNOSUPPRESSANT MEDICATION: ICD-10-CM

## 2020-10-16 PROCEDURE — 99215 PR OFFICE/OUTPT VISIT, EST, LEVL V, 40-54 MIN: ICD-10-PCS | Mod: S$PBB,,, | Performed by: INTERNAL MEDICINE

## 2020-10-16 PROCEDURE — 99999 PR PBB SHADOW E&M-EST. PATIENT-LVL IV: ICD-10-PCS | Mod: PBBFAC,,, | Performed by: INTERNAL MEDICINE

## 2020-10-16 PROCEDURE — 99215 OFFICE O/P EST HI 40 MIN: CPT | Mod: S$PBB,,, | Performed by: INTERNAL MEDICINE

## 2020-10-16 PROCEDURE — 99999 PR PBB SHADOW E&M-EST. PATIENT-LVL IV: CPT | Mod: PBBFAC,,, | Performed by: INTERNAL MEDICINE

## 2020-10-16 PROCEDURE — 99214 OFFICE O/P EST MOD 30 MIN: CPT | Mod: PBBFAC | Performed by: INTERNAL MEDICINE

## 2020-10-16 NOTE — LETTER
October 16, 2020        Arthur Mcdonald  [id number] [facility]  NEPHROLOGY CLINIC  [facility] [address]  Phone: [phone]  Fax: [phone]     Tabby Chavez  [address] [facility] [address]  Phone: [phone]  Fax: [phone]             [facility]- Transplant 1st Fl  [address]  [facility] [address]  Phone: [phone]   Patient: Tala Rivera   MR Number: [id number]   YOB: 1965   Date of Visit: 10/16/2020       Dear Dr. Arthur Mcdonald, Tabby Chavez    Thank you for referring Tala Rivera to me for evaluation. Attached you will find relevant portions of my assessment and plan of care.    If you have questions, please do not hesitate to call me. I look forward to following Tala Rivera along with you.    Sincerely,    Ondina Frye MD    Enclosure    If you would like to receive this communication electronically, please contact [email or web address] or [phone] to request [employer] Link access.    [employer] is a tool which provides read-only access to select patient information with whom you have a relationship. Its easy to use and provides real time access to review your patients record including encounter summaries, notes, results, and demographic information.    If you feel you have received this communication in error or would no longer like to receive these types of communications, please e-mail [email or web address]

## 2020-10-16 NOTE — PATIENT INSTRUCTIONS
Thank you for entrusting your transplant care to us, and visiting me today. Please:      - Feel free to call us with any concerns regarding your health care.  - Monitor your blood pressure and aim to keep < 140/90  - Follow a low sodium diet. It is hard to do, but helps keep blood pressure controlled and prevents swelling (edema).   - Avoid nonsteroidal anti-inflamatory drugs (NSAIDS): ibuprofen (Advil, Motrin), naproxen (Aleve, Naprosyn), Indomethacin (Indocin).   - Check any new medications (over the counter or prescription) to make sure they will not affect your new kidney.   - Follow regular routine health maintenance as appropriate for you (i.e. mammogram, Pap smear, colonoscopy after age 50).  - See your nephrologist regularly and follow his/her recommendations.

## 2020-10-19 ENCOUNTER — LAB VISIT (OUTPATIENT)
Dept: LAB | Facility: HOSPITAL | Age: 55
End: 2020-10-19
Attending: INTERNAL MEDICINE
Payer: MEDICARE

## 2020-10-19 DIAGNOSIS — Z94.0 KIDNEY REPLACED BY TRANSPLANT: ICD-10-CM

## 2020-10-19 LAB
ALBUMIN SERPL BCP-MCNC: 3.5 G/DL (ref 3.5–5.2)
ANION GAP SERPL CALC-SCNC: 8 MMOL/L (ref 8–16)
BASOPHILS # BLD AUTO: 0.04 K/UL (ref 0–0.2)
BASOPHILS NFR BLD: 1.1 % (ref 0–1.9)
BUN SERPL-MCNC: 34 MG/DL (ref 6–20)
CALCIUM SERPL-MCNC: 8.9 MG/DL (ref 8.7–10.5)
CHLORIDE SERPL-SCNC: 108 MMOL/L (ref 95–110)
CO2 SERPL-SCNC: 25 MMOL/L (ref 23–29)
CREAT SERPL-MCNC: 1.2 MG/DL (ref 0.5–1.4)
DIFFERENTIAL METHOD: ABNORMAL
EOSINOPHIL # BLD AUTO: 0.1 K/UL (ref 0–0.5)
EOSINOPHIL NFR BLD: 1.6 % (ref 0–8)
ERYTHROCYTE [DISTWIDTH] IN BLOOD BY AUTOMATED COUNT: 13 % (ref 11.5–14.5)
EST. GFR  (AFRICAN AMERICAN): >60 ML/MIN/1.73 M^2
EST. GFR  (NON AFRICAN AMERICAN): 52.1 ML/MIN/1.73 M^2
GLUCOSE SERPL-MCNC: 90 MG/DL (ref 70–110)
HCT VFR BLD AUTO: 32.6 % (ref 37–48.5)
HGB BLD-MCNC: 9.7 G/DL (ref 12–16)
IMM GRANULOCYTES # BLD AUTO: 0.01 K/UL (ref 0–0.04)
IMM GRANULOCYTES NFR BLD AUTO: 0.3 % (ref 0–0.5)
LYMPHOCYTES # BLD AUTO: 0.4 K/UL (ref 1–4.8)
LYMPHOCYTES NFR BLD: 10.6 % (ref 18–48)
MAGNESIUM SERPL-MCNC: 1.8 MG/DL (ref 1.6–2.6)
MCH RBC QN AUTO: 29.4 PG (ref 27–31)
MCHC RBC AUTO-ENTMCNC: 29.8 G/DL (ref 32–36)
MCV RBC AUTO: 99 FL (ref 82–98)
MONOCYTES # BLD AUTO: 0.3 K/UL (ref 0.3–1)
MONOCYTES NFR BLD: 9 % (ref 4–15)
NEUTROPHILS # BLD AUTO: 2.9 K/UL (ref 1.8–7.7)
NEUTROPHILS NFR BLD: 77.4 % (ref 38–73)
NRBC BLD-RTO: 0 /100 WBC
PHOSPHATE SERPL-MCNC: 3.1 MG/DL (ref 2.7–4.5)
PLATELET # BLD AUTO: 203 K/UL (ref 150–350)
PMV BLD AUTO: 11.3 FL (ref 9.2–12.9)
POTASSIUM SERPL-SCNC: 4 MMOL/L (ref 3.5–5.1)
RBC # BLD AUTO: 3.3 M/UL (ref 4–5.4)
SODIUM SERPL-SCNC: 141 MMOL/L (ref 136–145)
WBC # BLD AUTO: 3.77 K/UL (ref 3.9–12.7)

## 2020-10-19 PROCEDURE — 85025 COMPLETE CBC W/AUTO DIFF WBC: CPT

## 2020-10-19 PROCEDURE — 36415 COLL VENOUS BLD VENIPUNCTURE: CPT | Mod: PO

## 2020-10-19 PROCEDURE — 80197 ASSAY OF TACROLIMUS: CPT

## 2020-10-19 PROCEDURE — 83735 ASSAY OF MAGNESIUM: CPT

## 2020-10-19 PROCEDURE — 80069 RENAL FUNCTION PANEL: CPT

## 2020-10-19 NOTE — TELEPHONE ENCOUNTER
Patient repeated back and voice a understanding of orders.  Next labs 10    ----- Message from Ondina Frye MD sent at 10/19/2020  4:21 PM CDT -----  Please MMF to 500 mg BID and cylex and CMV PCR  with next lab . Thank you!

## 2020-10-20 DIAGNOSIS — Z94.0 KIDNEY REPLACED BY TRANSPLANT: ICD-10-CM

## 2020-10-20 LAB — TACROLIMUS BLD-MCNC: 5.7 NG/ML (ref 5–15)

## 2020-10-20 RX ORDER — MYCOPHENOLATE MOFETIL 250 MG/1
500 CAPSULE ORAL 2 TIMES DAILY
Qty: 120 CAPSULE | Refills: 11 | Status: SHIPPED | OUTPATIENT
Start: 2020-10-20 | End: 2021-04-12 | Stop reason: SDUPTHER

## 2020-10-20 RX ORDER — TACROLIMUS 1 MG/1
6 CAPSULE ORAL EVERY 12 HOURS
Qty: 360 CAPSULE | Refills: 11 | Status: SHIPPED | OUTPATIENT
Start: 2020-10-20 | End: 2021-01-05 | Stop reason: SDUPTHER

## 2020-10-20 NOTE — TELEPHONE ENCOUNTER
Patient repeated back and voice a understanding of orders.  Next labs 10/26/2020.    ----- Message from Ondina Frye MD sent at 10/20/2020 11:19 AM CDT -----  Please tac to 6 mg BID.

## 2020-10-26 ENCOUNTER — LAB VISIT (OUTPATIENT)
Dept: LAB | Facility: HOSPITAL | Age: 55
End: 2020-10-26
Attending: INTERNAL MEDICINE
Payer: MEDICARE

## 2020-10-26 DIAGNOSIS — Z94.0 KIDNEY REPLACED BY TRANSPLANT: ICD-10-CM

## 2020-10-26 LAB
ALBUMIN SERPL BCP-MCNC: 3.7 G/DL (ref 3.5–5.2)
ANION GAP SERPL CALC-SCNC: 11 MMOL/L (ref 8–16)
BASOPHILS # BLD AUTO: 0.04 K/UL (ref 0–0.2)
BASOPHILS NFR BLD: 0.9 % (ref 0–1.9)
BUN SERPL-MCNC: 41 MG/DL (ref 6–20)
CALCIUM SERPL-MCNC: 9.1 MG/DL (ref 8.7–10.5)
CHLORIDE SERPL-SCNC: 107 MMOL/L (ref 95–110)
CO2 SERPL-SCNC: 23 MMOL/L (ref 23–29)
CREAT SERPL-MCNC: 1.3 MG/DL (ref 0.5–1.4)
DIFFERENTIAL METHOD: ABNORMAL
EOSINOPHIL # BLD AUTO: 0.1 K/UL (ref 0–0.5)
EOSINOPHIL NFR BLD: 1.3 % (ref 0–8)
ERYTHROCYTE [DISTWIDTH] IN BLOOD BY AUTOMATED COUNT: 13 % (ref 11.5–14.5)
EST. GFR  (AFRICAN AMERICAN): 54.5 ML/MIN/1.73 M^2
EST. GFR  (NON AFRICAN AMERICAN): 47.3 ML/MIN/1.73 M^2
GLUCOSE SERPL-MCNC: 91 MG/DL (ref 70–110)
HCT VFR BLD AUTO: 36.2 % (ref 37–48.5)
HGB BLD-MCNC: 10.8 G/DL (ref 12–16)
IMM GRANULOCYTES # BLD AUTO: 0.01 K/UL (ref 0–0.04)
IMM GRANULOCYTES NFR BLD AUTO: 0.2 % (ref 0–0.5)
LYMPHOCYTES # BLD AUTO: 0.4 K/UL (ref 1–4.8)
LYMPHOCYTES NFR BLD: 8.9 % (ref 18–48)
MAGNESIUM SERPL-MCNC: 1.9 MG/DL (ref 1.6–2.6)
MCH RBC QN AUTO: 29.3 PG (ref 27–31)
MCHC RBC AUTO-ENTMCNC: 29.8 G/DL (ref 32–36)
MCV RBC AUTO: 98 FL (ref 82–98)
MONOCYTES # BLD AUTO: 0.4 K/UL (ref 0.3–1)
MONOCYTES NFR BLD: 7.6 % (ref 4–15)
NEUTROPHILS # BLD AUTO: 3.7 K/UL (ref 1.8–7.7)
NEUTROPHILS NFR BLD: 81.1 % (ref 38–73)
NRBC BLD-RTO: 0 /100 WBC
PHOSPHATE SERPL-MCNC: 3.4 MG/DL (ref 2.7–4.5)
PLATELET # BLD AUTO: 220 K/UL (ref 150–350)
PMV BLD AUTO: 11.5 FL (ref 9.2–12.9)
POTASSIUM SERPL-SCNC: 4.7 MMOL/L (ref 3.5–5.1)
RBC # BLD AUTO: 3.68 M/UL (ref 4–5.4)
SODIUM SERPL-SCNC: 141 MMOL/L (ref 136–145)
WBC # BLD AUTO: 4.59 K/UL (ref 3.9–12.7)

## 2020-10-26 PROCEDURE — 83735 ASSAY OF MAGNESIUM: CPT

## 2020-10-26 PROCEDURE — 85025 COMPLETE CBC W/AUTO DIFF WBC: CPT

## 2020-10-26 PROCEDURE — 80069 RENAL FUNCTION PANEL: CPT

## 2020-10-26 PROCEDURE — 80197 ASSAY OF TACROLIMUS: CPT

## 2020-10-26 PROCEDURE — 86352 CELL FUNCTION ASSAY W/STIM: CPT

## 2020-10-27 LAB — TACROLIMUS BLD-MCNC: 8.2 NG/ML (ref 5–15)

## 2020-10-29 LAB
CMV DNA SERPL NAA+PROBE-ACNC: NORMAL IU/ML
IMMUNKNOW (STIMULATED): 286 NG/ML (ref 226–524)

## 2020-11-09 ENCOUNTER — LAB VISIT (OUTPATIENT)
Dept: LAB | Facility: HOSPITAL | Age: 55
End: 2020-11-09
Attending: INTERNAL MEDICINE
Payer: MEDICARE

## 2020-11-09 DIAGNOSIS — Z94.0 KIDNEY REPLACED BY TRANSPLANT: ICD-10-CM

## 2020-11-09 LAB
ALBUMIN SERPL BCP-MCNC: 3.6 G/DL (ref 3.5–5.2)
ANION GAP SERPL CALC-SCNC: 8 MMOL/L (ref 8–16)
BASOPHILS # BLD AUTO: 0.03 K/UL (ref 0–0.2)
BASOPHILS NFR BLD: 0.8 % (ref 0–1.9)
BUN SERPL-MCNC: 29 MG/DL (ref 6–20)
CALCIUM SERPL-MCNC: 9.1 MG/DL (ref 8.7–10.5)
CHLORIDE SERPL-SCNC: 110 MMOL/L (ref 95–110)
CO2 SERPL-SCNC: 25 MMOL/L (ref 23–29)
CREAT SERPL-MCNC: 1.2 MG/DL (ref 0.5–1.4)
DIFFERENTIAL METHOD: ABNORMAL
EOSINOPHIL # BLD AUTO: 0.1 K/UL (ref 0–0.5)
EOSINOPHIL NFR BLD: 2.1 % (ref 0–8)
ERYTHROCYTE [DISTWIDTH] IN BLOOD BY AUTOMATED COUNT: 12.4 % (ref 11.5–14.5)
EST. GFR  (AFRICAN AMERICAN): >60 ML/MIN/1.73 M^2
EST. GFR  (NON AFRICAN AMERICAN): 52.1 ML/MIN/1.73 M^2
GLUCOSE SERPL-MCNC: 91 MG/DL (ref 70–110)
HCT VFR BLD AUTO: 35.2 % (ref 37–48.5)
HGB BLD-MCNC: 10.3 G/DL (ref 12–16)
IMM GRANULOCYTES # BLD AUTO: 0.01 K/UL (ref 0–0.04)
IMM GRANULOCYTES NFR BLD AUTO: 0.3 % (ref 0–0.5)
LYMPHOCYTES # BLD AUTO: 0.5 K/UL (ref 1–4.8)
LYMPHOCYTES NFR BLD: 12.3 % (ref 18–48)
MAGNESIUM SERPL-MCNC: 1.8 MG/DL (ref 1.6–2.6)
MCH RBC QN AUTO: 29.7 PG (ref 27–31)
MCHC RBC AUTO-ENTMCNC: 29.3 G/DL (ref 32–36)
MCV RBC AUTO: 101 FL (ref 82–98)
MONOCYTES # BLD AUTO: 0.4 K/UL (ref 0.3–1)
MONOCYTES NFR BLD: 10.5 % (ref 4–15)
NEUTROPHILS # BLD AUTO: 2.8 K/UL (ref 1.8–7.7)
NEUTROPHILS NFR BLD: 74 % (ref 38–73)
NRBC BLD-RTO: 0 /100 WBC
PHOSPHATE SERPL-MCNC: 3.4 MG/DL (ref 2.7–4.5)
PLATELET # BLD AUTO: 186 K/UL (ref 150–350)
PMV BLD AUTO: 11.3 FL (ref 9.2–12.9)
POTASSIUM SERPL-SCNC: 4.8 MMOL/L (ref 3.5–5.1)
RBC # BLD AUTO: 3.47 M/UL (ref 4–5.4)
SODIUM SERPL-SCNC: 143 MMOL/L (ref 136–145)
WBC # BLD AUTO: 3.73 K/UL (ref 3.9–12.7)

## 2020-11-09 PROCEDURE — 36415 COLL VENOUS BLD VENIPUNCTURE: CPT | Mod: PO

## 2020-11-09 PROCEDURE — 83735 ASSAY OF MAGNESIUM: CPT

## 2020-11-09 PROCEDURE — 80069 RENAL FUNCTION PANEL: CPT

## 2020-11-09 PROCEDURE — 80197 ASSAY OF TACROLIMUS: CPT

## 2020-11-09 PROCEDURE — 85025 COMPLETE CBC W/AUTO DIFF WBC: CPT

## 2020-11-10 LAB — TACROLIMUS BLD-MCNC: 8.4 NG/ML (ref 5–15)

## 2020-12-07 ENCOUNTER — LAB VISIT (OUTPATIENT)
Dept: LAB | Facility: HOSPITAL | Age: 55
End: 2020-12-07
Attending: INTERNAL MEDICINE
Payer: MEDICARE

## 2020-12-07 DIAGNOSIS — Z94.0 KIDNEY REPLACED BY TRANSPLANT: ICD-10-CM

## 2020-12-07 LAB
BASOPHILS # BLD AUTO: 0.04 K/UL (ref 0–0.2)
BASOPHILS NFR BLD: 1.2 % (ref 0–1.9)
DIFFERENTIAL METHOD: ABNORMAL
EOSINOPHIL # BLD AUTO: 0.1 K/UL (ref 0–0.5)
EOSINOPHIL NFR BLD: 2.4 % (ref 0–8)
ERYTHROCYTE [DISTWIDTH] IN BLOOD BY AUTOMATED COUNT: 12.1 % (ref 11.5–14.5)
HCT VFR BLD AUTO: 40.7 % (ref 37–48.5)
HGB BLD-MCNC: 11.9 G/DL (ref 12–16)
IMM GRANULOCYTES # BLD AUTO: 0.01 K/UL (ref 0–0.04)
IMM GRANULOCYTES NFR BLD AUTO: 0.3 % (ref 0–0.5)
LYMPHOCYTES # BLD AUTO: 0.4 K/UL (ref 1–4.8)
LYMPHOCYTES NFR BLD: 13.3 % (ref 18–48)
MCH RBC QN AUTO: 28.8 PG (ref 27–31)
MCHC RBC AUTO-ENTMCNC: 29.2 G/DL (ref 32–36)
MCV RBC AUTO: 99 FL (ref 82–98)
MONOCYTES # BLD AUTO: 0.4 K/UL (ref 0.3–1)
MONOCYTES NFR BLD: 10.6 % (ref 4–15)
NEUTROPHILS # BLD AUTO: 2.4 K/UL (ref 1.8–7.7)
NEUTROPHILS NFR BLD: 72.2 % (ref 38–73)
NRBC BLD-RTO: 0 /100 WBC
PLATELET # BLD AUTO: 214 K/UL (ref 150–350)
PMV BLD AUTO: 11.2 FL (ref 9.2–12.9)
RBC # BLD AUTO: 4.13 M/UL (ref 4–5.4)
WBC # BLD AUTO: 3.3 K/UL (ref 3.9–12.7)

## 2020-12-07 PROCEDURE — 36415 COLL VENOUS BLD VENIPUNCTURE: CPT | Mod: PO

## 2020-12-07 PROCEDURE — 80197 ASSAY OF TACROLIMUS: CPT

## 2020-12-07 PROCEDURE — 80069 RENAL FUNCTION PANEL: CPT

## 2020-12-07 PROCEDURE — 83735 ASSAY OF MAGNESIUM: CPT

## 2020-12-07 PROCEDURE — 85025 COMPLETE CBC W/AUTO DIFF WBC: CPT

## 2020-12-08 ENCOUNTER — TELEPHONE (OUTPATIENT)
Dept: TRANSPLANT | Facility: CLINIC | Age: 55
End: 2020-12-08

## 2020-12-08 LAB
ALBUMIN SERPL BCP-MCNC: 3.8 G/DL (ref 3.5–5.2)
ANION GAP SERPL CALC-SCNC: 13 MMOL/L (ref 8–16)
BUN SERPL-MCNC: 29 MG/DL (ref 6–20)
CALCIUM SERPL-MCNC: 9.5 MG/DL (ref 8.7–10.5)
CHLORIDE SERPL-SCNC: 106 MMOL/L (ref 95–110)
CO2 SERPL-SCNC: 22 MMOL/L (ref 23–29)
CREAT SERPL-MCNC: 1.3 MG/DL (ref 0.5–1.4)
EST. GFR  (AFRICAN AMERICAN): 54.1 ML/MIN/1.73 M^2
EST. GFR  (NON AFRICAN AMERICAN): 47 ML/MIN/1.73 M^2
GLUCOSE SERPL-MCNC: 89 MG/DL (ref 70–110)
MAGNESIUM SERPL-MCNC: 1.9 MG/DL (ref 1.6–2.6)
PHOSPHATE SERPL-MCNC: 3.3 MG/DL (ref 2.7–4.5)
POTASSIUM SERPL-SCNC: 4.5 MMOL/L (ref 3.5–5.1)
SODIUM SERPL-SCNC: 141 MMOL/L (ref 136–145)
TACROLIMUS BLD-MCNC: 9.3 NG/ML (ref 5–15)

## 2020-12-08 NOTE — TELEPHONE ENCOUNTER
Results reviewed all questions answered.    ----- Message from Anitha Inman MD sent at 12/8/2020 10:27 AM CST -----  Available results reviewed and require no immediate action.

## 2021-01-01 ENCOUNTER — LAB VISIT (OUTPATIENT)
Dept: LAB | Facility: HOSPITAL | Age: 56
End: 2021-01-01
Attending: INTERNAL MEDICINE
Payer: MEDICARE

## 2021-01-01 ENCOUNTER — TELEPHONE (OUTPATIENT)
Dept: TRANSPLANT | Facility: CLINIC | Age: 56
End: 2021-01-01
Payer: MEDICARE

## 2021-01-01 ENCOUNTER — TELEPHONE (OUTPATIENT)
Dept: TRANSPLANT | Facility: CLINIC | Age: 56
End: 2021-01-01

## 2021-01-01 ENCOUNTER — OFFICE VISIT (OUTPATIENT)
Dept: TRANSPLANT | Facility: CLINIC | Age: 56
End: 2021-01-01
Payer: MEDICARE

## 2021-01-01 ENCOUNTER — LAB VISIT (OUTPATIENT)
Dept: LAB | Facility: HOSPITAL | Age: 56
End: 2021-01-01
Attending: INTERNAL MEDICINE
Payer: COMMERCIAL

## 2021-01-01 ENCOUNTER — PATIENT MESSAGE (OUTPATIENT)
Dept: TRANSPLANT | Facility: CLINIC | Age: 56
End: 2021-01-01
Payer: MEDICARE

## 2021-01-01 VITALS
WEIGHT: 162.06 LBS | BODY MASS INDEX: 31.82 KG/M2 | HEIGHT: 60 IN | SYSTOLIC BLOOD PRESSURE: 173 MMHG | HEART RATE: 56 BPM | TEMPERATURE: 97 F | DIASTOLIC BLOOD PRESSURE: 75 MMHG | OXYGEN SATURATION: 96 % | RESPIRATION RATE: 16 BRPM

## 2021-01-01 DIAGNOSIS — Z94.0 S/P KIDNEY TRANSPLANT: ICD-10-CM

## 2021-01-01 DIAGNOSIS — N25.81 SECONDARY HYPERPARATHYROIDISM OF RENAL ORIGIN: ICD-10-CM

## 2021-01-01 DIAGNOSIS — C22.0 HCC (HEPATOCELLULAR CARCINOMA): ICD-10-CM

## 2021-01-01 DIAGNOSIS — I12.9 RENAL HYPERTENSION: ICD-10-CM

## 2021-01-01 DIAGNOSIS — N18.31 STAGE 3A CHRONIC KIDNEY DISEASE: Primary | ICD-10-CM

## 2021-01-01 DIAGNOSIS — Z94.0 KIDNEY REPLACED BY TRANSPLANT: ICD-10-CM

## 2021-01-01 DIAGNOSIS — N18.31 CHRONIC KIDNEY DISEASE (CKD) STAGE G3A/A1, MODERATELY DECREASED GLOMERULAR FILTRATION RATE (GFR) BETWEEN 45-59 ML/MIN/1.73 SQUARE METER AND ALBUMINURIA CREATININE RATIO LESS THAN 30 MG/G: ICD-10-CM

## 2021-01-01 DIAGNOSIS — Z94.0 KIDNEY REPLACED BY TRANSPLANT: Primary | ICD-10-CM

## 2021-01-01 DIAGNOSIS — Z29.89 PROPHYLACTIC IMMUNOTHERAPY: ICD-10-CM

## 2021-01-01 DIAGNOSIS — I10 ESSENTIAL HYPERTENSION, MALIGNANT: ICD-10-CM

## 2021-01-01 DIAGNOSIS — D50.9 IRON DEFICIENCY ANEMIA, UNSPECIFIED: ICD-10-CM

## 2021-01-01 DIAGNOSIS — N25.81 SECONDARY HYPERPARATHYROIDISM OF RENAL ORIGIN: Chronic | ICD-10-CM

## 2021-01-01 DIAGNOSIS — Z79.60 LONG-TERM USE OF IMMUNOSUPPRESSANT MEDICATION: ICD-10-CM

## 2021-01-01 DIAGNOSIS — E55.9 VITAMIN D DEFICIENCY: ICD-10-CM

## 2021-01-01 DIAGNOSIS — C22.0 HCC (HEPATOCELLULAR CARCINOMA): Primary | ICD-10-CM

## 2021-01-01 DIAGNOSIS — E87.20 ACIDOSIS: ICD-10-CM

## 2021-01-01 DIAGNOSIS — Z91.89 AT RISK FOR OPPORTUNISTIC INFECTIONS: ICD-10-CM

## 2021-01-01 LAB
25(OH)D3+25(OH)D2 SERPL-MCNC: 36 NG/ML (ref 30–96)
ALBUMIN SERPL BCP-MCNC: 3.6 G/DL (ref 3.5–5.2)
ALBUMIN SERPL BCP-MCNC: 3.7 G/DL (ref 3.5–5.2)
ALP SERPL-CCNC: 67 U/L (ref 55–135)
ALP SERPL-CCNC: 68 U/L (ref 55–135)
ALP SERPL-CCNC: 73 U/L (ref 55–135)
ALT SERPL W/O P-5'-P-CCNC: 11 U/L (ref 10–44)
ALT SERPL W/O P-5'-P-CCNC: 12 U/L (ref 10–44)
ALT SERPL W/O P-5'-P-CCNC: 13 U/L (ref 10–44)
ANION GAP SERPL CALC-SCNC: 10 MMOL/L (ref 8–16)
ANION GAP SERPL CALC-SCNC: 4 MMOL/L (ref 8–16)
ANION GAP SERPL CALC-SCNC: 6 MMOL/L (ref 8–16)
AST SERPL-CCNC: 13 U/L (ref 10–40)
AST SERPL-CCNC: 15 U/L (ref 10–40)
AST SERPL-CCNC: 16 U/L (ref 10–40)
BASOPHILS # BLD AUTO: 0.05 K/UL (ref 0–0.2)
BASOPHILS # BLD AUTO: 0.05 K/UL (ref 0–0.2)
BASOPHILS # BLD AUTO: 0.06 K/UL (ref 0–0.2)
BASOPHILS NFR BLD: 1.2 % (ref 0–1.9)
BASOPHILS NFR BLD: 1.2 % (ref 0–1.9)
BASOPHILS NFR BLD: 1.5 % (ref 0–1.9)
BILIRUB DIRECT SERPL-MCNC: 0.3 MG/DL (ref 0.1–0.3)
BILIRUB SERPL-MCNC: 0.7 MG/DL (ref 0.1–1)
BILIRUB UR QL STRIP: NEGATIVE
BILIRUB UR QL STRIP: NEGATIVE
BKV DNA SERPL NAA+PROBE-ACNC: <125 COPIES/ML
BKV DNA SERPL NAA+PROBE-LOG#: <2.1 LOG (10) COPIES/ML
BKV DNA SERPL QL NAA+PROBE: NOT DETECTED
BUN SERPL-MCNC: 24 MG/DL (ref 6–20)
BUN SERPL-MCNC: 28 MG/DL (ref 6–20)
BUN SERPL-MCNC: 31 MG/DL (ref 6–20)
CALCIUM SERPL-MCNC: 9.3 MG/DL (ref 8.7–10.5)
CALCIUM SERPL-MCNC: 9.3 MG/DL (ref 8.7–10.5)
CALCIUM SERPL-MCNC: 9.4 MG/DL (ref 8.7–10.5)
CHLORIDE SERPL-SCNC: 105 MMOL/L (ref 95–110)
CHLORIDE SERPL-SCNC: 106 MMOL/L (ref 95–110)
CHLORIDE SERPL-SCNC: 108 MMOL/L (ref 95–110)
CLARITY UR REFRACT.AUTO: CLEAR
CLARITY UR REFRACT.AUTO: CLEAR
CO2 SERPL-SCNC: 23 MMOL/L (ref 23–29)
CO2 SERPL-SCNC: 28 MMOL/L (ref 23–29)
CO2 SERPL-SCNC: 28 MMOL/L (ref 23–29)
COLOR UR AUTO: YELLOW
COLOR UR AUTO: YELLOW
CREAT SERPL-MCNC: 1.2 MG/DL (ref 0.5–1.4)
CREAT SERPL-MCNC: 1.3 MG/DL (ref 0.5–1.4)
CREAT SERPL-MCNC: 1.4 MG/DL (ref 0.5–1.4)
CREAT UR-MCNC: 53 MG/DL (ref 15–325)
CREAT UR-MCNC: 57 MG/DL (ref 15–325)
DIFFERENTIAL METHOD: ABNORMAL
EOSINOPHIL # BLD AUTO: 0.1 K/UL (ref 0–0.5)
EOSINOPHIL NFR BLD: 1.3 % (ref 0–8)
EOSINOPHIL NFR BLD: 1.6 % (ref 0–8)
EOSINOPHIL NFR BLD: 1.7 % (ref 0–8)
ERYTHROCYTE [DISTWIDTH] IN BLOOD BY AUTOMATED COUNT: 12.2 % (ref 11.5–14.5)
ERYTHROCYTE [DISTWIDTH] IN BLOOD BY AUTOMATED COUNT: 12.3 % (ref 11.5–14.5)
ERYTHROCYTE [DISTWIDTH] IN BLOOD BY AUTOMATED COUNT: 13.2 % (ref 11.5–14.5)
EST. GFR  (AFRICAN AMERICAN): 49.5 ML/MIN/1.73 M^2
EST. GFR  (AFRICAN AMERICAN): 54.1 ML/MIN/1.73 M^2
EST. GFR  (AFRICAN AMERICAN): 59.6 ML/MIN/1.73 M^2
EST. GFR  (NON AFRICAN AMERICAN): 42.9 ML/MIN/1.73 M^2
EST. GFR  (NON AFRICAN AMERICAN): 47 ML/MIN/1.73 M^2
EST. GFR  (NON AFRICAN AMERICAN): 51.7 ML/MIN/1.73 M^2
GLUCOSE SERPL-MCNC: 89 MG/DL (ref 70–110)
GLUCOSE SERPL-MCNC: 92 MG/DL (ref 70–110)
GLUCOSE SERPL-MCNC: 95 MG/DL (ref 70–110)
GLUCOSE UR QL STRIP: NEGATIVE
GLUCOSE UR QL STRIP: NEGATIVE
HCT VFR BLD AUTO: 38.2 % (ref 37–48.5)
HCT VFR BLD AUTO: 39.3 % (ref 37–48.5)
HCT VFR BLD AUTO: 40 % (ref 37–48.5)
HGB BLD-MCNC: 11.7 G/DL (ref 12–16)
HGB BLD-MCNC: 12.2 G/DL (ref 12–16)
HGB BLD-MCNC: 12.2 G/DL (ref 12–16)
HGB UR QL STRIP: NEGATIVE
HGB UR QL STRIP: NEGATIVE
IMM GRANULOCYTES # BLD AUTO: 0.01 K/UL (ref 0–0.04)
IMM GRANULOCYTES # BLD AUTO: 0.01 K/UL (ref 0–0.04)
IMM GRANULOCYTES # BLD AUTO: 0.02 K/UL (ref 0–0.04)
IMM GRANULOCYTES NFR BLD AUTO: 0.2 % (ref 0–0.5)
IMM GRANULOCYTES NFR BLD AUTO: 0.3 % (ref 0–0.5)
IMM GRANULOCYTES NFR BLD AUTO: 0.5 % (ref 0–0.5)
IRON SERPL-MCNC: 88 UG/DL (ref 30–160)
KETONES UR QL STRIP: NEGATIVE
KETONES UR QL STRIP: NEGATIVE
LEUKOCYTE ESTERASE UR QL STRIP: NEGATIVE
LEUKOCYTE ESTERASE UR QL STRIP: NEGATIVE
LYMPHOCYTES # BLD AUTO: 0.5 K/UL (ref 1–4.8)
LYMPHOCYTES # BLD AUTO: 0.5 K/UL (ref 1–4.8)
LYMPHOCYTES # BLD AUTO: 0.6 K/UL (ref 1–4.8)
LYMPHOCYTES NFR BLD: 12.1 % (ref 18–48)
LYMPHOCYTES NFR BLD: 12.8 % (ref 18–48)
LYMPHOCYTES NFR BLD: 13 % (ref 18–48)
MAGNESIUM SERPL-MCNC: 1.8 MG/DL (ref 1.6–2.6)
MAGNESIUM SERPL-MCNC: 1.9 MG/DL (ref 1.6–2.6)
MAGNESIUM SERPL-MCNC: 2 MG/DL (ref 1.6–2.6)
MCH RBC QN AUTO: 28.5 PG (ref 27–31)
MCH RBC QN AUTO: 29 PG (ref 27–31)
MCH RBC QN AUTO: 29.3 PG (ref 27–31)
MCHC RBC AUTO-ENTMCNC: 30.5 G/DL (ref 32–36)
MCHC RBC AUTO-ENTMCNC: 30.6 G/DL (ref 32–36)
MCHC RBC AUTO-ENTMCNC: 31 G/DL (ref 32–36)
MCV RBC AUTO: 93 FL (ref 82–98)
MCV RBC AUTO: 95 FL (ref 82–98)
MCV RBC AUTO: 95 FL (ref 82–98)
MONOCYTES # BLD AUTO: 0.4 K/UL (ref 0.3–1)
MONOCYTES NFR BLD: 10.2 % (ref 4–15)
MONOCYTES NFR BLD: 9.3 % (ref 4–15)
MONOCYTES NFR BLD: 9.4 % (ref 4–15)
NEUTROPHILS # BLD AUTO: 3 K/UL (ref 1.8–7.7)
NEUTROPHILS # BLD AUTO: 3 K/UL (ref 1.8–7.7)
NEUTROPHILS # BLD AUTO: 3.2 K/UL (ref 1.8–7.7)
NEUTROPHILS NFR BLD: 73.7 % (ref 38–73)
NEUTROPHILS NFR BLD: 74.6 % (ref 38–73)
NEUTROPHILS NFR BLD: 75.4 % (ref 38–73)
NITRITE UR QL STRIP: NEGATIVE
NITRITE UR QL STRIP: NEGATIVE
NRBC BLD-RTO: 0 /100 WBC
PH UR STRIP: 6 [PH] (ref 5–8)
PH UR STRIP: 6 [PH] (ref 5–8)
PHOSPHATE SERPL-MCNC: 3.7 MG/DL (ref 2.7–4.5)
PHOSPHATE SERPL-MCNC: 4 MG/DL (ref 2.7–4.5)
PHOSPHATE SERPL-MCNC: 4.1 MG/DL (ref 2.7–4.5)
PLATELET # BLD AUTO: 172 K/UL (ref 150–450)
PLATELET # BLD AUTO: 177 K/UL (ref 150–450)
PLATELET # BLD AUTO: 183 K/UL (ref 150–450)
PMV BLD AUTO: 11.5 FL (ref 9.2–12.9)
PMV BLD AUTO: 11.6 FL (ref 9.2–12.9)
PMV BLD AUTO: 11.9 FL (ref 9.2–12.9)
POTASSIUM SERPL-SCNC: 4 MMOL/L (ref 3.5–5.1)
POTASSIUM SERPL-SCNC: 4.1 MMOL/L (ref 3.5–5.1)
POTASSIUM SERPL-SCNC: 4.2 MMOL/L (ref 3.5–5.1)
PROT SERPL-MCNC: 6 G/DL (ref 6–8.4)
PROT SERPL-MCNC: 6.1 G/DL (ref 6–8.4)
PROT SERPL-MCNC: 6.2 G/DL (ref 6–8.4)
PROT UR QL STRIP: NEGATIVE
PROT UR QL STRIP: NEGATIVE
PROT UR-MCNC: <7 MG/DL (ref 0–15)
PROT UR-MCNC: <7 MG/DL (ref 0–15)
PROT/CREAT UR: NORMAL MG/G{CREAT} (ref 0–0.2)
PROT/CREAT UR: NORMAL MG/G{CREAT} (ref 0–0.2)
PTH-INTACT SERPL-MCNC: 107.9 PG/ML (ref 9–77)
PTH-INTACT SERPL-MCNC: 122.7 PG/ML (ref 9–77)
PTH-INTACT SERPL-MCNC: 142.1 PG/ML (ref 9–77)
RBC # BLD AUTO: 4.11 M/UL (ref 4–5.4)
RBC # BLD AUTO: 4.16 M/UL (ref 4–5.4)
RBC # BLD AUTO: 4.21 M/UL (ref 4–5.4)
SATURATED IRON: 35 % (ref 20–50)
SODIUM SERPL-SCNC: 137 MMOL/L (ref 136–145)
SODIUM SERPL-SCNC: 140 MMOL/L (ref 136–145)
SODIUM SERPL-SCNC: 141 MMOL/L (ref 136–145)
SP GR UR STRIP: 1.01 (ref 1–1.03)
SP GR UR STRIP: 1.01 (ref 1–1.03)
TACROLIMUS BLD-MCNC: 12.7 NG/ML (ref 5–15)
TACROLIMUS BLD-MCNC: 6.5 NG/ML (ref 5–15)
TACROLIMUS BLD-MCNC: 8.2 NG/ML (ref 5–15)
TACROLIMUS BLD-MCNC: 8.5 NG/ML (ref 5–15)
TACROLIMUS BLD-MCNC: 8.5 NG/ML (ref 5–15)
TACROLIMUS BLD-MCNC: 8.7 NG/ML (ref 5–15)
TACROLIMUS, NORMALIZED: 12 NG/ML (ref 5–15)
TACROLIMUS, NORMALIZED: 7.9 NG/ML (ref 5–15)
TACROLIMUS, NORMALIZED: 8.1 NG/ML (ref 5–15)
TOTAL IRON BINDING CAPACITY: 253 UG/DL (ref 250–450)
TRANSFERRIN SERPL-MCNC: 171 MG/DL (ref 200–375)
URATE SERPL-MCNC: 4.7 MG/DL (ref 2.4–5.7)
URATE SERPL-MCNC: 5.6 MG/DL (ref 2.4–5.7)
URN SPEC COLLECT METH UR: NORMAL
URN SPEC COLLECT METH UR: NORMAL
WBC # BLD AUTO: 3.99 K/UL (ref 3.9–12.7)
WBC # BLD AUTO: 4.04 K/UL (ref 3.9–12.7)
WBC # BLD AUTO: 4.31 K/UL (ref 3.9–12.7)

## 2021-01-01 PROCEDURE — 36415 COLL VENOUS BLD VENIPUNCTURE: CPT | Mod: PO | Performed by: INTERNAL MEDICINE

## 2021-01-01 PROCEDURE — 84100 ASSAY OF PHOSPHORUS: CPT | Performed by: INTERNAL MEDICINE

## 2021-01-01 PROCEDURE — 87799 DETECT AGENT NOS DNA QUANT: CPT | Performed by: INTERNAL MEDICINE

## 2021-01-01 PROCEDURE — 84156 ASSAY OF PROTEIN URINE: CPT | Performed by: INTERNAL MEDICINE

## 2021-01-01 PROCEDURE — 99215 PR OFFICE/OUTPT VISIT, EST, LEVL V, 40-54 MIN: ICD-10-PCS | Mod: S$PBB,,, | Performed by: NURSE PRACTITIONER

## 2021-01-01 PROCEDURE — 80197 ASSAY OF TACROLIMUS: CPT | Performed by: INTERNAL MEDICINE

## 2021-01-01 PROCEDURE — 80069 RENAL FUNCTION PANEL: CPT | Performed by: INTERNAL MEDICINE

## 2021-01-01 PROCEDURE — 84550 ASSAY OF BLOOD/URIC ACID: CPT | Performed by: INTERNAL MEDICINE

## 2021-01-01 PROCEDURE — 80053 COMPREHEN METABOLIC PANEL: CPT | Performed by: INTERNAL MEDICINE

## 2021-01-01 PROCEDURE — 85025 COMPLETE CBC W/AUTO DIFF WBC: CPT | Performed by: INTERNAL MEDICINE

## 2021-01-01 PROCEDURE — 99999 PR PBB SHADOW E&M-EST. PATIENT-LVL IV: CPT | Mod: PBBFAC,,, | Performed by: NURSE PRACTITIONER

## 2021-01-01 PROCEDURE — 81003 URINALYSIS AUTO W/O SCOPE: CPT | Performed by: PHYSICIAN ASSISTANT

## 2021-01-01 PROCEDURE — 83735 ASSAY OF MAGNESIUM: CPT | Performed by: INTERNAL MEDICINE

## 2021-01-01 PROCEDURE — 83735 ASSAY OF MAGNESIUM: CPT | Performed by: PHYSICIAN ASSISTANT

## 2021-01-01 PROCEDURE — 83970 ASSAY OF PARATHORMONE: CPT | Performed by: INTERNAL MEDICINE

## 2021-01-01 PROCEDURE — 82306 VITAMIN D 25 HYDROXY: CPT | Performed by: INTERNAL MEDICINE

## 2021-01-01 PROCEDURE — 84550 ASSAY OF BLOOD/URIC ACID: CPT | Performed by: PHYSICIAN ASSISTANT

## 2021-01-01 PROCEDURE — 83540 ASSAY OF IRON: CPT | Performed by: INTERNAL MEDICINE

## 2021-01-01 PROCEDURE — 99999 PR PBB SHADOW E&M-EST. PATIENT-LVL IV: ICD-10-PCS | Mod: PBBFAC,,, | Performed by: NURSE PRACTITIONER

## 2021-01-01 PROCEDURE — 84450 TRANSFERASE (AST) (SGOT): CPT | Performed by: INTERNAL MEDICINE

## 2021-01-01 PROCEDURE — 99215 OFFICE O/P EST HI 40 MIN: CPT | Mod: S$PBB,,, | Performed by: NURSE PRACTITIONER

## 2021-01-01 PROCEDURE — 99214 OFFICE O/P EST MOD 30 MIN: CPT | Mod: PBBFAC | Performed by: NURSE PRACTITIONER

## 2021-01-01 PROCEDURE — 81003 URINALYSIS AUTO W/O SCOPE: CPT | Performed by: INTERNAL MEDICINE

## 2021-01-01 PROCEDURE — 84100 ASSAY OF PHOSPHORUS: CPT | Performed by: PHYSICIAN ASSISTANT

## 2021-01-01 PROCEDURE — 84075 ASSAY ALKALINE PHOSPHATASE: CPT | Performed by: INTERNAL MEDICINE

## 2021-01-01 PROCEDURE — 83970 ASSAY OF PARATHORMONE: CPT | Performed by: PHYSICIAN ASSISTANT

## 2021-01-01 PROCEDURE — 85025 COMPLETE CBC W/AUTO DIFF WBC: CPT | Performed by: PHYSICIAN ASSISTANT

## 2021-01-01 PROCEDURE — 80053 COMPREHEN METABOLIC PANEL: CPT | Performed by: PHYSICIAN ASSISTANT

## 2021-01-01 PROCEDURE — 82570 ASSAY OF URINE CREATININE: CPT | Performed by: PHYSICIAN ASSISTANT

## 2021-01-01 RX ORDER — TACROLIMUS 1 MG/1
CAPSULE ORAL
Qty: 150 CAPSULE | Refills: 11 | Status: SHIPPED | OUTPATIENT
Start: 2021-01-01 | End: 2022-01-01 | Stop reason: DRUGHIGH

## 2021-01-01 RX ORDER — TACROLIMUS 1 MG/1
3 CAPSULE ORAL EVERY 12 HOURS
Qty: 180 CAPSULE | Refills: 11 | Status: SHIPPED | OUTPATIENT
Start: 2021-01-01 | End: 2021-01-01 | Stop reason: SDUPTHER

## 2021-01-01 RX ORDER — TACROLIMUS 1 MG/1
4 CAPSULE ORAL EVERY 12 HOURS
Qty: 240 CAPSULE | Refills: 11 | Status: SHIPPED | OUTPATIENT
Start: 2021-01-01 | End: 2021-01-01 | Stop reason: SDUPTHER

## 2021-01-01 RX ORDER — NIFEDIPINE 30 MG/1
30 TABLET, FILM COATED, EXTENDED RELEASE ORAL
COMMUNITY
End: 2021-01-01

## 2021-01-04 ENCOUNTER — LAB VISIT (OUTPATIENT)
Dept: LAB | Facility: HOSPITAL | Age: 56
End: 2021-01-04
Attending: INTERNAL MEDICINE
Payer: MEDICARE

## 2021-01-04 DIAGNOSIS — Z94.0 KIDNEY REPLACED BY TRANSPLANT: ICD-10-CM

## 2021-01-04 DIAGNOSIS — E55.9 VITAMIN D DEFICIENCY: ICD-10-CM

## 2021-01-04 LAB
25(OH)D3+25(OH)D2 SERPL-MCNC: 28 NG/ML (ref 30–96)
ALBUMIN SERPL BCP-MCNC: 3.7 G/DL (ref 3.5–5.2)
ALBUMIN SERPL BCP-MCNC: 3.7 G/DL (ref 3.5–5.2)
ALP SERPL-CCNC: 115 U/L (ref 55–135)
ALT SERPL W/O P-5'-P-CCNC: 14 U/L (ref 10–44)
ANION GAP SERPL CALC-SCNC: 8 MMOL/L (ref 8–16)
AST SERPL-CCNC: 14 U/L (ref 10–40)
BASOPHILS # BLD AUTO: 0.05 K/UL (ref 0–0.2)
BASOPHILS NFR BLD: 1.4 % (ref 0–1.9)
BILIRUB DIRECT SERPL-MCNC: 0.3 MG/DL (ref 0.1–0.3)
BILIRUB SERPL-MCNC: 0.6 MG/DL (ref 0.1–1)
BUN SERPL-MCNC: 33 MG/DL (ref 6–20)
CALCIUM SERPL-MCNC: 9.2 MG/DL (ref 8.7–10.5)
CHLORIDE SERPL-SCNC: 107 MMOL/L (ref 95–110)
CO2 SERPL-SCNC: 25 MMOL/L (ref 23–29)
CREAT SERPL-MCNC: 1.2 MG/DL (ref 0.5–1.4)
DIFFERENTIAL METHOD: ABNORMAL
EOSINOPHIL # BLD AUTO: 0.1 K/UL (ref 0–0.5)
EOSINOPHIL NFR BLD: 1.6 % (ref 0–8)
ERYTHROCYTE [DISTWIDTH] IN BLOOD BY AUTOMATED COUNT: 12.4 % (ref 11.5–14.5)
EST. GFR  (AFRICAN AMERICAN): 59.6 ML/MIN/1.73 M^2
EST. GFR  (NON AFRICAN AMERICAN): 51.7 ML/MIN/1.73 M^2
GLUCOSE SERPL-MCNC: 92 MG/DL (ref 70–110)
HCT VFR BLD AUTO: 41.7 % (ref 37–48.5)
HGB BLD-MCNC: 12 G/DL (ref 12–16)
IMM GRANULOCYTES # BLD AUTO: 0.01 K/UL (ref 0–0.04)
IMM GRANULOCYTES NFR BLD AUTO: 0.3 % (ref 0–0.5)
LYMPHOCYTES # BLD AUTO: 0.5 K/UL (ref 1–4.8)
LYMPHOCYTES NFR BLD: 12.7 % (ref 18–48)
MAGNESIUM SERPL-MCNC: 1.9 MG/DL (ref 1.6–2.6)
MCH RBC QN AUTO: 28.3 PG (ref 27–31)
MCHC RBC AUTO-ENTMCNC: 28.8 G/DL (ref 32–36)
MCV RBC AUTO: 98 FL (ref 82–98)
MONOCYTES # BLD AUTO: 0.3 K/UL (ref 0.3–1)
MONOCYTES NFR BLD: 8.4 % (ref 4–15)
NEUTROPHILS # BLD AUTO: 2.8 K/UL (ref 1.8–7.7)
NEUTROPHILS NFR BLD: 75.6 % (ref 38–73)
NRBC BLD-RTO: 0 /100 WBC
PHOSPHATE SERPL-MCNC: 3.9 MG/DL (ref 2.7–4.5)
PLATELET # BLD AUTO: 201 K/UL (ref 150–350)
PMV BLD AUTO: 11.1 FL (ref 9.2–12.9)
POTASSIUM SERPL-SCNC: 4.2 MMOL/L (ref 3.5–5.1)
PROT SERPL-MCNC: 6.5 G/DL (ref 6–8.4)
PTH-INTACT SERPL-MCNC: 137 PG/ML (ref 9–77)
RBC # BLD AUTO: 4.24 M/UL (ref 4–5.4)
SODIUM SERPL-SCNC: 140 MMOL/L (ref 136–145)
WBC # BLD AUTO: 3.7 K/UL (ref 3.9–12.7)

## 2021-01-04 PROCEDURE — 83735 ASSAY OF MAGNESIUM: CPT

## 2021-01-04 PROCEDURE — 82306 VITAMIN D 25 HYDROXY: CPT

## 2021-01-04 PROCEDURE — 83970 ASSAY OF PARATHORMONE: CPT

## 2021-01-04 PROCEDURE — 82247 BILIRUBIN TOTAL: CPT

## 2021-01-04 PROCEDURE — 85025 COMPLETE CBC W/AUTO DIFF WBC: CPT

## 2021-01-04 PROCEDURE — 80197 ASSAY OF TACROLIMUS: CPT

## 2021-01-04 PROCEDURE — 87799 DETECT AGENT NOS DNA QUANT: CPT

## 2021-01-04 PROCEDURE — 84075 ASSAY ALKALINE PHOSPHATASE: CPT

## 2021-01-04 PROCEDURE — 80069 RENAL FUNCTION PANEL: CPT

## 2021-01-05 DIAGNOSIS — Z94.0 KIDNEY REPLACED BY TRANSPLANT: ICD-10-CM

## 2021-01-05 LAB — TACROLIMUS BLD-MCNC: 9.4 NG/ML (ref 5–15)

## 2021-01-05 RX ORDER — TACROLIMUS 1 MG/1
CAPSULE ORAL
Qty: 330 CAPSULE | Refills: 11 | Status: SHIPPED | OUTPATIENT
Start: 2021-01-05 | End: 2021-04-12

## 2021-01-08 ENCOUNTER — PATIENT MESSAGE (OUTPATIENT)
Dept: TRANSPLANT | Facility: CLINIC | Age: 56
End: 2021-01-08

## 2021-01-11 ENCOUNTER — OFFICE VISIT (OUTPATIENT)
Dept: TRANSPLANT | Facility: CLINIC | Age: 56
End: 2021-01-11
Payer: MEDICARE

## 2021-01-11 VITALS
RESPIRATION RATE: 16 BRPM | TEMPERATURE: 98 F | BODY MASS INDEX: 29.27 KG/M2 | HEART RATE: 58 BPM | DIASTOLIC BLOOD PRESSURE: 67 MMHG | SYSTOLIC BLOOD PRESSURE: 134 MMHG | WEIGHT: 155 LBS | OXYGEN SATURATION: 100 % | HEIGHT: 61 IN

## 2021-01-11 DIAGNOSIS — Z94.0 S/P KIDNEY TRANSPLANT: ICD-10-CM

## 2021-01-11 DIAGNOSIS — N25.81 SECONDARY HYPERPARATHYROIDISM OF RENAL ORIGIN: Chronic | ICD-10-CM

## 2021-01-11 DIAGNOSIS — Z79.60 LONG-TERM USE OF IMMUNOSUPPRESSANT MEDICATION: Primary | ICD-10-CM

## 2021-01-11 DIAGNOSIS — I12.9 RENAL HYPERTENSION: ICD-10-CM

## 2021-01-11 PROBLEM — E87.6 HYPOKALEMIA: Status: RESOLVED | Noted: 2020-07-28 | Resolved: 2021-01-11

## 2021-01-11 PROBLEM — U07.1 COVID-19 VIRUS DETECTED: Status: RESOLVED | Noted: 2020-07-26 | Resolved: 2021-01-11

## 2021-01-11 PROCEDURE — 99999 PR PBB SHADOW E&M-EST. PATIENT-LVL IV: ICD-10-PCS | Mod: PBBFAC,,, | Performed by: INTERNAL MEDICINE

## 2021-01-11 PROCEDURE — 99214 OFFICE O/P EST MOD 30 MIN: CPT | Mod: PBBFAC | Performed by: INTERNAL MEDICINE

## 2021-01-11 PROCEDURE — 99999 PR PBB SHADOW E&M-EST. PATIENT-LVL IV: CPT | Mod: PBBFAC,,, | Performed by: INTERNAL MEDICINE

## 2021-01-11 PROCEDURE — 99215 OFFICE O/P EST HI 40 MIN: CPT | Mod: S$PBB,,, | Performed by: INTERNAL MEDICINE

## 2021-01-11 PROCEDURE — 99215 PR OFFICE/OUTPT VISIT, EST, LEVL V, 40-54 MIN: ICD-10-PCS | Mod: S$PBB,,, | Performed by: INTERNAL MEDICINE

## 2021-01-12 DIAGNOSIS — E53.8 B12 DEFICIENCY: ICD-10-CM

## 2021-01-12 DIAGNOSIS — Z94.0 KIDNEY REPLACED BY TRANSPLANT: Primary | ICD-10-CM

## 2021-01-15 ENCOUNTER — TELEPHONE (OUTPATIENT)
Dept: TRANSPLANT | Facility: CLINIC | Age: 56
End: 2021-01-15

## 2021-01-15 NOTE — TELEPHONE ENCOUNTER
----- Message from Theo Reich sent at 1/15/2021 12:52 PM CST -----  Regarding: Speak to coordinator  Contact: Pt  Pt would like advice on brands of lotion she is able to take as a post transplant pt.    Call back # [phone]

## 2021-01-19 ENCOUNTER — LAB VISIT (OUTPATIENT)
Dept: LAB | Facility: HOSPITAL | Age: 56
End: 2021-01-19
Attending: INTERNAL MEDICINE
Payer: COMMERCIAL

## 2021-01-19 DIAGNOSIS — Z94.0 KIDNEY REPLACED BY TRANSPLANT: ICD-10-CM

## 2021-01-19 DIAGNOSIS — E53.8 B12 DEFICIENCY: ICD-10-CM

## 2021-01-19 LAB
ALBUMIN SERPL BCP-MCNC: 3.8 G/DL (ref 3.5–5.2)
ANION GAP SERPL CALC-SCNC: 7 MMOL/L (ref 8–16)
BASOPHILS # BLD AUTO: 0.04 K/UL (ref 0–0.2)
BASOPHILS NFR BLD: 0.9 % (ref 0–1.9)
BUN SERPL-MCNC: 36 MG/DL (ref 6–20)
CALCIUM SERPL-MCNC: 9.4 MG/DL (ref 8.7–10.5)
CHLORIDE SERPL-SCNC: 106 MMOL/L (ref 95–110)
CO2 SERPL-SCNC: 27 MMOL/L (ref 23–29)
CREAT SERPL-MCNC: 1.4 MG/DL (ref 0.5–1.4)
DIFFERENTIAL METHOD: ABNORMAL
EOSINOPHIL # BLD AUTO: 0.1 K/UL (ref 0–0.5)
EOSINOPHIL NFR BLD: 1.6 % (ref 0–8)
ERYTHROCYTE [DISTWIDTH] IN BLOOD BY AUTOMATED COUNT: 12.6 % (ref 11.5–14.5)
EST. GFR  (AFRICAN AMERICAN): 49.5 ML/MIN/1.73 M^2
EST. GFR  (NON AFRICAN AMERICAN): 42.9 ML/MIN/1.73 M^2
GLUCOSE SERPL-MCNC: 103 MG/DL (ref 70–110)
HCT VFR BLD AUTO: 41.4 % (ref 37–48.5)
HGB BLD-MCNC: 12.2 G/DL (ref 12–16)
IMM GRANULOCYTES # BLD AUTO: 0.01 K/UL (ref 0–0.04)
IMM GRANULOCYTES NFR BLD AUTO: 0.2 % (ref 0–0.5)
LYMPHOCYTES # BLD AUTO: 0.5 K/UL (ref 1–4.8)
LYMPHOCYTES NFR BLD: 10.7 % (ref 18–48)
MAGNESIUM SERPL-MCNC: 2.1 MG/DL (ref 1.6–2.6)
MCH RBC QN AUTO: 29 PG (ref 27–31)
MCHC RBC AUTO-ENTMCNC: 29.5 G/DL (ref 32–36)
MCV RBC AUTO: 98 FL (ref 82–98)
MONOCYTES # BLD AUTO: 0.4 K/UL (ref 0.3–1)
MONOCYTES NFR BLD: 8.6 % (ref 4–15)
NEUTROPHILS # BLD AUTO: 3.4 K/UL (ref 1.8–7.7)
NEUTROPHILS NFR BLD: 78 % (ref 38–73)
NRBC BLD-RTO: 0 /100 WBC
PHOSPHATE SERPL-MCNC: 4 MG/DL (ref 2.7–4.5)
PLATELET # BLD AUTO: 197 K/UL (ref 150–350)
PMV BLD AUTO: 11.2 FL (ref 9.2–12.9)
POTASSIUM SERPL-SCNC: 4.8 MMOL/L (ref 3.5–5.1)
RBC # BLD AUTO: 4.21 M/UL (ref 4–5.4)
SODIUM SERPL-SCNC: 140 MMOL/L (ref 136–145)
VIT B12 SERPL-MCNC: 627 PG/ML (ref 210–950)
WBC # BLD AUTO: 4.3 K/UL (ref 3.9–12.7)

## 2021-01-19 PROCEDURE — 36415 COLL VENOUS BLD VENIPUNCTURE: CPT | Mod: PO

## 2021-01-19 PROCEDURE — 82607 VITAMIN B-12: CPT

## 2021-01-19 PROCEDURE — 83735 ASSAY OF MAGNESIUM: CPT

## 2021-01-19 PROCEDURE — 80197 ASSAY OF TACROLIMUS: CPT

## 2021-01-19 PROCEDURE — 80069 RENAL FUNCTION PANEL: CPT

## 2021-01-19 PROCEDURE — 85025 COMPLETE CBC W/AUTO DIFF WBC: CPT

## 2021-01-20 ENCOUNTER — TELEPHONE (OUTPATIENT)
Dept: TRANSPLANT | Facility: CLINIC | Age: 56
End: 2021-01-20

## 2021-01-20 LAB — TACROLIMUS BLD-MCNC: 8.9 NG/ML (ref 5–15)

## 2021-02-02 ENCOUNTER — LAB VISIT (OUTPATIENT)
Dept: LAB | Facility: HOSPITAL | Age: 56
End: 2021-02-02
Attending: INTERNAL MEDICINE
Payer: COMMERCIAL

## 2021-02-02 DIAGNOSIS — Z94.0 KIDNEY REPLACED BY TRANSPLANT: ICD-10-CM

## 2021-02-02 LAB
ALBUMIN SERPL BCP-MCNC: 3.7 G/DL (ref 3.5–5.2)
ANION GAP SERPL CALC-SCNC: 9 MMOL/L (ref 8–16)
BASOPHILS # BLD AUTO: 0.03 K/UL (ref 0–0.2)
BASOPHILS NFR BLD: 0.7 % (ref 0–1.9)
BUN SERPL-MCNC: 32 MG/DL (ref 6–20)
CALCIUM SERPL-MCNC: 9.1 MG/DL (ref 8.7–10.5)
CHLORIDE SERPL-SCNC: 108 MMOL/L (ref 95–110)
CO2 SERPL-SCNC: 25 MMOL/L (ref 23–29)
CREAT SERPL-MCNC: 1.2 MG/DL (ref 0.5–1.4)
DIFFERENTIAL METHOD: ABNORMAL
EOSINOPHIL # BLD AUTO: 0.1 K/UL (ref 0–0.5)
EOSINOPHIL NFR BLD: 1.7 % (ref 0–8)
ERYTHROCYTE [DISTWIDTH] IN BLOOD BY AUTOMATED COUNT: 12.6 % (ref 11.5–14.5)
EST. GFR  (AFRICAN AMERICAN): 59.6 ML/MIN/1.73 M^2
EST. GFR  (NON AFRICAN AMERICAN): 51.7 ML/MIN/1.73 M^2
GLUCOSE SERPL-MCNC: 97 MG/DL (ref 70–110)
HCT VFR BLD AUTO: 37.4 % (ref 37–48.5)
HGB BLD-MCNC: 11.3 G/DL (ref 12–16)
IMM GRANULOCYTES # BLD AUTO: 0.01 K/UL (ref 0–0.04)
IMM GRANULOCYTES NFR BLD AUTO: 0.2 % (ref 0–0.5)
LYMPHOCYTES # BLD AUTO: 0.5 K/UL (ref 1–4.8)
LYMPHOCYTES NFR BLD: 11.2 % (ref 18–48)
MAGNESIUM SERPL-MCNC: 1.8 MG/DL (ref 1.6–2.6)
MCH RBC QN AUTO: 29.2 PG (ref 27–31)
MCHC RBC AUTO-ENTMCNC: 30.2 G/DL (ref 32–36)
MCV RBC AUTO: 97 FL (ref 82–98)
MONOCYTES # BLD AUTO: 0.4 K/UL (ref 0.3–1)
MONOCYTES NFR BLD: 9.5 % (ref 4–15)
NEUTROPHILS # BLD AUTO: 3.1 K/UL (ref 1.8–7.7)
NEUTROPHILS NFR BLD: 76.7 % (ref 38–73)
NRBC BLD-RTO: 0 /100 WBC
PHOSPHATE SERPL-MCNC: 3.7 MG/DL (ref 2.7–4.5)
PLATELET # BLD AUTO: 183 K/UL (ref 150–350)
PMV BLD AUTO: 11.6 FL (ref 9.2–12.9)
POTASSIUM SERPL-SCNC: 4.6 MMOL/L (ref 3.5–5.1)
RBC # BLD AUTO: 3.87 M/UL (ref 4–5.4)
SODIUM SERPL-SCNC: 142 MMOL/L (ref 136–145)
WBC # BLD AUTO: 4.1 K/UL (ref 3.9–12.7)

## 2021-02-02 PROCEDURE — 85025 COMPLETE CBC W/AUTO DIFF WBC: CPT

## 2021-02-02 PROCEDURE — 80069 RENAL FUNCTION PANEL: CPT

## 2021-02-02 PROCEDURE — 83735 ASSAY OF MAGNESIUM: CPT

## 2021-02-02 PROCEDURE — 36415 COLL VENOUS BLD VENIPUNCTURE: CPT | Mod: PO

## 2021-02-02 PROCEDURE — 80197 ASSAY OF TACROLIMUS: CPT

## 2021-02-03 LAB — TACROLIMUS BLD-MCNC: 7.1 NG/ML (ref 5–15)

## 2021-02-26 ENCOUNTER — DOCUMENT SCAN (OUTPATIENT)
Dept: HOME HEALTH SERVICES | Facility: HOSPITAL | Age: 56
End: 2021-02-26
Payer: MEDICARE

## 2021-03-02 ENCOUNTER — LAB VISIT (OUTPATIENT)
Dept: LAB | Facility: HOSPITAL | Age: 56
End: 2021-03-02
Attending: INTERNAL MEDICINE
Payer: COMMERCIAL

## 2021-03-02 DIAGNOSIS — Z94.0 KIDNEY REPLACED BY TRANSPLANT: ICD-10-CM

## 2021-03-02 LAB
ALBUMIN SERPL BCP-MCNC: 3.7 G/DL (ref 3.5–5.2)
ANION GAP SERPL CALC-SCNC: 10 MMOL/L (ref 8–16)
BASOPHILS # BLD AUTO: 0.03 K/UL (ref 0–0.2)
BASOPHILS NFR BLD: 0.8 % (ref 0–1.9)
BUN SERPL-MCNC: 29 MG/DL (ref 6–20)
CALCIUM SERPL-MCNC: 9.1 MG/DL (ref 8.7–10.5)
CHLORIDE SERPL-SCNC: 107 MMOL/L (ref 95–110)
CO2 SERPL-SCNC: 24 MMOL/L (ref 23–29)
CREAT SERPL-MCNC: 1.3 MG/DL (ref 0.5–1.4)
DIFFERENTIAL METHOD: ABNORMAL
EOSINOPHIL # BLD AUTO: 0.1 K/UL (ref 0–0.5)
EOSINOPHIL NFR BLD: 1.7 % (ref 0–8)
ERYTHROCYTE [DISTWIDTH] IN BLOOD BY AUTOMATED COUNT: 12.7 % (ref 11.5–14.5)
EST. GFR  (AFRICAN AMERICAN): 54.1 ML/MIN/1.73 M^2
EST. GFR  (NON AFRICAN AMERICAN): 47 ML/MIN/1.73 M^2
GLUCOSE SERPL-MCNC: 91 MG/DL (ref 70–110)
HCT VFR BLD AUTO: 36.8 % (ref 37–48.5)
HGB BLD-MCNC: 11.1 G/DL (ref 12–16)
IMM GRANULOCYTES # BLD AUTO: 0.01 K/UL (ref 0–0.04)
IMM GRANULOCYTES NFR BLD AUTO: 0.3 % (ref 0–0.5)
LYMPHOCYTES # BLD AUTO: 0.4 K/UL (ref 1–4.8)
LYMPHOCYTES NFR BLD: 11 % (ref 18–48)
MAGNESIUM SERPL-MCNC: 2.1 MG/DL (ref 1.6–2.6)
MCH RBC QN AUTO: 28.8 PG (ref 27–31)
MCHC RBC AUTO-ENTMCNC: 30.2 G/DL (ref 32–36)
MCV RBC AUTO: 96 FL (ref 82–98)
MONOCYTES # BLD AUTO: 0.4 K/UL (ref 0.3–1)
MONOCYTES NFR BLD: 9.7 % (ref 4–15)
NEUTROPHILS # BLD AUTO: 2.8 K/UL (ref 1.8–7.7)
NEUTROPHILS NFR BLD: 76.5 % (ref 38–73)
NRBC BLD-RTO: 0 /100 WBC
PHOSPHATE SERPL-MCNC: 3.6 MG/DL (ref 2.7–4.5)
PLATELET # BLD AUTO: 181 K/UL (ref 150–350)
PMV BLD AUTO: 11.3 FL (ref 9.2–12.9)
POTASSIUM SERPL-SCNC: 4.5 MMOL/L (ref 3.5–5.1)
RBC # BLD AUTO: 3.85 M/UL (ref 4–5.4)
SODIUM SERPL-SCNC: 141 MMOL/L (ref 136–145)
WBC # BLD AUTO: 3.62 K/UL (ref 3.9–12.7)

## 2021-03-02 PROCEDURE — 80197 ASSAY OF TACROLIMUS: CPT

## 2021-03-02 PROCEDURE — 80069 RENAL FUNCTION PANEL: CPT

## 2021-03-02 PROCEDURE — 83735 ASSAY OF MAGNESIUM: CPT

## 2021-03-02 PROCEDURE — 36415 COLL VENOUS BLD VENIPUNCTURE: CPT | Mod: PO

## 2021-03-02 PROCEDURE — 85025 COMPLETE CBC W/AUTO DIFF WBC: CPT

## 2021-03-03 LAB — TACROLIMUS BLD-MCNC: 7.1 NG/ML (ref 5–15)

## 2021-03-19 ENCOUNTER — PATIENT MESSAGE (OUTPATIENT)
Dept: TRANSPLANT | Facility: CLINIC | Age: 56
End: 2021-03-19

## 2021-03-24 RX ORDER — ATOVAQUONE 750 MG/5ML
1500 SUSPENSION ORAL DAILY
Qty: 300 ML | Refills: 11 | Status: CANCELLED | OUTPATIENT
Start: 2021-03-24 | End: 2022-01-01

## 2021-03-31 NOTE — DISCHARGE INSTRUCTIONS
For scheduling: Call Melissa at [phone]    For questions or concerns call: [facility] 8 AM- 5PM [phone]. Radiology resident on call [phone].    For immediate concerns that are not emergent, you may call our radiology clinic at: [phone]     Quinolones Counseling:  I discussed with the patient the risks of fluoroquinolones including but not limited to GI upset, allergic reaction, drug rash, diarrhea, dizziness, photosensitivity, yeast infections, liver function test abnormalities, tendonitis/tendon rupture.

## 2021-04-06 ENCOUNTER — LAB VISIT (OUTPATIENT)
Dept: LAB | Facility: HOSPITAL | Age: 56
End: 2021-04-06
Attending: INTERNAL MEDICINE
Payer: MEDICARE

## 2021-04-06 DIAGNOSIS — Z94.0 KIDNEY REPLACED BY TRANSPLANT: ICD-10-CM

## 2021-04-06 DIAGNOSIS — E55.9 VITAMIN D DEFICIENCY: ICD-10-CM

## 2021-04-06 LAB
BASOPHILS # BLD AUTO: 0.02 K/UL (ref 0–0.2)
BASOPHILS NFR BLD: 0.6 % (ref 0–1.9)
DIFFERENTIAL METHOD: ABNORMAL
EOSINOPHIL # BLD AUTO: 0.1 K/UL (ref 0–0.5)
EOSINOPHIL NFR BLD: 1.5 % (ref 0–8)
ERYTHROCYTE [DISTWIDTH] IN BLOOD BY AUTOMATED COUNT: 12.2 % (ref 11.5–14.5)
HCT VFR BLD AUTO: 37.8 % (ref 37–48.5)
HGB BLD-MCNC: 11.6 G/DL (ref 12–16)
IMM GRANULOCYTES # BLD AUTO: 0.02 K/UL (ref 0–0.04)
IMM GRANULOCYTES NFR BLD AUTO: 0.6 % (ref 0–0.5)
LYMPHOCYTES # BLD AUTO: 0.4 K/UL (ref 1–4.8)
LYMPHOCYTES NFR BLD: 12.5 % (ref 18–48)
MCH RBC QN AUTO: 29.1 PG (ref 27–31)
MCHC RBC AUTO-ENTMCNC: 30.7 G/DL (ref 32–36)
MCV RBC AUTO: 95 FL (ref 82–98)
MONOCYTES # BLD AUTO: 0.3 K/UL (ref 0.3–1)
MONOCYTES NFR BLD: 10 % (ref 4–15)
NEUTROPHILS # BLD AUTO: 2.5 K/UL (ref 1.8–7.7)
NEUTROPHILS NFR BLD: 74.8 % (ref 38–73)
NRBC BLD-RTO: 0 /100 WBC
PLATELET # BLD AUTO: 183 K/UL (ref 150–450)
PMV BLD AUTO: 11.2 FL (ref 9.2–12.9)
PTH-INTACT SERPL-MCNC: 101 PG/ML (ref 9–77)
RBC # BLD AUTO: 3.99 M/UL (ref 4–5.4)
WBC # BLD AUTO: 3.29 K/UL (ref 3.9–12.7)

## 2021-04-06 PROCEDURE — 80197 ASSAY OF TACROLIMUS: CPT | Performed by: INTERNAL MEDICINE

## 2021-04-06 PROCEDURE — 80069 RENAL FUNCTION PANEL: CPT | Performed by: INTERNAL MEDICINE

## 2021-04-06 PROCEDURE — 85025 COMPLETE CBC W/AUTO DIFF WBC: CPT | Performed by: INTERNAL MEDICINE

## 2021-04-06 PROCEDURE — 84450 TRANSFERASE (AST) (SGOT): CPT | Performed by: INTERNAL MEDICINE

## 2021-04-06 PROCEDURE — 83970 ASSAY OF PARATHORMONE: CPT | Performed by: INTERNAL MEDICINE

## 2021-04-06 PROCEDURE — 87799 DETECT AGENT NOS DNA QUANT: CPT | Performed by: INTERNAL MEDICINE

## 2021-04-06 PROCEDURE — 83735 ASSAY OF MAGNESIUM: CPT | Performed by: INTERNAL MEDICINE

## 2021-04-06 PROCEDURE — 82306 VITAMIN D 25 HYDROXY: CPT | Performed by: INTERNAL MEDICINE

## 2021-04-06 PROCEDURE — 36415 COLL VENOUS BLD VENIPUNCTURE: CPT | Mod: PO | Performed by: INTERNAL MEDICINE

## 2021-04-06 PROCEDURE — 84075 ASSAY ALKALINE PHOSPHATASE: CPT | Performed by: INTERNAL MEDICINE

## 2021-04-07 LAB — TACROLIMUS BLD-MCNC: 7.5 NG/ML (ref 5–15)

## 2021-04-08 DIAGNOSIS — Z94.0 KIDNEY REPLACED BY TRANSPLANT: ICD-10-CM

## 2021-04-08 DIAGNOSIS — E55.9 VITAMIN D DEFICIENCY: ICD-10-CM

## 2021-04-08 LAB
25(OH)D3+25(OH)D2 SERPL-MCNC: 26 NG/ML (ref 30–96)
ALBUMIN SERPL BCP-MCNC: 3.7 G/DL (ref 3.5–5.2)
ALBUMIN SERPL BCP-MCNC: 3.7 G/DL (ref 3.5–5.2)
ALP SERPL-CCNC: 88 U/L (ref 55–135)
ALT SERPL W/O P-5'-P-CCNC: 12 U/L (ref 10–44)
ANION GAP SERPL CALC-SCNC: 8 MMOL/L (ref 8–16)
AST SERPL-CCNC: 19 U/L (ref 10–40)
BILIRUB DIRECT SERPL-MCNC: 0.3 MG/DL (ref 0.1–0.3)
BILIRUB SERPL-MCNC: 0.7 MG/DL (ref 0.1–1)
BUN SERPL-MCNC: 35 MG/DL (ref 6–20)
CALCIUM SERPL-MCNC: 8.9 MG/DL (ref 8.7–10.5)
CHLORIDE SERPL-SCNC: 107 MMOL/L (ref 95–110)
CO2 SERPL-SCNC: 25 MMOL/L (ref 23–29)
CREAT SERPL-MCNC: 1.5 MG/DL (ref 0.5–1.4)
EST. GFR  (AFRICAN AMERICAN): 45.5 ML/MIN/1.73 M^2
EST. GFR  (NON AFRICAN AMERICAN): 39.5 ML/MIN/1.73 M^2
GLUCOSE SERPL-MCNC: 91 MG/DL (ref 70–110)
MAGNESIUM SERPL-MCNC: 2 MG/DL (ref 1.6–2.6)
PHOSPHATE SERPL-MCNC: 4 MG/DL (ref 2.7–4.5)
POTASSIUM SERPL-SCNC: 4.5 MMOL/L (ref 3.5–5.1)
PROT SERPL-MCNC: 6.4 G/DL (ref 6–8.4)
SODIUM SERPL-SCNC: 140 MMOL/L (ref 136–145)

## 2021-04-08 RX ORDER — ERGOCALCIFEROL 1.25 MG/1
50000 CAPSULE ORAL
Qty: 4 CAPSULE | Refills: 11 | Status: SHIPPED | OUTPATIENT
Start: 2021-04-08 | End: 2022-01-01

## 2021-04-12 ENCOUNTER — LAB VISIT (OUTPATIENT)
Dept: LAB | Facility: HOSPITAL | Age: 56
End: 2021-04-12
Attending: INTERNAL MEDICINE
Payer: MEDICARE

## 2021-04-12 ENCOUNTER — OFFICE VISIT (OUTPATIENT)
Dept: TRANSPLANT | Facility: CLINIC | Age: 56
End: 2021-04-12
Payer: MEDICARE

## 2021-04-12 VITALS
TEMPERATURE: 98 F | OXYGEN SATURATION: 99 % | SYSTOLIC BLOOD PRESSURE: 143 MMHG | WEIGHT: 160.06 LBS | HEIGHT: 61 IN | DIASTOLIC BLOOD PRESSURE: 68 MMHG | RESPIRATION RATE: 16 BRPM | BODY MASS INDEX: 30.22 KG/M2 | HEART RATE: 57 BPM

## 2021-04-12 DIAGNOSIS — E55.9 VITAMIN D DEFICIENCY: ICD-10-CM

## 2021-04-12 DIAGNOSIS — Z94.0 S/P KIDNEY TRANSPLANT: Primary | ICD-10-CM

## 2021-04-12 DIAGNOSIS — I15.8 HYPERTENSION ASSOCIATED WITH TRANSPLANTATION: ICD-10-CM

## 2021-04-12 DIAGNOSIS — Z29.89 PROPHYLACTIC IMMUNOTHERAPY: ICD-10-CM

## 2021-04-12 DIAGNOSIS — Z91.89 AT RISK FOR OPPORTUNISTIC INFECTIONS: ICD-10-CM

## 2021-04-12 DIAGNOSIS — Z94.9 HYPERTENSION ASSOCIATED WITH TRANSPLANTATION: ICD-10-CM

## 2021-04-12 DIAGNOSIS — E83.51 HYPOCALCEMIA: ICD-10-CM

## 2021-04-12 DIAGNOSIS — I15.0 RENOVASCULAR HYPERTENSION: ICD-10-CM

## 2021-04-12 DIAGNOSIS — N18.32 STAGE 3B CHRONIC KIDNEY DISEASE: ICD-10-CM

## 2021-04-12 DIAGNOSIS — Z94.0 S/P KIDNEY TRANSPLANT: ICD-10-CM

## 2021-04-12 DIAGNOSIS — Z94.0 KIDNEY REPLACED BY TRANSPLANT: ICD-10-CM

## 2021-04-12 LAB
ALBUMIN SERPL BCP-MCNC: 3.8 G/DL (ref 3.5–5.2)
ANION GAP SERPL CALC-SCNC: 7 MMOL/L (ref 8–16)
BASOPHILS # BLD AUTO: 0.04 K/UL (ref 0–0.2)
BASOPHILS NFR BLD: 1.4 % (ref 0–1.9)
BUN SERPL-MCNC: 28 MG/DL (ref 6–20)
CALCIUM SERPL-MCNC: 9.4 MG/DL (ref 8.7–10.5)
CHLORIDE SERPL-SCNC: 108 MMOL/L (ref 95–110)
CO2 SERPL-SCNC: 26 MMOL/L (ref 23–29)
CREAT SERPL-MCNC: 1.3 MG/DL (ref 0.5–1.4)
DIFFERENTIAL METHOD: ABNORMAL
EOSINOPHIL # BLD AUTO: 0.1 K/UL (ref 0–0.5)
EOSINOPHIL NFR BLD: 1.7 % (ref 0–8)
ERYTHROCYTE [DISTWIDTH] IN BLOOD BY AUTOMATED COUNT: 12.1 % (ref 11.5–14.5)
EST. GFR  (AFRICAN AMERICAN): 54.1 ML/MIN/1.73 M^2
EST. GFR  (NON AFRICAN AMERICAN): 47 ML/MIN/1.73 M^2
GLUCOSE SERPL-MCNC: 101 MG/DL (ref 70–110)
HCT VFR BLD AUTO: 37.7 % (ref 37–48.5)
HGB BLD-MCNC: 11.9 G/DL (ref 12–16)
IMM GRANULOCYTES # BLD AUTO: 0.05 K/UL (ref 0–0.04)
IMM GRANULOCYTES NFR BLD AUTO: 1.7 % (ref 0–0.5)
LYMPHOCYTES # BLD AUTO: 0.4 K/UL (ref 1–4.8)
LYMPHOCYTES NFR BLD: 12.2 % (ref 18–48)
MAGNESIUM SERPL-MCNC: 1.9 MG/DL (ref 1.6–2.6)
MCH RBC QN AUTO: 29.3 PG (ref 27–31)
MCHC RBC AUTO-ENTMCNC: 31.6 G/DL (ref 32–36)
MCV RBC AUTO: 93 FL (ref 82–98)
MONOCYTES # BLD AUTO: 0.4 K/UL (ref 0.3–1)
MONOCYTES NFR BLD: 13.2 % (ref 4–15)
NEUTROPHILS # BLD AUTO: 2 K/UL (ref 1.8–7.7)
NEUTROPHILS NFR BLD: 69.8 % (ref 38–73)
NRBC BLD-RTO: 0 /100 WBC
PHOSPHATE SERPL-MCNC: 3.7 MG/DL (ref 2.7–4.5)
PLATELET # BLD AUTO: 181 K/UL (ref 150–450)
PMV BLD AUTO: 11.1 FL (ref 9.2–12.9)
POTASSIUM SERPL-SCNC: 4.3 MMOL/L (ref 3.5–5.1)
RBC # BLD AUTO: 4.06 M/UL (ref 4–5.4)
SODIUM SERPL-SCNC: 141 MMOL/L (ref 136–145)
TACROLIMUS BLD-MCNC: 8.9 NG/ML (ref 5–15)
WBC # BLD AUTO: 2.88 K/UL (ref 3.9–12.7)

## 2021-04-12 PROCEDURE — 85025 COMPLETE CBC W/AUTO DIFF WBC: CPT | Performed by: INTERNAL MEDICINE

## 2021-04-12 PROCEDURE — 36415 COLL VENOUS BLD VENIPUNCTURE: CPT | Performed by: INTERNAL MEDICINE

## 2021-04-12 PROCEDURE — 99215 PR OFFICE/OUTPT VISIT, EST, LEVL V, 40-54 MIN: ICD-10-PCS | Mod: S$PBB,,, | Performed by: NURSE PRACTITIONER

## 2021-04-12 PROCEDURE — 80197 ASSAY OF TACROLIMUS: CPT | Performed by: INTERNAL MEDICINE

## 2021-04-12 PROCEDURE — 99215 OFFICE O/P EST HI 40 MIN: CPT | Mod: S$PBB,,, | Performed by: NURSE PRACTITIONER

## 2021-04-12 PROCEDURE — 80069 RENAL FUNCTION PANEL: CPT | Performed by: INTERNAL MEDICINE

## 2021-04-12 PROCEDURE — 99999 PR PBB SHADOW E&M-EST. PATIENT-LVL IV: ICD-10-PCS | Mod: PBBFAC,,, | Performed by: NURSE PRACTITIONER

## 2021-04-12 PROCEDURE — 86977 RBC SERUM PRETX INCUBJ/INHIB: CPT | Mod: 59,PO | Performed by: INTERNAL MEDICINE

## 2021-04-12 PROCEDURE — 86833 HLA CLASS II HIGH DEFIN QUAL: CPT | Mod: PO | Performed by: INTERNAL MEDICINE

## 2021-04-12 PROCEDURE — 83735 ASSAY OF MAGNESIUM: CPT | Performed by: INTERNAL MEDICINE

## 2021-04-12 PROCEDURE — 99214 OFFICE O/P EST MOD 30 MIN: CPT | Mod: PBBFAC | Performed by: NURSE PRACTITIONER

## 2021-04-12 PROCEDURE — 86832 HLA CLASS I HIGH DEFIN QUAL: CPT | Mod: PO | Performed by: INTERNAL MEDICINE

## 2021-04-12 PROCEDURE — 99999 PR PBB SHADOW E&M-EST. PATIENT-LVL IV: CPT | Mod: PBBFAC,,, | Performed by: NURSE PRACTITIONER

## 2021-04-12 RX ORDER — TACROLIMUS 1 MG/1
CAPSULE ORAL
Qty: 270 CAPSULE | Refills: 11 | Status: SHIPPED | OUTPATIENT
Start: 2021-04-12 | End: 2021-01-01 | Stop reason: SDUPTHER

## 2021-04-12 RX ORDER — TACROLIMUS 1 MG/1
CAPSULE ORAL
Qty: 300 CAPSULE | Refills: 11 | Status: SHIPPED | OUTPATIENT
Start: 2021-04-12 | End: 2021-04-12 | Stop reason: SDUPTHER

## 2021-04-12 RX ORDER — MYCOPHENOLATE MOFETIL 250 MG/1
500 CAPSULE ORAL 2 TIMES DAILY
Qty: 120 CAPSULE | Refills: 11 | Status: SHIPPED | OUTPATIENT
Start: 2021-04-12 | End: 2022-01-01 | Stop reason: SDUPTHER

## 2021-04-12 RX ORDER — SODIUM BICARBONATE 650 MG/1
1300 TABLET ORAL 2 TIMES DAILY
Qty: 120 TABLET | Refills: 11 | Status: SHIPPED | OUTPATIENT
Start: 2021-04-12 | End: 2022-01-01

## 2021-04-12 RX ORDER — CALCITRIOL 0.25 UG/1
0.5 CAPSULE ORAL DAILY
Qty: 60 CAPSULE | Refills: 11 | Status: SHIPPED | OUTPATIENT
Start: 2021-04-12 | End: 2022-01-01 | Stop reason: SDUPTHER

## 2021-04-12 RX ORDER — NIFEDIPINE 30 MG/1
30 TABLET, EXTENDED RELEASE ORAL 2 TIMES DAILY
Qty: 60 TABLET | Refills: 11 | Status: SHIPPED | OUTPATIENT
Start: 2021-04-12 | End: 2021-01-01

## 2021-04-12 RX ORDER — PREDNISONE 5 MG/1
TABLET ORAL
Qty: 120 TABLET | Refills: 11 | Status: SHIPPED | OUTPATIENT
Start: 2021-04-12 | End: 2022-01-01 | Stop reason: SDUPTHER

## 2021-04-13 LAB — CMV DNA SERPL NAA+PROBE-ACNC: <35 IU/ML

## 2021-04-15 LAB
ALLOSURE: NORMAL
CLASS I ANTIBODY COMMENTS - LUMINEX: NORMAL
CLASS II ANTIBODY COMMENTS - LUMINEX: NORMAL
DSA1 TESTING DATE: NORMAL
DSA12 TESTING DATE: NORMAL
DSA2 TESTING DATE: NORMAL
SERUM COLLECTION DT - LUMINEX CLASS I: NORMAL
SERUM COLLECTION DT - LUMINEX CLASS II: NORMAL

## 2021-04-27 ENCOUNTER — LAB VISIT (OUTPATIENT)
Dept: LAB | Facility: HOSPITAL | Age: 56
End: 2021-04-27
Attending: INTERNAL MEDICINE
Payer: MEDICARE

## 2021-04-27 DIAGNOSIS — Z94.0 KIDNEY REPLACED BY TRANSPLANT: ICD-10-CM

## 2021-04-27 LAB
ALBUMIN SERPL BCP-MCNC: 3.3 G/DL (ref 3.5–5.2)
ANION GAP SERPL CALC-SCNC: 8 MMOL/L (ref 8–16)
BASOPHILS # BLD AUTO: 0.05 K/UL (ref 0–0.2)
BASOPHILS NFR BLD: 1.4 % (ref 0–1.9)
BUN SERPL-MCNC: 34 MG/DL (ref 6–20)
CALCIUM SERPL-MCNC: 8.8 MG/DL (ref 8.7–10.5)
CHLORIDE SERPL-SCNC: 107 MMOL/L (ref 95–110)
CO2 SERPL-SCNC: 25 MMOL/L (ref 23–29)
CREAT SERPL-MCNC: 1.3 MG/DL (ref 0.5–1.4)
DIFFERENTIAL METHOD: ABNORMAL
EOSINOPHIL # BLD AUTO: 0.1 K/UL (ref 0–0.5)
EOSINOPHIL NFR BLD: 1.4 % (ref 0–8)
ERYTHROCYTE [DISTWIDTH] IN BLOOD BY AUTOMATED COUNT: 12.4 % (ref 11.5–14.5)
EST. GFR  (AFRICAN AMERICAN): 54.1 ML/MIN/1.73 M^2
EST. GFR  (NON AFRICAN AMERICAN): 47 ML/MIN/1.73 M^2
GLUCOSE SERPL-MCNC: 92 MG/DL (ref 70–110)
HCT VFR BLD AUTO: 35.8 % (ref 37–48.5)
HGB BLD-MCNC: 10.9 G/DL (ref 12–16)
IMM GRANULOCYTES # BLD AUTO: 0.02 K/UL (ref 0–0.04)
IMM GRANULOCYTES NFR BLD AUTO: 0.6 % (ref 0–0.5)
LYMPHOCYTES # BLD AUTO: 0.5 K/UL (ref 1–4.8)
LYMPHOCYTES NFR BLD: 12.4 % (ref 18–48)
MAGNESIUM SERPL-MCNC: 2.1 MG/DL (ref 1.6–2.6)
MCH RBC QN AUTO: 29.1 PG (ref 27–31)
MCHC RBC AUTO-ENTMCNC: 30.4 G/DL (ref 32–36)
MCV RBC AUTO: 96 FL (ref 82–98)
MONOCYTES # BLD AUTO: 0.4 K/UL (ref 0.3–1)
MONOCYTES NFR BLD: 9.9 % (ref 4–15)
NEUTROPHILS # BLD AUTO: 2.7 K/UL (ref 1.8–7.7)
NEUTROPHILS NFR BLD: 74.3 % (ref 38–73)
NRBC BLD-RTO: 0 /100 WBC
PHOSPHATE SERPL-MCNC: 4.3 MG/DL (ref 2.7–4.5)
PLATELET # BLD AUTO: 181 K/UL (ref 150–450)
PMV BLD AUTO: 11 FL (ref 9.2–12.9)
POTASSIUM SERPL-SCNC: 4.4 MMOL/L (ref 3.5–5.1)
RBC # BLD AUTO: 3.74 M/UL (ref 4–5.4)
SODIUM SERPL-SCNC: 140 MMOL/L (ref 136–145)
WBC # BLD AUTO: 3.62 K/UL (ref 3.9–12.7)

## 2021-04-27 PROCEDURE — 83735 ASSAY OF MAGNESIUM: CPT | Performed by: INTERNAL MEDICINE

## 2021-04-27 PROCEDURE — 36415 COLL VENOUS BLD VENIPUNCTURE: CPT | Mod: PO | Performed by: INTERNAL MEDICINE

## 2021-04-27 PROCEDURE — 80069 RENAL FUNCTION PANEL: CPT | Performed by: INTERNAL MEDICINE

## 2021-04-27 PROCEDURE — 80197 ASSAY OF TACROLIMUS: CPT | Performed by: INTERNAL MEDICINE

## 2021-04-27 PROCEDURE — 85025 COMPLETE CBC W/AUTO DIFF WBC: CPT | Performed by: INTERNAL MEDICINE

## 2021-04-28 LAB — TACROLIMUS BLD-MCNC: 7.9 NG/ML (ref 5–15)

## 2021-05-11 ENCOUNTER — PATIENT MESSAGE (OUTPATIENT)
Dept: TRANSPLANT | Facility: CLINIC | Age: 56
End: 2021-05-11

## 2021-05-18 ENCOUNTER — LAB VISIT (OUTPATIENT)
Dept: LAB | Facility: HOSPITAL | Age: 56
End: 2021-05-18
Attending: INTERNAL MEDICINE
Payer: MEDICARE

## 2021-05-18 DIAGNOSIS — R80.9 PROTEINURIA: ICD-10-CM

## 2021-05-18 DIAGNOSIS — E55.9 VITAMIN D DEFICIENCY: ICD-10-CM

## 2021-05-18 DIAGNOSIS — N17.9 ACUTE KIDNEY FAILURE, UNSPECIFIED: ICD-10-CM

## 2021-05-18 DIAGNOSIS — D50.9 IRON DEFICIENCY ANEMIA, UNSPECIFIED: ICD-10-CM

## 2021-05-18 DIAGNOSIS — N18.30 CHRONIC KIDNEY DISEASE, STAGE III (MODERATE): Primary | ICD-10-CM

## 2021-05-18 DIAGNOSIS — I10 ESSENTIAL HYPERTENSION, MALIGNANT: ICD-10-CM

## 2021-05-18 LAB
25(OH)D3+25(OH)D2 SERPL-MCNC: 31 NG/ML (ref 30–96)
ALBUMIN SERPL BCP-MCNC: 3.4 G/DL (ref 3.5–5.2)
ALP SERPL-CCNC: 81 U/L (ref 55–135)
ALT SERPL W/O P-5'-P-CCNC: 16 U/L (ref 10–44)
ANION GAP SERPL CALC-SCNC: 9 MMOL/L (ref 8–16)
AST SERPL-CCNC: 19 U/L (ref 10–40)
BASOPHILS # BLD AUTO: 0.01 K/UL (ref 0–0.2)
BASOPHILS NFR BLD: 0.3 % (ref 0–1.9)
BILIRUB SERPL-MCNC: 0.6 MG/DL (ref 0.1–1)
BILIRUB UR QL STRIP: NEGATIVE
BUN SERPL-MCNC: 28 MG/DL (ref 6–20)
CALCIUM SERPL-MCNC: 9.1 MG/DL (ref 8.7–10.5)
CHLORIDE SERPL-SCNC: 107 MMOL/L (ref 95–110)
CLARITY UR REFRACT.AUTO: CLEAR
CO2 SERPL-SCNC: 24 MMOL/L (ref 23–29)
COLOR UR AUTO: YELLOW
CREAT SERPL-MCNC: 1.3 MG/DL (ref 0.5–1.4)
CREAT UR-MCNC: 61 MG/DL (ref 15–325)
DIFFERENTIAL METHOD: ABNORMAL
EOSINOPHIL # BLD AUTO: 0 K/UL (ref 0–0.5)
EOSINOPHIL NFR BLD: 0.3 % (ref 0–8)
ERYTHROCYTE [DISTWIDTH] IN BLOOD BY AUTOMATED COUNT: 12.1 % (ref 11.5–14.5)
EST. GFR  (AFRICAN AMERICAN): 54.1 ML/MIN/1.73 M^2
EST. GFR  (NON AFRICAN AMERICAN): 47 ML/MIN/1.73 M^2
GLUCOSE SERPL-MCNC: 92 MG/DL (ref 70–110)
GLUCOSE UR QL STRIP: NEGATIVE
HCT VFR BLD AUTO: 38.9 % (ref 37–48.5)
HGB BLD-MCNC: 11.6 G/DL (ref 12–16)
HGB UR QL STRIP: NEGATIVE
IMM GRANULOCYTES # BLD AUTO: 0.01 K/UL (ref 0–0.04)
IMM GRANULOCYTES NFR BLD AUTO: 0.3 % (ref 0–0.5)
IRON SERPL-MCNC: 25 UG/DL (ref 30–160)
KETONES UR QL STRIP: NEGATIVE
LEUKOCYTE ESTERASE UR QL STRIP: NEGATIVE
LYMPHOCYTES # BLD AUTO: 0.3 K/UL (ref 1–4.8)
LYMPHOCYTES NFR BLD: 11.6 % (ref 18–48)
MAGNESIUM SERPL-MCNC: 1.9 MG/DL (ref 1.6–2.6)
MCH RBC QN AUTO: 28.9 PG (ref 27–31)
MCHC RBC AUTO-ENTMCNC: 29.8 G/DL (ref 32–36)
MCV RBC AUTO: 97 FL (ref 82–98)
MONOCYTES # BLD AUTO: 0.3 K/UL (ref 0.3–1)
MONOCYTES NFR BLD: 8.5 % (ref 4–15)
NEUTROPHILS # BLD AUTO: 2.3 K/UL (ref 1.8–7.7)
NEUTROPHILS NFR BLD: 79 % (ref 38–73)
NITRITE UR QL STRIP: NEGATIVE
NRBC BLD-RTO: 0 /100 WBC
PH UR STRIP: 6 [PH] (ref 5–8)
PHOSPHATE SERPL-MCNC: 3.8 MG/DL (ref 2.7–4.5)
PLATELET # BLD AUTO: 158 K/UL (ref 150–450)
PLATELET BLD QL SMEAR: ABNORMAL
PMV BLD AUTO: 11.2 FL (ref 9.2–12.9)
POTASSIUM SERPL-SCNC: 4.4 MMOL/L (ref 3.5–5.1)
PROT SERPL-MCNC: 6.3 G/DL (ref 6–8.4)
PROT UR QL STRIP: NEGATIVE
PROT UR-MCNC: 18 MG/DL (ref 0–15)
PROT/CREAT UR: 0.3 MG/G{CREAT} (ref 0–0.2)
PTH-INTACT SERPL-MCNC: 168 PG/ML (ref 9–77)
RBC # BLD AUTO: 4.01 M/UL (ref 4–5.4)
SATURATED IRON: 11 % (ref 20–50)
SODIUM SERPL-SCNC: 140 MMOL/L (ref 136–145)
SP GR UR STRIP: 1.01 (ref 1–1.03)
TOTAL IRON BINDING CAPACITY: 229 UG/DL (ref 250–450)
TRANSFERRIN SERPL-MCNC: 155 MG/DL (ref 200–375)
URATE SERPL-MCNC: 5.2 MG/DL (ref 2.4–5.7)
URN SPEC COLLECT METH UR: NORMAL
WBC # BLD AUTO: 2.93 K/UL (ref 3.9–12.7)
WBC TOXIC VACUOLES BLD QL SMEAR: PRESENT

## 2021-05-18 PROCEDURE — 84156 ASSAY OF PROTEIN URINE: CPT | Performed by: INTERNAL MEDICINE

## 2021-05-18 PROCEDURE — 36415 COLL VENOUS BLD VENIPUNCTURE: CPT | Mod: PO | Performed by: INTERNAL MEDICINE

## 2021-05-18 PROCEDURE — 84550 ASSAY OF BLOOD/URIC ACID: CPT | Performed by: INTERNAL MEDICINE

## 2021-05-18 PROCEDURE — 80197 ASSAY OF TACROLIMUS: CPT | Performed by: INTERNAL MEDICINE

## 2021-05-18 PROCEDURE — 85025 COMPLETE CBC W/AUTO DIFF WBC: CPT | Performed by: INTERNAL MEDICINE

## 2021-05-18 PROCEDURE — 81003 URINALYSIS AUTO W/O SCOPE: CPT | Performed by: INTERNAL MEDICINE

## 2021-05-18 PROCEDURE — 84100 ASSAY OF PHOSPHORUS: CPT | Performed by: INTERNAL MEDICINE

## 2021-05-18 PROCEDURE — 82306 VITAMIN D 25 HYDROXY: CPT | Mod: GA | Performed by: INTERNAL MEDICINE

## 2021-05-18 PROCEDURE — 83970 ASSAY OF PARATHORMONE: CPT | Performed by: INTERNAL MEDICINE

## 2021-05-18 PROCEDURE — 83735 ASSAY OF MAGNESIUM: CPT | Performed by: INTERNAL MEDICINE

## 2021-05-18 PROCEDURE — 83540 ASSAY OF IRON: CPT | Performed by: INTERNAL MEDICINE

## 2021-05-18 PROCEDURE — 80053 COMPREHEN METABOLIC PANEL: CPT | Performed by: INTERNAL MEDICINE

## 2021-05-19 LAB
TACROLIMUS BLD-MCNC: 6.4 NG/ML (ref 5–15)
TACROLIMUS, NORMALIZED: 5.8 NG/ML (ref 5–15)

## 2021-05-26 ENCOUNTER — PATIENT MESSAGE (OUTPATIENT)
Dept: TRANSPLANT | Facility: CLINIC | Age: 56
End: 2021-05-26

## 2021-06-29 ENCOUNTER — PATIENT MESSAGE (OUTPATIENT)
Dept: TRANSPLANT | Facility: CLINIC | Age: 56
End: 2021-06-29

## 2021-06-29 ENCOUNTER — TELEPHONE (OUTPATIENT)
Dept: TRANSPLANT | Facility: CLINIC | Age: 56
End: 2021-06-29

## 2021-07-16 ENCOUNTER — PATIENT MESSAGE (OUTPATIENT)
Dept: TRANSPLANT | Facility: CLINIC | Age: 56
End: 2021-07-16

## 2022-01-01 ENCOUNTER — HOSPITAL ENCOUNTER (EMERGENCY)
Facility: HOSPITAL | Age: 57
Discharge: HOME | End: 2022-06-08
Attending: EMERGENCY MEDICINE
Payer: MEDICARE

## 2022-01-01 ENCOUNTER — LAB VISIT (OUTPATIENT)
Dept: LAB | Facility: HOSPITAL | Age: 57
End: 2022-01-01
Attending: INTERNAL MEDICINE
Payer: MEDICARE

## 2022-01-01 ENCOUNTER — TELEPHONE (OUTPATIENT)
Dept: TRANSPLANT | Facility: CLINIC | Age: 57
End: 2022-01-01
Payer: MEDICARE

## 2022-01-01 ENCOUNTER — LAB VISIT (OUTPATIENT)
Dept: LAB | Facility: HOSPITAL | Age: 57
End: 2022-01-01
Attending: PHYSICIAN ASSISTANT
Payer: MEDICARE

## 2022-01-01 ENCOUNTER — HOSPITAL ENCOUNTER (OUTPATIENT)
Facility: HOSPITAL | Age: 57
Discharge: HOME | End: 2022-06-14
Attending: EMERGENCY MEDICINE | Admitting: INTERNAL MEDICINE
Payer: MEDICARE

## 2022-01-01 ENCOUNTER — PATIENT MESSAGE (OUTPATIENT)
Dept: TRANSPLANT | Facility: CLINIC | Age: 57
End: 2022-01-01
Payer: MEDICARE

## 2022-01-01 ENCOUNTER — HOSPITAL ENCOUNTER (INPATIENT)
Facility: HOSPITAL | Age: 57
LOS: 22 days | DRG: 097 | End: 2022-07-19
Attending: HOSPITALIST | Admitting: HOSPITALIST
Payer: MEDICARE

## 2022-01-01 ENCOUNTER — OFFICE VISIT (OUTPATIENT)
Dept: TRANSPLANT | Facility: CLINIC | Age: 57
End: 2022-01-01
Payer: MEDICARE

## 2022-01-01 ENCOUNTER — ANESTHESIA (OUTPATIENT)
Dept: MEDSURG UNIT | Facility: HOSPITAL | Age: 57
End: 2022-01-01

## 2022-01-01 ENCOUNTER — PATIENT MESSAGE (OUTPATIENT)
Dept: RESEARCH | Facility: CLINIC | Age: 57
End: 2022-01-01
Payer: MEDICARE

## 2022-01-01 ENCOUNTER — ANESTHESIA EVENT (OUTPATIENT)
Dept: MEDSURG UNIT | Facility: HOSPITAL | Age: 57
End: 2022-01-01

## 2022-01-01 ENCOUNTER — NURSE TRIAGE (OUTPATIENT)
Dept: ADMINISTRATIVE | Facility: CLINIC | Age: 57
End: 2022-01-01
Payer: MEDICARE

## 2022-01-01 ENCOUNTER — DOCUMENTATION ONLY (OUTPATIENT)
Dept: TRANSPLANT | Facility: CLINIC | Age: 57
End: 2022-01-01
Payer: MEDICARE

## 2022-01-01 VITALS
DIASTOLIC BLOOD PRESSURE: 67 MMHG | WEIGHT: 161.63 LBS | RESPIRATION RATE: 16 BRPM | HEART RATE: 55 BPM | BODY MASS INDEX: 30.51 KG/M2 | TEMPERATURE: 98 F | OXYGEN SATURATION: 97 % | HEIGHT: 61 IN | SYSTOLIC BLOOD PRESSURE: 143 MMHG

## 2022-01-01 VITALS
BODY MASS INDEX: 25.97 KG/M2 | DIASTOLIC BLOOD PRESSURE: 79 MMHG | OXYGEN SATURATION: 90 % | TEMPERATURE: 98 F | SYSTOLIC BLOOD PRESSURE: 123 MMHG | WEIGHT: 132.25 LBS | HEIGHT: 60 IN | RESPIRATION RATE: 9 BRPM

## 2022-01-01 VITALS
RESPIRATION RATE: 18 BRPM | DIASTOLIC BLOOD PRESSURE: 71 MMHG | WEIGHT: 150 LBS | BODY MASS INDEX: 28.32 KG/M2 | HEIGHT: 61 IN | HEART RATE: 69 BPM | OXYGEN SATURATION: 97 % | TEMPERATURE: 98 F | SYSTOLIC BLOOD PRESSURE: 151 MMHG

## 2022-01-01 VITALS
TEMPERATURE: 98 F | HEART RATE: 60 BPM | HEIGHT: 61 IN | SYSTOLIC BLOOD PRESSURE: 121 MMHG | RESPIRATION RATE: 18 BRPM | BODY MASS INDEX: 31.84 KG/M2 | DIASTOLIC BLOOD PRESSURE: 58 MMHG | WEIGHT: 168.63 LBS | OXYGEN SATURATION: 95 %

## 2022-01-01 DIAGNOSIS — R00.1 BRADYCARDIA: ICD-10-CM

## 2022-01-01 DIAGNOSIS — N25.81 SECONDARY HYPERPARATHYROIDISM OF RENAL ORIGIN: ICD-10-CM

## 2022-01-01 DIAGNOSIS — R07.9 CHEST PAIN: ICD-10-CM

## 2022-01-01 DIAGNOSIS — C22.0 HCC (HEPATOCELLULAR CARCINOMA): ICD-10-CM

## 2022-01-01 DIAGNOSIS — R51.9 SEVERE HEADACHE: ICD-10-CM

## 2022-01-01 DIAGNOSIS — N18.31 CHRONIC KIDNEY DISEASE (CKD) STAGE G3A/A1, MODERATELY DECREASED GLOMERULAR FILTRATION RATE (GFR) BETWEEN 45-59 ML/MIN/1.73 SQUARE METER AND ALBUMINURIA CREATININE RATIO LESS THAN 30 MG/G: ICD-10-CM

## 2022-01-01 DIAGNOSIS — H47.11 BILATERAL PAPILLEDEMA DUE TO RAISED INTRACRANIAL PRESSURE: ICD-10-CM

## 2022-01-01 DIAGNOSIS — R65.21 SEPTIC SHOCK: ICD-10-CM

## 2022-01-01 DIAGNOSIS — Z94.0 KIDNEY REPLACED BY TRANSPLANT: ICD-10-CM

## 2022-01-01 DIAGNOSIS — Z29.89 PROPHYLACTIC IMMUNOTHERAPY: ICD-10-CM

## 2022-01-01 DIAGNOSIS — B45.1 CRYPTOCOCCAL MENINGITIS: ICD-10-CM

## 2022-01-01 DIAGNOSIS — Z94.0 S/P KIDNEY TRANSPLANT: ICD-10-CM

## 2022-01-01 DIAGNOSIS — R51.9 NONINTRACTABLE HEADACHE, UNSPECIFIED CHRONICITY PATTERN, UNSPECIFIED HEADACHE TYPE: Primary | ICD-10-CM

## 2022-01-01 DIAGNOSIS — R94.31 QT PROLONGATION: ICD-10-CM

## 2022-01-01 DIAGNOSIS — I10 ESSENTIAL HYPERTENSION, MALIGNANT: ICD-10-CM

## 2022-01-01 DIAGNOSIS — G02 MENINGITIS DUE TO FUNGAL INFECTION: Primary | ICD-10-CM

## 2022-01-01 DIAGNOSIS — C22.0 HCC (HEPATOCELLULAR CARCINOMA): Primary | ICD-10-CM

## 2022-01-01 DIAGNOSIS — E55.9 VITAMIN D DEFICIENCY: ICD-10-CM

## 2022-01-01 DIAGNOSIS — A41.9 SEPTIC SHOCK: ICD-10-CM

## 2022-01-01 DIAGNOSIS — G44.211 INTRACTABLE EPISODIC TENSION-TYPE HEADACHE: Primary | ICD-10-CM

## 2022-01-01 DIAGNOSIS — G02 FUNGAL MENINGITIS: ICD-10-CM

## 2022-01-01 DIAGNOSIS — R57.9 SHOCK, UNSPECIFIED: ICD-10-CM

## 2022-01-01 DIAGNOSIS — G93.2 INCREASED INTRACRANIAL PRESSURE: ICD-10-CM

## 2022-01-01 LAB
25(OH)D3+25(OH)D2 SERPL-MCNC: 33 NG/ML (ref 30–96)
ABO + RH BLD: NORMAL
ABO + RH BLD: NORMAL
ALBUMIN SERPL BCP-MCNC: 1.9 G/DL (ref 3.5–5.2)
ALBUMIN SERPL BCP-MCNC: 2.3 G/DL (ref 3.5–5.2)
ALBUMIN SERPL BCP-MCNC: 2.5 G/DL (ref 3.5–5.2)
ALBUMIN SERPL BCP-MCNC: 2.6 G/DL (ref 3.5–5.2)
ALBUMIN SERPL BCP-MCNC: 2.6 G/DL (ref 3.5–5.2)
ALBUMIN SERPL BCP-MCNC: 2.7 G/DL (ref 3.5–5.2)
ALBUMIN SERPL BCP-MCNC: 2.7 G/DL (ref 3.5–5.2)
ALBUMIN SERPL BCP-MCNC: 3 G/DL (ref 3.5–5.2)
ALBUMIN SERPL BCP-MCNC: 3 G/DL (ref 3.5–5.2)
ALBUMIN SERPL BCP-MCNC: 3.1 G/DL (ref 3.5–5.2)
ALBUMIN SERPL BCP-MCNC: 3.2 G/DL (ref 3.5–5.2)
ALBUMIN SERPL BCP-MCNC: 3.2 G/DL (ref 3.5–5.2)
ALBUMIN SERPL BCP-MCNC: 3.3 G/DL (ref 3.5–5.2)
ALBUMIN SERPL BCP-MCNC: 3.3 G/DL (ref 3.5–5.2)
ALBUMIN SERPL BCP-MCNC: 3.4 G/DL (ref 3.5–5.2)
ALBUMIN SERPL BCP-MCNC: 3.4 G/DL (ref 3.5–5.2)
ALBUMIN SERPL BCP-MCNC: 3.8 G/DL (ref 3.5–5.2)
ALBUMIN SERPL BCP-MCNC: 4 G/DL (ref 3.5–5.2)
ALBUMIN SERPL BCP-MCNC: 4.1 G/DL (ref 3.5–5.2)
ALLENS TEST: ABNORMAL
ALLENS TEST: ABNORMAL
ALP SERPL-CCNC: 39 U/L (ref 55–135)
ALP SERPL-CCNC: 42 U/L (ref 55–135)
ALP SERPL-CCNC: 43 U/L (ref 55–135)
ALP SERPL-CCNC: 45 U/L (ref 55–135)
ALP SERPL-CCNC: 45 U/L (ref 55–135)
ALP SERPL-CCNC: 48 U/L (ref 55–135)
ALP SERPL-CCNC: 52 U/L (ref 55–135)
ALP SERPL-CCNC: 53 U/L (ref 55–135)
ALP SERPL-CCNC: 54 U/L (ref 55–135)
ALP SERPL-CCNC: 55 U/L (ref 55–135)
ALP SERPL-CCNC: 55 U/L (ref 55–135)
ALP SERPL-CCNC: 57 U/L (ref 55–135)
ALP SERPL-CCNC: 58 U/L (ref 55–135)
ALP SERPL-CCNC: 59 U/L (ref 55–135)
ALP SERPL-CCNC: 60 U/L (ref 55–135)
ALP SERPL-CCNC: 63 U/L (ref 55–135)
ALP SERPL-CCNC: 65 U/L (ref 55–135)
ALP SERPL-CCNC: 66 U/L (ref 55–135)
ALP SERPL-CCNC: 67 U/L (ref 55–135)
ALP SERPL-CCNC: 67 U/L (ref 55–135)
ALP SERPL-CCNC: 70 U/L (ref 55–135)
ALT SERPL W/O P-5'-P-CCNC: 10 U/L (ref 10–44)
ALT SERPL W/O P-5'-P-CCNC: 11 U/L (ref 10–44)
ALT SERPL W/O P-5'-P-CCNC: 11 U/L (ref 10–44)
ALT SERPL W/O P-5'-P-CCNC: 12 U/L (ref 10–44)
ALT SERPL W/O P-5'-P-CCNC: 13 U/L (ref 10–44)
ALT SERPL W/O P-5'-P-CCNC: 14 U/L (ref 10–44)
ALT SERPL W/O P-5'-P-CCNC: 15 U/L (ref 10–44)
ALT SERPL W/O P-5'-P-CCNC: 16 U/L (ref 10–44)
ALT SERPL W/O P-5'-P-CCNC: 17 U/L (ref 10–44)
ALT SERPL W/O P-5'-P-CCNC: 18 U/L (ref 10–44)
ALT SERPL W/O P-5'-P-CCNC: 19 U/L (ref 10–44)
ALT SERPL W/O P-5'-P-CCNC: 19 U/L (ref 10–44)
ALT SERPL W/O P-5'-P-CCNC: 20 U/L (ref 10–44)
ALT SERPL W/O P-5'-P-CCNC: 20 U/L (ref 10–44)
ALT SERPL W/O P-5'-P-CCNC: 243 U/L (ref 10–44)
ALT SERPL W/O P-5'-P-CCNC: 7 U/L (ref 10–44)
ALT SERPL W/O P-5'-P-CCNC: 7 U/L (ref 10–44)
ALT SERPL W/O P-5'-P-CCNC: 8 U/L (ref 10–44)
ALT SERPL W/O P-5'-P-CCNC: 8 U/L (ref 10–44)
ALT SERPL W/O P-5'-P-CCNC: 9 U/L (ref 10–44)
ALT SERPL W/O P-5'-P-CCNC: 9 U/L (ref 10–44)
ALT SERPL W/O P-5'-P-CCNC: <5 U/L (ref 10–44)
ANION GAP SERPL CALC-SCNC: 10 MMOL/L (ref 8–16)
ANION GAP SERPL CALC-SCNC: 11 MMOL/L (ref 8–16)
ANION GAP SERPL CALC-SCNC: 12 MMOL/L (ref 8–16)
ANION GAP SERPL CALC-SCNC: 13 MMOL/L (ref 8–16)
ANION GAP SERPL CALC-SCNC: 19 MMOL/L (ref 8–16)
ANION GAP SERPL CALC-SCNC: 21 MMOL/L (ref 8–16)
ANION GAP SERPL CALC-SCNC: 5 MMOL/L (ref 8–16)
ANION GAP SERPL CALC-SCNC: 6 MMOL/L (ref 8–16)
ANION GAP SERPL CALC-SCNC: 7 MMOL/L (ref 8–16)
ANION GAP SERPL CALC-SCNC: 8 MMOL/L (ref 8–16)
ANION GAP SERPL CALC-SCNC: 9 MMOL/L (ref 8–16)
ANISOCYTOSIS BLD QL SMEAR: SLIGHT
APTT BLDCRRT: 102.8 SEC (ref 21–32)
APTT BLDCRRT: 22.8 SEC (ref 21–32)
APTT BLDCRRT: 23.4 SEC (ref 21–32)
APTT BLDCRRT: 23.6 SEC (ref 21–32)
APTT BLDCRRT: 25 SEC (ref 21–32)
APTT BLDCRRT: 25.2 SEC (ref 21–32)
APTT BLDCRRT: 25.4 SEC (ref 21–32)
APTT BLDCRRT: 25.8 SEC (ref 21–32)
APTT BLDCRRT: 30.5 SEC (ref 21–32)
APTT BLDCRRT: 32.2 SEC (ref 21–32)
APTT BLDCRRT: 33.2 SEC (ref 21–32)
APTT BLDCRRT: 33.3 SEC (ref 21–32)
APTT BLDCRRT: 34.5 SEC (ref 21–32)
APTT BLDCRRT: 34.7 SEC (ref 21–32)
APTT BLDCRRT: 37.2 SEC (ref 21–32)
APTT BLDCRRT: 37.3 SEC (ref 21–32)
APTT BLDCRRT: 38.3 SEC (ref 21–32)
APTT BLDCRRT: 38.5 SEC (ref 21–32)
APTT BLDCRRT: 39.7 SEC (ref 21–32)
APTT BLDCRRT: 39.9 SEC (ref 21–32)
APTT BLDCRRT: 40.7 SEC (ref 21–32)
APTT BLDCRRT: 41.7 SEC (ref 21–32)
APTT BLDCRRT: 41.9 SEC (ref 21–32)
APTT BLDCRRT: 46.2 SEC (ref 21–32)
APTT BLDCRRT: 46.4 SEC (ref 21–32)
APTT BLDCRRT: 54.9 SEC (ref 21–32)
APTT BLDCRRT: 57.6 SEC (ref 21–32)
APTT BLDCRRT: 57.6 SEC (ref 21–32)
APTT BLDCRRT: 59.8 SEC (ref 21–32)
AST SERPL-CCNC: 10 U/L (ref 10–40)
AST SERPL-CCNC: 10 U/L (ref 10–40)
AST SERPL-CCNC: 11 U/L (ref 10–40)
AST SERPL-CCNC: 12 U/L (ref 10–40)
AST SERPL-CCNC: 13 U/L (ref 10–40)
AST SERPL-CCNC: 14 U/L (ref 10–40)
AST SERPL-CCNC: 15 U/L (ref 10–40)
AST SERPL-CCNC: 16 U/L (ref 10–40)
AST SERPL-CCNC: 18 U/L (ref 10–40)
AST SERPL-CCNC: 19 U/L (ref 10–40)
AST SERPL-CCNC: 405 U/L (ref 10–40)
AST SERPL-CCNC: 7 U/L (ref 10–40)
AST SERPL-CCNC: 9 U/L (ref 10–40)
BACTERIA #/AREA URNS AUTO: ABNORMAL /HPF
BACTERIA #/AREA URNS AUTO: NORMAL /HPF
BACTERIA CSF CULT: NO GROWTH
BASO STIPL BLD QL SMEAR: ABNORMAL
BASOPHILS # BLD AUTO: 0.01 K/UL (ref 0–0.2)
BASOPHILS # BLD AUTO: 0.02 K/UL (ref 0–0.2)
BASOPHILS # BLD AUTO: 0.03 K/UL (ref 0–0.2)
BASOPHILS # BLD AUTO: 0.04 K/UL (ref 0–0.2)
BASOPHILS # BLD AUTO: 0.05 K/UL (ref 0–0.2)
BASOPHILS # BLD AUTO: 0.06 K/UL (ref 0–0.2)
BASOPHILS # BLD AUTO: ABNORMAL K/UL (ref 0–0.2)
BASOPHILS # BLD AUTO: ABNORMAL K/UL (ref 0–0.2)
BASOPHILS NFR BLD: 0 % (ref 0–1.9)
BASOPHILS NFR BLD: 0 % (ref 0–1.9)
BASOPHILS NFR BLD: 0.3 % (ref 0–1.9)
BASOPHILS NFR BLD: 0.4 % (ref 0–1.9)
BASOPHILS NFR BLD: 0.5 % (ref 0–1.9)
BASOPHILS NFR BLD: 0.6 % (ref 0–1.9)
BASOPHILS NFR BLD: 0.7 % (ref 0–1.9)
BASOPHILS NFR BLD: 0.8 % (ref 0–1.9)
BASOPHILS NFR BLD: 0.8 % (ref 0–1.9)
BASOPHILS NFR BLD: 0.9 % (ref 0–1.9)
BASOPHILS NFR BLD: 1 % (ref 0–1.9)
BASOPHILS NFR BLD: 1.1 % (ref 0–1.9)
BASOPHILS NFR BLD: 1.2 % (ref 0–1.9)
BASOPHILS NFR BLD: 1.7 % (ref 0–1.9)
BASOPHILS NFR BLD: 1.8 % (ref 0–1.9)
BILIRUB DIRECT SERPL-MCNC: 0.3 MG/DL (ref 0.1–0.3)
BILIRUB DIRECT SERPL-MCNC: 0.3 MG/DL (ref 0.1–0.3)
BILIRUB SERPL-MCNC: 0.3 MG/DL (ref 0.1–1)
BILIRUB SERPL-MCNC: 0.4 MG/DL (ref 0.1–1)
BILIRUB SERPL-MCNC: 0.5 MG/DL (ref 0.1–1)
BILIRUB SERPL-MCNC: 0.6 MG/DL (ref 0.1–1)
BILIRUB SERPL-MCNC: 0.7 MG/DL (ref 0.1–1)
BILIRUB SERPL-MCNC: 0.8 MG/DL (ref 0.1–1)
BILIRUB SERPL-MCNC: 0.8 MG/DL (ref 0.1–1)
BILIRUB SERPL-MCNC: 0.9 MG/DL (ref 0.1–1)
BILIRUB SERPL-MCNC: 0.9 MG/DL (ref 0.1–1)
BILIRUB SERPL-MCNC: 1 MG/DL (ref 0.1–1)
BILIRUB SERPL-MCNC: 1.8 MG/DL (ref 0.1–1)
BILIRUB UR QL STRIP: NEGATIVE
BKV DNA SERPL NAA+PROBE-ACNC: <125 COPIES/ML
BKV DNA SERPL NAA+PROBE-LOG#: <2.1 LOG (10) COPIES/ML
BKV DNA SERPL QL NAA+PROBE: NOT DETECTED
BLD GP AB SCN CELLS X3 SERPL QL: NORMAL
BLD GP AB SCN CELLS X3 SERPL QL: NORMAL
BNP SERPL-MCNC: 1241 PG/ML (ref 0–99)
BUN SERPL-MCNC: 16 MG/DL (ref 6–20)
BUN SERPL-MCNC: 16 MG/DL (ref 6–20)
BUN SERPL-MCNC: 17 MG/DL (ref 6–20)
BUN SERPL-MCNC: 17 MG/DL (ref 6–20)
BUN SERPL-MCNC: 19 MG/DL (ref 6–20)
BUN SERPL-MCNC: 20 MG/DL (ref 6–20)
BUN SERPL-MCNC: 21 MG/DL (ref 6–20)
BUN SERPL-MCNC: 22 MG/DL (ref 6–20)
BUN SERPL-MCNC: 22 MG/DL (ref 6–20)
BUN SERPL-MCNC: 23 MG/DL (ref 6–20)
BUN SERPL-MCNC: 24 MG/DL (ref 6–20)
BUN SERPL-MCNC: 25 MG/DL (ref 6–20)
BUN SERPL-MCNC: 26 MG/DL (ref 6–20)
BUN SERPL-MCNC: 27 MG/DL (ref 6–20)
BUN SERPL-MCNC: 30 MG/DL (ref 6–20)
BUN SERPL-MCNC: 33 MG/DL (ref 6–20)
BUN SERPL-MCNC: 46 MG/DL (ref 6–20)
BUN SERPL-MCNC: 53 MG/DL (ref 6–20)
BUN SERPL-MCNC: 71 MG/DL (ref 6–20)
BUN SERPL-MCNC: 72 MG/DL (ref 6–20)
C DIFF GDH STL QL: NEGATIVE
C DIFF TOX A+B STL QL IA: NEGATIVE
CALCIUM SERPL-MCNC: 7.8 MG/DL (ref 8.7–10.5)
CALCIUM SERPL-MCNC: 7.9 MG/DL (ref 8.7–10.5)
CALCIUM SERPL-MCNC: 8 MG/DL (ref 8.7–10.5)
CALCIUM SERPL-MCNC: 8.1 MG/DL (ref 8.7–10.5)
CALCIUM SERPL-MCNC: 8.2 MG/DL (ref 8.7–10.5)
CALCIUM SERPL-MCNC: 8.4 MG/DL (ref 8.7–10.5)
CALCIUM SERPL-MCNC: 8.5 MG/DL (ref 8.7–10.5)
CALCIUM SERPL-MCNC: 8.6 MG/DL (ref 8.7–10.5)
CALCIUM SERPL-MCNC: 8.6 MG/DL (ref 8.7–10.5)
CALCIUM SERPL-MCNC: 8.7 MG/DL (ref 8.7–10.5)
CALCIUM SERPL-MCNC: 8.9 MG/DL (ref 8.7–10.5)
CALCIUM SERPL-MCNC: 9 MG/DL (ref 8.7–10.5)
CALCIUM SERPL-MCNC: 9 MG/DL (ref 8.7–10.5)
CALCIUM SERPL-MCNC: 9.1 MG/DL (ref 8.7–10.5)
CALCIUM SERPL-MCNC: 9.3 MG/DL (ref 8.7–10.5)
CALCIUM SERPL-MCNC: 9.6 MG/DL (ref 8.7–10.5)
CHLORIDE SERPL-SCNC: 101 MMOL/L (ref 95–110)
CHLORIDE SERPL-SCNC: 102 MMOL/L (ref 95–110)
CHLORIDE SERPL-SCNC: 104 MMOL/L (ref 95–110)
CHLORIDE SERPL-SCNC: 105 MMOL/L (ref 95–110)
CHLORIDE SERPL-SCNC: 106 MMOL/L (ref 95–110)
CHLORIDE SERPL-SCNC: 107 MMOL/L (ref 95–110)
CHLORIDE SERPL-SCNC: 108 MMOL/L (ref 95–110)
CHLORIDE SERPL-SCNC: 109 MMOL/L (ref 95–110)
CHLORIDE SERPL-SCNC: 111 MMOL/L (ref 95–110)
CHLORIDE SERPL-SCNC: 111 MMOL/L (ref 95–110)
CHLORIDE SERPL-SCNC: 113 MMOL/L (ref 95–110)
CHLORIDE SERPL-SCNC: 93 MMOL/L (ref 95–110)
CHLORIDE SERPL-SCNC: 94 MMOL/L (ref 95–110)
CHLORIDE SERPL-SCNC: 97 MMOL/L (ref 95–110)
CHLORIDE SERPL-SCNC: 98 MMOL/L (ref 95–110)
CK SERPL-CCNC: 229 U/L (ref 20–180)
CLARITY CSF: ABNORMAL
CLARITY CSF: CLEAR
CLARITY UR REFRACT.AUTO: ABNORMAL
CLARITY UR REFRACT.AUTO: CLEAR
CLARITY UR REFRACT.AUTO: CLEAR
CO2 SERPL-SCNC: 14 MMOL/L (ref 23–29)
CO2 SERPL-SCNC: 15 MMOL/L (ref 23–29)
CO2 SERPL-SCNC: 17 MMOL/L (ref 23–29)
CO2 SERPL-SCNC: 18 MMOL/L (ref 23–29)
CO2 SERPL-SCNC: 19 MMOL/L (ref 23–29)
CO2 SERPL-SCNC: 20 MMOL/L (ref 23–29)
CO2 SERPL-SCNC: 21 MMOL/L (ref 23–29)
CO2 SERPL-SCNC: 22 MMOL/L (ref 23–29)
CO2 SERPL-SCNC: 23 MMOL/L (ref 23–29)
CO2 SERPL-SCNC: 24 MMOL/L (ref 23–29)
CO2 SERPL-SCNC: 25 MMOL/L (ref 23–29)
CO2 SERPL-SCNC: 26 MMOL/L (ref 23–29)
CO2 SERPL-SCNC: 27 MMOL/L (ref 23–29)
COLOR CSF: ABNORMAL
COLOR CSF: COLORLESS
COLOR UR AUTO: ABNORMAL
COLOR UR AUTO: NORMAL
COLOR UR AUTO: YELLOW
CREAT SERPL-MCNC: 1 MG/DL (ref 0.5–1.4)
CREAT SERPL-MCNC: 1.1 MG/DL (ref 0.5–1.4)
CREAT SERPL-MCNC: 1.2 MG/DL (ref 0.5–1.4)
CREAT SERPL-MCNC: 1.3 MG/DL (ref 0.5–1.4)
CREAT SERPL-MCNC: 1.4 MG/DL (ref 0.5–1.4)
CREAT SERPL-MCNC: 1.5 MG/DL (ref 0.5–1.4)
CREAT SERPL-MCNC: 1.5 MG/DL (ref 0.5–1.4)
CREAT SERPL-MCNC: 1.6 MG/DL (ref 0.5–1.4)
CREAT SERPL-MCNC: 1.7 MG/DL (ref 0.5–1.4)
CREAT SERPL-MCNC: 2.1 MG/DL (ref 0.5–1.4)
CREAT SERPL-MCNC: 2.2 MG/DL (ref 0.5–1.4)
CREAT SERPL-MCNC: 3.3 MG/DL (ref 0.5–1.4)
CREAT SERPL-MCNC: 3.4 MG/DL (ref 0.5–1.4)
CREAT UR-MCNC: 102 MG/DL (ref 15–325)
CREAT UR-MCNC: 102 MG/DL (ref 15–325)
CREAT UR-MCNC: 31 MG/DL (ref 15–325)
CRYPTOC AG CSF QL LA: ABNORMAL
CRYPTOC AG CSF QL LA: ABNORMAL
CRYPTOC AG SER QL LA: ABNORMAL
CRYPTOSP AG STL QL IA: NEGATIVE
CSF, COMMENT: ABNORMAL
CSF, COMMENT: ABNORMAL
CTP QC/QA: YES
CYTOMEGALOVIRUS LOG (IU/ML): 1.99 LOGIU/ML
CYTOMEGALOVIRUS PCR, QUANT: 97 IU/ML
D DIMER PPP IA.FEU-MCNC: 23.82 MG/L FEU
DACRYOCYTES BLD QL SMEAR: ABNORMAL
DACRYOCYTES BLD QL SMEAR: ABNORMAL
DELSYS: ABNORMAL
DELSYS: ABNORMAL
DIFFERENTIAL METHOD: ABNORMAL
DOHLE BOD BLD QL SMEAR: PRESENT
E COLI SXT1 STL QL IA: NEGATIVE
E COLI SXT2 STL QL IA: NEGATIVE
EOSINOPHIL # BLD AUTO: 0 K/UL (ref 0–0.5)
EOSINOPHIL # BLD AUTO: 0.1 K/UL (ref 0–0.5)
EOSINOPHIL # BLD AUTO: 0.2 K/UL (ref 0–0.5)
EOSINOPHIL # BLD AUTO: 0.3 K/UL (ref 0–0.5)
EOSINOPHIL # BLD AUTO: 0.3 K/UL (ref 0–0.5)
EOSINOPHIL # BLD AUTO: ABNORMAL K/UL (ref 0–0.5)
EOSINOPHIL # BLD AUTO: ABNORMAL K/UL (ref 0–0.5)
EOSINOPHIL NFR BLD: 0.1 % (ref 0–8)
EOSINOPHIL NFR BLD: 0.3 % (ref 0–8)
EOSINOPHIL NFR BLD: 0.3 % (ref 0–8)
EOSINOPHIL NFR BLD: 0.5 % (ref 0–8)
EOSINOPHIL NFR BLD: 0.6 % (ref 0–8)
EOSINOPHIL NFR BLD: 0.7 % (ref 0–8)
EOSINOPHIL NFR BLD: 0.7 % (ref 0–8)
EOSINOPHIL NFR BLD: 1 % (ref 0–8)
EOSINOPHIL NFR BLD: 1 % (ref 0–8)
EOSINOPHIL NFR BLD: 1.3 % (ref 0–8)
EOSINOPHIL NFR BLD: 1.4 % (ref 0–8)
EOSINOPHIL NFR BLD: 1.5 % (ref 0–8)
EOSINOPHIL NFR BLD: 1.6 % (ref 0–8)
EOSINOPHIL NFR BLD: 1.6 % (ref 0–8)
EOSINOPHIL NFR BLD: 1.8 % (ref 0–8)
EOSINOPHIL NFR BLD: 2.1 % (ref 0–8)
EOSINOPHIL NFR BLD: 2.1 % (ref 0–8)
EOSINOPHIL NFR BLD: 2.4 % (ref 0–8)
EOSINOPHIL NFR BLD: 2.5 % (ref 0–8)
EOSINOPHIL NFR BLD: 2.6 % (ref 0–8)
EOSINOPHIL NFR BLD: 2.7 % (ref 0–8)
EOSINOPHIL NFR BLD: 2.9 % (ref 0–8)
EOSINOPHIL NFR BLD: 3 % (ref 0–8)
EOSINOPHIL NFR BLD: 3.2 % (ref 0–8)
EOSINOPHIL NFR BLD: 3.6 % (ref 0–8)
EOSINOPHIL NFR BLD: 4 % (ref 0–8)
EOSINOPHIL NFR BLD: 4.7 % (ref 0–8)
EOSINOPHIL NFR BLD: 4.9 % (ref 0–8)
EOSINOPHIL NFR BLD: 5.8 % (ref 0–8)
EOSINOPHIL NFR BLD: 5.8 % (ref 0–8)
EOSINOPHIL NFR BLD: 6 % (ref 0–8)
EOSINOPHIL NFR BLD: 6 % (ref 0–8)
EOSINOPHIL NFR BLD: 9 % (ref 0–8)
EOSINOPHIL NFR BLD: 9 % (ref 0–8)
ERYTHROCYTE [DISTWIDTH] IN BLOOD BY AUTOMATED COUNT: 11.9 % (ref 11.5–14.5)
ERYTHROCYTE [DISTWIDTH] IN BLOOD BY AUTOMATED COUNT: 12 % (ref 11.5–14.5)
ERYTHROCYTE [DISTWIDTH] IN BLOOD BY AUTOMATED COUNT: 12.3 % (ref 11.5–14.5)
ERYTHROCYTE [DISTWIDTH] IN BLOOD BY AUTOMATED COUNT: 12.5 % (ref 11.5–14.5)
ERYTHROCYTE [DISTWIDTH] IN BLOOD BY AUTOMATED COUNT: 12.6 % (ref 11.5–14.5)
ERYTHROCYTE [DISTWIDTH] IN BLOOD BY AUTOMATED COUNT: 12.7 % (ref 11.5–14.5)
ERYTHROCYTE [DISTWIDTH] IN BLOOD BY AUTOMATED COUNT: 12.9 % (ref 11.5–14.5)
ERYTHROCYTE [DISTWIDTH] IN BLOOD BY AUTOMATED COUNT: 13.1 % (ref 11.5–14.5)
ERYTHROCYTE [DISTWIDTH] IN BLOOD BY AUTOMATED COUNT: 13.2 % (ref 11.5–14.5)
ERYTHROCYTE [DISTWIDTH] IN BLOOD BY AUTOMATED COUNT: 13.4 % (ref 11.5–14.5)
ERYTHROCYTE [DISTWIDTH] IN BLOOD BY AUTOMATED COUNT: 13.5 % (ref 11.5–14.5)
ERYTHROCYTE [DISTWIDTH] IN BLOOD BY AUTOMATED COUNT: 13.6 % (ref 11.5–14.5)
ERYTHROCYTE [DISTWIDTH] IN BLOOD BY AUTOMATED COUNT: 13.7 % (ref 11.5–14.5)
ERYTHROCYTE [DISTWIDTH] IN BLOOD BY AUTOMATED COUNT: 13.7 % (ref 11.5–14.5)
ERYTHROCYTE [DISTWIDTH] IN BLOOD BY AUTOMATED COUNT: 13.8 % (ref 11.5–14.5)
ERYTHROCYTE [SEDIMENTATION RATE] IN BLOOD BY WESTERGREN METHOD: 16 MM/H
EST. GFR  (AFRICAN AMERICAN): 16.8 ML/MIN/1.73 M^2
EST. GFR  (AFRICAN AMERICAN): 17.4 ML/MIN/1.73 M^2
EST. GFR  (AFRICAN AMERICAN): 28.5 ML/MIN/1.73 M^2
EST. GFR  (AFRICAN AMERICAN): 30.1 ML/MIN/1.73 M^2
EST. GFR  (AFRICAN AMERICAN): 38.9 ML/MIN/1.73 M^2
EST. GFR  (AFRICAN AMERICAN): 41.8 ML/MIN/1.73 M^2
EST. GFR  (AFRICAN AMERICAN): 45.2 ML/MIN/1.73 M^2
EST. GFR  (AFRICAN AMERICAN): 45.2 ML/MIN/1.73 M^2
EST. GFR  (AFRICAN AMERICAN): 49.1 ML/MIN/1.73 M^2
EST. GFR  (AFRICAN AMERICAN): 53.7 ML/MIN/1.73 M^2
EST. GFR  (AFRICAN AMERICAN): 59.2 ML/MIN/1.73 M^2
EST. GFR  (AFRICAN AMERICAN): >60 ML/MIN/1.73 M^2
EST. GFR  (AFRICAN AMERICAN): >60 ML/MIN/1.73 M^2
EST. GFR  (NON AFRICAN AMERICAN): 14.6 ML/MIN/1.73 M^2
EST. GFR  (NON AFRICAN AMERICAN): 15.1 ML/MIN/1.73 M^2
EST. GFR  (NON AFRICAN AMERICAN): 24.7 ML/MIN/1.73 M^2
EST. GFR  (NON AFRICAN AMERICAN): 26.1 ML/MIN/1.73 M^2
EST. GFR  (NON AFRICAN AMERICAN): 33.7 ML/MIN/1.73 M^2
EST. GFR  (NON AFRICAN AMERICAN): 36.3 ML/MIN/1.73 M^2
EST. GFR  (NON AFRICAN AMERICAN): 39.2 ML/MIN/1.73 M^2
EST. GFR  (NON AFRICAN AMERICAN): 39.2 ML/MIN/1.73 M^2
EST. GFR  (NON AFRICAN AMERICAN): 42.6 ML/MIN/1.73 M^2
EST. GFR  (NON AFRICAN AMERICAN): 46.6 ML/MIN/1.73 M^2
EST. GFR  (NON AFRICAN AMERICAN): 51.4 ML/MIN/1.73 M^2
EST. GFR  (NON AFRICAN AMERICAN): 57.1 ML/MIN/1.73 M^2
EST. GFR  (NON AFRICAN AMERICAN): >60 ML/MIN/1.73 M^2
ESTIMATED AVG GLUCOSE: 117 MG/DL (ref 68–131)
FACT X PPP CHRO-ACNC: 0.61 IU/ML (ref 0.3–0.7)
FACT X PPP CHRO-ACNC: <0.1 IU/ML (ref 0.3–0.7)
FERRITIN SERPL-MCNC: 3163 NG/ML (ref 20–300)
FIBRINOGEN PPP-MCNC: 478 MG/DL (ref 182–400)
FIO2: 100
FOLATE SERPL-MCNC: 19.2 NG/ML (ref 4–24)
G LAMBLIA AG STL QL IA: NEGATIVE
GIANT PLATELETS BLD QL SMEAR: PRESENT
GIANT PLATELETS BLD QL SMEAR: PRESENT
GLUCOSE CSF-MCNC: 11 MG/DL (ref 40–70)
GLUCOSE CSF-MCNC: 26 MG/DL (ref 40–70)
GLUCOSE CSF-MCNC: <5 MG/DL (ref 40–70)
GLUCOSE CSF-MCNC: <5 MG/DL (ref 40–70)
GLUCOSE SERPL-MCNC: 110 MG/DL (ref 70–110)
GLUCOSE SERPL-MCNC: 117 MG/DL (ref 70–110)
GLUCOSE SERPL-MCNC: 120 MG/DL (ref 70–110)
GLUCOSE SERPL-MCNC: 125 MG/DL (ref 70–110)
GLUCOSE SERPL-MCNC: 128 MG/DL (ref 70–110)
GLUCOSE SERPL-MCNC: 136 MG/DL (ref 70–110)
GLUCOSE SERPL-MCNC: 137 MG/DL (ref 70–110)
GLUCOSE SERPL-MCNC: 139 MG/DL (ref 70–110)
GLUCOSE SERPL-MCNC: 146 MG/DL (ref 70–110)
GLUCOSE SERPL-MCNC: 151 MG/DL (ref 70–110)
GLUCOSE SERPL-MCNC: 152 MG/DL (ref 70–110)
GLUCOSE SERPL-MCNC: 155 MG/DL (ref 70–110)
GLUCOSE SERPL-MCNC: 160 MG/DL (ref 70–110)
GLUCOSE SERPL-MCNC: 161 MG/DL (ref 70–110)
GLUCOSE SERPL-MCNC: 183 MG/DL (ref 70–110)
GLUCOSE SERPL-MCNC: 183 MG/DL (ref 70–110)
GLUCOSE SERPL-MCNC: 211 MG/DL (ref 70–110)
GLUCOSE SERPL-MCNC: 215 MG/DL (ref 70–110)
GLUCOSE SERPL-MCNC: 217 MG/DL (ref 70–110)
GLUCOSE SERPL-MCNC: 225 MG/DL (ref 70–110)
GLUCOSE SERPL-MCNC: 227 MG/DL (ref 70–110)
GLUCOSE SERPL-MCNC: 284 MG/DL (ref 70–110)
GLUCOSE SERPL-MCNC: 93 MG/DL (ref 70–110)
GLUCOSE SERPL-MCNC: 94 MG/DL (ref 70–110)
GLUCOSE SERPL-MCNC: 99 MG/DL (ref 70–110)
GLUCOSE UR QL STRIP: ABNORMAL
GLUCOSE UR QL STRIP: NEGATIVE
GLUCOSE UR QL STRIP: NEGATIVE
GRAM STN SPEC: NORMAL
HBA1C MFR BLD: 5.7 % (ref 4–5.6)
HCO3 UR-SCNC: 12.8 MMOL/L (ref 24–28)
HCO3 UR-SCNC: 15.6 MMOL/L (ref 24–28)
HCT VFR BLD AUTO: 26.6 % (ref 37–48.5)
HCT VFR BLD AUTO: 28.2 % (ref 37–48.5)
HCT VFR BLD AUTO: 28.2 % (ref 37–48.5)
HCT VFR BLD AUTO: 29 % (ref 37–48.5)
HCT VFR BLD AUTO: 29.2 % (ref 37–48.5)
HCT VFR BLD AUTO: 29.2 % (ref 37–48.5)
HCT VFR BLD AUTO: 29.6 % (ref 37–48.5)
HCT VFR BLD AUTO: 29.7 % (ref 37–48.5)
HCT VFR BLD AUTO: 30.7 % (ref 37–48.5)
HCT VFR BLD AUTO: 31.2 % (ref 37–48.5)
HCT VFR BLD AUTO: 31.3 % (ref 37–48.5)
HCT VFR BLD AUTO: 31.7 % (ref 37–48.5)
HCT VFR BLD AUTO: 31.7 % (ref 37–48.5)
HCT VFR BLD AUTO: 33 % (ref 37–48.5)
HCT VFR BLD AUTO: 33.1 % (ref 37–48.5)
HCT VFR BLD AUTO: 33.1 % (ref 37–48.5)
HCT VFR BLD AUTO: 33.8 % (ref 37–48.5)
HCT VFR BLD AUTO: 34 % (ref 37–48.5)
HCT VFR BLD AUTO: 34.2 % (ref 37–48.5)
HCT VFR BLD AUTO: 34.2 % (ref 37–48.5)
HCT VFR BLD AUTO: 34.8 % (ref 37–48.5)
HCT VFR BLD AUTO: 34.9 % (ref 37–48.5)
HCT VFR BLD AUTO: 35.4 % (ref 37–48.5)
HCT VFR BLD AUTO: 35.5 % (ref 37–48.5)
HCT VFR BLD AUTO: 35.5 % (ref 37–48.5)
HCT VFR BLD AUTO: 36.6 % (ref 37–48.5)
HCT VFR BLD AUTO: 37.3 % (ref 37–48.5)
HCT VFR BLD AUTO: 37.3 % (ref 37–48.5)
HCT VFR BLD AUTO: 38.7 % (ref 37–48.5)
HCT VFR BLD AUTO: 39.9 % (ref 37–48.5)
HCT VFR BLD AUTO: 40 % (ref 37–48.5)
HCT VFR BLD AUTO: 40.7 % (ref 37–48.5)
HCT VFR BLD AUTO: 42.9 % (ref 37–48.5)
HCT VFR BLD AUTO: 42.9 % (ref 37–48.5)
HCT VFR BLD AUTO: 44.4 % (ref 37–48.5)
HCT VFR BLD AUTO: 46.3 % (ref 37–48.5)
HCT VFR BLD AUTO: 46.3 % (ref 37–48.5)
HCT VFR BLD CALC: 30 %PCV (ref 36–54)
HCV AB SERPL QL IA: NEGATIVE
HGB BLD-MCNC: 10 G/DL (ref 12–16)
HGB BLD-MCNC: 10.1 G/DL (ref 12–16)
HGB BLD-MCNC: 10.1 G/DL (ref 12–16)
HGB BLD-MCNC: 10.4 G/DL (ref 12–16)
HGB BLD-MCNC: 10.6 G/DL (ref 12–16)
HGB BLD-MCNC: 10.9 G/DL (ref 12–16)
HGB BLD-MCNC: 11 G/DL (ref 12–16)
HGB BLD-MCNC: 11.2 G/DL (ref 12–16)
HGB BLD-MCNC: 11.2 G/DL (ref 12–16)
HGB BLD-MCNC: 11.3 G/DL (ref 12–16)
HGB BLD-MCNC: 11.4 G/DL (ref 12–16)
HGB BLD-MCNC: 11.4 G/DL (ref 12–16)
HGB BLD-MCNC: 11.6 G/DL (ref 12–16)
HGB BLD-MCNC: 11.7 G/DL (ref 12–16)
HGB BLD-MCNC: 12.1 G/DL (ref 12–16)
HGB BLD-MCNC: 12.1 G/DL (ref 12–16)
HGB BLD-MCNC: 12.5 G/DL (ref 12–16)
HGB BLD-MCNC: 12.5 G/DL (ref 12–16)
HGB BLD-MCNC: 12.8 G/DL (ref 12–16)
HGB BLD-MCNC: 13 G/DL (ref 12–16)
HGB BLD-MCNC: 13.4 G/DL (ref 12–16)
HGB BLD-MCNC: 13.5 G/DL (ref 12–16)
HGB BLD-MCNC: 14.6 G/DL (ref 12–16)
HGB BLD-MCNC: 14.8 G/DL (ref 12–16)
HGB BLD-MCNC: 15.2 G/DL (ref 12–16)
HGB BLD-MCNC: 8.9 G/DL (ref 12–16)
HGB BLD-MCNC: 9.3 G/DL (ref 12–16)
HGB BLD-MCNC: 9.4 G/DL (ref 12–16)
HGB BLD-MCNC: 9.8 G/DL (ref 12–16)
HGB UR QL STRIP: NEGATIVE
HIV 1+2 AB+HIV1 P24 AG SERPL QL IA: NEGATIVE
HYALINE CASTS UR QL AUTO: 3 /LPF
HYPOCHROMIA BLD QL SMEAR: ABNORMAL
IMM GRANULOCYTES # BLD AUTO: 0.01 K/UL (ref 0–0.04)
IMM GRANULOCYTES # BLD AUTO: 0.02 K/UL (ref 0–0.04)
IMM GRANULOCYTES # BLD AUTO: 0.03 K/UL (ref 0–0.04)
IMM GRANULOCYTES # BLD AUTO: 0.03 K/UL (ref 0–0.04)
IMM GRANULOCYTES # BLD AUTO: 0.04 K/UL (ref 0–0.04)
IMM GRANULOCYTES # BLD AUTO: 0.05 K/UL (ref 0–0.04)
IMM GRANULOCYTES # BLD AUTO: 0.06 K/UL (ref 0–0.04)
IMM GRANULOCYTES # BLD AUTO: 0.07 K/UL (ref 0–0.04)
IMM GRANULOCYTES # BLD AUTO: 0.07 K/UL (ref 0–0.04)
IMM GRANULOCYTES # BLD AUTO: 0.08 K/UL (ref 0–0.04)
IMM GRANULOCYTES # BLD AUTO: 0.08 K/UL (ref 0–0.04)
IMM GRANULOCYTES # BLD AUTO: 0.09 K/UL (ref 0–0.04)
IMM GRANULOCYTES # BLD AUTO: 0.1 K/UL (ref 0–0.04)
IMM GRANULOCYTES # BLD AUTO: 0.1 K/UL (ref 0–0.04)
IMM GRANULOCYTES # BLD AUTO: 0.13 K/UL (ref 0–0.04)
IMM GRANULOCYTES # BLD AUTO: ABNORMAL K/UL (ref 0–0.04)
IMM GRANULOCYTES NFR BLD AUTO: 0.2 % (ref 0–0.5)
IMM GRANULOCYTES NFR BLD AUTO: 0.2 % (ref 0–0.5)
IMM GRANULOCYTES NFR BLD AUTO: 0.3 % (ref 0–0.5)
IMM GRANULOCYTES NFR BLD AUTO: 0.3 % (ref 0–0.5)
IMM GRANULOCYTES NFR BLD AUTO: 0.5 % (ref 0–0.5)
IMM GRANULOCYTES NFR BLD AUTO: 0.5 % (ref 0–0.5)
IMM GRANULOCYTES NFR BLD AUTO: 0.6 % (ref 0–0.5)
IMM GRANULOCYTES NFR BLD AUTO: 0.6 % (ref 0–0.5)
IMM GRANULOCYTES NFR BLD AUTO: 0.7 % (ref 0–0.5)
IMM GRANULOCYTES NFR BLD AUTO: 0.8 % (ref 0–0.5)
IMM GRANULOCYTES NFR BLD AUTO: 0.8 % (ref 0–0.5)
IMM GRANULOCYTES NFR BLD AUTO: 1 % (ref 0–0.5)
IMM GRANULOCYTES NFR BLD AUTO: 1 % (ref 0–0.5)
IMM GRANULOCYTES NFR BLD AUTO: 1.1 % (ref 0–0.5)
IMM GRANULOCYTES NFR BLD AUTO: 1.2 % (ref 0–0.5)
IMM GRANULOCYTES NFR BLD AUTO: 1.4 % (ref 0–0.5)
IMM GRANULOCYTES NFR BLD AUTO: 1.5 % (ref 0–0.5)
IMM GRANULOCYTES NFR BLD AUTO: 1.5 % (ref 0–0.5)
IMM GRANULOCYTES NFR BLD AUTO: 1.6 % (ref 0–0.5)
IMM GRANULOCYTES NFR BLD AUTO: 1.6 % (ref 0–0.5)
IMM GRANULOCYTES NFR BLD AUTO: 1.8 % (ref 0–0.5)
IMM GRANULOCYTES NFR BLD AUTO: 1.9 % (ref 0–0.5)
IMM GRANULOCYTES NFR BLD AUTO: 1.9 % (ref 0–0.5)
IMM GRANULOCYTES NFR BLD AUTO: 2.5 % (ref 0–0.5)
IMM GRANULOCYTES NFR BLD AUTO: ABNORMAL % (ref 0–0.5)
INR PPP: 0.9 (ref 0.8–1.2)
INR PPP: 1 (ref 0.8–1.2)
INR PPP: 1.1 (ref 0.8–1.2)
INR PPP: 1.2 (ref 0.8–1.2)
INR PPP: 1.3 (ref 0.8–1.2)
INR PPP: 1.4 (ref 0.8–1.2)
INR PPP: 1.6 (ref 0.8–1.2)
IRON SERPL-MCNC: 101 UG/DL (ref 30–160)
IRON SERPL-MCNC: 45 UG/DL (ref 30–160)
KETONES UR QL STRIP: NEGATIVE
LACTATE SERPL-SCNC: >12 MMOL/L (ref 0.5–2.2)
LDH SERPL L TO P-CCNC: 1079 U/L (ref 110–260)
LDH SERPL L TO P-CCNC: 150 U/L (ref 110–260)
LEUKOCYTE ESTERASE UR QL STRIP: ABNORMAL
LEUKOCYTE ESTERASE UR QL STRIP: ABNORMAL
LEUKOCYTE ESTERASE UR QL STRIP: NEGATIVE
LYMPHOCYTES # BLD AUTO: 0.2 K/UL (ref 1–4.8)
LYMPHOCYTES # BLD AUTO: 0.3 K/UL (ref 1–4.8)
LYMPHOCYTES # BLD AUTO: 0.4 K/UL (ref 1–4.8)
LYMPHOCYTES # BLD AUTO: 0.5 K/UL (ref 1–4.8)
LYMPHOCYTES # BLD AUTO: 0.6 K/UL (ref 1–4.8)
LYMPHOCYTES # BLD AUTO: 0.7 K/UL (ref 1–4.8)
LYMPHOCYTES # BLD AUTO: ABNORMAL K/UL (ref 1–4.8)
LYMPHOCYTES # BLD AUTO: ABNORMAL K/UL (ref 1–4.8)
LYMPHOCYTES NFR BLD: 11.1 % (ref 18–48)
LYMPHOCYTES NFR BLD: 11.5 % (ref 18–48)
LYMPHOCYTES NFR BLD: 11.5 % (ref 18–48)
LYMPHOCYTES NFR BLD: 13.2 % (ref 18–48)
LYMPHOCYTES NFR BLD: 13.3 % (ref 18–48)
LYMPHOCYTES NFR BLD: 13.3 % (ref 18–48)
LYMPHOCYTES NFR BLD: 14.5 % (ref 18–48)
LYMPHOCYTES NFR BLD: 15.3 % (ref 18–48)
LYMPHOCYTES NFR BLD: 16 % (ref 18–48)
LYMPHOCYTES NFR BLD: 16 % (ref 18–48)
LYMPHOCYTES NFR BLD: 21 % (ref 18–48)
LYMPHOCYTES NFR BLD: 21 % (ref 18–48)
LYMPHOCYTES NFR BLD: 26 % (ref 18–48)
LYMPHOCYTES NFR BLD: 27.7 % (ref 18–48)
LYMPHOCYTES NFR BLD: 27.7 % (ref 18–48)
LYMPHOCYTES NFR BLD: 28.6 % (ref 18–48)
LYMPHOCYTES NFR BLD: 3.8 % (ref 18–48)
LYMPHOCYTES NFR BLD: 32.5 % (ref 18–48)
LYMPHOCYTES NFR BLD: 5.3 % (ref 18–48)
LYMPHOCYTES NFR BLD: 5.3 % (ref 18–48)
LYMPHOCYTES NFR BLD: 5.5 % (ref 18–48)
LYMPHOCYTES NFR BLD: 5.9 % (ref 18–48)
LYMPHOCYTES NFR BLD: 6 % (ref 18–48)
LYMPHOCYTES NFR BLD: 6.1 % (ref 18–48)
LYMPHOCYTES NFR BLD: 6.2 % (ref 18–48)
LYMPHOCYTES NFR BLD: 6.3 % (ref 18–48)
LYMPHOCYTES NFR BLD: 6.6 % (ref 18–48)
LYMPHOCYTES NFR BLD: 6.6 % (ref 18–48)
LYMPHOCYTES NFR BLD: 7.1 % (ref 18–48)
LYMPHOCYTES NFR BLD: 7.1 % (ref 18–48)
LYMPHOCYTES NFR BLD: 7.2 % (ref 18–48)
LYMPHOCYTES NFR BLD: 7.5 % (ref 18–48)
LYMPHOCYTES NFR BLD: 8.5 % (ref 18–48)
LYMPHOCYTES NFR BLD: 8.8 % (ref 18–48)
LYMPHOCYTES NFR BLD: 9.3 % (ref 18–48)
LYMPHOCYTES NFR CSF MANUAL: 37 % (ref 40–80)
LYMPHOCYTES NFR CSF MANUAL: 62 % (ref 40–80)
LYMPHOCYTES NFR CSF MANUAL: 77 % (ref 40–80)
LYMPHOCYTES NFR CSF MANUAL: 88 % (ref 40–80)
MAGNESIUM SERPL-MCNC: 1.8 MG/DL (ref 1.6–2.6)
MAGNESIUM SERPL-MCNC: 1.9 MG/DL (ref 1.6–2.6)
MAGNESIUM SERPL-MCNC: 2 MG/DL (ref 1.6–2.6)
MAGNESIUM SERPL-MCNC: 2.2 MG/DL (ref 1.6–2.6)
MAGNESIUM SERPL-MCNC: 2.3 MG/DL (ref 1.6–2.6)
MAGNESIUM SERPL-MCNC: 2.4 MG/DL (ref 1.6–2.6)
MAGNESIUM SERPL-MCNC: 2.5 MG/DL (ref 1.6–2.6)
MCH RBC QN AUTO: 28.8 PG (ref 27–31)
MCH RBC QN AUTO: 29.3 PG (ref 27–31)
MCH RBC QN AUTO: 29.4 PG (ref 27–31)
MCH RBC QN AUTO: 29.4 PG (ref 27–31)
MCH RBC QN AUTO: 29.5 PG (ref 27–31)
MCH RBC QN AUTO: 29.7 PG (ref 27–31)
MCH RBC QN AUTO: 29.8 PG (ref 27–31)
MCH RBC QN AUTO: 29.9 PG (ref 27–31)
MCH RBC QN AUTO: 29.9 PG (ref 27–31)
MCH RBC QN AUTO: 30 PG (ref 27–31)
MCH RBC QN AUTO: 30.1 PG (ref 27–31)
MCH RBC QN AUTO: 30.2 PG (ref 27–31)
MCH RBC QN AUTO: 30.3 PG (ref 27–31)
MCH RBC QN AUTO: 30.4 PG (ref 27–31)
MCH RBC QN AUTO: 30.5 PG (ref 27–31)
MCH RBC QN AUTO: 30.6 PG (ref 27–31)
MCH RBC QN AUTO: 30.7 PG (ref 27–31)
MCH RBC QN AUTO: 30.8 PG (ref 27–31)
MCH RBC QN AUTO: 30.9 PG (ref 27–31)
MCH RBC QN AUTO: 31 PG (ref 27–31)
MCH RBC QN AUTO: 31.1 PG (ref 27–31)
MCH RBC QN AUTO: 31.5 PG (ref 27–31)
MCHC RBC AUTO-ENTMCNC: 30.3 G/DL (ref 32–36)
MCHC RBC AUTO-ENTMCNC: 31.2 G/DL (ref 32–36)
MCHC RBC AUTO-ENTMCNC: 31.3 G/DL (ref 32–36)
MCHC RBC AUTO-ENTMCNC: 31.4 G/DL (ref 32–36)
MCHC RBC AUTO-ENTMCNC: 31.5 G/DL (ref 32–36)
MCHC RBC AUTO-ENTMCNC: 31.6 G/DL (ref 32–36)
MCHC RBC AUTO-ENTMCNC: 31.7 G/DL (ref 32–36)
MCHC RBC AUTO-ENTMCNC: 31.8 G/DL (ref 32–36)
MCHC RBC AUTO-ENTMCNC: 31.8 G/DL (ref 32–36)
MCHC RBC AUTO-ENTMCNC: 31.9 G/DL (ref 32–36)
MCHC RBC AUTO-ENTMCNC: 32.1 G/DL (ref 32–36)
MCHC RBC AUTO-ENTMCNC: 32.2 G/DL (ref 32–36)
MCHC RBC AUTO-ENTMCNC: 32.3 G/DL (ref 32–36)
MCHC RBC AUTO-ENTMCNC: 32.3 G/DL (ref 32–36)
MCHC RBC AUTO-ENTMCNC: 32.4 G/DL (ref 32–36)
MCHC RBC AUTO-ENTMCNC: 32.6 G/DL (ref 32–36)
MCHC RBC AUTO-ENTMCNC: 32.7 G/DL (ref 32–36)
MCHC RBC AUTO-ENTMCNC: 32.8 G/DL (ref 32–36)
MCHC RBC AUTO-ENTMCNC: 32.8 G/DL (ref 32–36)
MCHC RBC AUTO-ENTMCNC: 32.9 G/DL (ref 32–36)
MCHC RBC AUTO-ENTMCNC: 33.1 G/DL (ref 32–36)
MCHC RBC AUTO-ENTMCNC: 33.1 G/DL (ref 32–36)
MCHC RBC AUTO-ENTMCNC: 33.2 G/DL (ref 32–36)
MCHC RBC AUTO-ENTMCNC: 33.3 G/DL (ref 32–36)
MCHC RBC AUTO-ENTMCNC: 33.4 G/DL (ref 32–36)
MCHC RBC AUTO-ENTMCNC: 33.5 G/DL (ref 32–36)
MCHC RBC AUTO-ENTMCNC: 33.6 G/DL (ref 32–36)
MCHC RBC AUTO-ENTMCNC: 33.6 G/DL (ref 32–36)
MCHC RBC AUTO-ENTMCNC: 33.8 G/DL (ref 32–36)
MCV RBC AUTO: 88 FL (ref 82–98)
MCV RBC AUTO: 90 FL (ref 82–98)
MCV RBC AUTO: 90 FL (ref 82–98)
MCV RBC AUTO: 91 FL (ref 82–98)
MCV RBC AUTO: 91 FL (ref 82–98)
MCV RBC AUTO: 92 FL (ref 82–98)
MCV RBC AUTO: 93 FL (ref 82–98)
MCV RBC AUTO: 94 FL (ref 82–98)
MCV RBC AUTO: 95 FL (ref 82–98)
MCV RBC AUTO: 96 FL (ref 82–98)
MCV RBC AUTO: 99 FL (ref 82–98)
METAMYELOCYTES NFR BLD MANUAL: 1 %
METAMYELOCYTES NFR BLD MANUAL: 1 %
METAMYELOCYTES NFR BLD MANUAL: 3 %
METAMYELOCYTES NFR BLD MANUAL: 3 %
METAMYELOCYTES NFR BLD MANUAL: 6 %
MICROSCOPIC COMMENT: ABNORMAL
MICROSCOPIC COMMENT: NORMAL
MODE: ABNORMAL
MODE: ABNORMAL
MONOCYTES # BLD AUTO: 0.1 K/UL (ref 0.3–1)
MONOCYTES # BLD AUTO: 0.2 K/UL (ref 0.3–1)
MONOCYTES # BLD AUTO: 0.3 K/UL (ref 0.3–1)
MONOCYTES # BLD AUTO: 0.4 K/UL (ref 0.3–1)
MONOCYTES # BLD AUTO: 0.5 K/UL (ref 0.3–1)
MONOCYTES # BLD AUTO: 0.5 K/UL (ref 0.3–1)
MONOCYTES # BLD AUTO: 0.6 K/UL (ref 0.3–1)
MONOCYTES # BLD AUTO: 0.6 K/UL (ref 0.3–1)
MONOCYTES # BLD AUTO: ABNORMAL K/UL (ref 0.3–1)
MONOCYTES # BLD AUTO: ABNORMAL K/UL (ref 0.3–1)
MONOCYTES NFR BLD: 10 % (ref 4–15)
MONOCYTES NFR BLD: 10 % (ref 4–15)
MONOCYTES NFR BLD: 10.4 % (ref 4–15)
MONOCYTES NFR BLD: 10.6 % (ref 4–15)
MONOCYTES NFR BLD: 15.7 % (ref 4–15)
MONOCYTES NFR BLD: 15.7 % (ref 4–15)
MONOCYTES NFR BLD: 2.1 % (ref 4–15)
MONOCYTES NFR BLD: 2.3 % (ref 4–15)
MONOCYTES NFR BLD: 2.8 % (ref 4–15)
MONOCYTES NFR BLD: 3.4 % (ref 4–15)
MONOCYTES NFR BLD: 3.5 % (ref 4–15)
MONOCYTES NFR BLD: 3.9 % (ref 4–15)
MONOCYTES NFR BLD: 4 % (ref 4–15)
MONOCYTES NFR BLD: 4.2 % (ref 4–15)
MONOCYTES NFR BLD: 4.5 % (ref 4–15)
MONOCYTES NFR BLD: 4.7 % (ref 4–15)
MONOCYTES NFR BLD: 4.8 % (ref 4–15)
MONOCYTES NFR BLD: 5.3 % (ref 4–15)
MONOCYTES NFR BLD: 5.4 % (ref 4–15)
MONOCYTES NFR BLD: 5.6 % (ref 4–15)
MONOCYTES NFR BLD: 5.6 % (ref 4–15)
MONOCYTES NFR BLD: 5.7 % (ref 4–15)
MONOCYTES NFR BLD: 6 % (ref 4–15)
MONOCYTES NFR BLD: 6.5 % (ref 4–15)
MONOCYTES NFR BLD: 6.8 % (ref 4–15)
MONOCYTES NFR BLD: 7 % (ref 4–15)
MONOCYTES NFR BLD: 7.2 % (ref 4–15)
MONOCYTES NFR BLD: 7.5 % (ref 4–15)
MONOCYTES NFR BLD: 7.6 % (ref 4–15)
MONOCYTES NFR BLD: 8 % (ref 4–15)
MONOCYTES NFR BLD: 8 % (ref 4–15)
MONOCYTES NFR BLD: 8.7 % (ref 4–15)
MONOCYTES NFR BLD: 8.9 % (ref 4–15)
MONOS+MACROS NFR CSF MANUAL: 19 % (ref 15–45)
MONOS+MACROS NFR CSF MANUAL: 38 % (ref 15–45)
MONOS+MACROS NFR CSF MANUAL: 59 % (ref 15–45)
MONOS+MACROS NFR CSF MANUAL: 8 % (ref 15–45)
MYELOCYTES NFR BLD MANUAL: 1 %
MYELOCYTES NFR BLD MANUAL: 1 %
MYELOCYTES NFR BLD MANUAL: 5 %
NEUTROPHILS # BLD AUTO: 0.4 K/UL (ref 1.8–7.7)
NEUTROPHILS # BLD AUTO: 0.4 K/UL (ref 1.8–7.7)
NEUTROPHILS # BLD AUTO: 0.5 K/UL (ref 1.8–7.7)
NEUTROPHILS # BLD AUTO: 0.6 K/UL (ref 1.8–7.7)
NEUTROPHILS # BLD AUTO: 2.3 K/UL (ref 1.8–7.7)
NEUTROPHILS # BLD AUTO: 2.4 K/UL (ref 1.8–7.7)
NEUTROPHILS # BLD AUTO: 3.1 K/UL (ref 1.8–7.7)
NEUTROPHILS # BLD AUTO: 3.5 K/UL (ref 1.8–7.7)
NEUTROPHILS # BLD AUTO: 3.5 K/UL (ref 1.8–7.7)
NEUTROPHILS # BLD AUTO: 3.6 K/UL (ref 1.8–7.7)
NEUTROPHILS # BLD AUTO: 3.8 K/UL (ref 1.8–7.7)
NEUTROPHILS # BLD AUTO: 3.8 K/UL (ref 1.8–7.7)
NEUTROPHILS # BLD AUTO: 3.9 K/UL (ref 1.8–7.7)
NEUTROPHILS # BLD AUTO: 4 K/UL (ref 1.8–7.7)
NEUTROPHILS # BLD AUTO: 4.2 K/UL (ref 1.8–7.7)
NEUTROPHILS # BLD AUTO: 4.2 K/UL (ref 1.8–7.7)
NEUTROPHILS # BLD AUTO: 4.4 K/UL (ref 1.8–7.7)
NEUTROPHILS # BLD AUTO: 4.5 K/UL (ref 1.8–7.7)
NEUTROPHILS # BLD AUTO: 4.7 K/UL (ref 1.8–7.7)
NEUTROPHILS # BLD AUTO: 4.9 K/UL (ref 1.8–7.7)
NEUTROPHILS # BLD AUTO: 5.2 K/UL (ref 1.8–7.7)
NEUTROPHILS # BLD AUTO: 5.3 K/UL (ref 1.8–7.7)
NEUTROPHILS # BLD AUTO: 5.5 K/UL (ref 1.8–7.7)
NEUTROPHILS # BLD AUTO: 5.6 K/UL (ref 1.8–7.7)
NEUTROPHILS # BLD AUTO: 5.7 K/UL (ref 1.8–7.7)
NEUTROPHILS # BLD AUTO: 6 K/UL (ref 1.8–7.7)
NEUTROPHILS # BLD AUTO: 6.4 K/UL (ref 1.8–7.7)
NEUTROPHILS # BLD AUTO: 6.6 K/UL (ref 1.8–7.7)
NEUTROPHILS # BLD AUTO: 8.4 K/UL (ref 1.8–7.7)
NEUTROPHILS NFR BLD: 36 % (ref 38–73)
NEUTROPHILS NFR BLD: 45 % (ref 38–73)
NEUTROPHILS NFR BLD: 45 % (ref 38–73)
NEUTROPHILS NFR BLD: 48 % (ref 38–73)
NEUTROPHILS NFR BLD: 48 % (ref 38–73)
NEUTROPHILS NFR BLD: 48.2 % (ref 38–73)
NEUTROPHILS NFR BLD: 48.2 % (ref 38–73)
NEUTROPHILS NFR BLD: 56.2 % (ref 38–73)
NEUTROPHILS NFR BLD: 56.7 % (ref 38–73)
NEUTROPHILS NFR BLD: 61 % (ref 38–73)
NEUTROPHILS NFR BLD: 67.7 % (ref 38–73)
NEUTROPHILS NFR BLD: 75.5 % (ref 38–73)
NEUTROPHILS NFR BLD: 75.8 % (ref 38–73)
NEUTROPHILS NFR BLD: 77.8 % (ref 38–73)
NEUTROPHILS NFR BLD: 77.8 % (ref 38–73)
NEUTROPHILS NFR BLD: 78.3 % (ref 38–73)
NEUTROPHILS NFR BLD: 78.8 % (ref 38–73)
NEUTROPHILS NFR BLD: 78.8 % (ref 38–73)
NEUTROPHILS NFR BLD: 79.7 % (ref 38–73)
NEUTROPHILS NFR BLD: 81.1 % (ref 38–73)
NEUTROPHILS NFR BLD: 81.8 % (ref 38–73)
NEUTROPHILS NFR BLD: 81.8 % (ref 38–73)
NEUTROPHILS NFR BLD: 82 % (ref 38–73)
NEUTROPHILS NFR BLD: 82.9 % (ref 38–73)
NEUTROPHILS NFR BLD: 82.9 % (ref 38–73)
NEUTROPHILS NFR BLD: 83 % (ref 38–73)
NEUTROPHILS NFR BLD: 83.2 % (ref 38–73)
NEUTROPHILS NFR BLD: 83.4 % (ref 38–73)
NEUTROPHILS NFR BLD: 84.6 % (ref 38–73)
NEUTROPHILS NFR BLD: 85.7 % (ref 38–73)
NEUTROPHILS NFR BLD: 85.8 % (ref 38–73)
NEUTROPHILS NFR BLD: 86 % (ref 38–73)
NEUTROPHILS NFR BLD: 87.2 % (ref 38–73)
NEUTROPHILS NFR BLD: 87.5 % (ref 38–73)
NEUTROPHILS NFR BLD: 87.5 % (ref 38–73)
NEUTROPHILS NFR BLD: 87.9 % (ref 38–73)
NEUTROPHILS NFR BLD: 90.8 % (ref 38–73)
NEUTROPHILS NFR CSF MANUAL: 4 % (ref 0–6)
NEUTS BAND NFR BLD MANUAL: 11 %
NEUTS BAND NFR BLD MANUAL: 11 %
NEUTS BAND NFR BLD MANUAL: 13 %
NEUTS BAND NFR BLD MANUAL: 21 %
NEUTS BAND NFR BLD MANUAL: 21 %
NEUTS BAND NFR BLD MANUAL: 23 %
NITRITE UR QL STRIP: NEGATIVE
NRBC BLD-RTO: 0 /100 WBC
NRBC BLD-RTO: 5 /100 WBC
NRBC BLD-RTO: 9 /100 WBC
O+P STL MICRO: NORMAL
OB PNL STL: POSITIVE
OVALOCYTES BLD QL SMEAR: ABNORMAL
PCO2 BLDA: 42.1 MMHG (ref 35–45)
PCO2 BLDA: 60.7 MMHG (ref 35–45)
PEEP: 10
PF4 HEPARIN CMPLX AB SER QL: 0.29 OD (ref 0–0.4)
PH SMN: 6.93 [PH] (ref 7.35–7.45)
PH SMN: 7.18 [PH] (ref 7.35–7.45)
PH UR STRIP: 7 [PH] (ref 5–8)
PH UR STRIP: 8 [PH] (ref 5–8)
PH UR STRIP: 8 [PH] (ref 5–8)
PHOSPHATE SERPL-MCNC: 2.9 MG/DL (ref 2.7–4.5)
PHOSPHATE SERPL-MCNC: 3 MG/DL (ref 2.7–4.5)
PHOSPHATE SERPL-MCNC: 3.2 MG/DL (ref 2.7–4.5)
PHOSPHATE SERPL-MCNC: 3.2 MG/DL (ref 2.7–4.5)
PHOSPHATE SERPL-MCNC: 3.3 MG/DL (ref 2.7–4.5)
PHOSPHATE SERPL-MCNC: 3.5 MG/DL (ref 2.7–4.5)
PHOSPHATE SERPL-MCNC: 3.5 MG/DL (ref 2.7–4.5)
PHOSPHATE SERPL-MCNC: 3.6 MG/DL (ref 2.7–4.5)
PHOSPHATE SERPL-MCNC: 3.6 MG/DL (ref 2.7–4.5)
PHOSPHATE SERPL-MCNC: 3.7 MG/DL (ref 2.7–4.5)
PHOSPHATE SERPL-MCNC: 3.7 MG/DL (ref 2.7–4.5)
PHOSPHATE SERPL-MCNC: 3.9 MG/DL (ref 2.7–4.5)
PHOSPHATE SERPL-MCNC: 4 MG/DL (ref 2.7–4.5)
PHOSPHATE SERPL-MCNC: 4.1 MG/DL (ref 2.7–4.5)
PLATELET # BLD AUTO: 100 K/UL (ref 150–450)
PLATELET # BLD AUTO: 102 K/UL (ref 150–450)
PLATELET # BLD AUTO: 103 K/UL (ref 150–450)
PLATELET # BLD AUTO: 110 K/UL (ref 150–450)
PLATELET # BLD AUTO: 112 K/UL (ref 150–450)
PLATELET # BLD AUTO: 113 K/UL (ref 150–450)
PLATELET # BLD AUTO: 113 K/UL (ref 150–450)
PLATELET # BLD AUTO: 114 K/UL (ref 150–450)
PLATELET # BLD AUTO: 115 K/UL (ref 150–450)
PLATELET # BLD AUTO: 116 K/UL (ref 150–450)
PLATELET # BLD AUTO: 116 K/UL (ref 150–450)
PLATELET # BLD AUTO: 119 K/UL (ref 150–450)
PLATELET # BLD AUTO: 125 K/UL (ref 150–450)
PLATELET # BLD AUTO: 132 K/UL (ref 150–450)
PLATELET # BLD AUTO: 144 K/UL (ref 150–450)
PLATELET # BLD AUTO: 156 K/UL (ref 150–450)
PLATELET # BLD AUTO: 171 K/UL (ref 150–450)
PLATELET # BLD AUTO: 176 K/UL (ref 150–450)
PLATELET # BLD AUTO: 180 K/UL (ref 150–450)
PLATELET # BLD AUTO: 181 K/UL (ref 150–450)
PLATELET # BLD AUTO: 186 K/UL (ref 150–450)
PLATELET # BLD AUTO: 188 K/UL (ref 150–450)
PLATELET # BLD AUTO: 190 K/UL (ref 150–450)
PLATELET # BLD AUTO: 199 K/UL (ref 150–450)
PLATELET # BLD AUTO: 208 K/UL (ref 150–450)
PLATELET # BLD AUTO: 208 K/UL (ref 150–450)
PLATELET # BLD AUTO: 211 K/UL (ref 150–450)
PLATELET # BLD AUTO: 96 K/UL (ref 150–450)
PLATELET # BLD AUTO: 96 K/UL (ref 150–450)
PLATELET # BLD AUTO: 97 K/UL (ref 150–450)
PLATELET # BLD AUTO: ABNORMAL K/UL (ref 150–450)
PLATELET BLD QL SMEAR: ABNORMAL
PMV BLD AUTO: 10.3 FL (ref 9.2–12.9)
PMV BLD AUTO: 10.5 FL (ref 9.2–12.9)
PMV BLD AUTO: 10.5 FL (ref 9.2–12.9)
PMV BLD AUTO: 10.7 FL (ref 9.2–12.9)
PMV BLD AUTO: 10.7 FL (ref 9.2–12.9)
PMV BLD AUTO: 10.8 FL (ref 9.2–12.9)
PMV BLD AUTO: 10.9 FL (ref 9.2–12.9)
PMV BLD AUTO: 11.1 FL (ref 9.2–12.9)
PMV BLD AUTO: 11.2 FL (ref 9.2–12.9)
PMV BLD AUTO: 11.3 FL (ref 9.2–12.9)
PMV BLD AUTO: 11.4 FL (ref 9.2–12.9)
PMV BLD AUTO: 11.4 FL (ref 9.2–12.9)
PMV BLD AUTO: 11.6 FL (ref 9.2–12.9)
PMV BLD AUTO: 11.8 FL (ref 9.2–12.9)
PMV BLD AUTO: 11.8 FL (ref 9.2–12.9)
PMV BLD AUTO: 11.9 FL (ref 9.2–12.9)
PMV BLD AUTO: 12.1 FL (ref 9.2–12.9)
PMV BLD AUTO: 12.4 FL (ref 9.2–12.9)
PMV BLD AUTO: 12.4 FL (ref 9.2–12.9)
PMV BLD AUTO: 12.8 FL (ref 9.2–12.9)
PMV BLD AUTO: 13 FL (ref 9.2–12.9)
PMV BLD AUTO: 13.4 FL (ref 9.2–12.9)
PMV BLD AUTO: ABNORMAL FL (ref 9.2–12.9)
PO2 BLDA: 115 MMHG (ref 80–100)
PO2 BLDA: 38 MMHG (ref 80–100)
POC BE: -13 MMOL/L
POC BE: -20 MMOL/L
POC IONIZED CALCIUM: 1.17 MMOL/L (ref 1.06–1.42)
POC SATURATED O2: 58 % (ref 95–100)
POC SATURATED O2: 94 % (ref 95–100)
POC TCO2: 15 MMOL/L (ref 23–27)
POC TCO2: 17 MMOL/L (ref 23–27)
POCT GLUCOSE: 164 MG/DL (ref 70–110)
POCT GLUCOSE: 227 MG/DL (ref 70–110)
POCT GLUCOSE: 230 MG/DL (ref 70–110)
POCT GLUCOSE: 267 MG/DL (ref 70–110)
POCT GLUCOSE: 271 MG/DL (ref 70–110)
POCT GLUCOSE: 287 MG/DL (ref 70–110)
POIKILOCYTOSIS BLD QL SMEAR: SLIGHT
POLYCHROMASIA BLD QL SMEAR: ABNORMAL
POTASSIUM BLD-SCNC: 3.3 MMOL/L (ref 3.5–5.1)
POTASSIUM SERPL-SCNC: 3.2 MMOL/L (ref 3.5–5.1)
POTASSIUM SERPL-SCNC: 3.2 MMOL/L (ref 3.5–5.1)
POTASSIUM SERPL-SCNC: 3.3 MMOL/L (ref 3.5–5.1)
POTASSIUM SERPL-SCNC: 3.4 MMOL/L (ref 3.5–5.1)
POTASSIUM SERPL-SCNC: 3.4 MMOL/L (ref 3.5–5.1)
POTASSIUM SERPL-SCNC: 3.5 MMOL/L (ref 3.5–5.1)
POTASSIUM SERPL-SCNC: 3.6 MMOL/L (ref 3.5–5.1)
POTASSIUM SERPL-SCNC: 3.7 MMOL/L (ref 3.5–5.1)
POTASSIUM SERPL-SCNC: 3.8 MMOL/L (ref 3.5–5.1)
POTASSIUM SERPL-SCNC: 3.9 MMOL/L (ref 3.5–5.1)
POTASSIUM SERPL-SCNC: 4 MMOL/L (ref 3.5–5.1)
POTASSIUM SERPL-SCNC: 4 MMOL/L (ref 3.5–5.1)
POTASSIUM SERPL-SCNC: 4.1 MMOL/L (ref 3.5–5.1)
POTASSIUM SERPL-SCNC: 4.5 MMOL/L (ref 3.5–5.1)
POTASSIUM SERPL-SCNC: 5 MMOL/L (ref 3.5–5.1)
PROMYELOCYTES NFR BLD MANUAL: 1 %
PROMYELOCYTES NFR BLD MANUAL: 1 %
PROMYELOCYTES NFR BLD MANUAL: 2 %
PROT CSF-MCNC: 112 MG/DL (ref 15–40)
PROT CSF-MCNC: 127 MG/DL (ref 15–40)
PROT CSF-MCNC: 162 MG/DL (ref 15–40)
PROT CSF-MCNC: 174 MG/DL (ref 15–40)
PROT SERPL-MCNC: 4.2 G/DL (ref 6–8.4)
PROT SERPL-MCNC: 4.3 G/DL (ref 6–8.4)
PROT SERPL-MCNC: 4.5 G/DL (ref 6–8.4)
PROT SERPL-MCNC: 4.5 G/DL (ref 6–8.4)
PROT SERPL-MCNC: 4.8 G/DL (ref 6–8.4)
PROT SERPL-MCNC: 5 G/DL (ref 6–8.4)
PROT SERPL-MCNC: 5 G/DL (ref 6–8.4)
PROT SERPL-MCNC: 5.3 G/DL (ref 6–8.4)
PROT SERPL-MCNC: 5.4 G/DL (ref 6–8.4)
PROT SERPL-MCNC: 5.5 G/DL (ref 6–8.4)
PROT SERPL-MCNC: 5.6 G/DL (ref 6–8.4)
PROT SERPL-MCNC: 5.7 G/DL (ref 6–8.4)
PROT SERPL-MCNC: 5.7 G/DL (ref 6–8.4)
PROT SERPL-MCNC: 6.1 G/DL (ref 6–8.4)
PROT SERPL-MCNC: 6.4 G/DL (ref 6–8.4)
PROT SERPL-MCNC: 6.6 G/DL (ref 6–8.4)
PROT SERPL-MCNC: 6.7 G/DL (ref 6–8.4)
PROT SERPL-MCNC: 6.8 G/DL (ref 6–8.4)
PROT SERPL-MCNC: 6.8 G/DL (ref 6–8.4)
PROT UR QL STRIP: ABNORMAL
PROT UR QL STRIP: NEGATIVE
PROT UR QL STRIP: NEGATIVE
PROT UR-MCNC: 46 MG/DL (ref 0–15)
PROT UR-MCNC: <7 MG/DL (ref 0–15)
PROT/CREAT UR: 0.45 MG/G{CREAT} (ref 0–0.2)
PROT/CREAT UR: NORMAL MG/G{CREAT} (ref 0–0.2)
PROTHROMBIN TIME: 10.3 SEC (ref 9–12.5)
PROTHROMBIN TIME: 10.6 SEC (ref 9–12.5)
PROTHROMBIN TIME: 10.6 SEC (ref 9–12.5)
PROTHROMBIN TIME: 10.8 SEC (ref 9–12.5)
PROTHROMBIN TIME: 10.9 SEC (ref 9–12.5)
PROTHROMBIN TIME: 10.9 SEC (ref 9–12.5)
PROTHROMBIN TIME: 11 SEC (ref 9–12.5)
PROTHROMBIN TIME: 11.1 SEC (ref 9–12.5)
PROTHROMBIN TIME: 11.1 SEC (ref 9–12.5)
PROTHROMBIN TIME: 11.2 SEC (ref 9–12.5)
PROTHROMBIN TIME: 11.4 SEC (ref 9–12.5)
PROTHROMBIN TIME: 11.6 SEC (ref 9–12.5)
PROTHROMBIN TIME: 11.7 SEC (ref 9–12.5)
PROTHROMBIN TIME: 11.8 SEC (ref 9–12.5)
PROTHROMBIN TIME: 12.4 SEC (ref 9–12.5)
PROTHROMBIN TIME: 13 SEC (ref 9–12.5)
PROTHROMBIN TIME: 14 SEC (ref 9–12.5)
PROTHROMBIN TIME: 16.3 SEC (ref 9–12.5)
PROTHROMBIN TIME: 9.9 SEC (ref 9–12.5)
PTH-INTACT SERPL-MCNC: 144.6 PG/ML (ref 9–77)
PTH-INTACT SERPL-MCNC: 188.2 PG/ML (ref 9–77)
RBC # BLD AUTO: 2.96 M/UL (ref 4–5.4)
RBC # BLD AUTO: 3.02 M/UL (ref 4–5.4)
RBC # BLD AUTO: 3.05 M/UL (ref 4–5.4)
RBC # BLD AUTO: 3.05 M/UL (ref 4–5.4)
RBC # BLD AUTO: 3.08 M/UL (ref 4–5.4)
RBC # BLD AUTO: 3.18 M/UL (ref 4–5.4)
RBC # BLD AUTO: 3.18 M/UL (ref 4–5.4)
RBC # BLD AUTO: 3.23 M/UL (ref 4–5.4)
RBC # BLD AUTO: 3.26 M/UL (ref 4–5.4)
RBC # BLD AUTO: 3.27 M/UL (ref 4–5.4)
RBC # BLD AUTO: 3.29 M/UL (ref 4–5.4)
RBC # BLD AUTO: 3.36 M/UL (ref 4–5.4)
RBC # BLD AUTO: 3.45 M/UL (ref 4–5.4)
RBC # BLD AUTO: 3.47 M/UL (ref 4–5.4)
RBC # BLD AUTO: 3.47 M/UL (ref 4–5.4)
RBC # BLD AUTO: 3.55 M/UL (ref 4–5.4)
RBC # BLD AUTO: 3.63 M/UL (ref 4–5.4)
RBC # BLD AUTO: 3.67 M/UL (ref 4–5.4)
RBC # BLD AUTO: 3.69 M/UL (ref 4–5.4)
RBC # BLD AUTO: 3.7 M/UL (ref 4–5.4)
RBC # BLD AUTO: 3.73 M/UL (ref 4–5.4)
RBC # BLD AUTO: 3.74 M/UL (ref 4–5.4)
RBC # BLD AUTO: 3.87 M/UL (ref 4–5.4)
RBC # BLD AUTO: 3.88 M/UL (ref 4–5.4)
RBC # BLD AUTO: 3.97 M/UL (ref 4–5.4)
RBC # BLD AUTO: 3.97 M/UL (ref 4–5.4)
RBC # BLD AUTO: 4.16 M/UL (ref 4–5.4)
RBC # BLD AUTO: 4.19 M/UL (ref 4–5.4)
RBC # BLD AUTO: 4.35 M/UL (ref 4–5.4)
RBC # BLD AUTO: 4.51 M/UL (ref 4–5.4)
RBC # BLD AUTO: 4.51 M/UL (ref 4–5.4)
RBC # BLD AUTO: 4.58 M/UL (ref 4–5.4)
RBC # BLD AUTO: 4.92 M/UL (ref 4–5.4)
RBC # BLD AUTO: 5.04 M/UL (ref 4–5.4)
RBC # BLD AUTO: 5.06 M/UL (ref 4–5.4)
RBC # CSF: 0 /CU MM
RBC # CSF: 3000 /CU MM
RBC # CSF: 45 /CU MM
RBC # CSF: 657 /CU MM
RBC #/AREA URNS AUTO: 0 /HPF (ref 0–4)
RBC #/AREA URNS AUTO: 1 /HPF (ref 0–4)
RETICS/RBC NFR AUTO: 0.8 % (ref 0.5–2.5)
RV AG STL QL IA.RAPID: NEGATIVE
SAMPLE: ABNORMAL
SAMPLE: ABNORMAL
SARS-COV-2 RDRP RESP QL NAA+PROBE: NEGATIVE
SARS-COV-2 RDRP RESP QL NAA+PROBE: NEGATIVE
SATURATED IRON: 34 % (ref 20–50)
SATURATED IRON: 39 % (ref 20–50)
SITE: ABNORMAL
SITE: ABNORMAL
SMUDGE CELLS BLD QL SMEAR: PRESENT
SODIUM BLD-SCNC: 128 MMOL/L (ref 136–145)
SODIUM SERPL-SCNC: 128 MMOL/L (ref 136–145)
SODIUM SERPL-SCNC: 128 MMOL/L (ref 136–145)
SODIUM SERPL-SCNC: 131 MMOL/L (ref 136–145)
SODIUM SERPL-SCNC: 133 MMOL/L (ref 136–145)
SODIUM SERPL-SCNC: 134 MMOL/L (ref 136–145)
SODIUM SERPL-SCNC: 135 MMOL/L (ref 136–145)
SODIUM SERPL-SCNC: 136 MMOL/L (ref 136–145)
SODIUM SERPL-SCNC: 137 MMOL/L (ref 136–145)
SODIUM SERPL-SCNC: 138 MMOL/L (ref 136–145)
SODIUM SERPL-SCNC: 139 MMOL/L (ref 136–145)
SODIUM SERPL-SCNC: 140 MMOL/L (ref 136–145)
SODIUM SERPL-SCNC: 142 MMOL/L (ref 136–145)
SODIUM UR-SCNC: 53 MMOL/L (ref 20–250)
SP GR UR STRIP: 1.01 (ref 1–1.03)
SP GR UR STRIP: 1.01 (ref 1–1.03)
SP GR UR STRIP: 1.02 (ref 1–1.03)
SPECIMEN VOL CSF: 1 ML
SPECIMEN VOL CSF: 1 ML
SPECIMEN VOL CSF: 1.5 ML
SPECIMEN VOL CSF: 3.5 ML
SQUAMOUS #/AREA URNS AUTO: 0 /HPF
SQUAMOUS #/AREA URNS AUTO: 2 /HPF
TACROLIMUS BLD-MCNC: 13.2 NG/ML (ref 5–15)
TACROLIMUS BLD-MCNC: 17.2 NG/ML (ref 5–15)
TACROLIMUS BLD-MCNC: 17.3 NG/ML (ref 5–15)
TACROLIMUS BLD-MCNC: 2.3 NG/ML (ref 5–15)
TACROLIMUS BLD-MCNC: 2.8 NG/ML (ref 5–15)
TACROLIMUS BLD-MCNC: 3.1 NG/ML (ref 5–15)
TACROLIMUS BLD-MCNC: 3.9 NG/ML (ref 5–15)
TACROLIMUS BLD-MCNC: 4.1 NG/ML (ref 5–15)
TACROLIMUS BLD-MCNC: 4.4 NG/ML (ref 5–15)
TACROLIMUS BLD-MCNC: 4.6 NG/ML (ref 5–15)
TACROLIMUS BLD-MCNC: 5 NG/ML (ref 5–15)
TACROLIMUS BLD-MCNC: 5.1 NG/ML (ref 5–15)
TACROLIMUS BLD-MCNC: 5.2 NG/ML (ref 5–15)
TACROLIMUS BLD-MCNC: 5.3 NG/ML (ref 5–15)
TACROLIMUS BLD-MCNC: 5.6 NG/ML (ref 5–15)
TACROLIMUS BLD-MCNC: 5.8 NG/ML (ref 5–15)
TACROLIMUS BLD-MCNC: 5.8 NG/ML (ref 5–15)
TACROLIMUS BLD-MCNC: 5.9 NG/ML (ref 5–15)
TACROLIMUS BLD-MCNC: 6.2 NG/ML (ref 5–15)
TACROLIMUS BLD-MCNC: 6.6 NG/ML (ref 5–15)
TACROLIMUS BLD-MCNC: 7.3 NG/ML (ref 5–15)
TACROLIMUS BLD-MCNC: 7.5 NG/ML (ref 5–15)
TACROLIMUS BLD-MCNC: 7.6 NG/ML (ref 5–15)
TACROLIMUS BLD-MCNC: 8 NG/ML (ref 5–15)
TACROLIMUS BLD-MCNC: 8.1 NG/ML (ref 5–15)
TACROLIMUS BLD-MCNC: 9.2 NG/ML (ref 5–15)
TACROLIMUS BLD-MCNC: 9.4 NG/ML (ref 5–15)
TACROLIMUS BLD-MCNC: <2 NG/ML (ref 5–15)
TOTAL IRON BINDING CAPACITY: 133 UG/DL (ref 250–450)
TOTAL IRON BINDING CAPACITY: 259 UG/DL (ref 250–450)
TOXIC GRANULES BLD QL SMEAR: PRESENT
TRANSFERRIN SERPL-MCNC: 175 MG/DL (ref 200–375)
TRANSFERRIN SERPL-MCNC: 90 MG/DL (ref 200–375)
TRIGL SERPL-MCNC: 222 MG/DL (ref 30–150)
TROPONIN I SERPL DL<=0.01 NG/ML-MCNC: 0.09 NG/ML (ref 0–0.03)
URATE SERPL-MCNC: 5.6 MG/DL (ref 2.4–5.7)
URN SPEC COLLECT METH UR: ABNORMAL
URN SPEC COLLECT METH UR: ABNORMAL
URN SPEC COLLECT METH UR: NORMAL
VIT B12 SERPL-MCNC: 435 PG/ML (ref 210–950)
VIT B12 SERPL-MCNC: 801 PG/ML (ref 210–950)
VT: 345
WBC # BLD AUTO: 0.83 K/UL (ref 3.9–12.7)
WBC # BLD AUTO: 1.06 K/UL (ref 3.9–12.7)
WBC # BLD AUTO: 1.06 K/UL (ref 3.9–12.7)
WBC # BLD AUTO: 1.12 K/UL (ref 3.9–12.7)
WBC # BLD AUTO: 1.47 K/UL (ref 3.9–12.7)
WBC # BLD AUTO: 2.16 K/UL (ref 3.9–12.7)
WBC # BLD AUTO: 2.16 K/UL (ref 3.9–12.7)
WBC # BLD AUTO: 2.78 K/UL (ref 3.9–12.7)
WBC # BLD AUTO: 2.81 K/UL (ref 3.9–12.7)
WBC # BLD AUTO: 3.59 K/UL (ref 3.9–12.7)
WBC # BLD AUTO: 3.81 K/UL (ref 3.9–12.7)
WBC # BLD AUTO: 4.22 K/UL (ref 3.9–12.7)
WBC # BLD AUTO: 4.43 K/UL (ref 3.9–12.7)
WBC # BLD AUTO: 4.56 K/UL (ref 3.9–12.7)
WBC # BLD AUTO: 4.7 K/UL (ref 3.9–12.7)
WBC # BLD AUTO: 4.72 K/UL (ref 3.9–12.7)
WBC # BLD AUTO: 4.8 K/UL (ref 3.9–12.7)
WBC # BLD AUTO: 5.05 K/UL (ref 3.9–12.7)
WBC # BLD AUTO: 5.28 K/UL (ref 3.9–12.7)
WBC # BLD AUTO: 5.35 K/UL (ref 3.9–12.7)
WBC # BLD AUTO: 5.35 K/UL (ref 3.9–12.7)
WBC # BLD AUTO: 5.37 K/UL (ref 3.9–12.7)
WBC # BLD AUTO: 5.55 K/UL (ref 3.9–12.7)
WBC # BLD AUTO: 5.55 K/UL (ref 3.9–12.7)
WBC # BLD AUTO: 5.66 K/UL (ref 3.9–12.7)
WBC # BLD AUTO: 5.93 K/UL (ref 3.9–12.7)
WBC # BLD AUTO: 6.22 K/UL (ref 3.9–12.7)
WBC # BLD AUTO: 6.23 K/UL (ref 3.9–12.7)
WBC # BLD AUTO: 6.25 K/UL (ref 3.9–12.7)
WBC # BLD AUTO: 6.46 K/UL (ref 3.9–12.7)
WBC # BLD AUTO: 6.78 K/UL (ref 3.9–12.7)
WBC # BLD AUTO: 6.81 K/UL (ref 3.9–12.7)
WBC # BLD AUTO: 7.38 K/UL (ref 3.9–12.7)
WBC # BLD AUTO: 7.46 K/UL (ref 3.9–12.7)
WBC # BLD AUTO: 9.23 K/UL (ref 3.9–12.7)
WBC # CSF: 12 /CU MM (ref 0–5)
WBC # CSF: 144 /CU MM (ref 0–5)
WBC # CSF: 34 /CU MM (ref 0–5)
WBC # CSF: 45 /CU MM (ref 0–5)
WBC #/AREA STL HPF: ABNORMAL /[HPF]
WBC #/AREA STL HPF: NORMAL /[HPF]
WBC #/AREA URNS AUTO: 2 /HPF (ref 0–5)
WBC #/AREA URNS AUTO: 4 /HPF (ref 0–5)
WBC OTHER NFR BLD MANUAL: 0 %
WBC OTHER NFR BLD MANUAL: 0 %

## 2022-01-01 PROCEDURE — 63600175 PHARM REV CODE 636 W HCPCS: Performed by: STUDENT IN AN ORGANIZED HEALTH CARE EDUCATION/TRAINING PROGRAM

## 2022-01-01 PROCEDURE — 20600001 HC STEP DOWN PRIVATE ROOM

## 2022-01-01 PROCEDURE — 83735 ASSAY OF MAGNESIUM: CPT | Performed by: PHYSICIAN ASSISTANT

## 2022-01-01 PROCEDURE — 86403 PARTICLE AGGLUT ANTBDY SCRN: CPT | Performed by: STUDENT IN AN ORGANIZED HEALTH CARE EDUCATION/TRAINING PROGRAM

## 2022-01-01 PROCEDURE — 36410 VNPNXR 3YR/> PHY/QHP DX/THER: CPT

## 2022-01-01 PROCEDURE — 63600175 PHARM REV CODE 636 W HCPCS: Performed by: NURSE PRACTITIONER

## 2022-01-01 PROCEDURE — G0378 HOSPITAL OBSERVATION PER HR: HCPCS

## 2022-01-01 PROCEDURE — 83735 ASSAY OF MAGNESIUM: CPT | Performed by: INTERNAL MEDICINE

## 2022-01-01 PROCEDURE — 85025 COMPLETE CBC W/AUTO DIFF WBC: CPT | Performed by: STUDENT IN AN ORGANIZED HEALTH CARE EDUCATION/TRAINING PROGRAM

## 2022-01-01 PROCEDURE — 25000003 PHARM REV CODE 250: Performed by: INTERNAL MEDICINE

## 2022-01-01 PROCEDURE — 99233 PR SUBSEQUENT HOSPITAL CARE,LEVL III: ICD-10-PCS | Mod: ,,, | Performed by: INTERNAL MEDICINE

## 2022-01-01 PROCEDURE — 87799 DETECT AGENT NOS DNA QUANT: CPT | Performed by: STUDENT IN AN ORGANIZED HEALTH CARE EDUCATION/TRAINING PROGRAM

## 2022-01-01 PROCEDURE — U0002 COVID-19 LAB TEST NON-CDC: HCPCS | Performed by: EMERGENCY MEDICINE

## 2022-01-01 PROCEDURE — 84100 ASSAY OF PHOSPHORUS: CPT | Performed by: STUDENT IN AN ORGANIZED HEALTH CARE EDUCATION/TRAINING PROGRAM

## 2022-01-01 PROCEDURE — 85007 BL SMEAR W/DIFF WBC COUNT: CPT | Performed by: STUDENT IN AN ORGANIZED HEALTH CARE EDUCATION/TRAINING PROGRAM

## 2022-01-01 PROCEDURE — 36415 COLL VENOUS BLD VENIPUNCTURE: CPT | Performed by: STUDENT IN AN ORGANIZED HEALTH CARE EDUCATION/TRAINING PROGRAM

## 2022-01-01 PROCEDURE — 85610 PROTHROMBIN TIME: CPT | Performed by: HOSPITALIST

## 2022-01-01 PROCEDURE — 25000003 PHARM REV CODE 250: Performed by: HOSPITALIST

## 2022-01-01 PROCEDURE — 80053 COMPREHEN METABOLIC PANEL: CPT | Performed by: STUDENT IN AN ORGANIZED HEALTH CARE EDUCATION/TRAINING PROGRAM

## 2022-01-01 PROCEDURE — 80197 ASSAY OF TACROLIMUS: CPT | Performed by: STUDENT IN AN ORGANIZED HEALTH CARE EDUCATION/TRAINING PROGRAM

## 2022-01-01 PROCEDURE — 63600175 PHARM REV CODE 636 W HCPCS: Mod: JG | Performed by: HOSPITALIST

## 2022-01-01 PROCEDURE — 36415 COLL VENOUS BLD VENIPUNCTURE: CPT | Performed by: NURSE PRACTITIONER

## 2022-01-01 PROCEDURE — 99223 PR INITIAL HOSPITAL CARE,LEVL III: ICD-10-PCS | Mod: GC,,, | Performed by: SURGERY

## 2022-01-01 PROCEDURE — 36415 COLL VENOUS BLD VENIPUNCTURE: CPT | Mod: PO | Performed by: INTERNAL MEDICINE

## 2022-01-01 PROCEDURE — 86803 HEPATITIS C AB TEST: CPT | Performed by: EMERGENCY MEDICINE

## 2022-01-01 PROCEDURE — 99233 PR SUBSEQUENT HOSPITAL CARE,LEVL III: ICD-10-PCS | Mod: ,,, | Performed by: STUDENT IN AN ORGANIZED HEALTH CARE EDUCATION/TRAINING PROGRAM

## 2022-01-01 PROCEDURE — 25000003 PHARM REV CODE 250: Performed by: STUDENT IN AN ORGANIZED HEALTH CARE EDUCATION/TRAINING PROGRAM

## 2022-01-01 PROCEDURE — 80197 ASSAY OF TACROLIMUS: CPT | Performed by: NURSE PRACTITIONER

## 2022-01-01 PROCEDURE — 80197 ASSAY OF TACROLIMUS: CPT | Performed by: HOSPITALIST

## 2022-01-01 PROCEDURE — 93010 EKG 12-LEAD: ICD-10-PCS | Mod: ,,, | Performed by: INTERNAL MEDICINE

## 2022-01-01 PROCEDURE — 87799 DETECT AGENT NOS DNA QUANT: CPT | Performed by: INTERNAL MEDICINE

## 2022-01-01 PROCEDURE — 99231 SBSQ HOSP IP/OBS SF/LOW 25: CPT | Mod: ,,, | Performed by: PHYSICIAN ASSISTANT

## 2022-01-01 PROCEDURE — 85014 HEMATOCRIT: CPT

## 2022-01-01 PROCEDURE — 36620 INSERTION CATHETER ARTERY: CPT

## 2022-01-01 PROCEDURE — 83036 HEMOGLOBIN GLYCOSYLATED A1C: CPT | Performed by: NURSE PRACTITIONER

## 2022-01-01 PROCEDURE — 87206 SMEAR FLUORESCENT/ACID STAI: CPT | Performed by: STUDENT IN AN ORGANIZED HEALTH CARE EDUCATION/TRAINING PROGRAM

## 2022-01-01 PROCEDURE — 99226 PR SUBSEQUENT OBSERVATION CARE,LEVEL III: CPT | Mod: ,,, | Performed by: HOSPITALIST

## 2022-01-01 PROCEDURE — 27000923 HC TRIALYSIS CATHETER, ANY SIZE

## 2022-01-01 PROCEDURE — 85730 THROMBOPLASTIN TIME PARTIAL: CPT | Performed by: STUDENT IN AN ORGANIZED HEALTH CARE EDUCATION/TRAINING PROGRAM

## 2022-01-01 PROCEDURE — 99233 SBSQ HOSP IP/OBS HIGH 50: CPT | Mod: ,,, | Performed by: STUDENT IN AN ORGANIZED HEALTH CARE EDUCATION/TRAINING PROGRAM

## 2022-01-01 PROCEDURE — 85027 COMPLETE CBC AUTOMATED: CPT | Performed by: STUDENT IN AN ORGANIZED HEALTH CARE EDUCATION/TRAINING PROGRAM

## 2022-01-01 PROCEDURE — 99232 PR SUBSEQUENT HOSPITAL CARE,LEVL II: ICD-10-PCS | Mod: ,,, | Performed by: PHYSICIAN ASSISTANT

## 2022-01-01 PROCEDURE — 80053 COMPREHEN METABOLIC PANEL: CPT | Performed by: EMERGENCY MEDICINE

## 2022-01-01 PROCEDURE — 81003 URINALYSIS AUTO W/O SCOPE: CPT | Performed by: PHYSICIAN ASSISTANT

## 2022-01-01 PROCEDURE — 99233 SBSQ HOSP IP/OBS HIGH 50: CPT | Mod: GC,,, | Performed by: INTERNAL MEDICINE

## 2022-01-01 PROCEDURE — 99223 1ST HOSP IP/OBS HIGH 75: CPT | Mod: GC,,, | Performed by: INTERNAL MEDICINE

## 2022-01-01 PROCEDURE — 99217 PR OBSERVATION CARE DISCHARGE: ICD-10-PCS | Mod: ,,, | Performed by: HOSPITALIST

## 2022-01-01 PROCEDURE — 99232 PR SUBSEQUENT HOSPITAL CARE,LEVL II: ICD-10-PCS | Mod: GC,,, | Performed by: NEUROLOGICAL SURGERY

## 2022-01-01 PROCEDURE — 99232 SBSQ HOSP IP/OBS MODERATE 35: CPT | Mod: ,,, | Performed by: STUDENT IN AN ORGANIZED HEALTH CARE EDUCATION/TRAINING PROGRAM

## 2022-01-01 PROCEDURE — 76604 US EXAM CHEST: CPT

## 2022-01-01 PROCEDURE — 99232 PR SUBSEQUENT HOSPITAL CARE,LEVL II: ICD-10-PCS | Mod: ,,, | Performed by: STUDENT IN AN ORGANIZED HEALTH CARE EDUCATION/TRAINING PROGRAM

## 2022-01-01 PROCEDURE — 85730 THROMBOPLASTIN TIME PARTIAL: CPT | Mod: 91 | Performed by: STUDENT IN AN ORGANIZED HEALTH CARE EDUCATION/TRAINING PROGRAM

## 2022-01-01 PROCEDURE — 99292 CRITICAL CARE ADDL 30 MIN: CPT | Mod: 25,FS,, | Performed by: INTERNAL MEDICINE

## 2022-01-01 PROCEDURE — 99232 SBSQ HOSP IP/OBS MODERATE 35: CPT | Mod: ,,, | Performed by: PHYSICIAN ASSISTANT

## 2022-01-01 PROCEDURE — B4185 PARENTERAL SOL 10 GM LIPIDS: HCPCS | Performed by: STUDENT IN AN ORGANIZED HEALTH CARE EDUCATION/TRAINING PROGRAM

## 2022-01-01 PROCEDURE — 99232 SBSQ HOSP IP/OBS MODERATE 35: CPT | Mod: GC,,, | Performed by: INTERNAL MEDICINE

## 2022-01-01 PROCEDURE — 85025 COMPLETE CBC W/AUTO DIFF WBC: CPT | Mod: 91 | Performed by: STUDENT IN AN ORGANIZED HEALTH CARE EDUCATION/TRAINING PROGRAM

## 2022-01-01 PROCEDURE — 83735 ASSAY OF MAGNESIUM: CPT | Performed by: STUDENT IN AN ORGANIZED HEALTH CARE EDUCATION/TRAINING PROGRAM

## 2022-01-01 PROCEDURE — 63600175 PHARM REV CODE 636 W HCPCS: Performed by: EMERGENCY MEDICINE

## 2022-01-01 PROCEDURE — 82570 ASSAY OF URINE CREATININE: CPT | Performed by: STUDENT IN AN ORGANIZED HEALTH CARE EDUCATION/TRAINING PROGRAM

## 2022-01-01 PROCEDURE — 36415 COLL VENOUS BLD VENIPUNCTURE: CPT | Performed by: HOSPITALIST

## 2022-01-01 PROCEDURE — 27000221 HC OXYGEN, UP TO 24 HOURS

## 2022-01-01 PROCEDURE — 83993 ASSAY FOR CALPROTECTIN FECAL: CPT | Performed by: STUDENT IN AN ORGANIZED HEALTH CARE EDUCATION/TRAINING PROGRAM

## 2022-01-01 PROCEDURE — 87338 HPYLORI STOOL AG IA: CPT | Performed by: STUDENT IN AN ORGANIZED HEALTH CARE EDUCATION/TRAINING PROGRAM

## 2022-01-01 PROCEDURE — 99223 1ST HOSP IP/OBS HIGH 75: CPT | Mod: ,,, | Performed by: HOSPITALIST

## 2022-01-01 PROCEDURE — 83605 ASSAY OF LACTIC ACID: CPT | Performed by: STUDENT IN AN ORGANIZED HEALTH CARE EDUCATION/TRAINING PROGRAM

## 2022-01-01 PROCEDURE — 87205 SMEAR GRAM STAIN: CPT | Performed by: STUDENT IN AN ORGANIZED HEALTH CARE EDUCATION/TRAINING PROGRAM

## 2022-01-01 PROCEDURE — 99233 PR SUBSEQUENT HOSPITAL CARE,LEVL III: ICD-10-PCS | Mod: GC,,, | Performed by: INTERNAL MEDICINE

## 2022-01-01 PROCEDURE — 99999 PR PBB SHADOW E&M-EST. PATIENT-LVL IV: CPT | Mod: PBBFAC,,, | Performed by: NURSE PRACTITIONER

## 2022-01-01 PROCEDURE — 99284 PR EMERGENCY DEPT VISIT,LEVEL IV: ICD-10-PCS | Mod: CS,,, | Performed by: EMERGENCY MEDICINE

## 2022-01-01 PROCEDURE — 99223 PR INITIAL HOSPITAL CARE,LEVL III: ICD-10-PCS | Mod: ,,, | Performed by: PHYSICIAN ASSISTANT

## 2022-01-01 PROCEDURE — 99225 PR SUBSEQUENT OBSERVATION CARE,LEVEL II: CPT | Mod: ,,, | Performed by: PSYCHIATRY & NEUROLOGY

## 2022-01-01 PROCEDURE — 87116 MYCOBACTERIA CULTURE: CPT | Performed by: STUDENT IN AN ORGANIZED HEALTH CARE EDUCATION/TRAINING PROGRAM

## 2022-01-01 PROCEDURE — 82607 VITAMIN B-12: CPT | Performed by: STUDENT IN AN ORGANIZED HEALTH CARE EDUCATION/TRAINING PROGRAM

## 2022-01-01 PROCEDURE — 99900035 HC TECH TIME PER 15 MIN (STAT)

## 2022-01-01 PROCEDURE — 84157 ASSAY OF PROTEIN OTHER: CPT | Performed by: STUDENT IN AN ORGANIZED HEALTH CARE EDUCATION/TRAINING PROGRAM

## 2022-01-01 PROCEDURE — 99233 SBSQ HOSP IP/OBS HIGH 50: CPT | Mod: ,,, | Performed by: INTERNAL MEDICINE

## 2022-01-01 PROCEDURE — 36600 WITHDRAWAL OF ARTERIAL BLOOD: CPT

## 2022-01-01 PROCEDURE — 94760 N-INVAS EAR/PLS OXIMETRY 1: CPT

## 2022-01-01 PROCEDURE — 80197 ASSAY OF TACROLIMUS: CPT | Performed by: PHYSICIAN ASSISTANT

## 2022-01-01 PROCEDURE — 87449 NOS EACH ORGANISM AG IA: CPT | Performed by: STUDENT IN AN ORGANIZED HEALTH CARE EDUCATION/TRAINING PROGRAM

## 2022-01-01 PROCEDURE — 76937 US GUIDE VASCULAR ACCESS: CPT

## 2022-01-01 PROCEDURE — 25000003 PHARM REV CODE 250

## 2022-01-01 PROCEDURE — 84100 ASSAY OF PHOSPHORUS: CPT | Performed by: EMERGENCY MEDICINE

## 2022-01-01 PROCEDURE — 85025 COMPLETE CBC W/AUTO DIFF WBC: CPT | Performed by: PHYSICIAN ASSISTANT

## 2022-01-01 PROCEDURE — 93010 ELECTROCARDIOGRAM REPORT: CPT | Mod: ,,, | Performed by: INTERNAL MEDICINE

## 2022-01-01 PROCEDURE — 85520 HEPARIN ASSAY: CPT | Performed by: STUDENT IN AN ORGANIZED HEALTH CARE EDUCATION/TRAINING PROGRAM

## 2022-01-01 PROCEDURE — 25000003 PHARM REV CODE 250: Performed by: NURSE PRACTITIONER

## 2022-01-01 PROCEDURE — 99223 PR INITIAL HOSPITAL CARE,LEVL III: ICD-10-PCS | Mod: ,,, | Performed by: HOSPITALIST

## 2022-01-01 PROCEDURE — 99223 1ST HOSP IP/OBS HIGH 75: CPT | Mod: ,,, | Performed by: PHYSICIAN ASSISTANT

## 2022-01-01 PROCEDURE — 87209 SMEAR COMPLEX STAIN: CPT | Performed by: STUDENT IN AN ORGANIZED HEALTH CARE EDUCATION/TRAINING PROGRAM

## 2022-01-01 PROCEDURE — 36569 INSJ PICC 5 YR+ W/O IMAGING: CPT

## 2022-01-01 PROCEDURE — 80048 BASIC METABOLIC PNL TOTAL CA: CPT | Mod: XB | Performed by: STUDENT IN AN ORGANIZED HEALTH CARE EDUCATION/TRAINING PROGRAM

## 2022-01-01 PROCEDURE — 86403 PARTICLE AGGLUT ANTBDY SCRN: CPT | Performed by: INTERNAL MEDICINE

## 2022-01-01 PROCEDURE — 82705 FATS/LIPIDS FECES QUAL: CPT | Performed by: STUDENT IN AN ORGANIZED HEALTH CARE EDUCATION/TRAINING PROGRAM

## 2022-01-01 PROCEDURE — 99225 PR SUBSEQUENT OBSERVATION CARE,LEVEL II: ICD-10-PCS | Mod: ,,, | Performed by: PSYCHIATRY & NEUROLOGY

## 2022-01-01 PROCEDURE — 87177 OVA AND PARASITES SMEARS: CPT | Performed by: STUDENT IN AN ORGANIZED HEALTH CARE EDUCATION/TRAINING PROGRAM

## 2022-01-01 PROCEDURE — 83615 LACTATE (LD) (LDH) ENZYME: CPT | Performed by: STUDENT IN AN ORGANIZED HEALTH CARE EDUCATION/TRAINING PROGRAM

## 2022-01-01 PROCEDURE — 82656 EL-1 FECAL QUAL/SEMIQ: CPT | Performed by: STUDENT IN AN ORGANIZED HEALTH CARE EDUCATION/TRAINING PROGRAM

## 2022-01-01 PROCEDURE — 84466 ASSAY OF TRANSFERRIN: CPT | Performed by: STUDENT IN AN ORGANIZED HEALTH CARE EDUCATION/TRAINING PROGRAM

## 2022-01-01 PROCEDURE — 83880 ASSAY OF NATRIURETIC PEPTIDE: CPT | Performed by: NURSE PRACTITIONER

## 2022-01-01 PROCEDURE — 99223 PR INITIAL HOSPITAL CARE,LEVL III: ICD-10-PCS | Mod: ,,, | Performed by: INTERNAL MEDICINE

## 2022-01-01 PROCEDURE — 81001 URINALYSIS AUTO W/SCOPE: CPT | Performed by: STUDENT IN AN ORGANIZED HEALTH CARE EDUCATION/TRAINING PROGRAM

## 2022-01-01 PROCEDURE — 87015 SPECIMEN INFECT AGNT CONCNTJ: CPT | Performed by: STUDENT IN AN ORGANIZED HEALTH CARE EDUCATION/TRAINING PROGRAM

## 2022-01-01 PROCEDURE — 84295 ASSAY OF SERUM SODIUM: CPT

## 2022-01-01 PROCEDURE — 93005 ELECTROCARDIOGRAM TRACING: CPT

## 2022-01-01 PROCEDURE — 89051 BODY FLUID CELL COUNT: CPT | Performed by: STUDENT IN AN ORGANIZED HEALTH CARE EDUCATION/TRAINING PROGRAM

## 2022-01-01 PROCEDURE — 63600175 PHARM REV CODE 636 W HCPCS: Performed by: INTERNAL MEDICINE

## 2022-01-01 PROCEDURE — 99285 EMERGENCY DEPT VISIT HI MDM: CPT | Mod: 25

## 2022-01-01 PROCEDURE — 84100 ASSAY OF PHOSPHORUS: CPT | Performed by: PHYSICIAN ASSISTANT

## 2022-01-01 PROCEDURE — 99232 PR SUBSEQUENT HOSPITAL CARE,LEVL II: ICD-10-PCS | Mod: GC,,, | Performed by: INTERNAL MEDICINE

## 2022-01-01 PROCEDURE — 25500020 PHARM REV CODE 255: Performed by: STUDENT IN AN ORGANIZED HEALTH CARE EDUCATION/TRAINING PROGRAM

## 2022-01-01 PROCEDURE — 85610 PROTHROMBIN TIME: CPT

## 2022-01-01 PROCEDURE — 99284 EMERGENCY DEPT VISIT MOD MDM: CPT | Mod: 25

## 2022-01-01 PROCEDURE — 85025 COMPLETE CBC W/AUTO DIFF WBC: CPT | Performed by: HOSPITALIST

## 2022-01-01 PROCEDURE — 85025 COMPLETE CBC W/AUTO DIFF WBC: CPT | Performed by: NURSE PRACTITIONER

## 2022-01-01 PROCEDURE — 82272 OCCULT BLD FECES 1-3 TESTS: CPT | Performed by: STUDENT IN AN ORGANIZED HEALTH CARE EDUCATION/TRAINING PROGRAM

## 2022-01-01 PROCEDURE — 80053 COMPREHEN METABOLIC PANEL: CPT | Performed by: PHYSICIAN ASSISTANT

## 2022-01-01 PROCEDURE — 99222 1ST HOSP IP/OBS MODERATE 55: CPT | Mod: ,,, | Performed by: PSYCHIATRY & NEUROLOGY

## 2022-01-01 PROCEDURE — 99220 PR INITIAL OBSERVATION CARE,LEVL III: ICD-10-PCS | Mod: ,,, | Performed by: NURSE PRACTITIONER

## 2022-01-01 PROCEDURE — 84300 ASSAY OF URINE SODIUM: CPT | Performed by: STUDENT IN AN ORGANIZED HEALTH CARE EDUCATION/TRAINING PROGRAM

## 2022-01-01 PROCEDURE — 99223 1ST HOSP IP/OBS HIGH 75: CPT | Mod: ,,, | Performed by: INTERNAL MEDICINE

## 2022-01-01 PROCEDURE — 87046 STOOL CULTR AEROBIC BACT EA: CPT | Mod: 59 | Performed by: STUDENT IN AN ORGANIZED HEALTH CARE EDUCATION/TRAINING PROGRAM

## 2022-01-01 PROCEDURE — 89055 LEUKOCYTE ASSESSMENT FECAL: CPT | Performed by: STUDENT IN AN ORGANIZED HEALTH CARE EDUCATION/TRAINING PROGRAM

## 2022-01-01 PROCEDURE — 84484 ASSAY OF TROPONIN QUANT: CPT | Performed by: NURSE PRACTITIONER

## 2022-01-01 PROCEDURE — 83735 ASSAY OF MAGNESIUM: CPT | Performed by: NURSE PRACTITIONER

## 2022-01-01 PROCEDURE — 99223 1ST HOSP IP/OBS HIGH 75: CPT | Mod: ,,,

## 2022-01-01 PROCEDURE — 83970 ASSAY OF PARATHORMONE: CPT | Performed by: PHYSICIAN ASSISTANT

## 2022-01-01 PROCEDURE — 99223 1ST HOSP IP/OBS HIGH 75: CPT | Mod: GC,,, | Performed by: SURGERY

## 2022-01-01 PROCEDURE — 82746 ASSAY OF FOLIC ACID SERUM: CPT | Performed by: STUDENT IN AN ORGANIZED HEALTH CARE EDUCATION/TRAINING PROGRAM

## 2022-01-01 PROCEDURE — C1751 CATH, INF, PER/CENT/MIDLINE: HCPCS

## 2022-01-01 PROCEDURE — 82306 VITAMIN D 25 HYDROXY: CPT | Performed by: INTERNAL MEDICINE

## 2022-01-01 PROCEDURE — 83970 ASSAY OF PARATHORMONE: CPT | Performed by: INTERNAL MEDICINE

## 2022-01-01 PROCEDURE — 82550 ASSAY OF CK (CPK): CPT | Performed by: NURSE PRACTITIONER

## 2022-01-01 PROCEDURE — 87070 CULTURE OTHR SPECIMN AEROBIC: CPT | Performed by: STUDENT IN AN ORGANIZED HEALTH CARE EDUCATION/TRAINING PROGRAM

## 2022-01-01 PROCEDURE — 85379 FIBRIN DEGRADATION QUANT: CPT | Performed by: NURSE PRACTITIONER

## 2022-01-01 PROCEDURE — 87040 BLOOD CULTURE FOR BACTERIA: CPT | Performed by: INTERNAL MEDICINE

## 2022-01-01 PROCEDURE — A4217 STERILE WATER/SALINE, 500 ML: HCPCS | Performed by: STUDENT IN AN ORGANIZED HEALTH CARE EDUCATION/TRAINING PROGRAM

## 2022-01-01 PROCEDURE — 84478 ASSAY OF TRIGLYCERIDES: CPT | Performed by: STUDENT IN AN ORGANIZED HEALTH CARE EDUCATION/TRAINING PROGRAM

## 2022-01-01 PROCEDURE — 83615 LACTATE (LD) (LDH) ENZYME: CPT | Performed by: NURSE PRACTITIONER

## 2022-01-01 PROCEDURE — 87427 SHIGA-LIKE TOXIN AG IA: CPT | Mod: 59 | Performed by: STUDENT IN AN ORGANIZED HEALTH CARE EDUCATION/TRAINING PROGRAM

## 2022-01-01 PROCEDURE — 99215 PR OFFICE/OUTPT VISIT, EST, LEVL V, 40-54 MIN: ICD-10-PCS | Mod: S$PBB,,, | Performed by: NURSE PRACTITIONER

## 2022-01-01 PROCEDURE — 99215 OFFICE O/P EST HI 40 MIN: CPT | Mod: S$PBB,,, | Performed by: NURSE PRACTITIONER

## 2022-01-01 PROCEDURE — 80197 ASSAY OF TACROLIMUS: CPT | Performed by: EMERGENCY MEDICINE

## 2022-01-01 PROCEDURE — 84100 ASSAY OF PHOSPHORUS: CPT | Performed by: INTERNAL MEDICINE

## 2022-01-01 PROCEDURE — S0030 INJECTION, METRONIDAZOLE: HCPCS | Performed by: INTERNAL MEDICINE

## 2022-01-01 PROCEDURE — 86022 PLATELET ANTIBODIES: CPT | Performed by: STUDENT IN AN ORGANIZED HEALTH CARE EDUCATION/TRAINING PROGRAM

## 2022-01-01 PROCEDURE — 82800 BLOOD PH: CPT

## 2022-01-01 PROCEDURE — 99999 PR PBB SHADOW E&M-EST. PATIENT-LVL IV: ICD-10-PCS | Mod: PBBFAC,,, | Performed by: NURSE PRACTITIONER

## 2022-01-01 PROCEDURE — 96361 HYDRATE IV INFUSION ADD-ON: CPT

## 2022-01-01 PROCEDURE — 85045 AUTOMATED RETICULOCYTE COUNT: CPT | Performed by: STUDENT IN AN ORGANIZED HEALTH CARE EDUCATION/TRAINING PROGRAM

## 2022-01-01 PROCEDURE — 85025 COMPLETE CBC W/AUTO DIFF WBC: CPT | Performed by: INTERNAL MEDICINE

## 2022-01-01 PROCEDURE — 80197 ASSAY OF TACROLIMUS: CPT | Performed by: INTERNAL MEDICINE

## 2022-01-01 PROCEDURE — 94761 N-INVAS EAR/PLS OXIMETRY MLT: CPT

## 2022-01-01 PROCEDURE — 99284 EMERGENCY DEPT VISIT MOD MDM: CPT | Mod: CS,,, | Performed by: EMERGENCY MEDICINE

## 2022-01-01 PROCEDURE — 99222 PR INITIAL HOSPITAL CARE,LEVL II: ICD-10-PCS | Mod: ,,, | Performed by: PSYCHIATRY & NEUROLOGY

## 2022-01-01 PROCEDURE — 86850 RBC ANTIBODY SCREEN: CPT

## 2022-01-01 PROCEDURE — 36415 COLL VENOUS BLD VENIPUNCTURE: CPT

## 2022-01-01 PROCEDURE — 82570 ASSAY OF URINE CREATININE: CPT | Performed by: PHYSICIAN ASSISTANT

## 2022-01-01 PROCEDURE — 85520 HEPARIN ASSAY: CPT | Performed by: INTERNAL MEDICINE

## 2022-01-01 PROCEDURE — 99231 PR SUBSEQUENT HOSPITAL CARE,LEVL I: ICD-10-PCS | Mod: ,,, | Performed by: PHYSICIAN ASSISTANT

## 2022-01-01 PROCEDURE — 99233 SBSQ HOSP IP/OBS HIGH 50: CPT | Mod: ,,, | Performed by: PHYSICIAN ASSISTANT

## 2022-01-01 PROCEDURE — 99284 PR EMERGENCY DEPT VISIT,LEVEL IV: ICD-10-PCS | Mod: ,,, | Performed by: EMERGENCY MEDICINE

## 2022-01-01 PROCEDURE — 96366 THER/PROPH/DIAG IV INF ADDON: CPT

## 2022-01-01 PROCEDURE — 36556 INSERT NON-TUNNEL CV CATH: CPT

## 2022-01-01 PROCEDURE — 99291 CRITICAL CARE FIRST HOUR: CPT | Mod: 25,FS,, | Performed by: INTERNAL MEDICINE

## 2022-01-01 PROCEDURE — 80053 COMPREHEN METABOLIC PANEL: CPT | Performed by: NURSE PRACTITIONER

## 2022-01-01 PROCEDURE — U0002 COVID-19 LAB TEST NON-CDC: HCPCS

## 2022-01-01 PROCEDURE — 27200966 HC CLOSED SUCTION SYSTEM

## 2022-01-01 PROCEDURE — 82945 GLUCOSE OTHER FLUID: CPT | Performed by: STUDENT IN AN ORGANIZED HEALTH CARE EDUCATION/TRAINING PROGRAM

## 2022-01-01 PROCEDURE — 31500 INSERT EMERGENCY AIRWAY: CPT

## 2022-01-01 PROCEDURE — 36556 INSERT NON-TUNNEL CV CATH: CPT | Mod: 59,,, | Performed by: INTERNAL MEDICINE

## 2022-01-01 PROCEDURE — 99292 PR CRITICAL CARE, ADDL 30 MIN: ICD-10-PCS | Mod: 25,FS,, | Performed by: INTERNAL MEDICINE

## 2022-01-01 PROCEDURE — 83735 ASSAY OF MAGNESIUM: CPT | Performed by: EMERGENCY MEDICINE

## 2022-01-01 PROCEDURE — 85610 PROTHROMBIN TIME: CPT | Performed by: INTERNAL MEDICINE

## 2022-01-01 PROCEDURE — 76705 ECHO EXAM OF ABDOMEN: CPT

## 2022-01-01 PROCEDURE — 82803 BLOOD GASES ANY COMBINATION: CPT

## 2022-01-01 PROCEDURE — 87045 FECES CULTURE AEROBIC BACT: CPT | Performed by: STUDENT IN AN ORGANIZED HEALTH CARE EDUCATION/TRAINING PROGRAM

## 2022-01-01 PROCEDURE — 84550 ASSAY OF BLOOD/URIC ACID: CPT | Performed by: PHYSICIAN ASSISTANT

## 2022-01-01 PROCEDURE — 99223 PR INITIAL HOSPITAL CARE,LEVL III: ICD-10-PCS | Mod: GC,,, | Performed by: INTERNAL MEDICINE

## 2022-01-01 PROCEDURE — 85027 COMPLETE CBC AUTOMATED: CPT | Performed by: INTERNAL MEDICINE

## 2022-01-01 PROCEDURE — 87329 GIARDIA AG IA: CPT | Performed by: STUDENT IN AN ORGANIZED HEALTH CARE EDUCATION/TRAINING PROGRAM

## 2022-01-01 PROCEDURE — 27000249 HC VAPOTHERM CIRCUIT

## 2022-01-01 PROCEDURE — 85007 BL SMEAR W/DIFF WBC COUNT: CPT | Performed by: INTERNAL MEDICINE

## 2022-01-01 PROCEDURE — 25500020 PHARM REV CODE 255: Performed by: INTERNAL MEDICINE

## 2022-01-01 PROCEDURE — 85610 PROTHROMBIN TIME: CPT | Performed by: STUDENT IN AN ORGANIZED HEALTH CARE EDUCATION/TRAINING PROGRAM

## 2022-01-01 PROCEDURE — 85730 THROMBOPLASTIN TIME PARTIAL: CPT | Performed by: HOSPITALIST

## 2022-01-01 PROCEDURE — 82330 ASSAY OF CALCIUM: CPT

## 2022-01-01 PROCEDURE — 84075 ASSAY ALKALINE PHOSPHATASE: CPT | Performed by: INTERNAL MEDICINE

## 2022-01-01 PROCEDURE — 94002 VENT MGMT INPAT INIT DAY: CPT

## 2022-01-01 PROCEDURE — 51702 INSERT TEMP BLADDER CATH: CPT

## 2022-01-01 PROCEDURE — 87389 HIV-1 AG W/HIV-1&-2 AB AG IA: CPT | Performed by: EMERGENCY MEDICINE

## 2022-01-01 PROCEDURE — 96365 THER/PROPH/DIAG IV INF INIT: CPT

## 2022-01-01 PROCEDURE — 37799 UNLISTED PX VASCULAR SURGERY: CPT

## 2022-01-01 PROCEDURE — 25000003 PHARM REV CODE 250: Performed by: EMERGENCY MEDICINE

## 2022-01-01 PROCEDURE — 85730 THROMBOPLASTIN TIME PARTIAL: CPT | Performed by: INTERNAL MEDICINE

## 2022-01-01 PROCEDURE — 84132 ASSAY OF SERUM POTASSIUM: CPT

## 2022-01-01 PROCEDURE — 99223 PR INITIAL HOSPITAL CARE,LEVL III: ICD-10-PCS | Mod: ,,,

## 2022-01-01 PROCEDURE — C9113 INJ PANTOPRAZOLE SODIUM, VIA: HCPCS | Performed by: INTERNAL MEDICINE

## 2022-01-01 PROCEDURE — 36415 COLL VENOUS BLD VENIPUNCTURE: CPT | Mod: PO | Performed by: PHYSICIAN ASSISTANT

## 2022-01-01 PROCEDURE — 27100171 HC OXYGEN HIGH FLOW UP TO 24 HOURS

## 2022-01-01 PROCEDURE — 99233 PR SUBSEQUENT HOSPITAL CARE,LEVL III: ICD-10-PCS | Mod: ,,, | Performed by: PHYSICIAN ASSISTANT

## 2022-01-01 PROCEDURE — 99217 PR OBSERVATION CARE DISCHARGE: CPT | Mod: ,,, | Performed by: HOSPITALIST

## 2022-01-01 PROCEDURE — 80069 RENAL FUNCTION PANEL: CPT | Performed by: INTERNAL MEDICINE

## 2022-01-01 PROCEDURE — 36556 PR INSERT NON-TUNNEL CV CATH 5+ YRS OLD: ICD-10-PCS | Mod: 59,,, | Performed by: INTERNAL MEDICINE

## 2022-01-01 PROCEDURE — 84466 ASSAY OF TRANSFERRIN: CPT | Performed by: PHYSICIAN ASSISTANT

## 2022-01-01 PROCEDURE — 87425 ROTAVIRUS AG IA: CPT | Performed by: STUDENT IN AN ORGANIZED HEALTH CARE EDUCATION/TRAINING PROGRAM

## 2022-01-01 PROCEDURE — 99220 PR INITIAL OBSERVATION CARE,LEVL III: CPT | Mod: ,,, | Performed by: NURSE PRACTITIONER

## 2022-01-01 PROCEDURE — 99214 OFFICE O/P EST MOD 30 MIN: CPT | Mod: PBBFAC | Performed by: NURSE PRACTITIONER

## 2022-01-01 PROCEDURE — 82728 ASSAY OF FERRITIN: CPT | Performed by: STUDENT IN AN ORGANIZED HEALTH CARE EDUCATION/TRAINING PROGRAM

## 2022-01-01 PROCEDURE — 99291 PR CRITICAL CARE, E/M 30-74 MINUTES: ICD-10-PCS | Mod: 25,FS,, | Performed by: INTERNAL MEDICINE

## 2022-01-01 PROCEDURE — 99232 SBSQ HOSP IP/OBS MODERATE 35: CPT | Mod: GC,,, | Performed by: NEUROLOGICAL SURGERY

## 2022-01-01 PROCEDURE — 85384 FIBRINOGEN ACTIVITY: CPT | Performed by: STUDENT IN AN ORGANIZED HEALTH CARE EDUCATION/TRAINING PROGRAM

## 2022-01-01 PROCEDURE — 85025 COMPLETE CBC W/AUTO DIFF WBC: CPT | Performed by: EMERGENCY MEDICINE

## 2022-01-01 PROCEDURE — 63600175 PHARM REV CODE 636 W HCPCS

## 2022-01-01 PROCEDURE — 99000 SPECIMEN HANDLING OFFICE-LAB: CPT | Performed by: STUDENT IN AN ORGANIZED HEALTH CARE EDUCATION/TRAINING PROGRAM

## 2022-01-01 PROCEDURE — 99284 EMERGENCY DEPT VISIT MOD MDM: CPT | Mod: ,,, | Performed by: EMERGENCY MEDICINE

## 2022-01-01 PROCEDURE — 80053 COMPREHEN METABOLIC PANEL: CPT | Performed by: HOSPITALIST

## 2022-01-01 PROCEDURE — 99226 PR SUBSEQUENT OBSERVATION CARE,LEVEL III: ICD-10-PCS | Mod: ,,, | Performed by: HOSPITALIST

## 2022-01-01 PROCEDURE — 85025 COMPLETE CBC W/AUTO DIFF WBC: CPT | Mod: 91 | Performed by: INTERNAL MEDICINE

## 2022-01-01 PROCEDURE — 87799 DETECT AGENT NOS DNA QUANT: CPT | Mod: 91 | Performed by: STUDENT IN AN ORGANIZED HEALTH CARE EDUCATION/TRAINING PROGRAM

## 2022-01-01 PROCEDURE — 86850 RBC ANTIBODY SCREEN: CPT | Performed by: INTERNAL MEDICINE

## 2022-01-01 PROCEDURE — 82607 VITAMIN B-12: CPT | Performed by: NURSE PRACTITIONER

## 2022-01-01 PROCEDURE — 80048 BASIC METABOLIC PNL TOTAL CA: CPT

## 2022-01-01 RX ORDER — TOPIRAMATE 25 MG/1
50 TABLET ORAL 2 TIMES DAILY
Status: DISCONTINUED | OUTPATIENT
Start: 2022-01-01 | End: 2022-01-01

## 2022-01-01 RX ORDER — POLYETHYLENE GLYCOL 3350 17 G/17G
17 POWDER, FOR SOLUTION ORAL 2 TIMES DAILY
Status: DISCONTINUED | OUTPATIENT
Start: 2022-01-01 | End: 2022-01-01

## 2022-01-01 RX ORDER — DOCUSATE SODIUM 100 MG/1
100 CAPSULE, LIQUID FILLED ORAL 2 TIMES DAILY PRN
Status: DISCONTINUED | OUTPATIENT
Start: 2022-01-01 | End: 2022-01-01 | Stop reason: HOSPADM

## 2022-01-01 RX ORDER — FLUCYTOSINE 500 MG/1
1500 CAPSULE ORAL EVERY 6 HOURS
Status: DISCONTINUED | OUTPATIENT
Start: 2022-01-01 | End: 2022-01-01

## 2022-01-01 RX ORDER — TACROLIMUS 1 MG/1
3 CAPSULE ORAL EVERY MORNING
Status: DISCONTINUED | OUTPATIENT
Start: 2022-01-01 | End: 2022-01-01

## 2022-01-01 RX ORDER — HEPARIN SODIUM,PORCINE/D5W 25000/250
0-40 INTRAVENOUS SOLUTION INTRAVENOUS CONTINUOUS
Status: DISCONTINUED | OUTPATIENT
Start: 2022-01-01 | End: 2022-01-01

## 2022-01-01 RX ORDER — SODIUM BICARBONATE 650 MG/1
1300 TABLET ORAL 2 TIMES DAILY
Status: DISCONTINUED | OUTPATIENT
Start: 2022-01-01 | End: 2022-01-01

## 2022-01-01 RX ORDER — SODIUM CHLORIDE 0.9 % (FLUSH) 0.9 %
10 SYRINGE (ML) INJECTION
Status: DISCONTINUED | OUTPATIENT
Start: 2022-01-01 | End: 2022-01-01

## 2022-01-01 RX ORDER — MYCOPHENOLATE MOFETIL 250 MG/1
500 CAPSULE ORAL 2 TIMES DAILY
Qty: 120 CAPSULE | Refills: 11 | Status: SHIPPED | OUTPATIENT
Start: 2022-01-01 | End: 2023-03-28

## 2022-01-01 RX ORDER — TACROLIMUS 1 MG/1
3 CAPSULE ORAL 2 TIMES DAILY
Status: DISCONTINUED | OUTPATIENT
Start: 2022-01-01 | End: 2022-01-01

## 2022-01-01 RX ORDER — INDOMETHACIN 25 MG/1
50 CAPSULE ORAL ONCE
Status: COMPLETED | OUTPATIENT
Start: 2022-01-01 | End: 2022-01-01

## 2022-01-01 RX ORDER — DIPHENHYDRAMINE HCL 25 MG
25 CAPSULE ORAL
Status: DISCONTINUED | OUTPATIENT
Start: 2022-01-01 | End: 2022-01-01

## 2022-01-01 RX ORDER — NOREPINEPHRINE BITARTRATE/D5W 4MG/250ML
PLASTIC BAG, INJECTION (ML) INTRAVENOUS
Status: COMPLETED
Start: 2022-01-01 | End: 2022-01-01

## 2022-01-01 RX ORDER — TOPIRAMATE 25 MG/1
25 TABLET ORAL 2 TIMES DAILY
Status: DISCONTINUED | OUTPATIENT
Start: 2022-01-01 | End: 2022-01-01 | Stop reason: HOSPADM

## 2022-01-01 RX ORDER — SODIUM BICARBONATE 650 MG/1
1300 TABLET ORAL 3 TIMES DAILY
Status: DISCONTINUED | OUTPATIENT
Start: 2022-01-01 | End: 2022-01-01

## 2022-01-01 RX ORDER — CALCITRIOL 0.25 UG/1
0.5 CAPSULE ORAL DAILY
Status: DISCONTINUED | OUTPATIENT
Start: 2022-01-01 | End: 2022-01-01 | Stop reason: HOSPADM

## 2022-01-01 RX ORDER — MIDAZOLAM HYDROCHLORIDE 1 MG/ML
INJECTION INTRAMUSCULAR; INTRAVENOUS
Status: COMPLETED
Start: 2022-01-01 | End: 2022-01-01

## 2022-01-01 RX ORDER — POTASSIUM CHLORIDE 20 MEQ/1
20 TABLET, EXTENDED RELEASE ORAL ONCE
Status: COMPLETED | OUTPATIENT
Start: 2022-01-01 | End: 2022-01-01

## 2022-01-01 RX ORDER — PANTOPRAZOLE SODIUM 40 MG/10ML
40 INJECTION, POWDER, LYOPHILIZED, FOR SOLUTION INTRAVENOUS DAILY
Status: DISCONTINUED | OUTPATIENT
Start: 2022-01-01 | End: 2022-01-01

## 2022-01-01 RX ORDER — DICYCLOMINE HYDROCHLORIDE 10 MG/ML
20 INJECTION INTRAMUSCULAR ONCE
Status: COMPLETED | OUTPATIENT
Start: 2022-01-01 | End: 2022-01-01

## 2022-01-01 RX ORDER — NOREPINEPHRINE BITARTRATE/D5W 4MG/250ML
0-3 PLASTIC BAG, INJECTION (ML) INTRAVENOUS CONTINUOUS
Status: DISCONTINUED | OUTPATIENT
Start: 2022-01-01 | End: 2022-01-01

## 2022-01-01 RX ORDER — IBUPROFEN 200 MG
16 TABLET ORAL
Status: DISCONTINUED | OUTPATIENT
Start: 2022-01-01 | End: 2022-01-01 | Stop reason: HOSPADM

## 2022-01-01 RX ORDER — LANOLIN ALCOHOL/MO/W.PET/CERES
400 CREAM (GRAM) TOPICAL DAILY
Status: DISCONTINUED | OUTPATIENT
Start: 2022-01-01 | End: 2022-01-01 | Stop reason: HOSPADM

## 2022-01-01 RX ORDER — TACROLIMUS 1 MG/1
2 CAPSULE ORAL EVERY EVENING
Status: DISCONTINUED | OUTPATIENT
Start: 2022-01-01 | End: 2022-01-01

## 2022-01-01 RX ORDER — TACROLIMUS 1 MG/1
2 CAPSULE ORAL 2 TIMES DAILY
Status: DISCONTINUED | OUTPATIENT
Start: 2022-01-01 | End: 2022-01-01

## 2022-01-01 RX ORDER — TACROLIMUS 1 MG/1
3 CAPSULE ORAL EVERY MORNING
Status: DISCONTINUED | OUTPATIENT
Start: 2022-01-01 | End: 2022-01-01 | Stop reason: HOSPADM

## 2022-01-01 RX ORDER — DEXTROSE MONOHYDRATE 50 MG/ML
INJECTION, SOLUTION INTRAVENOUS DAILY PRN
Status: DISCONTINUED | OUTPATIENT
Start: 2022-01-01 | End: 2022-01-01

## 2022-01-01 RX ORDER — TACROLIMUS 1 MG/1
2 CAPSULE ORAL EVERY EVENING
Status: DISCONTINUED | OUTPATIENT
Start: 2022-01-01 | End: 2022-01-01 | Stop reason: HOSPADM

## 2022-01-01 RX ORDER — IPRATROPIUM BROMIDE AND ALBUTEROL SULFATE 2.5; .5 MG/3ML; MG/3ML
3 SOLUTION RESPIRATORY (INHALATION) EVERY 6 HOURS PRN
Status: DISCONTINUED | OUTPATIENT
Start: 2022-01-01 | End: 2022-01-01

## 2022-01-01 RX ORDER — CALCITRIOL 0.25 UG/1
0.25 CAPSULE ORAL DAILY
Status: DISCONTINUED | OUTPATIENT
Start: 2022-01-01 | End: 2022-01-01

## 2022-01-01 RX ORDER — LIDOCAINE HYDROCHLORIDE 10 MG/ML
3 INJECTION INFILTRATION; PERINEURAL ONCE
Status: COMPLETED | OUTPATIENT
Start: 2022-01-01 | End: 2022-01-01

## 2022-01-01 RX ORDER — GLUCAGON 1 MG
1 KIT INJECTION
Status: DISCONTINUED | OUTPATIENT
Start: 2022-01-01 | End: 2022-01-01

## 2022-01-01 RX ORDER — ONDANSETRON 2 MG/ML
4 INJECTION INTRAMUSCULAR; INTRAVENOUS EVERY 6 HOURS PRN
Status: DISCONTINUED | OUTPATIENT
Start: 2022-01-01 | End: 2022-01-01

## 2022-01-01 RX ORDER — FLUCONAZOLE 100 MG/1
400 TABLET ORAL DAILY
Status: DISCONTINUED | OUTPATIENT
Start: 2022-01-01 | End: 2022-01-01

## 2022-01-01 RX ORDER — PREDNISONE 2.5 MG/1
5 TABLET ORAL DAILY
Status: DISCONTINUED | OUTPATIENT
Start: 2022-07-20 | End: 2022-01-01

## 2022-01-01 RX ORDER — LIDOCAINE HYDROCHLORIDE 10 MG/ML
2 INJECTION INFILTRATION; PERINEURAL ONCE
Status: COMPLETED | OUTPATIENT
Start: 2022-01-01 | End: 2022-01-01

## 2022-01-01 RX ORDER — TOPIRAMATE 25 MG/1
25 TABLET ORAL 2 TIMES DAILY
Start: 2022-01-01

## 2022-01-01 RX ORDER — SODIUM CHLORIDE 0.9 % (FLUSH) 0.9 %
10 SYRINGE (ML) INJECTION EVERY 12 HOURS PRN
Status: DISCONTINUED | OUTPATIENT
Start: 2022-01-01 | End: 2022-01-01 | Stop reason: HOSPADM

## 2022-01-01 RX ORDER — TACROLIMUS 1 MG/1
CAPSULE ORAL
Qty: 180 CAPSULE | Refills: 11 | Status: SHIPPED | OUTPATIENT
Start: 2022-01-01 | End: 2022-01-01 | Stop reason: SDUPTHER

## 2022-01-01 RX ORDER — TALC
6 POWDER (GRAM) TOPICAL NIGHTLY PRN
Status: DISCONTINUED | OUTPATIENT
Start: 2022-01-01 | End: 2022-01-01

## 2022-01-01 RX ORDER — METRONIDAZOLE 500 MG/100ML
500 INJECTION, SOLUTION INTRAVENOUS
Status: DISCONTINUED | OUTPATIENT
Start: 2022-01-01 | End: 2022-01-01

## 2022-01-01 RX ORDER — DICYCLOMINE HYDROCHLORIDE 10 MG/1
10 CAPSULE ORAL 3 TIMES DAILY
Status: DISCONTINUED | OUTPATIENT
Start: 2022-01-01 | End: 2022-01-01

## 2022-01-01 RX ORDER — DEXAMETHASONE SODIUM PHOSPHATE 4 MG/ML
10 INJECTION, SOLUTION INTRA-ARTICULAR; INTRALESIONAL; INTRAMUSCULAR; INTRAVENOUS; SOFT TISSUE
Status: COMPLETED | OUTPATIENT
Start: 2022-01-01 | End: 2022-01-01

## 2022-01-01 RX ORDER — INSULIN ASPART 100 [IU]/ML
0-5 INJECTION, SOLUTION INTRAVENOUS; SUBCUTANEOUS EVERY 4 HOURS PRN
Status: DISCONTINUED | OUTPATIENT
Start: 2022-01-01 | End: 2022-01-01

## 2022-01-01 RX ORDER — NIFEDIPINE 30 MG/1
90 TABLET, EXTENDED RELEASE ORAL DAILY
Status: DISCONTINUED | OUTPATIENT
Start: 2022-01-01 | End: 2022-01-01

## 2022-01-01 RX ORDER — HYDROCODONE BITARTRATE AND ACETAMINOPHEN 500; 5 MG/1; MG/1
TABLET ORAL
Status: DISCONTINUED | OUTPATIENT
Start: 2022-01-01 | End: 2022-01-01

## 2022-01-01 RX ORDER — ETOMIDATE 2 MG/ML
20 INJECTION INTRAVENOUS ONCE
Status: COMPLETED | OUTPATIENT
Start: 2022-01-01 | End: 2022-01-01

## 2022-01-01 RX ORDER — SYRING-NEEDL,DISP,INSUL,0.3 ML 29 G X1/2"
296 SYRINGE, EMPTY DISPOSABLE MISCELLANEOUS ONCE
Status: COMPLETED | OUTPATIENT
Start: 2022-01-01 | End: 2022-01-01

## 2022-01-01 RX ORDER — TOPIRAMATE 25 MG/1
100 TABLET ORAL 2 TIMES DAILY
Status: DISCONTINUED | OUTPATIENT
Start: 2022-01-01 | End: 2022-01-01

## 2022-01-01 RX ORDER — LANOLIN ALCOHOL/MO/W.PET/CERES
400 CREAM (GRAM) TOPICAL DAILY
Qty: 30 TABLET | Refills: 3 | Status: SHIPPED | OUTPATIENT
Start: 2022-01-01

## 2022-01-01 RX ORDER — HALOPERIDOL 5 MG/ML
0.5 INJECTION INTRAMUSCULAR EVERY 10 MIN PRN
Status: CANCELLED | OUTPATIENT
Start: 2022-01-01

## 2022-01-01 RX ORDER — METOCLOPRAMIDE HYDROCHLORIDE 5 MG/ML
5 INJECTION INTRAMUSCULAR; INTRAVENOUS
Status: COMPLETED | OUTPATIENT
Start: 2022-01-01 | End: 2022-01-01

## 2022-01-01 RX ORDER — ACETAMINOPHEN 500 MG
1000 TABLET ORAL
Status: COMPLETED | OUTPATIENT
Start: 2022-01-01 | End: 2022-01-01

## 2022-01-01 RX ORDER — ACETAMINOPHEN 500 MG
500 TABLET ORAL
Status: DISCONTINUED | OUTPATIENT
Start: 2022-01-01 | End: 2022-01-01

## 2022-01-01 RX ORDER — HYDROXYZINE HYDROCHLORIDE 25 MG/1
25 TABLET, FILM COATED ORAL ONCE
Status: DISCONTINUED | OUTPATIENT
Start: 2022-01-01 | End: 2022-01-01

## 2022-01-01 RX ORDER — FLUCYTOSINE 500 MG/1
1500 CAPSULE ORAL EVERY 12 HOURS
Status: DISCONTINUED | OUTPATIENT
Start: 2022-01-01 | End: 2022-01-01

## 2022-01-01 RX ORDER — LANOLIN ALCOHOL/MO/W.PET/CERES
400 CREAM (GRAM) TOPICAL DAILY
Status: DISCONTINUED | OUTPATIENT
Start: 2022-01-01 | End: 2022-01-01

## 2022-01-01 RX ORDER — GLUCAGON 1 MG
1 KIT INJECTION
Status: DISCONTINUED | OUTPATIENT
Start: 2022-01-01 | End: 2022-01-01 | Stop reason: HOSPADM

## 2022-01-01 RX ORDER — HYDROMORPHONE HYDROCHLORIDE 1 MG/ML
0.2 INJECTION, SOLUTION INTRAMUSCULAR; INTRAVENOUS; SUBCUTANEOUS EVERY 5 MIN PRN
Status: CANCELLED | OUTPATIENT
Start: 2022-01-01

## 2022-01-01 RX ORDER — PANTOPRAZOLE SODIUM 40 MG/10ML
40 INJECTION, POWDER, LYOPHILIZED, FOR SOLUTION INTRAVENOUS ONCE
Status: DISCONTINUED | OUTPATIENT
Start: 2022-01-01 | End: 2022-01-01

## 2022-01-01 RX ORDER — TOPIRAMATE 25 MG/1
25 TABLET ORAL 2 TIMES DAILY
Status: DISCONTINUED | OUTPATIENT
Start: 2022-01-01 | End: 2022-01-01

## 2022-01-01 RX ORDER — PHENYLEPHRINE HCL IN 0.9% NACL 1 MG/10 ML
SYRINGE (ML) INTRAVENOUS
Status: DISCONTINUED
Start: 2022-01-01 | End: 2022-01-01 | Stop reason: HOSPADM

## 2022-01-01 RX ORDER — TALC
6 POWDER (GRAM) TOPICAL NIGHTLY PRN
Status: DISCONTINUED | OUTPATIENT
Start: 2022-01-01 | End: 2022-01-01 | Stop reason: HOSPADM

## 2022-01-01 RX ORDER — PREDNISONE 10 MG/1
10 TABLET ORAL DAILY
Status: DISCONTINUED | OUTPATIENT
Start: 2022-01-01 | End: 2022-01-01

## 2022-01-01 RX ORDER — MAGNESIUM SULFATE HEPTAHYDRATE 40 MG/ML
2 INJECTION, SOLUTION INTRAVENOUS
Status: COMPLETED | OUTPATIENT
Start: 2022-01-01 | End: 2022-01-01

## 2022-01-01 RX ORDER — DEXTROSE MONOHYDRATE 100 MG/ML
INJECTION, SOLUTION INTRAVENOUS CONTINUOUS PRN
Status: DISCONTINUED | OUTPATIENT
Start: 2022-01-01 | End: 2022-01-01

## 2022-01-01 RX ORDER — POTASSIUM CHLORIDE 20 MEQ/1
20 TABLET, EXTENDED RELEASE ORAL ONCE
Status: DISCONTINUED | OUTPATIENT
Start: 2022-01-01 | End: 2022-01-01

## 2022-01-01 RX ORDER — TACROLIMUS 1 MG/1
1 CAPSULE ORAL 2 TIMES DAILY
Status: DISCONTINUED | OUTPATIENT
Start: 2022-01-01 | End: 2022-01-01

## 2022-01-01 RX ORDER — MORPHINE SULFATE 2 MG/ML
INJECTION, SOLUTION INTRAMUSCULAR; INTRAVENOUS
Status: COMPLETED
Start: 2022-01-01 | End: 2022-01-01

## 2022-01-01 RX ORDER — BUTALBITAL, ACETAMINOPHEN AND CAFFEINE 50; 325; 40 MG/1; MG/1; MG/1
1 TABLET ORAL DAILY PRN
Qty: 30 TABLET | Refills: 3 | Status: SHIPPED | OUTPATIENT
Start: 2022-01-01 | End: 2022-01-01

## 2022-01-01 RX ORDER — ONDANSETRON 8 MG/1
8 TABLET, ORALLY DISINTEGRATING ORAL EVERY 8 HOURS
Status: DISCONTINUED | OUTPATIENT
Start: 2022-01-01 | End: 2022-01-01

## 2022-01-01 RX ORDER — FLUCONAZOLE 2 MG/ML
200 INJECTION, SOLUTION INTRAVENOUS
Status: DISCONTINUED | OUTPATIENT
Start: 2022-01-01 | End: 2022-01-01

## 2022-01-01 RX ORDER — TACROLIMUS 1 MG/1
1 CAPSULE ORAL ONCE
Status: COMPLETED | OUTPATIENT
Start: 2022-01-01 | End: 2022-01-01

## 2022-01-01 RX ORDER — IBUPROFEN 200 MG
16 TABLET ORAL
Status: DISCONTINUED | OUTPATIENT
Start: 2022-01-01 | End: 2022-01-01

## 2022-01-01 RX ORDER — MIDAZOLAM HYDROCHLORIDE 1 MG/ML
4 INJECTION INTRAMUSCULAR; INTRAVENOUS
Status: DISCONTINUED | OUTPATIENT
Start: 2022-01-01 | End: 2022-01-01 | Stop reason: HOSPADM

## 2022-01-01 RX ORDER — LIDOCAINE HYDROCHLORIDE 10 MG/ML
7 INJECTION INFILTRATION; PERINEURAL ONCE
Status: COMPLETED | OUTPATIENT
Start: 2022-01-01 | End: 2022-01-01

## 2022-01-01 RX ORDER — TACROLIMUS 1 MG/1
CAPSULE ORAL
Qty: 150 CAPSULE | Refills: 11 | Status: SHIPPED | OUTPATIENT
Start: 2022-01-01 | End: 2023-04-07

## 2022-01-01 RX ORDER — ACETAMINOPHEN 325 MG/1
650 TABLET ORAL EVERY 6 HOURS PRN
Status: DISCONTINUED | OUTPATIENT
Start: 2022-01-01 | End: 2022-01-01 | Stop reason: HOSPADM

## 2022-01-01 RX ORDER — HYDRALAZINE HYDROCHLORIDE 25 MG/1
25 TABLET, FILM COATED ORAL EVERY 8 HOURS PRN
Status: DISCONTINUED | OUTPATIENT
Start: 2022-01-01 | End: 2022-01-01

## 2022-01-01 RX ORDER — TOPIRAMATE 25 MG/1
25 TABLET ORAL DAILY
Status: ON HOLD | COMMUNITY
Start: 2022-01-01 | End: 2022-01-01 | Stop reason: SDUPTHER

## 2022-01-01 RX ORDER — PREDNISONE 2.5 MG/1
5 TABLET ORAL DAILY
Status: DISCONTINUED | OUTPATIENT
Start: 2022-01-01 | End: 2022-01-01

## 2022-01-01 RX ORDER — MYCOPHENOLATE MOFETIL 250 MG/1
500 CAPSULE ORAL 2 TIMES DAILY
Status: DISCONTINUED | OUTPATIENT
Start: 2022-01-01 | End: 2022-01-01 | Stop reason: HOSPADM

## 2022-01-01 RX ORDER — NALOXONE HCL 0.4 MG/ML
0.02 VIAL (ML) INJECTION
Status: DISCONTINUED | OUTPATIENT
Start: 2022-01-01 | End: 2022-01-01

## 2022-01-01 RX ORDER — LOPERAMIDE HYDROCHLORIDE 2 MG/1
4 CAPSULE ORAL 4 TIMES DAILY PRN
Status: DISCONTINUED | OUTPATIENT
Start: 2022-01-01 | End: 2022-01-01

## 2022-01-01 RX ORDER — IBUPROFEN 200 MG
24 TABLET ORAL
Status: DISCONTINUED | OUTPATIENT
Start: 2022-01-01 | End: 2022-01-01

## 2022-01-01 RX ORDER — PROPOFOL 10 MG/ML
0-50 INJECTION, EMULSION INTRAVENOUS CONTINUOUS
Status: DISCONTINUED | OUTPATIENT
Start: 2022-01-01 | End: 2022-01-01

## 2022-01-01 RX ORDER — POTASSIUM CHLORIDE 20 MEQ/1
60 TABLET, EXTENDED RELEASE ORAL ONCE
Status: COMPLETED | OUTPATIENT
Start: 2022-01-01 | End: 2022-01-01

## 2022-01-01 RX ORDER — PANTOPRAZOLE SODIUM 40 MG/10ML
80 INJECTION, POWDER, LYOPHILIZED, FOR SOLUTION INTRAVENOUS ONCE
Status: COMPLETED | OUTPATIENT
Start: 2022-01-01 | End: 2022-01-01

## 2022-01-01 RX ORDER — CEFEPIME HYDROCHLORIDE 1 G/50ML
2 INJECTION, SOLUTION INTRAVENOUS
Status: DISCONTINUED | OUTPATIENT
Start: 2022-01-01 | End: 2022-01-01

## 2022-01-01 RX ORDER — ROCURONIUM BROMIDE 10 MG/ML
60 INJECTION, SOLUTION INTRAVENOUS ONCE
Status: COMPLETED | OUTPATIENT
Start: 2022-01-01 | End: 2022-01-01

## 2022-01-01 RX ORDER — POTASSIUM CHLORIDE 20 MEQ/1
40 TABLET, EXTENDED RELEASE ORAL ONCE
Status: COMPLETED | OUTPATIENT
Start: 2022-01-01 | End: 2022-01-01

## 2022-01-01 RX ORDER — PANTOPRAZOLE SODIUM 40 MG/10ML
40 INJECTION, POWDER, LYOPHILIZED, FOR SOLUTION INTRAVENOUS 2 TIMES DAILY
Status: DISCONTINUED | OUTPATIENT
Start: 2022-01-01 | End: 2022-01-01

## 2022-01-01 RX ORDER — IBUPROFEN 200 MG
24 TABLET ORAL
Status: DISCONTINUED | OUTPATIENT
Start: 2022-01-01 | End: 2022-01-01 | Stop reason: HOSPADM

## 2022-01-01 RX ORDER — MUPIROCIN 20 MG/G
OINTMENT TOPICAL 2 TIMES DAILY
Status: DISPENSED | OUTPATIENT
Start: 2022-01-01 | End: 2022-01-01

## 2022-01-01 RX ORDER — SODIUM BICARBONATE 650 MG/1
1300 TABLET ORAL 2 TIMES DAILY
Status: DISCONTINUED | OUTPATIENT
Start: 2022-01-01 | End: 2022-01-01 | Stop reason: HOSPADM

## 2022-01-01 RX ORDER — MORPHINE SULFATE 2 MG/ML
4 INJECTION, SOLUTION INTRAMUSCULAR; INTRAVENOUS
Status: DISCONTINUED | OUTPATIENT
Start: 2022-01-01 | End: 2022-01-01 | Stop reason: HOSPADM

## 2022-01-01 RX ORDER — LIDOCAINE HYDROCHLORIDE 10 MG/ML
5 INJECTION, SOLUTION EPIDURAL; INFILTRATION; INTRACAUDAL; PERINEURAL ONCE
Status: COMPLETED | OUTPATIENT
Start: 2022-01-01 | End: 2022-01-01

## 2022-01-01 RX ORDER — BISACODYL 10 MG
10 SUPPOSITORY, RECTAL RECTAL DAILY PRN
Status: DISCONTINUED | OUTPATIENT
Start: 2022-01-01 | End: 2022-01-01

## 2022-01-01 RX ORDER — AMOXICILLIN 250 MG
1 CAPSULE ORAL 2 TIMES DAILY
Status: DISCONTINUED | OUTPATIENT
Start: 2022-01-01 | End: 2022-01-01

## 2022-01-01 RX ORDER — BUTALBITAL, ACETAMINOPHEN AND CAFFEINE 50; 325; 40 MG/1; MG/1; MG/1
1 TABLET ORAL DAILY PRN
Status: DISCONTINUED | OUTPATIENT
Start: 2022-01-01 | End: 2022-01-01

## 2022-01-01 RX ORDER — TACROLIMUS 1 MG/1
2 CAPSULE ORAL EVERY MORNING
Status: DISCONTINUED | OUTPATIENT
Start: 2022-01-01 | End: 2022-01-01

## 2022-01-01 RX ORDER — ESCITALOPRAM OXALATE 10 MG/1
10 TABLET ORAL DAILY
Status: DISCONTINUED | OUTPATIENT
Start: 2022-01-01 | End: 2022-01-01

## 2022-01-01 RX ORDER — PROPOFOL 10 MG/ML
VIAL (ML) INTRAVENOUS
Status: DISCONTINUED
Start: 2022-01-01 | End: 2022-01-01 | Stop reason: WASHOUT

## 2022-01-01 RX ORDER — SODIUM CHLORIDE 9 MG/ML
INJECTION, SOLUTION INTRAVENOUS CONTINUOUS
Status: ACTIVE | OUTPATIENT
Start: 2022-01-01 | End: 2022-01-01

## 2022-01-01 RX ORDER — OXYCODONE AND ACETAMINOPHEN 7.5; 325 MG/1; MG/1
1 TABLET ORAL EVERY 4 HOURS PRN
Status: DISCONTINUED | OUTPATIENT
Start: 2022-01-01 | End: 2022-01-01

## 2022-01-01 RX ORDER — INDOMETHACIN 25 MG/1
CAPSULE ORAL
Status: COMPLETED
Start: 2022-01-01 | End: 2022-01-01

## 2022-01-01 RX ORDER — NIFEDIPINE 30 MG/1
60 TABLET, EXTENDED RELEASE ORAL DAILY
Status: DISCONTINUED | OUTPATIENT
Start: 2022-01-01 | End: 2022-01-01

## 2022-01-01 RX ORDER — ACETAMINOPHEN 325 MG/1
325 TABLET ORAL EVERY 4 HOURS PRN
Status: DISCONTINUED | OUTPATIENT
Start: 2022-01-01 | End: 2022-01-01

## 2022-01-01 RX ORDER — CALCITRIOL 0.25 UG/1
0.5 CAPSULE ORAL DAILY
Status: DISCONTINUED | OUTPATIENT
Start: 2022-01-01 | End: 2022-01-01

## 2022-01-01 RX ORDER — HYDROXYZINE HYDROCHLORIDE 25 MG/1
25 TABLET, FILM COATED ORAL NIGHTLY
Status: DISCONTINUED | OUTPATIENT
Start: 2022-01-01 | End: 2022-01-01

## 2022-01-01 RX ORDER — SUMATRIPTAN 6 MG/.5ML
6 INJECTION, SOLUTION SUBCUTANEOUS ONCE
Status: COMPLETED | OUTPATIENT
Start: 2022-01-01 | End: 2022-01-01

## 2022-01-01 RX ORDER — ACETAMINOPHEN 325 MG/1
650 TABLET ORAL EVERY 6 HOURS PRN
Refills: 0
Start: 2022-01-01

## 2022-01-01 RX ORDER — TACROLIMUS 1 MG/1
3 CAPSULE ORAL EVERY EVENING
Status: DISCONTINUED | OUTPATIENT
Start: 2022-01-01 | End: 2022-01-01

## 2022-01-01 RX ORDER — BUTALBITAL, ACETAMINOPHEN AND CAFFEINE 50; 325; 40 MG/1; MG/1; MG/1
1 TABLET ORAL DAILY PRN
Status: DISCONTINUED | OUTPATIENT
Start: 2022-01-01 | End: 2022-01-01 | Stop reason: HOSPADM

## 2022-01-01 RX ORDER — FENTANYL CITRATE-0.9 % NACL/PF 10 MCG/ML
0-200 PLASTIC BAG, INJECTION (ML) INTRAVENOUS CONTINUOUS
Status: DISCONTINUED | OUTPATIENT
Start: 2022-01-01 | End: 2022-01-01 | Stop reason: HOSPADM

## 2022-01-01 RX ORDER — PREDNISONE 5 MG/1
5 TABLET ORAL DAILY
Status: DISCONTINUED | OUTPATIENT
Start: 2022-01-01 | End: 2022-01-01 | Stop reason: HOSPADM

## 2022-01-01 RX ORDER — TOPIRAMATE 25 MG/1
75 TABLET ORAL 2 TIMES DAILY
Status: DISCONTINUED | OUTPATIENT
Start: 2022-01-01 | End: 2022-01-01

## 2022-01-01 RX ORDER — PROPOFOL 10 MG/ML
INJECTION, EMULSION INTRAVENOUS
Status: DISCONTINUED
Start: 2022-01-01 | End: 2022-01-01 | Stop reason: HOSPADM

## 2022-01-01 RX ORDER — METOCLOPRAMIDE HYDROCHLORIDE 5 MG/ML
10 INJECTION INTRAMUSCULAR; INTRAVENOUS EVERY 6 HOURS PRN
Status: DISCONTINUED | OUTPATIENT
Start: 2022-01-01 | End: 2022-01-01 | Stop reason: HOSPADM

## 2022-01-01 RX ORDER — FLUCONAZOLE 2 MG/ML
400 INJECTION, SOLUTION INTRAVENOUS
Status: DISCONTINUED | OUTPATIENT
Start: 2022-01-01 | End: 2022-01-01

## 2022-01-01 RX ORDER — SODIUM BICARBONATE 650 MG/1
650 TABLET ORAL DAILY
Status: DISCONTINUED | OUTPATIENT
Start: 2022-01-01 | End: 2022-01-01

## 2022-01-01 RX ORDER — NIFEDIPINE 30 MG/1
30 TABLET, EXTENDED RELEASE ORAL 2 TIMES DAILY
Status: DISCONTINUED | OUTPATIENT
Start: 2022-01-01 | End: 2022-01-01 | Stop reason: HOSPADM

## 2022-01-01 RX ORDER — SODIUM CHLORIDE 0.9 % (FLUSH) 0.9 %
10 SYRINGE (ML) INJECTION EVERY 6 HOURS
Status: DISCONTINUED | OUTPATIENT
Start: 2022-01-01 | End: 2022-01-01

## 2022-01-01 RX ADMIN — TACROLIMUS 3 MG: 1 CAPSULE ORAL at 09:07

## 2022-01-01 RX ADMIN — CALCITRIOL CAPSULES 0.25 MCG 0.5 MCG: 0.25 CAPSULE ORAL at 09:07

## 2022-01-01 RX ADMIN — FLUCYTOSINE 1500 MG: 500 CAPSULE ORAL at 06:07

## 2022-01-01 RX ADMIN — NIFEDIPINE 90 MG: 30 TABLET, FILM COATED, EXTENDED RELEASE ORAL at 09:07

## 2022-01-01 RX ADMIN — AMPHOTERICIN B 300 MG: 50 INJECTION, POWDER, LYOPHILIZED, FOR SOLUTION INTRAVENOUS at 02:07

## 2022-01-01 RX ADMIN — MIDAZOLAM HYDROCHLORIDE 4 MG: 1 INJECTION INTRAMUSCULAR; INTRAVENOUS at 05:07

## 2022-01-01 RX ADMIN — PREDNISONE 5 MG: 5 TABLET ORAL at 08:07

## 2022-01-01 RX ADMIN — OXYCODONE AND ACETAMINOPHEN 1 TABLET: 7.5; 325 TABLET ORAL at 09:07

## 2022-01-01 RX ADMIN — SODIUM BICARBONATE 650 MG TABLET 1300 MG: at 02:07

## 2022-01-01 RX ADMIN — SODIUM BICARBONATE 650 MG TABLET 1300 MG: at 08:06

## 2022-01-01 RX ADMIN — OXYCODONE AND ACETAMINOPHEN 1 TABLET: 7.5; 325 TABLET ORAL at 03:07

## 2022-01-01 RX ADMIN — THERA TABS 1 TABLET: TAB at 09:06

## 2022-01-01 RX ADMIN — TOPIRAMATE 100 MG: 25 TABLET, FILM COATED ORAL at 08:07

## 2022-01-01 RX ADMIN — NIFEDIPINE 90 MG: 30 TABLET, FILM COATED, EXTENDED RELEASE ORAL at 08:07

## 2022-01-01 RX ADMIN — MELATONIN TAB 3 MG 6 MG: 3 TAB at 09:07

## 2022-01-01 RX ADMIN — DIPHENHYDRAMINE HYDROCHLORIDE 25 MG: 25 CAPSULE ORAL at 01:07

## 2022-01-01 RX ADMIN — PREDNISONE 10 MG: 5 TABLET ORAL at 08:06

## 2022-01-01 RX ADMIN — SENNOSIDES AND DOCUSATE SODIUM 1 TABLET: 50; 8.6 TABLET ORAL at 08:06

## 2022-01-01 RX ADMIN — TACROLIMUS 2 MG: 1 CAPSULE ORAL at 05:07

## 2022-01-01 RX ADMIN — SODIUM CHLORIDE 500 ML: 0.9 INJECTION, SOLUTION INTRAVENOUS at 01:07

## 2022-01-01 RX ADMIN — TOPIRAMATE 50 MG: 25 TABLET, FILM COATED ORAL at 09:07

## 2022-01-01 RX ADMIN — ACETAMINOPHEN 500 MG: 500 TABLET ORAL at 03:06

## 2022-01-01 RX ADMIN — SODIUM BICARBONATE: 84 INJECTION, SOLUTION INTRAVENOUS at 09:06

## 2022-01-01 RX ADMIN — INSULIN ASPART 1 UNITS: 100 INJECTION, SOLUTION INTRAVENOUS; SUBCUTANEOUS at 10:07

## 2022-01-01 RX ADMIN — DICYCLOMINE HYDROCHLORIDE 20 MG: 20 INJECTION, SOLUTION INTRAMUSCULAR at 04:07

## 2022-01-01 RX ADMIN — AMPHOTERICIN B 300 MG: 50 INJECTION, POWDER, LYOPHILIZED, FOR SOLUTION INTRAVENOUS at 04:07

## 2022-01-01 RX ADMIN — DEXAMETHASONE SODIUM PHOSPHATE 10 MG: 4 INJECTION INTRA-ARTICULAR; INTRALESIONAL; INTRAMUSCULAR; INTRAVENOUS; SOFT TISSUE at 05:06

## 2022-01-01 RX ADMIN — SODIUM BICARBONATE 650 MG TABLET 1300 MG: at 09:07

## 2022-01-01 RX ADMIN — OXYCODONE AND ACETAMINOPHEN 1 TABLET: 7.5; 325 TABLET ORAL at 01:07

## 2022-01-01 RX ADMIN — TACROLIMUS 2 MG: 1 CAPSULE ORAL at 08:06

## 2022-01-01 RX ADMIN — HYDROXYZINE HYDROCHLORIDE 25 MG: 25 TABLET, FILM COATED ORAL at 09:07

## 2022-01-01 RX ADMIN — FLUCYTOSINE 1500 MG: 500 CAPSULE ORAL at 01:06

## 2022-01-01 RX ADMIN — TOPIRAMATE 25 MG: 25 TABLET, FILM COATED ORAL at 08:06

## 2022-01-01 RX ADMIN — Medication 400 MG: at 09:06

## 2022-01-01 RX ADMIN — OXYCODONE AND ACETAMINOPHEN 1 TABLET: 7.5; 325 TABLET ORAL at 05:07

## 2022-01-01 RX ADMIN — BUTALBITAL, ACETAMINOPHEN, AND CAFFEINE 1 TABLET: 50; 325; 40 TABLET ORAL at 11:07

## 2022-01-01 RX ADMIN — BISACODYL 10 MG: 10 SUPPOSITORY RECTAL at 08:06

## 2022-01-01 RX ADMIN — CALCITRIOL CAPSULES 0.25 MCG 0.5 MCG: 0.25 CAPSULE ORAL at 08:07

## 2022-01-01 RX ADMIN — ACETAMINOPHEN 650 MG: 325 TABLET ORAL at 09:06

## 2022-01-01 RX ADMIN — ACETAMINOPHEN 500 MG: 500 TABLET ORAL at 02:07

## 2022-01-01 RX ADMIN — SENNOSIDES AND DOCUSATE SODIUM 1 TABLET: 50; 8.6 TABLET ORAL at 09:07

## 2022-01-01 RX ADMIN — TACROLIMUS 2 MG: 1 CAPSULE ORAL at 07:06

## 2022-01-01 RX ADMIN — FLUCYTOSINE 1500 MG: 500 CAPSULE ORAL at 12:06

## 2022-01-01 RX ADMIN — SODIUM BICARBONATE 650 MG TABLET 1300 MG: at 03:07

## 2022-01-01 RX ADMIN — SODIUM CHLORIDE 500 ML: 0.9 INJECTION, SOLUTION INTRAVENOUS at 05:07

## 2022-01-01 RX ADMIN — POLYETHYLENE GLYCOL 3350 17 G: 17 POWDER, FOR SOLUTION ORAL at 09:06

## 2022-01-01 RX ADMIN — SODIUM BICARBONATE 650 MG TABLET 1300 MG: at 11:07

## 2022-01-01 RX ADMIN — SODIUM BICARBONATE 50 MEQ: 84 INJECTION, SOLUTION INTRAVENOUS at 05:07

## 2022-01-01 RX ADMIN — SOYBEAN OIL 250 ML: 20 INJECTION, SOLUTION INTRAVENOUS at 09:07

## 2022-01-01 RX ADMIN — FLUCYTOSINE 1500 MG: 500 CAPSULE ORAL at 11:07

## 2022-01-01 RX ADMIN — THERA TABS 1 TABLET: TAB at 09:07

## 2022-01-01 RX ADMIN — ONDANSETRON 8 MG: 8 TABLET, ORALLY DISINTEGRATING ORAL at 09:07

## 2022-01-01 RX ADMIN — MELATONIN TAB 3 MG 6 MG: 3 TAB at 09:06

## 2022-01-01 RX ADMIN — SODIUM BICARBONATE 650 MG TABLET 1300 MG: at 04:07

## 2022-01-01 RX ADMIN — PREDNISONE 5 MG: 5 TABLET ORAL at 03:07

## 2022-01-01 RX ADMIN — SODIUM CHLORIDE 500 ML: 0.9 INJECTION, SOLUTION INTRAVENOUS at 05:06

## 2022-01-01 RX ADMIN — OXYCODONE AND ACETAMINOPHEN 1 TABLET: 7.5; 325 TABLET ORAL at 04:07

## 2022-01-01 RX ADMIN — ONDANSETRON 4 MG: 2 INJECTION INTRAMUSCULAR; INTRAVENOUS at 09:07

## 2022-01-01 RX ADMIN — Medication 400 MG: at 09:07

## 2022-01-01 RX ADMIN — POLYETHYLENE GLYCOL 3350 17 G: 17 POWDER, FOR SOLUTION ORAL at 08:07

## 2022-01-01 RX ADMIN — ACETAMINOPHEN 500 MG: 500 TABLET ORAL at 01:07

## 2022-01-01 RX ADMIN — SODIUM BICARBONATE 650 MG TABLET 1300 MG: at 08:07

## 2022-01-01 RX ADMIN — POTASSIUM CHLORIDE 40 MEQ: 1500 TABLET, EXTENDED RELEASE ORAL at 08:07

## 2022-01-01 RX ADMIN — POTASSIUM CHLORIDE 20 MEQ: 1500 TABLET, EXTENDED RELEASE ORAL at 07:07

## 2022-01-01 RX ADMIN — OXYCODONE AND ACETAMINOPHEN 1 TABLET: 7.5; 325 TABLET ORAL at 05:06

## 2022-01-01 RX ADMIN — PREDNISONE 5 MG: 5 TABLET ORAL at 08:06

## 2022-01-01 RX ADMIN — HEPARIN SODIUM 18 UNITS/KG/HR: 5000 INJECTION INTRAVENOUS; SUBCUTANEOUS at 09:07

## 2022-01-01 RX ADMIN — SENNOSIDES AND DOCUSATE SODIUM 1 TABLET: 50; 8.6 TABLET ORAL at 08:07

## 2022-01-01 RX ADMIN — HEPARIN SODIUM 23 UNITS/KG/HR: 5000 INJECTION INTRAVENOUS; SUBCUTANEOUS at 10:07

## 2022-01-01 RX ADMIN — TOPIRAMATE 100 MG: 25 TABLET, FILM COATED ORAL at 09:07

## 2022-01-01 RX ADMIN — TACROLIMUS 3 MG: 1 CAPSULE ORAL at 03:07

## 2022-01-01 RX ADMIN — HYDROCORTISONE SODIUM SUCCINATE 100 MG: 100 INJECTION, POWDER, FOR SOLUTION INTRAMUSCULAR; INTRAVENOUS at 02:07

## 2022-01-01 RX ADMIN — POLYETHYLENE GLYCOL 3350 17 G: 17 POWDER, FOR SOLUTION ORAL at 08:06

## 2022-01-01 RX ADMIN — OXYCODONE AND ACETAMINOPHEN 1 TABLET: 7.5; 325 TABLET ORAL at 02:07

## 2022-01-01 RX ADMIN — TACROLIMUS 3 MG: 1 CAPSULE ORAL at 06:07

## 2022-01-01 RX ADMIN — TACROLIMUS 2 MG: 1 CAPSULE ORAL at 06:07

## 2022-01-01 RX ADMIN — FLUCONAZOLE 400 MG: 200 TABLET ORAL at 11:07

## 2022-01-01 RX ADMIN — THERA TABS 1 TABLET: TAB at 08:07

## 2022-01-01 RX ADMIN — BUTALBITAL, ACETAMINOPHEN, AND CAFFEINE 1 TABLET: 50; 325; 40 TABLET ORAL at 01:07

## 2022-01-01 RX ADMIN — CALCITRIOL CAPSULES 0.25 MCG 0.5 MCG: 0.25 CAPSULE ORAL at 03:07

## 2022-01-01 RX ADMIN — PREDNISONE 5 MG: 5 TABLET ORAL at 09:07

## 2022-01-01 RX ADMIN — FLUCYTOSINE 1500 MG: 500 CAPSULE ORAL at 01:07

## 2022-01-01 RX ADMIN — MUPIROCIN: 20 OINTMENT TOPICAL at 09:07

## 2022-01-01 RX ADMIN — MYCOPHENOLATE MOFETIL 500 MG: 250 CAPSULE ORAL at 10:06

## 2022-01-01 RX ADMIN — OXYCODONE AND ACETAMINOPHEN 1 TABLET: 7.5; 325 TABLET ORAL at 12:07

## 2022-01-01 RX ADMIN — DIPHENHYDRAMINE HYDROCHLORIDE 25 MG: 25 CAPSULE ORAL at 02:07

## 2022-01-01 RX ADMIN — FLUCYTOSINE 1500 MG: 500 CAPSULE ORAL at 05:07

## 2022-01-01 RX ADMIN — IOHEXOL 100 ML: 350 INJECTION, SOLUTION INTRAVENOUS at 03:07

## 2022-01-01 RX ADMIN — MIDAZOLAM 4 MG: 1 INJECTION INTRAMUSCULAR; INTRAVENOUS at 05:07

## 2022-01-01 RX ADMIN — MUPIROCIN: 20 OINTMENT TOPICAL at 08:06

## 2022-01-01 RX ADMIN — TOPIRAMATE 25 MG: 25 TABLET, FILM COATED ORAL at 12:06

## 2022-01-01 RX ADMIN — ESCITALOPRAM OXALATE 10 MG: 10 TABLET ORAL at 11:07

## 2022-01-01 RX ADMIN — TOPIRAMATE 100 MG: 25 TABLET, FILM COATED ORAL at 03:07

## 2022-01-01 RX ADMIN — LIDOCAINE HYDROCHLORIDE 3 ML: 10 INJECTION, SOLUTION INFILTRATION; PERINEURAL at 03:07

## 2022-01-01 RX ADMIN — ONDANSETRON 4 MG: 2 INJECTION INTRAMUSCULAR; INTRAVENOUS at 12:07

## 2022-01-01 RX ADMIN — ACETAMINOPHEN 500 MG: 500 TABLET ORAL at 12:07

## 2022-01-01 RX ADMIN — ACETAMINOPHEN 500 MG: 500 TABLET ORAL at 03:07

## 2022-01-01 RX ADMIN — FLUCYTOSINE 1500 MG: 500 CAPSULE ORAL at 09:07

## 2022-01-01 RX ADMIN — HEPARIN SODIUM 21 UNITS/KG/HR: 5000 INJECTION INTRAVENOUS; SUBCUTANEOUS at 04:07

## 2022-01-01 RX ADMIN — CALCITRIOL CAPSULES 0.25 MCG 0.5 MCG: 0.25 CAPSULE ORAL at 09:06

## 2022-01-01 RX ADMIN — FLUCYTOSINE 1500 MG: 500 CAPSULE ORAL at 03:07

## 2022-01-01 RX ADMIN — DICYCLOMINE HYDROCHLORIDE 10 MG: 10 CAPSULE ORAL at 10:07

## 2022-01-01 RX ADMIN — CALCITRIOL CAPSULES 0.25 MCG 0.5 MCG: 0.25 CAPSULE ORAL at 10:07

## 2022-01-01 RX ADMIN — BUTALBITAL, ACETAMINOPHEN, AND CAFFEINE 1 TABLET: 50; 325; 40 TABLET ORAL at 06:06

## 2022-01-01 RX ADMIN — FLUCYTOSINE 1500 MG: 500 CAPSULE ORAL at 12:07

## 2022-01-01 RX ADMIN — MAGNESIUM SULFATE HEPTAHYDRATE: 500 INJECTION, SOLUTION INTRAMUSCULAR; INTRAVENOUS at 09:07

## 2022-01-01 RX ADMIN — INDOMETHACIN 50 MEQ: 25 CAPSULE ORAL at 08:07

## 2022-01-01 RX ADMIN — TOPIRAMATE 25 MG: 25 TABLET, FILM COATED ORAL at 09:07

## 2022-01-01 RX ADMIN — POLYETHYLENE GLYCOL 3350 17 G: 17 POWDER, FOR SOLUTION ORAL at 09:07

## 2022-01-01 RX ADMIN — MUPIROCIN: 20 OINTMENT TOPICAL at 09:06

## 2022-01-01 RX ADMIN — PREDNISONE 10 MG: 5 TABLET ORAL at 09:07

## 2022-01-01 RX ADMIN — PREDNISONE 10 MG: 5 TABLET ORAL at 08:07

## 2022-01-01 RX ADMIN — LOPERAMIDE HYDROCHLORIDE 4 MG: 2 CAPSULE ORAL at 01:07

## 2022-01-01 RX ADMIN — POTASSIUM BICARBONATE 20 MEQ: 391 TABLET, EFFERVESCENT ORAL at 11:07

## 2022-01-01 RX ADMIN — POTASSIUM CHLORIDE 40 MEQ: 1500 TABLET, EXTENDED RELEASE ORAL at 08:06

## 2022-01-01 RX ADMIN — HEPARIN SODIUM 60 UNITS/KG/HR: 5000 INJECTION INTRAVENOUS; SUBCUTANEOUS at 02:07

## 2022-01-01 RX ADMIN — SODIUM CHLORIDE 500 ML: 0.9 INJECTION, SOLUTION INTRAVENOUS at 04:06

## 2022-01-01 RX ADMIN — FLUCYTOSINE 1500 MG: 500 CAPSULE ORAL at 11:06

## 2022-01-01 RX ADMIN — LOPERAMIDE HYDROCHLORIDE 4 MG: 2 CAPSULE ORAL at 05:07

## 2022-01-01 RX ADMIN — THERA TABS 1 TABLET: TAB at 10:07

## 2022-01-01 RX ADMIN — OXYCODONE AND ACETAMINOPHEN 1 TABLET: 7.5; 325 TABLET ORAL at 06:07

## 2022-01-01 RX ADMIN — AMPHOTERICIN B 300 MG: 50 INJECTION, POWDER, LYOPHILIZED, FOR SOLUTION INTRAVENOUS at 03:07

## 2022-01-01 RX ADMIN — VANCOMYCIN HYDROCHLORIDE 1250 MG: 1.25 INJECTION, POWDER, LYOPHILIZED, FOR SOLUTION INTRAVENOUS at 12:07

## 2022-01-01 RX ADMIN — DEXTROSE: 5 SOLUTION INTRAVENOUS at 02:07

## 2022-01-01 RX ADMIN — SODIUM CHLORIDE 500 ML: 0.9 INJECTION, SOLUTION INTRAVENOUS at 01:06

## 2022-01-01 RX ADMIN — TOPIRAMATE 50 MG: 25 TABLET, FILM COATED ORAL at 08:07

## 2022-01-01 RX ADMIN — NIFEDIPINE 60 MG: 30 TABLET, FILM COATED, EXTENDED RELEASE ORAL at 02:07

## 2022-01-01 RX ADMIN — OXYCODONE AND ACETAMINOPHEN 1 TABLET: 7.5; 325 TABLET ORAL at 10:07

## 2022-01-01 RX ADMIN — BUTALBITAL, ACETAMINOPHEN, AND CAFFEINE 1 TABLET: 50; 325; 40 TABLET ORAL at 01:06

## 2022-01-01 RX ADMIN — ETOMIDATE 20 MG: 40 INJECTION, SOLUTION INTRAVENOUS at 09:07

## 2022-01-01 RX ADMIN — LIDOCAINE HYDROCHLORIDE 3 ML: 10 INJECTION, SOLUTION INFILTRATION; PERINEURAL at 10:07

## 2022-01-01 RX ADMIN — TACROLIMUS 3 MG: 1 CAPSULE ORAL at 05:07

## 2022-01-01 RX ADMIN — ONDANSETRON 4 MG: 2 INJECTION INTRAMUSCULAR; INTRAVENOUS at 01:07

## 2022-01-01 RX ADMIN — NIFEDIPINE 90 MG: 30 TABLET, FILM COATED, EXTENDED RELEASE ORAL at 03:07

## 2022-01-01 RX ADMIN — TACROLIMUS 3 MG: 1 CAPSULE ORAL at 08:07

## 2022-01-01 RX ADMIN — VASOPRESSIN 0.04 UNITS/MIN: 20 INJECTION INTRAVENOUS at 03:07

## 2022-01-01 RX ADMIN — TACROLIMUS 2 MG: 1 CAPSULE ORAL at 10:07

## 2022-01-01 RX ADMIN — SODIUM CHLORIDE 500 ML: 0.9 INJECTION, SOLUTION INTRAVENOUS at 02:06

## 2022-01-01 RX ADMIN — SODIUM BICARBONATE 650 MG TABLET 1300 MG: at 09:06

## 2022-01-01 RX ADMIN — THERA TABS 1 TABLET: TAB at 11:07

## 2022-01-01 RX ADMIN — Medication 400 MG: at 03:07

## 2022-01-01 RX ADMIN — TOPIRAMATE 75 MG: 25 TABLET, FILM COATED ORAL at 09:07

## 2022-01-01 RX ADMIN — HEPARIN SODIUM 21 UNITS/KG/HR: 5000 INJECTION INTRAVENOUS; SUBCUTANEOUS at 06:07

## 2022-01-01 RX ADMIN — TOPIRAMATE 25 MG: 25 TABLET, FILM COATED ORAL at 09:06

## 2022-01-01 RX ADMIN — ONDANSETRON 8 MG: 8 TABLET, ORALLY DISINTEGRATING ORAL at 06:07

## 2022-01-01 RX ADMIN — LOPERAMIDE HYDROCHLORIDE 4 MG: 2 CAPSULE ORAL at 10:07

## 2022-01-01 RX ADMIN — BUTALBITAL, ACETAMINOPHEN, AND CAFFEINE 1 TABLET: 50; 325; 40 TABLET ORAL at 08:06

## 2022-01-01 RX ADMIN — SODIUM CHLORIDE: 0.9 INJECTION, SOLUTION INTRAVENOUS at 12:07

## 2022-01-01 RX ADMIN — ESCITALOPRAM OXALATE 10 MG: 10 TABLET ORAL at 09:07

## 2022-01-01 RX ADMIN — TACROLIMUS 3 MG: 1 CAPSULE ORAL at 08:06

## 2022-01-01 RX ADMIN — FENTANYL CITRATE 12.5 MCG/HR: 50 INJECTION INTRAVENOUS at 05:07

## 2022-01-01 RX ADMIN — FLUCYTOSINE 1500 MG: 500 CAPSULE ORAL at 02:07

## 2022-01-01 RX ADMIN — Medication 1 CAPSULE: at 08:07

## 2022-01-01 RX ADMIN — BUTALBITAL, ACETAMINOPHEN, AND CAFFEINE 1 TABLET: 50; 325; 40 TABLET ORAL at 02:06

## 2022-01-01 RX ADMIN — MYCOPHENOLATE MOFETIL 500 MG: 250 CAPSULE ORAL at 09:06

## 2022-01-01 RX ADMIN — HEPARIN SODIUM 18 UNITS/KG/HR: 5000 INJECTION INTRAVENOUS; SUBCUTANEOUS at 01:07

## 2022-01-01 RX ADMIN — SODIUM CHLORIDE 250 ML: 0.9 INJECTION, SOLUTION INTRAVENOUS at 04:07

## 2022-01-01 RX ADMIN — FLUCYTOSINE 1500 MG: 500 CAPSULE ORAL at 06:06

## 2022-01-01 RX ADMIN — OXYCODONE AND ACETAMINOPHEN 1 TABLET: 7.5; 325 TABLET ORAL at 04:06

## 2022-01-01 RX ADMIN — ACETAMINOPHEN 1000 MG: 500 TABLET ORAL at 04:06

## 2022-01-01 RX ADMIN — OXYCODONE AND ACETAMINOPHEN 1 TABLET: 7.5; 325 TABLET ORAL at 08:06

## 2022-01-01 RX ADMIN — NIFEDIPINE 30 MG: 30 TABLET, FILM COATED, EXTENDED RELEASE ORAL at 09:06

## 2022-01-01 RX ADMIN — FLUCONAZOLE 400 MG: 200 TABLET ORAL at 08:07

## 2022-01-01 RX ADMIN — HEPARIN SODIUM 22 UNITS/KG/HR: 5000 INJECTION INTRAVENOUS; SUBCUTANEOUS at 09:07

## 2022-01-01 RX ADMIN — SODIUM CHLORIDE 500 ML: 0.9 INJECTION, SOLUTION INTRAVENOUS at 07:07

## 2022-01-01 RX ADMIN — METRONIDAZOLE 500 MG: 500 INJECTION, SOLUTION INTRAVENOUS at 01:07

## 2022-01-01 RX ADMIN — PREDNISONE 10 MG: 5 TABLET ORAL at 11:06

## 2022-01-01 RX ADMIN — ONDANSETRON 4 MG: 2 INJECTION INTRAMUSCULAR; INTRAVENOUS at 08:07

## 2022-01-01 RX ADMIN — SODIUM CHLORIDE 500 ML: 0.9 INJECTION, SOLUTION INTRAVENOUS at 02:07

## 2022-01-01 RX ADMIN — MAGNESIUM SULFATE 2 G: 2 INJECTION INTRAVENOUS at 05:06

## 2022-01-01 RX ADMIN — MORPHINE SULFATE 4 MG: 2 INJECTION, SOLUTION INTRAMUSCULAR; INTRAVENOUS at 05:07

## 2022-01-01 RX ADMIN — OXYCODONE AND ACETAMINOPHEN 1 TABLET: 7.5; 325 TABLET ORAL at 09:06

## 2022-01-01 RX ADMIN — THERA TABS 1 TABLET: TAB at 03:07

## 2022-01-01 RX ADMIN — SODIUM CHLORIDE 500 ML: 0.9 INJECTION, SOLUTION INTRAVENOUS at 03:06

## 2022-01-01 RX ADMIN — Medication 400 MG: at 08:07

## 2022-01-01 RX ADMIN — CALCITRIOL CAPSULES 0.25 MCG 0.5 MCG: 0.25 CAPSULE ORAL at 08:06

## 2022-01-01 RX ADMIN — HEPARIN SODIUM 18 UNITS/KG/HR: 5000 INJECTION INTRAVENOUS; SUBCUTANEOUS at 08:07

## 2022-01-01 RX ADMIN — AMPHOTERICIN B 300 MG: 50 INJECTION, POWDER, LYOPHILIZED, FOR SOLUTION INTRAVENOUS at 03:06

## 2022-01-01 RX ADMIN — Medication 0.02 MCG/KG/MIN: at 08:07

## 2022-01-01 RX ADMIN — FLUCYTOSINE 1500 MG: 500 CAPSULE ORAL at 03:06

## 2022-01-01 RX ADMIN — POTASSIUM BICARBONATE 50 MEQ: 978 TABLET, EFFERVESCENT ORAL at 06:07

## 2022-01-01 RX ADMIN — HEPARIN SODIUM 21 UNITS/KG/HR: 5000 INJECTION INTRAVENOUS; SUBCUTANEOUS at 12:07

## 2022-01-01 RX ADMIN — TACROLIMUS 1 MG: 1 CAPSULE ORAL at 10:06

## 2022-01-01 RX ADMIN — FLUCYTOSINE 1500 MG: 500 CAPSULE ORAL at 05:06

## 2022-01-01 RX ADMIN — IOHEXOL 15 ML: 350 INJECTION, SOLUTION INTRAVENOUS at 09:07

## 2022-01-01 RX ADMIN — OXYCODONE AND ACETAMINOPHEN 1 TABLET: 7.5; 325 TABLET ORAL at 07:07

## 2022-01-01 RX ADMIN — CALCITRIOL CAPSULES 0.25 MCG 0.5 MCG: 0.25 CAPSULE ORAL at 11:07

## 2022-01-01 RX ADMIN — NIFEDIPINE 90 MG: 30 TABLET, FILM COATED, EXTENDED RELEASE ORAL at 10:07

## 2022-01-01 RX ADMIN — ESCITALOPRAM OXALATE 10 MG: 10 TABLET ORAL at 08:07

## 2022-01-01 RX ADMIN — ACETAMINOPHEN 500 MG: 500 TABLET ORAL at 01:06

## 2022-01-01 RX ADMIN — ESCITALOPRAM OXALATE 10 MG: 10 TABLET ORAL at 10:07

## 2022-01-01 RX ADMIN — SODIUM CHLORIDE 250 ML: 0.9 INJECTION, SOLUTION INTRAVENOUS at 08:06

## 2022-01-01 RX ADMIN — LIDOCAINE HYDROCHLORIDE 50 MG: 10 SOLUTION INTRAVENOUS at 08:07

## 2022-01-01 RX ADMIN — MUPIROCIN: 20 OINTMENT TOPICAL at 12:06

## 2022-01-01 RX ADMIN — PROMETHAZINE HYDROCHLORIDE 25 MG: 25 INJECTION INTRAMUSCULAR; INTRAVENOUS at 09:07

## 2022-01-01 RX ADMIN — PREDNISONE 10 MG: 5 TABLET ORAL at 09:06

## 2022-01-01 RX ADMIN — DIPHENHYDRAMINE HYDROCHLORIDE 25 MG: 25 CAPSULE ORAL at 01:06

## 2022-01-01 RX ADMIN — ONDANSETRON 8 MG: 8 TABLET, ORALLY DISINTEGRATING ORAL at 04:07

## 2022-01-01 RX ADMIN — PREDNISONE 5 MG: 5 TABLET ORAL at 11:07

## 2022-01-01 RX ADMIN — SODIUM BICARBONATE 50 MEQ: 84 INJECTION, SOLUTION INTRAVENOUS at 04:07

## 2022-01-01 RX ADMIN — TOPIRAMATE 50 MG: 25 TABLET, FILM COATED ORAL at 10:07

## 2022-01-01 RX ADMIN — NOREPINEPHRINE BITARTRATE 1.2 MCG/KG/MIN: 1 INJECTION, SOLUTION, CONCENTRATE INTRAVENOUS at 10:07

## 2022-01-01 RX ADMIN — BUTALBITAL, ACETAMINOPHEN, AND CAFFEINE 1 TABLET: 50; 325; 40 TABLET ORAL at 08:07

## 2022-01-01 RX ADMIN — Medication 400 MG: at 08:06

## 2022-01-01 RX ADMIN — ONDANSETRON 4 MG: 2 INJECTION INTRAMUSCULAR; INTRAVENOUS at 11:07

## 2022-01-01 RX ADMIN — PANTOPRAZOLE SODIUM 80 MG: 40 INJECTION, POWDER, FOR SOLUTION INTRAVENOUS at 11:07

## 2022-01-01 RX ADMIN — TACROLIMUS 2 MG: 1 CAPSULE ORAL at 05:06

## 2022-01-01 RX ADMIN — OXYCODONE AND ACETAMINOPHEN 1 TABLET: 7.5; 325 TABLET ORAL at 08:07

## 2022-01-01 RX ADMIN — BUTALBITAL, ACETAMINOPHEN, AND CAFFEINE 1 TABLET: 50; 325; 40 TABLET ORAL at 04:07

## 2022-01-01 RX ADMIN — FLUCYTOSINE 1500 MG: 500 CAPSULE ORAL at 04:06

## 2022-01-01 RX ADMIN — SODIUM BICARBONATE: 84 INJECTION, SOLUTION INTRAVENOUS at 09:07

## 2022-01-01 RX ADMIN — TACROLIMUS 2 MG: 1 CAPSULE ORAL at 09:06

## 2022-01-01 RX ADMIN — LIDOCAINE HYDROCHLORIDE 7 ML: 10 INJECTION, SOLUTION INFILTRATION; PERINEURAL at 10:07

## 2022-01-01 RX ADMIN — POTASSIUM BICARBONATE 25 MEQ: 978 TABLET, EFFERVESCENT ORAL at 08:06

## 2022-01-01 RX ADMIN — LIDOCAINE HYDROCHLORIDE 2 ML: 10 INJECTION, SOLUTION INFILTRATION; PERINEURAL at 04:06

## 2022-01-01 RX ADMIN — SODIUM BICARBONATE: 84 INJECTION, SOLUTION INTRAVENOUS at 10:07

## 2022-01-01 RX ADMIN — LOPERAMIDE HYDROCHLORIDE 4 MG: 2 CAPSULE ORAL at 09:07

## 2022-01-01 RX ADMIN — SODIUM CHLORIDE, SODIUM LACTATE, POTASSIUM CHLORIDE, AND CALCIUM CHLORIDE 250 ML: .6; .31; .03; .02 INJECTION, SOLUTION INTRAVENOUS at 07:07

## 2022-01-01 RX ADMIN — TACROLIMUS 2 MG: 1 CAPSULE ORAL at 11:06

## 2022-01-01 RX ADMIN — NIFEDIPINE 60 MG: 30 TABLET, FILM COATED, EXTENDED RELEASE ORAL at 09:07

## 2022-01-01 RX ADMIN — ROCURONIUM BROMIDE 60 MG: 10 INJECTION INTRAVENOUS at 09:07

## 2022-01-01 RX ADMIN — POTASSIUM BICARBONATE 50 MEQ: 978 TABLET, EFFERVESCENT ORAL at 08:07

## 2022-01-01 RX ADMIN — CEFEPIME HYDROCHLORIDE 2 G: 2 INJECTION, SOLUTION INTRAVENOUS at 10:07

## 2022-01-01 RX ADMIN — OXYCODONE AND ACETAMINOPHEN 1 TABLET: 7.5; 325 TABLET ORAL at 12:06

## 2022-01-01 RX ADMIN — ONDANSETRON 8 MG: 8 TABLET, ORALLY DISINTEGRATING ORAL at 03:07

## 2022-01-01 RX ADMIN — DIPHENHYDRAMINE HYDROCHLORIDE 25 MG: 25 CAPSULE ORAL at 03:07

## 2022-01-01 RX ADMIN — FLUCYTOSINE 1500 MG: 500 CAPSULE ORAL at 07:07

## 2022-01-01 RX ADMIN — AMPHOTERICIN B 300 MG: 50 INJECTION, POWDER, LYOPHILIZED, FOR SOLUTION INTRAVENOUS at 02:06

## 2022-01-01 RX ADMIN — MAGNESIUM CITRATE 296 ML: 1.75 LIQUID ORAL at 04:06

## 2022-01-01 RX ADMIN — DIPHENHYDRAMINE HYDROCHLORIDE 25 MG: 25 CAPSULE ORAL at 03:06

## 2022-01-01 RX ADMIN — HEPARIN SODIUM 20 UNITS/KG/HR: 5000 INJECTION INTRAVENOUS; SUBCUTANEOUS at 05:07

## 2022-01-01 RX ADMIN — NIFEDIPINE 30 MG: 30 TABLET, FILM COATED, EXTENDED RELEASE ORAL at 08:06

## 2022-01-01 RX ADMIN — DIPHENHYDRAMINE HYDROCHLORIDE 25 MG: 25 CAPSULE ORAL at 12:07

## 2022-01-01 RX ADMIN — SUMATRIPTAN 6 MG: 6 INJECTION, SOLUTION SUBCUTANEOUS at 09:07

## 2022-01-01 RX ADMIN — ONDANSETRON 4 MG: 2 INJECTION INTRAMUSCULAR; INTRAVENOUS at 05:07

## 2022-01-01 RX ADMIN — OXYCODONE AND ACETAMINOPHEN 1 TABLET: 7.5; 325 TABLET ORAL at 10:06

## 2022-01-01 RX ADMIN — THERA TABS 1 TABLET: TAB at 08:06

## 2022-01-01 RX ADMIN — FLUCONAZOLE IN SODIUM CHLORIDE 400 MG: 2 INJECTION, SOLUTION INTRAVENOUS at 02:07

## 2022-01-01 RX ADMIN — HYDRALAZINE HYDROCHLORIDE 25 MG: 25 TABLET, FILM COATED ORAL at 08:07

## 2022-01-01 RX ADMIN — NOREPINEPHRINE BITARTRATE 0.02 MCG/KG/MIN: 4 INJECTION, SOLUTION INTRAVENOUS at 08:07

## 2022-01-01 RX ADMIN — PREDNISONE 5 MG: 5 TABLET ORAL at 10:07

## 2022-01-01 RX ADMIN — POTASSIUM CHLORIDE 60 MEQ: 1500 TABLET, EXTENDED RELEASE ORAL at 12:07

## 2022-01-01 RX ADMIN — SODIUM CHLORIDE 500 ML: 0.9 INJECTION, SOLUTION INTRAVENOUS at 04:07

## 2022-01-01 RX ADMIN — SODIUM CHLORIDE 250 ML: 0.9 INJECTION, SOLUTION INTRAVENOUS at 05:06

## 2022-01-01 RX ADMIN — ONDANSETRON 4 MG: 2 INJECTION INTRAMUSCULAR; INTRAVENOUS at 03:07

## 2022-01-01 RX ADMIN — BUTALBITAL, ACETAMINOPHEN, AND CAFFEINE 1 TABLET: 50; 325; 40 TABLET ORAL at 09:07

## 2022-01-01 RX ADMIN — TOPIRAMATE 50 MG: 25 TABLET, FILM COATED ORAL at 11:07

## 2022-01-01 RX ADMIN — Medication 1 CAPSULE: at 11:07

## 2022-01-01 RX ADMIN — SENNOSIDES AND DOCUSATE SODIUM 1 TABLET: 50; 8.6 TABLET ORAL at 09:06

## 2022-01-01 RX ADMIN — HEPARIN SODIUM 18 UNITS/KG/HR: 5000 INJECTION INTRAVENOUS; SUBCUTANEOUS at 04:07

## 2022-01-01 RX ADMIN — SODIUM BICARBONATE 650 MG TABLET 1300 MG: at 05:07

## 2022-01-01 RX ADMIN — ACETAMINOPHEN 325 MG: 325 TABLET ORAL at 10:07

## 2022-01-01 RX ADMIN — SODIUM BICARBONATE 50 MEQ: 84 INJECTION, SOLUTION INTRAVENOUS at 08:07

## 2022-01-01 RX ADMIN — NOREPINEPHRINE BITARTRATE 1.68 MCG/KG/MIN: 1 INJECTION, SOLUTION, CONCENTRATE INTRAVENOUS at 04:07

## 2022-01-01 RX ADMIN — GANCICLOVIR SODIUM 75 MG: 50 INJECTION, POWDER, LYOPHILIZED, FOR SOLUTION INTRAVENTRICULAR at 04:07

## 2022-01-01 RX ADMIN — TACROLIMUS 2 MG: 1 CAPSULE ORAL at 04:06

## 2022-01-01 RX ADMIN — TACROLIMUS 3 MG: 1 CAPSULE ORAL at 06:06

## 2022-01-01 RX ADMIN — DICYCLOMINE HYDROCHLORIDE 10 MG: 10 CAPSULE ORAL at 09:07

## 2022-01-01 RX ADMIN — METOCLOPRAMIDE 5 MG: 5 INJECTION, SOLUTION INTRAMUSCULAR; INTRAVENOUS at 05:06

## 2022-01-01 RX ADMIN — SODIUM BICARBONATE 650 MG TABLET 1300 MG: at 10:07

## 2022-01-01 RX ADMIN — MAGNESIUM CITRATE 296 ML: 1.75 LIQUID ORAL at 08:07

## 2022-01-26 NOTE — TELEPHONE ENCOUNTER
Return call to patient stating that her Grandchild had a live virus vaccination today.  Patient voice a understanding per protocol to avoid her grandchild for the next 48 hours.    ----- Message from Mor Ross RN sent at 1/26/2022  9:02 AM CST -----  Regarding: FW: speak to coordinator  Contact: Tala    ----- Message -----  From: Dianna Wolf  Sent: 1/26/2022   8:52 AM CST  To: [facility] Post-Kidney Transplant Clinical  Subject: speak to coordinator                             Patient called to speak to coordinator.    Contact: [phone]

## 2022-03-20 NOTE — TELEPHONE ENCOUNTER
/92 this morning with associated HA. Denies sinus congestin but states it feels like sinus HA. Current BP reading 160/102. Takes nifedipine 30mg BID, took today at 0700. Woke up with bad HA this morning, improved now, still mild but improved. Did not take any meds for HA this morning.     Kidney tx pt 4/5/2020    Dr. Mcdonald (Nephrology in [place]).     Advised per protocol. Needs to be seen within 24 hours. Will reach out to local nephrologist in the morning. ED precautions given for severe HA or other neuro/cardio symptoms. VU and agreed.     Reason for Disposition   Systolic BP  >= 160 OR Diastolic >= 100   Symptom is main concern  (e.g., headache, chest pain)   [1] New headache AND [2] weak immune system (e.g., HIV positive, cancer chemo, splenectomy, organ transplant, chronic steroids)    Additional Information   Negative: Difficult to awaken or acting confused (e.g., disoriented, slurred speech)   Negative: SEVERE difficulty breathing (e.g., struggling for each breath, speaks in single words)   Negative: [1] Weakness of the face, arm or leg on one side of the body AND [2] new-onset   Negative: [1] Numbness (i.e., loss of sensation) of the face, arm or leg on one side of the body AND [2] new-onset   Negative: [1] Chest pain lasts > 5 minutes AND [2] history of heart disease  (i.e., heart attack, bypass surgery, angina, angioplasty, CHF)   Negative: [1] Chest pain AND [2] took nitrogylcerin AND [3] pain was not relieved   Negative: Sounds like a life-threatening emergency to the triager   Negative: [1] Systolic BP  >= 160 OR Diastolic >= 100 AND [2] cardiac or neurologic symptoms (e.g., chest pain, difficulty breathing, unsteady gait, blurred vision)   Negative: [1] Systolic BP  >= 200 OR Diastolic >= 120  AND [2] having NO cardiac or neurologic symptoms   Negative: [1] Postpartum < 6 weeks AND [2] Systolic BP  >= 140 OR Diastolic >= 90   Negative: [1] Systolic BP  >= 180 OR Diastolic >= 110 AND  "[2] missed most recent dose of blood pressure medication   Negative: Systolic BP  >= 180 OR Diastolic >= 110   Negative: Ran out of BP medications   Negative: Difficult to awaken or acting confused (e.g., disoriented, slurred speech)   Negative: [1] Weakness of the face, arm or leg on one side of the body AND [2] new-onset   Negative: [1] Numbness of the face, arm or leg on one side of the body AND [2] new-onset   Negative: [1] Loss of speech or garbled speech AND [2] new-onset   Negative: Passed out (i.e., lost consciousness, collapsed and was not responding)   Negative: Sounds like a life-threatening emergency to the triager   Negative: Unable to walk, or can only walk with assistance (e.g., requires support)   Negative: Stiff neck (can't touch chin to chest)   Negative: Severe pain in one eye   Negative: [1] Other family members (or roommates) with headaches AND [2] possibility of carbon monoxide exposure   Negative: [1] SEVERE headache (e.g., excruciating) AND [2] "worst headache" of life   Negative: [1] SEVERE headache AND [2] sudden-onset (i.e., reaching maximum intensity within seconds to 1 hour)   Negative: [1] SEVERE headache AND [2] fever   Negative: Loss of vision or double vision (Exception: same as prior migraines)   Negative: [1] Fever > 100.0 F (37.8 C) AND [2] diabetes mellitus or weak immune system (e.g., HIV positive, cancer chemo, splenectomy, organ transplant, chronic steroids)   Negative: Patient sounds very sick or weak to the triager   Negative: [1] SEVERE headache (e.g., excruciating) AND [2] not improved after 2 hours of pain medicine   Negative: [1] Vomiting AND [2] 2 or more times (Exception: similar to previous migraines)   Negative: Fever > 104 F (40 C)   Negative: [1] MODERATE headache (e.g., interferes with normal activities) AND [2] present > 24 hours AND [3] unexplained  (Exceptions: analgesics not tried, typical migraine, or headache part of viral " illness)    Protocols used: BLOOD PRESSURE - HIGH-A-AH, HEADACHE-A-AH

## 2022-03-25 NOTE — TELEPHONE ENCOUNTER
Patient repeated back and voice a understanding of orders.  Next tacro level on 4/6/2022.  ----- Message from Ondina Frye MD sent at 3/25/2022 12:10 PM CDT -----  Tac 3 mg BID. Check tac level in 2 weeks.

## 2022-03-28 NOTE — PROGRESS NOTES
POST CLINIC 24 MONTHS:      TRU met with pt in clinic for 24 month post visit.  Pt presented alone, in good spirits stating she is working full time and doing well.  She stated she is affording her medications and taking them as ordered.  SW provided info on the likely termination of Medicare benefits at 36 months post transplant. Pt verbalized awareness of this and stated she is covered by [facility] through her job and [employer] through 's halfway.  No psychosocial needs identified at this time.  SW remains available.

## 2022-03-28 NOTE — LETTER
March 28, 2022        Arthur Mcdonald  [address] [facility]  NEPHROLOGY CLINIC  [facility] [address]  Phone: [phone]  Fax: [phone]     Tabby Chavez  [address] [facility] [address]  Phone: [phone]  Fax: [phone]             Attila Mcduff[facility]- Transplant 1st Fl  [address]  [facility] [address]  Phone: [phone]   Patient: Tala Rivera   MR Number: [id number]   YOB: 1965   Date of Visit: 3/28/2022       Dear Dr. Arthur Mcdonald, Tabby Chavez    Thank you for referring Tala Rivera to me for evaluation. Attached you will find relevant portions of my assessment and plan of care.    If you have questions, please do not hesitate to call me. I look forward to following Tala Rivera along with you.    Sincerely,    Caity Cagle, NP    Enclosure    If you would like to receive this communication electronically, please contact [email or web address] or [phone] to request [facility] [employer] access.    [facility] is a tool which provides read-only access to select patient information with whom you have a relationship. Its easy to use and provides real time access to review your patients record including encounter summaries, notes, results, and demographic information.    If you feel you have received this communication in error or would no longer like to receive these types of communications, please e-mail [email or web address]

## 2022-03-28 NOTE — PROGRESS NOTES
Kidney Post-Transplant Assessment    Referring Physician: Tabby Velasco  Current Nephrologist: Arthur Mcdonald    ORGAN: LEFT KIDNEY  Donor Type: donation after brain death  PHS Increased Risk: no  Cold Ischemia: 438 mins  Induction Medications: simulect - basiliximab    Subjective:     CC:  Reassessment of renal allograft function and management of chronic immunosuppression.    HPI:  Ms. Rivera is a 56 y.o. 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  She has CKD stage 3 - GFR 30-59 and her baseline creatinine is between 1.2-1.5. She takes mycophenolate mofetil, prednisone and tacrolimus for maintenance immunosuppression. She denies any recent hospitalizations or ER visits since her previous clinic visit.    Kidney History:  History of HTN, gastric bypass surgery on 7/28/16, umbilical hernia repair and PD catheter placement on 9/4/18, and ESRD presumed secondary to HTN on HD since 2/5/16 is now s/p KTX from 4/5/20. her Native UOP is approximately 1.8L/day. Pt was Simulect induction on 4/5/20.      - Kidney US: Decreased perfusion within the transplanted kidney.  Overall resistive indices are normal to upper normal.  - Repeated kidney US: huge fluid collection 05/22/20 s/p drain placement.  - Covid 19 in July 2020/ hospitalized     Interval History:   Reports having a HA last week, she f/u with Gen nephrology / Dr Mcdonald and it was thought to be sinus . She also reports going to [facility]/ ED  And had a CT head . Has not had anymore issue in the past 4 days     Following with general nephrology Dr. Mcdonald.   BP  BP Readings from Last 3 Encounters:   03/28/22 (!) 143/67   10/11/21 (!) 173/75   04/12/21 (!) 143/68     Overall feels well. No health concerns today. 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     Reports good appetite, weight stable. BP elevated in clinic, but running 130/70s at home, no  peripheral edema.  Denies chest pain, SOB, leg pain, abdominal pain or LUTs.      All questions answered     Current Outpatient Medications   Medication Sig Dispense Refill    calcitRIOL (ROCALTROL) 0.25 MCG Cap Take 2 capsules (0.5 mcg total) by mouth once daily. 60 capsule 11    docusate sodium (COLACE) 100 MG capsule Take 1 capsule (100 mg total) by mouth 3 (three) times daily as needed for Constipation.  0    ergocalciferol (ERGOCALCIFEROL) 50,000 unit Cap Take 1 capsule (50,000 Units total) by mouth every 7 days. 4 capsule 11    mycophenolate (CELLCEPT) 250 mg Cap Take 2 capsules (500 mg total) by mouth 2 (two) times daily. 120 capsule 11    NIFEdipine (PROCARDIA-XL) 30 MG (OSM) 24 hr tablet TAKE ONE TABLET BY MOUTH TWICE DAILY hold if systolic blood pressure is less than 130 60 tablet 11    predniSONE (DELTASONE) 5 MG tablet Take 20mg by mouth daily 4/8-5/7;   Take 15mg daily 5/8-6/7;  Take 10mg daily 6/8-7/7;  then 5mg daily thereafter starting 7/8/2020 (Patient taking differently: Take 20mg by mouth daily 4/8-5/7;   Take 15mg daily 5/8-6/7;  Take 10mg daily 6/8-7/7;  then 5mg daily thereafter starting 7/8/2020) 120 tablet 11    sodium bicarbonate 650 MG tablet Take 2 tablets (1,300 mg total) by mouth 2 (two) times daily. 120 tablet 11    tacrolimus (PROGRAF) 1 MG Cap Take 3 capsules (3 mg total) by mouth every morning AND 3 capsules (3 mg total) every evening. 180 capsule 11     No current facility-administered medications for this visit.       Past Medical History:   Diagnosis Date    Anemia of renal disease     ESRD on dialysis     Dr. Muhammad     Essential hypertension     PD catheter dysfunction 10/18/2018    Secondary hyperparathyroidism of renal origin          Review of Systems   Constitutional: Negative for activity change, appetite change and fever.   HENT: Negative for congestion, mouth sores and sore throat.    Eyes: Negative for visual disturbance.   Respiratory: Negative for cough,  "chest tightness and shortness of breath.    Cardiovascular: Negative for chest pain, palpitations and leg swelling.   Gastrointestinal: Positive for abdominal distention. Negative for constipation, diarrhea and nausea.   Genitourinary: Negative for difficulty urinating, frequency and hematuria.   Musculoskeletal: Negative for arthralgias, gait problem and myalgias.   Skin: Negative for wound.   Allergic/Immunologic: Positive for immunocompromised state. Negative for environmental allergies and food allergies.   Neurological: Negative for dizziness, weakness and numbness.   Psychiatric/Behavioral: Negative for sleep disturbance. The patient is not nervous/anxious.        Objective:     Blood pressure (!) 143/67, pulse (!) 55, temperature 97.7 °F (36.5 °C), temperature source Temporal, resp. rate 16, height 5' 1" (1.549 m), weight 73.3 kg (161 lb 9.6 oz), SpO2 97 %.body mass index is 30.53 kg/m².    Physical Exam  Vitals and nursing note reviewed.   Constitutional:       Appearance: Normal appearance.   HENT:      Head: Normocephalic.   Cardiovascular:      Rate and Rhythm: Normal rate and regular rhythm.      Heart sounds: Normal heart sounds.   Pulmonary:      Effort: Pulmonary effort is normal.      Breath sounds: Normal breath sounds.   Abdominal:      General: Bowel sounds are normal. There is distension.      Palpations: Abdomen is soft.      Tenderness: There is no abdominal tenderness.       Musculoskeletal:         General: Normal range of motion.        Arms:       Comments: No bruit noted over the allograft    Skin:     General: Skin is warm and dry.   Neurological:      General: No focal deficit present.      Mental Status: She is alert.   Psychiatric:         Behavior: Behavior normal.         Labs:  Lab Results   Component Value Date    WBC 4.70 03/24/2022    HGB 13.4 03/24/2022    HCT 42.9 03/24/2022     03/24/2022    K 3.8 03/24/2022     03/24/2022    CO2 26 03/24/2022    BUN 17 03/24/2022 "    CREATININE 1.3 03/24/2022    EGFRNONAA 46.6 (A) 03/24/2022    CALCIUM 9.6 03/24/2022    PHOS 3.5 03/24/2022    MG 2.0 03/24/2022    ALBUMIN 4.0 03/24/2022    ALBUMIN 4.0 03/24/2022    AST 13 03/24/2022    ALT 7 (L) 03/24/2022    UTPCR 0.13 03/24/2022    .6 (H) 03/24/2022    TACROLIMUS 3.9 (L) 03/24/2022       Labs were reviewed with the patient.    Assessment:     No diagnosis found.    Plan:    repeat trough 2 weeks   F/u with PCP/ Gen nephrology c/o HA and mgmt       Follow-up:   1. CKD stage: 3 stable      2. Immunosuppression: Prograf trough 3.9, which is SUPRAtherapeutic (target 4-6). Prograf increase to 3/3  on 3/25/2022. Continue  Prograf 4/4,  mg BID and Prednisone 5 mg QD. Will continue to monitor for drug toxicities      3. Allograft Function: Stable. Cr within baseline 1.2-1.5. Continue good po hydration.   Lab Results   Component Value Date    CREATININE 1.3 03/24/2022         3/24/2022  1yr 11mo   eGFR if non African American >60 mL/min/1.73 m^2 46.6 (A)  Calculation used to obtain the estimated glomerular filtration  rate (eGFR) is the CKD-EPI equation.         4. Hypertension management: advise low salt diet and home BP monitoring    Nifedipine 30 mg BID    5. Metabolic Bone Disease/Secondary Hyperparathyroidism:stable  Will monitor PTH, CA and Vit D/guidelines  Calcitriol  , Ergocalciferol  -->MGMT deferred to general nephrology  f  Lab Results   Component Value Date    .6 (H) 03/24/2022    CALCIUM 9.6 03/24/2022    PHOS 3.5 03/24/2022          3/24/2022  1yr 11mo   Magnesium 1.6 - 2.6 mg/dL 2.0         6. Electrolytes:  Will monitor /guidelines  Sodium bicarb  -->MGMT deferred to general nephrology    Lab Results   Component Value Date     03/24/2022    K 3.8 03/24/2022     03/24/2022    CO2 26 03/24/2022       7. Anemia: stable. No need for intervention    Will monitor /guidelines -->MGMT deferred to general nephrology    Lab Results   Component Value Date    WBC  4.70 03/24/2022    HGB 13.4 03/24/2022    HCT 42.9 03/24/2022    MCV 94 03/24/2022     03/24/2022       8.  Cytopenias: Medicine list reviewed including potential causes of drug-induced cytopenias        9.Proteinuria: continue p/c ratio as per guidelines        3/24/2022  1yr 11mo   Prot/Creat Ratio, Urine 0.00 - 0.20 0.13       10. BK virus infection screening:  will continue to monitor/ guidelines       10/4/2021  1yr 5mo   BK Virus DNA, Blood Not detected Not detected       10/4/2021  1yr 5mo   BK Virus DNA PCR, Quant, Blood <125 Copies/mL <125       11. Weight education: provided during the clinic visit   Body mass index is 30.53 kg/m².     12.Patient safety education regarding immunosuppression including prophylaxis posttransplant for CMV, PCP : Education provided about vaccination and prevention of infections            Follow-up:   Clinic: return to transplant clinic weekly for the first month after transplant; every 2 weeks during months 2-3; then at 6-, 9-, 12-, 18-, 24-, and 36- months post-transplant to reassess for complications from immunosuppression toxicity and monitor for rejection.  Annually thereafter.    Labs: since patient remains at high risk for rejection and drug-related complications that warrant close monitoring, labs will be ordered as follows: continue twice weekly CBC, renal panel, and drug level for first month; then same labs once weekly through 3rd month post-transplant.  Urine for UA and protein/creatinine ratio monthly.  Serum BK - PCR at 1-, 3-, 6-, 9-, 12-, 18-, 24-, 36- 48-, and 60 months post-transplant.  Hepatic panel at 1-, 2-, 3-, 6-, 9-, 12-, 18-, 24-, and 36- months post-transplant.    Caity Cagle NP       Education:   Material provided to the patient.  Patient reminded to call with any health changes since these can be early signs of significant complications.  Also, I advised the patient to be sure any new medications or changes of old medications are discussed  with either a pharmacist or physician knowledgeable with transplant to avoid rejection/drug toxicity related to significant drug interactions.

## 2022-04-07 NOTE — TELEPHONE ENCOUNTER
Patient repeated back and voice a understanding of orders.  Next tacro level on 4/20/2022.  ----- Message from Ondina Frye MD sent at 4/7/2022 11:03 AM CDT -----  Please tac to 3/2 mg . Check tac level in 2 weeks.

## 2022-06-03 NOTE — PROGRESS NOTES
Pt states she forgot to reorder Cellcept refill from [facility] pharmacy in enough time. Pharmacy told her prescription would arrive to her house on Tuesday June 7 but she is completely out of the medication. Prescription called in to [place] with [facility] in [place] for Mycophenolate 500mg BID x5 days. Pt's  is at pharmacy to  prescription.

## 2022-06-08 NOTE — ED PROVIDER NOTES
Encounter Date: 2022       History     Chief Complaint   Patient presents with    Headache     Ha for 2 weeks , kidney transplant 2020     56-year-old female with history of a kidney transplant in  presents with headaches.  She has been having headaches for the past 3 months.  They come and go.  They are intense to the forehead area.  They radiate around the sides of her head.  She has associated nausea and dry heaves.  Denies any fever or visual changes.  She states the headache is fairly mild right now but at times they are intense.  She has been seen by a neurologist and was prescribed Topamax.  She has been to the emergency department and had an MRI of her brain 2 days ago.  They are frustrated with these headaches and came to oxygen or since she has her kidney transplant managed here.        Review of patient's allergies indicates:   Allergen Reactions    Sulfa (sulfonamide antibiotics) Hives     Past Medical History:   Diagnosis Date    Anemia of renal disease     ESRD on dialysis     Dr. Muhammad     Essential hypertension     PD catheter dysfunction 10/18/2018    Secondary hyperparathyroidism of renal origin      Past Surgical History:   Procedure Laterality Date    AV FISTULA PLACEMENT Left     never matured    AV graft Right 2015     SECTION      x3; ; , and     COLONOSCOPY N/A 2020    Procedure: COLONOSCOPY;  Surgeon: Darrell Golden MD;  Location: [facility] ENDO;  Service: Endoscopy;  Laterality: N/A;    GASTRIC BYPASS      KIDNEY TRANSPLANT N/A 2020    Procedure: TRANSPLANT, KIDNEY;  Surgeon: Megan Garcia MD;  Location: [address];  Service: Transplant;  Laterality: N/A;    KIDNEY TRANSPLANT Right 2020    LAPAROSCOPIC REVISION OF PROCEDURE INVOLVING PERITONEAL DIALYSIS CATHETER N/A 10/18/2018    Procedure: REVISION OF PROCEDURE INVOLVING PERITONEAL DIALYSIS CATHETER, LAPAROSCOPIC;  Surgeon: Hair Jimenez MD;  [facility];  Service: General;  Laterality: N/A;    PERITONEAL CATHETER INSERTION N/A 9/4/2018    Procedure: Laparoscopic INSERTION, CATHETER, INTRAPERITONEAL;  Surgeon: Hair Jimenez MD;  Location: [facility];  Service: General;  Laterality: N/A;    PERITONEAL CATHETER REMOVAL N/A 12/14/2018    Procedure: REMOVAL, CATHETER, DIALYSIS, PERITONEAL;  Surgeon: Hair Jimenez MD;  Location: [facility] OR;  Service: General;  Laterality: N/A;    WOUND EXPLORATION N/A 12/14/2018    Procedure: EXPLORATION, WOUND-Surgical Site Irrigation;  Surgeon: Hair Jimenez MD;  Location: [facility] OR;  Service: General;  Laterality: N/A;     Family History   Problem Relation Age of Onset    Kidney disease Mother     Diabetes Mother     Heart disease Mother     Hypertension Mother     Lupus Sister     Down syndrome Son     Cancer Neg Hx     Stroke Neg Hx      Social History     Tobacco Use    Smoking status: Never Smoker    Smokeless tobacco: Never Used   Substance Use Topics    Alcohol use: No    Drug use: No     Review of Systems   Constitutional: Negative for chills and fever.   HENT: Negative for congestion.    Respiratory: Negative for shortness of breath.    Cardiovascular: Negative for chest pain.   Gastrointestinal: Negative for abdominal pain.   Genitourinary: Negative for dysuria.   Neurological: Positive for headaches. Negative for dizziness, syncope and numbness.   Hematological: Negative for adenopathy.   Psychiatric/Behavioral: Negative for agitation.       Physical Exam     Initial Vitals [06/08/22 1226]   BP Pulse Resp Temp SpO2   (!) 151/71 69 18 98.1 °F (36.7 °C) 97 %      MAP       --         Physical Exam    Constitutional: She appears well-developed and well-nourished. She is not diaphoretic. No distress.   HENT:   Head: Normocephalic.   Mouth/Throat: Oropharynx is clear and moist.   Eyes: Conjunctivae and EOM are normal. Pupils are equal, round, and reactive to light. Right eye exhibits no discharge. Left eye exhibits  no discharge.   Neck: Neck supple.   Normal range of motion.  Cardiovascular: Normal rate, regular rhythm and normal heart sounds.   No murmur heard.  Pulmonary/Chest: Breath sounds normal. No respiratory distress. She has no wheezes. She has no rales.   Abdominal: Abdomen is soft. Bowel sounds are normal. She exhibits no distension. There is no abdominal tenderness.   Musculoskeletal:         General: No edema. Normal range of motion.      Cervical back: Normal range of motion and neck supple.     Neurological: She is alert and oriented to person, place, and time. She has normal strength. No cranial nerve deficit or sensory deficit. GCS score is 15. GCS eye subscore is 4. GCS verbal subscore is 5. GCS motor subscore is 6.   Skin: Skin is warm. Capillary refill takes less than 2 seconds. No rash noted.   Psychiatric: She has a normal mood and affect.         ED Course   Procedures  Labs Reviewed   HIV 1 / 2 ANTIBODY          Imaging Results    None          Medications - No data to display  Medical Decision Making:   Initial Assessment:   Patient with several months of headaches.  She has had a reassuring evaluation recently.  MRI results and lab results were were reviewed from [facility] 2 days ago.  She had normal white count.  Her creatinine was at baseline.  The MRI was negative.  She was started on Topamax last week.  Currently her headache seems to be mild per her description and appearance.  I do not believe we have a neurologic emergency.  Highly doubt subarachnoid hemorrhage, meningitis, stroke, brain mass.  Does not appear to have hypertensive crisis.  We discussed repeating labs which they declined.  I reviewed the case with Neurology on-call Dr. Mitchell.  He recommended adding magnesium and riboflavin to her regimen.  Continue the Topamax.  Will discharge home in stable condition.                      Clinical Impression:   Final diagnoses:  [R51.9] Nonintractable headache, unspecified chronicity pattern,  unspecified headache type (Primary)          ED Disposition Condition    Discharge Stable        ED Prescriptions     Medication Sig Dispense Start Date End Date Auth. Provider    magnesium oxide (MAG-OX) 400 mg (241.3 mg magnesium) tablet Take 1 tablet (400 mg total) by mouth once daily. 30 tablet 6/8/2022  Lefty Griffith MD    [id number] (CEREFOLIN) 6-5-50-1 mg Tab Take 1 tablet by mouth once daily. 30 tablet 6/8/2022  Lefty Griffith MD        Follow-up Information     Follow up With Specialties Details Why Contact Info Additional Information    CHui Castellon MD Family Medicine Schedule an appointment as soon as possible for a visit   [address] [facility]  [place] [address] [phone]       [facility] - Neurology [facility] Neurology Schedule an appointment as soon as possible for a visit   [address] [address] [phone] [facility] [place] - [facility], 7th Floor Please park in [facility] and take Clinic elevator    [facility] - Emergency Dept Emergency Medicine  If symptoms worsen [address] [address] [phone]            Lefty Griffith MD  06/08/22 1451

## 2022-06-08 NOTE — ED TRIAGE NOTES
"Tala Rivera, a 56 y.o. female presents to the ED w/ complaint of headaches. Pt reports "severe" headaches x 2-3 weeks. Localized around frontal region of head, cheeks, jaw. Associated w n/v, insomnia.  No relieving factors. AAO x 4. Hx of kidney transplant.     Triage note:  Chief Complaint   Patient presents with    Headache     Ha for 2 weeks , kidney transplant April 2020     Review of patient's allergies indicates:   Allergen Reactions    Sulfa (sulfonamide antibiotics) Hives     Past Medical History:   Diagnosis Date    Anemia of renal disease     ESRD on dialysis     Dr. Muhammad     Essential hypertension     PD catheter dysfunction 10/18/2018    Secondary hyperparathyroidism of renal origin        "

## 2022-06-08 NOTE — TELEPHONE ENCOUNTER
Return call to patient and  stating that her MMF just arrived but she is actively vomiting with a horrible headache 10/10 on pain scale.  Voice a understanding to present to the ER now .  Per patient  he is going to bring her to the ER here.  All appropriate personnel has been notified.    ----- Message from Joe Bautista sent at 6/8/2022 10:04 AM CDT -----  Regarding: call back  Pt call to speak with Mor in regards to lab results also pt states she's out of mycophenolate (CELLCEPT) 250 mg Cap requesting call back    Call

## 2022-06-12 PROBLEM — N18.31 STAGE 3A CHRONIC KIDNEY DISEASE: Status: ACTIVE | Noted: 2022-01-01

## 2022-06-12 PROBLEM — G43.911 INTRACTABLE MIGRAINE WITH STATUS MIGRAINOSUS: Status: ACTIVE | Noted: 2022-01-01

## 2022-06-12 NOTE — ASSESSMENT & PLAN NOTE
"-The patient received IV decadron, tylenol, 2g IV mag, and small fluid bolus in the ED with moderate improvement of headache.  -Continue oral magnesium daily.  -Increase topamax to 25mg BID.  -PRN tylenol and fiorcet q24hrs PRN  -Neurology consult, appreciate recs.    Per chart review  Office visit with neurologist Dr Majano 6/2/22     "- migraines: cannot use triptans given the theoretical risk of kidney infarctions. Will start TPM 25 mg daily in addition to butalbital as needed.   - medication overuse headaches: stopping tylenol and caffeine rich beverages. Increase water intake. Starting TPM 25 mg daily   - PRES: cannot be excluded given as she takes Tacrolimus. calcineurin inhibitors are a likely contributor to severe headaches in transplant patients. Consider changing tacrolimus. Awaiting brain MRI"     Brain MRI o 6/6/22  "Findings:   There is no significant brain atrophy. There is mild microangiopathy. There is no hemorrhage, mass lesion, acute infarct, or hydrocephalus. The orbits, paranasal sinuses, and left mastoid air cells are clear. There is a right mastoid effusion. Impression: No acute finding."   "

## 2022-06-12 NOTE — H&P
"[facility]  Hospital Medicine  History & Physical    Patient Name: Tala Rivera  MRN: [id number]  Patient Class: OP- Observation  Admission Date: 6/12/2022  Attending Physician: Dawn Sorensen MD   Primary Care Provider: ANUJA Castellon MD         Patient information was obtained from patient, past medical records and ER records.     Subjective:     Principal Problem:Intractable migraine with status migrainosus    Chief Complaint:   Chief Complaint   Patient presents with    Multiple complaints     4th visit to er, almost passing, headache,  told migraines, trying to get neurology appt, kidney xplant 2020        HPI: Tala Rivera is a 56 y.o. female with a PMHx of HTN, kidney transplant in 2020, CKD3, and headaches who presents to the ED with complaints of severe headache. She states that she has had a headache now for 3-4 weeks and has had no relief.  She reports she began having issues with headaches about 3 months ago. She states the initial headache lasted a week, and she was seen in the ED at that time. This time she has seen a neurologist had an MRI and CT scan of her head both of which were negative and also saw ENT. Patient reports the headache is a frontal headache that sometimes radiates over the top of her had and down her neck. She endorses associated photophobia, nausea, and vomiting. The patient also notes intermittent episodes where she feels off balance, weak, and like the "room is closing in." She denies any syncope or vision changes. The patient has been taking Topamax daily for about 1 week with no relief. She was also prescribed Fioricet however the pharmacist would not fill it due to her transplant status, so she has never taken that. She reports she spoke to her transplant coordinator Mor and was told she could take fiorcet, but the pharmacy still did not fill it. The patient denies any fever, chills, chest pain, abdominal pain, diarrhea, shortness of breath, or " cough.    In the ED, VSSAF. CBC unremarkable. K+3.4. Cr 1.4 (baseline 1.3). Glucose 151. COVID-19 negative. The patient received decadron, tylenol, IV magnesium, reglan, and IVF bolus.      Past Medical History:   Diagnosis Date    Anemia of renal disease     ESRD on dialysis     Dr. Muhammad     Essential hypertension     PD catheter dysfunction 10/18/2018    Secondary hyperparathyroidism of renal origin        Past Surgical History:   Procedure Laterality Date    AV FISTULA PLACEMENT Left     never matured    AV graft Right 2015     SECTION      x3; ; , and     COLONOSCOPY N/A 2020    Procedure: COLONOSCOPY;  Surgeon: Darrell Golden MD;  Location: [facility] ENDO;  Service: Endoscopy;  Laterality: N/A;    GASTRIC BYPASS      KIDNEY TRANSPLANT N/A 2020    Procedure: TRANSPLANT, KIDNEY;  Surgeon: Megan Garcia MD;  Location: [address];  Service: Transplant;  Laterality: N/A;    KIDNEY TRANSPLANT Right 2020    LAPAROSCOPIC REVISION OF PROCEDURE INVOLVING PERITONEAL DIALYSIS CATHETER N/A 10/18/2018    Procedure: REVISION OF PROCEDURE INVOLVING PERITONEAL DIALYSIS CATHETER, LAPAROSCOPIC;  Surgeon: Hair Jimenez MD;  Location: [facility];  Service: General;  Laterality: N/A;    PERITONEAL CATHETER INSERTION N/A 2018    Procedure: Laparoscopic INSERTION, CATHETER, INTRAPERITONEAL;  Surgeon: Hair Jimenez MD;  Location: [facility];  Service: General;  Laterality: N/A;    PERITONEAL CATHETER REMOVAL N/A 2018    Procedure: REMOVAL, CATHETER, DIALYSIS, PERITONEAL;  Surgeon: Hair Jimenez MD;  Location: [facility];  Service: General;  Laterality: N/A;    WOUND EXPLORATION N/A 2018    Procedure: EXPLORATION, WOUND-Surgical Site Irrigation;  Surgeon: Hair Jimenez MD;  Location: [facility];  Service: General;  Laterality: N/A;       Review of patient's allergies indicates:   Allergen Reactions    Sulfa (sulfonamide antibiotics) Hives       No current  facility-administered medications on file prior to encounter.     Current Outpatient Medications on File Prior to Encounter   Medication Sig    calcitRIOL (ROCALTROL) 0.25 MCG Cap Take 2 capsules (0.5 mcg total) by mouth once daily.    docusate sodium (COLACE) 100 MG capsule Take 1 capsule (100 mg total) by mouth 3 (three) times daily as needed for Constipation.    ergocalciferol (VITAMIN D2) 50,000 unit Cap Take 1 capsule (50,000 Units total) by mouth every 7 days.    l-methylfolate-b2-b6-b12 (CEREFOLIN) 6-5-50-1 mg Tab Take 1 tablet by mouth once daily.    magnesium oxide (MAG-OX) 400 mg (241.3 mg magnesium) tablet Take 1 tablet (400 mg total) by mouth once daily.    mycophenolate (CELLCEPT) 250 mg Cap Take 2 capsules (500 mg total) by mouth 2 (two) times daily.    NIFEdipine (PROCARDIA-XL) 30 MG (OSM) 24 hr tablet TAKE ONE TABLET BY MOUTH TWICE DAILY hold if systolic blood pressure is less than 130    predniSONE (DELTASONE) 5 MG tablet Take 1 tablet (5 mg total) by mouth once daily.    sodium bicarbonate 650 MG tablet Take 2 tablets (1,300 mg total) by mouth 2 (two) times daily.    tacrolimus (PROGRAF) 1 MG Cap Take 3 capsules (3 mg total) by mouth every morning AND 2 capsules (2 mg total) every evening.     Family History       Problem Relation (Age of Onset)    Diabetes Mother    Down syndrome Son    Heart disease Mother    Hypertension Mother    Kidney disease Mother    Lupus Sister          Tobacco Use    Smoking status: Never Smoker    Smokeless tobacco: Never Used   Substance and Sexual Activity    Alcohol use: No    Drug use: No    Sexual activity: Yes     Partners: Male     Review of Systems   Constitutional:  Negative for appetite change, chills, diaphoresis, fatigue and fever.   HENT:  Negative for congestion, rhinorrhea, sneezing and sore throat.    Eyes:  Positive for photophobia. Negative for visual disturbance.   Respiratory:  Negative for cough, shortness of breath and wheezing.     Cardiovascular:  Negative for chest pain, palpitations and leg swelling.   Gastrointestinal:  Positive for nausea and vomiting. Negative for abdominal distention, abdominal pain and diarrhea.   Genitourinary:  Negative for difficulty urinating, dysuria, frequency and hematuria.   Musculoskeletal:  Positive for gait problem. Negative for arthralgias, back pain and myalgias.   Skin:  Negative for color change, pallor, rash and wound.   Neurological:  Positive for dizziness (intermittent), weakness (intermittent) and headaches. Negative for tremors, seizures, syncope, facial asymmetry, light-headedness and numbness.   Psychiatric/Behavioral:  Negative for confusion and hallucinations. The patient is not nervous/anxious.    Objective:     Vital Signs (Most Recent):  Temp: 98 °F (36.7 °C) (06/12/22 1710)  Pulse: (!) 59 (06/12/22 1710)  Resp: 17 (06/12/22 1710)  BP: (!) 167/79 (06/12/22 1710)  SpO2: 99 % (06/12/22 1710)   Vital Signs (24h Range):  Temp:  [98 °F (36.7 °C)-98.1 °F (36.7 °C)] 98 °F (36.7 °C)  Pulse:  [59-78] 59  Resp:  [16-18] 17  SpO2:  [98 %-99 %] 99 %  BP: (150-167)/(77-79) 167/79     Weight: 68 kg (150 lb)  Body mass index is 28.34 kg/m².    Physical Exam  Vitals and nursing note reviewed.   Constitutional:       General: She is not in acute distress.     Appearance: She is not ill-appearing, toxic-appearing or diaphoretic.   HENT:      Head: Normocephalic and atraumatic.      Nose: Nose normal.      Mouth/Throat:      Mouth: Mucous membranes are moist.   Eyes:      Pupils: Pupils are equal, round, and reactive to light.   Cardiovascular:      Rate and Rhythm: Regular rhythm. Bradycardia present.      Heart sounds: No murmur heard.  Pulmonary:      Effort: Pulmonary effort is normal. No respiratory distress.      Breath sounds: No wheezing, rhonchi or rales.      Comments: Currently on room air  Abdominal:      General: Bowel sounds are normal. There is no distension.      Palpations: Abdomen is soft.  "     Tenderness: There is no abdominal tenderness. There is no guarding.   Genitourinary:     Comments: deferred  Musculoskeletal:         General: No swelling, tenderness or deformity. Normal range of motion.      Cervical back: Normal range of motion. No tenderness.   Skin:     General: Skin is warm and dry.      Capillary Refill: Capillary refill takes less than 2 seconds.   Neurological:      General: No focal deficit present.      Mental Status: She is alert and oriented to person, place, and time.      Sensory: No sensory deficit.      Motor: No weakness.   Psychiatric:         Mood and Affect: Mood normal.         Behavior: Behavior normal.         Thought Content: Thought content normal.         Judgment: Judgment normal.         CRANIAL NERVES     CN III, IV, VI   Pupils are equal, round, and reactive to light.     Significant Labs: All pertinent labs within the past 24 hours have been reviewed.  CBC:   Recent Labs   Lab 06/12/22  1630   WBC 7.38   HGB 14.8   HCT 44.4        CMP:   Recent Labs   Lab 06/12/22  1630      K 3.4*      CO2 23   *   BUN 25*   CREATININE 1.4   CALCIUM 9.6   PROT 6.8   ALBUMIN 4.1   BILITOT 0.9   ALKPHOS 63   AST 12   ALT 19   ANIONGAP 12   EGFRNONAA 42.6*       Significant Imaging: I have reviewed all pertinent imaging results/findings within the past 24 hours.    Assessment/Plan:     * Intractable migraine with status migrainosus  -The patient received IV decadron, tylenol, 2g IV mag, and small fluid bolus in the ED with moderate improvement of headache.  -Continue oral magnesium daily.  -Increase topamax to 25mg BID.  -PRN tylenol and fiorcet q24hrs PRN  -Neurology consult, appreciate recs.    Per chart review  Office visit with neurologist Dr Majano 6/2/22     "- migraines: cannot use triptans given the theoretical risk of kidney infarctions. Will start TPM 25 mg daily in addition to butalbital as needed.   - medication overuse headaches: stopping " "tylenol and caffeine rich beverages. Increase water intake. Starting TPM 25 mg daily   - PRES: cannot be excluded given as she takes Tacrolimus. calcineurin inhibitors are a likely contributor to severe headaches in transplant patients. Consider changing tacrolimus. Awaiting brain MRI"     Brain MRI o 6/6/22  "Findings:   There is no significant brain atrophy. There is mild microangiopathy. There is no hemorrhage, mass lesion, acute infarct, or hydrocephalus. The orbits, paranasal sinuses, and left mastoid air cells are clear. There is a right mastoid effusion. Impression: No acute finding."     Stage 3a chronic kidney disease  -Cr 1.4 on admit (baseline 1.2-1.3). Appears stable.  -BMP reviewed- noted Estimated Creatinine Clearance: 40.5 mL/min (based on SCr of 1.4 mg/dL). according to latest data.   -Monitor UOP and serial BMP and adjust therapy as needed.   -Renally dose meds.    Hypokalemia  Patient has hypokalemia which is currently uncontrolled. Last electrolytes reviewed- Recent Labs   Lab 06/12/22  1630   K 3.4*   . Will replace potassium and monitor electrolytes closely. Continuous telemetry.    Renal hypertension  - Continue Nifedipine   -Monitor BP closely.    S/P kidney transplant  Long-term use of immunosuppressant medication  - s/p KTX from 4/5/20 (Simulect induction, CMV +/+).  -Kidney transplant consult, appreciate recs.  -Continue cellcept and prograf, prograf levels daily.    Secondary hyperparathyroidism of renal origin  - Continue calcitriol.  - Monitor    VTE Risk Mitigation (From admission, onward)         Ordered     IP VTE LOW RISK PATIENT  Once         06/12/22 2038     Place sequential compression device  Until discontinued         06/12/22 2038                   Nargis Cosme NP  Department of Hospital Medicine   [facility] Dept  "

## 2022-06-12 NOTE — HPI
"Tala Rivera is a 56 y.o. female with a PMHx of HTN, kidney transplant in 2020, CKD3, and headaches who presents to the ED with complaints of severe headache. She states that she has had a headache now for 3-4 weeks and has had no relief.  She reports she began having issues with headaches about 3 months ago. She states the initial headache lasted a week, and she was seen in the ED at that time. This time she has seen a neurologist had an MRI and CT scan of her head both of which were negative and also saw ENT. Patient reports the headache is a frontal headache that sometimes radiates over the top of her had and down her neck. She endorses associated photophobia, nausea, and vomiting. The patient also notes intermittent episodes where she feels off balance, weak, and like the "room is closing in." She denies any syncope or vision changes. The patient has been taking Topamax daily for about 1 week with no relief. She was also prescribed Fioricet however the pharmacist would not fill it due to her transplant status, so she has never taken that. She reports she spoke to her transplant coordinator Mor and was told she could take fiorcet, but the pharmacy still did not fill it. The patient denies any fever, chills, chest pain, abdominal pain, diarrhea, shortness of breath, or cough.    In the ED, VSSAF. CBC unremarkable. K+3.4. Cr 1.4 (baseline 1.3). Glucose 151. COVID-19 negative. The patient received decadron, tylenol, IV magnesium, reglan, and IVF bolus.  "

## 2022-06-12 NOTE — ED PROVIDER NOTES
"Encounter Date: 6/12/2022       History     Chief Complaint   Patient presents with    Multiple complaints     4th visit to er, almost passing, headache,  told migraines, trying to get neurology appt, kidney xplant 2020     Patient is a 56-year-old female with past medical history of kidney transplant in 2020 who presents with concern for a severe headache.  She states that she has had a headache now for 3-4 weeks and has had no relief.  She reports she recalls having a headache about 3 months ago that lasted a week and was seen and in the ED at that time.  This time she has seen a neurologist had an MRI and CT scan of her head both of which were negative and also saw ENT.  Patient reports the headache is a frontal headache and sometimes occipital headache.  She states she is sensitive to light and does get nauseated.  She has been taking Topamax with no relief.  She was at one time prescribed Fioricet however the pharmacist would not fill it due to her transplant status so she has never taken that.            Per chart review  Office visit with neurologist Dr Majano 6/2/22    "- migraines: cannot use triptans given the theoretical risk of kidney infarctions. Will start TPM 25 mg daily in addition to butalbital as needed.   - medication overuse headaches: stopping tylenol and caffeine rich beverages. Increase water intake. Starting TPM 25 mg daily   - PRES: cannot be excluded given as she takes Tacrolimus. calcineurin inhibitors are a likely contributor to severe headaches in transplant patients. Consider changing tacrolimus. Awaiting brain MRI"    Brain MRI o 6/6/22  "Findings:   There is no significant brain atrophy. There is mild microangiopathy. There is no hemorrhage, mass lesion, acute infarct, or hydrocephalus. The orbits, paranasal sinuses, and left mastoid air cells are clear. There is a right mastoid effusion. Impression: No acute finding."              Review of patient's allergies indicates:   Allergen " Reactions    Sulfa (sulfonamide antibiotics) Hives     Past Medical History:   Diagnosis Date    Anemia of renal disease     ESRD on dialysis     Dr. Muhammad     Essential hypertension     PD catheter dysfunction 10/18/2018    Secondary hyperparathyroidism of renal origin      Past Surgical History:   Procedure Laterality Date    AV FISTULA PLACEMENT Left     never matured    AV graft Right 2015     SECTION      x3; ; , and     COLONOSCOPY N/A 2020    Procedure: COLONOSCOPY;  Surgeon: Darrell Golden MD;  Location: [facility] ENDO;  Service: Endoscopy;  Laterality: N/A;    GASTRIC BYPASS  2016    KIDNEY TRANSPLANT N/A 2020    Procedure: TRANSPLANT, KIDNEY;  Surgeon: Megan Garcia MD;  Location: [address];  Service: Transplant;  Laterality: N/A;    KIDNEY TRANSPLANT Right 2020    LAPAROSCOPIC REVISION OF PROCEDURE INVOLVING PERITONEAL DIALYSIS CATHETER N/A 10/18/2018    Procedure: REVISION OF PROCEDURE INVOLVING PERITONEAL DIALYSIS CATHETER, LAPAROSCOPIC;  Surgeon: Hair Jimenez MD;  Location: [facility];  Service: General;  Laterality: N/A;    PERITONEAL CATHETER INSERTION N/A 2018    Procedure: Laparoscopic INSERTION, CATHETER, INTRAPERITONEAL;  Surgeon: Hair Jimenez MD;  Location: [facility];  Service: General;  Laterality: N/A;    PERITONEAL CATHETER REMOVAL N/A 2018    Procedure: REMOVAL, CATHETER, DIALYSIS, PERITONEAL;  Surgeon: Hair Jimenez MD;  Location: [facility];  Service: General;  Laterality: N/A;    WOUND EXPLORATION N/A 2018    Procedure: EXPLORATION, WOUND-Surgical Site Irrigation;  Surgeon: Hair Jimenez MD;  Location: [facility];  Service: General;  Laterality: N/A;     Family History   Problem Relation Age of Onset    Kidney disease Mother     Diabetes Mother     Heart disease Mother     Hypertension Mother     Lupus Sister     Down syndrome Son     Cancer Neg Hx     Stroke Neg Hx      Social History     Tobacco Use     Smoking status: Never Smoker    Smokeless tobacco: Never Used   Substance Use Topics    Alcohol use: No    Drug use: No     Review of Systems   Constitutional: Negative for fever.   HENT: Negative for congestion.    Eyes: Positive for photophobia.   Respiratory: Negative for shortness of breath.    Cardiovascular: Negative for chest pain.   Gastrointestinal: Positive for nausea. Negative for diarrhea and vomiting.   Endocrine: Negative for polydipsia and polyuria.   Genitourinary: Negative for dysuria.   Musculoskeletal: Negative for neck pain and neck stiffness.   Allergic/Immunologic: Positive for immunocompromised state.   Neurological: Positive for headaches. Negative for light-headedness.   Psychiatric/Behavioral: Negative for suicidal ideas.       Physical Exam     Initial Vitals [06/12/22 1459]   BP Pulse Resp Temp SpO2   (!) 151/77 78 18 98.1 °F (36.7 °C) 98 %      MAP       --         Physical Exam    Nursing note reviewed.  Constitutional: She appears well-developed and well-nourished. She is not diaphoretic. No distress.   Sitting in a dark room with sunglasses   HENT:   Head: Normocephalic and atraumatic.   Right Ear: External ear normal.   Left Ear: External ear normal.   No temple tenderness bilateral   Eyes: Conjunctivae and EOM are normal. Pupils are equal, round, and reactive to light.   Neck: Neck supple.   Normal range of motion.  Cardiovascular: Normal rate and regular rhythm.   Pulmonary/Chest: No respiratory distress.   Abdominal: She exhibits no distension.   Musculoskeletal:         General: Normal range of motion.      Cervical back: Normal range of motion and neck supple.     Neurological: She is alert and oriented to person, place, and time. GCS score is 15. GCS eye subscore is 4. GCS verbal subscore is 5. GCS motor subscore is 6.   Skin: Skin is warm and dry.   Psychiatric: She has a normal mood and affect. Her behavior is normal. Judgment and thought content normal.         ED  Course   Procedures  Labs Reviewed   COMPREHENSIVE METABOLIC PANEL - Abnormal; Notable for the following components:       Result Value    Potassium 3.4 (*)     Glucose 151 (*)     BUN 25 (*)     eGFR if  49.1 (*)     eGFR if non  42.6 (*)     All other components within normal limits   CBC W/ AUTO DIFFERENTIAL - Abnormal; Notable for the following components:    Lymph # 0.4 (*)     Gran % 86.0 (*)     Lymph % 5.3 (*)     All other components within normal limits   HEMOGLOBIN A1C - Abnormal; Notable for the following components:    Hemoglobin A1C 5.7 (*)     All other components within normal limits   SARS-COV-2 RDRP GENE - Normal   MAGNESIUM   PHOSPHORUS   MAGNESIUM    Narrative:     ADD ON MAGNESIUM AND PHOS PER FIDEL WILKESNP/ORDER# [id number] AND   [id number] @ 18:22 6/12/2022   PHOSPHORUS    Narrative:     ADD ON MAGNESIUM AND PHOS PER FIDEL WILKESNP/ORDER# [id number] AND   [id number] @ 18:22 6/12/2022   VITAMIN B12     EKG Readings: (Independently Interpreted)   Initial Reading: No STEMI. Previous EKG: Compared with most recent EKG Previous EKG Date: 07/06/2020.   4:42 p.m. sinus bradycardia rate of 56 normal axis normal intervals compared with most recent EKG heart rate has decreased     ECG Results          EKG 12-lead (Final result)  Result time 06/13/22 11:24:25    Final result by Interface, Lab In [facility] (06/13/22 11:24:25)                 Narrative:    Test Reason : R51.9,    Vent. Rate : 056 BPM     Atrial Rate : 056 BPM     P-R Int : 116 ms          QRS Dur : 100 ms      QT Int : 426 ms       P-R-T Axes : 066 060 089 degrees     QTc Int : 411 ms    Sinus bradycardia  Nonspecific T wave abnormality  Abnormal ECG  When compared with ECG of 26-JUL-2020 05:08,  Nonspecific T wave abnormality is now present  Confirmed by MICHELLE BETANCOURT MD ([id number]) on 6/13/2022 11:24:15 AM    Referred By: LISSETT   SELF           Confirmed By:MICHELLE BETANCOURT MD                             Imaging Results    None          Medications   calcitRIOL capsule 0.5 mcg (0.5 mcg Oral Given 6/13/22 0826)   docusate sodium capsule 100 mg (has no administration in time range)   predniSONE tablet 5 mg (5 mg Oral Given 6/13/22 0826)   mycophenolate capsule 500 mg (500 mg Oral Given 6/13/22 2107)   magnesium oxide tablet 400 mg (400 mg Oral Given 6/13/22 0829)   tacrolimus capsule 3 mg (3 mg Oral Given 6/13/22 0828)     And   tacrolimus capsule 2 mg (2 mg Oral Given 6/13/22 1928)   topiramate tablet 25 mg (25 mg Oral Given 6/13/22 2107)   metoclopramide HCl injection 10 mg (has no administration in time range)   NIFEdipine 24 hr tablet 30 mg (30 mg Oral Given 6/13/22 2107)   sodium bicarbonate tablet 1,300 mg (1,300 mg Oral Given 6/13/22 2107)   butalbital-acetaminophen-caffeine -40 mg per tablet 1 tablet (1 tablet Oral Given 6/13/22 0214)   acetaminophen tablet 650 mg (650 mg Oral Given 6/13/22 2108)   sodium chloride 0.9% flush 10 mL (has no administration in time range)   glucose chewable tablet 16 g (has no administration in time range)   glucose chewable tablet 24 g (has no administration in time range)   glucagon (human recombinant) injection 1 mg (has no administration in time range)   dextrose 10% bolus 125 mL (has no administration in time range)   dextrose 10% bolus 250 mL (has no administration in time range)   melatonin tablet 6 mg (has no administration in time range)   dexamethasone injection 10 mg (10 mg Intravenous Given 6/12/22 1753)   acetaminophen tablet 1,000 mg (1,000 mg Oral Given 6/12/22 1651)   magnesium sulfate 2g in water 50mL IVPB (premix) (0 g Intravenous Stopped 6/12/22 1732)   metoclopramide HCl injection 5 mg (5 mg Intravenous Given 6/12/22 1757)   sodium chloride 0.9% bolus 250 mL (0 mLs Intravenous Stopped 6/12/22 1813)   potassium bicarbonate disintegrating tablet 25 mEq (25 mEq Oral Given 6/12/22 2024)   sodium chloride 0.9% bolus 250 mL (0 mLs Intravenous Stopped  6/13/22 7633)     Medical Decision Making:   History:   Old Medical Records: I decided to obtain old medical records.  Old Records Summarized: records from clinic visits, records from previous admission(s) and records from another hospital.       <> Summary of Records: Reviewed recent MRI and CT head as well as ED visit  See HPI  Differential Diagnosis:   Migraine, cluster headache, tension headache, venous sinus thrombosis, sinusitis, decreased visual acuity, GCA, glaucoma  Clinical Tests:   Lab Tests: Ordered and Reviewed  Radiological Study: Reviewed  ED Management:  Patient with multiple visits for a headache that has persisted over the past 3-4 weeks, has failed outpatient treatment, given a migraine cocktail while in the ER and plan to admit to Hospital Medicine with neuro consult in the morning                      Clinical Impression:   Final diagnoses:  [R51.9] Severe headache          ED Disposition Condition    Observation               Kimberly Pardo MD  06/13/22 3527

## 2022-06-12 NOTE — ASSESSMENT & PLAN NOTE
Patient has hypokalemia which is currently uncontrolled. Last electrolytes reviewed- Recent Labs   Lab 06/12/22  1630   K 3.4*   . Will replace potassium and monitor electrolytes closely. Continuous telemetry.

## 2022-06-12 NOTE — ASSESSMENT & PLAN NOTE
Long-term use of immunosuppressant medication  - s/p KTX from 4/5/20 (Simulect induction, CMV +/+).  -Kidney transplant consult, appreciate recs.  -Continue cellcept and prograf, prograf levels daily.

## 2022-06-12 NOTE — SUBJECTIVE & OBJECTIVE
Past Medical History:   Diagnosis Date    Anemia of renal disease     ESRD on dialysis     Dr. Muhammad     Essential hypertension     PD catheter dysfunction 10/18/2018    Secondary hyperparathyroidism of renal origin        Past Surgical History:   Procedure Laterality Date    AV FISTULA PLACEMENT Left     never matured    AV graft Right 2015     SECTION      x3; ; , and     COLONOSCOPY N/A 2020    Procedure: COLONOSCOPY;  Surgeon: Darrell Golden MD;  Location: [facility] ENDO;  Service: Endoscopy;  Laterality: N/A;    GASTRIC BYPASS  2016    KIDNEY TRANSPLANT N/A 2020    Procedure: TRANSPLANT, KIDNEY;  Surgeon: Megan Garcia MD;  Location: [address];  Service: Transplant;  Laterality: N/A;    KIDNEY TRANSPLANT Right 2020    LAPAROSCOPIC REVISION OF PROCEDURE INVOLVING PERITONEAL DIALYSIS CATHETER N/A 10/18/2018    Procedure: REVISION OF PROCEDURE INVOLVING PERITONEAL DIALYSIS CATHETER, LAPAROSCOPIC;  Surgeon: Hair Jimenez MD;  Location: [facility];  Service: General;  Laterality: N/A;    PERITONEAL CATHETER INSERTION N/A 2018    Procedure: Laparoscopic INSERTION, CATHETER, INTRAPERITONEAL;  Surgeon: Hair Jimenez MD;  Location: [facility];  Service: General;  Laterality: N/A;    PERITONEAL CATHETER REMOVAL N/A 2018    Procedure: REMOVAL, CATHETER, DIALYSIS, PERITONEAL;  Surgeon: Hair Jimenez MD;  Location: [facility];  Service: General;  Laterality: N/A;    WOUND EXPLORATION N/A 2018    Procedure: EXPLORATION, WOUND-Surgical Site Irrigation;  Surgeon: Hair Jimenez MD;  Location: [facility];  Service: General;  Laterality: N/A;       Review of patient's allergies indicates:   Allergen Reactions    Sulfa (sulfonamide antibiotics) Hives       No current facility-administered medications on file prior to encounter.     Current Outpatient Medications on File Prior to Encounter   Medication Sig    calcitRIOL (ROCALTROL) 0.25 MCG Cap Take 2 capsules (0.5 mcg  total) by mouth once daily.    docusate sodium (COLACE) 100 MG capsule Take 1 capsule (100 mg total) by mouth 3 (three) times daily as needed for Constipation.    ergocalciferol (VITAMIN D2) 50,000 unit Cap Take 1 capsule (50,000 Units total) by mouth every 7 days.    l-methylfolate-b2-b6-b12 (CEREFOLIN) 6-5-50-1 mg Tab Take 1 tablet by mouth once daily.    magnesium oxide (MAG-OX) 400 mg (241.3 mg magnesium) tablet Take 1 tablet (400 mg total) by mouth once daily.    mycophenolate (CELLCEPT) 250 mg Cap Take 2 capsules (500 mg total) by mouth 2 (two) times daily.    NIFEdipine (PROCARDIA-XL) 30 MG (OSM) 24 hr tablet TAKE ONE TABLET BY MOUTH TWICE DAILY hold if systolic blood pressure is less than 130    predniSONE (DELTASONE) 5 MG tablet Take 1 tablet (5 mg total) by mouth once daily.    sodium bicarbonate 650 MG tablet Take 2 tablets (1,300 mg total) by mouth 2 (two) times daily.    tacrolimus (PROGRAF) 1 MG Cap Take 3 capsules (3 mg total) by mouth every morning AND 2 capsules (2 mg total) every evening.     Family History       Problem Relation (Age of Onset)    Diabetes Mother    Down syndrome Son    Heart disease Mother    Hypertension Mother    Kidney disease Mother    Lupus Sister          Tobacco Use    Smoking status: Never Smoker    Smokeless tobacco: Never Used   Substance and Sexual Activity    Alcohol use: No    Drug use: No    Sexual activity: Yes     Partners: Male     Review of Systems   Constitutional:  Negative for appetite change, chills, diaphoresis, fatigue and fever.   HENT:  Negative for congestion, rhinorrhea, sneezing and sore throat.    Eyes:  Positive for photophobia. Negative for visual disturbance.   Respiratory:  Negative for cough, shortness of breath and wheezing.    Cardiovascular:  Negative for chest pain, palpitations and leg swelling.   Gastrointestinal:  Positive for nausea and vomiting. Negative for abdominal distention, abdominal pain and diarrhea.   Genitourinary:  Negative  for difficulty urinating, dysuria, frequency and hematuria.   Musculoskeletal:  Positive for gait problem. Negative for arthralgias, back pain and myalgias.   Skin:  Negative for color change, pallor, rash and wound.   Neurological:  Positive for dizziness (intermittent), weakness (intermittent) and headaches. Negative for tremors, seizures, syncope, facial asymmetry, light-headedness and numbness.   Psychiatric/Behavioral:  Negative for confusion and hallucinations. The patient is not nervous/anxious.    Objective:     Vital Signs (Most Recent):  Temp: 98 °F (36.7 °C) (06/12/22 1710)  Pulse: (!) 59 (06/12/22 1710)  Resp: 17 (06/12/22 1710)  BP: (!) 167/79 (06/12/22 1710)  SpO2: 99 % (06/12/22 1710)   Vital Signs (24h Range):  Temp:  [98 °F (36.7 °C)-98.1 °F (36.7 °C)] 98 °F (36.7 °C)  Pulse:  [59-78] 59  Resp:  [16-18] 17  SpO2:  [98 %-99 %] 99 %  BP: (150-167)/(77-79) 167/79     Weight: 68 kg (150 lb)  Body mass index is 28.34 kg/m².    Physical Exam  Vitals and nursing note reviewed.   Constitutional:       General: She is not in acute distress.     Appearance: She is not ill-appearing, toxic-appearing or diaphoretic.   HENT:      Head: Normocephalic and atraumatic.      Nose: Nose normal.      Mouth/Throat:      Mouth: Mucous membranes are moist.   Eyes:      Pupils: Pupils are equal, round, and reactive to light.   Cardiovascular:      Rate and Rhythm: Regular rhythm. Bradycardia present.      Heart sounds: No murmur heard.  Pulmonary:      Effort: Pulmonary effort is normal. No respiratory distress.      Breath sounds: No wheezing, rhonchi or rales.      Comments: Currently on room air  Abdominal:      General: Bowel sounds are normal. There is no distension.      Palpations: Abdomen is soft.      Tenderness: There is no abdominal tenderness. There is no guarding.   Genitourinary:     Comments: deferred  Musculoskeletal:         General: No swelling, tenderness or deformity. Normal range of motion.       Cervical back: Normal range of motion. No tenderness.   Skin:     General: Skin is warm and dry.      Capillary Refill: Capillary refill takes less than 2 seconds.   Neurological:      General: No focal deficit present.      Mental Status: She is alert and oriented to person, place, and time.      Sensory: No sensory deficit.      Motor: No weakness.   Psychiatric:         Mood and Affect: Mood normal.         Behavior: Behavior normal.         Thought Content: Thought content normal.         Judgment: Judgment normal.         CRANIAL NERVES     CN III, IV, VI   Pupils are equal, round, and reactive to light.     Significant Labs: All pertinent labs within the past 24 hours have been reviewed.  CBC:   Recent Labs   Lab 06/12/22  1630   WBC 7.38   HGB 14.8   HCT 44.4        CMP:   Recent Labs   Lab 06/12/22  1630      K 3.4*      CO2 23   *   BUN 25*   CREATININE 1.4   CALCIUM 9.6   PROT 6.8   ALBUMIN 4.1   BILITOT 0.9   ALKPHOS 63   AST 12   ALT 19   ANIONGAP 12   EGFRNONAA 42.6*       Significant Imaging: I have reviewed all pertinent imaging results/findings within the past 24 hours.

## 2022-06-12 NOTE — ASSESSMENT & PLAN NOTE
-Cr 1.4 on admit (baseline 1.2-1.3). Appears stable.  -BMP reviewed- noted Estimated Creatinine Clearance: 40.5 mL/min (based on SCr of 1.4 mg/dL). according to latest data.   -Monitor UOP and serial BMP and adjust therapy as needed.   -Renally dose meds.

## 2022-06-13 PROBLEM — Z87.74 HISTORY OF CONGENITAL HEART DEFECT: Status: ACTIVE | Noted: 2022-01-01

## 2022-06-13 PROBLEM — R51.9 INTRACTABLE HEADACHE: Status: ACTIVE | Noted: 2022-01-01

## 2022-06-13 PROBLEM — R00.1 BRADYCARDIA: Status: ACTIVE | Noted: 2022-01-01

## 2022-06-13 PROBLEM — Z92.89 HX OF CARDIOVASCULAR STRESS TEST: Status: ACTIVE | Noted: 2022-01-01

## 2022-06-13 NOTE — ED NOTES
Pt's room does not have bedside monitoring per Latrice RN. Central Tele call for telebox at this time.

## 2022-06-13 NOTE — NURSING
Received from ED via wheelchair accompanied by spouse.  AAOx4, verbalized moderate relief of headaches.  oriented to room, call bell and phone.  Plan of care reviewed. Fall and safety precaution reviewed.  Needs addressed. Call bell and personal items within reach.

## 2022-06-13 NOTE — ASSESSMENT & PLAN NOTE
56 y.o. female with HTN, CKD and kidney transplant (April 2020) immunosuppressed on tacrolimus and cellcept presented to ED 6/12/22 for persistent headache since March with associated light and sound sensitivity. MRI brain without contrast at OSH 6/6 reportedly unremarkable for acute intracranial pathology and non-focal neuro exam. Presentation c/w migrainous and cervicogenic headache,resolved as of 6/13 am with inpatient cocktail.    Recommendations:  --continue Topamax 25 mg BID  --PO Mg oxide 400 mg qd and Riboflavin 200 mg BID  --arrange outpatient follow-up in headache clinic in 4-6 weeks

## 2022-06-13 NOTE — SUBJECTIVE & OBJECTIVE
"Interval history- she reports that headache was improving after fiorecet last night x 1. She had not received it at home as pharmacy would not fill it despite transplant coordinator saying it was okay. Outpatient neurology reports that prograf can be sometimes associated with headaches and had rec discussing with transplant team If any other meds potentially could be used, they checked levels and they were not in toxic ranges she reports last week. There was a mastoid effusion seen on MRI brain but no other major issues seen, and she saw ENT the day after that MRi and they reported no signiicance and rec following upw ith neurology. She has not noticed any patterns with seasons, foods, or activities. It is not unilateral. She had a rare headache earlier in life and has had this the last 3 months. She has not had a recent menopause or hormone changes presumably. She has not had any recent med changes. She has been on nifedipine even before transplant. Her transplant was from silent HTN causing renal damage. She has been off HD 2 years and was on HD 4 years. She has had a persistent bradycardia on previous EKG with HR in the 50s on previous EKGs and on admit now. She denies chest pain or dyspnea or cardiac issues now. She had a small area on previous stress echo of defect  in LAD territory, she sees kenrick at OSH. She reports a "hole in her heart" in the past but was told it had closed up fro childhood and hasnt caused known issues.   Unclear etiology of headaches now, possible vasodilation from lower HR compensation as contributor + idiopathic? She does endorse positional/orhtostatic type of headaches at times it sounds like when going from sitting to standing or rising head from feet very fast. She felt it could be coming on by laying in bed today so has been sitting up and it has not started yet this mornig but felt hesitant that it could come on from tension in her neck almost which has subsided. Has been on " topamax BID for preventative without success since early June. Was prescribed nurtec but hasnt taken as is unsure if it was safe for transplant.  Will f/u with transplant and neuro teams for recs later today and f/u symptoms in addition        Review of patient's allergies indicates:   Allergen Reactions    Sulfa (sulfonamide antibiotics) Hives       No current facility-administered medications on file prior to encounter.     Current Outpatient Medications on File Prior to Encounter   Medication Sig    calcitRIOL (ROCALTROL) 0.25 MCG Cap Take 2 capsules (0.5 mcg total) by mouth once daily.    docusate sodium (COLACE) 100 MG capsule Take 1 capsule (100 mg total) by mouth 3 (three) times daily as needed for Constipation.    ergocalciferol (VITAMIN D2) 50,000 unit Cap Take 1 capsule (50,000 Units total) by mouth every 7 days.    l-methylfolate-b2-b6-b12 (CEREFOLIN) 6-5-50-1 mg Tab Take 1 tablet by mouth once daily.    magnesium oxide (MAG-OX) 400 mg (241.3 mg magnesium) tablet Take 1 tablet (400 mg total) by mouth once daily.    mycophenolate (CELLCEPT) 250 mg Cap Take 2 capsules (500 mg total) by mouth 2 (two) times daily.    NIFEdipine (PROCARDIA-XL) 30 MG (OSM) 24 hr tablet TAKE ONE TABLET BY MOUTH TWICE DAILY hold if systolic blood pressure is less than 130    predniSONE (DELTASONE) 5 MG tablet Take 1 tablet (5 mg total) by mouth once daily.    sodium bicarbonate 650 MG tablet Take 2 tablets (1,300 mg total) by mouth 2 (two) times daily.    tacrolimus (PROGRAF) 1 MG Cap Take 3 capsules (3 mg total) by mouth every morning AND 2 capsules (2 mg total) every evening.     Family History       Problem Relation (Age of Onset)    Diabetes Mother    Down syndrome Son    Heart disease Mother    Hypertension Mother    Kidney disease Mother    Lupus Sister          Tobacco Use    Smoking status: Never Smoker    Smokeless tobacco: Never Used   Substance and Sexual Activity    Alcohol use: No    Drug use: No     Sexual activity: Yes     Partners: Male     Review of Systems   Constitutional:  Negative for appetite change, chills, diaphoresis, fatigue and fever.   HENT:  Negative for congestion, rhinorrhea, sneezing and sore throat.    Eyes:  Positive for photophobia. Negative for visual disturbance.   Respiratory:  Negative for cough, shortness of breath and wheezing.    Cardiovascular:  Negative for chest pain, palpitations and leg swelling.   Gastrointestinal:  Positive for nausea and vomiting. Negative for abdominal distention, abdominal pain and diarrhea.   Genitourinary:  Negative for difficulty urinating, dysuria, frequency and hematuria.   Musculoskeletal:  Positive for gait problem. Negative for arthralgias, back pain and myalgias.   Skin:  Negative for color change, pallor, rash and wound.   Neurological:  Positive for dizziness (intermittent), weakness (intermittent) and headaches. Negative for tremors, seizures, syncope, facial asymmetry, light-headedness and numbness.   Psychiatric/Behavioral:  Negative for confusion and hallucinations. The patient is not nervous/anxious.    Objective:     Vital Signs (Most Recent):  Temp: 96.7 °F (35.9 °C) (06/13/22 0717)  Pulse: (!) 53 (06/13/22 1048)  Resp: 18 (06/13/22 0717)  BP: 129/76 (06/13/22 0717)  SpO2: 98 % (06/13/22 0717)   Vital Signs (24h Range):  Temp:  [96.7 °F (35.9 °C)-98.1 °F (36.7 °C)] 96.7 °F (35.9 °C)  Pulse:  [49-78] 53  Resp:  [16-20] 18  SpO2:  [93 %-99 %] 98 %  BP: (112-172)/(61-81) 129/76     Weight: 75.2 kg (165 lb 12.6 oz)  Body mass index is 31.34 kg/m².    Physical Exam  Vitals and nursing note reviewed.   Constitutional:       General: She is not in acute distress.     Appearance: She is not ill-appearing, toxic-appearing or diaphoretic.   HENT:      Head: Normocephalic and atraumatic.      Nose: Nose normal.      Mouth/Throat:      Mouth: Mucous membranes are moist.   Eyes:      Pupils: Pupils are equal, round, and reactive to light.    Cardiovascular:      Rate and Rhythm: Regular rhythm. Bradycardia present.      Heart sounds: No murmur heard.  Pulmonary:      Effort: Pulmonary effort is normal. No respiratory distress.      Breath sounds: No wheezing, rhonchi or rales.      Comments: Currently on room air  Abdominal:      General: Bowel sounds are normal. There is no distension.      Palpations: Abdomen is soft.      Tenderness: There is no abdominal tenderness. There is no guarding.   Genitourinary:     Comments: deferred  Musculoskeletal:         General: No swelling, tenderness or deformity. Normal range of motion.      Cervical back: Normal range of motion. No tenderness.   Skin:     General: Skin is warm and dry.      Capillary Refill: Capillary refill takes less than 2 seconds.   Neurological:      General: No focal deficit present.      Mental Status: She is alert and oriented to person, place, and time.      Sensory: No sensory deficit.      Motor: No weakness.   Psychiatric:         Mood and Affect: Mood normal.         Behavior: Behavior normal.         Thought Content: Thought content normal.         Judgment: Judgment normal.         CRANIAL NERVES     CN III, IV, VI   Pupils are equal, round, and reactive to light.     Significant Labs: All pertinent labs within the past 24 hours have been reviewed.  CBC:   Recent Labs   Lab 06/12/22  1630 06/13/22  0414   WBC 7.38 7.46   HGB 14.8 15.2   HCT 44.4 46.3    176       CMP:   Recent Labs   Lab 06/12/22  1630 06/13/22  0414    133*   K 3.4* 4.1    101   CO2 23 20*   * 139*   BUN 25* 25*   CREATININE 1.4 1.2   CALCIUM 9.6 9.6   PROT 6.8 6.4   ALBUMIN 4.1 4.0   BILITOT 0.9 0.8   ALKPHOS 63 65   AST 12 11   ALT 19 17   ANIONGAP 12 12   EGFRNONAA 42.6* 51.4*         Significant Imaging: I have reviewed all pertinent imaging results/findings within the past 24 hours.

## 2022-06-13 NOTE — ASSESSMENT & PLAN NOTE
"Reports hx of "hole in their heart" that has closed up, suspect hx of PFO, PFO has some association with headaches, do not have recent bubble study but previous echos on file without bubble do not show a patent foramen ovale    "

## 2022-06-13 NOTE — PLAN OF CARE
No distress overnight, c/o migraine headache x1, medicated with Fioricet with good relief.  Reza on tele monitor with HR 46-50s while asleep. No needs currently. Call bell within reach. Spouse at bedside.    Problem: Adult Inpatient Plan of Care  Goal: Plan of Care Review  Outcome: Ongoing, Progressing     Problem: Adult Inpatient Plan of Care  Goal: Optimal Comfort and Wellbeing  Outcome: Ongoing, Progressing     Problem: Pain Acute  Goal: Acceptable Pain Control and Functional Ability  Outcome: Ongoing, Progressing

## 2022-06-13 NOTE — ASSESSMENT & PLAN NOTE
-Cr 1.4 on admit (baseline 1.2-1.3). Appears stable.  -BMP reviewed- noted Estimated Creatinine Clearance: 49.7 mL/min (based on SCr of 1.2 mg/dL). according to latest data.   -Monitor UOP and serial BMP and adjust therapy as needed.   -Renally dose meds.

## 2022-06-13 NOTE — HPI
56 y.o. female with a PMHx of HTN, kidney transplant in 2020, CKD3, and headaches who presents to the ED with complaints of severe headache.   She states that she has had a headache now intermittently for past few months. She reports she began having issues with headaches about 3 months ago. She states the initial headache lasted a week, and she was seen in the ED at that time. She had seen neurologist , Had an MRI and CT scan of her head both of which were negative and also saw ENT. Patient reports the headache is a frontal headache that sometimes radiates over the top of her had and down her neck. She endorses associated photophobia, nausea, and vomiting. The patient also notes intermittent episodes where she feels off balance, weak, and like tinnitus. She denies any syncope or vision changes. The patient has been taking Topamax daily for about 1 week with no relief.    The patient received decadron, tylenol, IV magnesium, reglan, and IVF bolus at presentation.

## 2022-06-13 NOTE — CONSULTS
[facility] - Tele[facility] ([place] [id number])  Neurology  Consult Note    Patient Name: Tala Rivera  MRN: [id number]  Admission Date: 6/12/2022  Hospital Length of Stay: 0 days  Code Status: Full Code   Attending Provider: Celine Cano MD   Consulting Provider: Olga Mcdaniel PA-C  Primary Care Physician: ANUJA Castellon MD  Principal Problem:Intractable migraine with status migrainosus    Inpatient consult to Neurology  Consult performed by: Olga Mcdaniel PA-C  Consult ordered by: Nargis Cosme NP         Subjective:     Chief Complaint:  headache     HPI:   56 y.o. female with HTN, CKD and kidney transplant (April 2020) immunosuppressed on tacrolimus and cellcept presented to ED 6/12/22 for persistent headache. Patient reports intermittent headache since March 2022 described as pressure sensation with associated light and sound sensitivity. She also reports neck pain and tenderness. Says this started after incident at work where she bent over to lift a bunch of coins, works as a . Of note, this admit is her 4th ED presentation for headache since March. [place] who was concerned for PRES. MRI brain without contrast 6/6 at OSH reportedly without acute intracranial pathology. He prescribed topamax 25 mg qd and fioricet prn. Has only taken fioricet in the hospital because pharmacy would not fill Rx due to concern for interactions with transplant medications. Says she came to the hospital yesterday mainly because of imbalance. She denies dizziness and vertigo, but had to hold onto furniture to stabilize herself. Since admit, she has received tylenol, IV decadron 10 mg, IV Mg 2 g, IV reglan 5 mg, topamax increased to 25 mg BID and fioricet prn x 1 with resolution of headache.     Past Medical History:   Diagnosis Date    Anemia of renal disease     ESRD on dialysis     Dr. Muhammad     Essential hypertension     PD catheter dysfunction 10/18/2018     Secondary hyperparathyroidism of renal origin      Past Surgical History:   Procedure Laterality Date    AV FISTULA PLACEMENT Left     never matured    AV graft Right 2015     SECTION      x3; ; , and     COLONOSCOPY N/A 2020    Procedure: COLONOSCOPY;  Surgeon: Darrell Golden MD;  Location: [facility];  Service: Endoscopy;  Laterality: N/A;    GASTRIC BYPASS  2016    KIDNEY TRANSPLANT N/A 2020    Procedure: TRANSPLANT, KIDNEY;  Surgeon: Megan Garcia MD;  Location: [address];  Service: Transplant;  Laterality: N/A;    KIDNEY TRANSPLANT Right 2020    LAPAROSCOPIC REVISION OF PROCEDURE INVOLVING PERITONEAL DIALYSIS CATHETER N/A 10/18/2018    Procedure: REVISION OF PROCEDURE INVOLVING PERITONEAL DIALYSIS CATHETER, LAPAROSCOPIC;  Surgeon: Hair Jimenez MD;  Location: [facility];  Service: General;  Laterality: N/A;    PERITONEAL CATHETER INSERTION N/A 2018    Procedure: Laparoscopic INSERTION, CATHETER, INTRAPERITONEAL;  Surgeon: Hair Jimenez MD;  Location: [facility];  Service: General;  Laterality: N/A;    PERITONEAL CATHETER REMOVAL N/A 2018    Procedure: REMOVAL, CATHETER, DIALYSIS, PERITONEAL;  Surgeon: Hair Jimenez MD;  Location: [facility];  Service: General;  Laterality: N/A;    WOUND EXPLORATION N/A 2018    Procedure: EXPLORATION, WOUND-Surgical Site Irrigation;  Surgeon: Hair Jimenez MD;  Location: [facility];  Service: General;  Laterality: N/A;     Review of patient's allergies indicates:   Allergen Reactions    Sulfa (sulfonamide antibiotics) Hives     No current facility-administered medications on file prior to encounter.     Current Outpatient Medications on File Prior to Encounter   Medication Sig    calcitRIOL (ROCALTROL) 0.25 MCG Cap Take 2 capsules (0.5 mcg total) by mouth once daily.    docusate sodium (COLACE) 100 MG capsule Take 1 capsule (100 mg total) by mouth 3 (three) times daily as needed for Constipation.     ergocalciferol (VITAMIN D2) 50,000 unit Cap Take 1 capsule (50,000 Units total) by mouth every 7 days.    l-methylfolate-b2-b6-b12 (CEREFOLIN) 6-5-50-1 mg Tab Take 1 tablet by mouth once daily.    magnesium oxide (MAG-OX) 400 mg (241.3 mg magnesium) tablet Take 1 tablet (400 mg total) by mouth once daily.    mycophenolate (CELLCEPT) 250 mg Cap Take 2 capsules (500 mg total) by mouth 2 (two) times daily.    NIFEdipine (PROCARDIA-XL) 30 MG (OSM) 24 hr tablet TAKE ONE TABLET BY MOUTH TWICE DAILY hold if systolic blood pressure is less than 130    predniSONE (DELTASONE) 5 MG tablet Take 1 tablet (5 mg total) by mouth once daily.    sodium bicarbonate 650 MG tablet Take 2 tablets (1,300 mg total) by mouth 2 (two) times daily.    tacrolimus (PROGRAF) 1 MG Cap Take 3 capsules (3 mg total) by mouth every morning AND 2 capsules (2 mg total) every evening.     Family History       Problem Relation (Age of Onset)    Diabetes Mother    Down syndrome Son    Heart disease Mother    Hypertension Mother    Kidney disease Mother    Lupus Sister          Tobacco Use    Smoking status: Never Smoker    Smokeless tobacco: Never Used   Substance and Sexual Activity    Alcohol use: No    Drug use: No    Sexual activity: Yes     Partners: Male     Review of Systems   Constitutional:  Positive for activity change and fatigue. Negative for fever.   HENT:  Negative for trouble swallowing and voice change.    Eyes:  Negative for photophobia, pain, redness and visual disturbance.   Respiratory:  Negative for shortness of breath.    Cardiovascular:  Negative for chest pain.   Gastrointestinal:  Negative for vomiting.   Musculoskeletal:  Negative for gait problem.   Allergic/Immunologic: Positive for immunocompromised state.   Neurological:  Negative for dizziness, seizures, facial asymmetry, speech difficulty, weakness, light-headedness, numbness and headaches.   Psychiatric/Behavioral:  Negative for confusion.    Objective:      Vital Signs (Most Recent):  Temp: 97.4 °F (36.3 °C) (06/13/22 1132)  Pulse: (!) 57 (06/13/22 1132)  Resp: 18 (06/13/22 1132)  BP: 117/70 (06/13/22 1132)  SpO2: 97 % (06/13/22 1132)   Vital Signs (24h Range):  Temp:  [96.7 °F (35.9 °C)-98.1 °F (36.7 °C)] 97.4 °F (36.3 °C)  Pulse:  [49-78] 57  Resp:  [16-20] 18  SpO2:  [93 %-99 %] 97 %  BP: (112-172)/(61-81) 117/70     Weight: 75.2 kg (165 lb 12.6 oz)  Body mass index is 31.34 kg/m².    Physical Exam  Eyes:      Extraocular Movements: EOM normal.      Pupils: Pupils are equal, round, and reactive to light.   Neurological:      Mental Status: She is oriented to person, place, and time.      Coordination: Finger-Nose-Finger Test normal.      Deep Tendon Reflexes:      Reflex Scores:       Bicep reflexes are 2+ on the right side and 2+ on the left side.       Brachioradialis reflexes are 2+ on the right side and 2+ on the left side.       Patellar reflexes are 2+ on the right side and 2+ on the left side.  Psychiatric:         Speech: Speech normal.       NEUROLOGICAL EXAMINATION:     MENTAL STATUS   Oriented to person, place, and time.   Follows 2 step commands.   Attention: normal.   Speech: speech is normal   Level of consciousness: alert  Normal comprehension.     CRANIAL NERVES     CN III, IV, VI   Pupils are equal, round, and reactive to light.  Extraocular motions are normal.   Nystagmus: none   Diplopia: none  Ophthalmoparesis: none    CN V   Facial sensation intact.     CN VII   Facial expression full, symmetric.     CN VIII   Hearing: intact    CN IX, X   Palate: symmetric    CN XI   CN XI normal.     CN XII   CN XII normal.     MOTOR EXAM   Muscle bulk: normal  Overall muscle tone: normal  Right arm pronator drift: absent  Left arm pronator drift: absent    Strength   Right deltoid: 5/5  Left deltoid: 5/5  Right biceps: 5/5  Left biceps: 5/5  Right triceps: 5/5  Left triceps: 5/5  Right interossei: 5/5  Left interossei: 5/5  Right iliopsoas: 5/5  Left  iliopsoas: 5/5  Right anterior tibial: 5/5  Left anterior tibial: 5/5  Right posterior tibial: 5/5  Left posterior tibial: 5/5    REFLEXES     Reflexes   Right brachioradialis: 2+  Left brachioradialis: 2+  Right biceps: 2+  Left biceps: 2+  Right patellar: 2+  Left patellar: 2+    SENSORY EXAM   Light touch normal.     GAIT AND COORDINATION     Gait  Gait: (deferred)     Coordination   Finger to nose coordination: normal    Tremor   Resting tremor: absent    Significant Labs: All pertinent lab results from the past 24 hours have been reviewed.    Significant Imaging: I have reviewed all pertinent imaging results/findings within the past 24 hours.    Assessment and Plan:     Intractable headache  56 y.o. female with HTN, CKD and kidney transplant (April 2020) immunosuppressed on tacrolimus and cellcept presented to ED 6/12/22 for persistent headache since March with associated light and sound sensitivity. MRI brain without contrast at OSH 6/6 reportedly unremarkable for acute intracranial pathology and non-focal neuro exam. Presentation c/w migrainous and cervicogenic headache,resolved as of 6/13 am with inpatient cocktail.    Recommendations:  --continue Topamax 25 mg BID  --PO Mg oxide 400 mg qd and Riboflavin 200 mg BID  --arrange outpatient follow-up in headache clinic in 4-6 weeks    VTE Risk Mitigation (From admission, onward)         Ordered     IP VTE LOW RISK PATIENT  Once         06/12/22 2038     Place sequential compression device  Until discontinued         06/12/22 2038              Thank you for your consult. I will sign off. Please contact us if you have any additional questions.    Olga Mcdaniel PA-C  General Neurology Consult

## 2022-06-13 NOTE — PROGRESS NOTES
"[facility] [facility]  Progress Note    Patient Name: Tala Rivera  MRN: [id number]  Patient Class: OP- Observation   Admission Date: 6/12/2022  Length of Stay: 0 days  Attending Physician: Celine Cano MD  Primary Care Provider: ANUJA Castellon MD        Subjective:     Principal Problem:Intractable migraine with status migrainosus        HPI:  Tala Rivera is a 56 y.o. female with a PMHx of HTN, kidney transplant in 2020, CKD3, and headaches who presents to the ED with complaints of severe headache. She states that she has had a headache now for 3-4 weeks and has had no relief.  She reports she began having issues with headaches about 3 months ago. She states the initial headache lasted a week, and she was seen in the ED at that time. This time she has seen a neurologist had an MRI and CT scan of her head both of which were negative and also saw ENT. Patient reports the headache is a frontal headache that sometimes radiates over the top of her had and down her neck. She endorses associated photophobia, nausea, and vomiting. The patient also notes intermittent episodes where she feels off balance, weak, and like the "room is closing in." She denies any syncope or vision changes. The patient has been taking Topamax daily for about 1 week with no relief. She was also prescribed Fioricet however the pharmacist would not fill it due to her transplant status, so she has never taken that. She reports she spoke to her transplant coordinator Mor and was told she could take fiorcet, but the pharmacy still did not fill it. The patient denies any fever, chills, chest pain, abdominal pain, diarrhea, shortness of breath, or cough.    In the ED, VSSAF. CBC unremarkable. K+3.4. Cr 1.4 (baseline 1.3). Glucose 151. COVID-19 negative. The patient received decadron, tylenol, IV magnesium, reglan, and IVF bolus.      Overview/Hospital Course:  No notes on file    Interval " "history- she reports that headache was improving after fiorecet last night x 1. She had not received it at home as pharmacy would not fill it despite transplant coordinator saying it was okay. Outpatient neurology reports that prograf can be sometimes associated with headaches and had rec discussing with transplant team If any other meds potentially could be used, they checked levels and they were not in toxic ranges she reports last week. There was a mastoid effusion seen on MRI brain but no other major issues seen, and she saw ENT the day after that MRi and they reported no signiicance and rec following upw ith neurology. She has not noticed any patterns with seasons, foods, or activities. It is not unilateral. She had a rare headache earlier in life and has had this the last 3 months. She has not had a recent menopause or hormone changes presumably. She has not had any recent med changes. She has been on nifedipine even before transplant. Her transplant was from silent HTN causing renal damage. She has been off HD 2 years and was on HD 4 years. She has had a persistent bradycardia on previous EKG with HR in the 50s on previous EKGs and on admit now. She denies chest pain or dyspnea or cardiac issues now. She had a small area on previous stress echo of defect  in LAD territory, she sees kenrick at OSH. She reports a "hole in her heart" in the past but was told it had closed up fro childhood and hasnt caused known issues.   Unclear etiology of headaches now, possible vasodilation from lower HR compensation as contributor + idiopathic? She does endorse positional/orhtostatic type of headaches at times it sounds like when going from sitting to standing or rising head from feet very fast. She felt it could be coming on by laying in bed today so has been sitting up and it has not started yet this mornig but felt hesitant that it could come on from tension in her neck almost which has subsided. Has been on topamax BID for " preventative without success since early June. Was prescribed nurtec but hasnt taken as is unsure if it was safe for transplant.  Will f/u with transplant and neuro teams for recs later today and f/u symptoms in addition        Review of patient's allergies indicates:   Allergen Reactions    Sulfa (sulfonamide antibiotics) Hives       No current facility-administered medications on file prior to encounter.     Current Outpatient Medications on File Prior to Encounter   Medication Sig    calcitRIOL (ROCALTROL) 0.25 MCG Cap Take 2 capsules (0.5 mcg total) by mouth once daily.    docusate sodium (COLACE) 100 MG capsule Take 1 capsule (100 mg total) by mouth 3 (three) times daily as needed for Constipation.    ergocalciferol (VITAMIN D2) 50,000 unit Cap Take 1 capsule (50,000 Units total) by mouth every 7 days.    l-methylfolate-b2-b6-b12 (CEREFOLIN) 6-5-50-1 mg Tab Take 1 tablet by mouth once daily.    magnesium oxide (MAG-OX) 400 mg (241.3 mg magnesium) tablet Take 1 tablet (400 mg total) by mouth once daily.    mycophenolate (CELLCEPT) 250 mg Cap Take 2 capsules (500 mg total) by mouth 2 (two) times daily.    NIFEdipine (PROCARDIA-XL) 30 MG (OSM) 24 hr tablet TAKE ONE TABLET BY MOUTH TWICE DAILY hold if systolic blood pressure is less than 130    predniSONE (DELTASONE) 5 MG tablet Take 1 tablet (5 mg total) by mouth once daily.    sodium bicarbonate 650 MG tablet Take 2 tablets (1,300 mg total) by mouth 2 (two) times daily.    tacrolimus (PROGRAF) 1 MG Cap Take 3 capsules (3 mg total) by mouth every morning AND 2 capsules (2 mg total) every evening.     Family History       Problem Relation (Age of Onset)    Diabetes Mother    Down syndrome Son    Heart disease Mother    Hypertension Mother    Kidney disease Mother    Lupus Sister          Tobacco Use    Smoking status: Never Smoker    Smokeless tobacco: Never Used   Substance and Sexual Activity    Alcohol use: No    Drug use: No    Sexual activity:  Yes     Partners: Male     Review of Systems   Constitutional:  Negative for appetite change, chills, diaphoresis, fatigue and fever.   HENT:  Negative for congestion, rhinorrhea, sneezing and sore throat.    Eyes:  Positive for photophobia. Negative for visual disturbance.   Respiratory:  Negative for cough, shortness of breath and wheezing.    Cardiovascular:  Negative for chest pain, palpitations and leg swelling.   Gastrointestinal:  Positive for nausea and vomiting. Negative for abdominal distention, abdominal pain and diarrhea.   Genitourinary:  Negative for difficulty urinating, dysuria, frequency and hematuria.   Musculoskeletal:  Positive for gait problem. Negative for arthralgias, back pain and myalgias.   Skin:  Negative for color change, pallor, rash and wound.   Neurological:  Positive for dizziness (intermittent), weakness (intermittent) and headaches. Negative for tremors, seizures, syncope, facial asymmetry, light-headedness and numbness.   Psychiatric/Behavioral:  Negative for confusion and hallucinations. The patient is not nervous/anxious.    Objective:     Vital Signs (Most Recent):  Temp: 96.7 °F (35.9 °C) (06/13/22 0717)  Pulse: (!) 53 (06/13/22 1048)  Resp: 18 (06/13/22 0717)  BP: 129/76 (06/13/22 0717)  SpO2: 98 % (06/13/22 0717)   Vital Signs (24h Range):  Temp:  [96.7 °F (35.9 °C)-98.1 °F (36.7 °C)] 96.7 °F (35.9 °C)  Pulse:  [49-78] 53  Resp:  [16-20] 18  SpO2:  [93 %-99 %] 98 %  BP: (112-172)/(61-81) 129/76     Weight: 75.2 kg (165 lb 12.6 oz)  Body mass index is 31.34 kg/m².    Physical Exam  Vitals and nursing note reviewed.   Constitutional:       General: She is not in acute distress.     Appearance: She is not ill-appearing, toxic-appearing or diaphoretic.   HENT:      Head: Normocephalic and atraumatic.      Nose: Nose normal.      Mouth/Throat:      Mouth: Mucous membranes are moist.   Eyes:      Pupils: Pupils are equal, round, and reactive to light.   Cardiovascular:      Rate  and Rhythm: Regular rhythm. Bradycardia present.      Heart sounds: No murmur heard.  Pulmonary:      Effort: Pulmonary effort is normal. No respiratory distress.      Breath sounds: No wheezing, rhonchi or rales.      Comments: Currently on room air  Abdominal:      General: Bowel sounds are normal. There is no distension.      Palpations: Abdomen is soft.      Tenderness: There is no abdominal tenderness. There is no guarding.   Genitourinary:     Comments: deferred  Musculoskeletal:         General: No swelling, tenderness or deformity. Normal range of motion.      Cervical back: Normal range of motion. No tenderness.   Skin:     General: Skin is warm and dry.      Capillary Refill: Capillary refill takes less than 2 seconds.   Neurological:      General: No focal deficit present.      Mental Status: She is alert and oriented to person, place, and time.      Sensory: No sensory deficit.      Motor: No weakness.   Psychiatric:         Mood and Affect: Mood normal.         Behavior: Behavior normal.         Thought Content: Thought content normal.         Judgment: Judgment normal.         CRANIAL NERVES     CN III, IV, VI   Pupils are equal, round, and reactive to light.     Significant Labs: All pertinent labs within the past 24 hours have been reviewed.  CBC:   Recent Labs   Lab 06/12/22  1630 06/13/22  0414   WBC 7.38 7.46   HGB 14.8 15.2   HCT 44.4 46.3    176       CMP:   Recent Labs   Lab 06/12/22  1630 06/13/22  0414    133*   K 3.4* 4.1    101   CO2 23 20*   * 139*   BUN 25* 25*   CREATININE 1.4 1.2   CALCIUM 9.6 9.6   PROT 6.8 6.4   ALBUMIN 4.1 4.0   BILITOT 0.9 0.8   ALKPHOS 63 65   AST 12 11   ALT 19 17   ANIONGAP 12 12   EGFRNONAA 42.6* 51.4*         Significant Imaging: I have reviewed all pertinent imaging results/findings within the past 24 hours.      Assessment/Plan:      * Intractable migraine with status migrainosus  -The patient received IV decadron, tylenol, 2g IV  "mag, and small fluid bolus in the ED with moderate improvement of headache.  -Continue oral magnesium daily.  -cont topamax to 25mg BID.  -PRN tylenol and fiorcet q24hrs PRN. Received 1 dose fiorecet overnight  -Neurology consult, will f/u recs.   -on chart review, sounds possible orthostatic potentially giving chronic low HRs, but no obvious precipitant. ? Need to rule out venous sinus thrombosis with increased risk given renal transplant history- will f/u with neuro and transplant their thoughts on this    Per chart review  Office visit with neurologist Dr Majano 6/2/22     "- migraines: cannot use triptans given the theoretical risk of kidney infarctions. Will start TPM 25 mg daily in addition to butalbital as needed.   - medication overuse headaches: stopping tylenol and caffeine rich beverages. Increase water intake. Starting TPM 25 mg daily   - PRES: cannot be excluded given as she takes Tacrolimus. calcineurin inhibitors are a likely contributor to severe headaches in transplant patients. Consider changing tacrolimus. Awaiting brain MRI"     Brain MRI o 6/6/22  "Findings:   There is no significant brain atrophy. There is mild microangiopathy. There is no hemorrhage, mass lesion, acute infarct, or hydrocephalus. The orbits, paranasal sinuses, and left mastoid air cells are clear. There is a right mastoid effusion. Impression: No acute finding."     History of congenital heart defect  Reports hx of "hole in their heart" that has closed up, suspect hx of PFO, PFO has some association with headaches, do not have recent bubble study but previous echos on file without bubble do not show a patent foramen ovale      Hx of cardiovascular stress test  Hx of defect in LAD territory in 2019 pre trnasplant work up, denies chest pain, has seen cardiology in [place] per notes and denies chest pain now      Bradycardia  HR runs 50s baseline based on prevous EKGS, not on BB. Trend with telemetry      Stage 3a chronic kidney " disease  -Cr 1.4 on admit (baseline 1.2-1.3). Appears stable.  -BMP reviewed- noted Estimated Creatinine Clearance: 49.7 mL/min (based on SCr of 1.2 mg/dL). according to latest data.   -Monitor UOP and serial BMP and adjust therapy as needed.   -Renally dose meds.    Hypokalemia  Patient has hypokalemia which is currently uncontrolled. Last electrolytes reviewed- Recent Labs   Lab 06/12/22  1630   K 3.4*   . Will replace potassium and monitor electrolytes closely. Continuous telemetry.    Renal hypertension  - Continue Nifedipine  -Monitor BP closely.    S/P kidney transplant  Long-term use of immunosuppressant medication  - s/p KTX from 4/5/20 (Simulect induction, CMV +/+).  -Kidney transplant consult, appreciate recs. cellcept may be possible headache inducing per neuro clinic notes, will f/u if any consideration to adjustments long term but has been on for 2 years with headahes only for 3 months so les slikely  -Continue cellcept and prograf, prograf levels daily.    Secondary hyperparathyroidism of renal origin  - Continue calcitriol.  - Monitor      VTE Risk Mitigation (From admission, onward)         Ordered     IP VTE LOW RISK PATIENT  Once         06/12/22 2038     Place sequential compression device  Until discontinued         06/12/22 2038                Discharge Planning   JULITO: 6/14/2022     Code Status: Full Code   Is the patient medically ready for discharge?: No    Reason for patient still in hospital (select all that apply): Patient trending condition  Discharge Plan A: Home with family                  Celine Cano MD  Department of Hospital Medicine   [facility] ([place]

## 2022-06-13 NOTE — ASSESSMENT & PLAN NOTE
Hx of defect in LAD territory in 2019 pre trnasplant work up, denies chest pain, has seen cardiology in [place] per notes and denies chest pain now

## 2022-06-13 NOTE — ASSESSMENT & PLAN NOTE
Planned to see neuro today   Less likely her symptoms are related to prograf   She reports unremarkable work up in the past

## 2022-06-13 NOTE — HPI
56 y.o. female with HTN, CKD and kidney transplant (April 2020) immunosuppressed on tacrolimus and cellcept presented to ED 6/12/22 for persistent headache. Patient reports intermittent headache since March 2022 described as pressure sensation with associated light and sound sensitivity. She also reports neck pain and tenderness. Says this started after incident at work where she bent over to lift a bunch of coins, works as a . Of note, this admit is her 4th ED presentation for headache since March. [place]2/22 who was concerned for PRES. MRI brain without contrast 6/6 at OSH reportedly without acute intracranial pathology. He prescribed topamax 25 mg qd and fioricet prn. Has only taken fioricet in the hospital because pharmacy would not fill Rx due to concern for interactions with transplant medications. Says she came to the hospital yesterday mainly because of imbalance. She denies dizziness and vertigo, but had to hold onto furniture to stabilize herself. Since admit, she has received tylenol, IV decadron 10 mg, IV Mg 2 g, IV reglan 5 mg, topamax increased to 25 mg BID and fioricet prn x 1 with resolution of headache.

## 2022-06-13 NOTE — PLAN OF CARE
[facility] - Tele[facility] Stepdown ([place])  Initial Discharge Assessment       Primary Care Provider: ANUJA Castellon MD    Admission Diagnosis: Severe headache [R51.9]  Chest pain [R07.9]    Admission Date: 6/12/2022  Expected Discharge Date: 6/14/2022         Payor: [employer] / Plan: [facility] CHOICE PLUS / Product Type: Commercial /     Extended Emergency Contact Information  Primary Emergency Contact: Kathy Rivera  Address: [id number] [place] [address] [place] [address] [facility]  Home Phone: [phone]  Mobile Phone: [phone]  Relation: Spouse    Discharge Plan A: (P) Home with family  Discharge Plan B: (P) Home with family, Home Health      [facility]  [address]  [facility] [address]  Phone: [phone] Fax: [phone]    [address] [address]  Phone: [phone] Fax: [phone]              completed Discharge Planning Assessment with patient via bedside. Discharge planning booklet given to patient/family and whiteboard updated with JULITO and phone #. All questions answered.    Patient reported that her  will provide transportation upon discharge.     Patient reported that she lives with her  and son, and prior to hospitalization she was independent with her ADL's. Patient reported that she is not on dialysis and does not go to a Coumadin clinic.      Patient lives in a [facility] with 0 steps to enter.       Natasha Gonzalez LMSW  [facility]  Ext. [phone]

## 2022-06-13 NOTE — ASSESSMENT & PLAN NOTE
Long-term use of immunosuppressant medication  - s/p KTX from 4/5/20 (Simulect induction, CMV +/+).  -Kidney transplant consult, appreciate recs. cellcept may be possible headache inducing per neuro clinic notes, will f/u if any consideration to adjustments long term but has been on for 2 years with headahes only for 3 months so les slikely  -Continue cellcept and prograf, prograf levels daily.

## 2022-06-13 NOTE — SUBJECTIVE & OBJECTIVE
Subjective:     History of Present Illness:   56 y.o. female with a PMHx of HTN, kidney transplant in , CKD3, and headaches who presents to the ED with complaints of severe headache.   She states that she has had a headache now intermittently for past few months. She reports she began having issues with headaches about 3 months ago. She states the initial headache lasted a week, and she was seen in the ED at that time. She had seen neurologist , Had an MRI and CT scan of her head both of which were negative and also saw ENT. Patient reports the headache is a frontal headache that sometimes radiates over the top of her had and down her neck. She endorses associated photophobia, nausea, and vomiting. The patient also notes intermittent episodes where she feels off balance, weak, and like tinnitus. She denies any syncope or vision changes. The patient has been taking Topamax daily for about 1 week with no relief.    The patient received decadron, tylenol, IV magnesium, reglan, and IVF bolus at presentation.       Past Medical and Surgical History: Ms. Rivera has a past medical history of Anemia of renal disease, ESRD on dialysis, Essential hypertension, PD catheter dysfunction (10/18/2018), and Secondary hyperparathyroidism of renal origin.  She has a past surgical history that includes  section; AV fistula placement (Left); AV graft (Right, 2015); Gastric bypass (); Peritoneal catheter insertion (N/A, 2018); Laparoscopic revision of procedure involving peritoneal dialysis catheter (N/A, 10/18/2018); Wound exploration (N/A, 2018); Peritoneal catheter removal (N/A, 2018); Colonoscopy (N/A, 2020); Kidney transplant (N/A, 2020); and Kidney transplant (Right, 2020).    Past Social and Family History: Ms. Rivera reports that she has never smoked. She has never used smokeless tobacco. She reports that she does not drink alcohol and does not use drugs.Her family  "history includes Diabetes in her mother; Down syndrome in her son; Heart disease in her mother; Hypertension in her mother; Kidney disease in her mother; Lupus in her sister.    Intake/Output - Last 3 Shifts         06/11 0700  06/12 0659 06/12 0700  06/13 0659 06/13 0700 06/14 0659           Urine Occurrence  1 x 1 x             Review of Systems   Constitutional:  Negative for appetite change, fatigue and fever.   HENT:  Positive for tinnitus.    Eyes:  Negative for visual disturbance.   Respiratory:  Negative for cough, shortness of breath and wheezing.    Cardiovascular:  Negative for chest pain and palpitations.   Gastrointestinal:  Negative for abdominal distention and abdominal pain.   Genitourinary:  Negative for difficulty urinating and hematuria.   Allergic/Immunologic: Positive for immunocompromised state.   Neurological:  Positive for headaches.   Psychiatric/Behavioral:  Negative for agitation and confusion.    Objective:     Vital Signs (Most Recent):  Temp: 97.4 °F (36.3 °C) (06/13/22 1132)  Pulse: (!) 57 (06/13/22 1132)  Resp: 18 (06/13/22 1132)  BP: 117/70 (06/13/22 1132)  SpO2: 97 % (06/13/22 1132)   Vital Signs (24h Range):  Temp:  [96.7 °F (35.9 °C)-98.1 °F (36.7 °C)] 97.4 °F (36.3 °C)  Pulse:  [49-78] 57  Resp:  [16-20] 18  SpO2:  [93 %-99 %] 97 %  BP: (112-172)/(61-81) 117/70     Weight: 75.2 kg (165 lb 12.6 oz)  Height: 5' 0.98" (154.9 cm)  Body mass index is 31.34 kg/m².    Physical Exam  Constitutional:       General: She is not in acute distress.     Appearance: She is not ill-appearing or toxic-appearing.   HENT:      Head: Normocephalic.   Eyes:      General: No scleral icterus.  Cardiovascular:      Rate and Rhythm: Normal rate.   Pulmonary:      Effort: No respiratory distress.      Breath sounds: Normal breath sounds. No wheezing.   Abdominal:      General: There is no distension.      Palpations: Abdomen is soft.      Tenderness: There is no abdominal tenderness.   Musculoskeletal: "      Right lower leg: No edema.      Left lower leg: No edema.   Skin:     Coloration: Skin is not jaundiced.   Neurological:      Mental Status: She is oriented to person, place, and time. Mental status is at baseline.   Psychiatric:         Mood and Affect: Mood normal.       Significant Labs:  BMP:   Recent Labs   Lab 06/07/22  0703 06/12/22  1630 06/13/22  0414   * 151* 139*    136 133*   K 4.0 3.4* 4.1    101 101   CO2 22* 23 20*   BUN 20 25* 25*   CREATININE 1.3 1.4 1.2   CALCIUM 9.6 9.6 9.6

## 2022-06-13 NOTE — CONSULTS
[facility] - Telemetry Stepdown ([place] [address])  Kidney Transplant  Consult Note    Inpatient consult to Kidney/Pancreas Transplant Medicine  Consult performed by: Cristian Cdeeno MD  Consult ordered by: Nargis Cosme NP            Subjective:     History of Present Illness:   56 y.o. female with a PMHx of HTN, kidney transplant in , CKD3, and headaches who presents to the ED with complaints of severe headache.   She states that she has had a headache now intermittently for past few months. She reports she began having issues with headaches about 3 months ago. She states the initial headache lasted a week, and she was seen in the ED at that time. She had seen neurologist , Had an MRI and CT scan of her head both of which were negative and also saw ENT. Patient reports the headache is a frontal headache that sometimes radiates over the top of her had and down her neck. She endorses associated photophobia, nausea, and vomiting. The patient also notes intermittent episodes where she feels off balance, weak, and like tinnitus. She denies any syncope or vision changes. The patient has been taking Topamax daily for about 1 week with no relief.    The patient received decadron, tylenol, IV magnesium, reglan, and IVF bolus at presentation.       Past Medical and Surgical History: Ms. Rivera has a past medical history of Anemia of renal disease, ESRD on dialysis, Essential hypertension, PD catheter dysfunction (10/18/2018), and Secondary hyperparathyroidism of renal origin.  She has a past surgical history that includes  section; AV fistula placement (Left); AV graft (Right, 2015); Gastric bypass (); Peritoneal catheter insertion (N/A, 2018); Laparoscopic revision of procedure involving peritoneal dialysis catheter (N/A, 10/18/2018); Wound exploration (N/A, 2018); Peritoneal catheter removal (N/A, 2018); Colonoscopy (N/A, 2020); Kidney transplant (N/A, 2020); and Kidney  "transplant (Right, 04/05/2020).    Past Social and Family History: Ms. Rivera reports that she has never smoked. She has never used smokeless tobacco. She reports that she does not drink alcohol and does not use drugs.Her family history includes Diabetes in her mother; Down syndrome in her son; Heart disease in her mother; Hypertension in her mother; Kidney disease in her mother; Lupus in her sister.    Intake/Output - Last 3 Shifts         06/11 0700  06/12 0659 06/12 0700 06/13 0659 06/13 0700  06/14 0659           Urine Occurrence  1 x 1 x             Review of Systems   Constitutional:  Negative for appetite change, fatigue and fever.   HENT:  Positive for tinnitus.    Eyes:  Negative for visual disturbance.   Respiratory:  Negative for cough, shortness of breath and wheezing.    Cardiovascular:  Negative for chest pain and palpitations.   Gastrointestinal:  Negative for abdominal distention and abdominal pain.   Genitourinary:  Negative for difficulty urinating and hematuria.   Allergic/Immunologic: Positive for immunocompromised state.   Neurological:  Positive for headaches.   Psychiatric/Behavioral:  Negative for agitation and confusion.    Objective:     Vital Signs (Most Recent):  Temp: 97.4 °F (36.3 °C) (06/13/22 1132)  Pulse: (!) 57 (06/13/22 1132)  Resp: 18 (06/13/22 1132)  BP: 117/70 (06/13/22 1132)  SpO2: 97 % (06/13/22 1132)   Vital Signs (24h Range):  Temp:  [96.7 °F (35.9 °C)-98.1 °F (36.7 °C)] 97.4 °F (36.3 °C)  Pulse:  [49-78] 57  Resp:  [16-20] 18  SpO2:  [93 %-99 %] 97 %  BP: (112-172)/(61-81) 117/70     Weight: 75.2 kg (165 lb 12.6 oz)  Height: 5' 0.98" (154.9 cm)  Body mass index is 31.34 kg/m².    Physical Exam  Constitutional:       General: She is not in acute distress.     Appearance: She is not ill-appearing or toxic-appearing.   HENT:      Head: Normocephalic.   Eyes:      General: No scleral icterus.  Cardiovascular:      Rate and Rhythm: Normal rate.   Pulmonary:      Effort: No " respiratory distress.      Breath sounds: Normal breath sounds. No wheezing.   Abdominal:      General: There is no distension.      Palpations: Abdomen is soft.      Tenderness: There is no abdominal tenderness.   Musculoskeletal:      Right lower leg: No edema.      Left lower leg: No edema.   Skin:     Coloration: Skin is not jaundiced.   Neurological:      Mental Status: She is oriented to person, place, and time. Mental status is at baseline.   Psychiatric:         Mood and Affect: Mood normal.       Significant Labs:  BMP:   Recent Labs   Lab 06/07/22  0703 06/12/22  1630 06/13/22  0414   * 151* 139*    136 133*   K 4.0 3.4* 4.1    101 101   CO2 22* 23 20*   BUN 20 25* 25*   CREATININE 1.3 1.4 1.2   CALCIUM 9.6 9.6 9.6           Assessment/Plan:     * Intractable migraine with status migrainosus  Planned to see neuro today   Less likely her symptoms are related to prograf   She reports unremarkable work up in the past       Acidosis  On sodium bicarb       Renal hypertension  BP steady   Tolerated nifedipine       Long-term use of immunosuppressant medication  On cellcept , prograf and prednisone   Monitor prograf levels to assess for toxicity        CKD stage III  Renal Transplant history     - Cr is at baseline     Cristian Cedeno MD  Kidney Transplant  [facility] - Telemetry Stepdown ([place])

## 2022-06-13 NOTE — ED NOTES
"Pt presents [place] [facility] c c/o severe HA and dizziness. Pt reports the HA's have been on-going for the last 3 weeks, reports she takes topimax at home w/o relief, awaiting Neuro MD appt. Pt rates HA 10/10, describes it as "something trying to cave" her head in. Pt reports the dizziness occurs intermittently. Pt PMHx includes HTN, KTxp 2020, CKD3, and migraines. Pt VSS, NADN at this time. Pt free from fall/injury, side rails up x2, bed in lowest/locked position, call light next to pt, spouse at bedside. Pt instructed to call for assistance, verbalizes understanding. Will continue to monitor pt while in the ER.  "

## 2022-06-13 NOTE — ASSESSMENT & PLAN NOTE
"-The patient received IV decadron, tylenol, 2g IV mag, and small fluid bolus in the ED with moderate improvement of headache.  -Continue oral magnesium daily.  -cont topamax to 25mg BID.  -PRN tylenol and fiorcet q24hrs PRN. Received 1 dose fiorecet overnight  -Neurology consult, will f/u recs.   -on chart review, sounds possible orthostatic potentially giving chronic low HRs, but no obvious precipitant. ? Need to rule out venous sinus thrombosis with increased risk given renal transplant history- will f/u with neuro and transplant their thoughts on this    Per chart review  Office visit with neurologist Dr Majano 6/2/22     "- migraines: cannot use triptans given the theoretical risk of kidney infarctions. Will start TPM 25 mg daily in addition to butalbital as needed.   - medication overuse headaches: stopping tylenol and caffeine rich beverages. Increase water intake. Starting TPM 25 mg daily   - PRES: cannot be excluded given as she takes Tacrolimus. calcineurin inhibitors are a likely contributor to severe headaches in transplant patients. Consider changing tacrolimus. Awaiting brain MRI"     Brain MRI o 6/6/22  "Findings:   There is no significant brain atrophy. There is mild microangiopathy. There is no hemorrhage, mass lesion, acute infarct, or hydrocephalus. The orbits, paranasal sinuses, and left mastoid air cells are clear. There is a right mastoid effusion. Impression: No acute finding."   "

## 2022-06-13 NOTE — SUBJECTIVE & OBJECTIVE
Past Medical History:   Diagnosis Date    Anemia of renal disease     ESRD on dialysis     Dr. Muhammad     Essential hypertension     PD catheter dysfunction 10/18/2018    Secondary hyperparathyroidism of renal origin      Past Surgical History:   Procedure Laterality Date    AV FISTULA PLACEMENT Left     never matured    AV graft Right 2015     SECTION      x3; ; , and     COLONOSCOPY N/A 2020    Procedure: COLONOSCOPY;  Surgeon: Darrell Golden MD;  Location: [facility] ENDO;  Service: Endoscopy;  Laterality: N/A;    GASTRIC BYPASS  2016    KIDNEY TRANSPLANT N/A 2020    Procedure: TRANSPLANT, KIDNEY;  Surgeon: Megan Garcia MD;  Location: [address];  Service: Transplant;  Laterality: N/A;    KIDNEY TRANSPLANT Right 2020    LAPAROSCOPIC REVISION OF PROCEDURE INVOLVING PERITONEAL DIALYSIS CATHETER N/A 10/18/2018    Procedure: REVISION OF PROCEDURE INVOLVING PERITONEAL DIALYSIS CATHETER, LAPAROSCOPIC;  Surgeon: Hair Jimenez MD;  Location: [facility];  Service: General;  Laterality: N/A;    PERITONEAL CATHETER INSERTION N/A 2018    Procedure: Laparoscopic INSERTION, CATHETER, INTRAPERITONEAL;  Surgeon: Hair Jimenez MD;  Location: [facility];  Service: General;  Laterality: N/A;    PERITONEAL CATHETER REMOVAL N/A 2018    Procedure: REMOVAL, CATHETER, DIALYSIS, PERITONEAL;  Surgeon: Hair Jimenez MD;  Location: [facility];  Service: General;  Laterality: N/A;    WOUND EXPLORATION N/A 2018    Procedure: EXPLORATION, WOUND-Surgical Site Irrigation;  Surgeon: Hair Jimenez MD;  Location: [facility];  Service: General;  Laterality: N/A;     Review of patient's allergies indicates:   Allergen Reactions    Sulfa (sulfonamide antibiotics) Hives     No current facility-administered medications on file prior to encounter.     Current Outpatient Medications on File Prior to Encounter   Medication Sig    calcitRIOL (ROCALTROL) 0.25 MCG Cap Take 2 capsules (0.5 mcg total) by  mouth once daily.    docusate sodium (COLACE) 100 MG capsule Take 1 capsule (100 mg total) by mouth 3 (three) times daily as needed for Constipation.    ergocalciferol (VITAMIN D2) 50,000 unit Cap Take 1 capsule (50,000 Units total) by mouth every 7 days.    l-methylfolate-b2-b6-b12 (CEREFOLIN) 6-5-50-1 mg Tab Take 1 tablet by mouth once daily.    magnesium oxide (MAG-OX) 400 mg (241.3 mg magnesium) tablet Take 1 tablet (400 mg total) by mouth once daily.    mycophenolate (CELLCEPT) 250 mg Cap Take 2 capsules (500 mg total) by mouth 2 (two) times daily.    NIFEdipine (PROCARDIA-XL) 30 MG (OSM) 24 hr tablet TAKE ONE TABLET BY MOUTH TWICE DAILY hold if systolic blood pressure is less than 130    predniSONE (DELTASONE) 5 MG tablet Take 1 tablet (5 mg total) by mouth once daily.    sodium bicarbonate 650 MG tablet Take 2 tablets (1,300 mg total) by mouth 2 (two) times daily.    tacrolimus (PROGRAF) 1 MG Cap Take 3 capsules (3 mg total) by mouth every morning AND 2 capsules (2 mg total) every evening.     Family History       Problem Relation (Age of Onset)    Diabetes Mother    Down syndrome Son    Heart disease Mother    Hypertension Mother    Kidney disease Mother    Lupus Sister          Tobacco Use    Smoking status: Never Smoker    Smokeless tobacco: Never Used   Substance and Sexual Activity    Alcohol use: No    Drug use: No    Sexual activity: Yes     Partners: Male     Review of Systems   Constitutional:  Positive for activity change and fatigue. Negative for fever.   HENT:  Negative for trouble swallowing and voice change.    Eyes:  Negative for photophobia, pain, redness and visual disturbance.   Respiratory:  Negative for shortness of breath.    Cardiovascular:  Negative for chest pain.   Gastrointestinal:  Negative for vomiting.   Musculoskeletal:  Negative for gait problem.   Allergic/Immunologic: Positive for immunocompromised state.   Neurological:  Negative for dizziness, seizures, facial asymmetry,  speech difficulty, weakness, light-headedness, numbness and headaches.   Psychiatric/Behavioral:  Negative for confusion.    Objective:     Vital Signs (Most Recent):  Temp: 97.4 °F (36.3 °C) (06/13/22 1132)  Pulse: (!) 57 (06/13/22 1132)  Resp: 18 (06/13/22 1132)  BP: 117/70 (06/13/22 1132)  SpO2: 97 % (06/13/22 1132)   Vital Signs (24h Range):  Temp:  [96.7 °F (35.9 °C)-98.1 °F (36.7 °C)] 97.4 °F (36.3 °C)  Pulse:  [49-78] 57  Resp:  [16-20] 18  SpO2:  [93 %-99 %] 97 %  BP: (112-172)/(61-81) 117/70     Weight: 75.2 kg (165 lb 12.6 oz)  Body mass index is 31.34 kg/m².    Physical Exam  Eyes:      Extraocular Movements: EOM normal.      Pupils: Pupils are equal, round, and reactive to light.   Neurological:      Mental Status: She is oriented to person, place, and time.      Coordination: Finger-Nose-Finger Test normal.      Deep Tendon Reflexes:      Reflex Scores:       Bicep reflexes are 2+ on the right side and 2+ on the left side.       Brachioradialis reflexes are 2+ on the right side and 2+ on the left side.       Patellar reflexes are 2+ on the right side and 2+ on the left side.  Psychiatric:         Speech: Speech normal.       NEUROLOGICAL EXAMINATION:     MENTAL STATUS   Oriented to person, place, and time.   Follows 2 step commands.   Attention: normal.   Speech: speech is normal   Level of consciousness: alert  Normal comprehension.     CRANIAL NERVES     CN III, IV, VI   Pupils are equal, round, and reactive to light.  Extraocular motions are normal.   Nystagmus: none   Diplopia: none  Ophthalmoparesis: none    CN V   Facial sensation intact.     CN VII   Facial expression full, symmetric.     CN VIII   Hearing: intact    CN IX, X   Palate: symmetric    CN XI   CN XI normal.     CN XII   CN XII normal.     MOTOR EXAM   Muscle bulk: normal  Overall muscle tone: normal  Right arm pronator drift: absent  Left arm pronator drift: absent    Strength   Right deltoid: 5/5  Left deltoid: 5/5  Right biceps:  5/5  Left biceps: 5/5  Right triceps: 5/5  Left triceps: 5/5  Right interossei: 5/5  Left interossei: 5/5  Right iliopsoas: 5/5  Left iliopsoas: 5/5  Right anterior tibial: 5/5  Left anterior tibial: 5/5  Right posterior tibial: 5/5  Left posterior tibial: 5/5    REFLEXES     Reflexes   Right brachioradialis: 2+  Left brachioradialis: 2+  Right biceps: 2+  Left biceps: 2+  Right patellar: 2+  Left patellar: 2+    SENSORY EXAM   Light touch normal.     GAIT AND COORDINATION     Gait  Gait: (deferred)     Coordination   Finger to nose coordination: normal    Tremor   Resting tremor: absent    Significant Labs: All pertinent lab results from the past 24 hours have been reviewed.    Significant Imaging: I have reviewed all pertinent imaging results/findings within the past 24 hours.

## 2022-06-13 NOTE — ED NOTES
Pt resting in bed at this time, spouse remains at bedside. Will continue to monitor pt while in the ER.

## 2022-06-14 NOTE — PLAN OF CARE
Problem: Adult Inpatient Plan of Care  Goal: Plan of Care Review  Outcome: Met  Goal: Patient-Specific Goal (Individualized)  Outcome: Met  Goal: Absence of Hospital-Acquired Illness or Injury  Outcome: Met  Goal: Optimal Comfort and Wellbeing  Outcome: Met  Goal: Readiness for Transition of Care  Outcome: Met     Problem: Pain Acute  Goal: Acceptable Pain Control and Functional Ability  Outcome: Met     Pt and spouse verbalizes understanding of discharge instructions. Pt will travel home via private vehicle.

## 2022-06-14 NOTE — PROGRESS NOTES
Pt transported via  [facility] with staff member, belongings at side. Transported home per private vehicles with spouse. DC instructions given to pt, pt verbalized understanding these at this time.

## 2022-06-14 NOTE — SUBJECTIVE & OBJECTIVE
Subjective:   History of Present Illness:  56 y.o. female with a PMHx of HTN, kidney transplant in 2020, CKD3, and headaches who presents to the ED with complaints of severe headache.   She states that she has had a headache now intermittently for past few months. She reports she began having issues with headaches about 3 months ago. She states the initial headache lasted a week, and she was seen in the ED at that time. She had seen neurologist , Had an MRI and CT scan of her head both of which were negative and also saw ENT. Patient reports the headache is a frontal headache that sometimes radiates over the top of her had and down her neck. She endorses associated photophobia, nausea, and vomiting. The patient also notes intermittent episodes where she feels off balance, weak, and like tinnitus. She denies any syncope or vision changes. The patient has been taking Topamax daily for about 1 week with no relief.    The patient received decadron, tylenol, IV magnesium, reglan, and IVF bolus at presentation.       Interval History:  Seen by neuro - Presentation c/w migrainous and cervicogenic headache  No overnight events   BP steady , no fevers   Cr remains steady and prograf levels stable     Past Medical, Surgical, Family, and Social History:   Unchanged from H&P.    Scheduled Meds:   calcitRIOL  0.5 mcg Oral Daily    magnesium oxide  400 mg Oral Daily    mycophenolate  500 mg Oral BID    NIFEdipine  30 mg Oral BID    predniSONE  5 mg Oral Daily    sodium bicarbonate  1,300 mg Oral BID    tacrolimus  3 mg Oral Daily AM    And    tacrolimus  2 mg Oral Daily PM    topiramate  25 mg Oral BID     Continuous Infusions:  PRN Meds:acetaminophen, butalbital-acetaminophen-caffeine -40 mg, dextrose 10%, dextrose 10%, docusate sodium, glucagon (human recombinant), glucose, glucose, melatonin, metoclopramide HCl, sodium chloride 0.9%    Intake/Output - Last 3 Shifts         06/12 0700 06/13 0659 06/13 0700 06/14 0659  "06/14 0700  06/15 0659           Urine Occurrence 1 x 3 x              Review of Systems   Constitutional:  Negative for activity change and appetite change.   Respiratory:  Negative for shortness of breath and wheezing.    Cardiovascular:  Negative for chest pain and palpitations.   Gastrointestinal:  Negative for abdominal distention and abdominal pain.   Genitourinary:  Negative for difficulty urinating and hematuria.   Skin:  Negative for color change.   Allergic/Immunologic: Positive for immunocompromised state.   Psychiatric/Behavioral:  Negative for confusion.     Objective:     Vital Signs (Most Recent):  Temp: 98.3 °F (36.8 °C) (06/14/22 0414)  Pulse: (!) 51 (06/14/22 0414)  Resp: 18 (06/14/22 0414)  BP: (!) 103/59 (06/14/22 0414)  SpO2: (!) 94 % (06/14/22 0414)   Vital Signs (24h Range):  Temp:  [97.4 °F (36.3 °C)-98.6 °F (37 °C)] 98.3 °F (36.8 °C)  Pulse:  [48-58] 51  Resp:  [18-20] 18  SpO2:  [94 %-98 %] 94 %  BP: (103-123)/(59-70) 103/59     Weight: 75.2 kg (165 lb 12.6 oz)  Height: 5' 0.98" (154.9 cm)  Body mass index is 31.34 kg/m².    Physical Exam  Constitutional:       General: She is not in acute distress.     Appearance: She is not toxic-appearing.   Eyes:      General: No scleral icterus.  Cardiovascular:      Rate and Rhythm: Normal rate.   Pulmonary:      Effort: No respiratory distress.      Breath sounds: Normal breath sounds. No wheezing.   Abdominal:      General: There is no distension.      Palpations: Abdomen is soft.      Tenderness: There is no abdominal tenderness.   Musculoskeletal:      Right lower leg: No edema.      Left lower leg: No edema.   Skin:     Coloration: Skin is not jaundiced.   Neurological:      Mental Status: Mental status is at baseline.       Laboratory:  BMP:   Recent Labs   Lab 06/12/22  1630 06/13/22  0414 06/14/22  0410   * 139* 99    133* 135*   K 3.4* 4.1 3.5    101 102   CO2 23 20* 24   BUN 25* 25* 27*   CREATININE 1.4 1.2 1.3   CALCIUM " 9.6 9.6 9.0

## 2022-06-14 NOTE — PLAN OF CARE
Patient has been scheduled for a [facility] Follow Up with ANUJA Castellon MD on Monday June 20, 2022 at 9:15 AM.    [address]   [place] [id number] [phone]          [facility]    Case management  Ext. [phone]

## 2022-06-14 NOTE — DISCHARGE SUMMARY
DISCHARGE SUMMARY  Hospital Medicine    Team: [facility]OS[facility] MED K    Patient Name: Tala Rivera  YOB: 1965    Admit Date: 6/12/2022    Discharge Date: 06/14/2022    Discharge Attending Physician: Celine Cano MD    Principal Diagnoses:  Active Hospital Problems    Diagnosis  POA    *Intractable migraine with status migrainosus [G43.911]  Yes    Bradycardia [R00.1]  Yes    Hx of cardiovascular stress test [Z92.89]  Not Applicable    History of congenital heart defect [Z87.74]  Not Applicable    Intractable headache [R51.9]  Yes    Stage 3a chronic kidney disease [N18.31]  Yes    Hypokalemia [E87.6]  Yes    Acidosis [E87.2]  Yes    Renal hypertension [I12.9]  Yes    Long-term use of immunosuppressant medication [Z79.899]  Not Applicable    S/P kidney transplant [Z94.0]  Not Applicable    Secondary hyperparathyroidism of renal origin [N25.81]  Yes     Chronic      Resolved Hospital Problems   No resolved problems to display.       Discharged Condition: improved     Interval history- she reports feeling better, had improvement in headaches with fiorecet PRN and continue regimen of topamax, riboflavin and magnesium per recs from neurology. Transplant feels less likely from prograf and keep currently immunosuppression in place. Neurology will rec follow up in 4-6 weeks with headache specialist, discussed potential other treatments such as botox injectiosn as potential ppx option they have arranged for patients intheir clinic before, specifically dr miranda I know does those, as well as other new medications if she may need but so far delicia has done okay and has been okay-ed by transplant ane neuro teams so updated orders for pharm to ensure filled given this as was declined before twice in the past. discused doing a headache diary with food, weather, BP and headache ranges on days of headaches and taking to her follow up for this. Discussed some foods being triggers and MSG has been of  some discussions of a possible trigger and foods that have MSG. She had some symptoms after salad dressing yesterday and review it may have MSG actually so they will be doing that and keeping a record to try to find a pattern of this. Her BP was 100s-110s systolic so I also am unsure if BP and HR shifts could be contributors as well, so I would liek her to check her BP when she has headaches, she holds her BP meds if her BP is under 130 systolic now and reinforced doing this further given some lows this AM. Her HG has a baseline of 50s-60s and this could also be a contributor possibly but not on meds. She had a abnormal stress in 2019 but has had n cardiac issues since then and sees cards in [place] and has never needed a cath. I let her know that if she has not had issues since 2019 when it was present and cards is aware of this then its likely non contributory but if she ever has chest pain or dyspnea I would take it seriously with that on the stress a few years ago and make sure is evaluated if something were to change from her baseline. Her and  voiced understanding and will have f/u with neuro and transplant here and will see neuro in [place] in addition. rec to keep that appt and to see them once headaches have stabilized with a f/u with HA specialists at least 1-2 times to ensure is stabilizing and then if doing well can continue with gen neuro closer to her for ease of follow ups.    Temp:  [97.8 °F (36.6 °C)-98.6 °F (37 °C)]   Pulse:  [48-60]   Resp:  [18-20]   BP: (103-123)/(58-69)   SpO2:  [94 %-98 %]      Physical Exam  Vitals and nursing note reviewed.   Constitutional:       General: She is not in acute distress.     Appearance: She is not ill-appearing, toxic-appearing or diaphoretic.   HENT:      Head: Normocephalic and atraumatic.      Nose: Nose normal.      Mouth/Throat:      Mouth: Mucous membranes are moist.   Eyes:      Pupils: Pupils are equal, round, and reactive to light.    Cardiovascular:      Rate and Rhythm: Regular rhythm. Bradycardia present.      Heart sounds: No murmur heard.  Pulmonary:      Effort: Pulmonary effort is normal. No respiratory distress.      Breath sounds: No wheezing, rhonchi or rales.      Comments: Currently on room air  Abdominal:      General: Bowel sounds are normal. There is no distension.      Palpations: Abdomen is soft.      Tenderness: There is no abdominal tenderness. There is no guarding.   Genitourinary:     Comments: deferred  Musculoskeletal:         General: No swelling, tenderness or deformity. Normal range of motion.      Cervical back: Normal range of motion. No tenderness.   Skin:     General: Skin is warm and dry.      Capillary Refill: Capillary refill takes less than 2 seconds.   Neurological:      General: No focal deficit present.      Mental Status: She is alert and oriented to person, place, and time.      Sensory: No sensory deficit.      Motor: No weakness.   Psychiatric:         Mood and Affect: Mood normal.         Behavior: Behavior normal.         Thought Content: Thought content normal.         Judgment: Judgment normal.        HOSPITAL COURSE:      Initial Presentation:    Tala Rivera is a 56 y.o. female with a PMHx of HTN, kidney transplant in 2020, CKD3, and headaches who presents to the ED with complaints of severe headache. She states that she has had a headache now for 3-4 weeks and has had no relief.  She reports she began having issues with headaches about 3 months ago. She states the initial headache lasted a week, and she was seen in the ED at that time. This time she has seen a neurologist had an MRI and CT scan of her head both of which were negative and also saw ENT. Patient reports the headache is a frontal headache that sometimes radiates over the top of her had and down her neck. She endorses associated photophobia, nausea, and vomiting. The patient also notes intermittent episodes where she feels off  "balance, weak, and like the "room is closing in." She denies any syncope or vision changes. The patient has been taking Topamax daily for about 1 week with no relief. She was also prescribed Fioricet however the pharmacist would not fill it due to her transplant status, so she has never taken that. She reports she spoke to her transplant coordinator Mor and was told she could take fiorcet, but the pharmacy still did not fill it. The patient denies any fever, chills, chest pain, abdominal pain, diarrhea, shortness of breath, or cough.     In the ED, VSSAF. CBC unremarkable. K+3.4. Cr 1.4 (baseline 1.3). Glucose 151. COVID-19 negative. The patient received decadron, tylenol, IV magnesium, reglan, and IVF bolus.    Course of Principle Problem for Admission:    Intractable migraine with status migrainosus  -The patient received IV decadron, tylenol, 2g IV mag, and small fluid bolus in the ED with moderate improvement of headache.  -Continue oral magnesium daily.  -cont topamax to 25mg BID.  -PRN tylenol and fiorcet q24hrs PRN. Improved on current regimen and continue on dc with follow up with headache specialist and neuro ref placed here. Discussed with neurology.  -discussed HA diary with her and possible precipitant including food, season, BP, weather, etc.   -if persists or reoccurs with any concerning findings in future would consider MRA to assess for venous sinus thrombosis with increased risk given renal transplant history- but has no concerning neurological findings on exam currently that would suggest underlying finding and neuro is compatible with cervicogenic HA and migranous headaches     Per chart review  Office visit with neurologist Dr Majano 6/2/22     "- migraines: cannot use triptans given the theoretical risk of kidney infarctions. Will start TPM 25 mg daily in addition to butalbital as needed.   - medication overuse headaches: stopping tylenol and caffeine rich beverages. Increase water intake. " "Starting TPM 25 mg daily   - PRES: cannot be excluded given as she takes Tacrolimus. calcineurin inhibitors are a likely contributor to severe headaches in transplant patients. Consider changing tacrolimus. Awaiting brain MRI"     Brain MRI wwo 6/6/22  "Findings:   There is no significant brain atrophy. There is mild microangiopathy. There is no hemorrhage, mass lesion, acute infarct, or hydrocephalus. The orbits, paranasal sinuses, and left mastoid air cells are clear. There is a right mastoid effusion. Impression: No acute finding."      History of congenital heart defect  Reports hx of "hole in their heart" that has closed up, suspect hx of PFO, PFO has some association with headaches, do not have recent bubble study but previous echos on file without bubble do not show a patent foramen ovale        Hx of cardiovascular stress test  Hx of defect in LAD territory in 2019 pre trnasplant work up, denies chest pain, has seen cardiology in [place] per notes and denies chest pain now or having any cardiac issues since then and has not required any further work up or angiogram with cardiology there, given this history advised if ever does have new chest pain or dyspnea would come for eval but does not appear to have been causing issue in last 4 years and is chronic issue        Bradycardia  HR runs 50s baseline based on prevous EKGS, not on BB. Trend with telemetry  Would rec checking HR when having headaches to assess if any contributor of lows as possible conributor with perfusion          Stage 3a chronic kidney disease  -Cr 1.4 on admit (baseline 1.2-1.3). stable    Hypokalemia  Replace prn  Renal hypertension  - Continue Nifedipine  -Monitor BP closely- hold at home if bp under 130 systolic and check BP on HA days and keep in a calendar/HA diary to see if correlates with lows or highs with HA days     S/P kidney transplant  Long-term use of immunosuppressant medication  - s/p KTX from 4/5/20 (Simulect induction, " CMV +/+).  -Kidney transplant consult, rec continue current regimen and do not feel prograf likely headache nidus  Cont na bicarb  -Continue cellcept and prograf     Secondary hyperparathyroidism of renal origin  - Continue calcitriol.               Consults: Neurology    Last CBC/BMP:    CBC/Anemia Labs: Coags:    Recent Labs   Lab 06/12/22 1630 06/12/22 2015 06/13/22 0414   WBC 7.38  --  7.46   HGB 14.8  --  15.2   HCT 44.4  --  46.3     --  176   MCV 88  --  92   RDW 11.9  --  12.0   [id number]  --  801  --     No results for input(s): PT, INR, APTT in the last 168 hours.     Chemistries:   Recent Labs   Lab 06/12/22 1630 06/13/22 0414 06/14/22 0410    133* 135*   K 3.4* 4.1 3.5    101 102   CO2 23 20* 24   BUN 25* 25* 27*   CREATININE 1.4 1.2 1.3   CALCIUM 9.6 9.6 9.0   PROT 6.8 6.4 5.6*   BILITOT 0.9 0.8 0.7   ALKPHOS 63 65 60   ALT 19 17 19   AST 12 11 12   MG 2.2 2.5 2.2   PHOS 3.9  --   --               Special Treatments/Procedures:   * No surgery found *     Disposition: Home or Self Care      Future Scheduled Appointments:  No future appointments.      Discharge Medication List:     Medication List      START taking these medications    acetaminophen 325 MG tablet  Commonly known as: TYLENOL  Take 2 tablets (650 mg total) by mouth every 6 (six) hours as needed (headache).     butalbital-acetaminophen-caffeine -40 mg -40 mg per tablet  Commonly known as: FIORICET, ESGIC  Take 1 tablet by mouth daily as needed (migraines).        CHANGE how you take these medications    topiramate 25 MG tablet  Commonly known as: TOPAMAX  Take 1 tablet (25 mg total) by mouth 2 (two) times daily.  What changed: when to take this        CONTINUE taking these medications    calcitRIOL 0.25 MCG Cap  Commonly known as: ROCALTROL  Take 2 capsules (0.5 mcg total) by mouth once daily.     docusate sodium 100 MG capsule  Commonly known as: COLACE  Take 1 capsule (100 mg total) by mouth 3 (three)  times daily as needed for Constipation.     L-METHYL-MC 6-5-50-1 mg Tab  Generic drug: l-methylfolate-b2-b6-b12  Take 1 tablet by mouth once daily.     magnesium oxide 400 mg (241.3 mg magnesium) tablet  Commonly known as: MAG-OX  Take 1 tablet (400 mg total) by mouth once daily.     mycophenolate 250 mg Cap  Commonly known as: CELLCEPT  Take 2 capsules (500 mg total) by mouth 2 (two) times daily.     NIFEdipine 30 MG (OSM) 24 hr tablet  Commonly known as: PROCARDIA-XL  TAKE ONE TABLET BY MOUTH TWICE DAILY hold if systolic blood pressure is less than 130     predniSONE 5 MG tablet  Commonly known as: DELTASONE  Take 1 tablet (5 mg total) by mouth once daily.     sodium bicarbonate 650 MG tablet  Take 2 tablets (1,300 mg total) by mouth 2 (two) times daily.     tacrolimus 1 MG Cap  Commonly known as: PROGRAF  Take 3 capsules (3 mg total) by mouth every morning AND 2 capsules (2 mg total) every evening.     VITAMIN D2 50,000 unit Cap  Generic drug: ergocalciferol  Take 1 capsule (50,000 Units total) by mouth every 7 days.           Where to Get Your Medications      These medications were sent to [facility] [address] [address]    Hours: Mon-Fri 7a-7p, Sat-Sun 10a-4p Phone: [phone]   · butalbital-acetaminophen-caffeine -40 mg -40 mg per tablet     Information about where to get these medications is not yet available    Ask your nurse or doctor about these medications  · acetaminophen 325 MG tablet  · topiramate 25 MG tablet         Patient Instructions:  Discharge Procedure Orders   Ambulatory referral/consult to Neurology   Standing Status: Future   Referral Priority: Routine Referral Type: Consultation   Referral Reason: Specialty Services Required   Requested Specialty: Neurology   Number of Visits Requested: 1     Diet renal     Notify your health care provider if you experience any of the following:  persistent nausea and vomiting or diarrhea     Notify your  health care provider if you experience any of the following:  persistent dizziness, light-headedness, or visual disturbances     Activity as tolerated       At the time of discharge patient was told to take all medications as prescribed, to keep all followup appointments, and to call their primary care physician or return to the emergency room if they have any worsening or concerning symptoms.    Signing Physician:  Celine Cano MD

## 2022-06-14 NOTE — PLAN OF CARE
Complain of headache x1 overnight, medicated with Fioricet with good relief. No needs this morning, states ready for discharge. Call bell within reach.  at bedside    Problem: Pain Acute  Goal: Acceptable Pain Control and Functional Ability  Outcome: Ongoing, Progressing     Problem: Adult Inpatient Plan of Care  Goal: Plan of Care Review  Outcome: Ongoing, Progressing     Problem: Adult Inpatient Plan of Care  Goal: Optimal Comfort and Wellbeing  Outcome: Ongoing, Progressing

## 2022-06-14 NOTE — DISCHARGE INSTRUCTIONS
Keep a headache diary with BP, weather, food eaten, activities and headache 1-10 values on days of headache to bring to neurology on folllow up.  Do not take BP meds if Bp is under 130systolic  Avoid foods with MSG if seem to precipitate headache (canned foods, chinese food, hot dogs, pepperoni)

## 2022-06-15 NOTE — PLAN OF CARE
[facility] Tele[facility]down ([facility]  Discharge Final Note    Primary Care Provider: ANUJA Castellon MD    Expected Discharge Date: 6/14/2022    Patient discharged to home via personal transportation.     Patient's bedside nurse and patient notified of the above.      Final Discharge Note (most recent)       Final Note - 06/15/22 1740          Final Note    Assessment Type Final Discharge Note (P)      Anticipated Discharge Disposition Home or Self Care (P)         Post-Acute Status    Post-Acute Authorization Other (P)      Other Status No Post-Acute Service Needs (P)                      Important Message from Medicare             Contact Info       [facility] Neurology [address]th [address]   Specialty: Neurology    [address]  [place] [address]   Phone: [phone]       Next Steps: Follow up    Instructions: will arrange follow up with headache specialist in 4-6 weeks    ANUJA Castellon MD   Specialty: Family Medicine   Relationship: PCP - General    [address] [place] [address]   Phone: [phone]       Next Steps: Follow up on 6/20/2022    Instructions: Patient has been scheduled for a [facility] Follow Up 9:15 AM.    [facility] Neurology [address]th [address]   Specialty: Neurology    [address]  [place] [address]   Phone: [phone]       Next Steps: Follow up    Instructions: Nurse will call to schedule follow up appointment; however, if you do not hear from someone within 48 hours contact the number listed.            No future appointments.    SW scheduled post-discharge follow-up appointment and information added to AVS.     Natasha Gonzalez LMSW  [facility]  Ext. [phone]

## 2022-06-24 NOTE — TELEPHONE ENCOUNTER
Return call to patient  Kathy stating that patient has been admitted at [facility] since Tuesday 6/21 for a Possible CVA .   to keep coordinator posted.  ----- Message from Mor Ross RN sent at 6/24/2022 12:21 PM CDT -----  Regarding: FW: call back    ----- Message -----  From: Joe Bautista  Sent: 6/24/2022  11:19 AM CDT  To: [facility] Post-Kidney Transplant Clinical  Subject: call back                                        Pt's  call to speak with Mor in regards to the pt being in the hospital     Call

## 2022-06-25 NOTE — TELEPHONE ENCOUNTER
Kidney transplant 4/5/20   Kathy, calling with update on pt's current status.  Currently admitted to [facility].   Warm transferred to Joan Yates RN.     Reason for Disposition   [1] Follow-up call to recent contact AND [2] information only call, no triage required    Protocols used: INFORMATION ONLY CALL - NO TRIAGE-A-

## 2022-06-26 NOTE — TELEPHONE ENCOUNTER
ON-CALL POST-TRANSPLANT NOTE    Date of call: 6/25/22  Approximate time: 4:30pm    Received call from spouse reporting that patient was currently admitted at [facility]. Patient  states he has been in contact with patient primary coordinator and has an update on patient.  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 Dr. Hampton informed. Message sent to primary coordinator.

## 2022-06-27 PROBLEM — G02: Status: ACTIVE | Noted: 2022-01-01

## 2022-06-27 PROBLEM — N17.9 AKI (ACUTE KIDNEY INJURY): Status: ACTIVE | Noted: 2022-01-01

## 2022-06-27 NOTE — PROVIDER TRANSFER
Outside Transfer Acceptance Note / Regional Referral Center    Referring facility: [facility]   Referring provider: JACEK SAXENA  Accepting provider: KIRILL VILLAVICENCIO  Reason for transfer: Higher Level of Care   Transfer diagnosis: disseminated cryptococcal  infection  Transfer specialty requested: Transplant Kidney  Transfer specialty notified: yes  Transfer level: NUMBER 1-5: 1  Isolation status: No active isolations   Admission class or status: IP- Inpatient    Narrative     56-year-old woman with PMH kidney XPL (2020 [facility]) on Prograf and Cellcept admitted to [facility] on 06/22 with intractable HA starting about 2 months earlier and with dizziness, blurred vision, left-sided facial droop and dysarthria starting a few days before admission.      On admission /72, HR 69. Neurologic exam is remarkable for left-sided facial weakness and mild dysarthria.  Labs (6/22) WBC 9.4, Hb 15.9, Plts 224, Na 137, K 3.3, CO2 25, BUN 24, Cr 1.32, AG 14, Glu 182 LFTs WNL. COVID neg    LP (6/24) CSF Glu 6, CSF protein 93.  stain pos for yeast like organisms.  Spinal fluid culture pos for encapsulated yeast species.      CT chest WO contrast pos for multiple indeterminate soft tissue masses in the left lower lobe with 2 smaller masses in the right lung which may be part of the same process.      On 06/26 patient started on amphotericin B liposomal (275 IV q.d.) and flucytosine (1500 p.o. q.6h) .  Hospitalization c/b the development of acute renal failure.  Cr 1.32 (6/22) > 1.4 (6/26) > 2.02 (6/27).  Cellcept and prednisone have been held.      Transfer requested for higher level of care (KTM and ID).  Transfer diagnosis disseminated cryptococcal infection, TERESA, kidney XPL     Instructions      [facility]-  Admit to Hospital Medicine  Upon patient arrival to floor, please send SecureChat to [facility] or call extension [phone] (if no answer, this will flip to a beeper, so enter your call back  number) for Hospital Medicine admit team assignment and for additional admit orders for the patient.  Do not page the attending physician associated with the patient on arrival (this physician may not be on duty at the time of arrival).  Rather, always call [phone] to reach the triage physician for orders and team assignment.

## 2022-06-28 PROBLEM — N17.9 AKI (ACUTE KIDNEY INJURY): Status: RESOLVED | Noted: 2022-01-01 | Resolved: 2022-01-01

## 2022-06-28 PROBLEM — N18.30 ACUTE RENAL FAILURE SUPERIMPOSED ON STAGE 3 CHRONIC KIDNEY DISEASE: Status: ACTIVE | Noted: 2022-01-01

## 2022-06-28 NOTE — PLAN OF CARE
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]    Admission Date: 6/27/2022  Expected Discharge Date: 7/1/2022    Discharge Barriers Identified: None    Payor: MEDICARE / Plan: MEDICARE PART A & B / Product Type: Government /     Extended Emergency Contact Information  Primary Emergency Contact: MamadouRossi vangon  Address: [address] [place] [address] [id number] [place] [address]  Home Phone: [phone]  Mobile Phone: [phone]  Relation: Spouse    Discharge Plan A: Home with family  Discharge Plan B: Home Health      [facility]  [address]  [facility] [address]  Phone: [phone] Fax: [phone]    [address] [address]  Phone: [phone] Fax: [phone]      Initial Assessment (most recent)     Adult Discharge Assessment - 06/28/22 1359        Discharge Assessment    Assessment Type Discharge Planning Assessment     Confirmed/corrected address, phone number and insurance Yes     Confirmed Demographics Correct on Facesheet     Source of Information patient;family     Communicated JULITO with patient/caregiver Yes     Reason For Admission Fungal meningitis     Lives With spouse     Do you expect to return to your current living situation? Yes     Do you have help at home or someone to help you manage your care at home? Yes     Who are your caregiver(s) and their phone number(s)? Kathy Rivera (spouse) [phone]     Prior to hospitilization cognitive status: Alert/Oriented     Current cognitive status: Alert/Oriented     Walking or Climbing Stairs Difficulty none     Dressing/Bathing Difficulty none     Equipment Currently Used at Home none     Readmission within 30 days? Yes   Recently discharged from [facility] on 6/14.    Patient currently being followed by outpatient  case management? No     Do you currently have service(s) that help you manage your care at home? No     Do you take prescription medications? Yes     Do you have prescription coverage? Yes     Do you have any problems affording any of your prescribed medications? No     Is the patient taking medications as prescribed? yes     Who is going to help you get home at discharge? Patient's spouse will provide transportation home.     How do you get to doctors appointments? family or friend will provide     Are you on dialysis? No     Do you take coumadin? No     Discharge Plan A Home with family     Discharge Plan B Home Health     DME Needed Upon Discharge  none     Discharge Plan discussed with: Patient;Spouse/sig other     Discharge Barriers Identified None               Julia Young RN  Ext [phone]

## 2022-06-28 NOTE — ASSESSMENT & PLAN NOTE
-Cr 2.2 on day of transfer, has been steadily worsening over last few days (baseline ~1.2)  -IV fluids to be given tonight, renal electrolytes ordered. Will hold acetazolamide which was being given at OSH  -Potential concern for Amp B nephrtoxicity, will need to be discussed with KTM and ID  -Continue tacrolimus 1mg BID with levels in am. Currently holding mycophenolate and prednisone in setting of acute infection  -KTM consulted for further assistance and recommendations regarding transplant medications

## 2022-06-28 NOTE — HPI
"56 y.o. female with HTN, CKD with hx of kidney transplant (2020) immunosuppressed on Cellcept and Tacrolimus presents 6/27/22 as transfer from [facility] in [place] for higher level of care after presenting 6/22 with persistent headache. Patient reports intermittent headaches since March 2022 despite topamax. She saw Neurologist Dr. Majano and had MRI brain W WO contrast (6/6/22) and CT Head at OSH, both reportedly unremarkable for acute intracranial pathology. She was started on Topamax 25 mg qd and Fioricet prn. [facility] (6/12-6/14) at which time Neurology was consulted for headache management. During this early June admission, she received IV headache cocktail including decadron, Mg sulfate, reglan and IVFs with resolution of headache. No focal neuro exam findings at the time. She was discharged on topamax 25 mg BID, Mg and riboflavin with close outpatient follow-up.  reports patient started complaining of severe headache within 24 hours of discharge. She later developed facial droop with inability to close R eye with drooling, impaired hearing and slurred speech. At [facility], we underwent repeat MRI brain without contrast (6/23/22) reportedly unremarkable. LP (6/24/22) remarkable for elevated opening pressure, elevated protein (93), low glucose (6) and +Slovenian ink and Cx for encapsulated yeast. She was started on amphotericin and flucytosine. CT chest read with "multiple indeterminate soft tissue masses in LLL with 2 smaller masses in R lung which may be a part of the same process." Hospital course further complicated by TERESA superimposed on CKD3. She was transferred for disseminated cryptococcal infection. Neurology consulted 6/28/22 for prognostication regarding neuro deficits related to CNS crypto infection.     "

## 2022-06-28 NOTE — ASSESSMENT & PLAN NOTE
56 y.o. female with hx of kidney transplant (2020) immunocompromised on tacrolimus and cellcept presented to OSH 6/22 with persistent headache. Patient reports intermittent pressure like headache since March 2022, but new symptoms of facial droop, drooling, slurred speech and hearing loss x 1.5 weeks. At OSH, MRI brain without contrast performed 6/23 reportedly unremarkable. LP 6/24 with elevated opening pressure, low glucose, elevated protein, +shahnaz ink and Cx with encapsulated yeast. CT chest also remarkable for soft tissues masses of unknown etiology. She was started on amphotericin and flucytosine and transferred to [facility] for higher level of care.     Recommendations:  --continue antifungals per transplant ID recommendations to ensure proper dosing for CNS penetration   Immunosuppression recs per transplant team (tacro 2 mg BID and prednisone 10 mg qd)   --will defer repeat CT chest, further investigations of pulmonary involvement to primary team  --continue treatment of TERESA on CKD  Could repeat MRI brain with contrast on non-urgent basis to evaluate for interval change   --neuro deficits already improving per , discussed deficits may take a few weeks to fully resolve   --headache rated 6/10 today, continue prn headache medications, but limit fioricet and narcotics to minimize medication overuse headache/rebound

## 2022-06-28 NOTE — ASSESSMENT & PLAN NOTE
TERESA on underlying CKD stage III   Likely pre-renal or medication related   BL 1.2 to 1.4 mg/dl   Cr now trending down to 1.7 mg/dl   Peak cr 2.02 mg/dl   Get UA , monitor I/Os , recent ECHO - normal EF   Ordered US transplant   Continue IV fluids

## 2022-06-28 NOTE — CONSULTS
[facility] - Telemetry Stepdown  Infectious Disease  Consult Note    Patient Name: Tala Rivera  MRN: [id number]  Admission Date: 6/27/2022  Hospital Length of Stay: 1 days  Attending Physician: Joanne Braden MD  Primary Care Provider: ANUJA Castellon MD     Isolation Status: No active isolations    Patient information was obtained from patient, spouse/SO and past medical records.      Inpatient consult to Infectious Diseases  Consult performed by: Damien Alexis MD  Consult ordered by: Karthikeyan Hurtado MD  Reason for consult: cryptococcal meningitis        Assessment/Plan:     * Meningitis due to fungal infection  57 y/o F h/o HTN, gastric bypass, ESRD 2/2 HTN s/p DBDKT 4/5/20 (basiliximab induction,, CMV D+/R+, tacro, MMF), COVID19 7/2020, recently discharged 6/2022 for 2 months of headaches, now admitted to [facility] with intractable headaches, dizziness, blurred vision, left sided facial droop, MRI brain negative 6/23, s/p LP 6/24 with CSF: WBC unable to quantitate due to infereing cellular structures, gluc 6, protein 93, shahnaz ink positive for encapsulated yeast and cultures also growing yeast, CT chest with b/l pulmonary nodules started on ambisome, flucytosine 6/26 course notable for TERESA (Cr 1.3->2.0) now [place] [facility] [place]. Feeling better but still with severe headaches, n/v  - cryptococcal meningitis - continue ambisome/flucytosine with plan to repeat LP 2 weeks from 6/24  - monitor electrolytes and renal function  - will follow        Thank you for your consult. I will follow-up with patient. Please contact us if you have any additional questions.    Damien Alexis MD  Infectious Disease  [facility] - Telemetry Stepdown    Subjective:     Principal Problem: Meningitis due to fungal infection    HPI: 57 y/o F h/o HTN, gastric bypass, ESRD 2/2 HTN s/p DBDKT 4/5/20 (basiliximab induction,, CMV D+/R+, tacro, MMF), COVID19 7/2020, recently discharged 6/2022 for 2 months of  headaches, now admitted to [facility] with intractable headaches, dizziness, blurred vision, left sided facial droop, s/p LP  with CSF: WBC unable to quantitate due to infereing cellular structures, gluc 6, protein 93, shahnaz ink positive for yeast and cultures also growing yeast, CT chest with b/l pulmonary nodules started on ambisome, flucytosine  course notable for TERESA (Cr 1.3->2.0) now transferred to [facility] for further care. She states HA and n/v have slightly improved but still not feeling well      Past Medical History:   Diagnosis Date    Anemia of renal disease     ESRD on dialysis     Dr. Muhammad     Essential hypertension     PD catheter dysfunction 10/18/2018    Secondary hyperparathyroidism of renal origin        Past Surgical History:   Procedure Laterality Date    AV FISTULA PLACEMENT Left     never matured    AV graft Right 2015     SECTION      x3; ; , and     COLONOSCOPY N/A 2020    Procedure: COLONOSCOPY;  Surgeon: Darrell Golden MD;  Location: [facility];  Service: Endoscopy;  Laterality: N/A;    GASTRIC BYPASS      KIDNEY TRANSPLANT N/A 2020    Procedure: TRANSPLANT, KIDNEY;  Surgeon: Megan Garcia MD;  Location: [address];  Service: Transplant;  Laterality: N/A;    KIDNEY TRANSPLANT Right 2020    LAPAROSCOPIC REVISION OF PROCEDURE INVOLVING PERITONEAL DIALYSIS CATHETER N/A 10/18/2018    Procedure: REVISION OF PROCEDURE INVOLVING PERITONEAL DIALYSIS CATHETER, LAPAROSCOPIC;  Surgeon: Hair Jimenez MD;  Location: [facility];  Service: General;  Laterality: N/A;    PERITONEAL CATHETER INSERTION N/A 2018    Procedure: Laparoscopic INSERTION, CATHETER, INTRAPERITONEAL;  Surgeon: Hair Jimenez MD;  Location: [facility];  Service: General;  Laterality: N/A;    PERITONEAL CATHETER REMOVAL N/A 2018    Procedure: REMOVAL, CATHETER, DIALYSIS, PERITONEAL;  Surgeon: Hair Jimenez MD;  Location: [facility];  Service:  General;  Laterality: N/A;    WOUND EXPLORATION N/A 12/14/2018    Procedure: EXPLORATION, WOUND-Surgical Site Irrigation;  Surgeon: Hair Jimenez MD;  Location: [facility];  Service: General;  Laterality: N/A;       Review of patient's allergies indicates:   Allergen Reactions    Sulfa (sulfonamide antibiotics) Hives       Medications:  Medications Prior to Admission   Medication Sig    acetaminophen (TYLENOL) 325 MG tablet Take 2 tablets (650 mg total) by mouth every 6 (six) hours as needed (headache).    butalbital-acetaminophen-caffeine -40 mg (FIORICET, ESGIC) -40 mg per tablet Take 1 tablet by mouth daily as needed (migraines).    calcitRIOL (ROCALTROL) 0.25 MCG Cap Take 2 capsules (0.5 mcg total) by mouth once daily.    docusate sodium (COLACE) 100 MG capsule Take 1 capsule (100 mg total) by mouth 3 (three) times daily as needed for Constipation.    l-methylfolate-b2-b6-b12 (CEREFOLIN) 6-5-50-1 mg Tab Take 1 tablet by mouth once daily.    magnesium oxide (MAG-OX) 400 mg (241.3 mg magnesium) tablet Take 1 tablet (400 mg total) by mouth once daily.    mycophenolate (CELLCEPT) 250 mg Cap Take 2 capsules (500 mg total) by mouth 2 (two) times daily.    NIFEdipine (PROCARDIA-XL) 30 MG (OSM) 24 hr tablet TAKE ONE TABLET BY MOUTH TWICE DAILY hold if systolic blood pressure is less than 130    predniSONE (DELTASONE) 5 MG tablet Take 1 tablet (5 mg total) by mouth once daily.    sodium bicarbonate 650 MG tablet Take 2 tablets (1,300 mg total) by mouth 2 (two) times daily.    tacrolimus (PROGRAF) 1 MG Cap Take 3 capsules (3 mg total) by mouth every morning AND 2 capsules (2 mg total) every evening.    topiramate (TOPAMAX) 25 MG tablet Take 1 tablet (25 mg total) by mouth 2 (two) times daily.     Antibiotics (From admission, onward)                Start     Stop Route Frequency Ordered    06/27/22 5953  mupirocin 2 % ointment         07/02 2059 Nasl 2 times daily 06/27/22 2203          Antifungals  (From admission, onward)                Start     Stop Route Frequency Ordered    06/28/22 0330  flucytosine capsule 1,500 mg         -- Oral Every 6 hours 06/27/22 5547    06/28/22 0230  amphotericin B liposome (AMBISOME) 300 mg in dextrose 5 % 300 mL IVPB  (amphotericin B liposome (AMBISOME) IVPB panel)         -- IV Every 24 hours (non-standard times) 06/28/22 0047          Antivirals (From admission, onward)      None             Immunization History   Administered Date(s) Administered    COVID-19, MRNA, LN-S, PF (Pfizer) (Purple Cap) 03/10/2021, 03/31/2021    Hepatitis A, Adult 09/27/2016, 03/28/2017    Pneumococcal Conjugate - 13 Valent 09/27/2016    Pneumococcal Polysaccharide - 23 Valent 11/28/2016    Tdap 09/27/2016       Family History       Problem Relation (Age of Onset)    Diabetes Mother    Down syndrome Son    Heart disease Mother    Hypertension Mother    Kidney disease Mother    Lupus Sister          Social History     Socioeconomic History    Marital status:    Tobacco Use    Smoking status: Never Smoker    Smokeless tobacco: Never Used   Substance and Sexual Activity    Alcohol use: No    Drug use: No    Sexual activity: Yes     Partners: Male   Social History Narrative    She lives with  and 2 dogs and 1 cat     She works as  at [employer]      Review of Systems   Constitutional:  Positive for fatigue. Negative for activity change, chills and fever.   HENT:  Negative for congestion, mouth sores, rhinorrhea, sinus pressure and sore throat.    Eyes:  Negative for photophobia, pain and redness.   Respiratory:  Negative for cough, chest tightness, shortness of breath and wheezing.    Cardiovascular:  Negative for chest pain and leg swelling.   Gastrointestinal:  Negative for abdominal distention, abdominal pain, diarrhea, nausea and vomiting.   Endocrine: Negative for polyuria.   Genitourinary:  Negative for decreased urine volume, dysuria and flank pain.    Musculoskeletal:  Negative for joint swelling and neck pain.   Skin:  Negative for color change.   Allergic/Immunologic: Negative for food allergies.   Neurological:  Positive for dizziness and headaches. Negative for weakness.   Hematological:  Negative for adenopathy.   Psychiatric/Behavioral:  Positive for confusion. Negative for agitation. The patient is not nervous/anxious.    Objective:     Vital Signs (Most Recent):  Temp: 98.1 °F (36.7 °C) (06/28/22 0739)  Pulse: (!) 54 (06/28/22 0739)  Resp: 18 (06/28/22 0739)  BP: (!) 121/58 (06/28/22 0739)  SpO2: 97 % (06/28/22 0739)   Vital Signs (24h Range):  Temp:  [97.3 °F (36.3 °C)-98.1 °F (36.7 °C)] 98.1 °F (36.7 °C)  Pulse:  [54-58] 54  Resp:  [14-18] 18  SpO2:  [95 %-97 %] 97 %  BP: (121-134)/(58-62) 121/58        There is no height or weight on file to calculate BMI.    CrCl cannot be calculated (Unknown ideal weight.).    Physical Exam    Significant Labs: CBC:   Recent Labs   Lab 06/28/22  0415   WBC 5.66   HGB 12.5   HCT 38.7   *     CMP:   Recent Labs   Lab 06/28/22  0415   *   K 3.6      CO2 17*   *   BUN 33*   CREATININE 1.7*   CALCIUM 8.4*   PROT 5.4*   ALBUMIN 3.2*   BILITOT 0.5   ALKPHOS 63   AST 14   ALT 20   ANIONGAP 11   EGFRNONAA 33.7*       Significant Imaging: I have reviewed all pertinent imaging results/findings within the past 24 hours.

## 2022-06-28 NOTE — HPI
57 y/o F h/o HTN, gastric bypass, ESRD 2/2 HTN s/p DBDKT 4/5/20 (basiliximab induction,, CMV D+/R+, tacro, MMF), COVID19 7/2020, recently discharged 6/2022 for 2 months of headaches, now admitted to [facility] with intractable headaches, dizziness, blurred vision, left sided facial droop, s/p LP 6/24 with CSF: WBC unable to quantitate due to infereing cellular structures, gluc 6, protein 93, shahnaz ink positive for yeast and cultures also growing yeast, CT chest with b/l pulmonary nodules started on ambisome, flucytosine 6/26 course notable for TERESA (Cr 1.3->2.0) now transferred to [facility] for further care. She states HA and n/v have slightly improved but still not feeling well

## 2022-06-28 NOTE — H&P
[facility] - Telemetry [facility] Medicine  History & Physical    Patient Name: Tala Rivera  MRN: [id number]  Patient Class: IP- Inpatient  Admission Date: 6/27/2022  Attending Physician: Joanne Barden MD   Primary Care Provider: ANUJA Castellon MD         Patient information was obtained from patient, past medical records and ER records.     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       HPI: 56-year-old woman with PMH kidney XPL (2020 OM) on Prograf and Cellcept admitted to [facility] on 06/22 with intractable HA starting about 2 months earlier and with dizziness, blurred vision, left-sided facial droop and dysarthria starting a few days before admission.       On admission /72, HR 69. Neurologic exam is remarkable for left-sided facial weakness and mild dysarthria.  Labs (6/22) WBC 9.4, Hb 15.9, Plts 224, Na 137, K 3.3, CO2 25, BUN 24, Cr 1.32, AG 14, Glu 182 LFTs WNL. COVID neg     LP (6/24) CSF Glu 6, CSF protein 93.  stain pos for yeast like organisms.  Spinal fluid culture pos for encapsulated yeast species.       CT chest WO contrast pos for multiple indeterminate soft tissue masses in the left lower lobe with 2 smaller masses in the right lung which may be part of the same process.       On 06/26 patient started on amphotericin B liposomal (275 IV q.d.) and flucytosine (1500 p.o. q.6h) .  Hospitalization c/b the development of acute renal failure.  Cr 1.32 (6/22) > 1.4 (6/26) > 2.02 (6/27).  Cellcept and prednisone have been held.       Transfer requested for higher level of care (KTM and ID).  Transfer diagnosis disseminated cryptococcal infection, TERESA, kidney XPL.    At time of my assessment, pt. Complains of headache which has been persistent for the last several days. She states that it is somewhat relieved by the fioricet and oxycodone that she was receiving at the outside hospital.      Past  Medical History:   Diagnosis Date    Anemia of renal disease     ESRD on dialysis     Dr. Muhammad     Essential hypertension     PD catheter dysfunction 10/18/2018    Secondary hyperparathyroidism of renal origin        Past Surgical History:   Procedure Laterality Date    AV FISTULA PLACEMENT Left     never matured    AV graft Right 2015     SECTION      x3; ; , and     COLONOSCOPY N/A 2020    Procedure: COLONOSCOPY;  Surgeon: Darrell Golden MD;  Location: [facility] ENDO;  Service: Endoscopy;  Laterality: N/A;    GASTRIC BYPASS  2016    KIDNEY TRANSPLANT N/A 2020    Procedure: TRANSPLANT, KIDNEY;  Surgeon: Megan Garcia MD;  Location: [address];  Service: Transplant;  Laterality: N/A;    KIDNEY TRANSPLANT Right 2020    LAPAROSCOPIC REVISION OF PROCEDURE INVOLVING PERITONEAL DIALYSIS CATHETER N/A 10/18/2018    Procedure: REVISION OF PROCEDURE INVOLVING PERITONEAL DIALYSIS CATHETER, LAPAROSCOPIC;  Surgeon: Hair Jimenez MD;  Location: [facility];  Service: General;  Laterality: N/A;    PERITONEAL CATHETER INSERTION N/A 2018    Procedure: Laparoscopic INSERTION, CATHETER, INTRAPERITONEAL;  Surgeon: Hair Jimenez MD;  Location: [facility];  Service: General;  Laterality: N/A;    PERITONEAL CATHETER REMOVAL N/A 2018    Procedure: REMOVAL, CATHETER, DIALYSIS, PERITONEAL;  Surgeon: Hair Jimenez MD;  Location: [facility];  Service: General;  Laterality: N/A;    WOUND EXPLORATION N/A 2018    Procedure: EXPLORATION, WOUND-Surgical Site Irrigation;  Surgeon: Hair Jimenez MD;  Location: [facility];  Service: General;  Laterality: N/A;       Review of patient's allergies indicates:   Allergen Reactions    Sulfa (sulfonamide antibiotics) Hives       Current Facility-Administered Medications on File Prior to Encounter   Medication    [DISCONTINUED] 0.9%  NaCl infusion    [DISCONTINUED] GENERIC EXTERNAL MEDICATION    [DISCONTINUED] GENERIC EXTERNAL  MEDICATION     Current Outpatient Medications on File Prior to Encounter   Medication Sig    acetaminophen (TYLENOL) 325 MG tablet Take 2 tablets (650 mg total) by mouth every 6 (six) hours as needed (headache).    butalbital-acetaminophen-caffeine -40 mg (FIORICET, ESGIC) -40 mg per tablet Take 1 tablet by mouth daily as needed (migraines).    calcitRIOL (ROCALTROL) 0.25 MCG Cap Take 2 capsules (0.5 mcg total) by mouth once daily.    docusate sodium (COLACE) 100 MG capsule Take 1 capsule (100 mg total) by mouth 3 (three) times daily as needed for Constipation.    l-methylfolate-b2-b6-b12 (CEREFOLIN) 6-5-50-1 mg Tab Take 1 tablet by mouth once daily.    magnesium oxide (MAG-OX) 400 mg (241.3 mg magnesium) tablet Take 1 tablet (400 mg total) by mouth once daily.    mycophenolate (CELLCEPT) 250 mg Cap Take 2 capsules (500 mg total) by mouth 2 (two) times daily.    NIFEdipine (PROCARDIA-XL) 30 MG (OSM) 24 hr tablet TAKE ONE TABLET BY MOUTH TWICE DAILY hold if systolic blood pressure is less than 130    predniSONE (DELTASONE) 5 MG tablet Take 1 tablet (5 mg total) by mouth once daily.    sodium bicarbonate 650 MG tablet Take 2 tablets (1,300 mg total) by mouth 2 (two) times daily.    tacrolimus (PROGRAF) 1 MG Cap Take 3 capsules (3 mg total) by mouth every morning AND 2 capsules (2 mg total) every evening.    topiramate (TOPAMAX) 25 MG tablet Take 1 tablet (25 mg total) by mouth 2 (two) times daily.     Family History       Problem Relation (Age of Onset)    Diabetes Mother    Down syndrome Son    Heart disease Mother    Hypertension Mother    Kidney disease Mother    Lupus Sister          Tobacco Use    Smoking status: Never Smoker    Smokeless tobacco: Never Used   Substance and Sexual Activity    Alcohol use: No    Drug use: No    Sexual activity: Yes     Partners: Male     Review of Systems   Constitutional:  Negative for activity change, appetite change, chills, fever and unexpected  weight change.   HENT:  Negative for congestion and sore throat.    Eyes:  Positive for visual disturbance.   Respiratory:  Negative for cough and shortness of breath.    Cardiovascular:  Negative for chest pain, palpitations and leg swelling.   Gastrointestinal:  Negative for abdominal distention, abdominal pain, blood in stool, constipation, diarrhea, nausea and vomiting.   Genitourinary:  Negative for difficulty urinating, dysuria and hematuria.   Musculoskeletal:  Negative for arthralgias and myalgias.   Skin:  Negative for color change and rash.   Neurological:  Positive for headaches. Negative for dizziness, tremors and seizures.   Objective:     Vital Signs (Most Recent):  Temp: 98 °F (36.7 °C) (06/27/22 2100)  Pulse: (!) 58 (06/27/22 2100)  Resp: 14 (06/27/22 2239)  BP: 134/62 (06/27/22 2100)  SpO2: 97 % (06/27/22 2100)   Vital Signs (24h Range):  Temp:  [98 °F (36.7 °C)] 98 °F (36.7 °C)  Pulse:  [58] 58  Resp:  [14-18] 14  SpO2:  [97 %] 97 %  BP: (134)/(62) 134/62        There is no height or weight on file to calculate BMI.    Physical Exam  Vitals reviewed.   Constitutional:       General: She is not in acute distress.     Appearance: She is well-developed.   HENT:      Head: Normocephalic and atraumatic.   Eyes:      Extraocular Movements: Extraocular movements intact.      Pupils: Pupils are equal, round, and reactive to light.   Neck:      Vascular: No JVD.      Trachea: No tracheal deviation.   Cardiovascular:      Rate and Rhythm: Normal rate and regular rhythm.      Heart sounds: No murmur heard.    No friction rub. No gallop.   Pulmonary:      Effort: No respiratory distress.      Breath sounds: Normal breath sounds. No wheezing or rales.   Abdominal:      General: Bowel sounds are normal. There is no distension.      Palpations: Abdomen is soft. There is no mass.      Tenderness: There is no abdominal tenderness.   Musculoskeletal:         General: No deformity.      Cervical back: Neck supple.    Lymphadenopathy:      Cervical: No cervical adenopathy.   Skin:     General: Skin is warm and dry.      Findings: No rash.   Neurological:      Mental Status: She is alert and oriented to person, place, and time.      Cranial Nerves: Facial asymmetry present.         CRANIAL NERVES     CN III, IV, VI   Pupils are equal, round, and reactive to light.     Significant Labs: All pertinent labs within the past 24 hours have been reviewed.    Significant Imaging: I have reviewed all pertinent imaging results/findings within the past 24 hours.    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 With intractable headache, facial droop, dysarthria (improved). LP shows encapsulated yeast on shahnaz ink stain concerning for fungal meningitis  -Continue Amp B 5/mg kg Q24hrs and flucytosine 1500 mg Q6hrs for treatment. Consult transplant ID for further recommendations regarding antifungal treatment      TERESA (acute kidney injury)  -Cr 2.2 on day of transfer, has been steadily worsening over last few days (baseline ~1.2)  -IV fluids to be given tonight, renal electrolytes ordered. Will hold acetazolamide which was being given at OSH  -Potential concern for Amp B nephrtoxicity, will need to be discussed with KTM and ID  -Continue tacrolimus 1mg BID with levels in am. Currently holding mycophenolate and prednisone in setting of acute infection  -KTM consulted for further assistance and recommendations regarding transplant medications      Long-term use of immunosuppressant medication  -Pt. With opportunistic infection, see above. Holding mycophenolate and prednisone for now      S/P kidney transplant  -See TERESA. KTM consulted for further assistance        VTE Risk Mitigation (From admission, onward)         Ordered     IP VTE HIGH RISK PATIENT  Once         06/27/22 2202     Place sequential compression device  Until discontinued         06/27/22 2202                   Karthikeyan Hurtado MD  Department of Hospital Medicine    [facility] - Telemetry Stepdown

## 2022-06-28 NOTE — ASSESSMENT & PLAN NOTE
57 y/o F h/o HTN, gastric bypass, ESRD 2/2 HTN s/p DBDKT 4/5/20 (basiliximab induction,, CMV D+/R+, tacro, MMF), COVID19 7/2020, recently discharged 6/2022 for 2 months of headaches, now admitted to [facility] with intractable headaches, dizziness, blurred vision, left sided facial droop, MRI brain negative 6/23, s/p LP 6/24 with CSF: WBC unable to quantitate due to infereing cellular structures, gluc 6, protein 93, shahnaz ink positive for encapsulated yeast and cultures also growing yeast, CT chest with b/l pulmonary nodules started on ambisome, flucytosine 6/26 course notable for TERESA (Cr 1.3->2.0) now transferred to [facility] for further care. Feeling better but still with severe headaches, n/v  - cryptococcal meningitis - continue ambisome/flucytosine with plan to repeat LP 2 weeks from 6/24  - monitor electrolytes and renal function  - will follow

## 2022-06-28 NOTE — PLAN OF CARE
Problem: Adult Inpatient Plan of Care  Goal: Plan of Care Review  Outcome: Ongoing, Progressing  Goal: Patient-Specific Goal (Individualized)  Outcome: Ongoing, Progressing  Goal: Absence of Hospital-Acquired Illness or Injury  Outcome: Ongoing, Progressing  Goal: Optimal Comfort and Wellbeing  Outcome: Ongoing, Progressing  Goal: Readiness for Transition of Care  Outcome: Ongoing, Progressing     Problem: Fluid and Electrolyte Imbalance (Acute Kidney Injury/Impairment)  Goal: Fluid and Electrolyte Balance  Outcome: Ongoing, Progressing     Problem: Oral Intake Inadequate (Acute Kidney Injury/Impairment)  Goal: Optimal Nutrition Intake  Outcome: Ongoing, Progressing     Problem: Renal Function Impairment (Acute Kidney Injury/Impairment)  Goal: Effective Renal Function  Outcome: Ongoing, Progressing   Patient is alert, oriented and conscious. Vital signs taken and recorded. SPO2 maintain in RA. Medication given as per order. Intake output recorded.

## 2022-06-28 NOTE — CONSULTS
[facility] - Telemetry Stepdown  Kidney Transplant  Consult Note    Inpatient consult to Kidney/Pancreas Transplant Medicine  Consult performed by: Cristian Cedeno MD  Consult ordered by: Karthikeyan Hurtado MD          Subjective:     History of Present Illness:   56 y.o. female with a PMHx of HTN, kidney transplant in , CKD3 admitted to [facility] on  with intractable HA starting about 2 months earlier and with dizziness, blurred vision, left-sided facial droop and dysarthria starting a few days before admission.    Per documentation   On admission /72, HR 69. Neurologic exam is remarkable for left-sided facial weakness and mild dysarthria.  Cr 1.32   LP () CSF Glu 6, CSF protein 93. [place] stain pos for yeast like organisms.  Spinal fluid culture pos for encapsulated yeast species  CT chest WO contrast pos for multiple indeterminate soft tissue masses in the left lower lobe with 2 smaller masses in the right lung which may be part of the same process.       On  patient started on amphotericin B liposomal (275 IV q.d.) and flucytosine (1500 p.o. q.6h) .  Hospitalization c/b the development of acute renal failure.  Cr 1.32 () > 1.4 () > 2.02 ().  Transfer requested for higher level of care (KTM and ID).  Transfer diagnosis disseminated cryptococcal infection, TERESA, kidney XPL       Interval History:  Sitting with her . She is hard of hearing. She complains about the neck pains. Still continues to have intermittent headaches. Mild facial deviation noticed, slow speech as well.     Past Medical and Surgical History: Ms. Rivera has a past medical history of Anemia of renal disease, ESRD on dialysis, Essential hypertension, PD catheter dysfunction (10/18/2018), and Secondary hyperparathyroidism of renal origin.  She has a past surgical history that includes  section; AV fistula placement (Left); AV graft (Right, 2015); Gastric bypass (2016); Peritoneal catheter  insertion (N/A, 9/4/2018); Laparoscopic revision of procedure involving peritoneal dialysis catheter (N/A, 10/18/2018); Wound exploration (N/A, 12/14/2018); Peritoneal catheter removal (N/A, 12/14/2018); Colonoscopy (N/A, 2/5/2020); Kidney transplant (N/A, 4/5/2020); and Kidney transplant (Right, 04/05/2020).    Past Social and Family History: Ms. Rivera reports that she has never smoked. She has never used smokeless tobacco. She reports that she does not drink alcohol and does not use drugs.Her family history includes Diabetes in her mother; Down syndrome in her son; Heart disease in her mother; Hypertension in her mother; Kidney disease in her mother; Lupus in her sister.    Intake/Output - Last 3 Shifts       None             Review of Systems   Constitutional:  Positive for activity change, appetite change and fatigue.   HENT:  Negative for facial swelling and tinnitus.    Eyes:  Positive for visual disturbance.   Respiratory:  Negative for cough, shortness of breath and wheezing.    Cardiovascular:  Negative for chest pain and palpitations.   Gastrointestinal:  Negative for abdominal distention, abdominal pain, diarrhea and nausea.   Genitourinary:  Negative for difficulty urinating and dysuria.   Skin:  Negative for color change.   Allergic/Immunologic: Positive for immunocompromised state.   Neurological:  Positive for headaches.   Psychiatric/Behavioral:  Negative for agitation and confusion.    Objective:     Vital Signs (Most Recent):  Temp: 98.1 °F (36.7 °C) (06/28/22 0739)  Pulse: (!) 54 (06/28/22 0739)  Resp: 18 (06/28/22 0739)  BP: (!) 121/58 (06/28/22 0739)  SpO2: 97 % (06/28/22 0739)   Vital Signs (24h Range):  Temp:  [97.3 °F (36.3 °C)-98.1 °F (36.7 °C)] 98.1 °F (36.7 °C)  Pulse:  [54-58] 54  Resp:  [14-18] 18  SpO2:  [95 %-97 %] 97 %  BP: (121-134)/(58-62) 121/58           There is no height or weight on file to calculate BMI.    Physical Exam  Constitutional:       Appearance: She is  ill-appearing.   HENT:      Head: Normocephalic and atraumatic.   Eyes:      General: No scleral icterus.     Conjunctiva/sclera: Conjunctivae normal.   Cardiovascular:      Rate and Rhythm: Normal rate.   Pulmonary:      Effort: No respiratory distress.      Breath sounds: No wheezing.   Abdominal:      General: There is no distension.      Palpations: Abdomen is soft.      Tenderness: There is no abdominal tenderness.   Musculoskeletal:      Cervical back: Rigidity present.      Right lower leg: No edema.      Left lower leg: No edema.   Skin:     Coloration: Skin is not jaundiced.   Neurological:      Mental Status: She is oriented to person, place, and time.      Motor: Weakness present.      Comments: Expressive aphasia noticed as well.       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   ID to follow as well       Acute renal failure superimposed on stage 3 chronic kidney disease  TERESA on underlying CKD stage III   Likely pre-renal or medication related   BL 1.2 to 1.4 mg/dl   Cr now trending down to 1.7 mg/dl   Peak cr 2.02 mg/dl   Get UA , monitor I/Os , recent ECHO - normal EF   Ordered US transplant   Continue IV fluids       Long-term use of immunosuppressant medication  Continue prograf per levels   Monitor levels to assess for toxicity  Continue prednisone ---> increased to 10mg daily   Hold MMF         S/P kidney transplant  Kidney transplant 4/5/2020   Underlying ckd stage III  ESRD related to HTN            Cristian Cedeno MD  Kidney Transplant  [facility] - Telemetry Stepdown

## 2022-06-28 NOTE — CONSULTS
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 female with HTN, CKD with hx of kidney transplant (2020) immunosuppressed on Cellcept and Tacrolimus presents 6/27/22 as transfer from [facility] in [place] for higher level of care after presenting 6/22 with persistent headache. Patient reports intermittent headaches since March 2022 despite topamax. She saw Neurologist Dr. Majano and had MRI brain W WO contrast (6/6/22) and CT Head at OSH, both reportedly unremarkable for acute intracranial pathology. She was started on Topamax 25 mg qd and Fioricet prn. [place] [facility] [place] at which time Neurology was consulted for headache management. During this early June admission, she received IV headache cocktail including decadron, Mg sulfate, reglan and IVFs with resolution of headache. No focal neuro exam findings at the time. She was discharged on topamax 25 mg BID, Mg and riboflavin with close outpatient follow-up.  reports patient started complaining of severe headache within 24 hours of discharge. She later developed facial droop with inability to close R eye with drooling, impaired hearing and slurred speech. At [facility], we underwent repeat MRI brain without contrast (6/23/22) reportedly unremarkable. LP (6/24/22) remarkable for elevated opening pressure, elevated protein (93), low glucose (6) and + ink and Cx for encapsulated yeast. She was started on amphotericin and  "flucytosine. CT chest read with "multiple indeterminate soft tissue masses in LLL with 2 smaller masses in R lung which may be a part of the same process." Hospital course further complicated by TERESA superimposed on CKD3. She was transferred for disseminated cryptococcal infection. Neurology consulted 22 for prognostication regarding neuro deficits related to CNS crypto infection.      Past Medical History:   Diagnosis Date    Anemia of renal disease     ESRD on dialysis     Dr. Muhammad     Essential hypertension     PD catheter dysfunction 10/18/2018    Secondary hyperparathyroidism of renal origin      Past Surgical History:   Procedure Laterality Date    AV FISTULA PLACEMENT Left     never matured    AV graft Right 2015     SECTION      x3; ; , and     COLONOSCOPY N/A 2020    Procedure: COLONOSCOPY;  Surgeon: Darrell Golden MD;  Location: [facility];  Service: Endoscopy;  Laterality: N/A;    GASTRIC BYPASS  2016    KIDNEY TRANSPLANT N/A 2020    Procedure: TRANSPLANT, KIDNEY;  Surgeon: Megan Garcia MD;  Location: [address];  Service: Transplant;  Laterality: N/A;    KIDNEY TRANSPLANT Right 2020    LAPAROSCOPIC REVISION OF PROCEDURE INVOLVING PERITONEAL DIALYSIS CATHETER N/A 10/18/2018    Procedure: REVISION OF PROCEDURE INVOLVING PERITONEAL DIALYSIS CATHETER, LAPAROSCOPIC;  Surgeon: Hair Jimenez MD;  Location: [facility];  Service: General;  Laterality: N/A;    PERITONEAL CATHETER INSERTION N/A 2018    Procedure: Laparoscopic INSERTION, CATHETER, INTRAPERITONEAL;  Surgeon: Hair Jimenez MD;  Location: [facility];  Service: General;  Laterality: N/A;    PERITONEAL CATHETER REMOVAL N/A 2018    Procedure: REMOVAL, CATHETER, DIALYSIS, PERITONEAL;  Surgeon: Hair Jimenez MD;  Location: [facility];  Service: General;  Laterality: N/A;    WOUND EXPLORATION N/A 2018    Procedure: EXPLORATION, WOUND-Surgical Site Irrigation;  Surgeon: Hair" FAUSTINO Jimenez MD;  Location: [facility];  Service: General;  Laterality: N/A;     Review of patient's allergies indicates:   Allergen Reactions    Sulfa (sulfonamide antibiotics) Hives     Current Facility-Administered Medications on File Prior to Encounter   Medication    [DISCONTINUED] GENERIC EXTERNAL MEDICATION    [DISCONTINUED] GENERIC EXTERNAL MEDICATION    [DISCONTINUED] sodium bicarbonate tablet     Current Outpatient Medications on File Prior to Encounter   Medication Sig    acetaminophen (TYLENOL) 325 MG tablet Take 2 tablets (650 mg total) by mouth every 6 (six) hours as needed (headache).    butalbital-acetaminophen-caffeine -40 mg (FIORICET, ESGIC) -40 mg per tablet Take 1 tablet by mouth daily as needed (migraines).    calcitRIOL (ROCALTROL) 0.25 MCG Cap Take 2 capsules (0.5 mcg total) by mouth once daily.    docusate sodium (COLACE) 100 MG capsule Take 1 capsule (100 mg total) by mouth 3 (three) times daily as needed for Constipation.    l-methylfolate-b2-b6-b12 (CEREFOLIN) 6-5-50-1 mg Tab Take 1 tablet by mouth once daily.    magnesium oxide (MAG-OX) 400 mg (241.3 mg magnesium) tablet Take 1 tablet (400 mg total) by mouth once daily.    mycophenolate (CELLCEPT) 250 mg Cap Take 2 capsules (500 mg total) by mouth 2 (two) times daily.    NIFEdipine (PROCARDIA-XL) 30 MG (OSM) 24 hr tablet TAKE ONE TABLET BY MOUTH TWICE DAILY hold if systolic blood pressure is less than 130    predniSONE (DELTASONE) 5 MG tablet Take 1 tablet (5 mg total) by mouth once daily.    sodium bicarbonate 650 MG tablet Take 2 tablets (1,300 mg total) by mouth 2 (two) times daily.    tacrolimus (PROGRAF) 1 MG Cap Take 3 capsules (3 mg total) by mouth every morning AND 2 capsules (2 mg total) every evening.    topiramate (TOPAMAX) 25 MG tablet Take 1 tablet (25 mg total) by mouth 2 (two) times daily.     Family History       Problem Relation (Age of Onset)    Diabetes Mother    Down syndrome Son    Heart disease  Mother    Hypertension Mother    Kidney disease Mother    Lupus Sister          Tobacco Use    Smoking status: Never Smoker    Smokeless tobacco: Never Used   Substance and Sexual Activity    Alcohol use: No    Drug use: No    Sexual activity: Yes     Partners: Male     Review of Systems   Constitutional:  Positive for activity change and fatigue. Negative for fever.   HENT:  Positive for hearing loss and voice change.    Eyes:  Positive for visual disturbance. Negative for pain.   Respiratory:  Negative for shortness of breath.    Gastrointestinal:  Negative for nausea and vomiting.   Musculoskeletal:  Positive for neck pain.   Allergic/Immunologic: Positive for immunocompromised state.   Neurological:  Positive for dizziness, facial asymmetry, speech difficulty and headaches. Negative for seizures.   Psychiatric/Behavioral:  Positive for confusion and decreased concentration. The patient is nervous/anxious.    Objective:     Vital Signs (Most Recent):  Temp: 98.8 °F (37.1 °C) (06/28/22 1516)  Pulse: 64 (06/28/22 1516)  Resp: 18 (06/28/22 1516)  BP: (!) 129/59 (06/28/22 1516)  SpO2: 97 % (06/28/22 1516)   Vital Signs (24h Range):  Temp:  [97.3 °F (36.3 °C)-98.8 °F (37.1 °C)] 98.8 °F (37.1 °C)  Pulse:  [54-64] 64  Resp:  [14-18] 18  SpO2:  [95 %-98 %] 97 %  BP: (121-135)/(58-62) 129/59        There is no height or weight on file to calculate BMI.    Physical Exam  Eyes:      Pupils: Pupils are equal, round, and reactive to light.   Psychiatric:         Speech: Speech is slurred.     NEUROLOGICAL EXAMINATION:     MENTAL STATUS   Oriented to person.   Oriented to place. Oriented to city.   Follows 2 step commands.   Attention: normal. Concentration: normal.   Speech: slurred   Level of consciousness: alert    CRANIAL NERVES     CN III, IV, VI   Pupils are equal, round, and reactive to light.  Nystagmus: none   Ophthalmoparesis: none    CN V   Facial sensation intact.     CN VII   Right facial weakness:  peripheral    CN VIII   Hearing: impaired    CN IX, X   Palate: symmetric    CN XII   CN XII normal.     MOTOR EXAM   Muscle bulk: normal  Overall muscle tone: normal  Right arm pronator drift: absent  Left arm pronator drift: absent    Strength   Right deltoid: 5/5  Left deltoid: 5/5  Right biceps: 5/5  Left biceps: 5/5  Right triceps: 5/5  Left triceps: 5/5  Right interossei: 5/5  Left interossei: 5/5  Right iliopsoas: 5/5  Left iliopsoas: 5/5  Right quadriceps: 5/5  Left quadriceps: 5/5  Right anterior tibial: 5/5  Left anterior tibial: 5/5  Right posterior tibial: 5/5  Left posterior tibial: 5/5       No abnormal movements noted on exam     SENSORY EXAM   Light touch normal.     GAIT AND COORDINATION     Gait  Gait: (deferred)    Tremor   Resting tremor: absent    Significant Labs: All pertinent lab results from the past 24 hours have been reviewed.    Significant Imaging: I have reviewed and interpreted all pertinent imaging results/findings within the past 24 hours.    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 female with hx of kidney transplant (2020) immunocompromised on tacrolimus and cellcept presented to OSH 6/22 with persistent headache. Patient reports intermittent pressure-like headache since March 2022, but new symptoms of facial droop, drooling, slurred speech and hearing loss x 1.5 weeks. At OSH, MRI brain without contrast performed 6/23 reportedly unremarkable. LP 6/24 with elevated opening pressure, low glucose, elevated protein, +shahnaz ink and Cx with encapsulated yeast. CT chest also remarkable for soft tissues masses of unknown etiology. She was started on amphotericin and flucytosine and transferred to [facility] for higher level of care of disseminated cryptococcal infection.     Recommendations:  --continue antifungals per transplant ID recommendations to ensure proper dosing for CNS penetration   Immunosuppression recs per transplant team (tacro 2 mg BID and prednisone  10 mg qd)   --will defer repeat CT chest, further investigations of pulmonary involvement to primary team  --continue treatment of TERESA on CKD  Could repeat MRI brain with contrast on non-urgent basis to evaluate for interval change   --neuro deficits already improving per , discussed deficits may take a few weeks to fully resolve   --headache rated 6/10 today, continue prn headache medications, but limit fioricet and narcotics to minimize medication overuse/rebound headaches     VTE Risk Mitigation (From admission, onward)         Ordered     IP VTE HIGH RISK PATIENT  Once         06/27/22 2202     Place sequential compression device  Until discontinued         06/27/22 2202              Thank you for your consult. Please call with questions or clarifications    Olga Mcdaniel PA-C  General Neurology Consult

## 2022-06-28 NOTE — SUBJECTIVE & OBJECTIVE
Subjective:     History of Present Illness:   56 y.o. female with a PMHx of HTN, kidney transplant in , CKD3 admitted to [facility] on  with intractable HA starting about 2 months earlier and with dizziness, blurred vision, left-sided facial droop and dysarthria starting a few days before admission.    Per documentation   On admission /72, HR 69. Neurologic exam is remarkable for left-sided facial weakness and mild dysarthria.  Cr 1.32   LP () CSF Glu 6, CSF protein 93. [place] stain pos for yeast like organisms.  Spinal fluid culture pos for encapsulated yeast species  CT chest WO contrast pos for multiple indeterminate soft tissue masses in the left lower lobe with 2 smaller masses in the right lung which may be part of the same process.       On  patient started on amphotericin B liposomal (275 IV q.d.) and flucytosine (1500 p.o. q.6h) .  Hospitalization c/b the development of acute renal failure.  Cr 1.32 () > 1.4 () > 2.02 ().  Transfer requested for higher level of care (KTM and ID).  Transfer diagnosis disseminated cryptococcal infection, TERESA, kidney XPL       Interval History:  Sitting with her . She is hard of hearing. She complains about the neck pains. Still continues to have intermittent headaches. Mild facial deviation noticed, slow speech as well.     Past Medical and Surgical History: Ms. Rivera has a past medical history of Anemia of renal disease, ESRD on dialysis, Essential hypertension, PD catheter dysfunction (10/18/2018), and Secondary hyperparathyroidism of renal origin.  She has a past surgical history that includes  section; AV fistula placement (Left); AV graft (Right, 2015); Gastric bypass (2016); Peritoneal catheter insertion (N/A, 2018); Laparoscopic revision of procedure involving peritoneal dialysis catheter (N/A, 10/18/2018); Wound exploration (N/A, 2018); Peritoneal catheter removal (N/A, 2018);  Colonoscopy (N/A, 2/5/2020); Kidney transplant (N/A, 4/5/2020); and Kidney transplant (Right, 04/05/2020).    Past Social and Family History: Ms. Rivera reports that she has never smoked. She has never used smokeless tobacco. She reports that she does not drink alcohol and does not use drugs.Her family history includes Diabetes in her mother; Down syndrome in her son; Heart disease in her mother; Hypertension in her mother; Kidney disease in her mother; Lupus in her sister.    Intake/Output - Last 3 Shifts       None             Review of Systems   Constitutional:  Positive for activity change, appetite change and fatigue.   HENT:  Negative for facial swelling and tinnitus.    Eyes:  Positive for visual disturbance.   Respiratory:  Negative for cough, shortness of breath and wheezing.    Cardiovascular:  Negative for chest pain and palpitations.   Gastrointestinal:  Negative for abdominal distention, abdominal pain, diarrhea and nausea.   Genitourinary:  Negative for difficulty urinating and dysuria.   Skin:  Negative for color change.   Allergic/Immunologic: Positive for immunocompromised state.   Neurological:  Positive for headaches.   Psychiatric/Behavioral:  Negative for agitation and confusion.    Objective:     Vital Signs (Most Recent):  Temp: 98.1 °F (36.7 °C) (06/28/22 0739)  Pulse: (!) 54 (06/28/22 0739)  Resp: 18 (06/28/22 0739)  BP: (!) 121/58 (06/28/22 0739)  SpO2: 97 % (06/28/22 0739)   Vital Signs (24h Range):  Temp:  [97.3 °F (36.3 °C)-98.1 °F (36.7 °C)] 98.1 °F (36.7 °C)  Pulse:  [54-58] 54  Resp:  [14-18] 18  SpO2:  [95 %-97 %] 97 %  BP: (121-134)/(58-62) 121/58           There is no height or weight on file to calculate BMI.    Physical Exam  Constitutional:       Appearance: She is ill-appearing.   HENT:      Head: Normocephalic and atraumatic.   Eyes:      General: No scleral icterus.     Conjunctiva/sclera: Conjunctivae normal.   Cardiovascular:      Rate and Rhythm: Normal rate.   Pulmonary:       Effort: No respiratory distress.      Breath sounds: No wheezing.   Abdominal:      General: There is no distension.      Palpations: Abdomen is soft.      Tenderness: There is no abdominal tenderness.   Musculoskeletal:      Cervical back: Rigidity present.      Right lower leg: No edema.      Left lower leg: No edema.   Skin:     Coloration: Skin is not jaundiced.   Neurological:      Mental Status: She is oriented to person, place, and time.      Motor: Weakness present.      Comments: Expressive aphasia noticed as well.       Significant Labs:  BMP:   Recent Labs   Lab 06/28/22  0415   *   *   K 3.6      CO2 17*   BUN 33*   CREATININE 1.7*   CALCIUM 8.4*       Diagnostics:  Reviewed

## 2022-06-28 NOTE — HPI
56 y.o. female with a PMHx of HTN, kidney transplant in 2020, CKD3 admitted to [facility] on 06/22 with intractable HA starting about 2 months earlier and with dizziness, blurred vision, left-sided facial droop and dysarthria starting a few days before admission.    Per documentation   On admission /72, HR 69. Neurologic exam is remarkable for left-sided facial weakness and mild dysarthria.  Cr 1.32   LP (6/24) CSF Glu 6, CSF protein 93.  stain pos for yeast like organisms.  Spinal fluid culture pos for encapsulated yeast species  CT chest WO contrast pos for multiple indeterminate soft tissue masses in the left lower lobe with 2 smaller masses in the right lung which may be part of the same process.       On 06/26 patient started on amphotericin B liposomal (275 IV q.d.) and flucytosine (1500 p.o. q.6h) .  Hospitalization c/b the development of acute renal failure.  Cr 1.32 (6/22) > 1.4 (6/26) > 2.02 (6/27).  Transfer requested for higher level of care (KTM and ID).  Transfer diagnosis disseminated cryptococcal infection, TERESA, kidney XPL

## 2022-06-28 NOTE — ASSESSMENT & PLAN NOTE
Continue prograf per levels   Monitor levels to assess for toxicity  Continue prednisone ---> increased to 10mg daily   Hold MMF

## 2022-06-28 NOTE — SUBJECTIVE & OBJECTIVE
Past Medical History:   Diagnosis Date    Anemia of renal disease     ESRD on dialysis     Dr. Muhammad     Essential hypertension     PD catheter dysfunction 10/18/2018    Secondary hyperparathyroidism of renal origin      Past Surgical History:   Procedure Laterality Date    AV FISTULA PLACEMENT Left     never matured    AV graft Right 2015     SECTION      x3; ; , and     COLONOSCOPY N/A 2020    Procedure: COLONOSCOPY;  Surgeon: Darrell Golden MD;  Location: [facility];  Service: Endoscopy;  Laterality: N/A;    GASTRIC BYPASS  2016    KIDNEY TRANSPLANT N/A 2020    Procedure: TRANSPLANT, KIDNEY;  Surgeon: Megan Garcia MD;  Location: [address];  Service: Transplant;  Laterality: N/A;    KIDNEY TRANSPLANT Right 2020    LAPAROSCOPIC REVISION OF PROCEDURE INVOLVING PERITONEAL DIALYSIS CATHETER N/A 10/18/2018    Procedure: REVISION OF PROCEDURE INVOLVING PERITONEAL DIALYSIS CATHETER, LAPAROSCOPIC;  Surgeon: Hair Jimenez MD;  Location: [facility];  Service: General;  Laterality: N/A;    PERITONEAL CATHETER INSERTION N/A 2018    Procedure: Laparoscopic INSERTION, CATHETER, INTRAPERITONEAL;  Surgeon: Hair Jimenez MD;  Location: [facility];  Service: General;  Laterality: N/A;    PERITONEAL CATHETER REMOVAL N/A 2018    Procedure: REMOVAL, CATHETER, DIALYSIS, PERITONEAL;  Surgeon: Hair Jimenez MD;  Location: [facility];  Service: General;  Laterality: N/A;    WOUND EXPLORATION N/A 2018    Procedure: EXPLORATION, WOUND-Surgical Site Irrigation;  Surgeon: Hair Jimenez MD;  Location: [facility];  Service: General;  Laterality: N/A;     Review of patient's allergies indicates:   Allergen Reactions    Sulfa (sulfonamide antibiotics) Hives     Current Facility-Administered Medications on File Prior to Encounter   Medication    [DISCONTINUED] GENERIC EXTERNAL MEDICATION    [DISCONTINUED] GENERIC EXTERNAL MEDICATION    [DISCONTINUED] sodium bicarbonate tablet      Current Outpatient Medications on File Prior to Encounter   Medication Sig    acetaminophen (TYLENOL) 325 MG tablet Take 2 tablets (650 mg total) by mouth every 6 (six) hours as needed (headache).    butalbital-acetaminophen-caffeine -40 mg (FIORICET, ESGIC) -40 mg per tablet Take 1 tablet by mouth daily as needed (migraines).    calcitRIOL (ROCALTROL) 0.25 MCG Cap Take 2 capsules (0.5 mcg total) by mouth once daily.    docusate sodium (COLACE) 100 MG capsule Take 1 capsule (100 mg total) by mouth 3 (three) times daily as needed for Constipation.    l-methylfolate-b2-b6-b12 (CEREFOLIN) 6-5-50-1 mg Tab Take 1 tablet by mouth once daily.    magnesium oxide (MAG-OX) 400 mg (241.3 mg magnesium) tablet Take 1 tablet (400 mg total) by mouth once daily.    mycophenolate (CELLCEPT) 250 mg Cap Take 2 capsules (500 mg total) by mouth 2 (two) times daily.    NIFEdipine (PROCARDIA-XL) 30 MG (OSM) 24 hr tablet TAKE ONE TABLET BY MOUTH TWICE DAILY hold if systolic blood pressure is less than 130    predniSONE (DELTASONE) 5 MG tablet Take 1 tablet (5 mg total) by mouth once daily.    sodium bicarbonate 650 MG tablet Take 2 tablets (1,300 mg total) by mouth 2 (two) times daily.    tacrolimus (PROGRAF) 1 MG Cap Take 3 capsules (3 mg total) by mouth every morning AND 2 capsules (2 mg total) every evening.    topiramate (TOPAMAX) 25 MG tablet Take 1 tablet (25 mg total) by mouth 2 (two) times daily.     Family History       Problem Relation (Age of Onset)    Diabetes Mother    Down syndrome Son    Heart disease Mother    Hypertension Mother    Kidney disease Mother    Lupus Sister          Tobacco Use    Smoking status: Never Smoker    Smokeless tobacco: Never Used   Substance and Sexual Activity    Alcohol use: No    Drug use: No    Sexual activity: Yes     Partners: Male     Review of Systems   Constitutional:  Positive for activity change and fatigue. Negative for fever.   HENT:  Positive for hearing loss and voice  change.    Eyes:  Positive for visual disturbance. Negative for pain.   Respiratory:  Negative for shortness of breath.    Gastrointestinal:  Negative for nausea and vomiting.   Musculoskeletal:  Positive for neck pain.   Allergic/Immunologic: Positive for immunocompromised state.   Neurological:  Positive for dizziness, facial asymmetry, speech difficulty and headaches. Negative for seizures.   Psychiatric/Behavioral:  Positive for confusion and decreased concentration. The patient is nervous/anxious.    Objective:     Vital Signs (Most Recent):  Temp: 98.8 °F (37.1 °C) (06/28/22 1516)  Pulse: 64 (06/28/22 1516)  Resp: 18 (06/28/22 1516)  BP: (!) 129/59 (06/28/22 1516)  SpO2: 97 % (06/28/22 1516)   Vital Signs (24h Range):  Temp:  [97.3 °F (36.3 °C)-98.8 °F (37.1 °C)] 98.8 °F (37.1 °C)  Pulse:  [54-64] 64  Resp:  [14-18] 18  SpO2:  [95 %-98 %] 97 %  BP: (121-135)/(58-62) 129/59        There is no height or weight on file to calculate BMI.    Physical Exam  Eyes:      Pupils: Pupils are equal, round, and reactive to light.   Psychiatric:         Speech: Speech is slurred.     NEUROLOGICAL EXAMINATION:     MENTAL STATUS   Oriented to person.   Oriented to place. Oriented to city.   Follows 2 step commands.   Attention: normal. Concentration: normal.   Speech: slurred   Level of consciousness: alert    CRANIAL NERVES     CN III, IV, VI   Pupils are equal, round, and reactive to light.  Nystagmus: none   Ophthalmoparesis: none    CN V   Facial sensation intact.     CN VII   Right facial weakness: peripheral    CN VIII   Hearing: impaired    CN IX, X   Palate: symmetric    CN XII   CN XII normal.     MOTOR EXAM   Muscle bulk: normal  Overall muscle tone: normal  Right arm pronator drift: absent  Left arm pronator drift: absent    Strength   Right deltoid: 5/5  Left deltoid: 5/5  Right biceps: 5/5  Left biceps: 5/5  Right triceps: 5/5  Left triceps: 5/5  Right interossei: 5/5  Left interossei: 5/5  Right iliopsoas:  5/5  Left iliopsoas: 5/5  Right quadriceps: 5/5  Left quadriceps: 5/5  Right anterior tibial: 5/5  Left anterior tibial: 5/5  Right posterior tibial: 5/5  Left posterior tibial: 5/5       No abnormal movements noted on exam     SENSORY EXAM   Light touch normal.     GAIT AND COORDINATION     Gait  Gait: (deferred)    Tremor   Resting tremor: absent    Significant Labs: All pertinent lab results from the past 24 hours have been reviewed.    Significant Imaging: I have reviewed and interpreted all pertinent imaging results/findings within the past 24 hours.

## 2022-06-28 NOTE — ASSESSMENT & PLAN NOTE
-Pt. With intractable headache, facial droop, dysarthria (improved). LP shows encapsulated yeast on shahnaz ink stain concerning for fungal meningitis  -Continue Amp B 5/mg kg Q24hrs and flucytosine 1500 mg Q6hrs for treatment. Consult transplant ID for further recommendations regarding antifungal treatment

## 2022-06-28 NOTE — HPI
Mrs. 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       On admission /72, HR 69. Neurologic exam is remarkable for left-sided facial weakness and mild dysarthria.  Labs (6/22) WBC 9.4, Hb 15.9, Plts 224, Na 137, K 3.3, CO2 25, BUN 24, Cr 1.32, AG 14, Glu 182 LFTs WNL. COVID neg     LP (6/24) CSF Glu 6, CSF protein 93. Tajik stain pos for yeast like organisms.  Spinal fluid culture pos for encapsulated yeast species.       CT chest WO contrast pos for multiple indeterminate soft tissue masses in the left lower lobe with 2 smaller masses in the right lung which may be part of the same process.       On 06/26 patient started on amphotericin B liposomal (275 IV q.d.) and flucytosine (1500 p.o. q.6h) .  Hospitalization c/b the development of acute renal failure.  Cr 1.32 (6/22) > 1.4 (6/26) > 2.02 (6/27).  Cellcept and prednisone have been held.       Transfer requested for higher level of care (KTM and ID).  Transfer diagnosis disseminated cryptococcal infection, TERESA, kidney XPL.    At time of my assessment, pt. Complains of headache which has been persistent for the last several days. She states that it is somewhat relieved by the fioricet and oxycodone that she was receiving at the outside hospital.

## 2022-06-28 NOTE — SUBJECTIVE & OBJECTIVE
Past Medical History:   Diagnosis Date    Anemia of renal disease     ESRD on dialysis     Dr. Muhammad     Essential hypertension     PD catheter dysfunction 10/18/2018    Secondary hyperparathyroidism of renal origin        Past Surgical History:   Procedure Laterality Date    AV FISTULA PLACEMENT Left     never matured    AV graft Right 2015     SECTION      x3; ; , and     COLONOSCOPY N/A 2020    Procedure: COLONOSCOPY;  Surgeon: Darrell Golden MD;  Location: [facility] ENDO;  Service: Endoscopy;  Laterality: N/A;    GASTRIC BYPASS  2016    KIDNEY TRANSPLANT N/A 2020    Procedure: TRANSPLANT, KIDNEY;  Surgeon: Megan Garcia MD;  Location: [address];  Service: Transplant;  Laterality: N/A;    KIDNEY TRANSPLANT Right 2020    LAPAROSCOPIC REVISION OF PROCEDURE INVOLVING PERITONEAL DIALYSIS CATHETER N/A 10/18/2018    Procedure: REVISION OF PROCEDURE INVOLVING PERITONEAL DIALYSIS CATHETER, LAPAROSCOPIC;  Surgeon: Hair Jimenez MD;  Location: [facility];  Service: General;  Laterality: N/A;    PERITONEAL CATHETER INSERTION N/A 2018    Procedure: Laparoscopic INSERTION, CATHETER, INTRAPERITONEAL;  Surgeon: Hair Jimenez MD;  Location: [facility];  Service: General;  Laterality: N/A;    PERITONEAL CATHETER REMOVAL N/A 2018    Procedure: REMOVAL, CATHETER, DIALYSIS, PERITONEAL;  Surgeon: Hair Jimenez MD;  Location: [facility];  Service: General;  Laterality: N/A;    WOUND EXPLORATION N/A 2018    Procedure: EXPLORATION, WOUND-Surgical Site Irrigation;  Surgeon: Hair Jimenez MD;  Location: [facility];  Service: General;  Laterality: N/A;       Review of patient's allergies indicates:   Allergen Reactions    Sulfa (sulfonamide antibiotics) Hives       Medications:  Medications Prior to Admission   Medication Sig    acetaminophen (TYLENOL) 325 MG tablet Take 2 tablets (650 mg total) by mouth every 6 (six) hours as needed (headache).    butalbital-acetaminophen-caffeine  -40 mg (FIORICET, ESGIC) -40 mg per tablet Take 1 tablet by mouth daily as needed (migraines).    calcitRIOL (ROCALTROL) 0.25 MCG Cap Take 2 capsules (0.5 mcg total) by mouth once daily.    docusate sodium (COLACE) 100 MG capsule Take 1 capsule (100 mg total) by mouth 3 (three) times daily as needed for Constipation.    l-methylfolate-b2-b6-b12 (CEREFOLIN) 6-5-50-1 mg Tab Take 1 tablet by mouth once daily.    magnesium oxide (MAG-OX) 400 mg (241.3 mg magnesium) tablet Take 1 tablet (400 mg total) by mouth once daily.    mycophenolate (CELLCEPT) 250 mg Cap Take 2 capsules (500 mg total) by mouth 2 (two) times daily.    NIFEdipine (PROCARDIA-XL) 30 MG (OSM) 24 hr tablet TAKE ONE TABLET BY MOUTH TWICE DAILY hold if systolic blood pressure is less than 130    predniSONE (DELTASONE) 5 MG tablet Take 1 tablet (5 mg total) by mouth once daily.    sodium bicarbonate 650 MG tablet Take 2 tablets (1,300 mg total) by mouth 2 (two) times daily.    tacrolimus (PROGRAF) 1 MG Cap Take 3 capsules (3 mg total) by mouth every morning AND 2 capsules (2 mg total) every evening.    topiramate (TOPAMAX) 25 MG tablet Take 1 tablet (25 mg total) by mouth 2 (two) times daily.     Antibiotics (From admission, onward)                Start     Stop Route Frequency Ordered    06/27/22 2315  mupirocin 2 % ointment         07/02 2059 Nasl 2 times daily 06/27/22 2203          Antifungals (From admission, onward)                Start     Stop Route Frequency Ordered    06/28/22 0330  flucytosine capsule 1,500 mg         -- Oral Every 6 hours 06/27/22 2357    06/28/22 0230  amphotericin B liposome (AMBISOME) 300 mg in dextrose 5 % 300 mL IVPB  (amphotericin B liposome (AMBISOME) IVPB panel)         -- IV Every 24 hours (non-standard times) 06/28/22 0047          Antivirals (From admission, onward)      None             Immunization History   Administered Date(s) Administered    COVID-19, MRNA, LN-S, PF (Pfizer) (Purple Cap) 03/10/2021,  03/31/2021    Hepatitis A, Adult 09/27/2016, 03/28/2017    Pneumococcal Conjugate - 13 Valent 09/27/2016    Pneumococcal Polysaccharide - 23 Valent 11/28/2016    Tdap 09/27/2016       Family History       Problem Relation (Age of Onset)    Diabetes Mother    Down syndrome Son    Heart disease Mother    Hypertension Mother    Kidney disease Mother    Lupus Sister          Social History     Socioeconomic History    Marital status:    Tobacco Use    Smoking status: Never Smoker    Smokeless tobacco: Never Used   Substance and Sexual Activity    Alcohol use: No    Drug use: No    Sexual activity: Yes     Partners: Male   Social History Narrative    She lives with  and 2 dogs and 1 cat     She works as  at [employer]      Review of Systems   Constitutional:  Positive for fatigue. Negative for activity change, chills and fever.   HENT:  Negative for congestion, mouth sores, rhinorrhea, sinus pressure and sore throat.    Eyes:  Negative for photophobia, pain and redness.   Respiratory:  Negative for cough, chest tightness, shortness of breath and wheezing.    Cardiovascular:  Negative for chest pain and leg swelling.   Gastrointestinal:  Negative for abdominal distention, abdominal pain, diarrhea, nausea and vomiting.   Endocrine: Negative for polyuria.   Genitourinary:  Negative for decreased urine volume, dysuria and flank pain.   Musculoskeletal:  Negative for joint swelling and neck pain.   Skin:  Negative for color change.   Allergic/Immunologic: Negative for food allergies.   Neurological:  Positive for dizziness and headaches. Negative for weakness.   Hematological:  Negative for adenopathy.   Psychiatric/Behavioral:  Positive for confusion. Negative for agitation. The patient is not nervous/anxious.    Objective:     Vital Signs (Most Recent):  Temp: 98.1 °F (36.7 °C) (06/28/22 0739)  Pulse: (!) 54 (06/28/22 0739)  Resp: 18 (06/28/22 0739)  BP: (!) 121/58 (06/28/22 0739)  SpO2:  97 % (06/28/22 0739)   Vital Signs (24h Range):  Temp:  [97.3 °F (36.3 °C)-98.1 °F (36.7 °C)] 98.1 °F (36.7 °C)  Pulse:  [54-58] 54  Resp:  [14-18] 18  SpO2:  [95 %-97 %] 97 %  BP: (121-134)/(58-62) 121/58        There is no height or weight on file to calculate BMI.    CrCl cannot be calculated (Unknown ideal weight.).    Physical Exam    Significant Labs: CBC:   Recent Labs   Lab 06/28/22  0415   WBC 5.66   HGB 12.5   HCT 38.7   *     CMP:   Recent Labs   Lab 06/28/22  0415   *   K 3.6      CO2 17*   *   BUN 33*   CREATININE 1.7*   CALCIUM 8.4*   PROT 5.4*   ALBUMIN 3.2*   BILITOT 0.5   ALKPHOS 63   AST 14   ALT 20   ANIONGAP 11   EGFRNONAA 33.7*       Significant Imaging: I have reviewed all pertinent imaging results/findings within the past 24 hours.

## 2022-06-28 NOTE — SUBJECTIVE & OBJECTIVE
Past Medical History:   Diagnosis Date    Anemia of renal disease     ESRD on dialysis     Dr. Muhammad     Essential hypertension     PD catheter dysfunction 10/18/2018    Secondary hyperparathyroidism of renal origin        Past Surgical History:   Procedure Laterality Date    AV FISTULA PLACEMENT Left     never matured    AV graft Right 2015     SECTION      x3; ; , and     COLONOSCOPY N/A 2020    Procedure: COLONOSCOPY;  Surgeon: Darrell Golden MD;  Location: [facility] ENDO;  Service: Endoscopy;  Laterality: N/A;    GASTRIC BYPASS  2016    KIDNEY TRANSPLANT N/A 2020    Procedure: TRANSPLANT, KIDNEY;  Surgeon: Megan Garcia MD;  Location: [address];  Service: Transplant;  Laterality: N/A;    KIDNEY TRANSPLANT Right 2020    LAPAROSCOPIC REVISION OF PROCEDURE INVOLVING PERITONEAL DIALYSIS CATHETER N/A 10/18/2018    Procedure: REVISION OF PROCEDURE INVOLVING PERITONEAL DIALYSIS CATHETER, LAPAROSCOPIC;  Surgeon: Hair Jimenez MD;  Location: [facility];  Service: General;  Laterality: N/A;    PERITONEAL CATHETER INSERTION N/A 2018    Procedure: Laparoscopic INSERTION, CATHETER, INTRAPERITONEAL;  Surgeon: Hair Jimenez MD;  Location: [facility];  Service: General;  Laterality: N/A;    PERITONEAL CATHETER REMOVAL N/A 2018    Procedure: REMOVAL, CATHETER, DIALYSIS, PERITONEAL;  Surgeon: Hair Jimenez MD;  Location: [facility];  Service: General;  Laterality: N/A;    WOUND EXPLORATION N/A 2018    Procedure: EXPLORATION, WOUND-Surgical Site Irrigation;  Surgeon: Hair Jimenez MD;  Location: [facility];  Service: General;  Laterality: N/A;       Review of patient's allergies indicates:   Allergen Reactions    Sulfa (sulfonamide antibiotics) Hives       Current Facility-Administered Medications on File Prior to Encounter   Medication    [DISCONTINUED] 0.9%  NaCl infusion    [DISCONTINUED] GENERIC EXTERNAL MEDICATION    [DISCONTINUED] GENERIC EXTERNAL MEDICATION      Current Outpatient Medications on File Prior to Encounter   Medication Sig    acetaminophen (TYLENOL) 325 MG tablet Take 2 tablets (650 mg total) by mouth every 6 (six) hours as needed (headache).    butalbital-acetaminophen-caffeine -40 mg (FIORICET, ESGIC) -40 mg per tablet Take 1 tablet by mouth daily as needed (migraines).    calcitRIOL (ROCALTROL) 0.25 MCG Cap Take 2 capsules (0.5 mcg total) by mouth once daily.    docusate sodium (COLACE) 100 MG capsule Take 1 capsule (100 mg total) by mouth 3 (three) times daily as needed for Constipation.    l-methylfolate-b2-b6-b12 (CEREFOLIN) 6-5-50-1 mg Tab Take 1 tablet by mouth once daily.    magnesium oxide (MAG-OX) 400 mg (241.3 mg magnesium) tablet Take 1 tablet (400 mg total) by mouth once daily.    mycophenolate (CELLCEPT) 250 mg Cap Take 2 capsules (500 mg total) by mouth 2 (two) times daily.    NIFEdipine (PROCARDIA-XL) 30 MG (OSM) 24 hr tablet TAKE ONE TABLET BY MOUTH TWICE DAILY hold if systolic blood pressure is less than 130    predniSONE (DELTASONE) 5 MG tablet Take 1 tablet (5 mg total) by mouth once daily.    sodium bicarbonate 650 MG tablet Take 2 tablets (1,300 mg total) by mouth 2 (two) times daily.    tacrolimus (PROGRAF) 1 MG Cap Take 3 capsules (3 mg total) by mouth every morning AND 2 capsules (2 mg total) every evening.    topiramate (TOPAMAX) 25 MG tablet Take 1 tablet (25 mg total) by mouth 2 (two) times daily.     Family History       Problem Relation (Age of Onset)    Diabetes Mother    Down syndrome Son    Heart disease Mother    Hypertension Mother    Kidney disease Mother    Lupus Sister          Tobacco Use    Smoking status: Never Smoker    Smokeless tobacco: Never Used   Substance and Sexual Activity    Alcohol use: No    Drug use: No    Sexual activity: Yes     Partners: Male     Review of Systems   Constitutional:  Negative for activity change, appetite change, chills, fever and unexpected weight change.   HENT:   Negative for congestion and sore throat.    Eyes:  Positive for visual disturbance.   Respiratory:  Negative for cough and shortness of breath.    Cardiovascular:  Negative for chest pain, palpitations and leg swelling.   Gastrointestinal:  Negative for abdominal distention, abdominal pain, blood in stool, constipation, diarrhea, nausea and vomiting.   Genitourinary:  Negative for difficulty urinating, dysuria and hematuria.   Musculoskeletal:  Negative for arthralgias and myalgias.   Skin:  Negative for color change and rash.   Neurological:  Positive for headaches. Negative for dizziness, tremors and seizures.   Objective:     Vital Signs (Most Recent):  Temp: 98 °F (36.7 °C) (06/27/22 2100)  Pulse: (!) 58 (06/27/22 2100)  Resp: 14 (06/27/22 2239)  BP: 134/62 (06/27/22 2100)  SpO2: 97 % (06/27/22 2100)   Vital Signs (24h Range):  Temp:  [98 °F (36.7 °C)] 98 °F (36.7 °C)  Pulse:  [58] 58  Resp:  [14-18] 14  SpO2:  [97 %] 97 %  BP: (134)/(62) 134/62        There is no height or weight on file to calculate BMI.    Physical Exam  Vitals reviewed.   Constitutional:       General: She is not in acute distress.     Appearance: She is well-developed.   HENT:      Head: Normocephalic and atraumatic.   Eyes:      Extraocular Movements: Extraocular movements intact.      Pupils: Pupils are equal, round, and reactive to light.   Neck:      Vascular: No JVD.      Trachea: No tracheal deviation.   Cardiovascular:      Rate and Rhythm: Normal rate and regular rhythm.      Heart sounds: No murmur heard.    No friction rub. No gallop.   Pulmonary:      Effort: No respiratory distress.      Breath sounds: Normal breath sounds. No wheezing or rales.   Abdominal:      General: Bowel sounds are normal. There is no distension.      Palpations: Abdomen is soft. There is no mass.      Tenderness: There is no abdominal tenderness.   Musculoskeletal:         General: No deformity.      Cervical back: Neck supple.   Lymphadenopathy:       Cervical: No cervical adenopathy.   Skin:     General: Skin is warm and dry.      Findings: No rash.   Neurological:      Mental Status: She is alert and oriented to person, place, and time.      Cranial Nerves: Facial asymmetry present.         CRANIAL NERVES     CN III, IV, VI   Pupils are equal, round, and reactive to light.     Significant Labs: All pertinent labs within the past 24 hours have been reviewed.    Significant Imaging: I have reviewed all pertinent imaging results/findings within the past 24 hours.

## 2022-06-28 NOTE — PROGRESS NOTES
Hospital Medicine Plan of Care Note     Admission H&P dated earlier this morning reviewed, and agree with assessment and plan as documented. Pt seen and examined this morning on rounds, ALMA DELIA.  FLOWERS improved.   at bedside and voices frustration regarding hospital to hospital transfer process; concerned about delay in terms of care.  Pending recs from KTM, Transplant ID, neurology.     Continue current plan.

## 2022-06-29 PROBLEM — G51.0 7TH NERVE PALSY: Status: ACTIVE | Noted: 2022-01-01

## 2022-06-29 PROBLEM — H47.11 BILATERAL PAPILLEDEMA DUE TO RAISED INTRACRANIAL PRESSURE: Status: ACTIVE | Noted: 2022-01-01

## 2022-06-29 NOTE — ASSESSMENT & PLAN NOTE
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

## 2022-06-29 NOTE — SUBJECTIVE & OBJECTIVE
Interval History:   No acute events overnight.  Patient seen and examined on rounds.   at bedside.  Patient notes that headache comes and goes; she denies at present.  Hearing is improved this morning.  However, bilateral blurry vision ongoing.  Appetite remains poor.  Patient notes nausea when eating.    Review of Systems   Constitutional:  Positive for activity change, appetite change and fatigue. Negative for chills, diaphoresis and fever.   HENT:  Positive for hearing loss and voice change. Negative for congestion.    Eyes:  Positive for visual disturbance. Negative for pain.   Respiratory:  Negative for cough, shortness of breath and wheezing.    Cardiovascular:  Negative for chest pain, palpitations and leg swelling.   Gastrointestinal:  Positive for nausea. Negative for constipation, diarrhea and vomiting.   Genitourinary:  Negative for difficulty urinating and dysuria.   Musculoskeletal:  Positive for neck pain. Negative for arthralgias.   Skin:  Negative for rash and wound.   Allergic/Immunologic: Positive for immunocompromised state.   Neurological:  Positive for facial asymmetry, speech difficulty and headaches. Negative for dizziness and seizures.   Psychiatric/Behavioral:  Negative for agitation and behavioral problems.      Objective:     Vital Signs (Most Recent):  Temp: 98.1 °F (36.7 °C) (06/29/22 1149)  Pulse: (!) 58 (06/29/22 1149)  Resp: 16 (06/29/22 1212)  BP: 126/64 (06/29/22 1149)  SpO2: (!) 94 % (06/29/22 1149)   Vital Signs (24h Range):  Temp:  [97.6 °F (36.4 °C)-98.8 °F (37.1 °C)] 98.1 °F (36.7 °C)  Pulse:  [48-64] 58  Resp:  [16-18] 16  SpO2:  [94 %-99 %] 94 %  BP: (126-166)/(59-73) 126/64     Weight: 71.7 kg (158 lb 1.1 oz)  Body mass index is 30.87 kg/m².  No intake or output data in the 24 hours ending 06/29/22 1513     Physical Exam  Vitals reviewed.   Constitutional:       General: She is not in acute distress.     Appearance: She is well-developed.   HENT:      Head:  Normocephalic and atraumatic.   Eyes:      Extraocular Movements: Extraocular movements intact.      Pupils: Pupils are equal, round, and reactive to light.   Neck:      Vascular: No JVD.      Trachea: No tracheal deviation.   Cardiovascular:      Rate and Rhythm: Normal rate and regular rhythm.      Heart sounds: No murmur heard.    No friction rub. No gallop.   Pulmonary:      Effort: No respiratory distress.      Breath sounds: Normal breath sounds. No wheezing or rales.   Abdominal:      General: Bowel sounds are normal. There is no distension.      Palpations: Abdomen is soft. There is no mass.      Tenderness: There is no abdominal tenderness.   Musculoskeletal:         General: No deformity.      Cervical back: Neck supple.   Lymphadenopathy:      Cervical: No cervical adenopathy.   Skin:     General: Skin is warm and dry.      Findings: No rash.   Neurological:      Mental Status: She is alert and oriented to person, place, and time.      Cranial Nerves: Facial asymmetry present.   Psychiatric:         Speech: Speech is slurred ((improved)).       Significant Labs: All pertinent labs within the past 24 hours have been reviewed.    Recent Results (from the past 24 hour(s))   Urinalysis    Collection Time: 06/28/22  8:06 PM   Result Value Ref Range    Specimen UA Urine, Clean Catch     Color, UA Yellow Yellow, Straw, Carey    Appearance, UA Clear Clear    pH, UA 8.0 5.0 - 8.0    Specific Gravity, UA 1.015 1.005 - 1.030    Protein, UA Negative Negative    Glucose, UA Negative Negative    Ketones, UA Negative Negative    Bilirubin (UA) Negative Negative    Occult Blood UA Negative Negative    Nitrite, UA Negative Negative    Leukocytes, UA 1+ (A) Negative   Urinalysis Microscopic    Collection Time: 06/28/22  8:06 PM   Result Value Ref Range    RBC, UA 0 0 - 4 /hpf    WBC, UA 2 0 - 5 /hpf    Bacteria Rare None-Occ /hpf    Squam Epithel, UA 2 /hpf    Microscopic Comment SEE COMMENT    PT/INR    Collection Time:  06/29/22  4:24 AM   Result Value Ref Range    Prothrombin Time 11.6 9.0 - 12.5 sec    INR 1.1 0.8 - 1.2   CBC Auto Differential    Collection Time: 06/29/22  4:24 AM   Result Value Ref Range    WBC 4.80 3.90 - 12.70 K/uL    RBC 3.70 (L) 4.00 - 5.40 M/uL    Hemoglobin 10.9 (L) 12.0 - 16.0 g/dL    Hematocrit 34.2 (L) 37.0 - 48.5 %    MCV 92 82 - 98 fL    MCH 29.5 27.0 - 31.0 pg    MCHC 31.9 (L) 32.0 - 36.0 g/dL    RDW 12.6 11.5 - 14.5 %    Platelets 114 (L) 150 - 450 K/uL    MPV 11.8 9.2 - 12.9 fL    Immature Granulocytes 1.9 (H) 0.0 - 0.5 %    Gran # (ANC) 3.8 1.8 - 7.7 K/uL    Immature Grans (Abs) 0.09 (H) 0.00 - 0.04 K/uL    Lymph # 0.3 (L) 1.0 - 4.8 K/uL    Mono # 0.5 0.3 - 1.0 K/uL    Eos # 0.1 0.0 - 0.5 K/uL    Baso # 0.03 0.00 - 0.20 K/uL    nRBC 0 0 /100 WBC    Gran % 78.3 (H) 38.0 - 73.0 %    Lymph % 7.1 (L) 18.0 - 48.0 %    Mono % 10.6 4.0 - 15.0 %    Eosinophil % 1.5 0.0 - 8.0 %    Basophil % 0.6 0.0 - 1.9 %    Differential Method Automated    Comprehensive Metabolic Panel    Collection Time: 06/29/22  4:24 AM   Result Value Ref Range    Sodium 137 136 - 145 mmol/L    Potassium 3.4 (L) 3.5 - 5.1 mmol/L    Chloride 109 95 - 110 mmol/L    CO2 18 (L) 23 - 29 mmol/L    Glucose 128 (H) 70 - 110 mg/dL    BUN 26 (H) 6 - 20 mg/dL    Creatinine 1.3 0.5 - 1.4 mg/dL    Calcium 8.5 (L) 8.7 - 10.5 mg/dL    Total Protein 4.8 (L) 6.0 - 8.4 g/dL    Albumin 2.7 (L) 3.5 - 5.2 g/dL    Total Bilirubin 0.4 0.1 - 1.0 mg/dL    Alkaline Phosphatase 48 (L) 55 - 135 U/L    AST 15 10 - 40 U/L    ALT 18 10 - 44 U/L    Anion Gap 10 8 - 16 mmol/L    eGFR if African American 53.7 (A) >60 mL/min/1.73 m^2    eGFR if non  46.6 (A) >60 mL/min/1.73 m^2   Magnesium    Collection Time: 06/29/22  4:24 AM   Result Value Ref Range    Magnesium 2.3 1.6 - 2.6 mg/dL   Phosphorus    Collection Time: 06/29/22  4:24 AM   Result Value Ref Range    Phosphorus 3.5 2.7 - 4.5 mg/dL   Tacrolimus level    Collection Time: 06/29/22  4:24 AM    Result Value Ref Range    Tacrolimus Lvl 2.3 (L) 5.0 - 15.0 ng/mL   Cryptococcal antigen, blood    Collection Time: 06/29/22  4:24 AM    Specimen: Blood, Venous   Result Value Ref Range    Cryptococcal Ag, Blood Positive 1:8 (A) Negative         Significant Imaging: I have reviewed all pertinent imaging results/findings within the past 24 hours.

## 2022-06-29 NOTE — HOSPITAL COURSE
KTM, transplant ID, Neurology consulted upon admission. Per KTN, normal saline scheduled with amphotericin infusions in addition to bicarb drip, prednisone increased, Prograf continued.  Creatinine down-trending and bicarb drip discontinued.  Per Neurology, outside hospital MR MRI reviewed and note that neuro deficits may take a few weeks to fully resolve; recommend limiting Fioricet and narcotics to minimize medication overuse/rebound headaches.  Per Transplant ID, continue ambisome/flucytosine with plan to repeat LP 2 weeks from 6/24.  Due to visual disturbance in the setting of fungal meningitis, ophthalmology consulted.  No infectious nidus found in retina or vitreous, however grade 3 disc edema bilaterally. Exam finding and subjective complaints explained by meningitis and papilledema.  Recommendation for repeating LP to reduce opening pressure per ID and Neurology in agreement.  On 06/29, patient hearing improved.  Remains with intermittent confusion per  at bedside.    CTH performed 06/30- Slight interval enlargement of the temporal horn of the right lateral ventricle since the recent MRI of 06/23/2022.  Appearance suggestive of early/developing hydrocephalus or ventricular entrapment. NSGY consulted. Per NSGY, recommended NCC transfer for further evaluation. Patient underwent repeat LP 06/30- opening pressure of 35cm- closing pressure of 13cm. Patient w/much improvement in signs and symptoms. CSF findings: <5glucose, 174 protein, 144 WBC, 3000 RBC, 37 lymph, 59 mono/macro. Repeat CSF w/1:8 cryptococcal antigen, CSF culture w/yeast. NCC evaluated, but patient with much improvement in signs and symptoms, and so NCC recommended continued observation on the floor. Discussed w/ID- stated to continue to monitor for worsening signs and symptoms- with closing pressure <25cm, no need for serial LP's at this time.     Doppler US performed on LLE- found to have DVT. Started on heparin drip. Planning for repeat  LP 07/07.  Heparin drip paused and repeat lumbar puncture completed on 07/07.  Per Radiology, CSF was clear to gout 16 mL total; opening pressure 27, closing pressure 12. Gram stain notable for few yeast.  Repeat CT head ordered with stable findings.  Neurosurgery consulted; Considering  shunt placement pending clearance of CSF and decrease in CSF protein.  Recommend ongoing serial LPs for now.  Repeat lumbar puncture performed 7/13.  Opening pressure 21, closing pressure 11. Final repeat LP 07/15- opening pressure 18.5cm, closing pressure 10.     Patient had persistent abd pain and diarrhea- topamax tapered down. Bentyl held as this could also be associated with nausea. Patient was started on lexapro 10mg qday and hydroxyzine 25mg qhs for additional anti-anxiety medication. Psychology consulted.

## 2022-06-29 NOTE — ASSESSMENT & PLAN NOTE
TERESA on underlying CKD stage III   Likely pre-renal or medication related   BL 1.2 to 1.4 mg/dl   Cr now trending down to baseline now   Peak cr 2.02 mg/dl   UA unremarkable  , monitor I/Os , recent ECHO - normal EF    US transplant unremarkable for acute changes   Can stop IV fluids now   Continue sodium bicarb for acidosis

## 2022-06-29 NOTE — NURSING
Patient hearing significantly improved overnight and staff was able to communicate with patient at her baseline. Plan care reviewed with patient and significant other. All agreeable. No complains or concerns expressed at this time.

## 2022-06-29 NOTE — SUBJECTIVE & OBJECTIVE
Subjective:   History of Present Illness:  56 y.o. female with a PMHx of HTN, kidney transplant in 2020, CKD3 admitted to [facility] on 06/22 with intractable HA starting about 2 months earlier and with dizziness, blurred vision, left-sided facial droop and dysarthria starting a few days before admission.    Per documentation   On admission /72, HR 69. Neurologic exam is remarkable for left-sided facial weakness and mild dysarthria.  Cr 1.32   LP (6/24) CSF Glu 6, CSF protein 93. [place] stain pos for yeast like organisms.  Spinal fluid culture pos for encapsulated yeast species  CT chest WO contrast pos for multiple indeterminate soft tissue masses in the left lower lobe with 2 smaller masses in the right lung which may be part of the same process.       On 06/26 patient started on amphotericin B liposomal (275 IV q.d.) and flucytosine (1500 p.o. q.6h) .  Hospitalization c/b the development of acute renal failure.  Cr 1.32 (6/22) > 1.4 (6/26) > 2.02 (6/27).  Transfer requested for higher level of care (KTM and ID).  Transfer diagnosis disseminated cryptococcal infection, TERESA, kidney XPL       Seen by Opthal/ID and Neuro     Interval History:  BP steady   No fevers  No overnight events   Continues to have intermittent headaches and neck discomfort. Reports blurry vision as well.     Past Medical, Surgical, Family, and Social History:   Unchanged from H&P.    Scheduled Meds:   acetaminophen  500 mg Oral Q24H    amphotericin B liposome (AMBISOME) IVPB  300 mg Intravenous Q24H    calcitRIOL  0.5 mcg Oral Daily    diphenhydrAMINE  25 mg Oral Q24H    flucytosine  1,500 mg Oral Q6H    magnesium oxide  400 mg Oral Daily    multivitamin  1 tablet Oral Daily    mupirocin   Nasal BID    polyethylene glycol  17 g Oral BID    predniSONE  10 mg Oral Daily    senna-docusate 8.6-50 mg  1 tablet Oral BID    sodium bicarbonate  1,300 mg Oral BID    sodium chloride 0.9%  500 mL Intravenous Q24H    sodium chloride 0.9%   500 mL Intravenous Q24H    tacrolimus  2 mg Oral BID    topiramate  25 mg Oral BID     Continuous Infusions:  PRN Meds:acetaminophen, albuterol-ipratropium, bisacodyL, butalbital-acetaminophen-caffeine -40 mg, glucose, glucose, melatonin, naloxone, ondansetron, oxyCODONE-acetaminophen, sodium chloride 0.9%    Intake/Output - Last 3 Shifts         06/27 0700 06/28 0659 06/28 0700 06/29 0659 06/29 0700 06/30 0659    P.O.  700     Total Intake  700     Urine  800     Stool  0     Total Output  800     Net  -100            Stool Occurrence  1 x              Review of Systems   Constitutional:  Positive for activity change. Negative for appetite change and fever.   Respiratory:  Negative for shortness of breath and wheezing.    Cardiovascular:  Negative for chest pain and palpitations.   Gastrointestinal:  Negative for abdominal distention and abdominal pain.   Genitourinary:  Negative for difficulty urinating and dysuria.   Allergic/Immunologic: Positive for immunocompromised state.   Neurological:  Positive for headaches. Negative for seizures.   Psychiatric/Behavioral:  Negative for agitation and confusion.     Objective:     Vital Signs (Most Recent):  Temp: 98.1 °F (36.7 °C) (06/29/22 0738)  Pulse: (!) 51 (06/29/22 0738)  Resp: 16 (06/29/22 0854)  BP: (!) 152/67 (06/29/22 0738)  SpO2: 95 % (06/29/22 0738)   Vital Signs (24h Range):  Temp:  [97.6 °F (36.4 °C)-98.8 °F (37.1 °C)] 98.1 °F (36.7 °C)  Pulse:  [48-64] 51  Resp:  [16-18] 16  SpO2:  [95 %-99 %] 95 %  BP: (129-166)/(59-73) 152/67           There is no height or weight on file to calculate BMI.    Physical Exam  Constitutional:       General: She is not in acute distress.     Appearance: She is not ill-appearing.   Eyes:      General: No scleral icterus.  Cardiovascular:      Rate and Rhythm: Normal rate.   Pulmonary:      Effort: No respiratory distress.      Breath sounds: No wheezing.   Abdominal:      General: There is no distension.       Palpations: Abdomen is soft.      Tenderness: There is no abdominal tenderness.   Musculoskeletal:      Right lower leg: No edema.      Left lower leg: No edema.   Skin:     Coloration: Skin is not jaundiced.   Neurological:      Mental Status: She is oriented to person, place, and time.       Laboratory:  BMP:   Recent Labs   Lab 06/28/22  0415 06/29/22  0424   * 128*   * 137   K 3.6 3.4*    109   CO2 17* 18*   BUN 33* 26*   CREATININE 1.7* 1.3   CALCIUM 8.4* 8.5*       Diagnostic Results:  Reviewed

## 2022-06-29 NOTE — CONSULTS
Consultation Report  Ophthalmology Service    Date: 2022    Chief complaint/Reason for Consult: Vision changes, cryptococcus     History of Present Illness: Tala Rivera is a 56 y.o. female who presents with PMH HTN, gastric bypass, kidney transplant 20 (basiliximab induction, CMV D+/R+, tacrolimus, MMF), and previous admission 2mo ago for intractable HA's. 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   Her original complaints were headache, blurry vision, facial droop and ptosis right side.  She has been on ambisome/flucytosine with good response.    With regard to her vision complaints.  She endorses bilateral blurry vision.  The patient denies flashes (like a camera going off), excessive floaters, and/or sheets/curtain blocking part of view. She endorses occasional diplopia that was worse when HA symptoms peaked prior to admission weeks ago and began to resolve last week, consisting of what is best described as binocular horizontal diplopia lasting minutes for several occasions.  Poor historian with regard to diplopia.  Denies diplopia currently. Denies eye pain.  She states that her facial drooping is improving and she is much more able to use her eyelid now.        POcularHx: No history of ocular problems or past ocular surgeries.  PRN prescription glasses; not with her today.     Current eye gtts: none      PMHx:  has a past medical history of Anemia of renal disease, ESRD on dialysis, Essential hypertension, PD catheter dysfunction (10/18/2018), and Secondary hyperparathyroidism of renal origin.     PSurgHx:  has a past surgical history that includes  section; AV fistula placement (Left); AV graft (Right, 2015); Gastric bypass (2016); Peritoneal catheter insertion (N/A, 2018); Laparoscopic revision of procedure involving peritoneal dialysis catheter (N/A, 10/18/2018); Wound exploration (N/A, 2018); Peritoneal catheter removal  (N/A, 12/14/2018); Colonoscopy (N/A, 2/5/2020); Kidney transplant (N/A, 4/5/2020); and Kidney transplant (Right, 04/05/2020).     Home Medications:   Prior to Admission medications    Medication Sig Start Date End Date Taking? Authorizing Provider   acetaminophen (TYLENOL) 325 MG tablet Take 2 tablets (650 mg total) by mouth every 6 (six) hours as needed (headache). 6/14/22   Celine Cano MD   butalbital-acetaminophen-caffeine -40 mg (FIORICET, ESGIC) -40 mg per tablet Take 1 tablet by mouth daily as needed (migraines). 6/14/22 7/14/22  Celine Cano MD   calcitRIOL (ROCALTROL) 0.25 MCG Cap Take 2 capsules (0.5 mcg total) by mouth once daily. 4/25/22      docusate sodium (COLACE) 100 MG capsule Take 1 capsule (100 mg total) by mouth 3 (three) times daily as needed for Constipation. 4/6/20   Ondina Frye MD   l-methylfolate-b2-b6-b12 (CEREFOLIN) 6-5-50-1 mg Tab Take 1 tablet by mouth once daily. 6/8/22   Lefty Griffith MD   magnesium oxide (MAG-OX) 400 mg (241.3 mg magnesium) tablet Take 1 tablet (400 mg total) by mouth once daily. 6/8/22   Lefty Griffith MD   mycophenolate (CELLCEPT) 250 mg Cap Take 2 capsules (500 mg total) by mouth 2 (two) times daily. 3/28/22 3/28/23  Caity Cagle NP   NIFEdipine (PROCARDIA-XL) 30 MG (OSM) 24 hr tablet TAKE ONE TABLET BY MOUTH TWICE DAILY hold if systolic blood pressure is less than 130 9/15/21   Ondina Frye MD   predniSONE (DELTASONE) 5 MG tablet Take 1 tablet (5 mg total) by mouth once daily. 4/25/22      sodium bicarbonate 650 MG tablet Take 2 tablets (1,300 mg total) by mouth 2 (two) times daily. 4/12/21 4/12/22  Lashonda Ritchie NP   tacrolimus (PROGRAF) 1 MG Cap Take 3 capsules (3 mg total) by mouth every morning AND 2 capsules (2 mg total) every evening. 4/7/22 4/7/23  Ondina Frye MD   topiramate (TOPAMAX) 25 MG tablet Take 1 tablet (25 mg total) by mouth 2 (two) times daily. 6/14/22   Celine Cano MD         Medications this encounter:    acetaminophen  500 mg Oral Q24H    amphotericin B liposome (AMBISOME) IVPB  300 mg Intravenous Q24H    calcitRIOL  0.5 mcg Oral Daily    diphenhydrAMINE  25 mg Oral Q24H    flucytosine  1,500 mg Oral Q6H    magnesium oxide  400 mg Oral Daily    multivitamin  1 tablet Oral Daily    mupirocin   Nasal BID    polyethylene glycol  17 g Oral BID    predniSONE  10 mg Oral Daily    senna-docusate 8.6-50 mg  1 tablet Oral BID    sodium bicarbonate  1,300 mg Oral BID    sodium chloride 0.9%  500 mL Intravenous Q24H    sodium chloride 0.9%  500 mL Intravenous Q24H    tacrolimus  2 mg Oral BID    topiramate  25 mg Oral BID       Allergies: is allergic to sulfa (sulfonamide antibiotics).     Social:  reports that she has never smoked. She has never used smokeless tobacco. She reports that she does not drink alcohol and does not use drugs.     Family Hx: No family history of glaucoma, macular degeneration, or blindness. family history includes Diabetes in her mother; Down syndrome in her son; Heart disease in her mother; Hypertension in her mother; Kidney disease in her mother; Lupus in her sister.       Ocular examination/Dilated fundus examination:  Base Eye Exam     Visual Acuity (Snellen - Linear)       Right Left    Near sc 20/80 20/30-    Near cc PH20/30 PH20/30+          Tonometry (Tonopen, 8:28 AM)       Right Left    Pressure 8 11          Pupils       APD    Right None    Left None          Visual Fields       Right Left     Full Full          Extraocular Movement       Right Left     Full Full          Dilation     Both eyes: 2.5% Phenylephrine @ 8:28 AM            Additional Tests     Color       Right Left    Ishihara 11/11 11/11            Slit Lamp and Fundus Exam     External Exam       Right Left    External Normal Normal    Mild lid lag OD  Orbicularis weakness OD      Hypoesthesia to V1 right side.            Slit Lamp Exam       Right Left    Lids/Lashes slight  ptosis Normal    Conjunctiva/Sclera White and quiet White and quiet    Cornea Clear Clear    Anterior Chamber Deep and quiet Deep and quiet    Iris Round and reactive Round and reactive    Lens 1+ NSC 1+ NSC    Anterior Vitreous Normal Normal          Fundus Exam       Right Left    Disc grade 3 disc edema grade 3 disc edema    Macula Normal Normal    Vessels venous tortuosity and engorgement venous tortuosity and engorgement    Periphery Normal Normal                  =======================================    Assessment/Plan:     1. Cryptococcal Meningitis  2. Papilledema, both eyes  3. 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 (resolving)  - In setting of immunosuppression  - No infectious nidus found in retina or vitreous  - There is grade 3 disc edema (papilledema; opening pressure elevated) in both eyes  - exam findings and subjective complaints explained by meningitis and papilledema.   ->The longer she has elevated CSF pressure, the higher her risk of permanent optic nerve damage and vision change is.  There is a congested appearance to the veins in both eyes secondary to optic nerve congestion and indicates higher risk for ischemic event.   - Continue current treatment per neurology/ID/primary; no ophthalmic intervention.   Consider titration up to Topamax dose to 100mg BID from 25mg BID if safe.  Consider acetazolamide, although given renal function concerns, may not be effective.       Please re-consult if the patient experiences any acute changes.  Recommend neuro-ophthalmology follow-up within 2 weeks of discharge for visual fields and nerve evaluation. WE WILL FOLLOW PERIPHERALLY AND ATTEMPT RE-EVALUATION SOMETIME NEXT WEEK.   If there are further questions, please page the on call ophthalmology resident.    Abner Gordon MD  PGY2, Ophthalmology Resident  06/29/2022  8:30 AM

## 2022-06-29 NOTE — ASSESSMENT & PLAN NOTE
-See TERESA. KTM consulted for further assistance  Kidney transplant 4/5/2020   Underlying ckd stage III  ESRD related to HTN

## 2022-06-29 NOTE — ASSESSMENT & PLAN NOTE
Recommend to give 500 ml normal saline with amphotericin as well.   ID to follow as well   Seen by Neuro and Opthal

## 2022-06-29 NOTE — ASSESSMENT & PLAN NOTE
-Cr 2.2 on day of transfer, has been steadily worsening over last few days (baseline ~1.2)  -KTM consulted, appreciate recs; likely prerenal versus medication related  - US transplant unremarkable for acute changes   -S/p IVF  -improving 2.2->1.7->1.3  -per [place] GASTELUM, continue p.o. sodium bicarb  -Strict I/os

## 2022-06-29 NOTE — ASSESSMENT & PLAN NOTE
-Pt. With opportunistic infection, see above. Holding mycophenolate   -KTM following, appreciate recs  Continue prograf per levels   Monitor levels to assess for toxicity  Continue prednisone ---> increased to 10mg daily   Hold MMF

## 2022-06-29 NOTE — PROGRESS NOTES
[facility] - Telemetry 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       On admission /72, HR 69. Neurologic exam is remarkable for left-sided facial weakness and mild dysarthria.  Labs (6/22) WBC 9.4, Hb 15.9, Plts 224, Na 137, K 3.3, CO2 25, BUN 24, Cr 1.32, AG 14, Glu 182 LFTs WNL. COVID neg     LP (6/24) CSF Glu 6, CSF protein 93.  stain pos for yeast like organisms.  Spinal fluid culture pos for encapsulated yeast species.       CT chest WO contrast pos for multiple indeterminate soft tissue masses in the left lower lobe with 2 smaller masses in the right lung which may be part of the same process.       On 06/26 patient started on amphotericin B liposomal (275 IV q.d.) and flucytosine (1500 p.o. q.6h) .  Hospitalization c/b the development of acute renal failure.  Cr 1.32 (6/22) > 1.4 (6/26) > 2.02 (6/27).  Cellcept and prednisone have been held.       Transfer requested for higher level of care (KTM and ID).  Transfer diagnosis disseminated cryptococcal infection, TERESA, kidney XPL.    At time of my assessment, pt. Complains of headache which has been persistent for the last several days. She states that it is somewhat relieved by the fioricet and oxycodone that she was receiving at the outside hospital.      Overview/Hospital Course:  KTM, transplant ID, Neurology consulted upon admission. Per KTN, normal saline scheduled with  amphotericin infusions in addition to bicarb drip, prednisone increased, Prograf continued.  Creatinine down-trending and bicarb drip discontinued.  Per Neurology, outside hospital MR MRI reviewed and note that neuro deficits may take a few weeks to fully resolve; recommend limiting Fioricet and narcotics to minimize medication overuse/rebound headaches.  Per Transplant ID, continue ambisome/flucytosine with plan to repeat LP 2 weeks from 6/24.  Due to visual disturbance in the setting of fungal meningitis, ophthalmology consulted.  No infectious nidus found in retina or vitreous, however grade 3 disc edema bilaterally. Exam finding and subjective complaints explained by meningitis and papilledema.  Recommendation for repeating LP to reduce opening pressure per ID and Neurology in agreement.  On 06/29, patient hearing improved.  Remains with intermittent confusion per  at bedside.      Interval History:   No acute events overnight.  Patient seen and examined on rounds.   at bedside.  Patient notes that headache comes and goes; she denies at present.  Hearing is improved this morning.  However, bilateral blurry vision ongoing.  Appetite remains poor.  Patient notes nausea when eating.    Review of Systems   Constitutional:  Positive for activity change, appetite change and fatigue. Negative for chills, diaphoresis and fever.   HENT:  Positive for hearing loss and voice change. Negative for congestion.    Eyes:  Positive for visual disturbance. Negative for pain.   Respiratory:  Negative for cough, shortness of breath and wheezing.    Cardiovascular:  Negative for chest pain, palpitations and leg swelling.   Gastrointestinal:  Positive for nausea. Negative for constipation, diarrhea and vomiting.   Genitourinary:  Negative for difficulty urinating and dysuria.   Musculoskeletal:  Positive for neck pain. Negative for arthralgias.   Skin:  Negative for rash and wound.   Allergic/Immunologic: Positive for  immunocompromised state.   Neurological:  Positive for facial asymmetry, speech difficulty and headaches. Negative for dizziness and seizures.   Psychiatric/Behavioral:  Negative for agitation and behavioral problems.      Objective:     Vital Signs (Most Recent):  Temp: 98.1 °F (36.7 °C) (06/29/22 1149)  Pulse: (!) 58 (06/29/22 1149)  Resp: 16 (06/29/22 1212)  BP: 126/64 (06/29/22 1149)  SpO2: (!) 94 % (06/29/22 1149)   Vital Signs (24h Range):  Temp:  [97.6 °F (36.4 °C)-98.8 °F (37.1 °C)] 98.1 °F (36.7 °C)  Pulse:  [48-64] 58  Resp:  [16-18] 16  SpO2:  [94 %-99 %] 94 %  BP: (126-166)/(59-73) 126/64     Weight: 71.7 kg (158 lb 1.1 oz)  Body mass index is 30.87 kg/m².  No intake or output data in the 24 hours ending 06/29/22 1513     Physical Exam  Vitals reviewed.   Constitutional:       General: She is not in acute distress.     Appearance: She is well-developed.   HENT:      Head: Normocephalic and atraumatic.   Eyes:      Extraocular Movements: Extraocular movements intact.      Pupils: Pupils are equal, round, and reactive to light.   Neck:      Vascular: No JVD.      Trachea: No tracheal deviation.   Cardiovascular:      Rate and Rhythm: Normal rate and regular rhythm.      Heart sounds: No murmur heard.    No friction rub. No gallop.   Pulmonary:      Effort: No respiratory distress.      Breath sounds: Normal breath sounds. No wheezing or rales.   Abdominal:      General: Bowel sounds are normal. There is no distension.      Palpations: Abdomen is soft. There is no mass.      Tenderness: There is no abdominal tenderness.   Musculoskeletal:         General: No deformity.      Cervical back: Neck supple.   Lymphadenopathy:      Cervical: No cervical adenopathy.   Skin:     General: Skin is warm and dry.      Findings: No rash.   Neurological:      Mental Status: She is alert and oriented to person, place, and time.      Cranial Nerves: Facial asymmetry present.   Psychiatric:         Speech: Speech is slurred  ((improved)).       Significant Labs: All pertinent labs within the past 24 hours have been reviewed.    Recent Results (from the past 24 hour(s))   Urinalysis    Collection Time: 06/28/22  8:06 PM   Result Value Ref Range    Specimen UA Urine, Clean Catch     Color, UA Yellow Yellow, Straw, Carey    Appearance, UA Clear Clear    pH, UA 8.0 5.0 - 8.0    Specific Gravity, UA 1.015 1.005 - 1.030    Protein, UA Negative Negative    Glucose, UA Negative Negative    Ketones, UA Negative Negative    Bilirubin (UA) Negative Negative    Occult Blood UA Negative Negative    Nitrite, UA Negative Negative    Leukocytes, UA 1+ (A) Negative   Urinalysis Microscopic    Collection Time: 06/28/22  8:06 PM   Result Value Ref Range    RBC, UA 0 0 - 4 /hpf    WBC, UA 2 0 - 5 /hpf    Bacteria Rare None-Occ /hpf    Squam Epithel, UA 2 /hpf    Microscopic Comment SEE COMMENT    PT/INR    Collection Time: 06/29/22  4:24 AM   Result Value Ref Range    Prothrombin Time 11.6 9.0 - 12.5 sec    INR 1.1 0.8 - 1.2   CBC Auto Differential    Collection Time: 06/29/22  4:24 AM   Result Value Ref Range    WBC 4.80 3.90 - 12.70 K/uL    RBC 3.70 (L) 4.00 - 5.40 M/uL    Hemoglobin 10.9 (L) 12.0 - 16.0 g/dL    Hematocrit 34.2 (L) 37.0 - 48.5 %    MCV 92 82 - 98 fL    MCH 29.5 27.0 - 31.0 pg    MCHC 31.9 (L) 32.0 - 36.0 g/dL    RDW 12.6 11.5 - 14.5 %    Platelets 114 (L) 150 - 450 K/uL    MPV 11.8 9.2 - 12.9 fL    Immature Granulocytes 1.9 (H) 0.0 - 0.5 %    Gran # (ANC) 3.8 1.8 - 7.7 K/uL    Immature Grans (Abs) 0.09 (H) 0.00 - 0.04 K/uL    Lymph # 0.3 (L) 1.0 - 4.8 K/uL    Mono # 0.5 0.3 - 1.0 K/uL    Eos # 0.1 0.0 - 0.5 K/uL    Baso # 0.03 0.00 - 0.20 K/uL    nRBC 0 0 /100 WBC    Gran % 78.3 (H) 38.0 - 73.0 %    Lymph % 7.1 (L) 18.0 - 48.0 %    Mono % 10.6 4.0 - 15.0 %    Eosinophil % 1.5 0.0 - 8.0 %    Basophil % 0.6 0.0 - 1.9 %    Differential Method Automated    Comprehensive Metabolic Panel    Collection Time: 06/29/22  4:24 AM   Result Value Ref  Range    Sodium 137 136 - 145 mmol/L    Potassium 3.4 (L) 3.5 - 5.1 mmol/L    Chloride 109 95 - 110 mmol/L    CO2 18 (L) 23 - 29 mmol/L    Glucose 128 (H) 70 - 110 mg/dL    BUN 26 (H) 6 - 20 mg/dL    Creatinine 1.3 0.5 - 1.4 mg/dL    Calcium 8.5 (L) 8.7 - 10.5 mg/dL    Total Protein 4.8 (L) 6.0 - 8.4 g/dL    Albumin 2.7 (L) 3.5 - 5.2 g/dL    Total Bilirubin 0.4 0.1 - 1.0 mg/dL    Alkaline Phosphatase 48 (L) 55 - 135 U/L    AST 15 10 - 40 U/L    ALT 18 10 - 44 U/L    Anion Gap 10 8 - 16 mmol/L    eGFR if African American 53.7 (A) >60 mL/min/1.73 m^2    eGFR if non  46.6 (A) >60 mL/min/1.73 m^2   Magnesium    Collection Time: 06/29/22  4:24 AM   Result Value Ref Range    Magnesium 2.3 1.6 - 2.6 mg/dL   Phosphorus    Collection Time: 06/29/22  4:24 AM   Result Value Ref Range    Phosphorus 3.5 2.7 - 4.5 mg/dL   Tacrolimus level    Collection Time: 06/29/22  4:24 AM   Result Value Ref Range    Tacrolimus Lvl 2.3 (L) 5.0 - 15.0 ng/mL   Cryptococcal antigen, blood    Collection Time: 06/29/22  4:24 AM    Specimen: Blood, Venous   Result Value Ref Range    Cryptococcal Ag, Blood Positive 1:8 (A) Negative         Significant Imaging: I have reviewed all pertinent imaging results/findings within the past 24 hours.            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 With intractable headache, facial droop, dysarthria (improved). 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  -Continue Amp B 5/mg kg Q24hrs and flucytosine 1500 mg Q6hrs for treatment.   -transplant ID following; plan to repeat diagnostic LP 2 weeks from 6/24  -KTM following; recommend  500 ml normal saline with amphotericin     Acute renal failure superimposed on stage 3 chronic kidney disease  -Cr 2.2 on day of transfer, has been steadily worsening over last few days (baseline ~1.2)  -KTM consulted, appreciate recs; likely prerenal versus medication related  - US transplant  unremarkable for acute changes   -S/p IVF  -improving 2.2->1.7->1.3  -per [facility]pa GASTELUM, continue p.o. sodium bicarb  -Strict I/os      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  Improving  See papilledema      Bilateral papilledema due to raised intracranial pressure  -Ophthalmology evaluation with grade 3 papilledema in both eyes (longer she has elevated CSF pressure, the higher her risk of permanent optic nerve damage and vision change is.  There is a congested appearance to the veins in both eyes secondary to optic nerve congestion and indicates higher risk for ischemic event), 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  -given opening pressure of 34 at OSH and above papilledema ID recommends repeating LP to reduce opening pressure; Neurology in agreement w/ daily LPs until opening pressure normalizes   - fluoro service contacted as Neurology has signed off; may need to also inquire with anesthesia    Hypokalemia  Po repletion prn to goal      Long-term use of immunosuppressant medication  -Pt. With opportunistic infection, see above. Holding mycophenolate   -KTM following, appreciate recs  Continue prograf per levels   Monitor levels to assess for toxicity  Continue prednisone ---> increased to 10mg daily   Hold MMF         S/P kidney transplant  -See TERESA. KTM consulted for further assistance  Kidney transplant 4/5/2020   Underlying ckd stage III  ESRD related to HTN      VTE Risk Mitigation (From admission, onward)         Ordered     IP VTE HIGH RISK PATIENT  Once         06/27/22 2202     Place sequential compression device  Until discontinued         06/27/22 2202                Discharge Planning   JULITO: 7/1/2022     Code Status: Full Code   Is the patient medically ready for discharge?: No    Reason for patient still in hospital (select all that apply): Patient trending condition, Treatment and Consult recommendations  Discharge Plan A: Home with family                  Joanne Braden MD  Department of Hospital Medicine   [facility] - Telemetry Stepdown

## 2022-06-29 NOTE — ASSESSMENT & PLAN NOTE
-Pt. With intractable headache, facial droop, dysarthria (improved). 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  -Continue Amp B 5/mg kg Q24hrs and flucytosine 1500 mg Q6hrs for treatment.   -transplant ID following; plan to repeat diagnostic LP 2 weeks from 6/24  -KTM following; recommend  500 ml normal saline with amphotericin

## 2022-06-29 NOTE — PROGRESS NOTES
[facility] - Telemetry Stepdown  Kidney Transplant  Progress Note      Reason for Follow-up: Reassessment of Kidney Transplant - 4/5/2020  (#1) recipient and management of immunosuppression.      Subjective:   History of Present Illness:  56 y.o. female with a PMHx of HTN, kidney transplant in 2020, CKD3 admitted to [facility] on 06/22 with intractable HA starting about 2 months earlier and with dizziness, blurred vision, left-sided facial droop and dysarthria starting a few days before admission.    Per documentation   On admission /72, HR 69. Neurologic exam is remarkable for left-sided facial weakness and mild dysarthria.  Cr 1.32   LP (6/24) CSF Glu 6, CSF protein 93.  stain pos for yeast like organisms.  Spinal fluid culture pos for encapsulated yeast species  CT chest WO contrast pos for multiple indeterminate soft tissue masses in the left lower lobe with 2 smaller masses in the right lung which may be part of the same process.       On 06/26 patient started on amphotericin B liposomal (275 IV q.d.) and flucytosine (1500 p.o. q.6h) .  Hospitalization c/b the development of acute renal failure.  Cr 1.32 (6/22) > 1.4 (6/26) > 2.02 (6/27).  Transfer requested for higher level of care (KTM and ID).  Transfer diagnosis disseminated cryptococcal infection, TERESA, kidney XPL       Seen by Opthal/ID and Neuro     Interval History:  BP steady   No fevers  No overnight events   Continues to have intermittent headaches and neck discomfort. Reports blurry vision as well.     Past Medical, Surgical, Family, and Social History:   Unchanged from H&P.    Scheduled Meds:   acetaminophen  500 mg Oral Q24H    amphotericin B liposome (AMBISOME) IVPB  300 mg Intravenous Q24H    calcitRIOL  0.5 mcg Oral Daily    diphenhydrAMINE  25 mg Oral Q24H    flucytosine  1,500 mg Oral Q6H    magnesium oxide  400 mg Oral Daily    multivitamin  1 tablet Oral Daily    mupirocin   Nasal BID    polyethylene glycol  17 g  Oral BID    predniSONE  10 mg Oral Daily    senna-docusate 8.6-50 mg  1 tablet Oral BID    sodium bicarbonate  1,300 mg Oral BID    sodium chloride 0.9%  500 mL Intravenous Q24H    sodium chloride 0.9%  500 mL Intravenous Q24H    tacrolimus  2 mg Oral BID    topiramate  25 mg Oral BID     Continuous Infusions:  PRN Meds:acetaminophen, albuterol-ipratropium, bisacodyL, butalbital-acetaminophen-caffeine -40 mg, glucose, glucose, melatonin, naloxone, ondansetron, oxyCODONE-acetaminophen, sodium chloride 0.9%    Intake/Output - Last 3 Shifts         06/27 0700  06/28 0659 06/28 0700 06/29 0659 06/29 0700  06/30 0659    P.O.  700     Total Intake  700     Urine  800     Stool  0     Total Output  800     Net  -100            Stool Occurrence  1 x              Review of Systems   Constitutional:  Positive for activity change. Negative for appetite change and fever.   Respiratory:  Negative for shortness of breath and wheezing.    Cardiovascular:  Negative for chest pain and palpitations.   Gastrointestinal:  Negative for abdominal distention and abdominal pain.   Genitourinary:  Negative for difficulty urinating and dysuria.   Allergic/Immunologic: Positive for immunocompromised state.   Neurological:  Positive for headaches. Negative for seizures.   Psychiatric/Behavioral:  Negative for agitation and confusion.     Objective:     Vital Signs (Most Recent):  Temp: 98.1 °F (36.7 °C) (06/29/22 0738)  Pulse: (!) 51 (06/29/22 0738)  Resp: 16 (06/29/22 0854)  BP: (!) 152/67 (06/29/22 0738)  SpO2: 95 % (06/29/22 0738)   Vital Signs (24h Range):  Temp:  [97.6 °F (36.4 °C)-98.8 °F (37.1 °C)] 98.1 °F (36.7 °C)  Pulse:  [48-64] 51  Resp:  [16-18] 16  SpO2:  [95 %-99 %] 95 %  BP: (129-166)/(59-73) 152/67           There is no height or weight on file to calculate BMI.    Physical Exam  Constitutional:       General: She is not in acute distress.     Appearance: She is not ill-appearing.   Eyes:      General: No scleral  icterus.  Cardiovascular:      Rate and Rhythm: Normal rate.   Pulmonary:      Effort: No respiratory distress.      Breath sounds: No wheezing.   Abdominal:      General: There is no distension.      Palpations: Abdomen is soft.      Tenderness: There is no abdominal tenderness.   Musculoskeletal:      Right lower leg: No edema.      Left lower leg: No edema.   Skin:     Coloration: Skin is not jaundiced.   Neurological:      Mental Status: She is oriented to person, place, and time.       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   ID to follow as well   Seen by Neuro and Opthal      Acute renal failure superimposed on stage 3 chronic kidney disease  TERESA on underlying CKD stage III   Likely pre-renal or medication related   BL 1.2 to 1.4 mg/dl   Cr now trending down to baseline now   Peak cr 2.02 mg/dl   UA unremarkable  , monitor I/Os , recent ECHO - normal EF    US transplant unremarkable for acute changes   Can stop IV fluids now   Continue sodium bicarb for acidosis     Long-term use of immunosuppressant medication  Continue prograf per levels   Monitor levels to assess for toxicity  Continue prednisone ---> increased to 10mg daily   Hold MMF       S/P kidney transplant  Kidney transplant 4/5/2020   Underlying ckd stage III  ESRD related to HTN      Cristian Cedeno MD  Kidney Transplant  [facility] - Telemetry Stepdown

## 2022-06-29 NOTE — PROGRESS NOTES
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  55 y/o F h/o HTN, gastric bypass, ESRD 2/2 HTN s/p DBDKT 4/5/20 (basiliximab induction,, CMV D+/R+, tacro, MMF), COVID19 7/2020, recently discharged 6/2022 for 2 months of headaches, now admitted to [facility] with intractable headaches, dizziness, blurred vision, left sided facial droop, MRI brain negative 6/23, s/p LP 6/24 with OP of 34cm, CSF: WBC unable to quantitate due to infereing cellular structures, gluc 6, protein 93, shahnaz ink positive for encapsulated yeast and cultures also growing yeast, CT chest with b/l pulmonary nodules started on ambisome, flucytosine 6/26 course notable for TERESA (Cr 1.3->2.0) now transferred to [facility] for further care. Feeling better but still with severe headaches, n/v, serum crypto Ag positive, ophthalmology exam with papilledema   - cryptococcal meningitis - continue ambisome/flucytosine with plan to repeat LP 2 weeks from 6/24  - repeating LP to decrease pressure  - monitor electrolytes and renal function  - will follow        Anticipated Disposition: pending    Thank you for your consult. I will follow-up with patient. Please contact us if you have any additional questions.    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    HPI: 55 y/o F h/o HTN, gastric bypass, ESRD 2/2 HTN s/p DBDKT 4/5/20 (basiliximab induction,, CMV D+/R+, tacro, MMF), COVID19 7/2020, recently discharged 6/2022 for 2 months of headaches, now admitted to [facility] with intractable headaches, dizziness, blurred vision, left sided facial droop,  s/p LP 6/24 with CSF: WBC unable to quantitate due to infereing cellular structures, gluc 6, protein 93, shahnaz ink positive for yeast and cultures also growing yeast, CT chest with b/l pulmonary nodules started on ambisome, flucytosine 6/26 course notable for TERESA (Cr 1.3->2.0) now transferred to [facility] for further care. She states HA and n/v have slightly improved but still not feeling well    Interval History: feeling much better today    Review of Systems   Constitutional:  Positive for fatigue. Negative for activity change, chills and fever.   HENT:  Negative for congestion, mouth sores, rhinorrhea, sinus pressure and sore throat.    Eyes:  Positive for visual disturbance. Negative for photophobia, pain and redness.   Respiratory:  Negative for cough, chest tightness, shortness of breath and wheezing.    Cardiovascular:  Negative for chest pain and leg swelling.   Gastrointestinal:  Negative for abdominal distention, abdominal pain, diarrhea, nausea and vomiting.   Endocrine: Negative for polyuria.   Genitourinary:  Negative for decreased urine volume, dysuria and flank pain.   Musculoskeletal:  Negative for joint swelling and neck pain.   Skin:  Negative for color change.   Allergic/Immunologic: Negative for food allergies.   Neurological:  Positive for speech difficulty, light-headedness and headaches. Negative for dizziness and weakness.   Hematological:  Negative for adenopathy.   Psychiatric/Behavioral:  Negative for agitation and confusion. The patient is not nervous/anxious.    Objective:     Vital Signs (Most Recent):  Temp: 98.3 °F (36.8 °C) (06/29/22 1516)  Pulse: (!) 55 (06/29/22 1516)  Resp: 18 (06/29/22 1516)  BP: (!) 140/65 (06/29/22 1516)  SpO2: 96 % (06/29/22 1516)   Vital Signs (24h Range):  Temp:  [97.6 °F (36.4 °C)-98.3 °F (36.8 °C)] 98.3 °F (36.8 °C)  Pulse:  [48-58] 55  Resp:  [16-18] 18  SpO2:  [94 %-99 %] 96 %  BP: (126-166)/(62-73) 140/65     Weight: 71.7 kg (158 lb 1.1 oz)  Body mass index  is 30.87 kg/m².    Estimated Creatinine Clearance: 43.7 mL/min (based on SCr of 1.3 mg/dL).    Physical Exam  Constitutional:       Appearance: She is well-developed.   HENT:      Head: Normocephalic and atraumatic.   Eyes:      Pupils: Pupils are equal, round, and reactive to light.   Cardiovascular:      Rate and Rhythm: Normal rate.   Pulmonary:      Effort: Pulmonary effort is normal.      Breath sounds: Normal breath sounds.   Abdominal:      General: Bowel sounds are normal.      Palpations: Abdomen is soft.   Musculoskeletal:         General: No tenderness.      Cervical back: Normal range of motion and neck supple.   Skin:     General: Skin is warm and dry.   Neurological:      Mental Status: She is alert and oriented to person, place, and time.       Significant Labs: CBC:   Recent Labs   Lab 06/28/22 0415 06/29/22 0424   WBC 5.66 4.80   HGB 12.5 10.9*   HCT 38.7 34.2*   * 114*     CMP:   Recent Labs   Lab 06/28/22 0415 06/29/22 0424   * 137   K 3.6 3.4*    109   CO2 17* 18*   * 128*   BUN 33* 26*   CREATININE 1.7* 1.3   CALCIUM 8.4* 8.5*   PROT 5.4* 4.8*   ALBUMIN 3.2* 2.7*   BILITOT 0.5 0.4   ALKPHOS 63 48*   AST 14 15   ALT 20 18   ANIONGAP 11 10   EGFRNONAA 33.7* 46.6*       Significant Imaging: I have reviewed all pertinent imaging results/findings within the past 24 hours.

## 2022-06-29 NOTE — PLAN OF CARE
Transplant ID  Plan of Care    Ophthalmology evaluation with papilledema in both eyes, 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.  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

## 2022-06-29 NOTE — ASSESSMENT & PLAN NOTE
57 y/o F h/o HTN, gastric bypass, ESRD 2/2 HTN s/p DBDKT 4/5/20 (basiliximab induction,, CMV D+/R+, tacro, MMF), COVID19 7/2020, recently discharged 6/2022 for 2 months of headaches, now admitted to [facility] with intractable headaches, dizziness, blurred vision, left sided facial droop, MRI brain negative 6/23, s/p LP 6/24 with OP of 34cm, CSF: WBC unable to quantitate due to infereing cellular structures, gluc 6, protein 93, shahnaz ink positive for encapsulated yeast and cultures also growing yeast, CT chest with b/l pulmonary nodules started on ambisome, flucytosine 6/26 course notable for TERESA (Cr 1.3->2.0) now transferred to [facility] for further care. Feeling better but still with severe headaches, n/v, serum crypto Ag positive, ophthalmology exam with papilledema   - cryptococcal meningitis - continue ambisome/flucytosine with plan to repeat LP 2 weeks from 6/24  - repeating LP to decrease pressure  - monitor electrolytes and renal function  - will follow

## 2022-06-29 NOTE — SUBJECTIVE & OBJECTIVE
Interval History: feeling much better today    Review of Systems   Constitutional:  Positive for fatigue. Negative for activity change, chills and fever.   HENT:  Negative for congestion, mouth sores, rhinorrhea, sinus pressure and sore throat.    Eyes:  Positive for visual disturbance. Negative for photophobia, pain and redness.   Respiratory:  Negative for cough, chest tightness, shortness of breath and wheezing.    Cardiovascular:  Negative for chest pain and leg swelling.   Gastrointestinal:  Negative for abdominal distention, abdominal pain, diarrhea, nausea and vomiting.   Endocrine: Negative for polyuria.   Genitourinary:  Negative for decreased urine volume, dysuria and flank pain.   Musculoskeletal:  Negative for joint swelling and neck pain.   Skin:  Negative for color change.   Allergic/Immunologic: Negative for food allergies.   Neurological:  Positive for speech difficulty, light-headedness and headaches. Negative for dizziness and weakness.   Hematological:  Negative for adenopathy.   Psychiatric/Behavioral:  Negative for agitation and confusion. The patient is not nervous/anxious.    Objective:     Vital Signs (Most Recent):  Temp: 98.3 °F (36.8 °C) (06/29/22 1516)  Pulse: (!) 55 (06/29/22 1516)  Resp: 18 (06/29/22 1516)  BP: (!) 140/65 (06/29/22 1516)  SpO2: 96 % (06/29/22 1516)   Vital Signs (24h Range):  Temp:  [97.6 °F (36.4 °C)-98.3 °F (36.8 °C)] 98.3 °F (36.8 °C)  Pulse:  [48-58] 55  Resp:  [16-18] 18  SpO2:  [94 %-99 %] 96 %  BP: (126-166)/(62-73) 140/65     Weight: 71.7 kg (158 lb 1.1 oz)  Body mass index is 30.87 kg/m².    Estimated Creatinine Clearance: 43.7 mL/min (based on SCr of 1.3 mg/dL).    Physical Exam  Constitutional:       Appearance: She is well-developed.   HENT:      Head: Normocephalic and atraumatic.   Eyes:      Pupils: Pupils are equal, round, and reactive to light.   Cardiovascular:      Rate and Rhythm: Normal rate.   Pulmonary:      Effort: Pulmonary effort is normal.       Breath sounds: Normal breath sounds.   Abdominal:      General: Bowel sounds are normal.      Palpations: Abdomen is soft.   Musculoskeletal:         General: No tenderness.      Cervical back: Normal range of motion and neck supple.   Skin:     General: Skin is warm and dry.   Neurological:      Mental Status: She is alert and oriented to person, place, and time.       Significant Labs: CBC:   Recent Labs   Lab 06/28/22 0415 06/29/22 0424   WBC 5.66 4.80   HGB 12.5 10.9*   HCT 38.7 34.2*   * 114*     CMP:   Recent Labs   Lab 06/28/22 0415 06/29/22 0424   * 137   K 3.6 3.4*    109   CO2 17* 18*   * 128*   BUN 33* 26*   CREATININE 1.7* 1.3   CALCIUM 8.4* 8.5*   PROT 5.4* 4.8*   ALBUMIN 3.2* 2.7*   BILITOT 0.5 0.4   ALKPHOS 63 48*   AST 14 15   ALT 20 18   ANIONGAP 11 10   EGFRNONAA 33.7* 46.6*       Significant Imaging: I have reviewed all pertinent imaging results/findings within the past 24 hours.

## 2022-06-29 NOTE — PLAN OF CARE
Problem: Fluid and Electrolyte Imbalance (Acute Kidney Injury/Impairment)  Goal: Fluid and Electrolyte Balance  Outcome: Ongoing, Progressing     Problem: Oral Intake Inadequate (Acute Kidney Injury/Impairment)  Goal: Optimal Nutrition Intake  Outcome: Ongoing, Progressing     Problem: Renal Function Impairment (Acute Kidney Injury/Impairment)  Goal: Effective Renal Function  Outcome: Ongoing, Progressing     Pt currently still constipated. Received orders for mag citrate. Will administer and monitor for results.

## 2022-06-30 PROBLEM — G93.2 INCREASED INTRACRANIAL PRESSURE: Status: ACTIVE | Noted: 2022-01-01

## 2022-06-30 NOTE — PROGRESS NOTES
[facility] - Tele[facility] Medicine  Progress Note    Patient Name: Tala Rivera  MRN: [id number]  Patient Class: IP- Inpatient   Admission Date: 6/27/2022  Length of Stay: 3 days  Attending Physician: Keke Hicks MD  Primary Care Provider: ANUJA Castellon MD        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        HPI:  56-year-old woman with PMH kidney XPL (2020 [facility]) on Prograf and Cellcept admitted to [facility] on 06/22 with intractable HA starting about 2 months earlier and with dizziness, blurred vision, left-sided facial droop and dysarthria starting a few days before admission.       On admission /72, HR 69. Neurologic exam is remarkable for left-sided facial weakness and mild dysarthria.  Labs (6/22) WBC 9.4, Hb 15.9, Plts 224, Na 137, K 3.3, CO2 25, BUN 24, Cr 1.32, AG 14, Glu 182 LFTs WNL. COVID neg     LP (6/24) CSF Glu 6, CSF protein 93. [place] stain pos for yeast like organisms.  Spinal fluid culture pos for encapsulated yeast species.       CT chest WO contrast pos for multiple indeterminate soft tissue masses in the left lower lobe with 2 smaller masses in the right lung which may be part of the same process.       On 06/26 patient started on amphotericin B liposomal (275 IV q.d.) and flucytosine (1500 p.o. q.6h) .  Hospitalization c/b the development of acute renal failure.  Cr 1.32 (6/22) > 1.4 (6/26) > 2.02 (6/27).  Cellcept and prednisone have been held.       Transfer requested for higher level of care (KTM and ID).  Transfer diagnosis disseminated cryptococcal infection, TERESA, kidney XPL.    At time of my assessment, pt. Complains of headache which has been persistent for the last several days. She states that it is somewhat relieved by the fioricet and oxycodone that she was receiving at the outside hospital.      Overview/Hospital Course:  KTM, transplant ID, Neurology consulted upon admission. Per KTN, normal saline scheduled  with amphotericin infusions in addition to bicarb drip, prednisone increased, Prograf continued.  Creatinine down-trending and bicarb drip discontinued.  Per Neurology, outside hospital MR MRI reviewed and note that neuro deficits may take a few weeks to fully resolve; recommend limiting Fioricet and narcotics to minimize medication overuse/rebound headaches.  Per Transplant ID, continue ambisome/flucytosine with plan to repeat LP 2 weeks from 6/24.  Due to visual disturbance in the setting of fungal meningitis, ophthalmology consulted.  No infectious nidus found in retina or vitreous, however grade 3 disc edema bilaterally. Exam finding and subjective complaints explained by meningitis and papilledema.  Recommendation for repeating LP to reduce opening pressure per ID and Neurology in agreement.  On 06/29, patient hearing improved.  Remains with intermittent confusion per  at bedside.    CTH performed 06/30- Slight interval enlargement of the temporal horn of the right lateral ventricle since the recent MRI of 06/23/2022.  Appearance suggestive of early/developing hydrocephalus or ventricular entrapment. NSGY consulted.       Interval History: Patient was seen and evaluated this am. Confusion. Per - vomiting and dry heaving. He states that she has had worsening vision changes since the onset of these headaches. No fevers in past 24hrs. Unable to obtain full ROS 2/2 AMS.    Objective:     Vital Signs (Most Recent):  Temp: 97.8 °F (36.6 °C) (06/30/22 0748)  Pulse: (!) 53 (06/30/22 0748)  Resp: 18 (06/30/22 0748)  BP: (!) 157/74 (06/30/22 0748)  SpO2: 96 % (06/30/22 0748)   Vital Signs (24h Range):  Temp:  [97 °F (36.1 °C)-99 °F (37.2 °C)] 97.8 °F (36.6 °C)  Pulse:  [53-62] 53  Resp:  [16-18] 18  SpO2:  [95 %-97 %] 96 %  BP: (135-157)/(60-74) 157/74     Weight: 71.7 kg (158 lb 1.1 oz)  Body mass index is 30.87 kg/m².    Intake/Output Summary (Last 24 hours) at 6/30/2022 0909  Last data filed at 6/29/2022  1800  Gross per 24 hour   Intake 277 ml   Output --   Net 277 ml      Physical Exam  Gen: in NAD, appears stated age  Neuro: awake and oriented to self and place  HEENT: NTNC, EOMI, PERRL, MMM  CVS: RRR, no m/r/g; S1/S2 auscultated with no S3 or S4; capillary refill < 2 sec  Resp: lungs CTAB, no w/r/r; no belabored breathing or accessory muscle use appreciated   Abd: BS+ in all 4 quadrants; NTND, soft to palpation; no organomegaly appreciated   Extrem: pulses full, equal, and regular over all 4 extremities; no UE or LE edema BL    Significant Labs: All pertinent labs within the past 24 hours have been reviewed.  Blood Culture: No results for input(s): LABBLOO in the last 48 hours.  CBC:   Recent Labs   Lab 06/29/22 0424 06/30/22  0448   WBC 4.80 5.37   HGB 10.9* 11.7*   HCT 34.2* 35.4*   * 144*     CMP:   Recent Labs   Lab 06/29/22 0424 06/30/22 0448    137   K 3.4* 4.0    108   CO2 18* 23   * 125*   BUN 26* 23*   CREATININE 1.3 1.3   CALCIUM 8.5* 8.6*   PROT 4.8* 5.0*   ALBUMIN 2.7* 3.0*   BILITOT 0.4 0.5   ALKPHOS 48* 55   AST 15 18   ALT 18 20   ANIONGAP 10 6*   EGFRNONAA 46.6* 46.6*     Magnesium:   Recent Labs   Lab 06/29/22 0424 06/30/22  0448   MG 2.3 2.3     Urine Culture: No results for input(s): LABURIN in the last 48 hours.  Urine Studies:   Recent Labs   Lab 06/28/22 2006   COLORU Yellow   APPEARANCEUA Clear   PHUR 8.0   SPECGRAV 1.015   PROTEINUA Negative   GLUCUA Negative   KETONESU Negative   BILIRUBINUA Negative   OCCULTUA Negative   NITRITE Negative   LEUKOCYTESUR 1+*   RBCUA 0   WBCUA 2   BACTERIA Rare   SQUAMEPITHEL 2       Significant Imaging: I have reviewed all pertinent imaging results/findings within the past 24 hours.    CTH:  FINDINGS:  Intracranial compartment:     Mild enlargement of the temporal horn of the right lateral ventricle, new from prior.  Remainder of the ventricular system is normal in size, stable.  Third ventricle diameter on the order of 0.3  cm.  Well-defined sulci remain present over the cerebral convexities.     The brain parenchyma appears otherwise within normal limits, and unchanged from priors.  No new parenchymal hemorrhage, edema or major vascular distribution infarct.  No intracranial mass effect or midline shift.     No new extra-axial blood or fluid collections.     Partially empty sella configuration.     Skull/extracranial contents (limited evaluation):     No displaced calvarial fracture.     Small volume right mastoid air cell fluid.     The visualized paranasal sinuses are essentially clear.     Impression:     Slight interval enlargement of the temporal horn of the right lateral ventricle since the recent MRI of 06/23/2022.  Appearance suggestive of early/developing hydrocephalus or ventricular entrapment.     No new hemorrhage, edema or infarction.     Partially empty sella configuration.  Nonspecific but configuration could be consistent with the reported history of a elevated intracranial pressure.         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  - Continue Amp B 5/mg kg Q24hrs and flucytosine 1500 mg Q6hrs for treatment.   - Transplant ID following- recommending repeat LP w/cell count and diff, glu, protein, aerobic, fungal cultures, cryptococcal ag   - KTM following; recommend  500mL normal saline with amphotericin     Increased intracranial pressure  - Last intracranial pressure of 34cm from LP at OSH  - Patient with papilledema, vomiting, AMS  - CTH w/developing hydrocephalus and signs of ventricular entrapment  - NSGY notified and consulted      Bilateral papilledema due to raised intracranial pressure  - Ophthalmology evaluation with grade 3 papilledema in both eyes (longer she has elevated CSF pressure, the higher her risk of permanent optic nerve damage  and vision change is.  There is a congested appearance to the veins in both eyes secondary to optic nerve congestion and indicates higher risk for ischemic event), 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  - see increased intracranial pressure    Acute renal failure superimposed on stage 3 chronic kidney disease  - Cr 2.2 on day of transfer, has been steadily worsening over last few days (baseline ~1.2)  - KTM consulted, appreciate recs; likely prerenal versus medication related  - US transplant unremarkable for acute changes   - S/p IVF  - Improving 2.2->1.7->1.3-->1.3  - Per [place]pa GASTELUM, continue p.o. sodium bicarb  - Strict I/O's    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  - Improving  - See papilledema    Hypokalemia  - PO repletion prn to goal    Long-term use of immunosuppressant medication  - Pt with opportunistic infection, see above. Holding mycophenolate   - KTM following, appreciate recs  Continue prograf per levels   Monitor levels to assess for toxicity  Continue prednisone ---> increased to 10mg daily   Hold MMF     S/P kidney transplant  - See TERESA. KTM consulted for further assistance  Kidney transplant 4/5/2020   Underlying ckd stage III  ESRD related to HTN    VTE Risk Mitigation (From admission, onward)         Ordered     IP VTE HIGH RISK PATIENT  Once         06/27/22 2202     Place sequential compression device  Until discontinued         06/27/22 2202                Discharge Planning   JULITO: 7/1/2022     Code Status: Full Code   Is the patient medically ready for discharge?: No    Reason for patient still in hospital (select all that apply): Patient trending condition  Discharge Plan A: Home with family                  Keke Hicks MD  Department of Hospital Medicine   [facility] - Telemetry Stepdown

## 2022-06-30 NOTE — H&P
Inpatient Radiology Pre-procedure Note    History of Present Illness:  Tala iRvera is a 56 y.o. female with a history of renal transplant, transferred from [facility] with cryptococcal meningitis, last LP 22 with elevated OP and culture positive for encapsulated yeasts who presents for lumbar puncture.    Admission H&P reviewed.  Past Medical History:   Diagnosis Date    Anemia of renal disease     ESRD on dialysis     Dr. Muhammad     Essential hypertension     PD catheter dysfunction 10/18/2018    Secondary hyperparathyroidism of renal origin      Past Surgical History:   Procedure Laterality Date    AV FISTULA PLACEMENT Left     never matured    AV graft Right 2015     SECTION      x3; ; , and     COLONOSCOPY N/A 2020    Procedure: COLONOSCOPY;  Surgeon: Darrell Golden MD;  Location: [facility];  Service: Endoscopy;  Laterality: N/A;    GASTRIC BYPASS      KIDNEY TRANSPLANT N/A 2020    Procedure: TRANSPLANT, KIDNEY;  Surgeon: Megan Garcia MD;  Location: [address];  Service: Transplant;  Laterality: N/A;    KIDNEY TRANSPLANT Right 2020    LAPAROSCOPIC REVISION OF PROCEDURE INVOLVING PERITONEAL DIALYSIS CATHETER N/A 10/18/2018    Procedure: REVISION OF PROCEDURE INVOLVING PERITONEAL DIALYSIS CATHETER, LAPAROSCOPIC;  Surgeon: Hair Jimenez MD;  Location: [facility];  Service: General;  Laterality: N/A;    PERITONEAL CATHETER INSERTION N/A 2018    Procedure: Laparoscopic INSERTION, CATHETER, INTRAPERITONEAL;  Surgeon: Hair Jimenez MD;  Location: [facility];  Service: General;  Laterality: N/A;    PERITONEAL CATHETER REMOVAL N/A 2018    Procedure: REMOVAL, CATHETER, DIALYSIS, PERITONEAL;  Surgeon: Hair Jimenez MD;  Location: [facility];  Service: General;  Laterality: N/A;    WOUND EXPLORATION N/A 2018    Procedure: EXPLORATION, WOUND-Surgical Site Irrigation;  Surgeon: Hair Jimenez MD;  Location: [facility];  Service: General;   Laterality: N/A;       Review of Systems:   As documented in primary team H&P    Home Meds:   Prior to Admission medications    Medication Sig Start Date End Date Taking? Authorizing Provider   acetaminophen (TYLENOL) 325 MG tablet Take 2 tablets (650 mg total) by mouth every 6 (six) hours as needed (headache). 6/14/22   Celine Cano MD   butalbital-acetaminophen-caffeine -40 mg (FIORICET, ESGIC) -40 mg per tablet Take 1 tablet by mouth daily as needed (migraines). 6/14/22 7/14/22  Celine Cano MD   calcitRIOL (ROCALTROL) 0.25 MCG Cap Take 2 capsules (0.5 mcg total) by mouth once daily. 4/25/22      docusate sodium (COLACE) 100 MG capsule Take 1 capsule (100 mg total) by mouth 3 (three) times daily as needed for Constipation. 4/6/20   Ondina Frye MD   l-methylfolate-b2-b6-b12 (CEREFOLIN) 6-5-50-1 mg Tab Take 1 tablet by mouth once daily. 6/8/22   Lefty Griffith MD   magnesium oxide (MAG-OX) 400 mg (241.3 mg magnesium) tablet Take 1 tablet (400 mg total) by mouth once daily. 6/8/22   Lefty Griffith MD   mycophenolate (CELLCEPT) 250 mg Cap Take 2 capsules (500 mg total) by mouth 2 (two) times daily. 3/28/22 3/28/23  Caity Cagle NP   NIFEdipine (PROCARDIA-XL) 30 MG (OSM) 24 hr tablet TAKE ONE TABLET BY MOUTH TWICE DAILY hold if systolic blood pressure is less than 130 9/15/21   Ondina Frye MD   predniSONE (DELTASONE) 5 MG tablet Take 1 tablet (5 mg total) by mouth once daily. 4/25/22      sodium bicarbonate 650 MG tablet Take 2 tablets (1,300 mg total) by mouth 2 (two) times daily. 4/12/21 4/12/22  Lashonda Ritchie NP   tacrolimus (PROGRAF) 1 MG Cap Take 3 capsules (3 mg total) by mouth every morning AND 2 capsules (2 mg total) every evening. 4/7/22 4/7/23  Ondina Frye MD   topiramate (TOPAMAX) 25 MG tablet Take 1 tablet (25 mg total) by mouth 2 (two) times daily. 6/14/22   Celine Cano MD     Scheduled Meds:    acetaminophen  500 mg Oral Q24H     amphotericin B liposome (AMBISOME) IVPB  300 mg Intravenous Q24H    calcitRIOL  0.5 mcg Oral Daily    diphenhydrAMINE  25 mg Oral Q24H    flucytosine  1,500 mg Oral Q6H    magnesium oxide  400 mg Oral Daily    multivitamin  1 tablet Oral Daily    mupirocin   Nasal BID    polyethylene glycol  17 g Oral BID    predniSONE  10 mg Oral Daily    senna-docusate 8.6-50 mg  1 tablet Oral BID    sodium bicarbonate  1,300 mg Oral BID    sodium chloride 0.9%  500 mL Intravenous Q24H    sodium chloride 0.9%  500 mL Intravenous Q24H    tacrolimus  3 mg Oral BID    topiramate  25 mg Oral BID     Continuous Infusions:   PRN Meds:acetaminophen, albuterol-ipratropium, bisacodyL, butalbital-acetaminophen-caffeine -40 mg, glucose, glucose, melatonin, naloxone, ondansetron, oxyCODONE-acetaminophen, sodium chloride 0.9%  Anticoagulants/Antiplatelets: no anticoagulation    Allergies:   Review of patient's allergies indicates:   Allergen Reactions    Sulfa (sulfonamide antibiotics) Hives     Sedation Hx: N/A    Labs:  Recent Labs   Lab 06/30/22 0448   INR 1.0       Recent Labs   Lab 06/30/22 0448   WBC 5.37   HGB 11.7*   HCT 35.4*   MCV 92   *      Recent Labs   Lab 06/30/22 0448   *      K 4.0      CO2 23   BUN 23*   CREATININE 1.3   CALCIUM 8.6*   MG 2.3   ALT 20   AST 18   ALBUMIN 3.0*   BILITOT 0.5         Vitals:  Temp: 97.8 °F (36.6 °C) (06/30/22 0748)  Pulse: (!) 53 (06/30/22 0748)  Resp: 18 (06/30/22 0748)  BP: (!) 157/74 (06/30/22 0748)  SpO2: 96 % (06/30/22 0748)     Physical Exam:  ASA: 3  Mallampati: N/A    General: in some mild distress, changes in visual perception  Mental Status: alert and disoriented  HEENT: normocephalic, atraumatic  Chest: unlabored breathing  Heart: regular heart rate  Abdomen: nondistended  Extremity: moves all extremities    Plan: patient will undergo lumbar puncture with opening pressure measurement.  Sedation Plan: local  Consent obtained from  patient's spouse at bedside.    Dayday Thorpe MD PGY-5  Diagnostic Radiology  [facility] [address]

## 2022-06-30 NOTE — PROGRESS NOTES
[facility] - Telemetry Stepdown  Kidney Transplant  Progress Note      Reason for Follow-up: Reassessment of Kidney Transplant - 4/5/2020  (#1) recipient and management of immunosuppression.      Subjective:   History of Present Illness:  56 y.o. female with a PMHx of HTN, kidney transplant in 2020, CKD3 admitted to [facility] on 06/22 with intractable HA starting about 2 months earlier and with dizziness, blurred vision, left-sided facial droop and dysarthria starting a few days before admission.    Per documentation   On admission /72, HR 69. Neurologic exam is remarkable for left-sided facial weakness and mild dysarthria.  Cr 1.32   LP (6/24) CSF Glu 6, CSF protein 93.  stain pos for yeast like organisms.  Spinal fluid culture pos for encapsulated yeast species  CT chest WO contrast pos for multiple indeterminate soft tissue masses in the left lower lobe with 2 smaller masses in the right lung which may be part of the same process.       On 06/26 patient started on amphotericin B liposomal (275 IV q.d.) and flucytosine (1500 p.o. q.6h) .  Hospitalization c/b the development of acute renal failure.  Cr 1.32 (6/22) > 1.4 (6/26) > 2.02 (6/27).  Transfer requested for higher level of care (KTM and ID).  Transfer diagnosis disseminated cryptococcal infection, TERESA, kidney XPL       Hospital Course:  Being treated for crypto meningitis     Interval History:  BP steady   On room air   No fevers   No overnight events   Continues to have headaches   Getting CT head today     Past Medical, Surgical, Family, and Social History:   Unchanged from H&P.    Scheduled Meds:   acetaminophen  500 mg Oral Q24H    amphotericin B liposome (AMBISOME) IVPB  300 mg Intravenous Q24H    calcitRIOL  0.5 mcg Oral Daily    diphenhydrAMINE  25 mg Oral Q24H    flucytosine  1,500 mg Oral Q6H    magnesium oxide  400 mg Oral Daily    multivitamin  1 tablet Oral Daily    mupirocin   Nasal BID    polyethylene glycol  17 g  Oral BID    predniSONE  10 mg Oral Daily    senna-docusate 8.6-50 mg  1 tablet Oral BID    sodium bicarbonate  1,300 mg Oral BID    sodium chloride 0.9%  500 mL Intravenous Q24H    sodium chloride 0.9%  500 mL Intravenous Q24H    tacrolimus  3 mg Oral BID    topiramate  25 mg Oral BID     Continuous Infusions:  PRN Meds:acetaminophen, albuterol-ipratropium, bisacodyL, butalbital-acetaminophen-caffeine -40 mg, glucose, glucose, melatonin, naloxone, ondansetron, oxyCODONE-acetaminophen, sodium chloride 0.9%    Intake/Output - Last 3 Shifts         06/28 0700 06/29 0659 06/29 0700 06/30 0659 06/30 0700 07/01 0659    P.O. 700 277     Total Intake(mL/kg) 700 277 (3.9)     Urine (mL/kg/hr) 800      Stool 0      Total Output 800      Net -100 +277            Urine Occurrence  4 x     Stool Occurrence 1 x 1 x 1 x             Review of Systems   Constitutional:  Positive for activity change and appetite change. Negative for fever.   HENT:  Positive for hearing loss.    Eyes:  Positive for visual disturbance.   Respiratory:  Negative for shortness of breath and wheezing.    Cardiovascular:  Negative for chest pain and palpitations.   Gastrointestinal:  Negative for abdominal distention, abdominal pain and diarrhea.   Genitourinary:  Negative for difficulty urinating and dysuria.   Skin:  Negative for color change.   Allergic/Immunologic: Positive for immunocompromised state.   Neurological:  Positive for headaches.   Psychiatric/Behavioral:  Negative for agitation and confusion.     Objective:     Vital Signs (Most Recent):  Temp: 97.8 °F (36.6 °C) (06/30/22 0748)  Pulse: (!) 53 (06/30/22 0748)  Resp: 18 (06/30/22 0748)  BP: (!) 157/74 (06/30/22 0748)  SpO2: 96 % (06/30/22 0748)   Vital Signs (24h Range):  Temp:  [97 °F (36.1 °C)-99 °F (37.2 °C)] 97.8 °F (36.6 °C)  Pulse:  [53-62] 53  Resp:  [16-18] 18  SpO2:  [95 %-97 %] 96 %  BP: (135-157)/(60-74) 157/74     Weight: 71.7 kg (158 lb 1.1 oz)  Height: 5' (152.4  cm)  Body mass index is 30.87 kg/m².    Physical Exam  Constitutional:       General: She is not in acute distress.     Appearance: She is ill-appearing.   Eyes:      General: No scleral icterus.  Cardiovascular:      Rate and Rhythm: Normal rate.   Pulmonary:      Effort: No respiratory distress.      Breath sounds: No wheezing.   Abdominal:      General: There is no distension.      Palpations: Abdomen is soft.      Tenderness: There is no abdominal tenderness.   Musculoskeletal:      Right lower leg: No edema.      Left lower leg: No edema.   Skin:     Coloration: Skin is not jaundiced.   Neurological:      Comments: AAOx3 . 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   ID to follow as well   Seen by Neuro and Opthal  Likely needs early repeat LP due to elevated ICP.   Noticed plan for IR and neurosurgery eval as well        Acute renal failure superimposed on stage 3 chronic kidney disease  TERESA on underlying CKD stage III   Likely pre-renal or medication related   BL 1.2 to 1.4 mg/dl   Peak cr 2.02 mg/dl - resolved TERESA now   UA unremarkable  , monitor I/Os , recent ECHO - normal EF   US transplant unremarkable for acute changes   Continue PO sodium bicarb for acidosis      Long-term use of immunosuppressant medication  Continue prograf per levels . Increased dose to 3mg BID today   Monitor levels to assess for toxicity  Continue prednisone ---> increased to 10mg daily   Hold MMF         S/P kidney transplant  Kidney transplant 4/5/2020   Underlying ckd stage III  ESRD related to HTN      Cristian Cedeno MD  Kidney Transplant  [facility] - Telemetry Stepdown

## 2022-06-30 NOTE — PROCEDURES
Radiology Post-Procedure Note    Pre Op Diagnosis: meningitis    Post Op Diagnosis: Same    Procedure: lumbar puncture    Procedure performed by:     Supervising staff: Simone Noel MD    Written Informed Consent Obtained: Yes, from patient's spouse    Specimen Removed: 15 mL CSF    Estimated Blood Loss: Minimal    Findings: Following written informed consent and sterile prep and drape, a 22 gauge spinal needle was inserted at L3 - L4 intralaminar space under fluoroscopic surveillance.  Initially there was spontaneous return of clear CSF. After several minutes the patient moved and spontaneous flow of CSF ceased. Following needle repositioning, CSF returned, however it appeared blood tinged which we suspect is secondary to encountering a venous plexus. CSF became progressively clearer by the end of the collection.    A total of 15 mL CSF removed and sent to the lab for further analysis.      Opening pressure: 35 cm H2O  Closing pressure: 13 cm H2O    Patient tolerated procedure well.      Dayday Thorpe MD PGY-5  Diagnostic Radiology  [facility]     [FreeTextEntry1] : 7 

## 2022-06-30 NOTE — ASSESSMENT & PLAN NOTE
- Pt with opportunistic infection, see above. Holding mycophenolate   - KTM following, appreciate recs  Continue prograf per levels   Monitor levels to assess for toxicity  Continue prednisone ---> increased to 10mg daily   Hold MMF

## 2022-06-30 NOTE — ASSESSMENT & PLAN NOTE
- See TERESA. KTM consulted for further assistance  Kidney transplant 4/5/2020   Underlying ckd stage III  ESRD related to HTN

## 2022-06-30 NOTE — SUBJECTIVE & OBJECTIVE
Interval History: Patient was seen and evaluated this am. Confusion. Per - vomiting and dry heaving. He states that she has had worsening vision changes since the onset of these headaches. No fevers in past 24hrs. Unable to obtain full ROS 2/2 AMS.    Objective:     Vital Signs (Most Recent):  Temp: 97.8 °F (36.6 °C) (06/30/22 0748)  Pulse: (!) 53 (06/30/22 0748)  Resp: 18 (06/30/22 0748)  BP: (!) 157/74 (06/30/22 0748)  SpO2: 96 % (06/30/22 0748)   Vital Signs (24h Range):  Temp:  [97 °F (36.1 °C)-99 °F (37.2 °C)] 97.8 °F (36.6 °C)  Pulse:  [53-62] 53  Resp:  [16-18] 18  SpO2:  [95 %-97 %] 96 %  BP: (135-157)/(60-74) 157/74     Weight: 71.7 kg (158 lb 1.1 oz)  Body mass index is 30.87 kg/m².    Intake/Output Summary (Last 24 hours) at 6/30/2022 1357  Last data filed at 6/29/2022 1800  Gross per 24 hour   Intake 277 ml   Output --   Net 277 ml      Physical Exam  Gen: in NAD, appears stated age  Neuro: awake and oriented to self and place  HEENT: NTNC, EOMI, PERRL, MMM  CVS: RRR, no m/r/g; S1/S2 auscultated with no S3 or S4; capillary refill < 2 sec  Resp: lungs CTAB, no w/r/r; no belabored breathing or accessory muscle use appreciated   Abd: BS+ in all 4 quadrants; NTND, soft to palpation; no organomegaly appreciated   Extrem: pulses full, equal, and regular over all 4 extremities; no UE or LE edema BL    Significant Labs: All pertinent labs within the past 24 hours have been reviewed.  Blood Culture: No results for input(s): LABBLOO in the last 48 hours.  CBC:   Recent Labs   Lab 06/29/22  0424 06/30/22  0448   WBC 4.80 5.37   HGB 10.9* 11.7*   HCT 34.2* 35.4*   * 144*     CMP:   Recent Labs   Lab 06/29/22  0424 06/30/22  0448    137   K 3.4* 4.0    108   CO2 18* 23   * 125*   BUN 26* 23*   CREATININE 1.3 1.3   CALCIUM 8.5* 8.6*   PROT 4.8* 5.0*   ALBUMIN 2.7* 3.0*   BILITOT 0.4 0.5   ALKPHOS 48* 55   AST 15 18   ALT 18 20   ANIONGAP 10 6*   EGFRNONAA 46.6* 46.6*     Magnesium:    Recent Labs   Lab 06/29/22  0424 06/30/22  0448   MG 2.3 2.3     Urine Culture: No results for input(s): LABURIN in the last 48 hours.  Urine Studies:   Recent Labs   Lab 06/28/22 2006   COLORU Yellow   APPEARANCEUA Clear   PHUR 8.0   SPECGRAV 1.015   PROTEINUA Negative   GLUCUA Negative   KETONESU Negative   BILIRUBINUA Negative   OCCULTUA Negative   NITRITE Negative   LEUKOCYTESUR 1+*   RBCUA 0   WBCUA 2   BACTERIA Rare   SQUAMEPITHEL 2       Significant Imaging: I have reviewed all pertinent imaging results/findings within the past 24 hours.    CTH:  FINDINGS:  Intracranial compartment:     Mild enlargement of the temporal horn of the right lateral ventricle, new from prior.  Remainder of the ventricular system is normal in size, stable.  Third ventricle diameter on the order of 0.3 cm.  Well-defined sulci remain present over the cerebral convexities.     The brain parenchyma appears otherwise within normal limits, and unchanged from priors.  No new parenchymal hemorrhage, edema or major vascular distribution infarct.  No intracranial mass effect or midline shift.     No new extra-axial blood or fluid collections.     Partially empty sella configuration.     Skull/extracranial contents (limited evaluation):     No displaced calvarial fracture.     Small volume right mastoid air cell fluid.     The visualized paranasal sinuses are essentially clear.     Impression:     Slight interval enlargement of the temporal horn of the right lateral ventricle since the recent MRI of 06/23/2022.  Appearance suggestive of early/developing hydrocephalus or ventricular entrapment.     No new hemorrhage, edema or infarction.     Partially empty sella configuration.  Nonspecific but configuration could be consistent with the reported history of a elevated intracranial pressure.

## 2022-06-30 NOTE — PLAN OF CARE
Problem: Adult Inpatient Plan of Care  Goal: Plan of Care Review  Outcome: Ongoing, Progressing  Goal: Absence of Hospital-Acquired Illness or Injury  Outcome: Ongoing, Progressing  Goal: Optimal Comfort and Wellbeing  Outcome: Ongoing, Progressing  Goal: Readiness for Transition of Care  Outcome: Ongoing, Progressing     Problem: Fluid and Electrolyte Imbalance (Acute Kidney Injury/Impairment)  Goal: Fluid and Electrolyte Balance  Outcome: Ongoing, Progressing     Problem: Renal Function Impairment (Acute Kidney Injury/Impairment)  Goal: Effective Renal Function  Outcome: Ongoing, Progressing     Problem: Skin Injury Risk Increased  Goal: Skin Health and Integrity  Outcome: Ongoing, Progressing     Problem: Oral Intake Inadequate (Acute Kidney Injury/Impairment)  Goal: Optimal Nutrition Intake  Outcome: Ongoing, Not Progressing

## 2022-06-30 NOTE — ASSESSMENT & PLAN NOTE
- Last intracranial pressure of 34cm from LP at OSH  - Patient with papilledema, vomiting, AMS  - CTH w/developing hydrocephalus and signs of ventricular entrapment  - NSGY notified and consulted

## 2022-06-30 NOTE — SUBJECTIVE & OBJECTIVE
Subjective:   History of Present Illness:  56 y.o. female with a PMHx of HTN, kidney transplant in 2020, CKD3 admitted to [facility] on 06/22 with intractable HA starting about 2 months earlier and with dizziness, blurred vision, left-sided facial droop and dysarthria starting a few days before admission.    Per documentation   On admission /72, HR 69. Neurologic exam is remarkable for left-sided facial weakness and mild dysarthria.  Cr 1.32   LP (6/24) CSF Glu 6, CSF protein 93. [place] stain pos for yeast like organisms.  Spinal fluid culture pos for encapsulated yeast species  CT chest WO contrast pos for multiple indeterminate soft tissue masses in the left lower lobe with 2 smaller masses in the right lung which may be part of the same process.       On 06/26 patient started on amphotericin B liposomal (275 IV q.d.) and flucytosine (1500 p.o. q.6h) .  Hospitalization c/b the development of acute renal failure.  Cr 1.32 (6/22) > 1.4 (6/26) > 2.02 (6/27).  Transfer requested for higher level of care (KTM and ID).  Transfer diagnosis disseminated cryptococcal infection, TERESA, kidney XPL       Hospital Course:  Being treated for crypto meningitis     Interval History:  BP steady   On room air   No fevers   No overnight events   Continues to have headaches   Getting CT head today     Past Medical, Surgical, Family, and Social History:   Unchanged from H&P.    Scheduled Meds:   acetaminophen  500 mg Oral Q24H    amphotericin B liposome (AMBISOME) IVPB  300 mg Intravenous Q24H    calcitRIOL  0.5 mcg Oral Daily    diphenhydrAMINE  25 mg Oral Q24H    flucytosine  1,500 mg Oral Q6H    magnesium oxide  400 mg Oral Daily    multivitamin  1 tablet Oral Daily    mupirocin   Nasal BID    polyethylene glycol  17 g Oral BID    predniSONE  10 mg Oral Daily    senna-docusate 8.6-50 mg  1 tablet Oral BID    sodium bicarbonate  1,300 mg Oral BID    sodium chloride 0.9%  500 mL Intravenous Q24H    sodium chloride 0.9%   500 mL Intravenous Q24H    tacrolimus  3 mg Oral BID    topiramate  25 mg Oral BID     Continuous Infusions:  PRN Meds:acetaminophen, albuterol-ipratropium, bisacodyL, butalbital-acetaminophen-caffeine -40 mg, glucose, glucose, melatonin, naloxone, ondansetron, oxyCODONE-acetaminophen, sodium chloride 0.9%    Intake/Output - Last 3 Shifts         06/28 0700 06/29 0659 06/29 0700 06/30 0659 06/30 0700 07/01 0659    P.O. 700 277     Total Intake(mL/kg) 700 277 (3.9)     Urine (mL/kg/hr) 800      Stool 0      Total Output 800      Net -100 +277            Urine Occurrence  4 x     Stool Occurrence 1 x 1 x 1 x             Review of Systems   Constitutional:  Positive for activity change and appetite change. Negative for fever.   HENT:  Positive for hearing loss.    Eyes:  Positive for visual disturbance.   Respiratory:  Negative for shortness of breath and wheezing.    Cardiovascular:  Negative for chest pain and palpitations.   Gastrointestinal:  Negative for abdominal distention, abdominal pain and diarrhea.   Genitourinary:  Negative for difficulty urinating and dysuria.   Skin:  Negative for color change.   Allergic/Immunologic: Positive for immunocompromised state.   Neurological:  Positive for headaches.   Psychiatric/Behavioral:  Negative for agitation and confusion.     Objective:     Vital Signs (Most Recent):  Temp: 97.8 °F (36.6 °C) (06/30/22 0748)  Pulse: (!) 53 (06/30/22 0748)  Resp: 18 (06/30/22 0748)  BP: (!) 157/74 (06/30/22 0748)  SpO2: 96 % (06/30/22 0748)   Vital Signs (24h Range):  Temp:  [97 °F (36.1 °C)-99 °F (37.2 °C)] 97.8 °F (36.6 °C)  Pulse:  [53-62] 53  Resp:  [16-18] 18  SpO2:  [95 %-97 %] 96 %  BP: (135-157)/(60-74) 157/74     Weight: 71.7 kg (158 lb 1.1 oz)  Height: 5' (152.4 cm)  Body mass index is 30.87 kg/m².    Physical Exam  Constitutional:       General: She is not in acute distress.     Appearance: She is ill-appearing.   Eyes:      General: No scleral  icterus.  Cardiovascular:      Rate and Rhythm: Normal rate.   Pulmonary:      Effort: No respiratory distress.      Breath sounds: No wheezing.   Abdominal:      General: There is no distension.      Palpations: Abdomen is soft.      Tenderness: There is no abdominal tenderness.   Musculoskeletal:      Right lower leg: No edema.      Left lower leg: No edema.   Skin:     Coloration: Skin is not jaundiced.   Neurological:      Comments: AAOx3 . Generally weak with headaches        Laboratory:  BMP:   Recent Labs   Lab 06/28/22  0415 06/29/22  0424 06/30/22  0448   * 128* 125*   * 137 137   K 3.6 3.4* 4.0    109 108   CO2 17* 18* 23   BUN 33* 26* 23*   CREATININE 1.7* 1.3 1.3   CALCIUM 8.4* 8.5* 8.6*

## 2022-06-30 NOTE — ASSESSMENT & PLAN NOTE
- Pt with intractable headache, facial droop, dysarthria (improved). 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  - Continue Amp B 5/mg kg Q24hrs and flucytosine 1500 mg Q6hrs for treatment.   - Transplant ID following- recommending repeat LP w/cell count and diff, glu, protein, aerobic, fungal cultures, cryptococcal ag   - KTM following; recommend  500mL normal saline with amphotericin

## 2022-06-30 NOTE — ASSESSMENT & PLAN NOTE
- Cr 2.2 on day of transfer, has been steadily worsening over last few days (baseline ~1.2)  - KTM consulted, appreciate recs; likely prerenal versus medication related  - US transplant unremarkable for acute changes   - S/p IVF  - Improving 2.2->1.7->1.3-->1.3  - Per Alpa GASTELUM, continue p.o. sodium bicarb  - Strict I/O's

## 2022-06-30 NOTE — ASSESSMENT & PLAN NOTE
- Ophthalmology evaluation with grade 3 papilledema in both eyes (longer she has elevated CSF pressure, the higher her risk of permanent optic nerve damage and vision change is.  There is a congested appearance to the veins in both eyes secondary to optic nerve congestion and indicates higher risk for ischemic event), 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  - see increased intracranial pressure

## 2022-07-01 PROBLEM — B45.1 CRYPTOCOCCAL MENINGITIS: Status: ACTIVE | Noted: 2022-01-01

## 2022-07-01 NOTE — SUBJECTIVE & OBJECTIVE
Interval History:   Feels much better today, walking out of bathroom and also eating food, repeat LP with lower OP    Review of Systems   Constitutional:  Positive for activity change, appetite change and fatigue. Negative for chills, diaphoresis and fever.   HENT:  Positive for hearing loss and voice change. Negative for congestion.    Eyes:  Positive for visual disturbance. Negative for pain.   Respiratory:  Negative for cough, shortness of breath and wheezing.    Cardiovascular:  Negative for chest pain, palpitations and leg swelling.   Gastrointestinal:  Positive for nausea. Negative for constipation, diarrhea and vomiting.   Genitourinary:  Negative for difficulty urinating and dysuria.   Musculoskeletal:  Positive for neck pain. Negative for arthralgias.   Skin:  Negative for rash and wound.   Allergic/Immunologic: Positive for immunocompromised state.   Neurological:  Positive for facial asymmetry, speech difficulty and headaches. Negative for dizziness and seizures.   Psychiatric/Behavioral:  Negative for agitation and behavioral problems.      Objective:     Vital Signs (Most Recent):  Temp: 98.2 °F (36.8 °C) (07/01/22 1223)  Pulse: (!) 59 (07/01/22 1223)  Resp: 20 (07/01/22 1306)  BP: (!) 179/78 (07/01/22 1223)  SpO2: 95 % (07/01/22 1223)   Vital Signs (24h Range):  Temp:  [97 °F (36.1 °C)-98.6 °F (37 °C)] 98.2 °F (36.8 °C)  Pulse:  [53-60] 59  Resp:  [18-20] 20  SpO2:  [95 %-98 %] 95 %  BP: (149-190)/(67-81) 179/78     Weight: 71.7 kg (158 lb 1.1 oz)  Body mass index is 30.87 kg/m².    Intake/Output Summary (Last 24 hours) at 7/1/2022 1433  Last data filed at 7/1/2022 0548  Gross per 24 hour   Intake 1450 ml   Output --   Net 1450 ml          Physical Exam  Vitals reviewed.   Constitutional:       General: She is not in acute distress.     Appearance: She is well-developed.   HENT:      Head: Normocephalic and atraumatic.   Eyes:      Extraocular Movements: Extraocular movements intact.      Pupils:  Pupils are equal, round, and reactive to light.   Neck:      Vascular: No JVD.      Trachea: No tracheal deviation.   Cardiovascular:      Rate and Rhythm: Normal rate and regular rhythm.      Heart sounds: No murmur heard.    No friction rub. No gallop.   Pulmonary:      Effort: No respiratory distress.      Breath sounds: Normal breath sounds. No wheezing or rales.   Abdominal:      General: Bowel sounds are normal. There is no distension.      Palpations: Abdomen is soft. There is no mass.      Tenderness: There is no abdominal tenderness.   Musculoskeletal:         General: No deformity.      Cervical back: Neck supple.   Lymphadenopathy:      Cervical: No cervical adenopathy.   Skin:     General: Skin is warm and dry.      Findings: No rash.   Neurological:      Mental Status: She is alert and oriented to person, place, and time.      Cranial Nerves: Facial asymmetry present.   Psychiatric:         Speech: Speech is slurred ((improved)).       Significant Labs: All pertinent labs within the past 24 hours have been reviewed.    Recent Results (from the past 24 hour(s))   CSF cell count with differential    Collection Time: 06/30/22  4:02 PM   Result Value Ref Range    Heme Aliquot 3.5 mL    Appearance, CSF Slightly hazy (A) Clear    Color, CSF Xanthochromic (A) Colorless    WBC,  (H) 0 - 5 /cu mm    RBC, CSF 3000 (A) 0 /cu mm    Segmented Neutrophils, CSF 4 0 - 6 %    Lymphs, CSF 37 (L) 40 - 80 %    Mono/Macrophage, CSF 59 (H) 15 - 45 %   Protein, CSF    Collection Time: 06/30/22  4:02 PM   Result Value Ref Range    Protein,  (H) 15 - 40 mg/dL   Glucose, CSF    Collection Time: 06/30/22  4:02 PM   Result Value Ref Range    Glucose, CSF <5 (L) 40 - 70 mg/dL   AFB Culture & Smear    Collection Time: 06/30/22  4:02 PM    Specimen: CSF Tap, Tube 2; CSF (Spinal Fluid)   Result Value Ref Range    AFB CULTURE STAIN No acid fast bacilli seen.    CSF culture    Collection Time: 06/30/22  4:02 PM     Specimen: CSF Tap, Tube 2; CSF (Spinal Fluid)   Result Value Ref Range    CSF CULTURE No Growth to date     Gram Stain Result No WBC's     Gram Stain Result Few yeast     Gram Stain Result       Results called to and read back by:Leslee Andrews RN 06/30/2022  18:45   Cryptococcal antigen, CSF    Collection Time: 06/30/22  4:02 PM    Specimen: CSF Tap, Tube 2; CSF (Spinal Fluid)   Result Value Ref Range    Crypto Ag, CSF Positive 1:8 (A) Negative   PT/INR    Collection Time: 07/01/22  6:17 AM   Result Value Ref Range    Prothrombin Time 14.0 (H) 9.0 - 12.5 sec    INR 1.4 (H) 0.8 - 1.2   CBC Auto Differential    Collection Time: 07/01/22  6:17 AM   Result Value Ref Range    WBC 4.22 3.90 - 12.70 K/uL    RBC 2.96 (L) 4.00 - 5.40 M/uL    Hemoglobin 8.9 (L) 12.0 - 16.0 g/dL    Hematocrit 26.6 (L) 37.0 - 48.5 %    MCV 90 82 - 98 fL    MCH 30.1 27.0 - 31.0 pg    MCHC 33.5 32.0 - 36.0 g/dL    RDW 12.3 11.5 - 14.5 %    Platelets 125 (L) 150 - 450 K/uL    MPV 11.3 9.2 - 12.9 fL    Immature Granulocytes 1.4 (H) 0.0 - 0.5 %    Gran # (ANC) 3.5 1.8 - 7.7 K/uL    Immature Grans (Abs) 0.06 (H) 0.00 - 0.04 K/uL    Lymph # 0.3 (L) 1.0 - 4.8 K/uL    Mono # 0.3 0.3 - 1.0 K/uL    Eos # 0.0 0.0 - 0.5 K/uL    Baso # 0.02 0.00 - 0.20 K/uL    nRBC 0 0 /100 WBC    Gran % 83.4 (H) 38.0 - 73.0 %    Lymph % 6.6 (L) 18.0 - 48.0 %    Mono % 7.6 4.0 - 15.0 %    Eosinophil % 0.5 0.0 - 8.0 %    Basophil % 0.5 0.0 - 1.9 %    Differential Method Automated    Tacrolimus level    Collection Time: 07/01/22  6:17 AM   Result Value Ref Range    Tacrolimus Lvl 3.1 (L) 5.0 - 15.0 ng/mL   Comprehensive metabolic panel    Collection Time: 07/01/22  8:44 AM   Result Value Ref Range    Sodium 134 (L) 136 - 145 mmol/L    Potassium 4.1 3.5 - 5.1 mmol/L    Chloride 107 95 - 110 mmol/L    CO2 20 (L) 23 - 29 mmol/L    Glucose 137 (H) 70 - 110 mg/dL    BUN 16 6 - 20 mg/dL    Creatinine 1.2 0.5 - 1.4 mg/dL    Calcium 8.9 8.7 - 10.5 mg/dL    Total Protein 5.5 (L) 6.0  - 8.4 g/dL    Albumin 3.1 (L) 3.5 - 5.2 g/dL    Total Bilirubin 0.5 0.1 - 1.0 mg/dL    Alkaline Phosphatase 58 55 - 135 U/L    AST 16 10 - 40 U/L    ALT 18 10 - 44 U/L    Anion Gap 7 (L) 8 - 16 mmol/L    eGFR if African American 59.2 (A) >60 mL/min/1.73 m^2    eGFR if non  51.4 (A) >60 mL/min/1.73 m^2   Magnesium    Collection Time: 07/01/22  8:44 AM   Result Value Ref Range    Magnesium 2.2 1.6 - 2.6 mg/dL   Phosphorus    Collection Time: 07/01/22  8:44 AM   Result Value Ref Range    Phosphorus 3.2 2.7 - 4.5 mg/dL         Significant Imaging: I have reviewed all pertinent imaging results/findings within the past 24 hours.

## 2022-07-01 NOTE — PLAN OF CARE
Problem: Adult Inpatient Plan of Care  Goal: Plan of Care Review  Outcome: Ongoing, Progressing  Goal: Absence of Hospital-Acquired Illness or Injury  Outcome: Ongoing, Progressing  Goal: Optimal Comfort and Wellbeing  Outcome: Ongoing, Progressing  Goal: Readiness for Transition of Care  Outcome: Ongoing, Progressing     Problem: Fluid and Electrolyte Imbalance (Acute Kidney Injury/Impairment)  Goal: Fluid and Electrolyte Balance  Outcome: Ongoing, Progressing     Problem: Oral Intake Inadequate (Acute Kidney Injury/Impairment)  Goal: Optimal Nutrition Intake  Outcome: Ongoing, Progressing     Problem: Renal Function Impairment (Acute Kidney Injury/Impairment)  Goal: Effective Renal Function  Outcome: Ongoing, Progressing     Problem: Skin Injury Risk Increased  Goal: Skin Health and Integrity  Outcome: Ongoing, Progressing

## 2022-07-01 NOTE — SUBJECTIVE & OBJECTIVE
Past Medical History:   Diagnosis Date    Anemia of renal disease     ESRD on dialysis     Dr. Muhammad     Essential hypertension     PD catheter dysfunction 10/18/2018    Secondary hyperparathyroidism of renal origin      Past Surgical History:   Procedure Laterality Date    AV FISTULA PLACEMENT Left     never matured    AV graft Right 2015     SECTION      x3; ; , and     COLONOSCOPY N/A 2020    Procedure: COLONOSCOPY;  Surgeon: Darrell Golden MD;  Location: [facility] ENDO;  Service: Endoscopy;  Laterality: N/A;    GASTRIC BYPASS  2016    KIDNEY TRANSPLANT N/A 2020    Procedure: TRANSPLANT, KIDNEY;  Surgeon: Megan Garcia MD;  Location: [address];  Service: Transplant;  Laterality: N/A;    KIDNEY TRANSPLANT Right 2020    LAPAROSCOPIC REVISION OF PROCEDURE INVOLVING PERITONEAL DIALYSIS CATHETER N/A 10/18/2018    Procedure: REVISION OF PROCEDURE INVOLVING PERITONEAL DIALYSIS CATHETER, LAPAROSCOPIC;  Surgeon: Hair Jimenez MD;  Location: [facility];  Service: General;  Laterality: N/A;    PERITONEAL CATHETER INSERTION N/A 2018    Procedure: Laparoscopic INSERTION, CATHETER, INTRAPERITONEAL;  Surgeon: Hair Jimenez MD;  Location: [facility];  Service: General;  Laterality: N/A;    PERITONEAL CATHETER REMOVAL N/A 2018    Procedure: REMOVAL, CATHETER, DIALYSIS, PERITONEAL;  Surgeon: Hair Jimenez MD;  Location: [facility];  Service: General;  Laterality: N/A;    WOUND EXPLORATION N/A 2018    Procedure: EXPLORATION, WOUND-Surgical Site Irrigation;  Surgeon: Hair Jimenez MD;  Location: [facility];  Service: General;  Laterality: N/A;      Current Facility-Administered Medications on File Prior to Encounter   Medication Dose Route Frequency Provider Last Rate Last Admin    [DISCONTINUED] GENERIC EXTERNAL MEDICATION     Generic External Data Provider        [DISCONTINUED] GENERIC EXTERNAL MEDICATION     Generic External Data Provider         Current Outpatient  Medications on File Prior to Encounter   Medication Sig Dispense Refill    acetaminophen (TYLENOL) 325 MG tablet Take 2 tablets (650 mg total) by mouth every 6 (six) hours as needed (headache).  0    butalbital-acetaminophen-caffeine -40 mg (FIORICET, ESGIC) -40 mg per tablet Take 1 tablet by mouth daily as needed (migraines). 30 tablet 3    calcitRIOL (ROCALTROL) 0.25 MCG Cap Take 2 capsules (0.5 mcg total) by mouth once daily. 60 capsule 11    docusate sodium (COLACE) 100 MG capsule Take 1 capsule (100 mg total) by mouth 3 (three) times daily as needed for Constipation.  0    l-methylfolate-b2-b6-b12 (CEREFOLIN) 6-5-50-1 mg Tab Take 1 tablet by mouth once daily. 30 tablet 3    magnesium oxide (MAG-OX) 400 mg (241.3 mg magnesium) tablet Take 1 tablet (400 mg total) by mouth once daily. 30 tablet 3    mycophenolate (CELLCEPT) 250 mg Cap Take 2 capsules (500 mg total) by mouth 2 (two) times daily. 120 capsule 11    NIFEdipine (PROCARDIA-XL) 30 MG (OSM) 24 hr tablet TAKE ONE TABLET BY MOUTH TWICE DAILY hold if systolic blood pressure is less than 130 60 tablet 11    predniSONE (DELTASONE) 5 MG tablet Take 1 tablet (5 mg total) by mouth once daily. 120 tablet 11    sodium bicarbonate 650 MG tablet Take 2 tablets (1,300 mg total) by mouth 2 (two) times daily. 120 tablet 11    tacrolimus (PROGRAF) 1 MG Cap Take 3 capsules (3 mg total) by mouth every morning AND 2 capsules (2 mg total) every evening. 150 capsule 11    topiramate (TOPAMAX) 25 MG tablet Take 1 tablet (25 mg total) by mouth 2 (two) times daily.        Allergies: Sulfa (sulfonamide antibiotics)    Family History   Problem Relation Age of Onset    Kidney disease Mother     Diabetes Mother     Heart disease Mother     Hypertension Mother     Lupus Sister     Down syndrome Son     Cancer Neg Hx     Stroke Neg Hx      Social History     Tobacco Use    Smoking status: Never Smoker    Smokeless tobacco: Never Used   Substance Use Topics    Alcohol use:  No    Drug use: No     Review of Systems   Constitutional:  Negative for diaphoresis and fever.   HENT:  Negative for ear pain and trouble swallowing.    Eyes:  Positive for visual disturbance. Negative for photophobia and pain.   Respiratory:  Negative for cough and shortness of breath.    Cardiovascular:  Negative for chest pain and palpitations.   Gastrointestinal:  Negative for abdominal pain, nausea and vomiting.   Musculoskeletal:  Positive for back pain. Negative for neck pain and neck stiffness.   Skin:  Negative for color change and pallor.   Allergic/Immunologic: Positive for immunocompromised state.   Neurological:  Negative for facial asymmetry, speech difficulty and weakness.   Psychiatric/Behavioral:  Negative for confusion and decreased concentration.    Objective:     Vitals:    Temp: 98.2 °F (36.8 °C)  Pulse: (!) 54  BP: (!) 190/81  MAP (mmHg): 116  Resp: 18  SpO2: 95 %    Temp  Min: 97.8 °F (36.6 °C)  Max: 99 °F (37.2 °C)  Pulse  Min: 53  Max: 62  BP  Min: 135/66  Max: 190/81  MAP (mmHg)  Min: 107  Max: 116  Resp  Min: 16  Max: 18  SpO2  Min: 95 %  Max: 97 %    06/29 0701 - 06/30 0700  In: 277 [P.O.:277]  Out: -    Unmeasured Output  Urine Occurrence: 4  Stool Occurrence: 1       Physical Exam  Constitutional:       General: She is not in acute distress.  HENT:      Head: Normocephalic and atraumatic.   Eyes:      Extraocular Movements: Extraocular movements intact.      Pupils: Pupils are equal, round, and reactive to light.   Cardiovascular:      Rate and Rhythm: Normal rate.      Pulses: Normal pulses.   Pulmonary:      Effort: Pulmonary effort is normal. No respiratory distress.   Abdominal:      Palpations: Abdomen is soft.      Tenderness: There is no abdominal tenderness.   Musculoskeletal:      Cervical back: Normal range of motion and neck supple.   Neurological:      Mental Status: She is alert.      Comments: A&O x 4. Able to name and repeat appropriately.  PERRL, EOMI.  No speech  changes.  Moves all 4 extremities spontaneously.  5/5 strength throughout all 4 extremities.  Sensation to light touch intact throughout all 4 extremities.     Psychiatric:         Mood and Affect: Mood normal.         Behavior: Behavior normal.       Today I personally reviewed pertinent medications, lines/drains/airways, imaging, cardiology results, laboratory results, microbiology results, notably:

## 2022-07-01 NOTE — ASSESSMENT & PLAN NOTE
57 y/o F h/o HTN, gastric bypass, ESRD 2/2 HTN s/p DBDKT 4/5/20 (basiliximab induction,, CMV D+/R+, tacro, MMF), COVID19 7/2020, recently discharged 6/2022 for 2 months of headaches, now admitted to [facility] with intractable headaches, dizziness, blurred vision, left sided facial droop, MRI brain negative 6/23, s/p LP 6/24 with OP of 34cm, CSF: WBC unable to quantitate due to infereing cellular structures, gluc 6, protein 93, shahnaz ink positive for encapsulated yeast and cultures also growing yeast, CT chest with b/l pulmonary nodules started on ambisome, flucytosine 6/26 course notable for TERESA (Cr 1.3->2.0) now transferred to [facility] [place] further care. Headaches much improved, nauseaus but no further emesis, eating food, serum crypto Ag positive, ophthalmology exam with papilledema, repeat LP with lower OP, csf crypto Ag +  - cryptococcal meningitis - continue ambisome/flucytosine   - pressure appears to be coming down and symptoms are slowly improving  - if clinical worsening would be concerned for possible IRIS and would repeat LP for pressure and cell count assessment if clinical worsening and would consider steroids in that situation.  At this exact moment, given improvement, do not think this is IRIS and would hold on steroids for now   - monitor electrolytes and renal function on ambisome  - will follow, discussed with team

## 2022-07-01 NOTE — PROGRESS NOTES
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 Symptoms improving  -kidney function improved since admission (Cr 1.2)    -will continue monitoring      Anticipated Disposition: pending    Thank you for your consult. .    Austyn Eldridge MD  Infectious Disease  [facility] - Tele[facility] Step[facility]    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    HPI: 55 y/o F h/o HTN, gastric bypass, ESRD 2/2 HTN s/p DBDKT 4/5/20 (basiliximab induction,, CMV D+/R+, tacro, MMF), COVID19 7/2020, recently discharged 6/2022 for 2 months of headaches, now admitted to [facility] with intractable headaches, dizziness, blurred vision, left sided facial droop, s/p LP 6/24 with CSF: WBC unable to quantitate due to infereing cellular structures, gluc 6, protein 93, shahnaz ink positive for yeast and cultures also growing yeast, CT chest with b/l pulmonary nodules started on ambisome, flucytosine 6/26 course notable for TERESA (Cr 1.3->2.0) now transferred to [facility] for further care. Bilateral papilledema on ophthalmologic exam.    Interval History: Patient reports improved headache following LP yesterday. Able to ambulate and more alert and oriented per .    Review of Systems   Constitutional: Positive for fatigue. Negative for chills and diaphoresis.   Gastrointestinal: Positive for nausea.    Musculoskeletal: Positive for back pain and neck pain. Negative for neck stiffness.   Allergic/Immunologic: Positive for immunocompromised state.   Neurological: Positive for light-headedness and headaches. Negative for facial asymmetry, speech difficulty, weakness and numbness.   All other systems reviewed and are negative.    Objective:     Vital Signs (Most Recent):  Temp: 98.6 °F (37 °C) (07/01/22 1611)  Pulse: 65 (07/01/22 1611)  Resp: 17 (07/01/22 1611)  BP: (!) 146/71 (07/01/22 1611)  SpO2: (!) 93 % (07/01/22 1611) Vital Signs (24h Range):  Temp:  [97 °F (36.1 °C)-98.6 °F (37 °C)] 98.6 °F (37 °C)  Pulse:  [53-65] 65  Resp:  [17-20] 17  SpO2:  [93 %-98 %] 93 %  BP: (146-179)/(67-80) 146/71     Weight: 71.7 kg (158 lb 1.1 oz)  Body mass index is 30.87 kg/m².    Estimated Creatinine Clearance: 47.4 mL/min (based on SCr of 1.2 mg/dL).    Physical Exam  Constitutional:       General: She is not in acute distress.     Appearance: She is not diaphoretic.      Comments: Appeared fatigued   HENT:      Head: Normocephalic and atraumatic.      Nose: No congestion.      Mouth/Throat:      Mouth: Mucous membranes are dry.   Eyes:      Extraocular Movements: Extraocular movements intact.      Pupils: Pupils are equal, round, and reactive to light.   Cardiovascular:      Rate and Rhythm: Normal rate and regular rhythm.   Pulmonary:      Effort: No respiratory distress.      Breath sounds: Normal breath sounds.   Abdominal:      General: Bowel sounds are normal.   Musculoskeletal:         General: No swelling or tenderness. Normal range of motion.      Cervical back: No rigidity.   Skin:     General: Skin is warm.   Neurological:      General: No focal deficit present.      Comments: Strength 5/5 upper and lower extremities bilaterally.   Moved extremities spontaneously.  No dysarthria.  No apparent facial droop.          Significant Labs:   Blood Culture: No results for input(s): LABBLOO in the last 4320 hours.  CBC:    Recent Labs   Lab 06/30/22  0448 07/01/22  0617   WBC 5.37 4.22   HGB 11.7* 8.9*   HCT 35.4* 26.6*   * 125*     CMP:   Recent Labs   Lab 06/30/22  0448 07/01/22  0844    134*   K 4.0 4.1    107   CO2 23 20*   * 137*   BUN 23* 16   CREATININE 1.3 1.2   CALCIUM 8.6* 8.9   PROT 5.0* 5.5*   ALBUMIN 3.0* 3.1*   BILITOT 0.5 0.5   ALKPHOS 55 58   AST 18 16   ALT 20 18   ANIONGAP 6* 7*   EGFRNONAA 46.6* 51.4*     CSF:   Recent Labs   Lab 06/30/22  1602   CSFCULTURE No Growth to date     Fungus Culture (Blood or Bone Marrow): No results for input(s): FUNGUSCULTUR in the last 4320 hours.  Recent Lab Results       07/01/22  0844   07/01/22  0617        Albumin 3.1         Alkaline Phosphatase 58         ALT 18         Anion Gap 7         AST 16         Baso #   0.02       Basophil %   0.5       BILIRUBIN TOTAL 0.5  Comment: For infants and newborns, interpretation of results should be based  on gestational age, weight and in agreement with clinical  observations.    Premature Infant recommended reference ranges:  Up to 24 hours.............<8.0 mg/dL  Up to 48 hours............<12.0 mg/dL  3-5 days..................<15.0 mg/dL  6-29 days.................<15.0 mg/dL           BUN 16         Calcium 8.9         Chloride 107         CO2 20         Creatinine 1.2         Differential Method   Automated       eGFR if  59.2         eGFR if non  51.4  Comment: Calculation used to obtain the estimated glomerular filtration  rate (eGFR) is the CKD-EPI equation.            Eos #   0.0       Eosinophil %   0.5       Glucose 137         Gran # (ANC)   3.5       Gran %   83.4       Hematocrit   26.6       Hemoglobin   8.9       Immature Grans (Abs)   0.06  Comment: Mild elevation in immature granulocytes is non specific and   can be seen in a variety of conditions including stress response,   acute inflammation, trauma and pregnancy. Correlation with other   laboratory and  clinical findings is essential.         Immature Granulocytes   1.4       INR   1.4  Comment: Coumadin Therapy:  2.0 - 3.0 for INR for all indicators except mechanical heart valves  and antiphospholipid syndromes which should use 2.5 - 3.5.         Lymph #   0.3       Lymph %   6.6       Magnesium 2.2         MCH   30.1       MCHC   33.5       MCV   90       Mono #   0.3       Mono %   7.6       MPV   11.3       nRBC   0       Phosphorus 3.2         Platelets   125       Potassium 4.1         PROTEIN TOTAL 5.5         Protime   14.0       RBC   2.96       RDW   12.3       Sodium 134         Tacrolimus Lvl   3.1  Comment: Testing performed by a chemiluminescent microparticle   immunoassay on the [employer] i System.         WBC   4.22             Significant Imaging:  CT head W/O contrast 7/1:   Impression:  Slight enlargement of the temporal horn of right lateral ventricle not significantly changed compared to most recent CT head 06/30/2022,  new compared to prior MRI brain 06/23/2022.  Clinical correlation and continued close follow-up is recommended as developing hydrocephalus or ventricular entrapment remains a concern.

## 2022-07-01 NOTE — CLINICAL REVIEW
KTM Chart Check      Intake/Output Summary (Last 24 hours) at 7/1/2022 1600  Last data filed at 7/1/2022 0548  Gross per 24 hour   Intake 1450 ml   Output --   Net 1450 ml       Vitals:    07/01/22 0536 07/01/22 0740 07/01/22 1223 07/01/22 1306   BP:  (!) 149/67 (!) 179/78    BP Location:       Patient Position:       Pulse:  (!) 53 (!) 59    Resp: 20 18 20 20   Temp:  98.6 °F (37 °C) 98.2 °F (36.8 °C)    TempSrc:  Oral     SpO2:  96% 95%    Weight:       Height:           Recent Labs   Lab 06/29/22  0424 06/30/22  0448 07/01/22  0844    137 134*   K 3.4* 4.0 4.1    108 107   CO2 18* 23 20*   BUN 26* 23* 16   CREATININE 1.3 1.3 1.2   CALCIUM 8.5* 8.6* 8.9   PHOS 3.5 3.0 3.2       Immunosuppression Level - 3.1    Continue prograf at current dose   On prednisone   Renal functions steady

## 2022-07-01 NOTE — ASSESSMENT & PLAN NOTE
"CSF performed with elevated protein (93), low glucose (6) and +[place] ink and Cx for encapsulated yeast. She was started on amphotericin and flucytosine. Opening pressure on 6/24 was 34 cm H2O. CT chest at that time with "multiple indeterminate soft tissue masses in LLL with 2 smaller masses in R lung which may be a part of the same process." CTH on 6/30 suggestive of early/developing hydrocephalus or ventricular entrapment. Repeat therapeutic LP on 6/30 with opening pressure of 35 cm H2O and significant symptomatic relief following procedure, draining 15 cc of CSF. She had a repeat CT head today which was stable from prior. Concern at this time is high for disseminated cryptococcus, pending yeast growth and speciation by OS microbiology lab.     At this time, NCC is aware of this patient. Our current recommendation due to symptomatic improvement post yesterday's LP is for the patient to remain on the floor as she is neuro-intact, briskly awakening to voice, and alert and oriented x 4. Please continue to monitor, and have a low threshold to call back NCC if acute neurologic change is noted.    -Continue care per ID.  -Recommend Neurosurgical consult for possible lumbar drain, as well as intermittent, therapeutic LPs as warranted in the interim.    Thank you for your consult. Please call [phone] with any further questions or any acute neurological changes in patient status.      "

## 2022-07-01 NOTE — CONSULTS
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 She was previously admitted to [facility] on 6/12-6/14 for HA with no focal neurologic deficits, at which time she received IV headache cocktail including decadron, Mg sulfate, reglan and IVFs with resolution of headache. She was dc'd with topamax 25 mg BID along with magnesium and riboflavin. Per her , she then started developing a severe headache over the next day and a half following discharge, followed by facial droop, inability to close her R eye or mouth, impaired hearing, and slurred speech. She underwent repeat brain MRI on 6/23/22 which was reportedly unremarkable. LP was performed on 6/24 w/ opening pressure of 34 cm H2O, elevated protein (93), low glucose (6) and +[place] ink and Cx for encapsulated yeast. She was started on amphotericin and flucytosine. CT chest at that time with "multiple indeterminate soft tissue masses in LLL with 2 smaller masses in R lung which may be a part of the same process." Hospital course was further complicated by TERESA superimposed on CKD3. She was transferred for disseminated cryptococcal infection. Transplant ID was consulted and recommended continuing ambisome/flucytosine with plan to repeat LP 2 weeks from 6/24. CTH on 6/30 suggestive of early/developing hydrocephalus or ventricular entrapment. Repeat LP was performed due to worsening patient mental status and vision with associated nausea and vomiting, with opening pressure of 35 cm H2O and closing pressure of 13 cm H2O. Neurocritical care was " consulted to assess patient appropriateness for transfer to the neurocritical care unit.          Past Medical History:   Diagnosis Date    Anemia of renal disease     ESRD on dialysis     Dr. Muhammad     Essential hypertension     PD catheter dysfunction 10/18/2018    Secondary hyperparathyroidism of renal origin      Past Surgical History:   Procedure Laterality Date    AV FISTULA PLACEMENT Left     never matured    AV graft Right 2015     SECTION      x3; ; , and     COLONOSCOPY N/A 2020    Procedure: COLONOSCOPY;  Surgeon: Darrell [place] MD;  Location: [facility] ENDO;  Service: Endoscopy;  Laterality: N/A;    GASTRIC BYPASS  2016    KIDNEY TRANSPLANT N/A 2020    Procedure: TRANSPLANT, KIDNEY;  Surgeon: Megan Garcia MD;  Location: [address];  Service: Transplant;  Laterality: N/A;    KIDNEY TRANSPLANT Right 2020    LAPAROSCOPIC REVISION OF PROCEDURE INVOLVING PERITONEAL DIALYSIS CATHETER N/A 10/18/2018    Procedure: REVISION OF PROCEDURE INVOLVING PERITONEAL DIALYSIS CATHETER, LAPAROSCOPIC;  Surgeon: Hair Jimenez MD;  Location: [facility];  Service: General;  Laterality: N/A;    PERITONEAL CATHETER INSERTION N/A 2018    Procedure: Laparoscopic INSERTION, CATHETER, INTRAPERITONEAL;  Surgeon: Hair Jimenez MD;  Location: [facility];  Service: General;  Laterality: N/A;    PERITONEAL CATHETER REMOVAL N/A 2018    Procedure: REMOVAL, CATHETER, DIALYSIS, PERITONEAL;  Surgeon: Hair Jimenez MD;  Location: [facility];  Service: General;  Laterality: N/A;    WOUND EXPLORATION N/A 2018    Procedure: EXPLORATION, WOUND-Surgical Site Irrigation;  Surgeon: Hair Jimenez MD;  Location: [facility];  Service: General;  Laterality: N/A;      Current Facility-Administered Medications on File Prior to Encounter   Medication Dose Route Frequency Provider Last Rate Last Admin    [DISCONTINUED] GENERIC EXTERNAL MEDICATION     Generic External Data Provider         [DISCONTINUED] GENERIC EXTERNAL MEDICATION     Generic External Data Provider         Current Outpatient Medications on File Prior to Encounter   Medication Sig Dispense Refill    acetaminophen (TYLENOL) 325 MG tablet Take 2 tablets (650 mg total) by mouth every 6 (six) hours as needed (headache).  0    butalbital-acetaminophen-caffeine -40 mg (FIORICET, ESGIC) -40 mg per tablet Take 1 tablet by mouth daily as needed (migraines). 30 tablet 3    calcitRIOL (ROCALTROL) 0.25 MCG Cap Take 2 capsules (0.5 mcg total) by mouth once daily. 60 capsule 11    docusate sodium (COLACE) 100 MG capsule Take 1 capsule (100 mg total) by mouth 3 (three) times daily as needed for Constipation.  0    l-methylfolate-b2-b6-b12 (CEREFOLIN) 6-5-50-1 mg Tab Take 1 tablet by mouth once daily. 30 tablet 3    magnesium oxide (MAG-OX) 400 mg (241.3 mg magnesium) tablet Take 1 tablet (400 mg total) by mouth once daily. 30 tablet 3    mycophenolate (CELLCEPT) 250 mg Cap Take 2 capsules (500 mg total) by mouth 2 (two) times daily. 120 capsule 11    NIFEdipine (PROCARDIA-XL) 30 MG (OSM) 24 hr tablet TAKE ONE TABLET BY MOUTH TWICE DAILY hold if systolic blood pressure is less than 130 60 tablet 11    predniSONE (DELTASONE) 5 MG tablet Take 1 tablet (5 mg total) by mouth once daily. 120 tablet 11    sodium bicarbonate 650 MG tablet Take 2 tablets (1,300 mg total) by mouth 2 (two) times daily. 120 tablet 11    tacrolimus (PROGRAF) 1 MG Cap Take 3 capsules (3 mg total) by mouth every morning AND 2 capsules (2 mg total) every evening. 150 capsule 11    topiramate (TOPAMAX) 25 MG tablet Take 1 tablet (25 mg total) by mouth 2 (two) times daily.        Allergies: Sulfa (sulfonamide antibiotics)    Family History   Problem Relation Age of Onset    Kidney disease Mother     Diabetes Mother     Heart disease Mother     Hypertension Mother     Lupus Sister     Down syndrome Son     Cancer Neg Hx     Stroke Neg Hx      Social  History     Tobacco Use    Smoking status: Never Smoker    Smokeless tobacco: Never Used   Substance Use Topics    Alcohol use: No    Drug use: No     Review of Systems   Constitutional:  Negative for diaphoresis and fever.   HENT:  Negative for ear pain and trouble swallowing.    Eyes:  Positive for visual disturbance. Negative for photophobia and pain.   Respiratory:  Negative for cough and shortness of breath.    Cardiovascular:  Negative for chest pain and palpitations.   Gastrointestinal:  Negative for abdominal pain, nausea and vomiting.   Musculoskeletal:  Positive for back pain. Negative for neck pain and neck stiffness.   Skin:  Negative for color change and pallor.   Allergic/Immunologic: Positive for immunocompromised state.   Neurological:  Negative for facial asymmetry, speech difficulty and weakness.   Psychiatric/Behavioral:  Negative for confusion and decreased concentration.    Objective:     Vitals:    Temp: 98.2 °F (36.8 °C)  Pulse: (!) 54  BP: (!) 190/81  MAP (mmHg): 116  Resp: 18  SpO2: 95 %    Temp  Min: 97.8 °F (36.6 °C)  Max: 99 °F (37.2 °C)  Pulse  Min: 53  Max: 62  BP  Min: 135/66  Max: 190/81  MAP (mmHg)  Min: 107  Max: 116  Resp  Min: 16  Max: 18  SpO2  Min: 95 %  Max: 97 %    06/29 0701 - 06/30 0700  In: 277 [P.O.:277]  Out: -    Unmeasured Output  Urine Occurrence: 4  Stool Occurrence: 1       Physical Exam  Constitutional:       General: She is not in acute distress.  HENT:      Head: Normocephalic and atraumatic.   Eyes:      Extraocular Movements: Extraocular movements intact.      Pupils: Pupils are equal, round, and reactive to light.   Cardiovascular:      Rate and Rhythm: Normal rate.      Pulses: Normal pulses.   Pulmonary:      Effort: Pulmonary effort is normal. No respiratory distress.   Abdominal:      Palpations: Abdomen is soft.      Tenderness: There is no abdominal tenderness.   Musculoskeletal:      Cervical back: Normal range of motion and neck supple.  "  Neurological:      Mental Status: She is alert.      Comments: A&O x 4. Able to name and repeat appropriately.  PERRL, EOMI.  No speech changes.  Moves all 4 extremities spontaneously.  5/5 strength throughout all 4 extremities.  Sensation to light touch intact throughout all 4 extremities.     Psychiatric:         Mood and Affect: Mood normal.         Behavior: Behavior normal.       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 She was started on amphotericin and flucytosine. Opening pressure on 6/24 was 34 cm H2O. CT chest at that time with "multiple indeterminate soft tissue masses in LLL with 2 smaller masses in R lung which may be a part of the same process." CTH on 6/30 suggestive of early/developing hydrocephalus or ventricular entrapment. Repeat therapeutic LP on 6/30 with opening pressure of 35 cm H2O and significant symptomatic relief following procedure. Concern at this time is high for disseminated cryptococcus, pending yeast growth and speciation by OS microbiology lab.    At this time, NCC is aware of this patient. Our current recommendation due to symptomatic improvement post LP is for the patient to remain on the floor. Please continue to monitor, and have a low threshold to call back NCC if acute neurologic change is noted.    -Recommend repeat CTH the morning of 7/1 for interval follow up of noted changes.  -Continue care per ID, in addition to intermittent, therapeutic LPs as warranted due to projected extended treatment course of patient's disease process.        Palliative Care  Long-term use of immunosuppressant " medication  s/p kidney transplant in 2020 (on cellcept and tacro)        Activity Orders          Diet Low Sodium, 2gm: Low Sodium, 2gm starting at 06/27 2202        Full Code    Navid Mitchell MD  Neurocritical Care  [facility] - Telemetry Stepdown

## 2022-07-01 NOTE — HPI
"Patient is a 56 yof with a pmh of htn, CKD s/p kidney transplant in 2020 (on cellcept and tacro) who [place] [facility] [place] a [place] due to persistent HA. She was previously [place] [facility] [place]12[place]6/14 for HA with no focal neurologic deficits, at which time she received IV headache cocktail including decadron, Mg sulfate, reglan and IVFs with resolution of headache. She was dc'd with topamax 25 mg BID along with magnesium and riboflavin. Per her , she then started developing a severe headache over the next day and a half following discharge, followed by facial droop, inability to close her R eye or mouth, impaired hearing, and slurred speech. She underwent repeat brain MRI on 6/23/22 which was reportedly unremarkable. LP was performed on 6/24 w/ opening pressure of 34 cm H2O, elevated protein (93), low glucose (6) and + ink and Cx for encapsulated yeast. She was started on amphotericin and flucytosine. CT chest at that time with "multiple indeterminate soft tissue masses in LLL with 2 smaller masses in R lung which may be a part of the same process." Hospital course was further complicated by TERESA superimposed on CKD3. She was transferred for disseminated cryptococcal infection. Transplant ID was consulted and recommended continuing ambisome/flucytosine with plan to repeat LP 2 weeks from 6/24. CTH on 6/30 suggestive of early/developing hydrocephalus or ventricular entrapment. Repeat LP was performed on 6/30 due to worsening patient mental status and vision with associated nausea and vomiting, with opening pressure of 35 cm H2O and closing pressure of 13 cm H2O. 15 cc CSF removed at that time. She had a repeat CT head today which was stable from prior. Neurocritical care was consulted to assess patient appropriateness for transfer to the neurocritical care unit.      "

## 2022-07-01 NOTE — PROGRESS NOTES
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  57 y/o F h/o HTN, gastric bypass, ESRD 2/2 HTN s/p DBDKT 4/5/20 (basiliximab induction,, CMV D+/R+, tacro, MMF), COVID19 7/2020, recently discharged 6/2022 for 2 months of headaches, now admitted to [facility] with intractable headaches, dizziness, blurred vision, left sided facial droop, MRI brain negative 6/23, s/p LP 6/24 with OP of 34cm, CSF: WBC unable to quantitate due to infereing cellular structures, gluc 6, protein 93, shahnaz ink positive for encapsulated yeast and cultures also growing yeast, CT chest with b/l pulmonary nodules started on ambisome, flucytosine 6/26 course notable for TERESA (Cr 1.3->2.0) now transferred to [facility] [place] care. Headaches much improved, nauseaus but no further emesis, eating food, serum crypto Ag positive, ophthalmology exam with papilledema, repeat LP with lower OP, csf crypto Ag +  - cryptococcal meningitis - continue ambisome/flucytosine   - pressure appears to be coming down and symptoms are slowly improving  - if clinical worsening would be concerned for possible IRIS and would repeat LP for pressure and cell count assessment if clinical worsening and would consider steroids in that situation.  At this exact moment, given improvement, do not think this is IRIS and would hold on steroids for now   - monitor electrolytes and renal function on ambisome  - will follow, discussed with team        Anticipated Disposition: pending    Thank you for your consult. I will follow-up with patient. Please contact us if you have any additional questions.    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    HPI: 55 y/o F h/o HTN, gastric bypass, ESRD 2/2 HTN s/p DBDKT 4/5/20 (basiliximab induction,, CMV D+/R+, tacro, MMF), COVID19 7/2020, recently discharged 6/2022 for 2 months of headaches, now admitted to [facility] with intractable headaches, dizziness, blurred vision, left sided facial droop, s/p LP 6/24 with CSF: WBC unable to quantitate due to infereing cellular structures, gluc 6, protein 93, shahnaz ink positive for yeast and cultures also growing yeast, CT chest with b/l pulmonary nodules started on ambisome, flucytosine 6/26 course notable for TERESA (Cr 1.3->2.0) now [place] [facility] [place]. She states HA and n/v have slightly improved but still not feeling well    Interval History:   Feels much better today, walking out of bathroom and also eating food, repeat LP with lower OP    Review of Systems   Constitutional:  Positive for activity change, appetite change and fatigue. Negative for chills, diaphoresis and fever.   HENT:  Positive for hearing loss and voice change. Negative for congestion.    Eyes:  Positive for visual disturbance. Negative for pain.   Respiratory:  Negative for cough, shortness of breath and wheezing.    Cardiovascular:  Negative for chest pain, palpitations and leg swelling.   Gastrointestinal:  Positive for nausea. Negative for constipation, diarrhea and vomiting.   Genitourinary:  Negative for difficulty urinating and dysuria.   Musculoskeletal:  Positive for neck pain. Negative for arthralgias.   Skin:  Negative for rash and wound.   Allergic/Immunologic: Positive for immunocompromised state.   Neurological:  Positive for facial asymmetry, speech difficulty and headaches. Negative for dizziness and seizures.   Psychiatric/Behavioral:  Negative for agitation and behavioral problems.      Objective:     Vital Signs (Most Recent):  Temp: 98.6 °F (37 °C) (07/01/22 1611)  Pulse: 65  (07/01/22 1611)  Resp: 17 (07/01/22 1611)  BP: (!) 146/71 (07/01/22 1611)  SpO2: (!) 93 % (07/01/22 1611)   Vital Signs (24h Range):  Temp:  [97 °F (36.1 °C)-98.6 °F (37 °C)] 98.6 °F (37 °C)  Pulse:  [53-65] 65  Resp:  [17-20] 17  SpO2:  [93 %-98 %] 93 %  BP: (146-179)/(67-80) 146/71     Weight: 71.7 kg (158 lb 1.1 oz)  Body mass index is 30.87 kg/m².    Intake/Output Summary (Last 24 hours) at 7/1/2022 1712  Last data filed at 7/1/2022 0548  Gross per 24 hour   Intake 1450 ml   Output --   Net 1450 ml          Physical Exam  Vitals reviewed.   Constitutional:       General: She is not in acute distress.     Appearance: She is well-developed.   HENT:      Head: Normocephalic and atraumatic.   Eyes:      Extraocular Movements: Extraocular movements intact.      Pupils: Pupils are equal, round, and reactive to light.   Neck:      Vascular: No JVD.      Trachea: No tracheal deviation.   Cardiovascular:      Rate and Rhythm: Normal rate and regular rhythm.      Heart sounds: No murmur heard.    No friction rub. No gallop.   Pulmonary:      Effort: No respiratory distress.      Breath sounds: Normal breath sounds. No wheezing or rales.   Abdominal:      General: Bowel sounds are normal. There is no distension.      Palpations: Abdomen is soft. There is no mass.      Tenderness: There is no abdominal tenderness.   Musculoskeletal:         General: No deformity.      Cervical back: Neck supple.   Lymphadenopathy:      Cervical: No cervical adenopathy.   Skin:     General: Skin is warm and dry.      Findings: No rash.   Neurological:      Mental Status: She is alert and oriented to person, place, and time.      Cranial Nerves: Facial asymmetry present.   Psychiatric:         Speech: Speech is slurred ((improved)).       Significant Labs: All pertinent labs within the past 24 hours have been reviewed.    Recent Results (from the past 24 hour(s))   PT/INR    Collection Time: 07/01/22  6:17 AM   Result Value Ref Range     Prothrombin Time 14.0 (H) 9.0 - 12.5 sec    INR 1.4 (H) 0.8 - 1.2   CBC Auto Differential    Collection Time: 07/01/22  6:17 AM   Result Value Ref Range    WBC 4.22 3.90 - 12.70 K/uL    RBC 2.96 (L) 4.00 - 5.40 M/uL    Hemoglobin 8.9 (L) 12.0 - 16.0 g/dL    Hematocrit 26.6 (L) 37.0 - 48.5 %    MCV 90 82 - 98 fL    MCH 30.1 27.0 - 31.0 pg    MCHC 33.5 32.0 - 36.0 g/dL    RDW 12.3 11.5 - 14.5 %    Platelets 125 (L) 150 - 450 K/uL    MPV 11.3 9.2 - 12.9 fL    Immature Granulocytes 1.4 (H) 0.0 - 0.5 %    Gran # (ANC) 3.5 1.8 - 7.7 K/uL    Immature Grans (Abs) 0.06 (H) 0.00 - 0.04 K/uL    Lymph # 0.3 (L) 1.0 - 4.8 K/uL    Mono # 0.3 0.3 - 1.0 K/uL    Eos # 0.0 0.0 - 0.5 K/uL    Baso # 0.02 0.00 - 0.20 K/uL    nRBC 0 0 /100 WBC    Gran % 83.4 (H) 38.0 - 73.0 %    Lymph % 6.6 (L) 18.0 - 48.0 %    Mono % 7.6 4.0 - 15.0 %    Eosinophil % 0.5 0.0 - 8.0 %    Basophil % 0.5 0.0 - 1.9 %    Differential Method Automated    Tacrolimus level    Collection Time: 07/01/22  6:17 AM   Result Value Ref Range    Tacrolimus Lvl 3.1 (L) 5.0 - 15.0 ng/mL   Comprehensive metabolic panel    Collection Time: 07/01/22  8:44 AM   Result Value Ref Range    Sodium 134 (L) 136 - 145 mmol/L    Potassium 4.1 3.5 - 5.1 mmol/L    Chloride 107 95 - 110 mmol/L    CO2 20 (L) 23 - 29 mmol/L    Glucose 137 (H) 70 - 110 mg/dL    BUN 16 6 - 20 mg/dL    Creatinine 1.2 0.5 - 1.4 mg/dL    Calcium 8.9 8.7 - 10.5 mg/dL    Total Protein 5.5 (L) 6.0 - 8.4 g/dL    Albumin 3.1 (L) 3.5 - 5.2 g/dL    Total Bilirubin 0.5 0.1 - 1.0 mg/dL    Alkaline Phosphatase 58 55 - 135 U/L    AST 16 10 - 40 U/L    ALT 18 10 - 44 U/L    Anion Gap 7 (L) 8 - 16 mmol/L    eGFR if African American 59.2 (A) >60 mL/min/1.73 m^2    eGFR if non  51.4 (A) >60 mL/min/1.73 m^2   Magnesium    Collection Time: 07/01/22  8:44 AM   Result Value Ref Range    Magnesium 2.2 1.6 - 2.6 mg/dL   Phosphorus    Collection Time: 07/01/22  8:44 AM   Result Value Ref Range    Phosphorus 3.2 2.7 -  4.5 mg/dL         Significant Imaging: I have reviewed all pertinent imaging results/findings within the past 24 hours.

## 2022-07-01 NOTE — CONSULTS
"[facility] - Telemetry Stepdown  Neurocritical Care  Consult Note    Admit Date: 6/27/2022  Service Date: 07/01/2022  Length of Stay: 4    Consults  Subjective:     Chief Complaint: Meningitis due to fungal infection    History of Present Illness: Patient is a 56 yof with a pmh of htn, CKD s/p kidney transplant in 2020 (on cellcept and tacro) who [place] [facility] [place]22 as a transfer from [place] due to persistent HA. She was previously admitted to [facility] on 6/12-6/14 for HA with no focal neurologic deficits, at which time she received IV headache cocktail including decadron, Mg sulfate, reglan and IVFs with resolution of headache. She was dc'd with topamax 25 mg BID along with magnesium and riboflavin. Per her , she then started developing a severe headache over the next day and a half following discharge, followed by facial droop, inability to close her R eye or mouth, impaired hearing, and slurred speech. She underwent repeat brain MRI on 6/23/22 which was reportedly unremarkable. LP was performed on 6/24 w/ opening pressure of 34 cm H2O, elevated protein (93), low glucose (6) and + ink and Cx for encapsulated yeast. She was started on amphotericin and flucytosine. CT chest at that time with "multiple indeterminate soft tissue masses in LLL with 2 smaller masses in R lung which may be a part of the same process." Hospital course was further complicated by TERESA superimposed on CKD3. She was transferred for disseminated cryptococcal infection. Transplant ID was consulted and recommended continuing ambisome/flucytosine with plan to repeat LP 2 weeks from 6/24. CTH on 6/30 suggestive of early/developing hydrocephalus or ventricular entrapment. Repeat LP was performed on 6/30 due to worsening patient mental status and vision with associated nausea and vomiting, with opening pressure of 35 cm H2O and closing pressure of 13 cm H2O. 15 cc CSF removed at that time. She had a repeat CT head today which was " stable from prior. Neurocritical care was consulted to assess patient appropriateness for transfer to the neurocritical care unit.          Past Medical History:   Diagnosis Date    Anemia of renal disease     ESRD on dialysis     Dr. Muhammad     Essential hypertension     PD catheter dysfunction 10/18/2018    Secondary hyperparathyroidism of renal origin      Past Surgical History:   Procedure Laterality Date    AV FISTULA PLACEMENT Left     never matured    AV graft Right 2015     SECTION      x3; ; , and     COLONOSCOPY N/A 2020    Procedure: COLONOSCOPY;  Surgeon: Darrell Golden MD;  Location: [facility] ENDO;  Service: Endoscopy;  Laterality: N/A;    GASTRIC BYPASS  2016    KIDNEY TRANSPLANT N/A 2020    Procedure: TRANSPLANT, KIDNEY;  Surgeon: Megan Garcia MD;  Location: [address];  Service: Transplant;  Laterality: N/A;    KIDNEY TRANSPLANT Right 2020    LAPAROSCOPIC REVISION OF PROCEDURE INVOLVING PERITONEAL DIALYSIS CATHETER N/A 10/18/2018    Procedure: REVISION OF PROCEDURE INVOLVING PERITONEAL DIALYSIS CATHETER, LAPAROSCOPIC;  Surgeon: Hair Jimenez MD;  Location: [facility];  Service: General;  Laterality: N/A;    PERITONEAL CATHETER INSERTION N/A 2018    Procedure: Laparoscopic INSERTION, CATHETER, INTRAPERITONEAL;  Surgeon: Hair Jimenez MD;  Location: [facility];  Service: General;  Laterality: N/A;    PERITONEAL CATHETER REMOVAL N/A 2018    Procedure: REMOVAL, CATHETER, DIALYSIS, PERITONEAL;  Surgeon: Hair Jimenez MD;  Location: [facility];  Service: General;  Laterality: N/A;    WOUND EXPLORATION N/A 2018    Procedure: EXPLORATION, WOUND-Surgical Site Irrigation;  Surgeon: Hair Jimenez MD;  Location: [facility];  Service: General;  Laterality: N/A;      No current facility-administered medications on file prior to encounter.     Current Outpatient Medications on File Prior to Encounter   Medication Sig Dispense Refill     acetaminophen (TYLENOL) 325 MG tablet Take 2 tablets (650 mg total) by mouth every 6 (six) hours as needed (headache).  0    butalbital-acetaminophen-caffeine -40 mg (FIORICET, ESGIC) -40 mg per tablet Take 1 tablet by mouth daily as needed (migraines). 30 tablet 3    calcitRIOL (ROCALTROL) 0.25 MCG Cap Take 2 capsules (0.5 mcg total) by mouth once daily. 60 capsule 11    docusate sodium (COLACE) 100 MG capsule Take 1 capsule (100 mg total) by mouth 3 (three) times daily as needed for Constipation.  0    l-methylfolate-b2-b6-b12 (CEREFOLIN) 6-5-50-1 mg Tab Take 1 tablet by mouth once daily. 30 tablet 3    magnesium oxide (MAG-OX) 400 mg (241.3 mg magnesium) tablet Take 1 tablet (400 mg total) by mouth once daily. 30 tablet 3    mycophenolate (CELLCEPT) 250 mg Cap Take 2 capsules (500 mg total) by mouth 2 (two) times daily. 120 capsule 11    NIFEdipine (PROCARDIA-XL) 30 MG (OSM) 24 hr tablet TAKE ONE TABLET BY MOUTH TWICE DAILY hold if systolic blood pressure is less than 130 60 tablet 11    predniSONE (DELTASONE) 5 MG tablet Take 1 tablet (5 mg total) by mouth once daily. 120 tablet 11    sodium bicarbonate 650 MG tablet Take 2 tablets (1,300 mg total) by mouth 2 (two) times daily. 120 tablet 11    tacrolimus (PROGRAF) 1 MG Cap Take 3 capsules (3 mg total) by mouth every morning AND 2 capsules (2 mg total) every evening. 150 capsule 11    topiramate (TOPAMAX) 25 MG tablet Take 1 tablet (25 mg total) by mouth 2 (two) times daily.        Allergies: Sulfa (sulfonamide antibiotics)    Family History   Problem Relation Age of Onset    Kidney disease Mother     Diabetes Mother     Heart disease Mother     Hypertension Mother     Lupus Sister     Down syndrome Son     Cancer Neg Hx     Stroke Neg Hx      Social History     Tobacco Use    Smoking status: Never Smoker    Smokeless tobacco: Never Used   Substance Use Topics    Alcohol use: No    Drug use: No     Review of Systems  "  Constitutional: Negative.    HENT: Negative.     Respiratory: Negative.     Cardiovascular: Negative.    Gastrointestinal: Negative.    Genitourinary: Negative.    Musculoskeletal:  Positive for back pain, neck pain and neck stiffness. Negative for joint swelling and myalgias.   Neurological:  Positive for headaches. Negative for seizures, syncope, facial asymmetry, speech difficulty, weakness and numbness.   Objective:     Vitals:    Temp: 98.2 °F (36.8 °C)  Pulse: (!) 59  BP: (!) 179/78  MAP (mmHg): 97  Resp: 20  SpO2: 95 %    Temp  Min: 97 °F (36.1 °C)  Max: 98.6 °F (37 °C)  Pulse  Min: 53  Max: 60  BP  Min: 149/67  Max: 190/81  MAP (mmHg)  Min: 97  Max: 116  Resp  Min: 18  Max: 20  SpO2  Min: 95 %  Max: 98 %    06/30 0701 - 07/01 0700  In: 1450 [P.O.:1450]  Out: -    Unmeasured Output  Urine Occurrence: 4  Stool Occurrence: 0  Emesis Occurrence: 0  Pad Count: 0       Physical Exam  Constitutional: Well-nourished, well-developed. No obvious distress.  Eyes: Clear conjunctiva. Anicteric. No discharge. Lids without lesions.  HEENT: MMM. Nose, external ears atraumatic.  Cardio: RRR. Pulses intact. Capillary refill time < 2 seconds.  Respiratory: Clear to auscultation. Regular effort.  GI: Bowel sounds present. Soft, non-distended, non-tender.    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 She was started on amphotericin and flucytosine. Opening pressure on 6/24 was 34 cm H2O. CT chest at that time with "multiple indeterminate soft " "tissue masses in LLL with 2 smaller masses in R lung which may be a part of the same process." CTH on 6/30 suggestive of early/developing hydrocephalus or ventricular entrapment. Repeat therapeutic LP on 6/30 with opening pressure of 35 cm H2O and significant symptomatic relief following procedure, draining 15 cc of CSF. She had a repeat CT head today which was stable from prior. Concern at this time is high for disseminated cryptococcus, pending yeast growth and speciation by OSH microbiology lab.     At this time, NCC is aware of this patient. Our current recommendation due to symptomatic improvement post yesterday's LP is for the patient to remain on the floor as she is neuro-intact, briskly awakening to voice, and alert and oriented x 4. Please continue to monitor, and have a low threshold to call back NCC if acute neurologic change is noted.    -Continue care per ID.  -Recommend Neurosurgical consult for possible lumbar drain, as well as intermittent, therapeutic LPs as warranted in the interim.    Thank you for your consult. Please call [phone] with any further questions or any acute neurological changes in patient status.        Palliative Care  Long-term use of immunosuppressant medication  s/p kidney transplant in 2020 (on cellcept and tacro)          The patient is being Prophylaxed for:  Venous Thromboembolism with: Mechanical  Stress Ulcer with: Not Applicable   Ventilator Pneumonia with: not applicable    Activity Orders          Diet Low Sodium, 2gm: Low Sodium, 2gm starting at 06/27 2202        Full Code     Level III    Celine Fry PA-C  Neurocritical Care  [facility] - Telemetry Stepdown  "

## 2022-07-01 NOTE — ASSESSMENT & PLAN NOTE
"CSF performed with elevated protein (93), low glucose (6) and +[place] ink and Cx for encapsulated yeast. She was started on amphotericin and flucytosine. Opening pressure on 6/24 was 34 cm H2O. CT chest at that time with "multiple indeterminate soft tissue masses in LLL with 2 smaller masses in R lung which may be a part of the same process." CTH on 6/30 suggestive of early/developing hydrocephalus or ventricular entrapment. Repeat therapeutic LP on 6/30 with opening pressure of 35 cm H2O and significant symptomatic relief following procedure. Concern at this time is high for disseminated cryptococcus, pending yeast growth and speciation by [facility] microbiology lab.    At this time, NCC is aware of this patient. Our current recommendation due to symptomatic improvement post LP is for the patient to remain on the floor. Please continue to monitor, and have a low threshold to call back NCC if acute neurologic change is noted.    -Recommend repeat CTH the morning of 7/1 for interval follow up of noted changes.  -Continue care per ID, in addition to intermittent, therapeutic LPs as warranted due to projected extended treatment course of patient's disease process.      "

## 2022-07-01 NOTE — SUBJECTIVE & OBJECTIVE
Past Medical History:   Diagnosis Date    Anemia of renal disease     ESRD on dialysis     Dr. Muhammad     Essential hypertension     PD catheter dysfunction 10/18/2018    Secondary hyperparathyroidism of renal origin      Past Surgical History:   Procedure Laterality Date    AV FISTULA PLACEMENT Left     never matured    AV graft Right 2015     SECTION      x3; ; , and     COLONOSCOPY N/A 2020    Procedure: COLONOSCOPY;  Surgeon: Darrell Golden MD;  Location: [facility] ENDO;  Service: Endoscopy;  Laterality: N/A;    GASTRIC BYPASS  2016    KIDNEY TRANSPLANT N/A 2020    Procedure: TRANSPLANT, KIDNEY;  Surgeon: Megan Garcia MD;  Location: [address];  Service: Transplant;  Laterality: N/A;    KIDNEY TRANSPLANT Right 2020    LAPAROSCOPIC REVISION OF PROCEDURE INVOLVING PERITONEAL DIALYSIS CATHETER N/A 10/18/2018    Procedure: REVISION OF PROCEDURE INVOLVING PERITONEAL DIALYSIS CATHETER, LAPAROSCOPIC;  Surgeon: Hair Jimenez MD;  Location: [facility];  Service: General;  Laterality: N/A;    PERITONEAL CATHETER INSERTION N/A 2018    Procedure: Laparoscopic INSERTION, CATHETER, INTRAPERITONEAL;  Surgeon: Hair Jimenez MD;  Location: [facility];  Service: General;  Laterality: N/A;    PERITONEAL CATHETER REMOVAL N/A 2018    Procedure: REMOVAL, CATHETER, DIALYSIS, PERITONEAL;  Surgeon: Hair Jimenez MD;  Location: [facility];  Service: General;  Laterality: N/A;    WOUND EXPLORATION N/A 2018    Procedure: EXPLORATION, WOUND-Surgical Site Irrigation;  Surgeon: Hair Jimenez MD;  Location: [facility];  Service: General;  Laterality: N/A;      No current facility-administered medications on file prior to encounter.     Current Outpatient Medications on File Prior to Encounter   Medication Sig Dispense Refill    acetaminophen (TYLENOL) 325 MG tablet Take 2 tablets (650 mg total) by mouth every 6 (six) hours as needed (headache).  0    butalbital-acetaminophen-caffeine  -40 mg (FIORICET, ESGIC) -40 mg per tablet Take 1 tablet by mouth daily as needed (migraines). 30 tablet 3    calcitRIOL (ROCALTROL) 0.25 MCG Cap Take 2 capsules (0.5 mcg total) by mouth once daily. 60 capsule 11    docusate sodium (COLACE) 100 MG capsule Take 1 capsule (100 mg total) by mouth 3 (three) times daily as needed for Constipation.  0    l-methylfolate-b2-b6-b12 (CEREFOLIN) 6-5-50-1 mg Tab Take 1 tablet by mouth once daily. 30 tablet 3    magnesium oxide (MAG-OX) 400 mg (241.3 mg magnesium) tablet Take 1 tablet (400 mg total) by mouth once daily. 30 tablet 3    mycophenolate (CELLCEPT) 250 mg Cap Take 2 capsules (500 mg total) by mouth 2 (two) times daily. 120 capsule 11    NIFEdipine (PROCARDIA-XL) 30 MG (OSM) 24 hr tablet TAKE ONE TABLET BY MOUTH TWICE DAILY hold if systolic blood pressure is less than 130 60 tablet 11    predniSONE (DELTASONE) 5 MG tablet Take 1 tablet (5 mg total) by mouth once daily. 120 tablet 11    sodium bicarbonate 650 MG tablet Take 2 tablets (1,300 mg total) by mouth 2 (two) times daily. 120 tablet 11    tacrolimus (PROGRAF) 1 MG Cap Take 3 capsules (3 mg total) by mouth every morning AND 2 capsules (2 mg total) every evening. 150 capsule 11    topiramate (TOPAMAX) 25 MG tablet Take 1 tablet (25 mg total) by mouth 2 (two) times daily.        Allergies: Sulfa (sulfonamide antibiotics)    Family History   Problem Relation Age of Onset    Kidney disease Mother     Diabetes Mother     Heart disease Mother     Hypertension Mother     Lupus Sister     Down syndrome Son     Cancer Neg Hx     Stroke Neg Hx      Social History     Tobacco Use    Smoking status: Never Smoker    Smokeless tobacco: Never Used   Substance Use Topics    Alcohol use: No    Drug use: No     Review of Systems   Constitutional: Negative.    HENT: Negative.     Respiratory: Negative.     Cardiovascular: Negative.    Gastrointestinal: Negative.    Genitourinary: Negative.    Musculoskeletal:  Positive  for back pain, neck pain and neck stiffness. Negative for joint swelling and myalgias.   Neurological:  Positive for headaches. Negative for seizures, syncope, facial asymmetry, speech difficulty, weakness and numbness.   Objective:     Vitals:    Temp: 98.2 °F (36.8 °C)  Pulse: (!) 59  BP: (!) 179/78  MAP (mmHg): 97  Resp: 20  SpO2: 95 %    Temp  Min: 97 °F (36.1 °C)  Max: 98.6 °F (37 °C)  Pulse  Min: 53  Max: 60  BP  Min: 149/67  Max: 190/81  MAP (mmHg)  Min: 97  Max: 116  Resp  Min: 18  Max: 20  SpO2  Min: 95 %  Max: 98 %    06/30 0701 - 07/01 0700  In: 1450 [P.O.:1450]  Out: -    Unmeasured Output  Urine Occurrence: 4  Stool Occurrence: 0  Emesis Occurrence: 0  Pad Count: 0       Physical Exam  Constitutional: Well-nourished, well-developed. No obvious distress.  Eyes: Clear conjunctiva. Anicteric. No discharge. Lids without lesions.  HEENT: MMM. Nose, external ears atraumatic.  Cardio: RRR. Pulses intact. Capillary refill time < 2 seconds.  Respiratory: Clear to auscultation. Regular effort.  GI: Bowel sounds present. Soft, non-distended, non-tender.    Neurologic:  E3V5M6  Awakes briskly to voice  AAOx4  PERRL, EOMI  Pupils brisk  SILT  Strength 5/5 throughout    Today I personally reviewed pertinent medications, lines/drains/airways, imaging, cardiology results, laboratory results, microbiology results, notably:

## 2022-07-01 NOTE — ASSESSMENT & PLAN NOTE
57 y/o F h/o HTN, gastric bypass, ESRD 2/2 HTN s/p DBDKT 4/5/20 (basiliximab induction,, CMV D+/R+, tacro, MMF), COVID19 7/2020, recently discharged 6/2022 for 2 months of headaches, now admitted to [facility] with intractable headaches, dizziness, blurred vision, left sided facial droop, MRI brain negative 6/23, s/p LP 6/24 with OP of 34cm, CSF: WBC unable to quantitate due to infereing cellular structures, gluc 6, protein 93, shahnaz ink positive for encapsulated yeast and cultures also growing yeast, CT chest with b/l pulmonary nodules started on ambisome, flucytosine 6/26 course notable for TERESA (Cr 1.3->2.0) now [place] [facility] [place]. Headaches much improved, nauseaus but no further emesis, eating food, serum crypto Ag positive, ophthalmology exam with papilledema, repeat LP with lower OP  - cryptococcal meningitis - continue ambisome/flucytosine   - pressure appears to be coming down and symptoms are slowly improving  - if clinical worsening would be concerned for possible IRIS and would repeat LP for pressure and cell count assessment if clinical worsening and would consider steroids in that situation.  At this exact moment, given improvement, do not think IRIS and would hold on steroids for now   - monitor electrolytes and renal function on ambisome  - will follow, discussed with team

## 2022-07-02 NOTE — ASSESSMENT & PLAN NOTE
- Cr 2.2 on day of transfer, has been steadily worsening over last few days (baseline ~1.2)  - KTM consulted, appreciate recs; likely prerenal versus medication related  - US transplant unremarkable for acute changes   - S/p IVF  - Improving 2.2->1.7->1.3-->1.3-->1.2  - Per Alpa GASTELUM, continue p.o. sodium bicarb  - Strict I/O's

## 2022-07-02 NOTE — SUBJECTIVE & OBJECTIVE
Interval History:   Feels much better today, walking out of bathroom and also eating food    Review of Systems   Constitutional:  Positive for activity change, appetite change and fatigue. Negative for chills, diaphoresis and fever.   HENT:  Positive for hearing loss and voice change. Negative for congestion.    Eyes:  Positive for visual disturbance. Negative for pain.   Respiratory:  Negative for cough, shortness of breath and wheezing.    Cardiovascular:  Negative for chest pain, palpitations and leg swelling.   Gastrointestinal:  Positive for nausea. Negative for constipation, diarrhea and vomiting.   Genitourinary:  Negative for difficulty urinating and dysuria.   Musculoskeletal:  Positive for neck pain. Negative for arthralgias.   Skin:  Negative for rash and wound.   Allergic/Immunologic: Positive for immunocompromised state.   Neurological:  Positive for facial asymmetry, speech difficulty and headaches. Negative for dizziness and seizures.   Psychiatric/Behavioral:  Negative for agitation and behavioral problems.      Objective:     Vital Signs (Most Recent):  Temp: 98 °F (36.7 °C) (07/02/22 1130)  Pulse: 75 (07/02/22 1130)  Resp: 18 (07/02/22 1130)  BP: 132/66 (07/02/22 1130)  SpO2: 97 % (07/02/22 1130)   Vital Signs (24h Range):  Temp:  [98 °F (36.7 °C)-98.6 °F (37 °C)] 98 °F (36.7 °C)  Pulse:  [65-75] 75  Resp:  [17-20] 18  SpO2:  [93 %-97 %] 97 %  BP: (132-182)/(66-85) 132/66     Weight: 71.7 kg (158 lb 1.1 oz)  Body mass index is 30.87 kg/m².    Intake/Output Summary (Last 24 hours) at 7/2/2022 1239  Last data filed at 7/2/2022 0800  Gross per 24 hour   Intake 1240 ml   Output --   Net 1240 ml          Physical Exam  Vitals reviewed.   Constitutional:       General: She is not in acute distress.     Appearance: She is well-developed.   HENT:      Head: Normocephalic and atraumatic.   Eyes:      Extraocular Movements: Extraocular movements intact.      Pupils: Pupils are equal, round, and reactive to  light.   Neck:      Vascular: No JVD.      Trachea: No tracheal deviation.   Cardiovascular:      Rate and Rhythm: Normal rate and regular rhythm.      Heart sounds: No murmur heard.    No friction rub. No gallop.   Pulmonary:      Effort: No respiratory distress.      Breath sounds: Normal breath sounds. No wheezing or rales.   Abdominal:      General: Bowel sounds are normal. There is no distension.      Palpations: Abdomen is soft. There is no mass.      Tenderness: There is no abdominal tenderness.   Musculoskeletal:         General: No deformity.      Cervical back: Neck supple.   Lymphadenopathy:      Cervical: No cervical adenopathy.   Skin:     General: Skin is warm and dry.      Findings: No rash.   Neurological:      Mental Status: She is alert and oriented to person, place, and time.      Cranial Nerves: Facial asymmetry present.   Psychiatric:         Speech: Speech is slurred ((improved)).       Significant Labs: All pertinent labs within the past 24 hours have been reviewed.    Recent Results (from the past 24 hour(s))   PT/INR    Collection Time: 07/02/22  6:09 AM   Result Value Ref Range    Prothrombin Time 10.6 9.0 - 12.5 sec    INR 1.0 0.8 - 1.2   CBC Auto Differential    Collection Time: 07/02/22  6:09 AM   Result Value Ref Range    WBC 6.78 3.90 - 12.70 K/uL    RBC 4.51 4.00 - 5.40 M/uL    Hemoglobin 13.5 12.0 - 16.0 g/dL    Hematocrit 40.7 37.0 - 48.5 %    MCV 90 82 - 98 fL    MCH 29.9 27.0 - 31.0 pg    MCHC 33.2 32.0 - 36.0 g/dL    RDW 12.6 11.5 - 14.5 %    Platelets 208 150 - 450 K/uL    MPV 11.3 9.2 - 12.9 fL    Immature Granulocytes 1.9 (H) 0.0 - 0.5 %    Gran # (ANC) 5.6 1.8 - 7.7 K/uL    Immature Grans (Abs) 0.13 (H) 0.00 - 0.04 K/uL    Lymph # 0.5 (L) 1.0 - 4.8 K/uL    Mono # 0.4 0.3 - 1.0 K/uL    Eos # 0.1 0.0 - 0.5 K/uL    Baso # 0.04 0.00 - 0.20 K/uL    nRBC 0 0 /100 WBC    Gran % 82.9 (H) 38.0 - 73.0 %    Lymph % 7.1 (L) 18.0 - 48.0 %    Mono % 6.5 4.0 - 15.0 %    Eosinophil % 1.0 0.0  - 8.0 %    Basophil % 0.6 0.0 - 1.9 %    Differential Method Automated    Comprehensive Metabolic Panel    Collection Time: 07/02/22  6:09 AM   Result Value Ref Range    Sodium 138 136 - 145 mmol/L    Potassium 3.7 3.5 - 5.1 mmol/L    Chloride 105 95 - 110 mmol/L    CO2 24 23 - 29 mmol/L    Glucose 139 (H) 70 - 110 mg/dL    BUN 19 6 - 20 mg/dL    Creatinine 1.2 0.5 - 1.4 mg/dL    Calcium 9.1 8.7 - 10.5 mg/dL    Total Protein 6.1 6.0 - 8.4 g/dL    Albumin 3.4 (L) 3.5 - 5.2 g/dL    Total Bilirubin 0.4 0.1 - 1.0 mg/dL    Alkaline Phosphatase 70 55 - 135 U/L    AST 15 10 - 40 U/L    ALT 18 10 - 44 U/L    Anion Gap 9 8 - 16 mmol/L    eGFR if African American 59.2 (A) >60 mL/min/1.73 m^2    eGFR if non  51.4 (A) >60 mL/min/1.73 m^2   Magnesium    Collection Time: 07/02/22  6:09 AM   Result Value Ref Range    Magnesium 2.3 1.6 - 2.6 mg/dL   Phosphorus    Collection Time: 07/02/22  6:09 AM   Result Value Ref Range    Phosphorus 3.6 2.7 - 4.5 mg/dL   Tacrolimus level    Collection Time: 07/02/22  6:09 AM   Result Value Ref Range    Tacrolimus Lvl 5.9 5.0 - 15.0 ng/mL         Significant Imaging: I have reviewed all pertinent imaging results/findings within the past 24 hours.

## 2022-07-02 NOTE — SUBJECTIVE & OBJECTIVE
Interval History: Patient was seen and evaluated this am. Doing well this am. Sitting on cough at bedside with . Reports mild headache. Still w/nausea. No vomiting. No fevers, chills, night sweats, abd pain, diarrhea, constipation. Good UOP.    Objective:     Vital Signs (Most Recent):  Temp: 97.9 °F (36.6 °C) (07/02/22 1512)  Pulse: (!) 54 (07/02/22 1512)  Resp: 18 (07/02/22 1512)  BP: (!) 146/70 (07/02/22 1512)  SpO2: 100 % (07/02/22 1512)   Vital Signs (24h Range):  Temp:  [97.9 °F (36.6 °C)-98.1 °F (36.7 °C)] 97.9 °F (36.6 °C)  Pulse:  [54-75] 54  Resp:  [18] 18  SpO2:  [93 %-100 %] 100 %  BP: (132-182)/(66-85) 146/70     Weight: 71.7 kg (158 lb 1.1 oz)  Body mass index is 30.87 kg/m².    Intake/Output Summary (Last 24 hours) at 7/2/2022 1843  Last data filed at 7/2/2022 0800  Gross per 24 hour   Intake 1240 ml   Output --   Net 1240 ml      Physical Exam  Gen: in NAD, appears stated age  Neuro: awake and oriented to self, place, time  HEENT: NTNC, EOMI, PERRL, MMM  CVS: RRR, no m/r/g; S1/S2 auscultated with no S3 or S4; capillary refill < 2 sec  Resp: lungs CTAB, no w/r/r; no belabored breathing or accessory muscle use appreciated   Abd: BS+ in all 4 quadrants; NTND, soft to palpation; no organomegaly appreciated   Extrem: pulses full, equal, and regular over all 4 extremities; no UE or LE edema BL    Significant Labs: All pertinent labs within the past 24 hours have been reviewed.  Blood Culture: No results for input(s): LABBLOO in the last 48 hours.    CSF findings 06/30/22: <5glucose, 174 protein, 144 WBC, 3000 RBC, 37 lymph, 59 mono/macro. Repeat CSF w/1:8 cryptococcal antigen, CSF culture w/yeast    CBC:   Recent Labs   Lab 07/01/22  0617 07/02/22  0609   WBC 4.22 6.78   HGB 8.9* 13.5   HCT 26.6* 40.7   * 208     CMP:   Recent Labs   Lab 07/01/22  0844 07/02/22  0609   * 138   K 4.1 3.7    105   CO2 20* 24   * 139*   BUN 16 19   CREATININE 1.2 1.2   CALCIUM 8.9 9.1   PROT  5.5* 6.1   ALBUMIN 3.1* 3.4*   BILITOT 0.5 0.4   ALKPHOS 58 70   AST 16 15   ALT 18 18   ANIONGAP 7* 9   EGFRNONAA 51.4* 51.4*     Magnesium:   Recent Labs   Lab 07/01/22  0844 07/02/22  0609   MG 2.2 2.3     Urine Culture: No results for input(s): LABURIN in the last 48 hours.  Urine Studies:   No results for input(s): COLORU, APPEARANCEUA, PHUR, SPECGRAV, PROTEINUA, GLUCUA, KETONESU, BILIRUBINUA, OCCULTUA, NITRITE, UROBILINOGEN, LEUKOCYTESUR, RBCUA, WBCUA, BACTERIA, SQUAMEPITHEL, HYALINECASTS in the last 48 hours.    Invalid input(s): WRIGHTSUR      Significant Imaging: I have reviewed all pertinent imaging results/findings within the past 24 hours.    CTH 06/30/22:  FINDINGS:  Intracranial compartment:     Mild enlargement of the temporal horn of the right lateral ventricle, new from prior.  Remainder of the ventricular system is normal in size, stable.  Third ventricle diameter on the order of 0.3 cm.  Well-defined sulci remain present over the cerebral convexities.     The brain parenchyma appears otherwise within normal limits, and unchanged from priors.  No new parenchymal hemorrhage, edema or major vascular distribution infarct.  No intracranial mass effect or midline shift.     No new extra-axial blood or fluid collections.     Partially empty sella configuration.     Skull/extracranial contents (limited evaluation):     No displaced calvarial fracture.     Small volume right mastoid air cell fluid.     The visualized paranasal sinuses are essentially clear.     Impression:     Slight interval enlargement of the temporal horn of the right lateral ventricle since the recent MRI of 06/23/2022.  Appearance suggestive of early/developing hydrocephalus or ventricular entrapment.     No new hemorrhage, edema or infarction.     Partially empty sella configuration.  Nonspecific but configuration could be consistent with the reported history of a elevated intracranial pressure.    CT  07/01/22:  FINDINGS:  Intracranial compartment:     Mild enlargement of the temporal horn of the right lateral ventricle similar to most recent CT head 06/30/2022, new compared to prior MRI brain 06/23/2022.  Third ventricle diameter measures approximately 4 mm.     The brain parenchyma appears within normal limits not significantly changed compared to prior exam.  No parenchymal mass, hemorrhage, edema or major vascular distribution infarct.     No extra-axial blood or fluid collections.     Partially empty sella configuration.     Skull/extracranial contents (limited evaluation):     No fracture. Paranasal sinuses are essentially clear.  Small volume fluid within the right mastoid air cells.     Impression:     Slight enlargement of the temporal horn of right lateral ventricle not significantly changed compared to most recent CT head 06/30/2022,  new compared to prior MRI brain 06/23/2022.  Clinical correlation and continued close follow-up is recommended as developing hydrocephalus or ventricular entrapment remains a concern.     No new acute infarct or hemorrhage.

## 2022-07-02 NOTE — ASSESSMENT & PLAN NOTE
55 y/o F h/o HTN, gastric bypass, ESRD 2/2 HTN s/p DBDKT 4/5/20 (basiliximab induction,, CMV D+/R+, tacro, MMF), COVID19 7/2020, recently discharged 6/2022 for 2 months of headaches, now admitted to [facility] with intractable headaches, dizziness, blurred vision, left sided facial droop, MRI brain negative 6/23, s/p LP 6/24 with OP of 34cm, CSF: WBC unable to quantitate due to infereing cellular structures, gluc 6, protein 93, shahnaz ink positive for encapsulated yeast and cultures also growing yeast, CT chest with b/l pulmonary nodules started on ambisome, flucytosine 6/26 course notable for TERESA (Cr 1.3->2.0) now transferred to [facility] [place] for further care. Headaches much improved, nauseaus but no further emesis, eating food, serum crypto Ag positive, ophthalmology exam with papilledema, repeat LP with lower OP, csf crypto Ag +  - cryptococcal meningitis - continue ambisome/flucytosine   - pressure appears to be coming down and symptoms are slowly improving  - if clinical worsening would be concerned for possible IRIS and would repeat LP for pressure and cell count assessment if clinical worsening and would consider steroids in that situation.  At this exact moment, given improvement, do not think this is IRIS and would hold on steroids for now   - monitor electrolytes and renal function on ambisome  - will follow, discussed with team

## 2022-07-02 NOTE — SUBJECTIVE & OBJECTIVE
Interval History: Patient was seen and evaluated this am. Much improvement in signs and symptoms.  endorses that he is afraid that  is complaining of headache, neck pain, and lower back pain worse than usual- discussed w/ that this is likely 2/2 from LP.  had further discussions with transplant ID about how he is very anxious about everything, but he does feel like patient is improved since time of admission. Pt denies SOB, vomiting, chest pain, fevers, chills, night sweats. No worsening vision changes.     Objective:     Vital Signs (Most Recent):  Temp: 98.6 °F (37 °C) (07/01/22 1611)  Pulse: 65 (07/01/22 1611)  Resp: 17 (07/01/22 1611)  BP: (!) 146/71 (07/01/22 1611)  SpO2: (!) 93 % (07/01/22 1611)   Vital Signs (24h Range):  Temp:  [97 °F (36.1 °C)-98.6 °F (37 °C)] 98.6 °F (37 °C)  Pulse:  [53-65] 65  Resp:  [17-20] 17  SpO2:  [93 %-98 %] 93 %  BP: (146-179)/(67-80) 146/71     Weight: 71.7 kg (158 lb 1.1 oz)  Body mass index is 30.87 kg/m².    Intake/Output Summary (Last 24 hours) at 7/1/2022 2031  Last data filed at 7/1/2022 1847  Gross per 24 hour   Intake 1650 ml   Output --   Net 1650 ml      Physical Exam  Gen: in NAD, appears stated age  Neuro: awake and oriented to self, place, time  HEENT: NTNC, EOMI, PERRL, MMM  CVS: RRR, no m/r/g; S1/S2 auscultated with no S3 or S4; capillary refill < 2 sec  Resp: lungs CTAB, no w/r/r; no belabored breathing or accessory muscle use appreciated   Abd: BS+ in all 4 quadrants; NTND, soft to palpation; no organomegaly appreciated   Extrem: pulses full, equal, and regular over all 4 extremities; no UE or LE edema BL    Significant Labs: All pertinent labs within the past 24 hours have been reviewed.  Blood Culture: No results for input(s): LABBLOO in the last 48 hours.    CSF findings 06/30/22: <5glucose, 174 protein, 144 WBC, 3000 RBC, 37 lymph, 59 mono/macro. Repeat CSF w/1:8 cryptococcal antigen, CSF culture w/yeast    CBC:   Recent Labs   Lab  06/30/22 0448 07/01/22  0617   WBC 5.37 4.22   HGB 11.7* 8.9*   HCT 35.4* 26.6*   * 125*     CMP:   Recent Labs   Lab 06/30/22 0448 07/01/22  0844    134*   K 4.0 4.1    107   CO2 23 20*   * 137*   BUN 23* 16   CREATININE 1.3 1.2   CALCIUM 8.6* 8.9   PROT 5.0* 5.5*   ALBUMIN 3.0* 3.1*   BILITOT 0.5 0.5   ALKPHOS 55 58   AST 18 16   ALT 20 18   ANIONGAP 6* 7*   EGFRNONAA 46.6* 51.4*     Magnesium:   Recent Labs   Lab 06/30/22 0448 07/01/22  0844   MG 2.3 2.2     Urine Culture: No results for input(s): LABURIN in the last 48 hours.  Urine Studies:   No results for input(s): COLORU, APPEARANCEUA, PHUR, SPECGRAV, PROTEINUA, GLUCUA, KETONESU, BILIRUBINUA, OCCULTUA, NITRITE, UROBILINOGEN, LEUKOCYTESUR, RBCUA, WBCUA, BACTERIA, SQUAMEPITHEL, HYALINECASTS in the last 48 hours.    Invalid input(s): WRIGHTSUR      Significant Imaging: I have reviewed all pertinent imaging results/findings within the past 24 hours.    CTH 06/30/22:  FINDINGS:  Intracranial compartment:     Mild enlargement of the temporal horn of the right lateral ventricle, new from prior.  Remainder of the ventricular system is normal in size, stable.  Third ventricle diameter on the order of 0.3 cm.  Well-defined sulci remain present over the cerebral convexities.     The brain parenchyma appears otherwise within normal limits, and unchanged from priors.  No new parenchymal hemorrhage, edema or major vascular distribution infarct.  No intracranial mass effect or midline shift.     No new extra-axial blood or fluid collections.     Partially empty sella configuration.     Skull/extracranial contents (limited evaluation):     No displaced calvarial fracture.     Small volume right mastoid air cell fluid.     The visualized paranasal sinuses are essentially clear.     Impression:     Slight interval enlargement of the temporal horn of the right lateral ventricle since the recent MRI of 06/23/2022.  Appearance suggestive of  early/developing hydrocephalus or ventricular entrapment.     No new hemorrhage, edema or infarction.     Partially empty sella configuration.  Nonspecific but configuration could be consistent with the reported history of a elevated intracranial pressure.    CTH 07/01/22:  FINDINGS:  Intracranial compartment:     Mild enlargement of the temporal horn of the right lateral ventricle similar to most recent CT head 06/30/2022, new compared to prior MRI brain 06/23/2022.  Third ventricle diameter measures approximately 4 mm.     The brain parenchyma appears within normal limits not significantly changed compared to prior exam.  No parenchymal mass, hemorrhage, edema or major vascular distribution infarct.     No extra-axial blood or fluid collections.     Partially empty sella configuration.     Skull/extracranial contents (limited evaluation):     No fracture. Paranasal sinuses are essentially clear.  Small volume fluid within the right mastoid air cells.     Impression:     Slight enlargement of the temporal horn of right lateral ventricle not significantly changed compared to most recent CT head 06/30/2022,  new compared to prior MRI brain 06/23/2022.  Clinical correlation and continued close follow-up is recommended as developing hydrocephalus or ventricular entrapment remains a concern.     No new acute infarct or hemorrhage.

## 2022-07-02 NOTE — ASSESSMENT & PLAN NOTE
- Improving  - See papilledema-- spoke w/ophthalmology- expect papilledema to improve over the course of 2wks if infection remains well-controlled and ICP remains wnl

## 2022-07-02 NOTE — PROGRESS NOTES
Progress Note  Ophthalmology Service    Date: 07/02/2022    Chief complaint: Cryptococcal meninginitis w/ papilledema OU      Interval History:   The patient is doing well today and feels that her vision is improving slightly. She continues to report HA and decreased hearing, but states that she is feeling better overall. Denies any new/worsening visual changes, visual disturbances, such as flashes, floaters, or curtain-veil in visual field, and ocular discomfort OU.    Current eye gtts: none     Medications this encounter:    acetaminophen  500 mg Oral Q24H    amphotericin B liposome (AMBISOME) IVPB  300 mg Intravenous Q24H    calcitRIOL  0.5 mcg Oral Daily    diphenhydrAMINE  25 mg Oral Q24H    flucytosine  1,500 mg Oral Q6H    magnesium oxide  400 mg Oral Daily    multivitamin  1 tablet Oral Daily    mupirocin   Nasal BID    [START ON 7/3/2022] NIFEdipine  90 mg Oral Daily    polyethylene glycol  17 g Oral BID    predniSONE  10 mg Oral Daily    senna-docusate 8.6-50 mg  1 tablet Oral BID    sodium bicarbonate  1,300 mg Oral BID    sodium chloride 0.9%  500 mL Intravenous Q24H    sodium chloride 0.9%  500 mL Intravenous Q24H    tacrolimus  3 mg Oral BID    topiramate  50 mg Oral BID       Allergies: is allergic to sulfa (sulfonamide antibiotics).     ROS: As per HPI    Ocular examination/Dilated fundus examination:  Base Eye Exam     Visual Acuity (Snellen - Linear)       Right Left    Dist sc 20/30 20/25 -2    Dist ph sc 20/20 20/20          Tonometry (Tonopen, 10:46 AM)       Right Left    Pressure 8 9          Pupils       Pupils Dark Light Shape React APD    Right PERRL 4 2 Round Brisk None    Left PERRL 4 2 Round Brisk None          Visual Fields       Right Left     Full Full          Extraocular Movement       Right Left     Full Full          Neuro/Psych     Oriented x3: Yes          Dilation     Both eyes: 2.5% Phenylephrine, 1% Tropicamide, 2.5% Phenylephrine, 2% Cyclopentolate @ 10:54  AM            Additional Tests     Color       Right Left    Ishihara 13/13 13/13   Red color saturation equal OU             Slit Lamp and Fundus Exam     External Exam       Right Left    External Normal Normal          Slit Lamp Exam       Right Left    Lids/Lashes Normal Normal    Conjunctiva/Sclera White and quiet White and quiet    Cornea Clear Clear    Anterior Chamber Deep and formed Deep and formed    Iris Round and reactive Round and reactive    Lens 1+ NSC 1+ NSC    Anterior Vitreous Normal Normal          Fundus Exam       Right Left    Disc grade 3 disc edema grade 3 disc edema    Macula Normal Normal    Vessels venous tortuosity and engorgement venous tortuosity and engorgement    Periphery Normal Normal                Assessment/Plan:     1. Cryptococcal Meningitis  2. Papilledema, both eyes  3. 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 Still 3+ ONH edema, and we do not expect this to rapidly improve as there is often delay of resolution of ONH edema following improvement in ICP   - Continue current treatment per neurology/ID/primary; no ophthalmic intervention.   - Consider titration up to Topamax dose to 100mg BID if safe.     Please re-consult if the patient experiences any acute changes.  Recommend neuro-ophthalmology follow-up within 2 weeks of discharge for visual fields and nerve evaluation. WE WILL FOLLOW PERIPHERALLY AND PLAN FOR REPEAT DFE MID-LATE NEXT WEEK.   If there are further questions or concern for new/worsening visual changes, please page the on call ophthalmology resident.      Santana Panchal MD   [facility] Ophthalmology PGY-2  07/02/2022  10:57 AM

## 2022-07-02 NOTE — PLAN OF CARE
Aaox4, c/o pain med given, denies sob,  at bedside. No distress noted    Problem: Adult Inpatient Plan of Care  Goal: Plan of Care Review  Outcome: Ongoing, Progressing  Goal: Patient-Specific Goal (Individualized)  Outcome: Ongoing, Progressing  Goal: Absence of Hospital-Acquired Illness or Injury  Outcome: Ongoing, Progressing  Goal: Optimal Comfort and Wellbeing  Outcome: Ongoing, Progressing  Goal: Readiness for Transition of Care  Outcome: Ongoing, Progressing     Problem: Fluid and Electrolyte Imbalance (Acute Kidney Injury/Impairment)  Goal: Fluid and Electrolyte Balance  Outcome: Ongoing, Progressing     Problem: Oral Intake Inadequate (Acute Kidney Injury/Impairment)  Goal: Optimal Nutrition Intake  Outcome: Ongoing, Progressing     Problem: Renal Function Impairment (Acute Kidney Injury/Impairment)  Goal: Effective Renal Function  Outcome: Ongoing, Progressing     Problem: Skin Injury Risk Increased  Goal: Skin Health and Integrity  Outcome: Ongoing, Progressing     Problem: Fall Injury Risk  Goal: Absence of Fall and Fall-Related Injury  Outcome: Ongoing, Progressing

## 2022-07-02 NOTE — PROGRESS NOTES
[facility] ([place] [id number])  Infectious Disease  Progress Note    Patient Name: Tala Rivera  MRN: [id number]  Admission Date: 6/27/2022  Length of Stay: 5 days  Attending Physician: Keke Hicks MD  Primary Care Provider: ANUJA Castellon MD    Isolation Status: No active isolations  Assessment/Plan:      * Cryptococcal meningitis  57 y/o F h/o HTN, gastric bypass, ESRD 2/2 HTN s/p DBDKT 4/5/20 (basiliximab induction,, CMV D+/R+, tacro, MMF), COVID19 7/2020, recently discharged 6/2022 for 2 months of headaches, now admitted to [facility] with intractable headaches, dizziness, blurred vision, left sided facial droop, MRI brain negative 6/23, s/p LP 6/24 with OP of 34cm, CSF: WBC unable to quantitate due to infereing cellular structures, gluc 6, protein 93, shahnaz ink positive for encapsulated yeast and cultures also growing yeast, CT chest with b/l pulmonary nodules started on ambisome, flucytosine 6/26 course notable for TERESA (Cr 1.3->2.0) now transferred to [facility] for further care. Headaches much improved, nauseaus but no further emesis, eating food, serum crypto Ag positive, ophthalmology exam with papilledema, repeat LP with lower OP, csf crypto Ag +  - cryptococcal meningitis - continue ambisome/flucytosine   - pressure appears to be coming down and symptoms are slowly improving  - if clinical worsening would be concerned for possible IRIS and would repeat LP for pressure and cell count assessment if clinical worsening and would consider steroids in that situation.  At this exact moment, given improvement, do not think this is IRIS and would hold on steroids for now   - monitor electrolytes and renal function on ambisome  - will follow, discussed with team        Anticipated Disposition: pending    Thank you for your consult. I will follow-up with patient. Please contact us if you have any additional questions.    Damien Alexis MD  Infectious Disease  [facility]  ([facility])    Subjective:     Principal Problem:Cryptococcal meningitis    HPI: 57 y/o F h/o HTN, gastric bypass, ESRD 2/2 HTN s/p DBDKT 4/5/20 (basiliximab induction,, CMV D+/R+, tacro, MMF), COVID19 7/2020, recently discharged 6/2022 for 2 months of headaches, now admitted to [facility] with intractable headaches, dizziness, blurred vision, left sided facial droop, s/p LP 6/24 with CSF: WBC unable to quantitate due to infereing cellular structures, gluc 6, protein 93, shahnaz ink positive for yeast and cultures also growing yeast, CT chest with b/l pulmonary nodules started on ambisome, flucytosine 6/26 course notable for TERESA (Cr 1.3->2.0) now [place] [facility] [place]. She states HA and n/v have slightly improved but still not feeling well    Interval History:   Feels much better today, walking out of bathroom and also eating food    Review of Systems   Constitutional:  Positive for activity change, appetite change and fatigue. Negative for chills, diaphoresis and fever.   HENT:  Positive for hearing loss and voice change. Negative for congestion.    Eyes:  Positive for visual disturbance. Negative for pain.   Respiratory:  Negative for cough, shortness of breath and wheezing.    Cardiovascular:  Negative for chest pain, palpitations and leg swelling.   Gastrointestinal:  Positive for nausea. Negative for constipation, diarrhea and vomiting.   Genitourinary:  Negative for difficulty urinating and dysuria.   Musculoskeletal:  Positive for neck pain. Negative for arthralgias.   Skin:  Negative for rash and wound.   Allergic/Immunologic: Positive for immunocompromised state.   Neurological:  Positive for facial asymmetry, speech difficulty and headaches. Negative for dizziness and seizures.   Psychiatric/Behavioral:  Negative for agitation and behavioral problems.      Objective:     Vital Signs (Most Recent):  Temp: 98 °F (36.7 °C) (07/02/22 1130)  Pulse: 75 (07/02/22 1130)  Resp: 18  (07/02/22 1130)  BP: 132/66 (07/02/22 1130)  SpO2: 97 % (07/02/22 1130)   Vital Signs (24h Range):  Temp:  [98 °F (36.7 °C)-98.6 °F (37 °C)] 98 °F (36.7 °C)  Pulse:  [65-75] 75  Resp:  [17-20] 18  SpO2:  [93 %-97 %] 97 %  BP: (132-182)/(66-85) 132/66     Weight: 71.7 kg (158 lb 1.1 oz)  Body mass index is 30.87 kg/m².    Intake/Output Summary (Last 24 hours) at 7/2/2022 1239  Last data filed at 7/2/2022 0800  Gross per 24 hour   Intake 1240 ml   Output --   Net 1240 ml          Physical Exam  Vitals reviewed.   Constitutional:       General: She is not in acute distress.     Appearance: She is well-developed.   HENT:      Head: Normocephalic and atraumatic.   Eyes:      Extraocular Movements: Extraocular movements intact.      Pupils: Pupils are equal, round, and reactive to light.   Neck:      Vascular: No JVD.      Trachea: No tracheal deviation.   Cardiovascular:      Rate and Rhythm: Normal rate and regular rhythm.      Heart sounds: No murmur heard.    No friction rub. No gallop.   Pulmonary:      Effort: No respiratory distress.      Breath sounds: Normal breath sounds. No wheezing or rales.   Abdominal:      General: Bowel sounds are normal. There is no distension.      Palpations: Abdomen is soft. There is no mass.      Tenderness: There is no abdominal tenderness.   Musculoskeletal:         General: No deformity.      Cervical back: Neck supple.   Lymphadenopathy:      Cervical: No cervical adenopathy.   Skin:     General: Skin is warm and dry.      Findings: No rash.   Neurological:      Mental Status: She is alert and oriented to person, place, and time.      Cranial Nerves: Facial asymmetry present.   Psychiatric:         Speech: Speech is slurred ((improved)).       Significant Labs: All pertinent labs within the past 24 hours have been reviewed.    Recent Results (from the past 24 hour(s))   PT/INR    Collection Time: 07/02/22  6:09 AM   Result Value Ref Range    Prothrombin Time 10.6 9.0 - 12.5 sec     INR 1.0 0.8 - 1.2   CBC Auto Differential    Collection Time: 07/02/22  6:09 AM   Result Value Ref Range    WBC 6.78 3.90 - 12.70 K/uL    RBC 4.51 4.00 - 5.40 M/uL    Hemoglobin 13.5 12.0 - 16.0 g/dL    Hematocrit 40.7 37.0 - 48.5 %    MCV 90 82 - 98 fL    MCH 29.9 27.0 - 31.0 pg    MCHC 33.2 32.0 - 36.0 g/dL    RDW 12.6 11.5 - 14.5 %    Platelets 208 150 - 450 K/uL    MPV 11.3 9.2 - 12.9 fL    Immature Granulocytes 1.9 (H) 0.0 - 0.5 %    Gran # (ANC) 5.6 1.8 - 7.7 K/uL    Immature Grans (Abs) 0.13 (H) 0.00 - 0.04 K/uL    Lymph # 0.5 (L) 1.0 - 4.8 K/uL    Mono # 0.4 0.3 - 1.0 K/uL    Eos # 0.1 0.0 - 0.5 K/uL    Baso # 0.04 0.00 - 0.20 K/uL    nRBC 0 0 /100 WBC    Gran % 82.9 (H) 38.0 - 73.0 %    Lymph % 7.1 (L) 18.0 - 48.0 %    Mono % 6.5 4.0 - 15.0 %    Eosinophil % 1.0 0.0 - 8.0 %    Basophil % 0.6 0.0 - 1.9 %    Differential Method Automated    Comprehensive Metabolic Panel    Collection Time: 07/02/22  6:09 AM   Result Value Ref Range    Sodium 138 136 - 145 mmol/L    Potassium 3.7 3.5 - 5.1 mmol/L    Chloride 105 95 - 110 mmol/L    CO2 24 23 - 29 mmol/L    Glucose 139 (H) 70 - 110 mg/dL    BUN 19 6 - 20 mg/dL    Creatinine 1.2 0.5 - 1.4 mg/dL    Calcium 9.1 8.7 - 10.5 mg/dL    Total Protein 6.1 6.0 - 8.4 g/dL    Albumin 3.4 (L) 3.5 - 5.2 g/dL    Total Bilirubin 0.4 0.1 - 1.0 mg/dL    Alkaline Phosphatase 70 55 - 135 U/L    AST 15 10 - 40 U/L    ALT 18 10 - 44 U/L    Anion Gap 9 8 - 16 mmol/L    eGFR if African American 59.2 (A) >60 mL/min/1.73 m^2    eGFR if non  51.4 (A) >60 mL/min/1.73 m^2   Magnesium    Collection Time: 07/02/22  6:09 AM   Result Value Ref Range    Magnesium 2.3 1.6 - 2.6 mg/dL   Phosphorus    Collection Time: 07/02/22  6:09 AM   Result Value Ref Range    Phosphorus 3.6 2.7 - 4.5 mg/dL   Tacrolimus level    Collection Time: 07/02/22  6:09 AM   Result Value Ref Range    Tacrolimus Lvl 5.9 5.0 - 15.0 ng/mL         Significant Imaging: I have reviewed all pertinent imaging  results/findings within the past 24 hours.

## 2022-07-02 NOTE — ASSESSMENT & PLAN NOTE
- Last intracranial pressure of 34cm from LP at OSH  - Patient with papilledema, vomiting, AMS  - CTH w/developing hydrocephalus and signs of ventricular entrapment  - NSGY notified and consulted- no acute interventions at this time  - NCC notified- continue monitoring on floor  - S/p LP w/opening pressure of 35cm and closing pressure of 13cm 06/30/22  - Continue to monitor for signs and symptoms of worsening ICP  - Repeat CTH relatively unchanged

## 2022-07-02 NOTE — PROGRESS NOTES
[facility]  Progress Note    Patient Name: Tala Rivera  MRN: [id number]  Patient Class: IP- Inpatient   Admission Date: 6/27/2022  Length of Stay: 5 days  Attending Physician: Keke Hicks MD  Primary Care Provider: ANUJA Castellon MD        Subjective:     Principal Problem:Cryptococcal meningitis        HPI:  Mrs. Rivera is a 56yoF w/PMHx of kidney XPL (2020 [facility]) on Prograf and Cellcept admitted to [facility] on 06/22 with intractable HA starting about 2 months earlier and with dizziness, blurred vision, left-sided facial droop and dysarthria starting a few days before admission.       On admission /72, HR 69. Neurologic exam is remarkable for left-sided facial weakness and mild dysarthria.  Labs (6/22) WBC 9.4, Hb 15.9, Plts 224, Na 137, K 3.3, CO2 25, BUN 24, Cr 1.32, AG 14, Glu 182 LFTs WNL. COVID neg     LP (6/24) CSF Glu 6, CSF protein 93. Austrian stain pos for yeast like organisms.  Spinal fluid culture pos for encapsulated yeast species.       CT chest WO contrast pos for multiple indeterminate soft tissue masses in the left lower lobe with 2 smaller masses in the right lung which may be part of the same process.       On 06/26 patient started on amphotericin B liposomal (275 IV q.d.) and flucytosine (1500 p.o. q.6h) .  Hospitalization c/b the development of acute renal failure.  Cr 1.32 (6/22) > 1.4 (6/26) > 2.02 (6/27).  Cellcept and prednisone have been held.       Transfer requested for higher level of care (KTM and ID).  Transfer diagnosis disseminated cryptococcal infection, TERESA, kidney XPL.    At time of my assessment, pt. Complains of headache which has been persistent for the last several days. She states that it is somewhat relieved by the fioricet and oxycodone that she was receiving at the outside hospital.      Overview/Hospital Course:  KTM, transplant ID, Neurology consulted upon admission. Per KTN, normal saline scheduled  with amphotericin infusions in addition to bicarb drip, prednisone increased, Prograf continued.  Creatinine down-trending and bicarb drip discontinued.  Per Neurology, outside hospital MR MRI reviewed and note that neuro deficits may take a few weeks to fully resolve; recommend limiting Fioricet and narcotics to minimize medication overuse/rebound headaches.  Per Transplant ID, continue ambisome/flucytosine with plan to repeat LP 2 weeks from 6/24.  Due to visual disturbance in the setting of fungal meningitis, ophthalmology consulted.  No infectious nidus found in retina or vitreous, however grade 3 disc edema bilaterally. Exam finding and subjective complaints explained by meningitis and papilledema.  Recommendation for repeating LP to reduce opening pressure per ID and Neurology in agreement.  On 06/29, patient hearing improved.  Remains with intermittent confusion per  at bedside.    CTH performed 06/30- Slight interval enlargement of the temporal horn of the right lateral ventricle since the recent MRI of 06/23/2022.  Appearance suggestive of early/developing hydrocephalus or ventricular entrapment. NSGY consulted. Per NSGY, recommended NCC transfer for further evaluation. Patient underwent repeat LP 06/30- opening pressure of 35cm- closing pressure of 13cm. Patient w/much improvement in signs and symptoms. CSF findings: <5glucose, 174 protein, 144 WBC, 3000 RBC, 37 lymph, 59 mono/macro. Repeat CSF w/1:8 cryptococcal antigen, CSF culture w/yeast. NCC evaluated, but patient with much improvement in signs and symptoms, and so NCC recommended continued observation on the floor. Discussed w/ID- stated to continue to monitor for worsening signs and symptoms- with closing pressure <25cm, no need for serial LP's at this time.       Interval History: Patient was seen and evaluated this am. Doing well this am. Sitting on cough at bedside with . Reports mild headache. Still w/nausea. No vomiting. No  fevers, chills, night sweats, abd pain, diarrhea, constipation. Good UOP.    Objective:     Vital Signs (Most Recent):  Temp: 97.9 °F (36.6 °C) (07/02/22 1512)  Pulse: (!) 54 (07/02/22 1512)  Resp: 18 (07/02/22 1512)  BP: (!) 146/70 (07/02/22 1512)  SpO2: 100 % (07/02/22 1512)   Vital Signs (24h Range):  Temp:  [97.9 °F (36.6 °C)-98.1 °F (36.7 °C)] 97.9 °F (36.6 °C)  Pulse:  [54-75] 54  Resp:  [18] 18  SpO2:  [93 %-100 %] 100 %  BP: (132-182)/(66-85) 146/70     Weight: 71.7 kg (158 lb 1.1 oz)  Body mass index is 30.87 kg/m².    Intake/Output Summary (Last 24 hours) at 7/2/2022 1843  Last data filed at 7/2/2022 0800  Gross per 24 hour   Intake 1240 ml   Output --   Net 1240 ml      Physical Exam  Gen: in NAD, appears stated age  Neuro: awake and oriented to self, place, time  HEENT: NTNC, EOMI, PERRL, MMM  CVS: RRR, no m/r/g; S1/S2 auscultated with no S3 or S4; capillary refill < 2 sec  Resp: lungs CTAB, no w/r/r; no belabored breathing or accessory muscle use appreciated   Abd: BS+ in all 4 quadrants; NTND, soft to palpation; no organomegaly appreciated   Extrem: pulses full, equal, and regular over all 4 extremities; no UE or LE edema BL    Significant Labs: All pertinent labs within the past 24 hours have been reviewed.  Blood Culture: No results for input(s): LABBLOO in the last 48 hours.    CSF findings 06/30/22: <5glucose, 174 protein, 144 WBC, 3000 RBC, 37 lymph, 59 mono/macro. Repeat CSF w/1:8 cryptococcal antigen, CSF culture w/yeast    CBC:   Recent Labs   Lab 07/01/22  0617 07/02/22  0609   WBC 4.22 6.78   HGB 8.9* 13.5   HCT 26.6* 40.7   * 208     CMP:   Recent Labs   Lab 07/01/22  0844 07/02/22  0609   * 138   K 4.1 3.7    105   CO2 20* 24   * 139*   BUN 16 19   CREATININE 1.2 1.2   CALCIUM 8.9 9.1   PROT 5.5* 6.1   ALBUMIN 3.1* 3.4*   BILITOT 0.5 0.4   ALKPHOS 58 70   AST 16 15   ALT 18 18   ANIONGAP 7* 9   EGFRNONAA 51.4* 51.4*     Magnesium:   Recent Labs   Lab 07/01/22  0844  07/02/22  0609   MG 2.2 2.3     Urine Culture: No results for input(s): LABURIN in the last 48 hours.  Urine Studies:   No results for input(s): COLORU, APPEARANCEUA, PHUR, SPECGRAV, PROTEINUA, GLUCUA, KETONESU, BILIRUBINUA, OCCULTUA, NITRITE, UROBILINOGEN, LEUKOCYTESUR, RBCUA, WBCUA, BACTERIA, SQUAMEPITHEL, HYALINECASTS in the last 48 hours.    Invalid input(s): WRIGHTSUR      Significant Imaging: I have reviewed all pertinent imaging results/findings within the past 24 hours.    CTH 06/30/22:  FINDINGS:  Intracranial compartment:     Mild enlargement of the temporal horn of the right lateral ventricle, new from prior.  Remainder of the ventricular system is normal in size, stable.  Third ventricle diameter on the order of 0.3 cm.  Well-defined sulci remain present over the cerebral convexities.     The brain parenchyma appears otherwise within normal limits, and unchanged from priors.  No new parenchymal hemorrhage, edema or major vascular distribution infarct.  No intracranial mass effect or midline shift.     No new extra-axial blood or fluid collections.     Partially empty sella configuration.     Skull/extracranial contents (limited evaluation):     No displaced calvarial fracture.     Small volume right mastoid air cell fluid.     The visualized paranasal sinuses are essentially clear.     Impression:     Slight interval enlargement of the temporal horn of the right lateral ventricle since the recent MRI of 06/23/2022.  Appearance suggestive of early/developing hydrocephalus or ventricular entrapment.     No new hemorrhage, edema or infarction.     Partially empty sella configuration.  Nonspecific but configuration could be consistent with the reported history of a elevated intracranial pressure.    CTH 07/01/22:  FINDINGS:  Intracranial compartment:     Mild enlargement of the temporal horn of the right lateral ventricle similar to most recent CT head 06/30/2022, new compared to prior MRI brain 06/23/2022.   Third ventricle diameter measures approximately 4 mm.     The brain parenchyma appears within normal limits not significantly changed compared to prior exam.  No parenchymal mass, hemorrhage, edema or major vascular distribution infarct.     No extra-axial blood or fluid collections.     Partially empty sella configuration.     Skull/extracranial contents (limited evaluation):     No fracture. Paranasal sinuses are essentially clear.  Small volume fluid within the right mastoid air cells.     Impression:     Slight enlargement of the temporal horn of right lateral ventricle not significantly changed compared to most recent CT head 06/30/2022,  new compared to prior MRI brain 06/23/2022.  Clinical correlation and continued close follow-up is recommended as developing hydrocephalus or ventricular entrapment remains a concern.     No new acute infarct or hemorrhage.      Assessment/Plan:      * Cryptococcal meningitis  - Pt with intractable headache, facial droop, dysarthria (improved). 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  - Continue Amp B 5/mg kg Q24hrs and flucytosine 1500 mg Q6hrs for treatment.   -Transplant ID following- continue with current course- patient signs and symptoms much improved since time of admission, will hold of on steroids- no signs of IRIS, will continue to monitor for need for repeat LP  - KTM following; recommend  500mL normal saline with amphotericin     Increased intracranial pressure  - Last intracranial pressure of 34cm from LP at OSH  - Patient with papilledema, vomiting, AMS  - CTH w/developing hydrocephalus and signs of ventricular entrapment  - NSGY notified and consulted- no acute interventions at this time  - NCC notified- continue monitoring on floor  - S/p LP w/opening pressure of 35cm and closing pressure of 13cm 06/30/22  - Continue to monitor for signs and symptoms of worsening ICP  - Repeat CTH relatively unchanged     Bilateral  papilledema due to raised intracranial pressure  - Ophthalmology evaluation with grade 3 papilledema in both eyes (longer she has elevated CSF pressure, the higher her risk of permanent optic nerve damage and vision change is.  There is a congested appearance to the veins in both eyes secondary to optic nerve congestion and indicates higher risk for ischemic event), 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  - see increased intracranial pressure    Acute renal failure superimposed on stage 3 chronic kidney disease  - Cr 2.2 on day of transfer, has been steadily worsening over last few days (baseline ~1.2)  - KTM consulted, appreciate recs; likely prerenal versus medication related  - US transplant unremarkable for acute changes   - Resolved  - Scr at baseline- 1.2  - Continue sodium bicarb    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  - Improving  - See papilledema-- spoke w/ophthalmology- expect papilledema to improve over the course of 2wks if infection remains well-controlled and ICP remains wnl    Hypokalemia  - PO repletion prn to goal    Long-term use of immunosuppressant medication  - Pt with opportunistic infection, see above. Holding mycophenolate   - KTM following, appreciate recs  Continue prograf per levels   Monitor levels to assess for toxicity  Continue prednisone ---> increased to 10mg daily   Hold MMF     S/P kidney transplant  - See TERESA. KTM consulted for further assistance  Kidney transplant 4/5/2020   Underlying ckd stage III  ESRD related to HTN    VTE Risk Mitigation (From admission, onward)         Ordered     IP VTE HIGH RISK PATIENT  Once         06/27/22 2202     Place sequential compression device  Until discontinued         06/27/22 2202                Discharge Planning   JULITO: 7/4/2022     Code Status: Full Code   Is the patient medically ready for discharge?: No    Reason for patient still in hospital (select all that apply): Patient trending condition  Discharge Plan A: Home with family                Keke Hicks MD  Department of  [facility]   [facility]

## 2022-07-02 NOTE — ASSESSMENT & PLAN NOTE
- Pt with intractable headache, facial droop, dysarthria (improved). 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  - Continue Amp B 5/mg kg Q24hrs and flucytosine 1500 mg Q6hrs for treatment.   -Transplant ID following- continue with current course- patient signs and symptoms much improved since time of admission, will hold of on steroids- no signs of IRIS, will continue to monitor for need for repeat LP  - KTM following; recommend  500mL normal saline with amphotericin

## 2022-07-02 NOTE — ASSESSMENT & PLAN NOTE
- Cr 2.2 on day of transfer, has been steadily worsening over last few days (baseline ~1.2)  - KTM consulted, appreciate recs; likely prerenal versus medication related  - US transplant unremarkable for acute changes   - Resolved  - Scr at baseline- 1.2  - Continue sodium bicarb

## 2022-07-02 NOTE — PROGRESS NOTES
[facility] - [facility] ([address] Medicine  Progress Note    Patient Name: Tala Rivera  MRN: [id number]  Patient Class: IP- Inpatient   Admission Date: 6/27/2022  Length of Stay: 4 days  Attending Physician: Keke Hicks MD  Primary Care Provider: ANUJA Castellon MD        Subjective:     Principal Problem:Cryptococcal meningitis        HPI:  56-year-old woman with PMH kidney XPL (2020 OM) on Prograf and Cellcept admitted to [facility] on 06/22 with intractable HA starting about 2 months earlier and with dizziness, blurred vision, left-sided facial droop and dysarthria starting a few days before admission.       On admission /72, HR 69. Neurologic exam is remarkable for left-sided facial weakness and mild dysarthria.  Labs (6/22) WBC 9.4, Hb 15.9, Plts 224, Na 137, K 3.3, CO2 25, BUN 24, Cr 1.32, AG 14, Glu 182 LFTs WNL. COVID neg     LP (6/24) CSF Glu 6, CSF protein 93. Marshallese stain pos for yeast like organisms.  Spinal fluid culture pos for encapsulated yeast species.       CT chest WO contrast pos for multiple indeterminate soft tissue masses in the left lower lobe with 2 smaller masses in the right lung which may be part of the same process.       On 06/26 patient started on amphotericin B liposomal (275 IV q.d.) and flucytosine (1500 p.o. q.6h) .  Hospitalization c/b the development of acute renal failure.  Cr 1.32 (6/22) > 1.4 (6/26) > 2.02 (6/27).  Cellcept and prednisone have been held.       Transfer requested for higher level of care (KTM and ID).  Transfer diagnosis disseminated cryptococcal infection, TERESA, kidney XPL.    At time of my assessment, pt. Complains of headache which has been persistent for the last several days. She states that it is somewhat relieved by the fioricet and oxycodone that she was receiving at the outside hospital.      Overview/Hospital Course:  KTM, transplant ID, Neurology consulted upon admission. Per KTN, normal saline  scheduled with amphotericin infusions in addition to bicarb drip, prednisone increased, Prograf continued.  Creatinine down-trending and bicarb drip discontinued.  Per Neurology, outside hospital MR MRI reviewed and note that neuro deficits may take a few weeks to fully resolve; recommend limiting Fioricet and narcotics to minimize medication overuse/rebound headaches.  Per Transplant ID, continue ambisome/flucytosine with plan to repeat LP 2 weeks from 6/24.  Due to visual disturbance in the setting of fungal meningitis, ophthalmology consulted.  No infectious nidus found in retina or vitreous, however grade 3 disc edema bilaterally. Exam finding and subjective complaints explained by meningitis and papilledema.  Recommendation for repeating LP to reduce opening pressure per ID and Neurology in agreement.  On 06/29, patient hearing improved.  Remains with intermittent confusion per  at bedside.    CTH performed 06/30- Slight interval enlargement of the temporal horn of the right lateral ventricle since the recent MRI of 06/23/2022.  Appearance suggestive of early/developing hydrocephalus or ventricular entrapment. NSGY consulted. Per NSGY, recommended NCC transfer for further evaluation. Patient underwent repeat LP 06/30- opening pressure of 35cm- closing pressure of 13cm. Patient w/much improvement in signs and symptoms. CSF findings: <5glucose, 174 protein, 144 WBC, 3000 RBC, 37 lymph, 59 mono/macro. Repeat CSF w/1:8 cryptococcal antigen, CSF culture w/yeast. NCC evaluated, but patient with much improvement in signs and symptoms, and so NCC recommended continued observation on the floor. Discussed w/ID- stated to continue to monitor for worsening signs and symptoms- with closing pressure <25cm, no need for serial Lps at this time.       Interval History: Patient was seen and evaluated this am. Much improvement in signs and symptoms.  endorses that he is afraid that  is complaining of  headache, neck pain, and lower back pain worse than usual- discussed w/ that this is likely 2/2 from LP.  had further discussions with transplant ID about how he is very anxious about everything, but he does feel like patient is improved since time of admission. Pt denies SOB, vomiting, chest pain, fevers, chills, night sweats. No worsening vision changes.     Objective:     Vital Signs (Most Recent):  Temp: 98.6 °F (37 °C) (07/01/22 1611)  Pulse: 65 (07/01/22 1611)  Resp: 17 (07/01/22 1611)  BP: (!) 146/71 (07/01/22 1611)  SpO2: (!) 93 % (07/01/22 1611)   Vital Signs (24h Range):  Temp:  [97 °F (36.1 °C)-98.6 °F (37 °C)] 98.6 °F (37 °C)  Pulse:  [53-65] 65  Resp:  [17-20] 17  SpO2:  [93 %-98 %] 93 %  BP: (146-179)/(67-80) 146/71     Weight: 71.7 kg (158 lb 1.1 oz)  Body mass index is 30.87 kg/m².    Intake/Output Summary (Last 24 hours) at 7/1/2022 2031  Last data filed at 7/1/2022 1847  Gross per 24 hour   Intake 1650 ml   Output --   Net 1650 ml      Physical Exam  Gen: in NAD, appears stated age  Neuro: awake and oriented to self, place, time  HEENT: NTNC, EOMI, PERRL, MMM  CVS: RRR, no m/r/g; S1/S2 auscultated with no S3 or S4; capillary refill < 2 sec  Resp: lungs CTAB, no w/r/r; no belabored breathing or accessory muscle use appreciated   Abd: BS+ in all 4 quadrants; NTND, soft to palpation; no organomegaly appreciated   Extrem: pulses full, equal, and regular over all 4 extremities; no UE or LE edema BL    Significant Labs: All pertinent labs within the past 24 hours have been reviewed.  Blood Culture: No results for input(s): LABBLOO in the last 48 hours.    CSF findings 06/30/22: <5glucose, 174 protein, 144 WBC, 3000 RBC, 37 lymph, 59 mono/macro. Repeat CSF w/1:8 cryptococcal antigen, CSF culture w/yeast    CBC:   Recent Labs   Lab 06/30/22  0448 07/01/22  0617   WBC 5.37 4.22   HGB 11.7* 8.9*   HCT 35.4* 26.6*   * 125*     CMP:   Recent Labs   Lab 06/30/22 0448 07/01/22  0844   NA  137 134*   K 4.0 4.1    107   CO2 23 20*   * 137*   BUN 23* 16   CREATININE 1.3 1.2   CALCIUM 8.6* 8.9   PROT 5.0* 5.5*   ALBUMIN 3.0* 3.1*   BILITOT 0.5 0.5   ALKPHOS 55 58   AST 18 16   ALT 20 18   ANIONGAP 6* 7*   EGFRNONAA 46.6* 51.4*     Magnesium:   Recent Labs   Lab 06/30/22  0448 07/01/22  0844   MG 2.3 2.2     Urine Culture: No results for input(s): LABURIN in the last 48 hours.  Urine Studies:   No results for input(s): COLORU, APPEARANCEUA, PHUR, SPECGRAV, PROTEINUA, GLUCUA, KETONESU, BILIRUBINUA, OCCULTUA, NITRITE, UROBILINOGEN, LEUKOCYTESUR, RBCUA, WBCUA, BACTERIA, SQUAMEPITHEL, HYALINECASTS in the last 48 hours.    Invalid input(s): WRIGHTSUR      Significant Imaging: I have reviewed all pertinent imaging results/findings within the past 24 hours.    CTH 06/30/22:  FINDINGS:  Intracranial compartment:     Mild enlargement of the temporal horn of the right lateral ventricle, new from prior.  Remainder of the ventricular system is normal in size, stable.  Third ventricle diameter on the order of 0.3 cm.  Well-defined sulci remain present over the cerebral convexities.     The brain parenchyma appears otherwise within normal limits, and unchanged from priors.  No new parenchymal hemorrhage, edema or major vascular distribution infarct.  No intracranial mass effect or midline shift.     No new extra-axial blood or fluid collections.     Partially empty sella configuration.     Skull/extracranial contents (limited evaluation):     No displaced calvarial fracture.     Small volume right mastoid air cell fluid.     The visualized paranasal sinuses are essentially clear.     Impression:     Slight interval enlargement of the temporal horn of the right lateral ventricle since the recent MRI of 06/23/2022.  Appearance suggestive of early/developing hydrocephalus or ventricular entrapment.     No new hemorrhage, edema or infarction.     Partially empty sella configuration.  Nonspecific but  configuration could be consistent with the reported history of a elevated intracranial pressure.    CTH 07/01/22:  FINDINGS:  Intracranial compartment:     Mild enlargement of the temporal horn of the right lateral ventricle similar to most recent CT head 06/30/2022, new compared to prior MRI brain 06/23/2022.  Third ventricle diameter measures approximately 4 mm.     The brain parenchyma appears within normal limits not significantly changed compared to prior exam.  No parenchymal mass, hemorrhage, edema or major vascular distribution infarct.     No extra-axial blood or fluid collections.     Partially empty sella configuration.     Skull/extracranial contents (limited evaluation):     No fracture. Paranasal sinuses are essentially clear.  Small volume fluid within the right mastoid air cells.     Impression:     Slight enlargement of the temporal horn of right lateral ventricle not significantly changed compared to most recent CT head 06/30/2022,  new compared to prior MRI brain 06/23/2022.  Clinical correlation and continued close follow-up is recommended as developing hydrocephalus or ventricular entrapment remains a concern.     No new acute infarct or hemorrhage.      Assessment/Plan:      * Cryptococcal meningitis  - Pt with intractable headache, facial droop, dysarthria (improved). 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  - Continue Amp B 5/mg kg Q24hrs and flucytosine 1500 mg Q6hrs for treatment.   -Transplant ID following- continue with current course- patient signs and symptoms much improved since time of admission, will hold of on steroids- no signs of IRIS, will continue to monitor for need for repeat LP  - KTM following; recommend  500mL normal saline with amphotericin     Increased intracranial pressure  - Last intracranial pressure of 34cm from LP at OSH  - Patient with papilledema, vomiting, AMS  - CTH w/developing hydrocephalus and signs of  ventricular entrapment  - NSGY notified and consulted- no acute interventions at this time  - NCC notified- continue monitoring on floor  - S/p LP w/opening pressure of 35cm and closing pressure of 13cm 06/30/22  - Continue to monitor for signs and symptoms of worsening ICP  - Repeat CTH relatively unchanged     Bilateral papilledema due to raised intracranial pressure  - Ophthalmology evaluation with grade 3 papilledema in both eyes (longer she has elevated CSF pressure, the higher her risk of permanent optic nerve damage and vision change is.  There is a congested appearance to the veins in both eyes secondary to optic nerve congestion and indicates higher risk for ischemic event), 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  - see increased intracranial pressure    Acute renal failure superimposed on stage 3 chronic kidney disease  - Cr 2.2 on day of transfer, has been steadily worsening over last few days (baseline ~1.2)  - KTM consulted, appreciate recs; likely prerenal versus medication related  -  transplant unremarkable for acute changes   - S/p IVF  - Improving 2.2->1.7->1.3-->1.3-->1.2  - Per [place] GASTELUM, continue p.o. sodium bicarb  - Strict I/O's    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  - Improving  - See papilledema    Hypokalemia  - PO repletion prn to goal    Long-term use of immunosuppressant medication  - Pt with opportunistic infection, see above. Holding mycophenolate   - KTM following, appreciate recs  Continue prograf per levels   Monitor levels to assess for toxicity  Continue prednisone ---> increased to 10mg daily   Hold MMF     S/P kidney transplant  - See TERESA. KTM consulted for further assistance  Kidney transplant 4/5/2020   Underlying ckd stage III  ESRD related to HTN    VTE Risk Mitigation (From admission, onward)         Ordered     IP VTE HIGH RISK PATIENT  Once         06/27/22 2202     Place sequential compression device  Until discontinued         06/27/22 2202                Discharge Planning   JULITO: 7/1/2022     Code Status: Full  Code   Is the patient medically ready for discharge?: No    Reason for patient still in hospital (select all that apply): Patient trending condition  Discharge Plan A: Home with family                    Keke Hicks MD  Department of Hospital Medicine   [facility] - Intensive Care ([place]-14)

## 2022-07-03 NOTE — PROGRESS NOTES
[facility] Medicine  Progress Note    Patient Name: Tala Rivera  MRN: [id number]  Patient Class: IP- Inpatient   Admission Date: 6/27/2022  Length of Stay: 6 days  Attending Physician: Keke Hicks MD  Primary Care Provider: ANUJA Castellon MD        Subjective:     Principal Problem:Cryptococcal meningitis        HPI:  Mrs. Rivera is a 56yoF w/PMHx of kidney XPL (2020 [facility]) on Prograf and Cellcept admitted to [facility] on 06/22 with intractable HA starting about 2 months earlier and with dizziness, blurred vision, left-sided facial droop and dysarthria starting a few days before admission.       On admission /72, HR 69. Neurologic exam is remarkable for left-sided facial weakness and mild dysarthria.  Labs (6/22) WBC 9.4, Hb 15.9, Plts 224, Na 137, K 3.3, CO2 25, BUN 24, Cr 1.32, AG 14, Glu 182 LFTs WNL. COVID neg     LP (6/24) CSF Glu 6, CSF protein 93. [place] stain pos for yeast like organisms.  Spinal fluid culture pos for encapsulated yeast species.       CT chest WO contrast pos for multiple indeterminate soft tissue masses in the left lower lobe with 2 smaller masses in the right lung which may be part of the same process.       On 06/26 patient started on amphotericin B liposomal (275 IV q.d.) and flucytosine (1500 p.o. q.6h) .  Hospitalization c/b the development of acute renal failure.  Cr 1.32 (6/22) > 1.4 (6/26) > 2.02 (6/27).  Cellcept and prednisone have been held.       Transfer requested for higher level of care (KTM and ID).  Transfer diagnosis disseminated cryptococcal infection, TERESA, kidney XPL.    At time of my assessment, pt. Complains of headache which has been persistent for the last several days. She states that it is somewhat relieved by the fioricet and oxycodone that she was receiving at the outside hospital.      Overview/Hospital Course:  KTM, transplant ID, Neurology consulted upon admission. Per KTN, normal saline scheduled  with amphotericin infusions in addition to bicarb drip, prednisone increased, Prograf continued.  Creatinine down-trending and bicarb drip discontinued.  Per Neurology, outside hospital MR MRI reviewed and note that neuro deficits may take a few weeks to fully resolve; recommend limiting Fioricet and narcotics to minimize medication overuse/rebound headaches.  Per Transplant ID, continue ambisome/flucytosine with plan to repeat LP 2 weeks from 6/24.  Due to visual disturbance in the setting of fungal meningitis, ophthalmology consulted.  No infectious nidus found in retina or vitreous, however grade 3 disc edema bilaterally. Exam finding and subjective complaints explained by meningitis and papilledema.  Recommendation for repeating LP to reduce opening pressure per ID and Neurology in agreement.  On 06/29, patient hearing improved.  Remains with intermittent confusion per  at bedside.    CTH performed 06/30- Slight interval enlargement of the temporal horn of the right lateral ventricle since the recent MRI of 06/23/2022.  Appearance suggestive of early/developing hydrocephalus or ventricular entrapment. NSGY consulted. Per NSGY, recommended NCC transfer for further evaluation. Patient underwent repeat LP 06/30- opening pressure of 35cm- closing pressure of 13cm. Patient w/much improvement in signs and symptoms. CSF findings: <5glucose, 174 protein, 144 WBC, 3000 RBC, 37 lymph, 59 mono/macro. Repeat CSF w/1:8 cryptococcal antigen, CSF culture w/yeast. NCC evaluated, but patient with much improvement in signs and symptoms, and so NCC recommended continued observation on the floor. Discussed w/ID- stated to continue to monitor for worsening signs and symptoms- with closing pressure <25cm, no need for serial LP's at this time.     Doppler US performed on LLE- found to have DVT. Started on heparin drip in the case that further Lps or other procedures needed.       Interval History: Patient was seen and evaluated  this am. Doing well this am. HA still present and some nausea. No vomiting. No fevers, chills, night sweats, abd pain, diarrhea, constipation. Good UOP.    Objective:     Vital Signs (Most Recent):  Temp: 97.2 °F (36.2 °C) (07/03/22 1208)  Pulse: 75 (07/03/22 1208)  Resp: 18 (07/03/22 1208)  BP: 133/66 (07/03/22 1208)  SpO2: 95 % (07/03/22 1208)   Vital Signs (24h Range):  Temp:  [97.2 °F (36.2 °C)-98.5 °F (36.9 °C)] 97.2 °F (36.2 °C)  Pulse:  [53-75] 75  Resp:  [16-18] 18  SpO2:  [93 %-97 %] 95 %  BP: (130-163)/(65-72) 133/66     Weight: 71.7 kg (158 lb 1.1 oz)  Body mass index is 30.87 kg/m².    Intake/Output Summary (Last 24 hours) at 7/3/2022 1531  Last data filed at 7/3/2022 1300  Gross per 24 hour   Intake 960 ml   Output --   Net 960 ml      Physical Exam  Gen: in NAD, appears stated age  Neuro: awake and oriented to self, place, time  HEENT: NTNC, EOMI, PERRL, MMM  CVS: RRR, no m/r/g; S1/S2 auscultated with no S3 or S4; capillary refill < 2 sec  Resp: lungs CTAB, no w/r/r; no belabored breathing or accessory muscle use appreciated   Abd: BS+ in all 4 quadrants; NTND, soft to palpation; no organomegaly appreciated   Extrem: pulses full, equal, and regular over all 4 extremities; swelling of LLE, no swelling of RLE or BL UE    Significant Labs: All pertinent labs within the past 24 hours have been reviewed.  Blood Culture: No results for input(s): LABBLOO in the last 48 hours.    CSF findings 06/30/22: <5glucose, 174 protein, 144 WBC, 3000 RBC, 37 lymph, 59 mono/macro. Repeat CSF w/1:8 cryptococcal antigen, CSF culture w/yeast    CBC:   Recent Labs   Lab 07/02/22  0609 07/03/22  1002 07/03/22  1235   WBC 6.78 6.81 9.23   HGB 13.5 12.5 12.8   HCT 40.7 40.0 39.9    186 208     CMP:   Recent Labs   Lab 07/02/22  0609 07/03/22  1002    135*   K 3.7 3.2*    106   CO2 24 20*   * 183*   BUN 19 25*   CREATININE 1.2 1.5*   CALCIUM 9.1 9.1   PROT 6.1 5.7*   ALBUMIN 3.4* 3.1*   BILITOT 0.4 0.5    ALKPHOS 70 63   AST 15 13   ALT 18 16   ANIONGAP 9 9   EGFRNONAA 51.4* 39.2*     Magnesium:   Recent Labs   Lab 07/02/22  0609 07/03/22  1002   MG 2.3 2.0     Urine Culture: No results for input(s): LABURIN in the last 48 hours.  Urine Studies:   No results for input(s): COLORU, APPEARANCEUA, PHUR, SPECGRAV, PROTEINUA, GLUCUA, KETONESU, BILIRUBINUA, OCCULTUA, NITRITE, UROBILINOGEN, LEUKOCYTESUR, RBCUA, WBCUA, BACTERIA, SQUAMEPITHEL, HYALINECASTS in the last 48 hours.    Invalid input(s): WRIGHTSUR      Significant Imaging: I have reviewed all pertinent imaging results/findings within the past 24 hours.    CTH 06/30/22:  FINDINGS:  Intracranial compartment:     Mild enlargement of the temporal horn of the right lateral ventricle, new from prior.  Remainder of the ventricular system is normal in size, stable.  Third ventricle diameter on the order of 0.3 cm.  Well-defined sulci remain present over the cerebral convexities.     The brain parenchyma appears otherwise within normal limits, and unchanged from priors.  No new parenchymal hemorrhage, edema or major vascular distribution infarct.  No intracranial mass effect or midline shift.     No new extra-axial blood or fluid collections.     Partially empty sella configuration.     Skull/extracranial contents (limited evaluation):     No displaced calvarial fracture.     Small volume right mastoid air cell fluid.     The visualized paranasal sinuses are essentially clear.     Impression:     Slight interval enlargement of the temporal horn of the right lateral ventricle since the recent MRI of 06/23/2022.  Appearance suggestive of early/developing hydrocephalus or ventricular entrapment.     No new hemorrhage, edema or infarction.     Partially empty sella configuration.  Nonspecific but configuration could be consistent with the reported history of a elevated intracranial pressure.    CTH 07/01/22:  FINDINGS:  Intracranial compartment:     Mild enlargement of the  temporal horn of the right lateral ventricle similar to most recent CT head 06/30/2022, new compared to prior MRI brain 06/23/2022.  Third ventricle diameter measures approximately 4 mm.     The brain parenchyma appears within normal limits not significantly changed compared to prior exam.  No parenchymal mass, hemorrhage, edema or major vascular distribution infarct.     No extra-axial blood or fluid collections.     Partially empty sella configuration.     Skull/extracranial contents (limited evaluation):     No fracture. Paranasal sinuses are essentially clear.  Small volume fluid within the right mastoid air cells.     Impression:     Slight enlargement of the temporal horn of right lateral ventricle not significantly changed compared to most recent CT head 06/30/2022,  new compared to prior MRI brain 06/23/2022.  Clinical correlation and continued close follow-up is recommended as developing hydrocephalus or ventricular entrapment remains a concern.     No new acute infarct or hemorrhage.    Doppler US of LLE:  FINDINGS:  Left thigh veins: Near occlusive thrombus within the left popliteal vein.  The common femoral, femoral, and upper greater saphenous veins are patent and free of thrombus. The veins are normally compressible and have normal phasic flow and augmentation response.     Left calf veins: Partially occlusive thrombus within the posterior tibial veins.  Partially occlusive thrombus with 1 of the peroneal veins.  Anterior tibial veins are patent.     Contralateral CFV: The contralateral (right) common femoral vein is patent and free of thrombus.     Miscellaneous: None     Impression:     Deep venous thrombosis within the left popliteal, posterior tibial, and peroneal veins.      Assessment/Plan:      * Cryptococcal meningitis  - Pt with intractable headache, facial droop, dysarthria (improved). 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  - Continue Amp B  5/mg kg Q24hrs and flucytosine 1500 mg Q6hrs for treatment.   -Transplant ID following- continue with current course- patient signs and symptoms much improved since time of admission, will hold of on steroids- no signs of IRIS, will continue to monitor for need for repeat LP  - KTM following; recommend  500mL normal saline with amphotericin     Increased intracranial pressure  - Last intracranial pressure of 34cm from LP at OSH  - Patient with papilledema, vomiting, AMS  - CTH w/developing hydrocephalus and signs of ventricular entrapment  - NSGY notified and consulted- no acute interventions at this time  - NCC notified- continue monitoring on floor  - S/p LP w/opening pressure of 35cm and closing pressure of 13cm 06/30/22  - Continue to monitor for signs and symptoms of worsening ICP  - Repeat CTH relatively unchanged       Bilateral papilledema due to raised intracranial pressure  - Ophthalmology evaluation with grade 3 papilledema in both eyes (longer she has elevated CSF pressure, the higher her risk of permanent optic nerve damage and vision change is.  There is a congested appearance to the veins in both eyes secondary to optic nerve congestion and indicates higher risk for ischemic event), 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  - see increased intracranial pressure    Acute renal failure superimposed on stage 3 chronic kidney disease  - Cr 2.2 on day of transfer, has been steadily worsening over last few days (baseline ~1.2)  - KTM consulted, appreciate recs; likely prerenal versus medication related  - US transplant unremarkable for acute changes   - Resolved  - Scr 1.5 today- begin NS at 100mL/hr for 12hrs  - Continue sodium bicarb    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  - Improving  - See papilledema-- spoke w/ophthalmology- expect papilledema to improve over the course of 2wks if infection remains well-controlled and ICP remains wnl      Headache  - history of intractable headache  - increased topamax to 50mg BID 07/02      Hypokalemia  - PO  repletion prn to goal      Long-term use of immunosuppressant medication  - Pt with opportunistic infection, see above. Holding mycophenolate   - KTM following, appreciate recs  Continue prograf per levels   Monitor levels to assess for toxicity  Continue prednisone ---> increased to 10mg daily   Hold MMF         S/P kidney transplant  - See TERESA. KTM consulted for further assistance  Kidney transplant 4/5/2020   Underlying ckd stage III  ESRD related to HTN        VTE Risk Mitigation (From admission, onward)         Ordered     heparin 25,000 units in dextrose 5% (100 units/ml) IV bolus from bag - ADDITIONAL PRN BOLUS - 60 units/kg  As needed (PRN)        Question:  Heparin Infusion Adjustment (DO NOT MODIFY ANSWER)  Answer:  \\[email or web address]\epic\Images\Pharmacy\HeparinInfusions\heparin HIGH INTENSITY nomogram for [facility] [id number]    07/03/22 1205     heparin 25,000 units in dextrose 5% (100 units/ml) IV bolus from bag - ADDITIONAL PRN BOLUS - 30 units/kg  As needed (PRN)        Question:  Heparin Infusion Adjustment (DO NOT MODIFY ANSWER)  Answer:  \\[email or web address]\epic\Images\Pharmacy\HeparinInfusions\heparin HIGH INTENSITY nomogram for [facility] [id number]    07/03/22 1205     heparin 25,000 units in dextrose 5% 250 mL (100 units/mL) infusion HIGH INTENSITY nomogram - [facility]  Continuous        Question Answer Comment   Heparin Infusion Adjustment (DO NOT MODIFY ANSWER) [email or web address]\epic\Images\Pharmacy\HeparinInfusions\heparin HIGH INTENSITY nomogram for [facility] [id number]    Begin at (in units/kg/hr) 18        07/03/22 1205     IP VTE HIGH RISK PATIENT  Once         06/27/22 2202     Place sequential compression device  Until discontinued         06/27/22 2202                Discharge Planning   JULITO: 7/4/2022     Code Status: Full Code   Is the patient medically ready for discharge?: No    Reason for patient still in hospital (select all that apply): Patient trending condition  Discharge Plan A: Home with family                 Keke Hicks MD  Department of Hospital Medicine   [facility] - Telemetry Stepdown

## 2022-07-03 NOTE — SUBJECTIVE & OBJECTIVE
Subjective:   History of Present Illness:  56 y.o. female with a PMHx of HTN, kidney transplant in 2020, CKD3 admitted to [facility] on 06/22 with intractable HA starting about 2 months earlier and with dizziness, blurred vision, left-sided facial droop and dysarthria starting a few days before admission.    Per documentation   On admission /72, HR 69. Neurologic exam is remarkable for left-sided facial weakness and mild dysarthria.  Cr 1.32   LP (6/24) CSF Glu 6, CSF protein 93. [place] stain pos for yeast like organisms.  Spinal fluid culture pos for encapsulated yeast species  CT chest WO contrast pos for multiple indeterminate soft tissue masses in the left lower lobe with 2 smaller masses in the right lung which may be part of the same process.       On 06/26 patient started on amphotericin B liposomal (275 IV q.d.) and flucytosine (1500 p.o. q.6h) .  Hospitalization c/b the development of acute renal failure.  Cr 1.32 (6/22) > 1.4 (6/26) > 2.02 (6/27).  Transfer requested for higher level of care (KTM and ID).  Transfer diagnosis disseminated cryptococcal infection, TERESA, kidney XPL       Hospital Course:  Being treated for crypto meningitis     Interval History:  BP steady - ambulatory   No issues overnight   Headaches much improved, continues to have issue with hearing   On room air, no fevers     Past Medical, Surgical, Family, and Social History:   Unchanged from H&P.    Scheduled Meds:   acetaminophen  500 mg Oral Q24H    amphotericin B liposome (AMBISOME) IVPB  300 mg Intravenous Q24H    calcitRIOL  0.5 mcg Oral Daily    diphenhydrAMINE  25 mg Oral Q24H    flucytosine  1,500 mg Oral Q6H    heparin (PORCINE)  80 Units/kg (Adjusted) Intravenous Once    magnesium oxide  400 mg Oral Daily    multivitamin  1 tablet Oral Daily    NIFEdipine  90 mg Oral Daily    polyethylene glycol  17 g Oral BID    predniSONE  10 mg Oral Daily    senna-docusate 8.6-50 mg  1 tablet Oral BID    sodium bicarbonate   1,300 mg Oral BID    sodium chloride 0.9%  500 mL Intravenous Q24H    sodium chloride 0.9%  500 mL Intravenous Q24H    tacrolimus  3 mg Oral BID    topiramate  50 mg Oral BID     Continuous Infusions:   sodium chloride 0.9% 100 mL/hr at 07/03/22 1203    heparin (porcine) in D5W       PRN Meds:acetaminophen, albuterol-ipratropium, bisacodyL, butalbital-acetaminophen-caffeine -40 mg, glucose, glucose, heparin (PORCINE), heparin (PORCINE), melatonin, naloxone, ondansetron, oxyCODONE-acetaminophen, sodium chloride 0.9%    Intake/Output - Last 3 Shifts         07/01 0700 07/02 0659 07/02 0700 07/03 0659 07/03 0700 07/04 0659    P.O. 1240 480 240    Total Intake(mL/kg) 1240 (17.3) 480 (6.7) 240 (3.3)    Net +1240 +480 +240           Urine Occurrence 7 x 5 x     Stool Occurrence 1 x 0 x              Review of Systems   Constitutional:  Negative for activity change, appetite change and fever.   Respiratory:  Negative for cough, shortness of breath and wheezing.    Cardiovascular:  Negative for chest pain and palpitations.   Gastrointestinal:  Negative for abdominal distention and abdominal pain.   Genitourinary:  Negative for difficulty urinating and dysuria.   Allergic/Immunologic: Positive for immunocompromised state.   Psychiatric/Behavioral:  Negative for agitation and confusion.     Objective:     Vital Signs (Most Recent):  Temp: 97.2 °F (36.2 °C) (07/03/22 1208)  Pulse: 75 (07/03/22 1208)  Resp: 18 (07/03/22 1208)  BP: 133/66 (07/03/22 1208)  SpO2: 95 % (07/03/22 1208) Vital Signs (24h Range):  Temp:  [97.2 °F (36.2 °C)-98.5 °F (36.9 °C)] 97.2 °F (36.2 °C)  Pulse:  [53-75] 75  Resp:  [16-18] 18  SpO2:  [93 %-100 %] 95 %  BP: (130-163)/(65-72) 133/66     Weight: 71.7 kg (158 lb 1.1 oz)  Height: 5' (152.4 cm)  Body mass index is 30.87 kg/m².    Physical Exam  Constitutional:       General: She is not in acute distress.  Eyes:      General: No scleral icterus.  Cardiovascular:      Rate and Rhythm: Normal rate.    Pulmonary:      Effort: No respiratory distress.      Breath sounds: No wheezing.   Abdominal:      General: There is no distension.      Palpations: Abdomen is soft.      Tenderness: There is no abdominal tenderness.   Musculoskeletal:      Right lower leg: No edema.      Left lower leg: No edema.   Skin:     Coloration: Skin is not jaundiced.   Neurological:      Mental Status: She is oriented to person, place, and time.       Laboratory:  BMP:   Recent Labs   Lab 07/01/22  0844 07/02/22  0609 07/03/22  1002   * 139* 183*   * 138 135*   K 4.1 3.7 3.2*    105 106   CO2 20* 24 20*   BUN 16 19 25*   CREATININE 1.2 1.2 1.5*   CALCIUM 8.9 9.1 9.1

## 2022-07-03 NOTE — PLAN OF CARE
Cryptococcal meningitis  55 y/o F h/o HTN, gastric bypass, ESRD 2/2 HTN s/p DBDKT 4/5/20 (basiliximab induction,, CMV D+/R+, tacro, MMF), COVID19 7/2020, recently discharged 6/2022 for 2 months of headaches, now admitted to [facility] with intractable headaches, dizziness, blurred vision, left sided facial droop, MRI brain negative 6/23, s/p LP 6/24 with OP of 34cm, CSF: WBC unable to quantitate due to infereing cellular structures, gluc 6, protein 93, shahnaz ink positive for encapsulated yeast and cultures also growing yeast, CT chest with b/l pulmonary nodules started on ambisome, flucytosine 6/26 course notable for TERESA (Cr 1.3->2.0) now transferred to [facility] [place] [place] for further care. Headaches much improved, nauseaus but no further emesis, eating food, serum crypto Ag positive, ophthalmology exam with papilledema, repeat LP with lower OP, csf crypto Ag +  - cryptococcal meningitis (discussed with OSH micro lab 7/3 unable to grow organism) - continue ambisome/flucytosine x 2 total weeks and repeat LP at that time to assess for clearance (yeast seen on gram stain from LP done here)  - pressure appears to be coming down and symptoms are slowly improving  - if clinical worsening would be concerned for possible IRIS and would repeat LP for pressure and cell count assessment if clinical worsening and would consider steroids in that situation.  At this exact moment, given improvement, do not think this is IRIS and would hold on steroids for now   - monitor electrolytes and renal function on ambisome  - will follow, discussed with team

## 2022-07-03 NOTE — PLAN OF CARE
Pt aaox4, moves all extremities, c/o pain meds given, denies sob, cont gtt NS@100ml/hr & heparin, family at bedside. No distress noted      Problem: Adult Inpatient Plan of Care  Goal: Plan of Care Review  Outcome: Ongoing, Progressing  Goal: Patient-Specific Goal (Individualized)  Outcome: Ongoing, Progressing  Goal: Absence of Hospital-Acquired Illness or Injury  Outcome: Ongoing, Progressing  Goal: Optimal Comfort and Wellbeing  Outcome: Ongoing, Progressing  Goal: Readiness for Transition of Care  Outcome: Ongoing, Progressing     Problem: Fluid and Electrolyte Imbalance (Acute Kidney Injury/Impairment)  Goal: Fluid and Electrolyte Balance  Outcome: Ongoing, Progressing     Problem: Oral Intake Inadequate (Acute Kidney Injury/Impairment)  Goal: Optimal Nutrition Intake  Outcome: Ongoing, Progressing     Problem: Renal Function Impairment (Acute Kidney Injury/Impairment)  Goal: Effective Renal Function  Outcome: Ongoing, Progressing     Problem: Skin Injury Risk Increased  Goal: Skin Health and Integrity  Outcome: Ongoing, Progressing     Problem: Fall Injury Risk  Goal: Absence of Fall and Fall-Related Injury  Outcome: Ongoing, Progressing

## 2022-07-03 NOTE — PROGRESS NOTES
[facility] - Telemetry Stepdown  Kidney Transplant  Progress Note      Reason for Follow-up: Reassessment of Kidney Transplant - 4/5/2020  (#1) recipient and management of immunosuppression.      Subjective:   History of Present Illness:  56 y.o. female with a PMHx of HTN, kidney transplant in 2020, CKD3 admitted to [facility] on 06/22 with intractable HA starting about 2 months earlier and with dizziness, blurred vision, left-sided facial droop and dysarthria starting a few days before admission.    Per documentation   On admission /72, HR 69. Neurologic exam is remarkable for left-sided facial weakness and mild dysarthria.  Cr 1.32   LP (6/24) CSF Glu 6, CSF protein 93.  stain pos for yeast like organisms.  Spinal fluid culture pos for encapsulated yeast species  CT chest WO contrast pos for multiple indeterminate soft tissue masses in the left lower lobe with 2 smaller masses in the right lung which may be part of the same process.       On 06/26 patient started on amphotericin B liposomal (275 IV q.d.) and flucytosine (1500 p.o. q.6h) .  Hospitalization c/b the development of acute renal failure.  Cr 1.32 (6/22) > 1.4 (6/26) > 2.02 (6/27).  Transfer requested for higher level of care (KTM and ID).  Transfer diagnosis disseminated cryptococcal infection, TERESA, kidney XPL       Hospital Course:  Being treated for crypto meningitis     Interval History:  BP steady - ambulatory   No issues overnight   Headaches much improved, continues to have issue with hearing   On room air, no fevers     Past Medical, Surgical, Family, and Social History:   Unchanged from H&P.    Scheduled Meds:   acetaminophen  500 mg Oral Q24H    amphotericin B liposome (AMBISOME) IVPB  300 mg Intravenous Q24H    calcitRIOL  0.5 mcg Oral Daily    diphenhydrAMINE  25 mg Oral Q24H    flucytosine  1,500 mg Oral Q6H    heparin (PORCINE)  80 Units/kg (Adjusted) Intravenous Once    magnesium oxide  400 mg Oral Daily     multivitamin  1 tablet Oral Daily    NIFEdipine  90 mg Oral Daily    polyethylene glycol  17 g Oral BID    predniSONE  10 mg Oral Daily    senna-docusate 8.6-50 mg  1 tablet Oral BID    sodium bicarbonate  1,300 mg Oral BID    sodium chloride 0.9%  500 mL Intravenous Q24H    sodium chloride 0.9%  500 mL Intravenous Q24H    tacrolimus  3 mg Oral BID    topiramate  50 mg Oral BID     Continuous Infusions:   sodium chloride 0.9% 100 mL/hr at 07/03/22 1203    heparin (porcine) in D5W       PRN Meds:acetaminophen, albuterol-ipratropium, bisacodyL, butalbital-acetaminophen-caffeine -40 mg, glucose, glucose, heparin (PORCINE), heparin (PORCINE), melatonin, naloxone, ondansetron, oxyCODONE-acetaminophen, sodium chloride 0.9%    Intake/Output - Last 3 Shifts         07/01 0700  07/02 0659 07/02 0700  07/03 0659 07/03 0700  07/04 0659    P.O. 1240 480 240    Total Intake(mL/kg) 1240 (17.3) 480 (6.7) 240 (3.3)    Net +1240 +480 +240           Urine Occurrence 7 x 5 x     Stool Occurrence 1 x 0 x              Review of Systems   Constitutional:  Negative for activity change, appetite change and fever.   Respiratory:  Negative for cough, shortness of breath and wheezing.    Cardiovascular:  Negative for chest pain and palpitations.   Gastrointestinal:  Negative for abdominal distention and abdominal pain.   Genitourinary:  Negative for difficulty urinating and dysuria.   Allergic/Immunologic: Positive for immunocompromised state.   Psychiatric/Behavioral:  Negative for agitation and confusion.     Objective:     Vital Signs (Most Recent):  Temp: 97.2 °F (36.2 °C) (07/03/22 1208)  Pulse: 75 (07/03/22 1208)  Resp: 18 (07/03/22 1208)  BP: 133/66 (07/03/22 1208)  SpO2: 95 % (07/03/22 1208) Vital Signs (24h Range):  Temp:  [97.2 °F (36.2 °C)-98.5 °F (36.9 °C)] 97.2 °F (36.2 °C)  Pulse:  [53-75] 75  Resp:  [16-18] 18  SpO2:  [93 %-100 %] 95 %  BP: (130-163)/(65-72) 133/66     Weight: 71.7 kg (158 lb 1.1 oz)  Height:  5' (152.4 cm)  Body mass index is 30.87 kg/m².    Physical Exam  Constitutional:       General: She is not in acute distress.  Eyes:      General: No scleral icterus.  Cardiovascular:      Rate and Rhythm: Normal rate.   Pulmonary:      Effort: No respiratory distress.      Breath sounds: No wheezing.   Abdominal:      General: There is no distension.      Palpations: Abdomen is soft.      Tenderness: There is no abdominal tenderness.   Musculoskeletal:      Right lower leg: No edema.      Left lower leg: No edema.   Skin:     Coloration: Skin is not jaundiced.   Neurological:      Mental Status: She is oriented to person, place, and time.       Laboratory:  BMP:   Recent Labs   Lab 07/01/22  0844 07/02/22  0609 07/03/22  1002   * 139* 183*   * 138 135*   K 4.1 3.7 3.2*    105 106   CO2 20* 24 20*   BUN 16 19 25*   CREATININE 1.2 1.2 1.5*   CALCIUM 8.9 9.1 9.1         Assessment/Plan:     * Meningitis due to fungal infection  Recommend to give 500 ml normal saline with amphotericin as well.   ID following   Seen by Neuro and Opthal  S/p repeat LP with improvement of headaches        Acute renal failure superimposed on stage 3 chronic kidney disease  TERESA on underlying CKD stage III   Likely pre-renal or medication related   BL 1.2 to 1.4 mg/dl . Cr noticed to be trending back up   Resume IV fluids   Peak cr 2.02 mg/dl previously   UA unremarkable  , monitor I/Os , recent ECHO - normal EF   US transplant unremarkable for acute changes   Continue PO sodium bicarb for acidosis    Amphotericin can cause electrolyte wasting - So replace potassium to goal 4 meq/L      Long-term use of immunosuppressant medication  Continue prograf per levels. Needs a 12 hour trough   Monitor levels to assess for toxicity  Continue prednisone ---> increased to 10mg daily   Hold MMF        S/P kidney transplant  Kidney transplant 4/5/2020   Underlying ckd stage III  ESRD related to HTN        Cristian Cedeno MD  Kidney  Transplant  [facility] - Telemetry Stepdown

## 2022-07-03 NOTE — ASSESSMENT & PLAN NOTE
- Cr 2.2 on day of transfer, has been steadily worsening over last few days (baseline ~1.2)  - KTM consulted, appreciate recs; likely prerenal versus medication related  - US transplant unremarkable for acute changes   - Resolved  - Scr 1.5 today- begin NS at 100mL/hr for 12hrs  - Continue sodium bicarb

## 2022-07-03 NOTE — SUBJECTIVE & OBJECTIVE
Interval History: Patient was seen and evaluated this am. Doing well this am. HA still present and some nausea. No vomiting. No fevers, chills, night sweats, abd pain, diarrhea, constipation. Good UOP.    Objective:     Vital Signs (Most Recent):  Temp: 97.2 °F (36.2 °C) (07/03/22 1208)  Pulse: 75 (07/03/22 1208)  Resp: 18 (07/03/22 1208)  BP: 133/66 (07/03/22 1208)  SpO2: 95 % (07/03/22 1208)   Vital Signs (24h Range):  Temp:  [97.2 °F (36.2 °C)-98.5 °F (36.9 °C)] 97.2 °F (36.2 °C)  Pulse:  [53-75] 75  Resp:  [16-18] 18  SpO2:  [93 %-97 %] 95 %  BP: (130-163)/(65-72) 133/66     Weight: 71.7 kg (158 lb 1.1 oz)  Body mass index is 30.87 kg/m².    Intake/Output Summary (Last 24 hours) at 7/3/2022 1531  Last data filed at 7/3/2022 1300  Gross per 24 hour   Intake 960 ml   Output --   Net 960 ml      Physical Exam  Gen: in NAD, appears stated age  Neuro: awake and oriented to self, place, time  HEENT: NTNC, EOMI, PERRL, MMM  CVS: RRR, no m/r/g; S1/S2 auscultated with no S3 or S4; capillary refill < 2 sec  Resp: lungs CTAB, no w/r/r; no belabored breathing or accessory muscle use appreciated   Abd: BS+ in all 4 quadrants; NTND, soft to palpation; no organomegaly appreciated   Extrem: pulses full, equal, and regular over all 4 extremities; swelling of LLE, no swelling of RLE or BL UE    Significant Labs: All pertinent labs within the past 24 hours have been reviewed.  Blood Culture: No results for input(s): LABBLOO in the last 48 hours.    CSF findings 06/30/22: <5glucose, 174 protein, 144 WBC, 3000 RBC, 37 lymph, 59 mono/macro. Repeat CSF w/1:8 cryptococcal antigen, CSF culture w/yeast    CBC:   Recent Labs   Lab 07/02/22  0609 07/03/22  1002 07/03/22  1235   WBC 6.78 6.81 9.23   HGB 13.5 12.5 12.8   HCT 40.7 40.0 39.9    186 208     CMP:   Recent Labs   Lab 07/02/22  0609 07/03/22  1002    135*   K 3.7 3.2*    106   CO2 24 20*   * 183*   BUN 19 25*   CREATININE 1.2 1.5*   CALCIUM 9.1 9.1   PROT  6.1 5.7*   ALBUMIN 3.4* 3.1*   BILITOT 0.4 0.5   ALKPHOS 70 63   AST 15 13   ALT 18 16   ANIONGAP 9 9   EGFRNONAA 51.4* 39.2*     Magnesium:   Recent Labs   Lab 07/02/22  0609 07/03/22  1002   MG 2.3 2.0     Urine Culture: No results for input(s): LABURIN in the last 48 hours.  Urine Studies:   No results for input(s): COLORU, APPEARANCEUA, PHUR, SPECGRAV, PROTEINUA, GLUCUA, KETONESU, BILIRUBINUA, OCCULTUA, NITRITE, UROBILINOGEN, LEUKOCYTESUR, RBCUA, WBCUA, BACTERIA, SQUAMEPITHEL, HYALINECASTS in the last 48 hours.    Invalid input(s): WRIGHTSUR      Significant Imaging: I have reviewed all pertinent imaging results/findings within the past 24 hours.    CTH 06/30/22:  FINDINGS:  Intracranial compartment:     Mild enlargement of the temporal horn of the right lateral ventricle, new from prior.  Remainder of the ventricular system is normal in size, stable.  Third ventricle diameter on the order of 0.3 cm.  Well-defined sulci remain present over the cerebral convexities.     The brain parenchyma appears otherwise within normal limits, and unchanged from priors.  No new parenchymal hemorrhage, edema or major vascular distribution infarct.  No intracranial mass effect or midline shift.     No new extra-axial blood or fluid collections.     Partially empty sella configuration.     Skull/extracranial contents (limited evaluation):     No displaced calvarial fracture.     Small volume right mastoid air cell fluid.     The visualized paranasal sinuses are essentially clear.     Impression:     Slight interval enlargement of the temporal horn of the right lateral ventricle since the recent MRI of 06/23/2022.  Appearance suggestive of early/developing hydrocephalus or ventricular entrapment.     No new hemorrhage, edema or infarction.     Partially empty sella configuration.  Nonspecific but configuration could be consistent with the reported history of a elevated intracranial pressure.    CTH  07/01/22:  FINDINGS:  Intracranial compartment:     Mild enlargement of the temporal horn of the right lateral ventricle similar to most recent CT head 06/30/2022, new compared to prior MRI brain 06/23/2022.  Third ventricle diameter measures approximately 4 mm.     The brain parenchyma appears within normal limits not significantly changed compared to prior exam.  No parenchymal mass, hemorrhage, edema or major vascular distribution infarct.     No extra-axial blood or fluid collections.     Partially empty sella configuration.     Skull/extracranial contents (limited evaluation):     No fracture. Paranasal sinuses are essentially clear.  Small volume fluid within the right mastoid air cells.     Impression:     Slight enlargement of the temporal horn of right lateral ventricle not significantly changed compared to most recent CT head 06/30/2022,  new compared to prior MRI brain 06/23/2022.  Clinical correlation and continued close follow-up is recommended as developing hydrocephalus or ventricular entrapment remains a concern.     No new acute infarct or hemorrhage.    Doppler US of LLE:  FINDINGS:  Left thigh veins: Near occlusive thrombus within the left popliteal vein.  The common femoral, femoral, and upper greater saphenous veins are patent and free of thrombus. The veins are normally compressible and have normal phasic flow and augmentation response.     Left calf veins: Partially occlusive thrombus within the posterior tibial veins.  Partially occlusive thrombus with 1 of the peroneal veins.  Anterior tibial veins are patent.     Contralateral CFV: The contralateral (right) common femoral vein is patent and free of thrombus.     Miscellaneous: None     Impression:     Deep venous thrombosis within the left popliteal, posterior tibial, and peroneal veins.

## 2022-07-04 NOTE — CLINICAL REVIEW
KTM Chart Check      Intake/Output Summary (Last 24 hours) at 7/4/2022 1252  Last data filed at 7/3/2022 1800  Gross per 24 hour   Intake 1389.53 ml   Output --   Net 1389.53 ml       Vitals:    07/04/22 0411 07/04/22 0814 07/04/22 0915 07/04/22 1149   BP: (!) 145/65 (!) 141/66  (!) 147/70   BP Location: Right leg Right leg  Right leg   Patient Position:  Lying  Sitting   Pulse: (!) 58 (!) 58  (!) 57   Resp: 18 18 18 18   Temp: 98.3 °F (36.8 °C) 98.4 °F (36.9 °C)  98 °F (36.7 °C)   TempSrc: Oral Oral  Oral   SpO2: 95% (!) 94%  95%   Weight:       Height:           Recent Labs   Lab 07/02/22  0609 07/03/22  1002 07/04/22  0507    135* 138   K 3.7 3.2* 3.8    106 108   CO2 24 20* 23   BUN 19 25* 27*   CREATININE 1.2 1.5* 1.4   CALCIUM 9.1 9.1 8.9   PHOS 3.6 4.1 3.6       Immunosuppression Level - 4.1    Continue current dose of prograf  MMF on hold   Prednisone was increased to 10mg   Getting treatment for Crypto meningitis   Continue IV fluids with Amphotericin   Cr trending down now

## 2022-07-04 NOTE — PLAN OF CARE
Problem: Adult Inpatient Plan of Care  Goal: Plan of Care Review  Outcome: Ongoing, Progressing  Goal: Absence of Hospital-Acquired Illness or Injury  Outcome: Ongoing, Progressing  Goal: Optimal Comfort and Wellbeing  Outcome: Ongoing, Progressing     Problem: Fluid and Electrolyte Imbalance (Acute Kidney Injury/Impairment)  Goal: Fluid and Electrolyte Balance  Outcome: Ongoing, Progressing     Problem: Skin Injury Risk Increased  Goal: Skin Health and Integrity  Outcome: Ongoing, Progressing   Awake, alert, oriented x 4 this shift.  Able to ambulate in room with standby assist.  Heparin infusing without complications.  Next lab draw at 2100. No signs of bleeding noted.  Continues to report headache, but states it is improving slightly. PRN pain medication given per patient request.   at bedside. POC reviewed. Will continue to monitor. Side rails up x 2, call light in reach, bed low and locked.

## 2022-07-05 PROBLEM — I82.90 VTE (VENOUS THROMBOEMBOLISM): Status: ACTIVE | Noted: 2022-01-01

## 2022-07-05 NOTE — SUBJECTIVE & OBJECTIVE
Interval History: intermittent HA and vomiting but overall still improving per  and patient    Review of Systems   Constitutional:  Positive for appetite change and fatigue. Negative for activity change, chills, diaphoresis and fever.   HENT:  Positive for hearing loss and voice change. Negative for congestion, mouth sores, rhinorrhea, sinus pressure and sore throat.    Eyes:  Positive for visual disturbance. Negative for photophobia, pain and redness.   Respiratory:  Negative for cough, chest tightness, shortness of breath and wheezing.    Cardiovascular:  Negative for chest pain, palpitations and leg swelling.   Gastrointestinal:  Negative for abdominal distention, abdominal pain, constipation, diarrhea, nausea and vomiting.   Endocrine: Negative for polyuria.   Genitourinary:  Negative for decreased urine volume, difficulty urinating, dysuria and flank pain.   Musculoskeletal:  Negative for arthralgias, joint swelling and neck pain.   Skin:  Negative for color change, rash and wound.   Allergic/Immunologic: Positive for immunocompromised state. Negative for food allergies.   Neurological:  Positive for facial asymmetry and speech difficulty. Negative for dizziness, seizures, weakness and headaches.   Hematological:  Negative for adenopathy.   Psychiatric/Behavioral:  Negative for agitation, behavioral problems and confusion. The patient is not nervous/anxious.    Objective:     Vital Signs (Most Recent):  Temp: 98.2 °F (36.8 °C) (07/05/22 0800)  Pulse: 80 (07/05/22 0800)  Resp: 18 (07/05/22 0800)  BP: (!) (P) 195/95 (bp meds given) (07/05/22 0800)  SpO2: 97 % (07/05/22 0800)   Vital Signs (24h Range):  Temp:  [98 °F (36.7 °C)-98.8 °F (37.1 °C)] 98.2 °F (36.8 °C)  Pulse:  [55-80] 80  Resp:  [17-20] 18  SpO2:  [6 %-98 %] 97 %  BP: (130-179)/(59-77) (P) 195/95     Weight: 71.7 kg (158 lb 1.1 oz)  Body mass index is 30.87 kg/m².    Estimated Creatinine Clearance: 47.4 mL/min (based on SCr of 1.2  mg/dL).    Physical Exam  Vitals reviewed.   Constitutional:       General: She is not in acute distress.     Appearance: She is well-developed.   HENT:      Head: Normocephalic and atraumatic.   Eyes:      Extraocular Movements: Extraocular movements intact.      Pupils: Pupils are equal, round, and reactive to light.   Neck:      Vascular: No JVD.      Trachea: No tracheal deviation.   Cardiovascular:      Rate and Rhythm: Normal rate and regular rhythm.      Heart sounds: No murmur heard.    No friction rub. No gallop.   Pulmonary:      Effort: Pulmonary effort is normal. No respiratory distress.      Breath sounds: Normal breath sounds. No wheezing or rales.   Abdominal:      General: Bowel sounds are normal. There is no distension.      Palpations: Abdomen is soft. There is no mass.      Tenderness: There is no abdominal tenderness.   Musculoskeletal:         General: No tenderness or deformity.      Cervical back: Normal range of motion and neck supple.   Lymphadenopathy:      Cervical: No cervical adenopathy.   Skin:     General: Skin is warm and dry.      Findings: No rash.   Neurological:      Mental Status: She is alert and oriented to person, place, and time.      Cranial Nerves: Facial asymmetry present.   Psychiatric:         Speech: Speech is not slurred ((improved)).       Significant Labs: CBC:   Recent Labs   Lab 07/03/22  1235 07/04/22  0506 07/05/22  0423   WBC 9.23 5.55  5.55 5.35  5.35   HGB 12.8 11.3*  11.3* 12.1  12.1   HCT 39.9 35.5*  35.5* 37.3  37.3    190  190 199  199     CMP:   Recent Labs   Lab 07/03/22  1002 07/04/22  0507 07/05/22  0423   * 138 131*   K 3.2* 3.8 3.7    108 101   CO2 20* 23 24   * 117* 211*   BUN 25* 27* 23*   CREATININE 1.5* 1.4 1.2   CALCIUM 9.1 8.9 9.0   PROT 5.7* 5.3* 5.5*   ALBUMIN 3.1* 3.0* 3.1*   BILITOT 0.5 0.4 0.4   ALKPHOS 63 57 59   AST 13 12 13   ALT 16 13 15   ANIONGAP 9 7* 6*   EGFRNONAA 39.2* 42.6* 51.4*        Significant Imaging: I have reviewed all pertinent imaging results/findings within the past 24 hours.

## 2022-07-05 NOTE — ASSESSMENT & PLAN NOTE
- history of intractable headache  - increase topamax to 100mg BID   - 1x dose of sumatriptan today

## 2022-07-05 NOTE — SUBJECTIVE & OBJECTIVE
Subjective:   History of Present Illness:  56 y.o. female with a PMHx of HTN, kidney transplant in 2020, CKD3 admitted to [facility] on 06/22 with intractable HA starting about 2 months earlier and with dizziness, blurred vision, left-sided facial droop and dysarthria starting a few days before admission.    Per documentation   On admission /72, HR 69. Neurologic exam is remarkable for left-sided facial weakness and mild dysarthria.  Cr 1.32   LP (6/24) CSF Glu 6, CSF protein 93.  stain pos for yeast like organisms.  Spinal fluid culture pos for encapsulated yeast species  CT chest WO contrast pos for multiple indeterminate soft tissue masses in the left lower lobe with 2 smaller masses in the right lung which may be part of the same process.       On 06/26 patient started on amphotericin B liposomal (275 IV q.d.) and flucytosine (1500 p.o. q.6h) .  Hospitalization c/b the development of acute renal failure.  Cr 1.32 (6/22) > 1.4 (6/26) > 2.02 (6/27).  Transfer requested for higher level of care (KTM and ID).  Transfer diagnosis disseminated cryptococcal infection, TERESA, kidney XPL       Ms. Rivera is a 56 y.o. year old female who is status post Kidney Transplant - 4/5/2020  (#1).    Her maintenance immunosuppression consists of:   Immunosuppressants (From admission, onward)                Start     Stop Route Frequency Ordered    07/04/22 0800  tacrolimus capsule 3 mg         -- Oral Every morning 07/03/22 1311    07/03/22 1800  tacrolimus capsule 2 mg         -- Oral Every evening 07/03/22 1311            Hospital Course:  Being treated for crypto meningitis     Interval History:  No new complaints. Continues ot have headaches. Plan for repeat lp tomorrow.    Past Medical, Surgical, Family, and Social History:   Unchanged from H&P.    Scheduled Meds:   acetaminophen  500 mg Oral Q24H    amphotericin B liposome (AMBISOME) IVPB  300 mg Intravenous Q24H    calcitRIOL  0.5 mcg Oral Daily     diphenhydrAMINE  25 mg Oral Q24H    flucytosine  1,500 mg Oral Q6H    magnesium oxide  400 mg Oral Daily    multivitamin  1 tablet Oral Daily    NIFEdipine  90 mg Oral Daily    polyethylene glycol  17 g Oral BID    predniSONE  10 mg Oral Daily    senna-docusate 8.6-50 mg  1 tablet Oral BID    sodium bicarbonate  1,300 mg Oral TID    sodium chloride 0.9%  500 mL Intravenous Q24H    tacrolimus  2 mg Oral Daily PM    tacrolimus  3 mg Oral Daily AM    topiramate  100 mg Oral BID     Continuous Infusions:   heparin (porcine) in D5W 21 Units/kg/hr (07/05/22 0632)     PRN Meds:acetaminophen, albuterol-ipratropium, bisacodyL, butalbital-acetaminophen-caffeine -40 mg, dextrose 5 %, glucose, glucose, heparin (PORCINE), heparin (PORCINE), melatonin, naloxone, ondansetron, oxyCODONE-acetaminophen, sodium chloride 0.9%    Intake/Output - Last 3 Shifts         07/03 0700 07/04 0659 07/04 0700 07/05 0659 07/05 0700 07/06 0659    P.O. 1200 950 240    I.V. (mL/kg) 669.5 (9.3)      Total Intake(mL/kg) 1869.5 (26.1) 950 (13.2) 240 (3.3)    Net +1869.5 +950 +240           Urine Occurrence 6 x 4 x     Stool Occurrence   0 x             Review of Systems   Constitutional: Negative.    HENT: Negative.     Eyes: Negative.    Respiratory: Negative.     Cardiovascular:  Positive for leg swelling.   Gastrointestinal: Negative.    Endocrine: Negative.    Genitourinary: Negative.    Musculoskeletal: Negative.    Skin: Negative.    Neurological:  Positive for facial asymmetry, weakness and headaches (7-9/10).   Psychiatric/Behavioral: Negative.      Objective:     Vital Signs (Most Recent):  Temp: 97.7 °F (36.5 °C) (07/05/22 1655)  Pulse: (!) 55 (07/05/22 1655)  Resp: 18 (07/05/22 1655)  BP: (!) 149/67 (07/05/22 1655)  SpO2: 99 % (07/05/22 1655)   Vital Signs (24h Range):  Temp:  [97.3 °F (36.3 °C)-98.8 °F (37.1 °C)] 97.7 °F (36.5 °C)  Pulse:  [55-80] 55  Resp:  [17-20] 18  SpO2:  [96 %-99 %] 99 %  BP: (134-195)/(61-95) 149/67      Weight: 71.7 kg (158 lb 1.1 oz)  Height: 5' (152.4 cm)  Body mass index is 30.87 kg/m².    Physical Exam  Constitutional:       General: She is not in acute distress.     Appearance: She is obese. She is ill-appearing.   Eyes:      General: No scleral icterus.     Extraocular Movements: Extraocular movements intact.      Pupils: Pupils are equal, round, and reactive to light.   Cardiovascular:      Rate and Rhythm: Normal rate and regular rhythm.      Pulses: Normal pulses.      Heart sounds: No murmur heard.  Pulmonary:      Effort: Pulmonary effort is normal. No respiratory distress.   Abdominal:      General: There is no distension.      Palpations: Abdomen is soft.      Tenderness: There is no abdominal tenderness.   Musculoskeletal:      Right lower leg: Edema present.      Left lower leg: No edema.   Skin:     Coloration: Skin is not jaundiced.   Neurological:      Mental Status: She is alert and oriented to person, place, and time.       Laboratory:  Labs within the past 24 hours have been reviewed.    Diagnostic Results:  None

## 2022-07-05 NOTE — ASSESSMENT & PLAN NOTE
57 y/o F h/o HTN, gastric bypass, ESRD 2/2 HTN s/p DBDKT 4/5/20 (basiliximab induction,, CMV D+/R+, tacro, MMF), COVID19 7/2020, recently discharged 6/2022 for 2 months of headaches, now admitted to [facility] with intractable headaches, dizziness, blurred vision, left sided facial droop, MRI brain negative 6/23, s/p LP 6/24 with OP of 34cm, CSF: WBC unable to quantitate due to infereing cellular structures, gluc 6, protein 93, shahnaz ink positive for encapsulated yeast and cultures also growing yeast, CT chest with b/l pulmonary nodules started on ambisome, flucytosine 6/26 course notable for TERESA (Cr 1.3->2.0) now transferred to [facility] [place] for further care. 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

## 2022-07-05 NOTE — PROGRESS NOTES
[facility] - Telemetry Stepdown  Infectious Disease  Progress Note    Patient Name: Tala Rivera  MRN: [id number]  Admission Date: 6/27/2022  Length of Stay: 8 days  Attending Physician: Keke Hicks MD  Primary Care Provider: ANUJA Castellon MD    Isolation Status: No active isolations  Assessment/Plan:      * Cryptococcal meningitis  57 y/o F h/o HTN, gastric bypass, ESRD 2/2 HTN s/p DBDKT 4/5/20 (basiliximab induction,, CMV D+/R+, tacro, MMF), COVID19 7/2020, recently discharged 6/2022 for 2 months of headaches, now admitted to [facility] with intractable headaches, dizziness, blurred vision, left sided facial droop, MRI brain negative 6/23, s/p LP 6/24 with OP of 34cm, CSF: WBC unable to quantitate due to infereing cellular structures, gluc 6, protein 93, shahnaz ink positive for encapsulated yeast and cultures also growing yeast, CT chest with b/l pulmonary nodules started on ambisome, flucytosine 6/26 course notable for TERESA (Cr 1.3->2.0) now transferred to [facility] [place] for further care. Headaches much improved, emesis has improved, eating food, serum crypto Ag positive, ophthalmology exam with papilledema, repeat LP still with elevated OP (but above symptoms improving), csf crypto Ag +, yeast on gram stain, , protein 174, course now notable for LLE DVT on heparin  - cryptococcal meningitis (discussed with OSH micro lab 7/4 that organism confirmed cryptococcus neoformans/galdino complex, microscan unable to give exact ID)  - continue ambisome/flucytosine   -pt and  state intermittent HAs and emesis slightly worsening overnight - plan for repeat LP tomorrow to assess opening pressure and organism clearance, cell count with diff, glucose, protein gram stain, aerobic culture, fungal culture, cryptococcal antigen  - monitor electrolytes and renal function on ambisome  - discussed with team will follow        Anticipated Disposition: pending    Thank you for your consult. I will follow-up with  patient. Please contact us if you have any additional questions.    Damien Alexis MD  Infectious Disease  [facility] - Telemetry Stepdown    Subjective:     Principal Problem:Cryptococcal meningitis    HPI: 57 y/o F h/o HTN, gastric bypass, ESRD 2/2 HTN s/p DBDKT 4/5/20 (basiliximab induction,, CMV D+/R+, tacro, MMF), COVID19 7/2020, recently discharged 6/2022 for 2 months of headaches, now admitted to [facility] with intractable headaches, dizziness, blurred vision, left sided facial droop, s/p LP 6/24 with CSF: WBC unable to quantitate due to infereing cellular structures, gluc 6, protein 93, shahnaz ink positive for yeast and cultures also growing yeast, CT chest with b/l pulmonary nodules started on ambisome, flucytosine 6/26 course notable for TERESA (Cr 1.3->2.0) now transferred to [facility] [place] for further care. She states HA and n/v have slightly improved but still not feeling well    Interval History: intermittent HA and vomiting but overall still improving per  and patient    Review of Systems   Constitutional:  Positive for appetite change and fatigue. Negative for activity change, chills, diaphoresis and fever.   HENT:  Positive for hearing loss and voice change. Negative for congestion, mouth sores, rhinorrhea, sinus pressure and sore throat.    Eyes:  Positive for visual disturbance. Negative for photophobia, pain and redness.   Respiratory:  Negative for cough, chest tightness, shortness of breath and wheezing.    Cardiovascular:  Negative for chest pain, palpitations and leg swelling.   Gastrointestinal:  Negative for abdominal distention, abdominal pain, constipation, diarrhea, nausea and vomiting.   Endocrine: Negative for polyuria.   Genitourinary:  Negative for decreased urine volume, difficulty urinating, dysuria and flank pain.   Musculoskeletal:  Negative for arthralgias, joint swelling and neck pain.   Skin:  Negative for color change, rash and wound.   Allergic/Immunologic: Positive for  immunocompromised state. Negative for food allergies.   Neurological:  Positive for facial asymmetry and speech difficulty. Negative for dizziness, seizures, weakness and headaches.   Hematological:  Negative for adenopathy.   Psychiatric/Behavioral:  Negative for agitation, behavioral problems and confusion. The patient is not nervous/anxious.    Objective:     Vital Signs (Most Recent):  Temp: 98.2 °F (36.8 °C) (07/05/22 0800)  Pulse: 80 (07/05/22 0800)  Resp: 18 (07/05/22 0800)  BP: (!) (P) 195/95 (bp meds given) (07/05/22 0800)  SpO2: 97 % (07/05/22 0800)   Vital Signs (24h Range):  Temp:  [98 °F (36.7 °C)-98.8 °F (37.1 °C)] 98.2 °F (36.8 °C)  Pulse:  [55-80] 80  Resp:  [17-20] 18  SpO2:  [6 %-98 %] 97 %  BP: (130-179)/(59-77) (P) 195/95     Weight: 71.7 kg (158 lb 1.1 oz)  Body mass index is 30.87 kg/m².    Estimated Creatinine Clearance: 47.4 mL/min (based on SCr of 1.2 mg/dL).    Physical Exam  Vitals reviewed.   Constitutional:       General: She is not in acute distress.     Appearance: She is well-developed.   HENT:      Head: Normocephalic and atraumatic.   Eyes:      Extraocular Movements: Extraocular movements intact.      Pupils: Pupils are equal, round, and reactive to light.   Neck:      Vascular: No JVD.      Trachea: No tracheal deviation.   Cardiovascular:      Rate and Rhythm: Normal rate and regular rhythm.      Heart sounds: No murmur heard.    No friction rub. No gallop.   Pulmonary:      Effort: Pulmonary effort is normal. No respiratory distress.      Breath sounds: Normal breath sounds. No wheezing or rales.   Abdominal:      General: Bowel sounds are normal. There is no distension.      Palpations: Abdomen is soft. There is no mass.      Tenderness: There is no abdominal tenderness.   Musculoskeletal:         General: No tenderness or deformity.      Cervical back: Normal range of motion and neck supple.   Lymphadenopathy:      Cervical: No cervical adenopathy.   Skin:     General: Skin is  warm and dry.      Findings: No rash.   Neurological:      Mental Status: She is alert and oriented to person, place, and time.      Cranial Nerves: Facial asymmetry present.   Psychiatric:         Speech: Speech is not slurred ((improved)).       Significant Labs: CBC:   Recent Labs   Lab 07/03/22  1235 07/04/22  0506 07/05/22  0423   WBC 9.23 5.55  5.55 5.35  5.35   HGB 12.8 11.3*  11.3* 12.1  12.1   HCT 39.9 35.5*  35.5* 37.3  37.3    190  190 199  199     CMP:   Recent Labs   Lab 07/03/22  1002 07/04/22  0507 07/05/22  0423   * 138 131*   K 3.2* 3.8 3.7    108 101   CO2 20* 23 24   * 117* 211*   BUN 25* 27* 23*   CREATININE 1.5* 1.4 1.2   CALCIUM 9.1 8.9 9.0   PROT 5.7* 5.3* 5.5*   ALBUMIN 3.1* 3.0* 3.1*   BILITOT 0.5 0.4 0.4   ALKPHOS 63 57 59   AST 13 12 13   ALT 16 13 15   ANIONGAP 9 7* 6*   EGFRNONAA 39.2* 42.6* 51.4*       Significant Imaging: I have reviewed all pertinent imaging results/findings within the past 24 hours.

## 2022-07-05 NOTE — ASSESSMENT & PLAN NOTE
TERESA on underlying CKD stage III   Likely pre-renal or medication related   BL 1.2 to 1.4 mg/dl   Now at baseline

## 2022-07-05 NOTE — NURSING
Spoke with Micah in pharmacy regarding Flucytosine. Micah states medication needed to be borrowed and is on its way. Dose will not be given on time and will pass to night shift nurse to call pharmacy for time adjustment once medication is administered

## 2022-07-05 NOTE — ASSESSMENT & PLAN NOTE
"Kidney transplant 4/5/2020   Underlying ckd stage III  ESRD related to HTN  Continue immunosuppression as per problem "long term use of immunosuppression medication"  "

## 2022-07-05 NOTE — SUBJECTIVE & OBJECTIVE
Interval History: Patient was seen and evaluated this am. HA still present and some nausea.  No vomiting. No fevers, chills, night sweats, abd pain, diarrhea, constipation. Good UOP.    Objective:     Vital Signs (Most Recent):  Temp: 98.6 °F (37 °C) (07/04/22 1914)  Pulse: (!) 57 (07/04/22 1914)  Resp: 20 (07/04/22 1914)  BP: (!) 179/77 (07/04/22 1914)  SpO2: 98 % (07/04/22 1914)   Vital Signs (24h Range):  Temp:  [98 °F (36.7 °C)-98.8 °F (37.1 °C)] 98.6 °F (37 °C)  Pulse:  [57-62] 57  Resp:  [18-20] 20  SpO2:  [6 %-98 %] 98 %  BP: (123-179)/(59-77) 179/77     Weight: 71.7 kg (158 lb 1.1 oz)  Body mass index is 30.87 kg/m².    Intake/Output Summary (Last 24 hours) at 7/4/2022 1924  Last data filed at 7/4/2022 1816  Gross per 24 hour   Intake 600 ml   Output --   Net 600 ml      Physical Exam  Gen: in NAD, appears stated age  Neuro: awake and oriented to self, place, time  HEENT: NTNC, EOMI, PERRL, MMM  CVS: RRR, no m/r/g; S1/S2 auscultated with no S3 or S4; capillary refill < 2 sec  Resp: lungs CTAB, no w/r/r; no belabored breathing or accessory muscle use appreciated   Abd: BS+ in all 4 quadrants; NTND, soft to palpation; no organomegaly appreciated   Extrem: pulses full, equal, and regular over all 4 extremities; no swelling of BL UE or LE ext    Significant Labs: All pertinent labs within the past 24 hours have been reviewed.  Blood Culture: No results for input(s): LABBLOO in the last 48 hours.    CSF findings 06/30/22: <5glucose, 174 protein, 144 WBC, 3000 RBC, 37 lymph, 59 mono/macro. Repeat CSF w/1:8 cryptococcal antigen, CSF culture w/yeast    CBC:   Recent Labs   Lab 07/03/22  1002 07/03/22  1235 07/04/22  0506   WBC 6.81 9.23 5.55  5.55   HGB 12.5 12.8 11.3*  11.3*   HCT 40.0 39.9 35.5*  35.5*    208 190  190     CMP:   Recent Labs   Lab 07/03/22  1002 07/04/22  0507   * 138   K 3.2* 3.8    108   CO2 20* 23   * 117*   BUN 25* 27*   CREATININE 1.5* 1.4   CALCIUM 9.1 8.9   PROT  5.7* 5.3*   ALBUMIN 3.1* 3.0*   BILITOT 0.5 0.4   ALKPHOS 63 57   AST 13 12   ALT 16 13   ANIONGAP 9 7*   EGFRNONAA 39.2* 42.6*     Magnesium:   Recent Labs   Lab 07/03/22  1002 07/04/22  0507   MG 2.0 2.0     Urine Culture: No results for input(s): LABURIN in the last 48 hours.  Urine Studies:   No results for input(s): COLORU, APPEARANCEUA, PHUR, SPECGRAV, PROTEINUA, GLUCUA, KETONESU, BILIRUBINUA, OCCULTUA, NITRITE, UROBILINOGEN, LEUKOCYTESUR, RBCUA, WBCUA, BACTERIA, SQUAMEPITHEL, HYALINECASTS in the last 48 hours.    Invalid input(s): WRIGHTSUR      Significant Imaging: I have reviewed all pertinent imaging results/findings within the past 24 hours.    CTH 06/30/22:  FINDINGS:  Intracranial compartment:     Mild enlargement of the temporal horn of the right lateral ventricle, new from prior.  Remainder of the ventricular system is normal in size, stable.  Third ventricle diameter on the order of 0.3 cm.  Well-defined sulci remain present over the cerebral convexities.     The brain parenchyma appears otherwise within normal limits, and unchanged from priors.  No new parenchymal hemorrhage, edema or major vascular distribution infarct.  No intracranial mass effect or midline shift.     No new extra-axial blood or fluid collections.     Partially empty sella configuration.     Skull/extracranial contents (limited evaluation):     No displaced calvarial fracture.     Small volume right mastoid air cell fluid.     The visualized paranasal sinuses are essentially clear.     Impression:     Slight interval enlargement of the temporal horn of the right lateral ventricle since the recent MRI of 06/23/2022.  Appearance suggestive of early/developing hydrocephalus or ventricular entrapment.     No new hemorrhage, edema or infarction.     Partially empty sella configuration.  Nonspecific but configuration could be consistent with the reported history of a elevated intracranial pressure.    CT  07/01/22:  FINDINGS:  Intracranial compartment:     Mild enlargement of the temporal horn of the right lateral ventricle similar to most recent CT head 06/30/2022, new compared to prior MRI brain 06/23/2022.  Third ventricle diameter measures approximately 4 mm.     The brain parenchyma appears within normal limits not significantly changed compared to prior exam.  No parenchymal mass, hemorrhage, edema or major vascular distribution infarct.     No extra-axial blood or fluid collections.     Partially empty sella configuration.     Skull/extracranial contents (limited evaluation):     No fracture. Paranasal sinuses are essentially clear.  Small volume fluid within the right mastoid air cells.     Impression:     Slight enlargement of the temporal horn of right lateral ventricle not significantly changed compared to most recent CT head 06/30/2022,  new compared to prior MRI brain 06/23/2022.  Clinical correlation and continued close follow-up is recommended as developing hydrocephalus or ventricular entrapment remains a concern.     No new acute infarct or hemorrhage.    Doppler US of LLE:  FINDINGS:  Left thigh veins: Near occlusive thrombus within the left popliteal vein.  The common femoral, femoral, and upper greater saphenous veins are patent and free of thrombus. The veins are normally compressible and have normal phasic flow and augmentation response.     Left calf veins: Partially occlusive thrombus within the posterior tibial veins.  Partially occlusive thrombus with 1 of the peroneal veins.  Anterior tibial veins are patent.     Contralateral CFV: The contralateral (right) common femoral vein is patent and free of thrombus.     Miscellaneous: None     Impression:     Deep venous thrombosis within the left popliteal, posterior tibial, and peroneal veins.

## 2022-07-05 NOTE — PROGRESS NOTES
[facility] - Telemetry Stepdown  Kidney Transplant  Progress Note      Reason for Follow-up: Reassessment of Kidney Transplant - 4/5/2020  (#1) recipient and management of immunosuppression.    ORGAN: LEFT KIDNEY    Donor Type: Donation after Brain Death    PHS Increased Risk: yes         Subjective:   History of Present Illness:  56 y.o. female with a PMHx of HTN, kidney transplant in 2020, CKD3 admitted to [facility] on 06/22 with intractable HA starting about 2 months earlier and with dizziness, blurred vision, left-sided facial droop and dysarthria starting a few days before admission.    Per documentation   On admission /72, HR 69. Neurologic exam is remarkable for left-sided facial weakness and mild dysarthria.  Cr 1.32   LP (6/24) CSF Glu 6, CSF protein 93.  stain pos for yeast like organisms.  Spinal fluid culture pos for encapsulated yeast species  CT chest WO contrast pos for multiple indeterminate soft tissue masses in the left lower lobe with 2 smaller masses in the right lung which may be part of the same process.       On 06/26 patient started on amphotericin B liposomal (275 IV q.d.) and flucytosine (1500 p.o. q.6h) .  Hospitalization c/b the development of acute renal failure.  Cr 1.32 (6/22) > 1.4 (6/26) > 2.02 (6/27).  Transfer requested for higher level of care (KTM and ID).  Transfer diagnosis disseminated cryptococcal infection, TERESA, kidney XPL       Ms. Rivera is a 56 y.o. year old female who is status post Kidney Transplant - 4/5/2020  (#1).    Her maintenance immunosuppression consists of:   Immunosuppressants (From admission, onward)                Start     Stop Route Frequency Ordered    07/04/22 0800  tacrolimus capsule 3 mg         -- Oral Every morning 07/03/22 1311    07/03/22 1800  tacrolimus capsule 2 mg         -- Oral Every evening 07/03/22 1311            Hospital Course:  Being treated for crypto meningitis     Interval History:  No new complaints. Continues  ot have headaches. Plan for repeat lp tomorrow.    Past Medical, Surgical, Family, and Social History:   Unchanged from H&P.    Scheduled Meds:   acetaminophen  500 mg Oral Q24H    amphotericin B liposome (AMBISOME) IVPB  300 mg Intravenous Q24H    calcitRIOL  0.5 mcg Oral Daily    diphenhydrAMINE  25 mg Oral Q24H    flucytosine  1,500 mg Oral Q6H    magnesium oxide  400 mg Oral Daily    multivitamin  1 tablet Oral Daily    NIFEdipine  90 mg Oral Daily    polyethylene glycol  17 g Oral BID    predniSONE  10 mg Oral Daily    senna-docusate 8.6-50 mg  1 tablet Oral BID    sodium bicarbonate  1,300 mg Oral TID    sodium chloride 0.9%  500 mL Intravenous Q24H    tacrolimus  2 mg Oral Daily PM    tacrolimus  3 mg Oral Daily AM    topiramate  100 mg Oral BID     Continuous Infusions:   heparin (porcine) in D5W 21 Units/kg/hr (07/05/22 0632)     PRN Meds:acetaminophen, albuterol-ipratropium, bisacodyL, butalbital-acetaminophen-caffeine -40 mg, dextrose 5 %, glucose, glucose, heparin (PORCINE), heparin (PORCINE), melatonin, naloxone, ondansetron, oxyCODONE-acetaminophen, sodium chloride 0.9%    Intake/Output - Last 3 Shifts         07/03 0700  07/04 0659 07/04 0700  07/05 0659 07/05 0700  07/06 0659    P.O. 1200 950 240    I.V. (mL/kg) 669.5 (9.3)      Total Intake(mL/kg) 1869.5 (26.1) 950 (13.2) 240 (3.3)    Net +1869.5 +950 +240           Urine Occurrence 6 x 4 x     Stool Occurrence   0 x             Review of Systems   Constitutional: Negative.    HENT: Negative.     Eyes: Negative.    Respiratory: Negative.     Cardiovascular:  Positive for leg swelling.   Gastrointestinal: Negative.    Endocrine: Negative.    Genitourinary: Negative.    Musculoskeletal: Negative.    Skin: Negative.    Neurological:  Positive for facial asymmetry, weakness and headaches (7-9/10).   Psychiatric/Behavioral: Negative.      Objective:     Vital Signs (Most Recent):  Temp: 97.7 °F (36.5 °C) (07/05/22 1655)  Pulse: (!)  55 (07/05/22 1655)  Resp: 18 (07/05/22 1655)  BP: (!) 149/67 (07/05/22 1655)  SpO2: 99 % (07/05/22 1655)   Vital Signs (24h Range):  Temp:  [97.3 °F (36.3 °C)-98.8 °F (37.1 °C)] 97.7 °F (36.5 °C)  Pulse:  [55-80] 55  Resp:  [17-20] 18  SpO2:  [96 %-99 %] 99 %  BP: (134-195)/(61-95) 149/67     Weight: 71.7 kg (158 lb 1.1 oz)  Height: 5' (152.4 cm)  Body mass index is 30.87 kg/m².    Physical Exam  Constitutional:       General: She is not in acute distress.     Appearance: She is obese. She is ill-appearing.   Eyes:      General: No scleral icterus.     Extraocular Movements: Extraocular movements intact.      Pupils: Pupils are equal, round, and reactive to light.   Cardiovascular:      Rate and Rhythm: Normal rate and regular rhythm.      Pulses: Normal pulses.      Heart sounds: No murmur heard.  Pulmonary:      Effort: Pulmonary effort is normal. No respiratory distress.   Abdominal:      General: There is no distension.      Palpations: Abdomen is soft.      Tenderness: There is no abdominal tenderness.   Musculoskeletal:      Right lower leg: Edema present.      Left lower leg: No edema.   Skin:     Coloration: Skin is not jaundiced.   Neurological:      Mental Status: She is alert and oriented to person, place, and time.       Laboratory:  Labs within the past 24 hours have been reviewed.    Diagnostic Results:  None    Assessment/Plan:     * Cryptococcal meningitis  ID following and is on ampho B and flucytosine   Seen by Neuro and Opthal      VTE (venous thromboembolism)  LLE popliteal VTE likely secondary to hospitalization and immobility  On heparin      Acute renal failure superimposed on stage 3 chronic kidney disease  TERESA on underlying CKD stage III   Likely pre-renal or medication related   BL 1.2 to 1.4 mg/dl   Now at baseline    Headache  Suspect from increased intracranial pressure      Long-term use of immunosuppressant medication  Hold MMF  Continue prednisone 10 and tacro 3/2        S/P kidney  "transplant  Kidney transplant 4/5/2020   Underlying ckd stage III  ESRD related to HTN  Continue immunosuppression as per problem "long term use of immunosuppression medication"        Roberto Eisenberg Jr., MD  Kidney Transplant  [facility] - Telemetry Stepdown  "

## 2022-07-05 NOTE — PLAN OF CARE
Problem: Adult Inpatient Plan of Care  Goal: Plan of Care Review  Outcome: Ongoing, Progressing     Problem: Adult Inpatient Plan of Care  Goal: Patient-Specific Goal (Individualized)  Outcome: Ongoing, Progressing     Problem: Adult Inpatient Plan of Care  Goal: Absence of Hospital-Acquired Illness or Injury  Outcome: Ongoing, Progressing     POC reviewed with pt and spouse at bedside. Answered all questions. A&Ox4. Pt very hard of hearing. Continues to complain of a constant headache mildly relieved with PRN medications. Bed in lowest position, call light within reach, non skid socks on, instructed to call staff for needs with call bell - pt and  at bedside verbalized understanding.

## 2022-07-05 NOTE — PROGRESS NOTES
Attila Harman - Telemetry [facility] Medicine  Progress Note    Patient Name: Tala Rivera  MRN: [id number]  Patient Class: IP- Inpatient   Admission Date: 6/27/2022  Length of Stay: 7 days  Attending Physician: Keke Hicks MD  Primary Care Provider: ANUJA Castellon MD        Subjective:     Principal Problem:Cryptococcal meningitis        HPI:  Mrs. Rivera is a 56yoF w/PMHx of kidney XPL (2020 [facility]) on Prograf and Cellcept admitted to [facility] on 06/22 with intractable HA starting about 2 months earlier and with dizziness, blurred vision, left-sided facial droop and dysarthria starting a few days before admission.       On admission /72, HR 69. Neurologic exam is remarkable for left-sided facial weakness and mild dysarthria.  Labs (6/22) WBC 9.4, Hb 15.9, Plts 224, Na 137, K 3.3, CO2 25, BUN 24, Cr 1.32, AG 14, Glu 182 LFTs WNL. COVID neg     LP (6/24) CSF Glu 6, CSF protein 93. [place] stain pos for yeast like organisms.  Spinal fluid culture pos for encapsulated yeast species.       CT chest WO contrast pos for multiple indeterminate soft tissue masses in the left lower lobe with 2 smaller masses in the right lung which may be part of the same process.       On 06/26 patient started on amphotericin B liposomal (275 IV q.d.) and flucytosine (1500 p.o. q.6h) .  Hospitalization c/b the development of acute renal failure.  Cr 1.32 (6/22) > 1.4 (6/26) > 2.02 (6/27).  Cellcept and prednisone have been held.       Transfer requested for higher level of care (KTM and ID).  Transfer diagnosis disseminated cryptococcal infection, TERESA, kidney XPL.    At time of my assessment, pt. Complains of headache which has been persistent for the last several days. She states that it is somewhat relieved by the fioricet and oxycodone that she was receiving at the outside hospital.      Overview/Hospital Course:  KTM, transplant ID, Neurology consulted upon admission. Per KTN, normal saline scheduled  with amphotericin infusions in addition to bicarb drip, prednisone increased, Prograf continued.  Creatinine down-trending and bicarb drip discontinued.  Per Neurology, outside hospital MR MRI reviewed and note that neuro deficits may take a few weeks to fully resolve; recommend limiting Fioricet and narcotics to minimize medication overuse/rebound headaches.  Per Transplant ID, continue ambisome/flucytosine with plan to repeat LP 2 weeks from 6/24.  Due to visual disturbance in the setting of fungal meningitis, ophthalmology consulted.  No infectious nidus found in retina or vitreous, however grade 3 disc edema bilaterally. Exam finding and subjective complaints explained by meningitis and papilledema.  Recommendation for repeating LP to reduce opening pressure per ID and Neurology in agreement.  On 06/29, patient hearing improved.  Remains with intermittent confusion per  at bedside.    CTH performed 06/30- Slight interval enlargement of the temporal horn of the right lateral ventricle since the recent MRI of 06/23/2022.  Appearance suggestive of early/developing hydrocephalus or ventricular entrapment. NSGY consulted. Per NSGY, recommended NCC transfer for further evaluation. Patient underwent repeat LP 06/30- opening pressure of 35cm- closing pressure of 13cm. Patient w/much improvement in signs and symptoms. CSF findings: <5glucose, 174 protein, 144 WBC, 3000 RBC, 37 lymph, 59 mono/macro. Repeat CSF w/1:8 cryptococcal antigen, CSF culture w/yeast. NCC evaluated, but patient with much improvement in signs and symptoms, and so NCC recommended continued observation on the floor. Discussed w/ID- stated to continue to monitor for worsening signs and symptoms- with closing pressure <25cm, no need for serial LP's at this time.     Doppler US performed on LLE- found to have DVT. Started on heparin drip in the case that further Lps or other procedures needed.       Interval History: Patient was seen and evaluated  this am. FLOWERS still present and some nausea.  No vomiting. No fevers, chills, night sweats, abd pain, diarrhea, constipation. Good UOP.    Objective:     Vital Signs (Most Recent):  Temp: 98.6 °F (37 °C) (07/04/22 1914)  Pulse: (!) 57 (07/04/22 1914)  Resp: 20 (07/04/22 1914)  BP: (!) 179/77 (07/04/22 1914)  SpO2: 98 % (07/04/22 1914)   Vital Signs (24h Range):  Temp:  [98 °F (36.7 °C)-98.8 °F (37.1 °C)] 98.6 °F (37 °C)  Pulse:  [57-62] 57  Resp:  [18-20] 20  SpO2:  [6 %-98 %] 98 %  BP: (123-179)/(59-77) 179/77     Weight: 71.7 kg (158 lb 1.1 oz)  Body mass index is 30.87 kg/m².    Intake/Output Summary (Last 24 hours) at 7/4/2022 1924  Last data filed at 7/4/2022 1816  Gross per 24 hour   Intake 600 ml   Output --   Net 600 ml      Physical Exam  Gen: in NAD, appears stated age  Neuro: awake and oriented to self, place, time  HEENT: NTNC, EOMI, PERRL, MMM  CVS: RRR, no m/r/g; S1/S2 auscultated with no S3 or S4; capillary refill < 2 sec  Resp: lungs CTAB, no w/r/r; no belabored breathing or accessory muscle use appreciated   Abd: BS+ in all 4 quadrants; NTND, soft to palpation; no organomegaly appreciated   Extrem: pulses full, equal, and regular over all 4 extremities; no swelling of BL UE or LE ext    Significant Labs: All pertinent labs within the past 24 hours have been reviewed.  Blood Culture: No results for input(s): LABBLOO in the last 48 hours.    CSF findings 06/30/22: <5glucose, 174 protein, 144 WBC, 3000 RBC, 37 lymph, 59 mono/macro. Repeat CSF w/1:8 cryptococcal antigen, CSF culture w/yeast    CBC:   Recent Labs   Lab 07/03/22  1002 07/03/22  1235 07/04/22  0506   WBC 6.81 9.23 5.55  5.55   HGB 12.5 12.8 11.3*  11.3*   HCT 40.0 39.9 35.5*  35.5*    208 190  190     CMP:   Recent Labs   Lab 07/03/22  1002 07/04/22  0507   * 138   K 3.2* 3.8    108   CO2 20* 23   * 117*   BUN 25* 27*   CREATININE 1.5* 1.4   CALCIUM 9.1 8.9   PROT 5.7* 5.3*   ALBUMIN 3.1* 3.0*   BILITOT 0.5 0.4    ALKPHOS 63 57   AST 13 12   ALT 16 13   ANIONGAP 9 7*   EGFRNONAA 39.2* 42.6*     Magnesium:   Recent Labs   Lab 07/03/22  1002 07/04/22  0507   MG 2.0 2.0     Urine Culture: No results for input(s): LABURIN in the last 48 hours.  Urine Studies:   No results for input(s): COLORU, APPEARANCEUA, PHUR, SPECGRAV, PROTEINUA, GLUCUA, KETONESU, BILIRUBINUA, OCCULTUA, NITRITE, UROBILINOGEN, LEUKOCYTESUR, RBCUA, WBCUA, BACTERIA, SQUAMEPITHEL, HYALINECASTS in the last 48 hours.    Invalid input(s): WRIGHTSUR      Significant Imaging: I have reviewed all pertinent imaging results/findings within the past 24 hours.    CTH 06/30/22:  FINDINGS:  Intracranial compartment:     Mild enlargement of the temporal horn of the right lateral ventricle, new from prior.  Remainder of the ventricular system is normal in size, stable.  Third ventricle diameter on the order of 0.3 cm.  Well-defined sulci remain present over the cerebral convexities.     The brain parenchyma appears otherwise within normal limits, and unchanged from priors.  No new parenchymal hemorrhage, edema or major vascular distribution infarct.  No intracranial mass effect or midline shift.     No new extra-axial blood or fluid collections.     Partially empty sella configuration.     Skull/extracranial contents (limited evaluation):     No displaced calvarial fracture.     Small volume right mastoid air cell fluid.     The visualized paranasal sinuses are essentially clear.     Impression:     Slight interval enlargement of the temporal horn of the right lateral ventricle since the recent MRI of 06/23/2022.  Appearance suggestive of early/developing hydrocephalus or ventricular entrapment.     No new hemorrhage, edema or infarction.     Partially empty sella configuration.  Nonspecific but configuration could be consistent with the reported history of a elevated intracranial pressure.    CTH 07/01/22:  FINDINGS:  Intracranial compartment:     Mild enlargement of the  temporal horn of the right lateral ventricle similar to most recent CT head 06/30/2022, new compared to prior MRI brain 06/23/2022.  Third ventricle diameter measures approximately 4 mm.     The brain parenchyma appears within normal limits not significantly changed compared to prior exam.  No parenchymal mass, hemorrhage, edema or major vascular distribution infarct.     No extra-axial blood or fluid collections.     Partially empty sella configuration.     Skull/extracranial contents (limited evaluation):     No fracture. Paranasal sinuses are essentially clear.  Small volume fluid within the right mastoid air cells.     Impression:     Slight enlargement of the temporal horn of right lateral ventricle not significantly changed compared to most recent CT head 06/30/2022,  new compared to prior MRI brain 06/23/2022.  Clinical correlation and continued close follow-up is recommended as developing hydrocephalus or ventricular entrapment remains a concern.     No new acute infarct or hemorrhage.    Doppler US of LLE:  FINDINGS:  Left thigh veins: Near occlusive thrombus within the left popliteal vein.  The common femoral, femoral, and upper greater saphenous veins are patent and free of thrombus. The veins are normally compressible and have normal phasic flow and augmentation response.     Left calf veins: Partially occlusive thrombus within the posterior tibial veins.  Partially occlusive thrombus with 1 of the peroneal veins.  Anterior tibial veins are patent.     Contralateral CFV: The contralateral (right) common femoral vein is patent and free of thrombus.     Miscellaneous: None     Impression:     Deep venous thrombosis within the left popliteal, posterior tibial, and peroneal veins.      Assessment/Plan:      * Cryptococcal meningitis  - Pt with intractable headache, facial droop, dysarthria (improved). 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  - Continue Amp B  5/mg kg Q24hrs and flucytosine 1500 mg Q6hrs for treatment.   -Transplant ID following- continue with current course- patient signs and symptoms much improved since time of admission, will hold of on steroids- no signs of IRIS, will continue to monitor for need for repeat LP  - KTM following; recommend  500mL normal saline with amphotericin     Increased intracranial pressure  - Last intracranial pressure of 34cm from LP at OSH  - Patient with papilledema, vomiting, AMS  - CTH w/developing hydrocephalus and signs of ventricular entrapment  - NSGY notified and consulted- no acute interventions at this time  - NCC notified- continue monitoring on floor  - S/p LP w/opening pressure of 35cm and closing pressure of 13cm 06/30/22  - Continue to monitor for signs and symptoms of worsening ICP  - Repeat CTH relatively unchanged       Bilateral papilledema due to raised intracranial pressure  - Ophthalmology evaluation with grade 3 papilledema in both eyes (longer she has elevated CSF pressure, the higher her risk of permanent optic nerve damage and vision change is.  There is a congested appearance to the veins in both eyes secondary to optic nerve congestion and indicates higher risk for ischemic event), 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  - see increased intracranial pressure    Acute renal failure superimposed on stage 3 chronic kidney disease  - Cr 2.2 on day of transfer, has been steadily worsening over last few days (baseline ~1.2)  - KTM consulted, appreciate recs; likely prerenal versus medication related  - US transplant unremarkable for acute changes   - Resolved  - Scr 1.4 today- IVF stopped  - Continue sodium bicarb    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  - Improving  - See papilledema-- spoke w/ophthalmology- expect papilledema to improve over the course of 2wks if infection remains well-controlled and ICP remains wnl      Headache  - history of intractable headache  - increase topamax to 100mg BID   - 1x dose of sumatriptan  today      Hypokalemia  - PO repletion prn to goal      Long-term use of immunosuppressant medication  - Pt with opportunistic infection, see above. Holding mycophenolate   - KTM following, appreciate recs  Continue prograf per levels   Monitor levels to assess for toxicity  Continue prednisone ---> increased to 10mg daily   Hold MMF         S/P kidney transplant  - See TERESA. KTM consulted for further assistance  Kidney transplant 4/5/2020   Underlying ckd stage III  ESRD related to HTN        VTE Risk Mitigation (From admission, onward)         Ordered     heparin 25,000 units in dextrose 5% (100 units/ml) IV bolus from bag - ADDITIONAL PRN BOLUS - 60 units/kg  As needed (PRN)        Question:  Heparin Infusion Adjustment (DO NOT MODIFY ANSWER)  Answer:  [email or web address]\Images\Pharmacy\HeparinInfusions\heparin HIGH INTENSITY nomogram for [facility] [id number]    07/03/22 1205     heparin 25,000 units in dextrose 5% (100 units/ml) IV bolus from bag - ADDITIONAL PRN BOLUS - 30 units/kg  As needed (PRN)        Question:  Heparin Infusion Adjustment (DO NOT MODIFY ANSWER)  Answer:  [email or web address]\[email or web address]\Images\Pharmacy\HeparinInfusions\heparin HIGH INTENSITY nomogram for [facility] [id number]    07/03/22 1205     heparin 25,000 units in dextrose 5% 250 mL (100 units/mL) infusion HIGH INTENSITY nomogram - [facility]  Continuous        Question Answer Comment   Heparin Infusion Adjustment (DO NOT MODIFY ANSWER) [email or web address]\[email or web address]\Images\Pharmacy\HeparinInfusions\heparin HIGH INTENSITY nomogram for [facility] [id number]    Begin at (in units/kg/hr) 18        07/03/22 1205     IP VTE HIGH RISK PATIENT  Once         06/27/22 2202     Place sequential compression device  Until discontinued         06/27/22 2202                Discharge Planning   JULITO: 7/4/2022     Code Status: Full Code   Is the patient medically ready for discharge?: No    Reason for patient still in hospital (select all that apply): Pending disposition  Discharge Plan A: Home with  family                    Keke Hicks MD  Department of Hospital Medicine   [facility] - Telemetry Stepdown

## 2022-07-06 NOTE — PROGRESS NOTES
Attila Harman - Telemetry [facility]  Progress Note    Patient Name: Tala Rivera  MRN: [id number]  Patient Class: IP- Inpatient   Admission Date: 6/27/2022  Length of Stay: 9 days  Attending Physician: Keke Hicks MD  Primary Care Provider: ANUJA Castellon MD        Subjective:     Principal Problem:Cryptococcal meningitis        HPI:  Mrs. Rivera is a 56yoF w/PMHx of kidney XPL (2020 [facility]) on Prograf and Cellcept admitted to [facility] on 06/22 with intractable HA starting about 2 months earlier and with dizziness, blurred vision, left-sided facial droop and dysarthria starting a few days before admission.       On admission /72, HR 69. Neurologic exam is remarkable for left-sided facial weakness and mild dysarthria.  Labs (6/22) WBC 9.4, Hb 15.9, Plts 224, Na 137, K 3.3, CO2 25, BUN 24, Cr 1.32, AG 14, Glu 182 LFTs WNL. COVID neg     LP (6/24) CSF Glu 6, CSF protein 93. Cambodian stain pos for yeast like organisms.  Spinal fluid culture pos for encapsulated yeast species.       CT chest WO contrast pos for multiple indeterminate soft tissue masses in the left lower lobe with 2 smaller masses in the right lung which may be part of the same process.       On 06/26 patient started on amphotericin B liposomal (275 IV q.d.) and flucytosine (1500 p.o. q.6h) .  Hospitalization c/b the development of acute renal failure.  Cr 1.32 (6/22) > 1.4 (6/26) > 2.02 (6/27).  Cellcept and prednisone have been held.       Transfer requested for higher level of care (KTM and ID).  Transfer diagnosis disseminated cryptococcal infection, TERESA, kidney XPL.    At time of my assessment, pt. Complains of headache which has been persistent for the last several days. She states that it is somewhat relieved by the fioricet and oxycodone that she was receiving at the outside hospital.      Overview/Hospital Course:  KTM, transplant ID, Neurology consulted upon admission. Per KTN, normal saline scheduled  with amphotericin infusions in addition to bicarb drip, prednisone increased, Prograf continued.  Creatinine down-trending and bicarb drip discontinued.  Per Neurology, outside hospital MR MRI reviewed and note that neuro deficits may take a few weeks to fully resolve; recommend limiting Fioricet and narcotics to minimize medication overuse/rebound headaches.  Per Transplant ID, continue ambisome/flucytosine with plan to repeat LP 2 weeks from 6/24.  Due to visual disturbance in the setting of fungal meningitis, ophthalmology consulted.  No infectious nidus found in retina or vitreous, however grade 3 disc edema bilaterally. Exam finding and subjective complaints explained by meningitis and papilledema.  Recommendation for repeating LP to reduce opening pressure per ID and Neurology in agreement.  On 06/29, patient hearing improved.  Remains with intermittent confusion per  at bedside.    CTH performed 06/30- Slight interval enlargement of the temporal horn of the right lateral ventricle since the recent MRI of 06/23/2022.  Appearance suggestive of early/developing hydrocephalus or ventricular entrapment. NSGY consulted. Per NSGY, recommended NCC transfer for further evaluation. Patient underwent repeat LP 06/30- opening pressure of 35cm- closing pressure of 13cm. Patient w/much improvement in signs and symptoms. CSF findings: <5glucose, 174 protein, 144 WBC, 3000 RBC, 37 lymph, 59 mono/macro. Repeat CSF w/1:8 cryptococcal antigen, CSF culture w/yeast. NCC evaluated, but patient with much improvement in signs and symptoms, and so NCC recommended continued observation on the floor. Discussed w/ID- stated to continue to monitor for worsening signs and symptoms- with closing pressure <25cm, no need for serial LP's at this time.     Doppler US performed on LLE- found to have DVT. Started on heparin drip. Planning for repeat LP 07/06.      Interval History: Patient was seen and evaluated this am. FLOWERS still present  and some nausea. No worsening of symptoms.  getting patient up and walking around. Planning for LP today- heparin drip paused this am. No fevers, chills, night sweats, abd pain, vomiting.     Objective:     Vital Signs (Most Recent):  Temp: 98.4 °F (36.9 °C) (07/06/22 1154)  Pulse: (!) 57 (07/06/22 1154)  Resp: 18 (07/06/22 1154)  BP: (!) 151/68 (07/06/22 1154)  SpO2: 97 % (07/06/22 1154)   Vital Signs (24h Range):  Temp:  [97.3 °F (36.3 °C)-98.6 °F (37 °C)] 98.4 °F (36.9 °C)  Pulse:  [53-70] 57  Resp:  [16-19] 18  SpO2:  [96 %-99 %] 97 %  BP: (138-151)/(65-83) 151/68     Weight: 71.7 kg (158 lb 1.1 oz)  Body mass index is 30.87 kg/m².    Intake/Output Summary (Last 24 hours) at 7/6/2022 1156  Last data filed at 7/5/2022 1300  Gross per 24 hour   Intake 240 ml   Output --   Net 240 ml      Physical Exam  Gen: in NAD, appears stated age  Neuro: awake and oriented to self, place, time  HEENT: NTNC, EOMI, PERRL, MMM  CVS: RRR, no m/r/g; S1/S2 auscultated with no S3 or S4; capillary refill < 2 sec  Resp: lungs CTAB, no w/r/r; no belabored breathing or accessory muscle use appreciated   Abd: BS+ in all 4 quadrants; NTND, soft to palpation; no organomegaly appreciated   Extrem: pulses full, equal, and regular over all 4 extremities; no swelling of BL UE or LE ext    Significant Labs: All pertinent labs within the past 24 hours have been reviewed.  Blood Culture: No results for input(s): LABBLOO in the last 48 hours.    CSF findings 06/30/22: <5glucose, 174 protein, 144 WBC, 3000 RBC, 37 lymph, 59 mono/macro. Repeat CSF w/1:8 cryptococcal antigen, CSF culture w/yeast    CBC:   Recent Labs   Lab 07/05/22  0423 07/06/22  0305   WBC 5.35  5.35 5.05   HGB 12.1  12.1 11.0*   HCT 37.3  37.3 34.2*     199 180     CMP:   Recent Labs   Lab 07/05/22  0423   *   K 3.7      CO2 24   *   BUN 23*   CREATININE 1.2   CALCIUM 9.0   PROT 5.5*   ALBUMIN 3.1*   BILITOT 0.4   ALKPHOS 59   AST 13   ALT 15    ANIONGAP 6*   EGFRNONAA 51.4*     Magnesium:   Recent Labs   Lab 07/05/22  0423   MG 1.9     Urine Culture: No results for input(s): LABURIN in the last 48 hours.  Urine Studies:   No results for input(s): COLORU, APPEARANCEUA, PHUR, SPECGRAV, PROTEINUA, GLUCUA, KETONESU, BILIRUBINUA, OCCULTUA, NITRITE, UROBILINOGEN, LEUKOCYTESUR, RBCUA, WBCUA, BACTERIA, SQUAMEPITHEL, HYALINECASTS in the last 48 hours.    Invalid input(s): WRIGHTSUR      Significant Imaging: I have reviewed all pertinent imaging results/findings within the past 24 hours.    CTH 06/30/22:  FINDINGS:  Intracranial compartment:     Mild enlargement of the temporal horn of the right lateral ventricle, new from prior.  Remainder of the ventricular system is normal in size, stable.  Third ventricle diameter on the order of 0.3 cm.  Well-defined sulci remain present over the cerebral convexities.     The brain parenchyma appears otherwise within normal limits, and unchanged from priors.  No new parenchymal hemorrhage, edema or major vascular distribution infarct.  No intracranial mass effect or midline shift.     No new extra-axial blood or fluid collections.     Partially empty sella configuration.     Skull/extracranial contents (limited evaluation):     No displaced calvarial fracture.     Small volume right mastoid air cell fluid.     The visualized paranasal sinuses are essentially clear.     Impression:     Slight interval enlargement of the temporal horn of the right lateral ventricle since the recent MRI of 06/23/2022.  Appearance suggestive of early/developing hydrocephalus or ventricular entrapment.     No new hemorrhage, edema or infarction.     Partially empty sella configuration.  Nonspecific but configuration could be consistent with the reported history of a elevated intracranial pressure.    CTH 07/01/22:  FINDINGS:  Intracranial compartment:     Mild enlargement of the temporal horn of the right lateral ventricle similar to most recent  CT head 06/30/2022, new compared to prior MRI brain 06/23/2022.  Third ventricle diameter measures approximately 4 mm.     The brain parenchyma appears within normal limits not significantly changed compared to prior exam.  No parenchymal mass, hemorrhage, edema or major vascular distribution infarct.     No extra-axial blood or fluid collections.     Partially empty sella configuration.     Skull/extracranial contents (limited evaluation):     No fracture. Paranasal sinuses are essentially clear.  Small volume fluid within the right mastoid air cells.     Impression:     Slight enlargement of the temporal horn of right lateral ventricle not significantly changed compared to most recent CT head 06/30/2022,  new compared to prior MRI brain 06/23/2022.  Clinical correlation and continued close follow-up is recommended as developing hydrocephalus or ventricular entrapment remains a concern.     No new acute infarct or hemorrhage.    Doppler US of LLE:  FINDINGS:  Left thigh veins: Near occlusive thrombus within the left popliteal vein.  The common femoral, femoral, and upper greater saphenous veins are patent and free of thrombus. The veins are normally compressible and have normal phasic flow and augmentation response.     Left calf veins: Partially occlusive thrombus within the posterior tibial veins.  Partially occlusive thrombus with 1 of the peroneal veins.  Anterior tibial veins are patent.     Contralateral CFV: The contralateral (right) common femoral vein is patent and free of thrombus.     Miscellaneous: None     Impression:     Deep venous thrombosis within the left popliteal, posterior tibial, and peroneal veins.      Assessment/Plan:      * Cryptococcal meningitis  - Pt with intractable headache, facial droop, dysarthria (improved). 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  - Continue Amp B 5/mg kg Q24hrs and flucytosine 1500 mg Q6hrs for treatment.   - On  chart review, amphotericin started on 06/24/22- 2wks of treatment 07/08/22  - Repeat LP today  -Transplant ID following- continue with current course- patient signs and symptoms much improved since time of admission, will hold of on steroids- no signs of IRIS, will continue to monitor for need for repeat LP  - KTM following; recommend  500mL normal saline with amphotericin     Increased intracranial pressure  - Last intracranial pressure of 34cm from LP at OSH  - Patient with papilledema, vomiting, AMS  - CTH w/developing hydrocephalus and signs of ventricular entrapment  - NSGY notified and consulted- no acute interventions at this time  - NCC notified- continue monitoring on floor  - S/p LP w/opening pressure of 35cm and closing pressure of 13cm 06/30/22  - Continue to monitor for signs and symptoms of worsening ICP  - Repeat CTH relatively unchanged     Bilateral papilledema due to raised intracranial pressure  - Ophthalmology evaluation with grade 3 papilledema in both eyes (longer she has elevated CSF pressure, the higher her risk of permanent optic nerve damage and vision change is.  There is a congested appearance to the veins in both eyes secondary to optic nerve congestion and indicates higher risk for ischemic event), 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  - see increased intracranial pressure    Acute renal failure superimposed on stage 3 chronic kidney disease  - Cr 2.2 on day of transfer, has been steadily worsening over last few days (baseline ~1.2)  - KTM consulted, appreciate recs; likely prerenal versus medication related  - US transplant unremarkable for acute changes   - Resolved  - Scr pending  - Continue sodium bicarb    VTE (venous thromboembolism)  - continue heparin drip for DVT of the LLE    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  - Improving  - See papilledema-- spoke w/ophthalmology- expect papilledema to improve over the course of 2wks if infection remains well-controlled and ICP remains wnl    Headache  - history of intractable  headache  - continue topamax to 100mg BID   - 1x dose of sumatriptan 07/04 w/no improvement    Hypokalemia  - PO repletion prn to goal    Long-term use of immunosuppressant medication  - Pt with opportunistic infection, see above. Holding mycophenolate   - KTM following, appreciate recs  Continue prograf per levels   Monitor levels to assess for toxicity  Continue prednisone ---> increased to 10mg daily   Hold MMF     S/P kidney transplant  - See TERESA. KTM consulted for further assistance  Kidney transplant 4/5/2020   Underlying ckd stage III  ESRD related to HTN    VTE Risk Mitigation (From admission, onward)         Ordered     heparin 25,000 units in dextrose 5% (100 units/ml) IV bolus from bag - ADDITIONAL PRN BOLUS - 60 units/kg  As needed (PRN)        Question:  Heparin Infusion Adjustment (DO NOT MODIFY ANSWER)  Answer:  \\[email or web address]\epic\Images\Pharmacy\HeparinInfusions\heparin HIGH INTENSITY nomogram for [facility] [id number]    07/03/22 1205     heparin 25,000 units in dextrose 5% (100 units/ml) IV bolus from bag - ADDITIONAL PRN BOLUS - 30 units/kg  As needed (PRN)        Question:  Heparin Infusion Adjustment (DO NOT MODIFY ANSWER)  Answer:  \\[email or web address]\epic\Images\Pharmacy\HeparinInfusions\heparin HIGH INTENSITY nomogram for [facility] [id number].pdf    07/03/22 1205     heparin 25,000 units in dextrose 5% 250 mL (100 units/mL) infusion HIGH INTENSITY nomogram - [facility]  Continuous        Question Answer Comment   Heparin Infusion Adjustment (DO NOT MODIFY ANSWER) \\[email or web address]\epic\Images\Pharmacy\HeparinInfusions\heparin HIGH INTENSITY nomogram for [facility] [id number].pdf    Begin at (in units/kg/hr) 18        07/03/22 1205     IP VTE HIGH RISK PATIENT  Once         06/27/22 2202     Place sequential compression device  Until discontinued         06/27/22 2202                Discharge Planning   JULITO: 7/11/2022     Code Status: Full Code   Is the patient medically ready for discharge?: No    Reason for patient still in hospital  (select all that apply): Patient trending condition  Discharge Plan A: Home with family                    Keke Hicks MD  Department of Hospital Medicine   [facility] - Tele[facility]

## 2022-07-06 NOTE — SUBJECTIVE & OBJECTIVE
Subjective:   History of Present Illness:  56 y.o. female with a PMHx of HTN, kidney transplant in 2020, CKD3 admitted to [facility] on 06/22 with intractable HA starting about 2 months earlier and with dizziness, blurred vision, left-sided facial droop and dysarthria starting a few days before admission.    Per documentation   On admission /72, HR 69. Neurologic exam is remarkable for left-sided facial weakness and mild dysarthria.  Cr 1.32   LP (6/24) CSF Glu 6, CSF protein 93.  stain pos for yeast like organisms.  Spinal fluid culture pos for encapsulated yeast species  CT chest WO contrast pos for multiple indeterminate soft tissue masses in the left lower lobe with 2 smaller masses in the right lung which may be part of the same process.       On 06/26 patient started on amphotericin B liposomal (275 IV q.d.) and flucytosine (1500 p.o. q.6h) .  Hospitalization c/b the development of acute renal failure.  Cr 1.32 (6/22) > 1.4 (6/26) > 2.02 (6/27).  Transfer requested for higher level of care (KTM and ID).  Transfer diagnosis disseminated cryptococcal infection, TERESA, kidney XPL       Ms. Rivera is a 56 y.o. year old female who is status post Kidney Transplant - 4/5/2020  (#1).    Her maintenance immunosuppression consists of:   Immunosuppressants (From admission, onward)                Start     Stop Route Frequency Ordered    07/04/22 0800  tacrolimus capsule 3 mg         -- Oral Every morning 07/03/22 1311    07/03/22 1800  tacrolimus capsule 2 mg         -- Oral Every evening 07/03/22 1311            Hospital Course:  Being treated for crypto meningitis     Interval History:  Improvement in headaches slightly since yesterday. LP planned for today. [place] level low.    Past Medical, Surgical, Family, and Social History:   Unchanged from H&P.    Scheduled Meds:   acetaminophen  500 mg Oral Q24H    amphotericin B liposome (AMBISOME) IVPB  300 mg Intravenous Q24H    calcitRIOL  0.5 mcg Oral  Daily    diphenhydrAMINE  25 mg Oral Q24H    flucytosine  1,500 mg Oral Q6H    magnesium oxide  400 mg Oral Daily    multivitamin  1 tablet Oral Daily    NIFEdipine  90 mg Oral Daily    polyethylene glycol  17 g Oral BID    predniSONE  10 mg Oral Daily    senna-docusate 8.6-50 mg  1 tablet Oral BID    sodium bicarbonate  1,300 mg Oral TID    sodium chloride 0.9%  500 mL Intravenous Q24H    tacrolimus  2 mg Oral Daily PM    tacrolimus  3 mg Oral Daily AM    topiramate  100 mg Oral BID     Continuous Infusions:   heparin (porcine) in D5W 21 Units/kg/hr (07/06/22 1551)     PRN Meds:acetaminophen, albuterol-ipratropium, bisacodyL, butalbital-acetaminophen-caffeine -40 mg, dextrose 5 %, glucose, glucose, heparin (PORCINE), heparin (PORCINE), melatonin, naloxone, ondansetron, oxyCODONE-acetaminophen, sodium chloride 0.9%    Intake/Output - Last 3 Shifts         07/04 0700 07/05 0659 07/05 0700 07/06 0659 07/06 0700 07/07 0659    P.O. 950 240 750    I.V. (mL/kg)       Total Intake(mL/kg) 950 (13.2) 240 (3.3) 750 (10.5)    Urine (mL/kg/hr)   500 (0.7)    Stool   200    Total Output   700    Net +950 +240 +50           Urine Occurrence 4 x      Stool Occurrence  0 x              Review of Systems   Constitutional: Negative.    HENT: Negative.     Eyes: Negative.    Respiratory: Negative.     Cardiovascular:  Positive for leg swelling.   Gastrointestinal: Negative.    Endocrine: Negative.    Genitourinary: Negative.    Musculoskeletal: Negative.    Skin: Negative.    Neurological:  Positive for facial asymmetry, weakness and headaches (7-9/10).   Psychiatric/Behavioral: Negative.      Objective:     Vital Signs (Most Recent):  Temp: 96.5 °F (35.8 °C) (07/06/22 1537)  Pulse: 60 (07/06/22 1537)  Resp: 18 (07/06/22 1600)  BP: (!) 187/77 (07/06/22 1537)  SpO2: 97 % (07/06/22 1537)   Vital Signs (24h Range):  Temp:  [96.5 °F (35.8 °C)-98.6 °F (37 °C)] 96.5 °F (35.8 °C)  Pulse:  [53-60] 60  Resp:  [16-19] 18  SpO2:  [97  %-99 %] 97 %  BP: (138-187)/(65-83) 187/77     Weight: 71.7 kg (158 lb 1.1 oz)  Height: 5' (152.4 cm)  Body mass index is 30.87 kg/m².    Physical Exam  Constitutional:       General: She is not in acute distress.     Appearance: She is obese. She is ill-appearing.   Eyes:      General: No scleral icterus.     Extraocular Movements: Extraocular movements intact.      Pupils: Pupils are equal, round, and reactive to light.   Cardiovascular:      Rate and Rhythm: Normal rate and regular rhythm.      Pulses: Normal pulses.      Heart sounds: No murmur heard.  Pulmonary:      Effort: Pulmonary effort is normal. No respiratory distress.   Abdominal:      General: There is no distension.      Palpations: Abdomen is soft.      Tenderness: There is no abdominal tenderness.   Musculoskeletal:      Right lower leg: Edema present.      Left lower leg: No edema.   Skin:     Coloration: Skin is not jaundiced.   Neurological:      Mental Status: She is alert and oriented to person, place, and time.       Laboratory:  Labs within the past 24 hours have been reviewed.    Diagnostic Results:  None

## 2022-07-06 NOTE — ASSESSMENT & PLAN NOTE
- Pt with intractable headache, facial droop, dysarthria (improved). 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  - Continue Amp B 5/mg kg Q24hrs and flucytosine 1500 mg Q6hrs for treatment.   - On chart review, amphotericin started on 06/24/22- 2wks of treatment 07/08/22  - Repeat LP tomorrow 07/06  -Transplant ID following- continue with current course- patient signs and symptoms much improved since time of admission, will hold of on steroids- no signs of IRIS, will continue to monitor for need for repeat LP  - KTM following; recommend  500mL normal saline with amphotericin

## 2022-07-06 NOTE — SUBJECTIVE & OBJECTIVE
Interval History: Patient was seen and evaluated this am. HA still present and some nausea.  No vomiting. No fevers, chills, night sweats, abd pain, diarrhea, constipation. Good UOP. Discussed w/Dr. Alexis, Mr. Rivera and patient- need for repeat LP 07/06 to evaluate for cryptococcal growth and opening pressure.     Objective:     Vital Signs (Most Recent):  Temp: 98.6 °F (37 °C) (07/05/22 1941)  Pulse: (!) 56 (07/05/22 1941)  Resp: 19 (07/05/22 1941)  BP: (!) 146/65 (07/05/22 1941)  SpO2: 97 % (07/05/22 1941)   Vital Signs (24h Range):  Temp:  [97.3 °F (36.3 °C)-98.8 °F (37.1 °C)] 98.6 °F (37 °C)  Pulse:  [55-80] 56  Resp:  [17-19] 19  SpO2:  [96 %-99 %] 97 %  BP: (134-195)/(61-95) 146/65     Weight: 71.7 kg (158 lb 1.1 oz)  Body mass index is 30.87 kg/m².    Intake/Output Summary (Last 24 hours) at 7/5/2022 2102  Last data filed at 7/5/2022 1300  Gross per 24 hour   Intake 590 ml   Output --   Net 590 ml      Physical Exam  Gen: in NAD, appears stated age  Neuro: awake and oriented to self, place, time  HEENT: NTNC, EOMI, PERRL, MMM  CVS: RRR, no m/r/g; S1/S2 auscultated with no S3 or S4; capillary refill < 2 sec  Resp: lungs CTAB, no w/r/r; no belabored breathing or accessory muscle use appreciated   Abd: BS+ in all 4 quadrants; NTND, soft to palpation; no organomegaly appreciated   Extrem: pulses full, equal, and regular over all 4 extremities; no swelling of BL UE or LE ext    Significant Labs: All pertinent labs within the past 24 hours have been reviewed.  Blood Culture: No results for input(s): LABBLOO in the last 48 hours.    CSF findings 06/30/22: <5glucose, 174 protein, 144 WBC, 3000 RBC, 37 lymph, 59 mono/macro. Repeat CSF w/1:8 cryptococcal antigen, CSF culture w/yeast    CBC:   Recent Labs   Lab 07/04/22  0506 07/05/22  0423   WBC 5.55  5.55 5.35  5.35   HGB 11.3*  11.3* 12.1  12.1   HCT 35.5*  35.5* 37.3  37.3     190 199  199     CMP:   Recent Labs   Lab 07/04/22  0507 07/05/22  0423     131*   K 3.8 3.7    101   CO2 23 24   * 211*   BUN 27* 23*   CREATININE 1.4 1.2   CALCIUM 8.9 9.0   PROT 5.3* 5.5*   ALBUMIN 3.0* 3.1*   BILITOT 0.4 0.4   ALKPHOS 57 59   AST 12 13   ALT 13 15   ANIONGAP 7* 6*   EGFRNONAA 42.6* 51.4*     Magnesium:   Recent Labs   Lab 07/04/22  0507 07/05/22  0423   MG 2.0 1.9     Urine Culture: No results for input(s): LABURIN in the last 48 hours.  Urine Studies:   No results for input(s): COLORU, APPEARANCEUA, PHUR, SPECGRAV, PROTEINUA, GLUCUA, KETONESU, BILIRUBINUA, OCCULTUA, NITRITE, UROBILINOGEN, LEUKOCYTESUR, RBCUA, WBCUA, BACTERIA, SQUAMEPITHEL, HYALINECASTS in the last 48 hours.    Invalid input(s): WRIGHTSUR      Significant Imaging: I have reviewed all pertinent imaging results/findings within the past 24 hours.    CTH 06/30/22:  FINDINGS:  Intracranial compartment:     Mild enlargement of the temporal horn of the right lateral ventricle, new from prior.  Remainder of the ventricular system is normal in size, stable.  Third ventricle diameter on the order of 0.3 cm.  Well-defined sulci remain present over the cerebral convexities.     The brain parenchyma appears otherwise within normal limits, and unchanged from priors.  No new parenchymal hemorrhage, edema or major vascular distribution infarct.  No intracranial mass effect or midline shift.     No new extra-axial blood or fluid collections.     Partially empty sella configuration.     Skull/extracranial contents (limited evaluation):     No displaced calvarial fracture.     Small volume right mastoid air cell fluid.     The visualized paranasal sinuses are essentially clear.     Impression:     Slight interval enlargement of the temporal horn of the right lateral ventricle since the recent MRI of 06/23/2022.  Appearance suggestive of early/developing hydrocephalus or ventricular entrapment.     No new hemorrhage, edema or infarction.     Partially empty sella configuration.  Nonspecific but  configuration could be consistent with the reported history of a elevated intracranial pressure.    CTH 07/01/22:  FINDINGS:  Intracranial compartment:     Mild enlargement of the temporal horn of the right lateral ventricle similar to most recent CT head 06/30/2022, new compared to prior MRI brain 06/23/2022.  Third ventricle diameter measures approximately 4 mm.     The brain parenchyma appears within normal limits not significantly changed compared to prior exam.  No parenchymal mass, hemorrhage, edema or major vascular distribution infarct.     No extra-axial blood or fluid collections.     Partially empty sella configuration.     Skull/extracranial contents (limited evaluation):     No fracture. Paranasal sinuses are essentially clear.  Small volume fluid within the right mastoid air cells.     Impression:     Slight enlargement of the temporal horn of right lateral ventricle not significantly changed compared to most recent CT head 06/30/2022,  new compared to prior MRI brain 06/23/2022.  Clinical correlation and continued close follow-up is recommended as developing hydrocephalus or ventricular entrapment remains a concern.     No new acute infarct or hemorrhage.    Doppler US of LLE:  FINDINGS:  Left thigh veins: Near occlusive thrombus within the left popliteal vein.  The common femoral, femoral, and upper greater saphenous veins are patent and free of thrombus. The veins are normally compressible and have normal phasic flow and augmentation response.     Left calf veins: Partially occlusive thrombus within the posterior tibial veins.  Partially occlusive thrombus with 1 of the peroneal veins.  Anterior tibial veins are patent.     Contralateral CFV: The contralateral (right) common femoral vein is patent and free of thrombus.     Miscellaneous: None     Impression:     Deep venous thrombosis within the left popliteal, posterior tibial, and peroneal veins.

## 2022-07-06 NOTE — ASSESSMENT & PLAN NOTE
- history of intractable headache  - continue topamax to 100mg BID   - 1x dose of sumatriptan 07/04 w/no improvement

## 2022-07-06 NOTE — SUBJECTIVE & OBJECTIVE
Interval History: Patient was seen and evaluated this am. HA still present and some nausea. No worsening of symptoms.  getting patient up and walking around. Planning for LP today- heparin drip paused this am. No fevers, chills, night sweats, abd pain, vomiting.     Objective:     Vital Signs (Most Recent):  Temp: 98.4 °F (36.9 °C) (07/06/22 1154)  Pulse: (!) 57 (07/06/22 1154)  Resp: 18 (07/06/22 1154)  BP: (!) 151/68 (07/06/22 1154)  SpO2: 97 % (07/06/22 1154)   Vital Signs (24h Range):  Temp:  [97.3 °F (36.3 °C)-98.6 °F (37 °C)] 98.4 °F (36.9 °C)  Pulse:  [53-70] 57  Resp:  [16-19] 18  SpO2:  [96 %-99 %] 97 %  BP: (138-151)/(65-83) 151/68     Weight: 71.7 kg (158 lb 1.1 oz)  Body mass index is 30.87 kg/m².    Intake/Output Summary (Last 24 hours) at 7/6/2022 1156  Last data filed at 7/5/2022 1300  Gross per 24 hour   Intake 240 ml   Output --   Net 240 ml      Physical Exam  Gen: in NAD, appears stated age  Neuro: awake and oriented to self, place, time  HEENT: NTNC, EOMI, PERRL, MMM  CVS: RRR, no m/r/g; S1/S2 auscultated with no S3 or S4; capillary refill < 2 sec  Resp: lungs CTAB, no w/r/r; no belabored breathing or accessory muscle use appreciated   Abd: BS+ in all 4 quadrants; NTND, soft to palpation; no organomegaly appreciated   Extrem: pulses full, equal, and regular over all 4 extremities; no swelling of BL UE or LE ext    Significant Labs: All pertinent labs within the past 24 hours have been reviewed.  Blood Culture: No results for input(s): LABBLOO in the last 48 hours.    CSF findings 06/30/22: <5glucose, 174 protein, 144 WBC, 3000 RBC, 37 lymph, 59 mono/macro. Repeat CSF w/1:8 cryptococcal antigen, CSF culture w/yeast    CBC:   Recent Labs   Lab 07/05/22  0423 07/06/22  0305   WBC 5.35  5.35 5.05   HGB 12.1  12.1 11.0*   HCT 37.3  37.3 34.2*     199 180     CMP:   Recent Labs   Lab 07/05/22  0423   *   K 3.7      CO2 24   *   BUN 23*   CREATININE 1.2   CALCIUM 9.0    PROT 5.5*   ALBUMIN 3.1*   BILITOT 0.4   ALKPHOS 59   AST 13   ALT 15   ANIONGAP 6*   EGFRNONAA 51.4*     Magnesium:   Recent Labs   Lab 07/05/22  0423   MG 1.9     Urine Culture: No results for input(s): LABURIN in the last 48 hours.  Urine Studies:   No results for input(s): COLORU, APPEARANCEUA, PHUR, SPECGRAV, PROTEINUA, GLUCUA, KETONESU, BILIRUBINUA, OCCULTUA, NITRITE, UROBILINOGEN, LEUKOCYTESUR, RBCUA, WBCUA, BACTERIA, SQUAMEPITHEL, HYALINECASTS in the last 48 hours.    Invalid input(s): WRIGHTSUR      Significant Imaging: I have reviewed all pertinent imaging results/findings within the past 24 hours.    CTH 06/30/22:  FINDINGS:  Intracranial compartment:     Mild enlargement of the temporal horn of the right lateral ventricle, new from prior.  Remainder of the ventricular system is normal in size, stable.  Third ventricle diameter on the order of 0.3 cm.  Well-defined sulci remain present over the cerebral convexities.     The brain parenchyma appears otherwise within normal limits, and unchanged from priors.  No new parenchymal hemorrhage, edema or major vascular distribution infarct.  No intracranial mass effect or midline shift.     No new extra-axial blood or fluid collections.     Partially empty sella configuration.     Skull/extracranial contents (limited evaluation):     No displaced calvarial fracture.     Small volume right mastoid air cell fluid.     The visualized paranasal sinuses are essentially clear.     Impression:     Slight interval enlargement of the temporal horn of the right lateral ventricle since the recent MRI of 06/23/2022.  Appearance suggestive of early/developing hydrocephalus or ventricular entrapment.     No new hemorrhage, edema or infarction.     Partially empty sella configuration.  Nonspecific but configuration could be consistent with the reported history of a elevated intracranial pressure.    CTH 07/01/22:  FINDINGS:  Intracranial compartment:     Mild enlargement of  the temporal horn of the right lateral ventricle similar to most recent CT head 06/30/2022, new compared to prior MRI brain 06/23/2022.  Third ventricle diameter measures approximately 4 mm.     The brain parenchyma appears within normal limits not significantly changed compared to prior exam.  No parenchymal mass, hemorrhage, edema or major vascular distribution infarct.     No extra-axial blood or fluid collections.     Partially empty sella configuration.     Skull/extracranial contents (limited evaluation):     No fracture. Paranasal sinuses are essentially clear.  Small volume fluid within the right mastoid air cells.     Impression:     Slight enlargement of the temporal horn of right lateral ventricle not significantly changed compared to most recent CT head 06/30/2022,  new compared to prior MRI brain 06/23/2022.  Clinical correlation and continued close follow-up is recommended as developing hydrocephalus or ventricular entrapment remains a concern.     No new acute infarct or hemorrhage.    Doppler US of LLE:  FINDINGS:  Left thigh veins: Near occlusive thrombus within the left popliteal vein.  The common femoral, femoral, and upper greater saphenous veins are patent and free of thrombus. The veins are normally compressible and have normal phasic flow and augmentation response.     Left calf veins: Partially occlusive thrombus within the posterior tibial veins.  Partially occlusive thrombus with 1 of the peroneal veins.  Anterior tibial veins are patent.     Contralateral CFV: The contralateral (right) common femoral vein is patent and free of thrombus.     Miscellaneous: None     Impression:     Deep venous thrombosis within the left popliteal, posterior tibial, and peroneal veins.

## 2022-07-06 NOTE — PLAN OF CARE
Problem: Adult Inpatient Plan of Care  Goal: Plan of Care Review  Outcome: Ongoing, Progressing  Goal: Patient-Specific Goal (Individualized)  Outcome: Ongoing, Progressing  Goal: Absence of Hospital-Acquired Illness or Injury  Outcome: Ongoing, Progressing  Goal: Optimal Comfort and Wellbeing  Outcome: Ongoing, Progressing   Pt rested well. VS wnl. No ss of pain or distress. Call light within reach.  at bedside. Bed in low position. Pt ambulated in [place]. Will continue to monitor and pass on care to oncoming nurse.

## 2022-07-06 NOTE — ASSESSMENT & PLAN NOTE
- Cr 2.2 on day of transfer, has been steadily worsening over last few days (baseline ~1.2)  - KTM consulted, appreciate recs; likely prerenal versus medication related  - US transplant unremarkable for acute changes   - Resolved  - Scr pending  - Continue sodium bicarb

## 2022-07-06 NOTE — ASSESSMENT & PLAN NOTE
- Cr 2.2 on day of transfer, has been steadily worsening over last few days (baseline ~1.2)  - KTM consulted, appreciate recs; likely prerenal versus medication related  - US transplant unremarkable for acute changes   - Resolved  - Scr 1.2 today  - Continue sodium bicarb

## 2022-07-06 NOTE — PROGRESS NOTES
[facility] - Telemetry [facility] Medicine  Progress Note    Patient Name: Tala Rivera  MRN: [id number]  Patient Class: IP- Inpatient   Admission Date: 6/27/2022  Length of Stay: 8 days  Attending Physician: Keke Hicks MD  Primary Care Provider: ANUJA Castellon MD        Subjective:     Principal Problem:Cryptococcal meningitis        HPI:  Mrs. Rivera is a 56yoF w/PMHx of kidney XPL (2020 [facility]) on Prograf and Cellcept admitted to [facility] on 06/22 with intractable HA starting about 2 months earlier and with dizziness, blurred vision, left-sided facial droop and dysarthria starting a few days before admission.       On admission /72, HR 69. Neurologic exam is remarkable for left-sided facial weakness and mild dysarthria.  Labs (6/22) WBC 9.4, Hb 15.9, Plts 224, Na 137, K 3.3, CO2 25, BUN 24, Cr 1.32, AG 14, Glu 182 LFTs WNL. COVID neg     LP (6/24) CSF Glu 6, CSF protein 93. [place] stain pos for yeast like organisms.  Spinal fluid culture pos for encapsulated yeast species.       CT chest WO contrast pos for multiple indeterminate soft tissue masses in the left lower lobe with 2 smaller masses in the right lung which may be part of the same process.       On 06/26 patient started on amphotericin B liposomal (275 IV q.d.) and flucytosine (1500 p.o. q.6h) .  Hospitalization c/b the development of acute renal failure.  Cr 1.32 (6/22) > 1.4 (6/26) > 2.02 (6/27).  Cellcept and prednisone have been held.       Transfer requested for higher level of care (KTM and ID).  Transfer diagnosis disseminated cryptococcal infection, TERESA, kidney XPL.    At time of my assessment, pt. Complains of headache which has been persistent for the last several days. She states that it is somewhat relieved by the fioricet and oxycodone that she was receiving at the outside hospital.      Overview/Hospital Course:  KTM, transplant ID, Neurology consulted upon admission. Per KTN, normal saline scheduled  with amphotericin infusions in addition to bicarb drip, prednisone increased, Prograf continued.  Creatinine down-trending and bicarb drip discontinued.  Per Neurology, outside hospital MR MRI reviewed and note that neuro deficits may take a few weeks to fully resolve; recommend limiting Fioricet and narcotics to minimize medication overuse/rebound headaches.  Per Transplant ID, continue ambisome/flucytosine with plan to repeat LP 2 weeks from 6/24.  Due to visual disturbance in the setting of fungal meningitis, ophthalmology consulted.  No infectious nidus found in retina or vitreous, however grade 3 disc edema bilaterally. Exam finding and subjective complaints explained by meningitis and papilledema.  Recommendation for repeating LP to reduce opening pressure per ID and Neurology in agreement.  On 06/29, patient hearing improved.  Remains with intermittent confusion per  at bedside.    CTH performed 06/30- Slight interval enlargement of the temporal horn of the right lateral ventricle since the recent MRI of 06/23/2022.  Appearance suggestive of early/developing hydrocephalus or ventricular entrapment. NSGY consulted. Per NSGY, recommended NCC transfer for further evaluation. Patient underwent repeat LP 06/30- opening pressure of 35cm- closing pressure of 13cm. Patient w/much improvement in signs and symptoms. CSF findings: <5glucose, 174 protein, 144 WBC, 3000 RBC, 37 lymph, 59 mono/macro. Repeat CSF w/1:8 cryptococcal antigen, CSF culture w/yeast. NCC evaluated, but patient with much improvement in signs and symptoms, and so NCC recommended continued observation on the floor. Discussed w/ID- stated to continue to monitor for worsening signs and symptoms- with closing pressure <25cm, no need for serial LP's at this time.     Doppler US performed on LLE- found to have DVT. Started on heparin drip. Planning for repeat LP 07/06.      Interval History: Patient was seen and evaluated this am. FLOWERS still present  and some nausea.  No vomiting. No fevers, chills, night sweats, abd pain, diarrhea, constipation. Good UOP. Discussed w/Dr. Alexis, Mr. Rivera and patient- need for repeat LP 07/06 to evaluate for cryptococcal growth and opening pressure.     Objective:     Vital Signs (Most Recent):  Temp: 98.6 °F (37 °C) (07/05/22 1941)  Pulse: (!) 56 (07/05/22 1941)  Resp: 19 (07/05/22 1941)  BP: (!) 146/65 (07/05/22 1941)  SpO2: 97 % (07/05/22 1941)   Vital Signs (24h Range):  Temp:  [97.3 °F (36.3 °C)-98.8 °F (37.1 °C)] 98.6 °F (37 °C)  Pulse:  [55-80] 56  Resp:  [17-19] 19  SpO2:  [96 %-99 %] 97 %  BP: (134-195)/(61-95) 146/65     Weight: 71.7 kg (158 lb 1.1 oz)  Body mass index is 30.87 kg/m².    Intake/Output Summary (Last 24 hours) at 7/5/2022 2102  Last data filed at 7/5/2022 1300  Gross per 24 hour   Intake 590 ml   Output --   Net 590 ml      Physical Exam  Gen: in NAD, appears stated age  Neuro: awake and oriented to self, place, time  HEENT: NTNC, EOMI, PERRL, MMM  CVS: RRR, no m/r/g; S1/S2 auscultated with no S3 or S4; capillary refill < 2 sec  Resp: lungs CTAB, no w/r/r; no belabored breathing or accessory muscle use appreciated   Abd: BS+ in all 4 quadrants; NTND, soft to palpation; no organomegaly appreciated   Extrem: pulses full, equal, and regular over all 4 extremities; no swelling of BL UE or LE ext    Significant Labs: All pertinent labs within the past 24 hours have been reviewed.  Blood Culture: No results for input(s): LABBLOO in the last 48 hours.    CSF findings 06/30/22: <5glucose, 174 protein, 144 WBC, 3000 RBC, 37 lymph, 59 mono/macro. Repeat CSF w/1:8 cryptococcal antigen, CSF culture w/yeast    CBC:   Recent Labs   Lab 07/04/22  0506 07/05/22  0423   WBC 5.55  5.55 5.35  5.35   HGB 11.3*  11.3* 12.1  12.1   HCT 35.5*  35.5* 37.3  37.3     190 199  199     CMP:   Recent Labs   Lab 07/04/22  0507 07/05/22  0423    131*   K 3.8 3.7    101   CO2 23 24   * 211*   BUN 27*  23*   CREATININE 1.4 1.2   CALCIUM 8.9 9.0   PROT 5.3* 5.5*   ALBUMIN 3.0* 3.1*   BILITOT 0.4 0.4   ALKPHOS 57 59   AST 12 13   ALT 13 15   ANIONGAP 7* 6*   EGFRNONAA 42.6* 51.4*     Magnesium:   Recent Labs   Lab 07/04/22  0507 07/05/22  0423   MG 2.0 1.9     Urine Culture: No results for input(s): LABURIN in the last 48 hours.  Urine Studies:   No results for input(s): COLORU, APPEARANCEUA, PHUR, SPECGRAV, PROTEINUA, GLUCUA, KETONESU, BILIRUBINUA, OCCULTUA, NITRITE, UROBILINOGEN, LEUKOCYTESUR, RBCUA, WBCUA, BACTERIA, SQUAMEPITHEL, HYALINECASTS in the last 48 hours.    Invalid input(s): WRIGHTSUR      Significant Imaging: I have reviewed all pertinent imaging results/findings within the past 24 hours.    CTH 06/30/22:  FINDINGS:  Intracranial compartment:     Mild enlargement of the temporal horn of the right lateral ventricle, new from prior.  Remainder of the ventricular system is normal in size, stable.  Third ventricle diameter on the order of 0.3 cm.  Well-defined sulci remain present over the cerebral convexities.     The brain parenchyma appears otherwise within normal limits, and unchanged from priors.  No new parenchymal hemorrhage, edema or major vascular distribution infarct.  No intracranial mass effect or midline shift.     No new extra-axial blood or fluid collections.     Partially empty sella configuration.     Skull/extracranial contents (limited evaluation):     No displaced calvarial fracture.     Small volume right mastoid air cell fluid.     The visualized paranasal sinuses are essentially clear.     Impression:     Slight interval enlargement of the temporal horn of the right lateral ventricle since the recent MRI of 06/23/2022.  Appearance suggestive of early/developing hydrocephalus or ventricular entrapment.     No new hemorrhage, edema or infarction.     Partially empty sella configuration.  Nonspecific but configuration could be consistent with the reported history of a elevated  intracranial pressure.    CTH 07/01/22:  FINDINGS:  Intracranial compartment:     Mild enlargement of the temporal horn of the right lateral ventricle similar to most recent CT head 06/30/2022, new compared to prior MRI brain 06/23/2022.  Third ventricle diameter measures approximately 4 mm.     The brain parenchyma appears within normal limits not significantly changed compared to prior exam.  No parenchymal mass, hemorrhage, edema or major vascular distribution infarct.     No extra-axial blood or fluid collections.     Partially empty sella configuration.     Skull/extracranial contents (limited evaluation):     No fracture. Paranasal sinuses are essentially clear.  Small volume fluid within the right mastoid air cells.     Impression:     Slight enlargement of the temporal horn of right lateral ventricle not significantly changed compared to most recent CT head 06/30/2022,  new compared to prior MRI brain 06/23/2022.  Clinical correlation and continued close follow-up is recommended as developing hydrocephalus or ventricular entrapment remains a concern.     No new acute infarct or hemorrhage.    Doppler US of LLE:  FINDINGS:  Left thigh veins: Near occlusive thrombus within the left popliteal vein.  The common femoral, femoral, and upper greater saphenous veins are patent and free of thrombus. The veins are normally compressible and have normal phasic flow and augmentation response.     Left calf veins: Partially occlusive thrombus within the posterior tibial veins.  Partially occlusive thrombus with 1 of the peroneal veins.  Anterior tibial veins are patent.     Contralateral CFV: The contralateral (right) common femoral vein is patent and free of thrombus.     Miscellaneous: None     Impression:     Deep venous thrombosis within the left popliteal, posterior tibial, and peroneal veins.      Assessment/Plan:      * Cryptococcal meningitis  - Pt with intractable headache, facial droop, dysarthria (improved). LP  at OSH shows encapsulated yeast on shahnaz ink stain concerning for fungal meningitis  - Continue Amp B 5/mg kg Q24hrs and flucytosine 1500 mg Q6hrs for treatment.   - On chart review, amphotericin started on 06/24/22- 2wks of treatment 07/08/22  - Repeat LP tomorrow 07/06  -Transplant ID following- continue with current course- patient signs and symptoms much improved since time of admission, will hold of on steroids- no signs of IRIS, will continue to monitor for need for repeat LP  - KTM following; recommend  500mL normal saline with amphotericin     Increased intracranial pressure  - Last intracranial pressure of 34cm from LP at OSH  - Patient with papilledema, vomiting, AMS  - CTH w/developing hydrocephalus and signs of ventricular entrapment  - NSGY notified and consulted- no acute interventions at this time  - NCC notified- continue monitoring on floor  - S/p LP w/opening pressure of 35cm and closing pressure of 13cm 06/30/22  - Continue to monitor for signs and symptoms of worsening ICP  - Repeat CTH relatively unchanged     Bilateral papilledema due to raised intracranial pressure  - Ophthalmology evaluation with grade 3 papilledema in both eyes (longer she has elevated CSF pressure, the higher her risk of permanent optic nerve damage and vision change is.  There is a congested appearance to the veins in both eyes secondary to optic nerve congestion and indicates higher risk for ischemic event), 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  - see increased intracranial pressure    Acute renal failure superimposed on stage 3 chronic kidney disease  - Cr 2.2 on day of transfer, has been steadily worsening over last few days (baseline ~1.2)  - KTM consulted, appreciate recs; likely prerenal versus medication related  - US transplant unremarkable for acute changes   - Resolved  - Scr 1.2 today  - Continue sodium bicarb    VTE (venous thromboembolism)  - continue heparin drip for DVT of the LLE    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  - Improving  - See  papilledema-- spoke w/ophthalmology- expect papilledema to improve over the course of 2wks if infection remains well-controlled and ICP remains wnl    Headache  - history of intractable headache  - continue topamax to 100mg BID   - 1x dose of sumatriptan 07/04 w/no improvement    Hypokalemia  - PO repletion prn to goal    Long-term use of immunosuppressant medication  - Pt with opportunistic infection, see above. Holding mycophenolate   - KTM following, appreciate recs  Continue prograf per levels   Monitor levels to assess for toxicity  Continue prednisone ---> increased to 10mg daily   Hold MMF     S/P kidney transplant  - See TERESA. KTM consulted for further assistance  Kidney transplant 4/5/2020   Underlying ckd stage III  ESRD related to HTN    VTE Risk Mitigation (From admission, onward)         Ordered     heparin 25,000 units in dextrose 5% (100 units/ml) IV bolus from bag - ADDITIONAL PRN BOLUS - 60 units/kg  As needed (PRN)        Question:  Heparin Infusion Adjustment (DO NOT MODIFY ANSWER)  Answer:  \\[email or web address]\epic\Images\Pharmacy\HeparinInfusions\heparin HIGH INTENSITY nomogram for [facility] [id number]    07/03/22 1205     heparin 25,000 units in dextrose 5% (100 units/ml) IV bolus from bag - ADDITIONAL PRN BOLUS - 30 units/kg  As needed (PRN)        Question:  Heparin Infusion Adjustment (DO NOT MODIFY ANSWER)  Answer:  \\[email or web address]\epic\Images\Pharmacy\HeparinInfusions\heparin HIGH INTENSITY nomogram for [facility] [id number]    07/03/22 1205     heparin 25,000 units in dextrose 5% 250 mL (100 units/mL) infusion HIGH INTENSITY nomogram - [facility]  Continuous        Question Answer Comment   Heparin Infusion Adjustment (DO NOT MODIFY ANSWER) \\[email or web address]\epic\Images\Pharmacy\HeparinInfusions\heparin HIGH INTENSITY nomogram for [facility] [id number]    Begin at (in units/kg/hr) 18        07/03/22 1205     IP VTE HIGH RISK PATIENT  Once         06/27/22 2202     Place sequential compression device  Until discontinued          06/27/22 2202                Discharge Planning   JULITO: 7/11/2022     Code Status: Full Code   Is the patient medically ready for discharge?: No    Reason for patient still in hospital (select all that apply): Patient trending condition  Discharge Plan A: Home with family                  Keke Hicks MD  Department of Hospital Medicine   [facility] - Telemetry Stepdown

## 2022-07-06 NOTE — ASSESSMENT & PLAN NOTE
- Improving  - See papilledema-- spoke w/ophthalmology- expect papilledema to improve over the course of 2wks if infection remains well-controlled and ICP remains wnl     Future oriented

## 2022-07-06 NOTE — ASSESSMENT & PLAN NOTE
- Pt with intractable headache, facial droop, dysarthria (improved). 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  - Continue Amp B 5/mg kg Q24hrs and flucytosine 1500 mg Q6hrs for treatment.   - On chart review, amphotericin started on 06/24/22- 2wks of treatment 07/08/22  - Repeat LP today  -Transplant ID following- continue with current course- patient signs and symptoms much improved since time of admission, will hold of on steroids- no signs of IRIS, will continue to monitor for need for repeat LP  - KTM following; recommend  500mL normal saline with amphotericin

## 2022-07-06 NOTE — PROGRESS NOTES
[facility] - Telemetry Stepdown  Kidney Transplant  Progress Note      Reason for Follow-up: Reassessment of Kidney Transplant - 4/5/2020  (#1) recipient and management of immunosuppression.    ORGAN: LEFT KIDNEY    Donor Type: Donation after Brain Death    PHS Increased Risk: yes       Subjective:   History of Present Illness:  56 y.o. female with a PMHx of HTN, kidney transplant in 2020, CKD3 admitted to [facility] on 06/22 with intractable HA starting about 2 months earlier and with dizziness, blurred vision, left-sided facial droop and dysarthria starting a few days before admission.    Per documentation   On admission /72, HR 69. Neurologic exam is remarkable for left-sided facial weakness and mild dysarthria.  Cr 1.32   LP (6/24) CSF Glu 6, CSF protein 93.  stain pos for yeast like organisms.  Spinal fluid culture pos for encapsulated yeast species  CT chest WO contrast pos for multiple indeterminate soft tissue masses in the left lower lobe with 2 smaller masses in the right lung which may be part of the same process.       On 06/26 patient started on amphotericin B liposomal (275 IV q.d.) and flucytosine (1500 p.o. q.6h) .  Hospitalization c/b the development of acute renal failure.  Cr 1.32 (6/22) > 1.4 (6/26) > 2.02 (6/27).  Transfer requested for higher level of care (KTM and ID).  Transfer diagnosis disseminated cryptococcal infection, TERESA, kidney XPL       Ms. Rivera is a 56 y.o. year old female who is status post Kidney Transplant - 4/5/2020  (#1).    Her maintenance immunosuppression consists of:   Immunosuppressants (From admission, onward)                Start     Stop Route Frequency Ordered    07/04/22 0800  tacrolimus capsule 3 mg         -- Oral Every morning 07/03/22 1311    07/03/22 1800  tacrolimus capsule 2 mg         -- Oral Every evening 07/03/22 1311            Hospital Course:  Being treated for crypto meningitis     Interval History:  Improvement in headaches  slightly since yesterday. LP planned for today. Tacro level low.    Past Medical, Surgical, Family, and Social History:   Unchanged from H&P.    Scheduled Meds:   acetaminophen  500 mg Oral Q24H    amphotericin B liposome (AMBISOME) IVPB  300 mg Intravenous Q24H    calcitRIOL  0.5 mcg Oral Daily    diphenhydrAMINE  25 mg Oral Q24H    flucytosine  1,500 mg Oral Q6H    magnesium oxide  400 mg Oral Daily    multivitamin  1 tablet Oral Daily    NIFEdipine  90 mg Oral Daily    polyethylene glycol  17 g Oral BID    predniSONE  10 mg Oral Daily    senna-docusate 8.6-50 mg  1 tablet Oral BID    sodium bicarbonate  1,300 mg Oral TID    sodium chloride 0.9%  500 mL Intravenous Q24H    tacrolimus  2 mg Oral Daily PM    tacrolimus  3 mg Oral Daily AM    topiramate  100 mg Oral BID     Continuous Infusions:   heparin (porcine) in D5W 21 Units/kg/hr (07/06/22 1551)     PRN Meds:acetaminophen, albuterol-ipratropium, bisacodyL, butalbital-acetaminophen-caffeine -40 mg, dextrose 5 %, glucose, glucose, heparin (PORCINE), heparin (PORCINE), melatonin, naloxone, ondansetron, oxyCODONE-acetaminophen, sodium chloride 0.9%    Intake/Output - Last 3 Shifts         07/04 0700  07/05 0659 07/05 0700 07/06 0659 07/06 0700  07/07 0659    P.O. 950 240 750    I.V. (mL/kg)       Total Intake(mL/kg) 950 (13.2) 240 (3.3) 750 (10.5)    Urine (mL/kg/hr)   500 (0.7)    Stool   200    Total Output   700    Net +950 +240 +50           Urine Occurrence 4 x      Stool Occurrence  0 x              Review of Systems   Constitutional: Negative.    HENT: Negative.     Eyes: Negative.    Respiratory: Negative.     Cardiovascular:  Positive for leg swelling.   Gastrointestinal: Negative.    Endocrine: Negative.    Genitourinary: Negative.    Musculoskeletal: Negative.    Skin: Negative.    Neurological:  Positive for facial asymmetry, weakness and headaches (7-9/10).   Psychiatric/Behavioral: Negative.      Objective:     Vital Signs  (Most Recent):  Temp: 96.5 °F (35.8 °C) (07/06/22 1537)  Pulse: 60 (07/06/22 1537)  Resp: 18 (07/06/22 1600)  BP: (!) 187/77 (07/06/22 1537)  SpO2: 97 % (07/06/22 1537)   Vital Signs (24h Range):  Temp:  [96.5 °F (35.8 °C)-98.6 °F (37 °C)] 96.5 °F (35.8 °C)  Pulse:  [53-60] 60  Resp:  [16-19] 18  SpO2:  [97 %-99 %] 97 %  BP: (138-187)/(65-83) 187/77     Weight: 71.7 kg (158 lb 1.1 oz)  Height: 5' (152.4 cm)  Body mass index is 30.87 kg/m².    Physical Exam  Constitutional:       General: She is not in acute distress.     Appearance: She is obese. She is ill-appearing.   Eyes:      General: No scleral icterus.     Extraocular Movements: Extraocular movements intact.      Pupils: Pupils are equal, round, and reactive to light.   Cardiovascular:      Rate and Rhythm: Normal rate and regular rhythm.      Pulses: Normal pulses.      Heart sounds: No murmur heard.  Pulmonary:      Effort: Pulmonary effort is normal. No respiratory distress.   Abdominal:      General: There is no distension.      Palpations: Abdomen is soft.      Tenderness: There is no abdominal tenderness.   Musculoskeletal:      Right lower leg: Edema present.      Left lower leg: No edema.   Skin:     Coloration: Skin is not jaundiced.   Neurological:      Mental Status: She is alert and oriented to person, place, and time.       Laboratory:  Labs within the past 24 hours have been reviewed.    Diagnostic Results:  None    Assessment/Plan:     * Cryptococcal meningitis  ID following and is on ampho B and flucytosine   Seen by Neuro and Opthal      VTE (venous thromboembolism)  LLE popliteal VTE likely secondary to hospitalization and immobility  On heparin      Acute renal failure superimposed on stage 3 chronic kidney disease  TERESA on underlying CKD stage III   Likely pre-renal or medication related   BL 1.2 to 1.4 mg/dl   Now at baseline    Headache  Likely from increased intracranial pressure      Long-term use of immunosuppressant medication  Hold  "MMF  Continue prednisone 10  Resume home tacro 3/3 from 3/2 yesterday      S/P kidney transplant  Kidney transplant 4/5/2020   Underlying ckd stage III  ESRD related to HTN  Continue immunosuppression as per problem "long term use of immunosuppression medication"          Roberto Eisenberg Jr., MD  Kidney Transplant  [facility] - Telemetry Stepdown  "

## 2022-07-07 NOTE — PLAN OF CARE
Recommendations     1. Continue current Low Na diet + ONS.   2. RD to monitor & follow-up.     Goals: Meet % EEN, EPN by RD f/u date  Nutrition Goal Status: new  Communication of RD Recs: reviewed with RN

## 2022-07-07 NOTE — ASSESSMENT & PLAN NOTE
- Pt with intractable headache, facial droop, dysarthria (improved). 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  - Continue Amp B 5/mg kg Q24hrs and flucytosine 1500 mg Q6hrs for treatment.   - On chart review, amphotericin started on 06/24/22- 2wks of treatment 07/08/22  - Repeat LP scheduled for 7/9  -Transplant ID following- continue with current course- patient signs and symptoms much improved since time of admission, will hold of on steroids- no signs of IRIS, will continue to monitor for need for repeat LP  - KTM following; recommend  500mL normal saline with amphotericin

## 2022-07-07 NOTE — PROGRESS NOTES
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 States that she will be having LP on 7/8/22. Continues to report intermittent HA and decreased hearing, but denies any new/worsening visual changes, visual disturbances, such as flashes, floaters, or curtain-veil in visual field, and ocular discomfort OU.    Current eye gtts: none     Medications this encounter:    acetaminophen  500 mg Oral Q24H    amphotericin B liposome (AMBISOME) IVPB  300 mg Intravenous Q24H    calcitRIOL  0.5 mcg Oral Daily    diphenhydrAMINE  25 mg Oral Q24H    flucytosine  1,500 mg Oral Q6H    magnesium oxide  400 mg Oral Daily    multivitamin  1 tablet Oral Daily    NIFEdipine  90 mg Oral Daily    polyethylene glycol  17 g Oral BID    predniSONE  10 mg Oral Daily    senna-docusate 8.6-50 mg  1 tablet Oral BID    sodium bicarbonate  1,300 mg Oral TID    sodium chloride 0.9%  500 mL Intravenous Q24H    tacrolimus  3 mg Oral BID    topiramate  100 mg Oral BID       Allergies: is allergic to sulfa (sulfonamide antibiotics).     ROS: As per HPI    Ocular examination/Dilated fundus examination:  Base Eye Exam     Visual Acuity (Snellen - Linear)       Right Left    Dist sc 20/30 -2 20/30 -2    Dist ph sc 20/25 -2 20/25 -2          Tonometry (Tonopen, 10:08 AM)       Right Left    Pressure 11 9          Pupils       Pupils Dark Light Shape React APD    Right PERRL 4 2 Round Brisk None    Left PERRL 4 2 Round Brisk None          Visual Fields       Right Left     Full Full          Extraocular Movement       Right Left     Full, Ortho Full, Ortho          Neuro/Psych     Oriented x3: Yes          Dilation     Both eyes: 1% Tropicamide, 2.5% Phenylephrine, 2% Cyclopentolate @ 10:08 AM            Slit Lamp and Fundus Exam     External Exam       Right Left    External  Normal Normal          Slit Lamp Exam       Right Left    Lids/Lashes Normal Normal    Conjunctiva/Sclera White and quiet White and quiet    Cornea Clear Clear    Anterior Chamber Deep and formed Deep and formed    Iris Round and reactive Round and reactive    Lens 1+ NSC 1+ NSC    Anterior Vitreous Normal Normal          Fundus Exam       Right Left    Disc grade 3 disc edema, stable grade 3 disc edema, stable    Macula Normal Normal    Vessels venous tortuosity and engorgement venous tortuosity and engorgement    Periphery Normal Normal                Assessment/Plan:     1. Cryptococcal Meningitis  2. Papilledema, both eyes  3. 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 (resolving)  - In setting of immunosuppression  - No infectious nidus found in retina or vitreous  - Initial exam w/ grade 3 disc edema (papilledema; opening pressure elevated) in both eyes  - 7/7 exam, vision stable OU, color plates full. Still 3+ ONH edema, and we do not expect this to rapidly improve as there is often delay of resolution of ONH edema following improvement in ICP   - Continue current treatment per neurology/ID/primary; no ophthalmic intervention at this time .       Please re-consult if the patient experiences any acute changes.  Recommend neuro-ophthalmology follow-up within 2 weeks of discharge for visual fields and nerve evaluation. WE WILL FOLLOW PERIPHERALLY AND PLAN FOR REPEAT DFE NEXT WEEK.   If there are further questions or concern for new/worsening visual changes, please page the on call ophthalmology resident.      Santana Panchal MD  [facility] Ophthalmology, PGY-2  07/07/2022  10:10 AM

## 2022-07-07 NOTE — ASSESSMENT & PLAN NOTE
ID following and is on ampho B and flucytosine   Seen by Neuro and Opthal  Please help ensure LP is performed as soon as possible as symptoms are worsening

## 2022-07-07 NOTE — PROGRESS NOTES
[facility] - Telemetry Stepdown  Kidney Transplant  Progress Note      Reason for Follow-up: Reassessment of Kidney Transplant - 4/5/2020  (#1) recipient and management of immunosuppression.    ORGAN: LEFT KIDNEY    Donor Type: Donation after Brain Death    PHS Increased Risk: yes         Subjective:   History of Present Illness:  56 y.o. female with a PMHx of HTN, kidney transplant in 2020, CKD3 admitted to [facility] on 06/22 with intractable HA starting about 2 months earlier and with dizziness, blurred vision, left-sided facial droop and dysarthria starting a few days before admission.    Per documentation   On admission /72, HR 69. Neurologic exam is remarkable for left-sided facial weakness and mild dysarthria.  Cr 1.32   LP (6/24) CSF Glu 6, CSF protein 93.  stain pos for yeast like organisms.  Spinal fluid culture pos for encapsulated yeast species  CT chest WO contrast pos for multiple indeterminate soft tissue masses in the left lower lobe with 2 smaller masses in the right lung which may be part of the same process.       On 06/26 patient started on amphotericin B liposomal (275 IV q.d.) and flucytosine (1500 p.o. q.6h) .  Hospitalization c/b the development of acute renal failure.  Cr 1.32 (6/22) > 1.4 (6/26) > 2.02 (6/27).  Transfer requested for higher level of care (KTM and ID).  Transfer diagnosis disseminated cryptococcal infection, TERESA, kidney XPL       Ms. Rivera is a 56 y.o. year old female who is status post Kidney Transplant - 4/5/2020  (#1).    Her maintenance immunosuppression consists of:   Immunosuppressants (From admission, onward)                Start     Stop Route Frequency Ordered    07/06/22 1800  tacrolimus capsule 3 mg         -- Oral 2 times daily 07/06/22 1628            Hospital Course:  Being treated for crypto meningitis     Interval History:  Worsening headaches and hearing since yesterday. Planned to LP tomorrow.    Past Medical, Surgical, Family, and  Social History:   Unchanged from H&P.    Scheduled Meds:   acetaminophen  500 mg Oral Q24H    amphotericin B liposome (AMBISOME) IVPB  300 mg Intravenous Q24H    calcitRIOL  0.5 mcg Oral Daily    diphenhydrAMINE  25 mg Oral Q24H    flucytosine  1,500 mg Oral Q6H    magnesium oxide  400 mg Oral Daily    multivitamin  1 tablet Oral Daily    NIFEdipine  90 mg Oral Daily    polyethylene glycol  17 g Oral BID    [START ON 7/8/2022] predniSONE  5 mg Oral Daily    senna-docusate 8.6-50 mg  1 tablet Oral BID    sodium bicarbonate  1,300 mg Oral TID    sodium chloride 0.9%  500 mL Intravenous Q24H    tacrolimus  3 mg Oral BID    topiramate  100 mg Oral BID     Continuous Infusions:   heparin (porcine) in D5W 21 Units/kg/hr (07/07/22 1209)     PRN Meds:acetaminophen, albuterol-ipratropium, bisacodyL, butalbital-acetaminophen-caffeine -40 mg, dextrose 5 %, glucose, glucose, heparin (PORCINE), heparin (PORCINE), hydrALAZINE, melatonin, naloxone, ondansetron, oxyCODONE-acetaminophen, sodium chloride 0.9%    Intake/Output - Last 3 Shifts         07/05 0700  07/06 0659 07/06 0700 07/07 0659 07/07 0700  07/08 0659    P.O. 240 950 100    Total Intake(mL/kg) 240 (3.3) 950 (13.2) 100 (1.4)    Urine (mL/kg/hr)  800 (0.5)     Stool  200     Total Output  1000     Net +240 -50 +100           Urine Occurrence   1 x    Stool Occurrence 0 x 1 x 0 x             Review of Systems   Constitutional:  Positive for activity change and fatigue.   HENT: Negative.     Eyes: Negative.    Respiratory: Negative.     Cardiovascular:  Positive for leg swelling.   Gastrointestinal: Negative.    Endocrine: Negative.    Genitourinary: Negative.    Musculoskeletal: Negative.    Skin: Negative.    Neurological:  Positive for facial asymmetry, weakness and headaches (7-9/10).   Psychiatric/Behavioral: Negative.      Objective:     Vital Signs (Most Recent):  Temp: 98.1 °F (36.7 °C) (07/07/22 1542)  Pulse: 63 (07/07/22 1542)  Resp: 18  (07/07/22 1542)  BP: (!) 179/71 (07/07/22 1542)  SpO2: (!) 94 % (07/07/22 1542)   Vital Signs (24h Range):  Temp:  [97.8 °F (36.6 °C)-98.2 °F (36.8 °C)] 98.1 °F (36.7 °C)  Pulse:  [54-63] 63  Resp:  [18] 18  SpO2:  [94 %-98 %] 94 %  BP: (138-181)/(64-84) 179/71     Weight: 71.7 kg (158 lb 1.1 oz)  Height: 5' (152.4 cm)  Body mass index is 30.87 kg/m².    Physical Exam  Constitutional:       General: She is not in acute distress.     Appearance: She is obese. She is ill-appearing.   Eyes:      General: No scleral icterus.     Extraocular Movements: Extraocular movements intact.      Pupils: Pupils are equal, round, and reactive to light.   Cardiovascular:      Rate and Rhythm: Normal rate and regular rhythm.      Pulses: Normal pulses.      Heart sounds: No murmur heard.  Pulmonary:      Effort: Pulmonary effort is normal. No respiratory distress.   Abdominal:      General: There is no distension.      Palpations: Abdomen is soft.      Tenderness: There is no abdominal tenderness.   Musculoskeletal:      Right lower leg: Edema present.      Left lower leg: No edema.   Skin:     Coloration: Skin is not jaundiced.   Neurological:      Mental Status: She is alert and oriented to person, place, and time.       Laboratory:  Labs within the past 24 hours have been reviewed.    Diagnostic Results:  None    Assessment/Plan:     * Cryptococcal meningitis  ID following and is on ampho B and flucytosine   Seen by Neuro and Opthal  Please help ensure LP is performed as soon as possible as symptoms are worsening    VTE (venous thromboembolism)  LLE popliteal VTE likely secondary to hospitalization and immobility  On heparin      Acute renal failure superimposed on stage 3 chronic kidney disease  TERESA on underlying CKD stage III   Likely pre-renal or medication related   BL 1.2 to 1.4 mg/dl   Now at baseline    Headache  Likely from increased intracranial pressure      Long-term use of immunosuppressant medication  Goal tacro 4-6  "trough  Hold MMF  Reduced prednisone to home dose of 5  Resume home tacro 3/3      S/P kidney transplant  Kidney transplant 4/5/2020   Underlying ckd stage III  ESRD related to HTN  Continue immunosuppression as per problem "long term use of immunosuppression medication"      Roberto Eisenberg Jr., MD  Kidney Transplant  [facility] - Telemetry Stepdown  "

## 2022-07-07 NOTE — PROGRESS NOTES
[facility] - Telemetry [facility] Medicine  Progress Note    Patient Name: Tala Rivera  MRN: [id number]  Patient Class: IP- Inpatient   Admission Date: 6/27/2022  Length of Stay: 10 days  Attending Physician: Joanne Braden MD  Primary Care Provider: ANUJA Castellon MD        Subjective:     Principal Problem:Cryptococcal meningitis        HPI:  Mrs. Rivera is a 56yoF w/PMHx of kidney XPL (2020 [facility]) on Prograf and Cellcept admitted to [facility] on 06/22 with intractable HA starting about 2 months earlier and with dizziness, blurred vision, left-sided facial droop and dysarthria starting a few days before admission.       On admission /72, HR 69. Neurologic exam is remarkable for left-sided facial weakness and mild dysarthria.  Labs (6/22) WBC 9.4, Hb 15.9, Plts 224, Na 137, K 3.3, CO2 25, BUN 24, Cr 1.32, AG 14, Glu 182 LFTs WNL. COVID neg     LP (6/24) CSF Glu 6, CSF protein 93. Lithuanian stain pos for yeast like organisms.  Spinal fluid culture pos for encapsulated yeast species.       CT chest WO contrast pos for multiple indeterminate soft tissue masses in the left lower lobe with 2 smaller masses in the right lung which may be part of the same process.       On 06/26 patient started on amphotericin B liposomal (275 IV q.d.) and flucytosine (1500 p.o. q.6h) .  Hospitalization c/b the development of acute renal failure.  Cr 1.32 (6/22) > 1.4 (6/26) > 2.02 (6/27).  Cellcept and prednisone have been held.       Transfer requested for higher level of care (KTM and ID).  Transfer diagnosis disseminated cryptococcal infection, TERESA, kidney XPL.    At time of my assessment, pt. Complains of headache which has been persistent for the last several days. She states that it is somewhat relieved by the fioricet and oxycodone that she was receiving at the outside hospital.      Overview/Hospital Course:  KTM, transplant ID, Neurology consulted upon admission. Per KTN, normal saline scheduled with  amphotericin infusions in addition to bicarb drip, prednisone increased, Prograf continued.  Creatinine down-trending and bicarb drip discontinued.  Per Neurology, outside hospital MR MRI reviewed and note that neuro deficits may take a few weeks to fully resolve; recommend limiting Fioricet and narcotics to minimize medication overuse/rebound headaches.  Per Transplant ID, continue ambisome/flucytosine with plan to repeat LP 2 weeks from 6/24.  Due to visual disturbance in the setting of fungal meningitis, ophthalmology consulted.  No infectious nidus found in retina or vitreous, however grade 3 disc edema bilaterally. Exam finding and subjective complaints explained by meningitis and papilledema.  Recommendation for repeating LP to reduce opening pressure per ID and Neurology in agreement.  On 06/29, patient hearing improved.  Remains with intermittent confusion per  at bedside.    CTH performed 06/30- Slight interval enlargement of the temporal horn of the right lateral ventricle since the recent MRI of 06/23/2022.  Appearance suggestive of early/developing hydrocephalus or ventricular entrapment. NSGY consulted. Per NSGY, recommended NCC transfer for further evaluation. Patient underwent repeat LP 06/30- opening pressure of 35cm- closing pressure of 13cm. Patient w/much improvement in signs and symptoms. CSF findings: <5glucose, 174 protein, 144 WBC, 3000 RBC, 37 lymph, 59 mono/macro. Repeat CSF w/1:8 cryptococcal antigen, CSF culture w/yeast. NCC evaluated, but patient with much improvement in signs and symptoms, and so NCC recommended continued observation on the floor. Discussed w/ID- stated to continue to monitor for worsening signs and symptoms- with closing pressure <25cm, no need for serial LP's at this time.     Doppler US performed on LLE- found to have DVT. Started on heparin drip. Planning for repeat LP 07/07.      Interval History: Patient was seen and evaluated this am. HA still present.  Denies nausea. No worsening of symptoms however, reports hearing loss similar to that upon admission over the past several days.  getting patient up and walking around.     No fevers, chills, night sweats, abd pain, vomiting.     Objective:     Vital Signs (Most Recent):  Temp: 97.8 °F (36.6 °C) (07/07/22 1153)  Pulse: (!) 59 (07/07/22 1153)  Resp: 18 (07/07/22 1214)  BP: 138/64 (07/07/22 1153)  SpO2: 96 % (07/07/22 1153)   Vital Signs (24h Range):  Temp:  [97.8 °F (36.6 °C)-98.2 °F (36.8 °C)] 97.8 °F (36.6 °C)  Pulse:  [54-61] 59  Resp:  [18] 18  SpO2:  [95 %-98 %] 96 %  BP: (138-181)/(64-84) 138/64     Weight: 71.7 kg (158 lb 1.1 oz)  Body mass index is 30.87 kg/m².    Intake/Output Summary (Last 24 hours) at 7/7/2022 1540  Last data filed at 7/7/2022 0800  Gross per 24 hour   Intake 300 ml   Output 300 ml   Net 0 ml      Physical Exam  Gen: in NAD, appears stated age  Neuro: awake and oriented to self, place, time  HEENT: NTNC, EOMI, PERRL, MMM  CVS: RRR, no m/r/g; S1/S2 auscultated with no S3 or S4; capillary refill < 2 sec  Resp: lungs CTAB, no w/r/r; no belabored breathing or accessory muscle use appreciated   Abd: BS+ in all 4 quadrants; NTND, soft to palpation; no organomegaly appreciated   Extrem: pulses full, equal, and regular over all 4 extremities; no swelling of BL UE or LE ext    Significant Labs: All pertinent labs within the past 24 hours have been reviewed.  Blood Culture: No results for input(s): LABBLOO in the last 48 hours.    CSF findings 06/30/22: <5glucose, 174 protein, 144 WBC, 3000 RBC, 37 lymph, 59 mono/macro. Repeat CSF w/1:8 cryptococcal antigen, CSF culture w/yeast    CBC:   Recent Labs   Lab 07/06/22  0305 07/07/22  0651   WBC 5.05 4.56   HGB 11.0* 11.2*   HCT 34.2* 33.1*    171     CMP:   Recent Labs   Lab 07/07/22  0651      K 4.1      CO2 25   *   BUN 24*   CREATININE 1.3   CALCIUM 8.7   PROT 5.5*   ALBUMIN 3.1*   BILITOT 0.3   ALKPHOS 53*   AST 19    ALT 13   ANIONGAP 7*   EGFRNONAA 46.6*     Magnesium:   No results for input(s): MG in the last 48 hours.    Urine Culture: No results for input(s): LABURIN in the last 48 hours.  Urine Studies:   No results for input(s): COLORU, APPEARANCEUA, PHUR, SPECGRAV, PROTEINUA, GLUCUA, KETONESU, BILIRUBINUA, OCCULTUA, NITRITE, UROBILINOGEN, LEUKOCYTESUR, RBCUA, WBCUA, BACTERIA, SQUAMEPITHEL, HYALINECASTS in the last 48 hours.    Invalid input(s): WRIGHTSUR      Significant Imaging: I have reviewed all pertinent imaging results/findings within the past 24 hours.    CTH 06/30/22:  FINDINGS:  Intracranial compartment:     Mild enlargement of the temporal horn of the right lateral ventricle, new from prior.  Remainder of the ventricular system is normal in size, stable.  Third ventricle diameter on the order of 0.3 cm.  Well-defined sulci remain present over the cerebral convexities.     The brain parenchyma appears otherwise within normal limits, and unchanged from priors.  No new parenchymal hemorrhage, edema or major vascular distribution infarct.  No intracranial mass effect or midline shift.     No new extra-axial blood or fluid collections.     Partially empty sella configuration.     Skull/extracranial contents (limited evaluation):     No displaced calvarial fracture.     Small volume right mastoid air cell fluid.     The visualized paranasal sinuses are essentially clear.     Impression:     Slight interval enlargement of the temporal horn of the right lateral ventricle since the recent MRI of 06/23/2022.  Appearance suggestive of early/developing hydrocephalus or ventricular entrapment.     No new hemorrhage, edema or infarction.     Partially empty sella configuration.  Nonspecific but configuration could be consistent with the reported history of a elevated intracranial pressure.    CTH 07/01/22:  FINDINGS:  Intracranial compartment:     Mild enlargement of the temporal horn of the right lateral ventricle similar  to most recent CT head 06/30/2022, new compared to prior MRI brain 06/23/2022.  Third ventricle diameter measures approximately 4 mm.     The brain parenchyma appears within normal limits not significantly changed compared to prior exam.  No parenchymal mass, hemorrhage, edema or major vascular distribution infarct.     No extra-axial blood or fluid collections.     Partially empty sella configuration.     Skull/extracranial contents (limited evaluation):     No fracture. Paranasal sinuses are essentially clear.  Small volume fluid within the right mastoid air cells.     Impression:     Slight enlargement of the temporal horn of right lateral ventricle not significantly changed compared to most recent CT head 06/30/2022,  new compared to prior MRI brain 06/23/2022.  Clinical correlation and continued close follow-up is recommended as developing hydrocephalus or ventricular entrapment remains a concern.     No new acute infarct or hemorrhage.    Doppler US of LLE:  FINDINGS:  Left thigh veins: Near occlusive thrombus within the left popliteal vein.  The common femoral, femoral, and upper greater saphenous veins are patent and free of thrombus. The veins are normally compressible and have normal phasic flow and augmentation response.     Left calf veins: Partially occlusive thrombus within the posterior tibial veins.  Partially occlusive thrombus with 1 of the peroneal veins.  Anterior tibial veins are patent.     Contralateral CFV: The contralateral (right) common femoral vein is patent and free of thrombus.     Miscellaneous: None     Impression:     Deep venous thrombosis within the left popliteal, posterior tibial, and peroneal veins.      Assessment/Plan:      * Cryptococcal meningitis  - Pt with intractable headache, facial droop, dysarthria (improved). 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  - Continue Amp B 5/mg kg Q24hrs and flucytosine 1500 mg Q6hrs for  "treatment.   - On chart review, amphotericin started on 06/24/22- 2wks of treatment 07/08/22  - Repeat LP scheduled for 7/9  -Transplant ID following- continue with current course- patient signs and symptoms much improved since time of admission, will hold of on steroids- no signs of IRIS, will continue to monitor for need for repeat LP  - KTM following; recommend  500mL normal saline with amphotericin     Acute renal failure superimposed on stage 3 chronic kidney disease  - Cr 2.2 on day of transfer, has been steadily worsening over last few days (baseline ~1.2)  - KTM consulted, appreciate recs; likely prerenal versus medication related  - US transplant unremarkable for acute changes   - Resolved  - Scr pending  - Continue sodium bicarb    VTE (venous thromboembolism)  - continue heparin drip for DVT of the LLE      Increased intracranial pressure  - Last intracranial pressure of 34cm from LP at OSH  - Patient with papilledema, vomiting, AMS  - CTH w/developing hydrocephalus and signs of ventricular entrapment  - NSGY notified and consulted- no acute interventions at this time  - NCC notified- continue monitoring on floor  - S/p LP w/opening pressure of 35cm and closing pressure of 13cm 06/30/22  - Continue to monitor for signs and symptoms of worsening ICP  - Repeat CTH relatively unchanged       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  - Improving  - See papilledema-- spoke w/ophthalmology- expect papilledema to improve over the course of 2wks if infection remains well-controlled and ICP remains wnl      Bilateral papilledema due to raised intracranial pressure  - Ophthalmology evaluation with grade 3 papilledema in both eyes (longer she has elevated CSF pressure, the higher her risk of permanent optic nerve damage and vision change is.  There is a congested appearance to the veins in both eyes secondary to optic nerve congestion and indicates higher risk for ischemic event), 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  -"- 7/7 exam, vision stable OU, color " "plates full. Still 3+ ONH edema, and we do not expect this to rapidly improve as there is often delay of resolution of ONH edema following improvement in ICP "  - see increased intracranial pressure    Headache  - history of intractable headache  - continue topamax to 100mg BID   - 1x dose of sumatriptan 07/04 w/no improvement      Hypokalemia  - PO repletion prn to goal      Long-term use of immunosuppressant medication  - Pt with opportunistic infection, see above. Holding mycophenolate   - KTM following, appreciate recs  Continue prograf per levels   Monitor levels to assess for toxicity  Continue prednisone ---> increased to 10mg daily   Hold MMF         S/P kidney transplant  - See TERESA. KTM consulted for further assistance  Kidney transplant 4/5/2020   Underlying ckd stage III  ESRD related to HTN        VTE Risk Mitigation (From admission, onward)         Ordered     heparin 25,000 units in dextrose 5% (100 units/ml) IV bolus from bag - ADDITIONAL PRN BOLUS - 60 units/kg  As needed (PRN)        Question:  Heparin Infusion Adjustment (DO NOT MODIFY ANSWER)  Answer:  \\[email or web address]\epic\Images\Pharmacy\HeparinInfusions\heparin HIGH INTENSITY nomogram for [facility] [id number]    07/03/22 1205     heparin 25,000 units in dextrose 5% (100 units/ml) IV bolus from bag - ADDITIONAL PRN BOLUS - 30 units/kg  As needed (PRN)        Question:  Heparin Infusion Adjustment (DO NOT MODIFY ANSWER)  Answer:  [email or web address]\epic\Images\Pharmacy\HeparinInfusions\heparin HIGH INTENSITY nomogram for [facility] [id number]    07/03/22 1205     heparin 25,000 units in dextrose 5% 250 mL (100 units/mL) infusion HIGH INTENSITY nomogram - [facility]  Continuous        Question Answer Comment   Heparin Infusion Adjustment (DO NOT MODIFY ANSWER) \\[email or web address]\epic\Images\Pharmacy\HeparinInfusions\heparin HIGH INTENSITY nomogram for [facility] [id number]    Begin at (in units/kg/hr) 18        07/03/22 1205     IP VTE HIGH RISK PATIENT  Once         06/27/22 2202     " Place sequential compression device  Until discontinued         06/27/22 2202                Discharge Planning   JULITO: 7/11/2022     Code Status: Full Code   Is the patient medically ready for discharge?: No    Reason for patient still in hospital (select all that apply): Patient trending condition, Laboratory test, Imaging and Consult recommendations  Discharge Plan A: Home with family                  Joanne Braden MD  Department of Hospital Medicine   [facility] - Telemetry Stepdown

## 2022-07-07 NOTE — PROGRESS NOTES
[facility] - Telemetry Stepdown  Adult Nutrition  Consult Note    SUMMARY     Recommendations    1. Continue current Low Na diet + ONS.   2. RD to monitor & follow-up.    Goals: Meet % EEN, EPN by RD f/u date  Nutrition Goal Status: new  Communication of RD Recs: reviewed with RN    Assessment and Plan    No nutritional dx at this time.    Reason for Assessment    Reason For Assessment: length of stay  Diagnosis: other (see comments) (Meningitis)  Relevant Medical History: HTN, Kidney tx (2020), CKD  Interdisciplinary Rounds: did not attend    General Information Comments: Pt w/ fair appetite, tolerating diet, drinking 2-3 ONS/day. PTA - pt reports fair appetite & UBW of 150# - chart review confirms. NFPE not warranted, pt appears nourished.  Nutrition Discharge Planning: Adequate PO intake    Nutrition/Diet History    Factors Affecting Nutritional Intake: decreased appetite    Anthropometrics    Temp: 97.8 °F (36.6 °C)  Height Method: Stated  Height: 5' (152.4 cm)  Height (inches): 60 in  Weight Method: Bed Scale (bed was zeroed prior to weight)  Weight: 71.7 kg (158 lb 1.1 oz)  Weight (lb): 158.07 lb  Ideal Body Weight (IBW), Female: 100 lb  % Ideal Body Weight, Female (lb): 158.07 %  BMI (Calculated): 30.9  BMI Grade: 30 - 34.9- obesity - grade I    Lab/Procedures/Meds    Pertinent Labs Reviewed: reviewed  Pertinent Labs Comments: BUN 24, GFR 46.6  Pertinent Medications Reviewed: reviewed  Pertinent Medications Comments: MVI, Heparin    Estimated/Assessed Needs    Weight Used For Calorie Calculations: 71.7 kg (158 lb 1.1 oz)     Energy Calorie Requirements (kcal): 1549 kcal/d  Energy Need Method: Reading-St Jeor (1.25 PAL)     Protein Requirements: 72-86 g/d (1-1.2 g/kg)  Weight Used For Protein Calculations: 71.7 kg (158 lb 1.1 oz)     Estimated Fluid Requirement Method: other (see comments) (Per MD or 1 mL/kcal)  RDA Method (mL): 1549    Nutrition Prescription Ordered    Current Diet Order: Low Na  Oral  Nutrition Supplement: Boost Plus ONS    Evaluation of Received Nutrient/Fluid Intake    I/O: +6.3L since admit    Comments: LBM: 7/6    Tolerance: tolerating    Nutrition Risk    Level of Risk/Frequency of Follow-up: (1x/week)     Monitor and Evaluation    Food and Nutrient Intake: energy intake, food and beverage intake  Food and Nutrient Adminstration: diet order  Physical Activity and Function: nutrition-related ADLs and IADLs  Anthropometric Measurements: weight, weight change  Biochemical Data, Medical Tests and Procedures: inflammatory profile, lipid profile, glucose/endocrine profile, gastrointestinal profile  Nutrition-Focused Physical Findings: overall appearance     Nutrition Follow-Up    RD Follow-up?: Yes

## 2022-07-07 NOTE — PLAN OF CARE
Problem: Adult Inpatient Plan of Care  Goal: Plan of Care Review  Outcome: Ongoing, Progressing   AAOX4.  Pleasant.  [place].  Blurred vision, patient states.  at bedside.  Participates in all care with patient.  VSS.  No c/o pain at this time.  NADN.  Patient sitting up in chair.  Hep infusing.  Call light in reach.  Room free of clutter.  TM.

## 2022-07-07 NOTE — SUBJECTIVE & OBJECTIVE
Interval History: Patient was seen and evaluated this am. HA still present. Denies nausea. No worsening of symptoms however, reports hearing loss similar to that upon admission over the past several days.  getting patient up and walking around.     No fevers, chills, night sweats, abd pain, vomiting.     Objective:     Vital Signs (Most Recent):  Temp: 97.8 °F (36.6 °C) (07/07/22 1153)  Pulse: (!) 59 (07/07/22 1153)  Resp: 18 (07/07/22 1214)  BP: 138/64 (07/07/22 1153)  SpO2: 96 % (07/07/22 1153)   Vital Signs (24h Range):  Temp:  [97.8 °F (36.6 °C)-98.2 °F (36.8 °C)] 97.8 °F (36.6 °C)  Pulse:  [54-61] 59  Resp:  [18] 18  SpO2:  [95 %-98 %] 96 %  BP: (138-181)/(64-84) 138/64     Weight: 71.7 kg (158 lb 1.1 oz)  Body mass index is 30.87 kg/m².    Intake/Output Summary (Last 24 hours) at 7/7/2022 1540  Last data filed at 7/7/2022 0800  Gross per 24 hour   Intake 300 ml   Output 300 ml   Net 0 ml      Physical Exam  Gen: in NAD, appears stated age  Neuro: awake and oriented to self, place, time  HEENT: NTNC, EOMI, PERRL, MMM  CVS: RRR, no m/r/g; S1/S2 auscultated with no S3 or S4; capillary refill < 2 sec  Resp: lungs CTAB, no w/r/r; no belabored breathing or accessory muscle use appreciated   Abd: BS+ in all 4 quadrants; NTND, soft to palpation; no organomegaly appreciated   Extrem: pulses full, equal, and regular over all 4 extremities; no swelling of BL UE or LE ext    Significant Labs: All pertinent labs within the past 24 hours have been reviewed.  Blood Culture: No results for input(s): LABBLOO in the last 48 hours.    CSF findings 06/30/22: <5glucose, 174 protein, 144 WBC, 3000 RBC, 37 lymph, 59 mono/macro. Repeat CSF w/1:8 cryptococcal antigen, CSF culture w/yeast    CBC:   Recent Labs   Lab 07/06/22  0305 07/07/22  0651   WBC 5.05 4.56   HGB 11.0* 11.2*   HCT 34.2* 33.1*    171     CMP:   Recent Labs   Lab 07/07/22  0651      K 4.1      CO2 25   *   BUN 24*   CREATININE 1.3    CALCIUM 8.7   PROT 5.5*   ALBUMIN 3.1*   BILITOT 0.3   ALKPHOS 53*   AST 19   ALT 13   ANIONGAP 7*   EGFRNONAA 46.6*     Magnesium:   No results for input(s): MG in the last 48 hours.    Urine Culture: No results for input(s): LABURIN in the last 48 hours.  Urine Studies:   No results for input(s): COLORU, APPEARANCEUA, PHUR, SPECGRAV, PROTEINUA, GLUCUA, KETONESU, BILIRUBINUA, OCCULTUA, NITRITE, UROBILINOGEN, LEUKOCYTESUR, RBCUA, WBCUA, BACTERIA, SQUAMEPITHEL, HYALINECASTS in the last 48 hours.    Invalid input(s): WRIGHTSUR      Significant Imaging: I have reviewed all pertinent imaging results/findings within the past 24 hours.    CTH 06/30/22:  FINDINGS:  Intracranial compartment:     Mild enlargement of the temporal horn of the right lateral ventricle, new from prior.  Remainder of the ventricular system is normal in size, stable.  Third ventricle diameter on the order of 0.3 cm.  Well-defined sulci remain present over the cerebral convexities.     The brain parenchyma appears otherwise within normal limits, and unchanged from priors.  No new parenchymal hemorrhage, edema or major vascular distribution infarct.  No intracranial mass effect or midline shift.     No new extra-axial blood or fluid collections.     Partially empty sella configuration.     Skull/extracranial contents (limited evaluation):     No displaced calvarial fracture.     Small volume right mastoid air cell fluid.     The visualized paranasal sinuses are essentially clear.     Impression:     Slight interval enlargement of the temporal horn of the right lateral ventricle since the recent MRI of 06/23/2022.  Appearance suggestive of early/developing hydrocephalus or ventricular entrapment.     No new hemorrhage, edema or infarction.     Partially empty sella configuration.  Nonspecific but configuration could be consistent with the reported history of a elevated intracranial pressure.    CTH 07/01/22:  FINDINGS:  Intracranial compartment:      Mild enlargement of the temporal horn of the right lateral ventricle similar to most recent CT head 06/30/2022, new compared to prior MRI brain 06/23/2022.  Third ventricle diameter measures approximately 4 mm.     The brain parenchyma appears within normal limits not significantly changed compared to prior exam.  No parenchymal mass, hemorrhage, edema or major vascular distribution infarct.     No extra-axial blood or fluid collections.     Partially empty sella configuration.     Skull/extracranial contents (limited evaluation):     No fracture. Paranasal sinuses are essentially clear.  Small volume fluid within the right mastoid air cells.     Impression:     Slight enlargement of the temporal horn of right lateral ventricle not significantly changed compared to most recent CT head 06/30/2022,  new compared to prior MRI brain 06/23/2022.  Clinical correlation and continued close follow-up is recommended as developing hydrocephalus or ventricular entrapment remains a concern.     No new acute infarct or hemorrhage.    Doppler US of LLE:  FINDINGS:  Left thigh veins: Near occlusive thrombus within the left popliteal vein.  The common femoral, femoral, and upper greater saphenous veins are patent and free of thrombus. The veins are normally compressible and have normal phasic flow and augmentation response.     Left calf veins: Partially occlusive thrombus within the posterior tibial veins.  Partially occlusive thrombus with 1 of the peroneal veins.  Anterior tibial veins are patent.     Contralateral CFV: The contralateral (right) common femoral vein is patent and free of thrombus.     Miscellaneous: None     Impression:     Deep venous thrombosis within the left popliteal, posterior tibial, and peroneal veins.

## 2022-07-07 NOTE — PLAN OF CARE
Problem: Adult Inpatient Plan of Care  Goal: Plan of Care Review  Outcome: Ongoing, Progressing  Goal: Patient-Specific Goal (Individualized)  Outcome: Ongoing, Progressing  Goal: Absence of Hospital-Acquired Illness or Injury  Outcome: Ongoing, Progressing  Goal: Optimal Comfort and Wellbeing  Outcome: Ongoing, Progressing  Goal: Readiness for Transition of Care  Outcome: Ongoing, Progressing     Problem: Fluid and Electrolyte Imbalance (Acute Kidney Injury/Impairment)  Goal: Fluid and Electrolyte Balance  Outcome: Ongoing, Progressing     Problem: Oral Intake Inadequate (Acute Kidney Injury/Impairment)  Goal: Optimal Nutrition Intake  Outcome: Ongoing, Progressing     Problem: Renal Function Impairment (Acute Kidney Injury/Impairment)  Goal: Effective Renal Function  Outcome: Ongoing, Progressing     Problem: Skin Injury Risk Increased  Goal: Skin Health and Integrity  Outcome: Ongoing, Progressing     Problem: Fall Injury Risk  Goal: Absence of Fall and Fall-Related Injury  Outcome: Ongoing, Progressing   Patient is alert, oriented and conscious. Vital signs taken and recorded. SPO2 maintain in RA. Medication given as per order. Mobilization done in room. Intake output recorded. Heparin drip continue. Will continue to monitor.

## 2022-07-07 NOTE — ASSESSMENT & PLAN NOTE
"- Ophthalmology evaluation with grade 3 papilledema in both eyes (longer she has elevated CSF pressure, the higher her risk of permanent optic nerve damage and vision change is.  There is a congested appearance to the veins in both eyes secondary to optic nerve congestion and indicates higher risk for ischemic event), 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  -"- 7/7 exam, vision stable OU, color plates full. Still 3+ ONH edema, and we do not expect this to rapidly improve as there is often delay of resolution of ONH edema following improvement in ICP "  - see increased intracranial pressure  "

## 2022-07-07 NOTE — SUBJECTIVE & OBJECTIVE
Subjective:   History of Present Illness:  56 y.o. female with a PMHx of HTN, kidney transplant in 2020, CKD3 admitted to [facility] on 06/22 with intractable HA starting about 2 months earlier and with dizziness, blurred vision, left-sided facial droop and dysarthria starting a few days before admission.    Per documentation   On admission /72, HR 69. Neurologic exam is remarkable for left-sided facial weakness and mild dysarthria.  Cr 1.32   LP (6/24) CSF Glu 6, CSF protein 93.  stain pos for yeast like organisms.  Spinal fluid culture pos for encapsulated yeast species  CT chest WO contrast pos for multiple indeterminate soft tissue masses in the left lower lobe with 2 smaller masses in the right lung which may be part of the same process.       On 06/26 patient started on amphotericin B liposomal (275 IV q.d.) and flucytosine (1500 p.o. q.6h) .  Hospitalization c/b the development of acute renal failure.  Cr 1.32 (6/22) > 1.4 (6/26) > 2.02 (6/27).  Transfer requested for higher level of care (KTM and ID).  Transfer diagnosis disseminated cryptococcal infection, TERESA, kidney XPL       Ms. Rivera is a 56 y.o. year old female who is status post Kidney Transplant - 4/5/2020  (#1).    Her maintenance immunosuppression consists of:   Immunosuppressants (From admission, onward)                Start     Stop Route Frequency Ordered    07/06/22 1800  tacrolimus capsule 3 mg         -- Oral 2 times daily 07/06/22 1628            Hospital Course:  Being treated for crypto meningitis     Interval History:  Worsening headaches and hearing since yesterday. Planned to LP tomorrow.    Past Medical, Surgical, Family, and Social History:   Unchanged from H&P.    Scheduled Meds:   acetaminophen  500 mg Oral Q24H    amphotericin B liposome (AMBISOME) IVPB  300 mg Intravenous Q24H    calcitRIOL  0.5 mcg Oral Daily    diphenhydrAMINE  25 mg Oral Q24H    flucytosine  1,500 mg Oral Q6H    magnesium oxide  400 mg  Oral Daily    multivitamin  1 tablet Oral Daily    NIFEdipine  90 mg Oral Daily    polyethylene glycol  17 g Oral BID    [START ON 7/8/2022] predniSONE  5 mg Oral Daily    senna-docusate 8.6-50 mg  1 tablet Oral BID    sodium bicarbonate  1,300 mg Oral TID    sodium chloride 0.9%  500 mL Intravenous Q24H    tacrolimus  3 mg Oral BID    topiramate  100 mg Oral BID     Continuous Infusions:   heparin (porcine) in D5W 21 Units/kg/hr (07/07/22 1209)     PRN Meds:acetaminophen, albuterol-ipratropium, bisacodyL, butalbital-acetaminophen-caffeine -40 mg, dextrose 5 %, glucose, glucose, heparin (PORCINE), heparin (PORCINE), hydrALAZINE, melatonin, naloxone, ondansetron, oxyCODONE-acetaminophen, sodium chloride 0.9%    Intake/Output - Last 3 Shifts         07/05 0700  07/06 0659 07/06 0700 07/07 0659 07/07 0700 07/08 0659    P.O. 240 950 100    Total Intake(mL/kg) 240 (3.3) 950 (13.2) 100 (1.4)    Urine (mL/kg/hr)  800 (0.5)     Stool  200     Total Output  1000     Net +240 -50 +100           Urine Occurrence   1 x    Stool Occurrence 0 x 1 x 0 x             Review of Systems   Constitutional:  Positive for activity change and fatigue.   HENT: Negative.     Eyes: Negative.    Respiratory: Negative.     Cardiovascular:  Positive for leg swelling.   Gastrointestinal: Negative.    Endocrine: Negative.    Genitourinary: Negative.    Musculoskeletal: Negative.    Skin: Negative.    Neurological:  Positive for facial asymmetry, weakness and headaches (7-9/10).   Psychiatric/Behavioral: Negative.      Objective:     Vital Signs (Most Recent):  Temp: 98.1 °F (36.7 °C) (07/07/22 1542)  Pulse: 63 (07/07/22 1542)  Resp: 18 (07/07/22 1542)  BP: (!) 179/71 (07/07/22 1542)  SpO2: (!) 94 % (07/07/22 1542)   Vital Signs (24h Range):  Temp:  [97.8 °F (36.6 °C)-98.2 °F (36.8 °C)] 98.1 °F (36.7 °C)  Pulse:  [54-63] 63  Resp:  [18] 18  SpO2:  [94 %-98 %] 94 %  BP: (138-181)/(64-84) 179/71     Weight: 71.7 kg (158 lb 1.1 oz)  Height: 5'  (152.4 cm)  Body mass index is 30.87 kg/m².    Physical Exam  Constitutional:       General: She is not in acute distress.     Appearance: She is obese. She is ill-appearing.   Eyes:      General: No scleral icterus.     Extraocular Movements: Extraocular movements intact.      Pupils: Pupils are equal, round, and reactive to light.   Cardiovascular:      Rate and Rhythm: Normal rate and regular rhythm.      Pulses: Normal pulses.      Heart sounds: No murmur heard.  Pulmonary:      Effort: Pulmonary effort is normal. No respiratory distress.   Abdominal:      General: There is no distension.      Palpations: Abdomen is soft.      Tenderness: There is no abdominal tenderness.   Musculoskeletal:      Right lower leg: Edema present.      Left lower leg: No edema.   Skin:     Coloration: Skin is not jaundiced.   Neurological:      Mental Status: She is alert and oriented to person, place, and time.       Laboratory:  Labs within the past 24 hours have been reviewed.    Diagnostic Results:  None

## 2022-07-07 NOTE — PLAN OF CARE
Problem: Adult Inpatient Plan of Care  Goal: Plan of Care Review  Outcome: Ongoing, Progressing  Goal: Patient-Specific Goal (Individualized)  Outcome: Ongoing, Progressing  Goal: Absence of Hospital-Acquired Illness or Injury  Outcome: Ongoing, Progressing  Goal: Optimal Comfort and Wellbeing  Outcome: Ongoing, Progressing  Goal: Readiness for Transition of Care  Outcome: Ongoing, Progressing   Pt rested well. VS WNL. No ss of distress. Call light within reach. Bed in low position.  at bedside. PRN pain med given as requested. Will continue to monitor and pass on care to oncoming nurse.

## 2022-07-08 NOTE — ASSESSMENT & PLAN NOTE
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 LP x 2 with elevated OP and papilledema on ophthalmology exam. NSGY consulted for consideration of drain placement for elevated ICPs. Pt is currently neurologically stable. Of note, pt is on hep gtt for acute DVT    -Imaging and diagnostics reviewed   -CSF (6/24) OP34, Glu 6, protein 93. [place] stain pos for yeast like organisms.  Cx pos for encapsulated yeast species.     -CSF (6/30): OP35,CP13. <5 glucose, 174 protein, 144 WBC, 3000 RBC, 37 lymph, 59 mono/macro. Repeat CSF w/1:8 cryptococcal antigen, cx w/yeast.   -CTH 6/30 with slight enlargement of the temporal horn of right lateral ventricle compared to prior MRI brain 06/23   -Primary team planning for repeat LP today with CTH to follow. Please obtain OP/CP and send for full CSF profile  -Ophthalmology following. Last eval 7/7 with stable OU, color plates full. Still 3+ ONH edema  -ID: Pt completed 2 weeks of flucytosine and amphotericin B 7/8. F/u ID recommendations following LP 7/8  -Will consider  shunt next week pending results of pts CSF cultures 7/8 vs external drain for management of elevated ICPs  -We will continue to follow. Please call NSGY with any decline in neuro exam. Would have a low threshold to transfer to ICU with any worsening visual deficits, decline in neuro exam or evidence for worsening ICPs  -Discussed with Dr. Sorensen

## 2022-07-08 NOTE — PROGRESS NOTES
[facility] - Telemetry [facility] Medicine  Progress Note    Patient Name: Tala Rivera  MRN: [id number]  Patient Class: IP- Inpatient   Admission Date: 6/27/2022  Length of Stay: 11 days  Attending Physician: Joanne Braden MD  Primary Care Provider: ANUJA Castellon MD        Subjective:     Principal Problem:Cryptococcal meningitis        HPI:  Mrs. Rivera is a 56yoF w/PMHx of kidney XPL (2020 [facility]) on Prograf and Cellcept admitted to [facility] on 06/22 with intractable HA starting about 2 months earlier and with dizziness, blurred vision, left-sided facial droop and dysarthria starting a few days before admission.       On admission /72, HR 69. Neurologic exam is remarkable for left-sided facial weakness and mild dysarthria.  Labs (6/22) WBC 9.4, Hb 15.9, Plts 224, Na 137, K 3.3, CO2 25, BUN 24, Cr 1.32, AG 14, Glu 182 LFTs WNL. COVID neg     LP (6/24) CSF Glu 6, CSF protein 93. Cymraes stain pos for yeast like organisms.  Spinal fluid culture pos for encapsulated yeast species.       CT chest WO contrast pos for multiple indeterminate soft tissue masses in the left lower lobe with 2 smaller masses in the right lung which may be part of the same process.       On 06/26 patient started on amphotericin B liposomal (275 IV q.d.) and flucytosine (1500 p.o. q.6h) .  Hospitalization c/b the development of acute renal failure.  Cr 1.32 (6/22) > 1.4 (6/26) > 2.02 (6/27).  Cellcept and prednisone have been held.       Transfer requested for higher level of care (KTM and ID).  Transfer diagnosis disseminated cryptococcal infection, TERESA, kidney XPL.    At time of my assessment, pt. Complains of headache which has been persistent for the last several days. She states that it is somewhat relieved by the fioricet and oxycodone that she was receiving at the outside hospital.      Overview/Hospital Course:  KTM, transplant ID, Neurology consulted upon admission. Per KTN, normal saline scheduled with  amphotericin infusions in addition to bicarb drip, prednisone increased, Prograf continued.  Creatinine down-trending and bicarb drip discontinued.  Per Neurology, outside hospital MR MRI reviewed and note that neuro deficits may take a few weeks to fully resolve; recommend limiting Fioricet and narcotics to minimize medication overuse/rebound headaches.  Per Transplant ID, continue ambisome/flucytosine with plan to repeat LP 2 weeks from 6/24.  Due to visual disturbance in the setting of fungal meningitis, ophthalmology consulted.  No infectious nidus found in retina or vitreous, however grade 3 disc edema bilaterally. Exam finding and subjective complaints explained by meningitis and papilledema.  Recommendation for repeating LP to reduce opening pressure per ID and Neurology in agreement.  On 06/29, patient hearing improved.  Remains with intermittent confusion per  at bedside.    CTH performed 06/30- Slight interval enlargement of the temporal horn of the right lateral ventricle since the recent MRI of 06/23/2022.  Appearance suggestive of early/developing hydrocephalus or ventricular entrapment. NSGY consulted. Per NSGY, recommended NCC transfer for further evaluation. Patient underwent repeat LP 06/30- opening pressure of 35cm- closing pressure of 13cm. Patient w/much improvement in signs and symptoms. CSF findings: <5glucose, 174 protein, 144 WBC, 3000 RBC, 37 lymph, 59 mono/macro. Repeat CSF w/1:8 cryptococcal antigen, CSF culture w/yeast. NCC evaluated, but patient with much improvement in signs and symptoms, and so NCC recommended continued observation on the floor. Discussed w/ID- stated to continue to monitor for worsening signs and symptoms- with closing pressure <25cm, no need for serial LP's at this time.     Doppler US performed on LLE- found to have DVT. Started on heparin drip. Planning for repeat LP 07/07.  Heparin drip paused and repeat lumbar puncture completed on 07/07.  Per Radiology,  CSF was clear to gout 16 mL total; opening pressure 27, closing pressure 12. G stain notable for few yeast.  Repeat CT head ordered.        Interval History: Patient was seen and evaluated this am.  Lumbar puncture completed earlier this morning.  HA still present; no significant improvement. Denies nausea.  In terms of hearing loss, patient reports incremental improvement today.      No fevers, chills, night sweats, abd pain, vomiting, new vision changes.     Objective:     Vital Signs (Most Recent):  Temp: 97.6 °F (36.4 °C) (07/08/22 0751)  Pulse: (!) 56 (07/08/22 0751)  Resp: 18 (07/08/22 0751)  BP: (!) 151/66 (07/08/22 0751)  SpO2: 98 % (07/08/22 0751)   Vital Signs (24h Range):  Temp:  [97.6 °F (36.4 °C)-98.5 °F (36.9 °C)] 97.6 °F (36.4 °C)  Pulse:  [55-63] 56  Resp:  [18] 18  SpO2:  [94 %-98 %] 98 %  BP: (143-179)/(64-71) 151/66     Weight: 71.7 kg (158 lb 1.1 oz)  Body mass index is 30.87 kg/m².  No intake or output data in the 24 hours ending 07/08/22 1455     Physical Exam  Gen: in NAD, appears stated age  Neuro: awake and oriented to self, place, time  HEENT: NTNC, EOMI, PERRL, MMM  CVS: RRR, no m/r/g; S1/S2 auscultated with no S3 or S4; capillary refill < 2 sec  Resp: lungs CTAB, no w/r/r; no belabored breathing or accessory muscle use appreciated   Abd: BS+ in all 4 quadrants; NTND, soft to palpation; no organomegaly appreciated   Extrem: pulses full, equal, and regular over all 4 extremities; no swelling of BL UE or LE ext    Significant Labs: All pertinent labs within the past 24 hours have been reviewed.  Blood Culture: No results for input(s): LABBLOO in the last 48 hours.    CSF findings 06/30/22: <5glucose, 174 protein, 144 WBC, 3000 RBC, 37 lymph, 59 mono/macro. Repeat CSF w/1:8 cryptococcal antigen, CSF culture w/yeast    CBC:   Recent Labs   Lab 07/07/22  0651 07/08/22  0428   WBC 4.56 4.72   HGB 11.2* 11.6*   HCT 33.1* 36.6*    181     CMP:   Recent Labs   Lab 07/07/22  0651  07/08/22  0428    138   K 4.1 3.5    107   CO2 25 22*   * 139*   BUN 24* 23*   CREATININE 1.3 1.3   CALCIUM 8.7 8.9   PROT 5.5* 5.4*   ALBUMIN 3.1* 3.2*   BILITOT 0.3 0.5   ALKPHOS 53* 52*   AST 19 10   ALT 13 11   ANIONGAP 7* 9   EGFRNONAA 46.6* 46.6*     Magnesium:   No results for input(s): MG in the last 48 hours.    Urine Culture: No results for input(s): LABURIN in the last 48 hours.  Urine Studies:   No results for input(s): COLORU, APPEARANCEUA, PHUR, SPECGRAV, PROTEINUA, GLUCUA, KETONESU, BILIRUBINUA, OCCULTUA, NITRITE, UROBILINOGEN, LEUKOCYTESUR, RBCUA, WBCUA, BACTERIA, SQUAMEPITHEL, HYALINECASTS in the last 48 hours.    Invalid input(s): WRIGHTSUR      Significant Imaging: I have reviewed all pertinent imaging results/findings within the past 24 hours.    CTH 06/30/22:  FINDINGS:  Intracranial compartment:     Mild enlargement of the temporal horn of the right lateral ventricle, new from prior.  Remainder of the ventricular system is normal in size, stable.  Third ventricle diameter on the order of 0.3 cm.  Well-defined sulci remain present over the cerebral convexities.     The brain parenchyma appears otherwise within normal limits, and unchanged from priors.  No new parenchymal hemorrhage, edema or major vascular distribution infarct.  No intracranial mass effect or midline shift.     No new extra-axial blood or fluid collections.     Partially empty sella configuration.     Skull/extracranial contents (limited evaluation):     No displaced calvarial fracture.     Small volume right mastoid air cell fluid.     The visualized paranasal sinuses are essentially clear.     Impression:     Slight interval enlargement of the temporal horn of the right lateral ventricle since the recent MRI of 06/23/2022.  Appearance suggestive of early/developing hydrocephalus or ventricular entrapment.     No new hemorrhage, edema or infarction.     Partially empty sella configuration.  Nonspecific but  configuration could be consistent with the reported history of a elevated intracranial pressure.    CTH 07/01/22:  FINDINGS:  Intracranial compartment:     Mild enlargement of the temporal horn of the right lateral ventricle similar to most recent CT head 06/30/2022, new compared to prior MRI brain 06/23/2022.  Third ventricle diameter measures approximately 4 mm.     The brain parenchyma appears within normal limits not significantly changed compared to prior exam.  No parenchymal mass, hemorrhage, edema or major vascular distribution infarct.     No extra-axial blood or fluid collections.     Partially empty sella configuration.     Skull/extracranial contents (limited evaluation):     No fracture. Paranasal sinuses are essentially clear.  Small volume fluid within the right mastoid air cells.     Impression:     Slight enlargement of the temporal horn of right lateral ventricle not significantly changed compared to most recent CT head 06/30/2022,  new compared to prior MRI brain 06/23/2022.  Clinical correlation and continued close follow-up is recommended as developing hydrocephalus or ventricular entrapment remains a concern.     No new acute infarct or hemorrhage.    Doppler US of LLE:  FINDINGS:  Left thigh veins: Near occlusive thrombus within the left popliteal vein.  The common femoral, femoral, and upper greater saphenous veins are patent and free of thrombus. The veins are normally compressible and have normal phasic flow and augmentation response.     Left calf veins: Partially occlusive thrombus within the posterior tibial veins.  Partially occlusive thrombus with 1 of the peroneal veins.  Anterior tibial veins are patent.     Contralateral CFV: The contralateral (right) common femoral vein is patent and free of thrombus.     Miscellaneous: None     Impression:     Deep venous thrombosis within the left popliteal, posterior tibial, and peroneal veins.      Assessment/Plan:      * Cryptococcal  meningitis  - Pt with intractable headache, facial droop, dysarthria (improved). 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  - Continue Amp B 5/mg kg Q24hrs and flucytosine 1500 mg Q6hrs for treatment.   - On chart review, amphotericin started on 06/24/22- 2wks of treatment 07/08/22  - Repeat LP completed 7/9; gram stain notable for yeast  -Transplant ID following- continue with current course- patient signs and symptoms much improved since time of admission, will hold of on steroids- no signs of IRIS, will continue to monitor for need for repeat LP  - KTM following; recommend  500mL normal saline with amphotericin     Acute renal failure superimposed on stage 3 chronic kidney disease  - Cr 2.2 on day of transfer, has been steadily worsening over last few days (baseline ~1.2)  - KTM consulted, appreciate recs; likely prerenal versus medication related  - US transplant unremarkable for acute changes   - creatinine improved  - Continue sodium bicarb    VTE (venous thromboembolism)  - continue heparin drip for DVT of the LLE      Increased intracranial pressure  - Last intracranial pressure of 34cm from LP at OSH  - Patient with papilledema, vomiting, AMS  - CTH w/developing hydrocephalus and signs of ventricular entrapment  - NSGY notified and consulted- no acute interventions at this time  - NCC notified- continue monitoring on floor  - S/p LP w/opening pressure of 35cm and closing pressure of 13cm 06/30/22  - S/p LP w/opening pressure of 27cm and closing pressure of 12cm 06/30/22  - Continue to monitor for signs and symptoms of worsening ICP  - Repeat CTH 07/01 relatively unchanged   -repeat CTH pending      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  - Improving  - See papilledema-- spoke w/ophthalmology- expect papilledema to improve over the course of 2wks if infection remains well-controlled and ICP remains wnl      Bilateral papilledema due to raised intracranial pressure  - Ophthalmology  "evaluation with grade 3 papilledema in both eyes (longer she has elevated CSF pressure, the higher her risk of permanent optic nerve damage and vision change is.  There is a congested appearance to the veins in both eyes secondary to optic nerve congestion and indicates higher risk for ischemic event), 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  -"- 7/7 exam, vision stable OU, color plates full. Still 3+ ONH edema, and we do not expect this to rapidly improve as there is often delay of resolution of ONH edema following improvement in ICP "  - see increased intracranial pressure    Headache  - history of intractable headache  - continue topamax to 100mg BID   - 1x dose of sumatriptan 07/04 w/no improvement      Hypokalemia  - PO repletion prn to goal      Long-term use of immunosuppressant medication  - Pt with opportunistic infection, see above. Holding mycophenolate   - KTM following, appreciate recs  Continue prograf per levels   Monitor levels to assess for toxicity  Continue prednisone ---> increased to 10mg daily   Hold MMF         S/P kidney transplant  - See TERESA. KTM consulted for further assistance  Kidney transplant 4/5/2020   Underlying ckd stage III  ESRD related to HTN        VTE Risk Mitigation (From admission, onward)         Ordered     heparin 25,000 units in dextrose 5% (100 units/ml) IV bolus from bag - ADDITIONAL PRN BOLUS - 60 units/kg  As needed (PRN)        Question:  Heparin Infusion Adjustment (DO NOT MODIFY ANSWER)  Answer:  [email or web address]\Images\Pharmacy\HeparinInfusions\heparin HIGH INTENSITY nomogram for [facility] [email or web address]    07/03/22 1205     heparin 25,000 units in dextrose 5% (100 units/ml) IV bolus from bag - ADDITIONAL PRN BOLUS - 30 units/kg  As needed (PRN)        Question:  Heparin Infusion Adjustment (DO NOT MODIFY ANSWER)  Answer:  [email or web address]\Images\Pharmacy\HeparinInfusions\heparin HIGH INTENSITY nomogram for [facility] [id number]    07/03/22 1205     heparin 25,000 units in dextrose 5% 250 mL (100 " units/mL) infusion HIGH INTENSITY nomogram - [facility]  Continuous        Question Answer Comment   Heparin Infusion Adjustment (DO NOT MODIFY ANSWER) [email or web address]\Images\Pharmacy\HeparinInfusions\heparin HIGH INTENSITY nomogram for [facility] [id number]    Begin at (in units/kg/hr) 18        07/03/22 1205     IP VTE HIGH RISK PATIENT  Once         06/27/22 2202     Place sequential compression device  Until discontinued         06/27/22 2202                Discharge Planning   JULITO: 7/11/2022     Code Status: Full Code   Is the patient medically ready for discharge?: No    Reason for patient still in hospital (select all that apply): Patient unstable, Patient trending condition, Laboratory test, Treatment, Imaging and Consult recommendations  Discharge Plan A: Home with family                  Joanne Braden MD  Department of Hospital Medicine   [facility] - Telemetry Stepdown

## 2022-07-08 NOTE — H&P
Inpatient Radiology Pre-procedure Note    History of Present Illness:  Tala Rivera is a 56 y.o. 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    Admission H&P reviewed.  Past Medical History:   Diagnosis Date    Anemia of renal disease     ESRD on dialysis     Dr. Muhammad     Essential hypertension     PD catheter dysfunction 10/18/2018    Secondary hyperparathyroidism of renal origin      Past Surgical History:   Procedure Laterality Date    AV FISTULA PLACEMENT Left     never matured    AV graft Right 2015     SECTION      x3; ; , and     COLONOSCOPY N/A 2020    Procedure: COLONOSCOPY;  Surgeon: Darrell Golden MD;  Location: [facility] ENDO;  Service: Endoscopy;  Laterality: N/A;    GASTRIC BYPASS      KIDNEY TRANSPLANT N/A 2020    Procedure: TRANSPLANT, KIDNEY;  Surgeon: Megan Garcia MD;  Location: [address];  Service: Transplant;  Laterality: N/A;    KIDNEY TRANSPLANT Right 2020    LAPAROSCOPIC REVISION OF PROCEDURE INVOLVING PERITONEAL DIALYSIS CATHETER N/A 10/18/2018    Procedure: REVISION OF PROCEDURE INVOLVING PERITONEAL DIALYSIS CATHETER, LAPAROSCOPIC;  Surgeon: Hair Jimenez MD;  Location: [facility];  Service: General;  Laterality: N/A;    PERITONEAL CATHETER INSERTION N/A 2018    Procedure: Laparoscopic INSERTION, CATHETER, INTRAPERITONEAL;  Surgeon: Hair Jimenez MD;  Location: [facility];  Service: General;  Laterality: N/A;    PERITONEAL CATHETER REMOVAL N/A 2018    Procedure: REMOVAL, CATHETER, DIALYSIS, PERITONEAL;  Surgeon: Hair Jimenez MD;  Location: [facility];  Service: General;  Laterality: N/A;    WOUND EXPLORATION N/A 2018    Procedure: EXPLORATION, WOUND-Surgical Site Irrigation;  Surgeon: Hair Jimenez MD;  Location: [facility];  Service: General;  Laterality: N/A;       Review of Systems:   As  documented in primary team H&P    Home Meds:   Prior to Admission medications    Medication Sig Start Date End Date Taking? Authorizing Provider   acetaminophen (TYLENOL) 325 MG tablet Take 2 tablets (650 mg total) by mouth every 6 (six) hours as needed (headache). 6/14/22   Celine Cano MD   butalbital-acetaminophen-caffeine -40 mg (FIORICET, ESGIC) -40 mg per tablet Take 1 tablet by mouth daily as needed (migraines). 6/14/22 7/14/22  Celine Cano MD   calcitRIOL (ROCALTROL) 0.25 MCG Cap Take 2 capsules (0.5 mcg total) by mouth once daily. 4/25/22      docusate sodium (COLACE) 100 MG capsule Take 1 capsule (100 mg total) by mouth 3 (three) times daily as needed for Constipation. 4/6/20   Ondina Frye MD   l-methylfolate-b2-b6-b12 (CEREFOLIN) 6-5-50-1 mg Tab Take 1 tablet by mouth once daily. 6/8/22   Lefty Griffith MD   magnesium oxide (MAG-OX) 400 mg (241.3 mg magnesium) tablet Take 1 tablet (400 mg total) by mouth once daily. 6/8/22   Lefty Griffith MD   mycophenolate (CELLCEPT) 250 mg Cap Take 2 capsules (500 mg total) by mouth 2 (two) times daily. 3/28/22 3/28/23  Caity Cagle NP   NIFEdipine (PROCARDIA-XL) 30 MG (OSM) 24 hr tablet TAKE ONE TABLET BY MOUTH TWICE DAILY hold if systolic blood pressure is less than 130 9/15/21   Ondina Frye MD   predniSONE (DELTASONE) 5 MG tablet Take 1 tablet (5 mg total) by mouth once daily. 4/25/22      sodium bicarbonate 650 MG tablet Take 2 tablets (1,300 mg total) by mouth 2 (two) times daily. 4/12/21 4/12/22  Lashonda Ritchie NP   tacrolimus (PROGRAF) 1 MG Cap Take 3 capsules (3 mg total) by mouth every morning AND 2 capsules (2 mg total) every evening. 4/7/22 4/7/23  Ondina Frye MD   topiramate (TOPAMAX) 25 MG tablet Take 1 tablet (25 mg total) by mouth 2 (two) times daily. 6/14/22   Celine Cano MD     Scheduled Meds:    acetaminophen  500 mg Oral Q24H    amphotericin B liposome (AMBISOME) IVPB  300 mg  Intravenous Q24H    calcitRIOL  0.5 mcg Oral Daily    diphenhydrAMINE  25 mg Oral Q24H    flucytosine  1,500 mg Oral Q6H    magnesium oxide  400 mg Oral Daily    multivitamin  1 tablet Oral Daily    NIFEdipine  90 mg Oral Daily    polyethylene glycol  17 g Oral BID    predniSONE  5 mg Oral Daily    senna-docusate 8.6-50 mg  1 tablet Oral BID    sodium bicarbonate  1,300 mg Oral TID    sodium chloride 0.9%  500 mL Intravenous Q24H    tacrolimus  3 mg Oral BID    topiramate  100 mg Oral BID     Continuous Infusions:    heparin (porcine) in D5W 21 Units/kg/hr (07/07/22 1209)     PRN Meds:acetaminophen, albuterol-ipratropium, bisacodyL, butalbital-acetaminophen-caffeine -40 mg, dextrose 5 %, glucose, glucose, heparin (PORCINE), heparin (PORCINE), hydrALAZINE, melatonin, naloxone, ondansetron, oxyCODONE-acetaminophen, sodium chloride 0.9%  Anticoagulants/Antiplatelets: no anticoagulation    Allergies:   Review of patient's allergies indicates:   Allergen Reactions    Sulfa (sulfonamide antibiotics) Hives     Sedation Hx: have not been any systemic reactions    Labs:  Recent Labs   Lab 07/08/22 0428   INR 1.1       Recent Labs   Lab 07/08/22 0428   WBC 4.72   HGB 11.6*   HCT 36.6*   MCV 94         Recent Labs   Lab 07/05/22  0423 07/07/22  0651 07/08/22  0428   *   < > 139*   *   < > 138   K 3.7   < > 3.5      < > 107   CO2 24   < > 22*   BUN 23*   < > 23*   CREATININE 1.2   < > 1.3   CALCIUM 9.0   < > 8.9   MG 1.9  --   --    ALT 15   < > 11   AST 13   < > 10   ALBUMIN 3.1*   < > 3.2*   BILITOT 0.4   < > 0.5    < > = values in this interval not displayed.         Vitals:  Temp: 97.6 °F (36.4 °C) (07/08/22 0751)  Pulse: (!) 56 (07/08/22 0751)  Resp: 18 (07/08/22 0751)  BP: (!) 151/66 (07/08/22 0751)  SpO2: 98 % (07/08/22 0751)     Physical Exam:  ASA: 3  Mallampati: 3    General: no acute distress  Mental Status: alert and oriented to person, place and time  HEENT:  normocephalic, atraumatic, decreased hearing  Chest: unlabored breathing  Heart: regular heart rate  Abdomen: nondistended  Extremity: moves all extremities    Plan: fluoroscopically guided lumbar puncture  Sedation Plan: local        Marshall Martinez MD  Radiology PGY-3  [facility]

## 2022-07-08 NOTE — CONSULTS
[facility] - Telemetry Stepdown  Neurosurgery  Consult Note    Inpatient consult to Neurosurgery  Consult performed by: Afia Reynolds PA-C  Consult ordered by: Joanne Braden MD        Subjective:     Chief Complaint/Reason for Admission: cryptococcal meningitis    History of Present Illness: Mrs. Rivera is a 56yoF w/PMHx of kidney transplant (2020 [facility]) on Prograf and Cellcept admitted to [facility] on 06/22 with intractable HA starting about 2 months earlier and with dizziness, blurred vision, left-sided facial droop and dysarthria starting a few days before admission. 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 LP (6/24) with OP of 34,  stain pos for yeast like organisms and CSF culture pos for encapsulated yeast species. Ophthalmology was consulted for complaint of visual disturbance in the setting of elevated ICP.  No infectious nidus was found in retina or vitreous, however pt was found to have grade 3 palpilledema bilaterally. CTH performed 06/30 showed slight interval enlargement of the temporal horn of the right lateral ventricle since the recent MRI of 06/23/2022.  Repeat LP 06/30- opening pressure of 35cm- closing pressure of 13cm. Patient w/much improvement in signs and symptoms post LP. NCC evaluated, but given improvement in symptoms, NCC recommended continued observation on the floor. Antibiotic end date is today and primary team planning to repeat LP and CTH. Pt currently reports improvement in her HA and her visual symptoms, however continues with hearing loss and intermittent nausea. NSGY consulted for consideration of EVD placement for elevated ICPs.          Medications Prior to Admission   Medication Sig Dispense Refill Last Dose    acetaminophen (TYLENOL) 325 MG tablet Take 2 tablets (650 mg total) by mouth every 6 (six) hours as needed (headache).  0      butalbital-acetaminophen-caffeine -40 mg (FIORICET, ESGIC) -40 mg per tablet Take 1 tablet by mouth daily as needed (migraines). 30 tablet 3     calcitRIOL (ROCALTROL) 0.25 MCG Cap Take 2 capsules (0.5 mcg total) by mouth once daily. 60 capsule 11     docusate sodium (COLACE) 100 MG capsule Take 1 capsule (100 mg total) by mouth 3 (three) times daily as needed for Constipation.  0     l-methylfolate-b2-b6-b12 (CEREFOLIN) 6-5-50-1 mg Tab Take 1 tablet by mouth once daily. 30 tablet 3     magnesium oxide (MAG-OX) 400 mg (241.3 mg magnesium) tablet Take 1 tablet (400 mg total) by mouth once daily. 30 tablet 3     mycophenolate (CELLCEPT) 250 mg Cap Take 2 capsules (500 mg total) by mouth 2 (two) times daily. 120 capsule 11     NIFEdipine (PROCARDIA-XL) 30 MG (OSM) 24 hr tablet TAKE ONE TABLET BY MOUTH TWICE DAILY hold if systolic blood pressure is less than 130 60 tablet 11     predniSONE (DELTASONE) 5 MG tablet Take 1 tablet (5 mg total) by mouth once daily. 120 tablet 11     sodium bicarbonate 650 MG tablet Take 2 tablets (1,300 mg total) by mouth 2 (two) times daily. 120 tablet 11     tacrolimus (PROGRAF) 1 MG Cap Take 3 capsules (3 mg total) by mouth every morning AND 2 capsules (2 mg total) every evening. 150 capsule 11     topiramate (TOPAMAX) 25 MG tablet Take 1 tablet (25 mg total) by mouth 2 (two) times daily.          Review of patient's allergies indicates:   Allergen Reactions    Sulfa (sulfonamide antibiotics) Hives       Past Medical History:   Diagnosis Date    Anemia of renal disease     ESRD on dialysis     Dr. Muhammad     Essential hypertension     PD catheter dysfunction 10/18/2018    Secondary hyperparathyroidism of renal origin      Past Surgical History:   Procedure Laterality Date    AV FISTULA PLACEMENT Left     never matured    AV graft Right 2015     SECTION      x3; ; , and     COLONOSCOPY N/A 2020    Procedure: COLONOSCOPY;  Surgeon:  Darrell Golden MD;  [facility]: [facility] ENDO;  Service: Endoscopy;  Laterality: N/A;    GASTRIC BYPASS  2016    KIDNEY TRANSPLANT N/A 4/5/2020    Procedure: TRANSPLANT, KIDNEY;  Surgeon: Megan Garcia MD;  Location: [address];  Service: Transplant;  Laterality: N/A;    KIDNEY TRANSPLANT Right 04/05/2020    LAPAROSCOPIC REVISION OF PROCEDURE INVOLVING PERITONEAL DIALYSIS CATHETER N/A 10/18/2018    Procedure: REVISION OF PROCEDURE INVOLVING PERITONEAL DIALYSIS CATHETER, LAPAROSCOPIC;  Surgeon: Hair Jimenez MD;  Location: [facility];  Service: General;  Laterality: N/A;    PERITONEAL CATHETER INSERTION N/A 9/4/2018    Procedure: Laparoscopic INSERTION, CATHETER, INTRAPERITONEAL;  Surgeon: Hair Jimenez MD;  Location: [facility];  Service: General;  Laterality: N/A;    PERITONEAL CATHETER REMOVAL N/A 12/14/2018    Procedure: REMOVAL, CATHETER, DIALYSIS, PERITONEAL;  Surgeon: Hair Jimenez MD;  Location: [facility];  Service: General;  Laterality: N/A;    WOUND EXPLORATION N/A 12/14/2018    Procedure: EXPLORATION, WOUND-Surgical Site Irrigation;  Surgeon: Hair Jimenez MD;  Location: [facility];  Service: General;  Laterality: N/A;     Family History       Problem Relation (Age of Onset)    Diabetes Mother    Down syndrome Son    Heart disease Mother    Hypertension Mother    Kidney disease Mother    Lupus Sister          Tobacco Use    Smoking status: Never Smoker    Smokeless tobacco: Never Used   Substance and Sexual Activity    Alcohol use: No    Drug use: No    Sexual activity: Yes     Partners: Male     Review of Systems  Objective:     Weight: 71.7 kg (158 lb 1.1 oz)  Body mass index is 30.87 kg/m².  Vital Signs (Most Recent):  Temp: 97.6 °F (36.4 °C) (07/08/22 0751)  Pulse: (!) 56 (07/08/22 0751)  Resp: 18 (07/08/22 0751)  BP: (!) 151/66 (07/08/22 0751)  SpO2: 98 % (07/08/22 0751)   Vital Signs (24h Range):  Temp:  [97.6 °F (36.4 °C)-98.5 °F (36.9 °C)] 97.6 °F (36.4 °C)  Pulse:  [55-63] 56  Resp:   [18] 18  SpO2:  [94 %-98 %] 98 %  BP: (138-179)/(64-71) 151/66                      Hemodialysis AV Graft Right forearm (Active)   Site Assessment Clean;Dry;Intact 07/30/20 0745   Patency Present;Thrill;Bruit 07/30/20 0745   Status Deaccessed 07/29/20 2010   Dressing Status Clean;Dry;Intact 10/18/18 0808   Site Condition No complications 07/30/20 0745   Dressing Open to air (None) 07/29/20 2010       Physical Exam  General: well developed, well nourished, no distress.   Head: normocephalic, atraumatic  Neurologic: Alert and oriented. Thought content appropriate.  GCS: Motor: 6/Verbal: 5/Eyes: 4 GCS Total: 15  Mental Status: Awake, Alert, Oriented x3  Cranial nerves: face symmetric, tongue midline, CN II-XII grossly intact. Hard of hearing   Eyes: pupils equal, round, reactive to light with accomodation, EOMI.   Sensory: intact to light touch throughout  Motor Strength: Moves all extremities spontaneously with good tone.  Full strength upper and lower extremities. No abnormal movements seen.     Strength  Deltoids Triceps Biceps Wrist Extension Wrist Flexion Hand    Upper: R 5/5 5/5 5/5 5/5 5/5 5/5    L 5/5 5/5 5/5 5/5 5/5 5/5     Iliopsoas Quadriceps Knee  Flexion Tibialis  anterior Gastro- cnemius EHL   Lower: R 5/5 5/5 5/5 5/5 5/5 5/5    L 5/5 5/5 5/5 5/5 5/5 5/5     DTR's - 2 + and symmetric in UE and LE  Pronator Drift: no drift noted  Finger-to-nose: Intact bilaterally  Aparicio: absent  Clonus: absent  Pulses: 2+ and symmetric radial and dorsalis pedis. No lower extremity edema      Significant Labs:  Recent Labs   Lab 07/07/22  0651 07/08/22 0428   * 139*    138   K 4.1 3.5    107   CO2 25 22*   BUN 24* 23*   CREATININE 1.3 1.3   CALCIUM 8.7 8.9     Recent Labs   Lab 07/07/22  0651 07/08/22 0428   WBC 4.56 4.72   HGB 11.2* 11.6*   HCT 33.1* 36.6*    181     Recent Labs   Lab 07/07/22  0651 07/08/22  0428   INR 1.1 1.1   APTT 41.7* 39.7*     Microbiology Results (last 7 days)        Procedure Component Value Units Date/Time    CSF culture [[id number]]     Order Status: No result Specimen: CSF (Spinal Fluid) from CSF Tap, Tube 2     AFB Culture & Smear [[id number]]     Order Status: No result Specimen: CSF (Spinal Fluid) from CSF Tap, Tube 3     CSF culture [[id number]] Collected: 06/30/22 1602    Order Status: Completed Specimen: CSF (Spinal Fluid) from CSF Tap, Tube 2 Updated: 07/04/22 0546     CSF CULTURE No Growth     Gram Stain Result No WBC's      Few yeast      Results called to and read back by:Leslee Andrews RN 06/30/2022  18:45    AFB Culture & Smear [[id number]] Collected: 06/30/22 1602    Order Status: Completed Specimen: CSF (Spinal Fluid) from CSF Tap, Tube 2 Updated: 07/01/22 2127     AFB Culture & Smear Culture in progress     AFB CULTURE STAIN No acid fast bacilli seen.    Cryptococcal antigen, CSF [[id number]]  (Abnormal) Collected: 06/30/22 1602    Order Status: Completed Specimen: CSF (Spinal Fluid) from CSF Tap, Tube 2 Updated: 07/01/22 1025     Crypto Ag, CSF Positive 1:8          All pertinent labs from the last 24 hours have been reviewed.    Significant Diagnostics:  I have reviewed all pertinent imaging results/findings within the past 24 hours.    Assessment/Plan:     * Cryptococcal meningitis  55 yo female s/p kidney transplant (2020) on Prograf, admitted with cryptococcal meningitis s/p 2 weeks of amphotericin B and flucytosine. LP x 2 with elevated OP and papilledema on ophthalmology exam. NSGY consulted for consideration of drain placement for elevated ICPs. Pt is currently neurologically stable. Of note, pt is on hep gtt for acute DVT    -Imaging and diagnostics reviewed   -CSF (6/24) OP34, Glu 6, protein 93. [place] stain pos for yeast like organisms.  Cx pos for encapsulated yeast species.     -CSF (6/30): OP35,CP13. <5 glucose, 174 protein, 144 WBC, 3000 RBC, 37 lymph, 59 mono/macro. Repeat CSF w/1:8 cryptococcal antigen, cx w/yeast.   -CTH 6/30 with slight  enlargement of the temporal horn of right lateral ventricle compared to prior MRI brain 06/23   -Primary team planning for repeat LP today with CTH to follow. Please obtain OP/CP and send for full CSF profile  -Ophthalmology following. Last eval 7/7 with stable OU, color plates full. Still 3+ ONH edema  -ID: Pt completed 2 weeks of flucytosine and amphotericin B 7/8. F/u ID recommendations following LP 7/8  -Will consider  shunt next week pending results of pts CSF cultures 7/8 vs external drain for management of elevated ICPs  -We will continue to follow. Please call NSGY with any decline in neuro exam. Would have a low threshold to transfer to ICU with any worsening visual deficits, decline in neuro exam or evidence for worsening ICPs  -Discussed with Dr. Edu Reynolds PA-C  Neurosurgery  [facility] - Telemetry Stepdown

## 2022-07-08 NOTE — SUBJECTIVE & OBJECTIVE
Medications Prior to Admission   Medication Sig Dispense Refill Last Dose    acetaminophen (TYLENOL) 325 MG tablet Take 2 tablets (650 mg total) by mouth every 6 (six) hours as needed (headache).  0     butalbital-acetaminophen-caffeine -40 mg (FIORICET, ESGIC) -40 mg per tablet Take 1 tablet by mouth daily as needed (migraines). 30 tablet 3     calcitRIOL (ROCALTROL) 0.25 MCG Cap Take 2 capsules (0.5 mcg total) by mouth once daily. 60 capsule 11     docusate sodium (COLACE) 100 MG capsule Take 1 capsule (100 mg total) by mouth 3 (three) times daily as needed for Constipation.  0     l-methylfolate-b2-b6-b12 (CEREFOLIN) 6-5-50-1 mg Tab Take 1 tablet by mouth once daily. 30 tablet 3     magnesium oxide (MAG-OX) 400 mg (241.3 mg magnesium) tablet Take 1 tablet (400 mg total) by mouth once daily. 30 tablet 3     mycophenolate (CELLCEPT) 250 mg Cap Take 2 capsules (500 mg total) by mouth 2 (two) times daily. 120 capsule 11     NIFEdipine (PROCARDIA-XL) 30 MG (OSM) 24 hr tablet TAKE ONE TABLET BY MOUTH TWICE DAILY hold if systolic blood pressure is less than 130 60 tablet 11     predniSONE (DELTASONE) 5 MG tablet Take 1 tablet (5 mg total) by mouth once daily. 120 tablet 11     sodium bicarbonate 650 MG tablet Take 2 tablets (1,300 mg total) by mouth 2 (two) times daily. 120 tablet 11     tacrolimus (PROGRAF) 1 MG Cap Take 3 capsules (3 mg total) by mouth every morning AND 2 capsules (2 mg total) every evening. 150 capsule 11     topiramate (TOPAMAX) 25 MG tablet Take 1 tablet (25 mg total) by mouth 2 (two) times daily.          Review of patient's allergies indicates:   Allergen Reactions    Sulfa (sulfonamide antibiotics) Hives       Past Medical History:   Diagnosis Date    Anemia of renal disease     ESRD on dialysis     Dr. Muhammad     Essential hypertension     PD catheter dysfunction 10/18/2018    Secondary hyperparathyroidism of renal origin      Past Surgical History:   Procedure Laterality Date    AV  FISTULA PLACEMENT Left     never matured    AV graft Right 2015     SECTION      x3; ; , and     COLONOSCOPY N/A 2020    Procedure: COLONOSCOPY;  Surgeon: Darrell Golden MD;  Location: [facility] ENDO;  Service: Endoscopy;  Laterality: N/A;    GASTRIC BYPASS  2016    KIDNEY TRANSPLANT N/A 2020    Procedure: TRANSPLANT, KIDNEY;  Surgeon: Megan Garcia MD;  Location: [address];  Service: Transplant;  Laterality: N/A;    KIDNEY TRANSPLANT Right 2020    LAPAROSCOPIC REVISION OF PROCEDURE INVOLVING PERITONEAL DIALYSIS CATHETER N/A 10/18/2018    Procedure: REVISION OF PROCEDURE INVOLVING PERITONEAL DIALYSIS CATHETER, LAPAROSCOPIC;  Surgeon: Hair Jimenez MD;  Location: [facility];  Service: General;  Laterality: N/A;    PERITONEAL CATHETER INSERTION N/A 2018    Procedure: Laparoscopic INSERTION, CATHETER, INTRAPERITONEAL;  Surgeon: Hair Jimenez MD;  Location: [facility];  Service: General;  Laterality: N/A;    PERITONEAL CATHETER REMOVAL N/A 2018    Procedure: REMOVAL, CATHETER, DIALYSIS, PERITONEAL;  Surgeon: Hair Jimenez MD;  Location: [facility] OR;  Service: General;  Laterality: N/A;    WOUND EXPLORATION N/A 2018    Procedure: EXPLORATION, WOUND-Surgical Site Irrigation;  Surgeon: Hair Jimenez MD;  Location: [facility] OR;  Service: General;  Laterality: N/A;     Family History       Problem Relation (Age of Onset)    Diabetes Mother    Down syndrome Son    Heart disease Mother    Hypertension Mother    Kidney disease Mother    Lupus Sister          Tobacco Use    Smoking status: Never Smoker    Smokeless tobacco: Never Used   Substance and Sexual Activity    Alcohol use: No    Drug use: No    Sexual activity: Yes     Partners: Male     Review of Systems  Objective:     Weight: 71.7 kg (158 lb 1.1 oz)  Body mass index is 30.87 kg/m².  Vital Signs (Most Recent):  Temp: 97.6 °F (36.4 °C) (22)  Pulse: (!) 56 (22)  Resp: 18 (22)  BP:  (!) 151/66 (07/08/22 0751)  SpO2: 98 % (07/08/22 0751)   Vital Signs (24h Range):  Temp:  [97.6 °F (36.4 °C)-98.5 °F (36.9 °C)] 97.6 °F (36.4 °C)  Pulse:  [55-63] 56  Resp:  [18] 18  SpO2:  [94 %-98 %] 98 %  BP: (138-179)/(64-71) 151/66                      Hemodialysis AV Graft Right forearm (Active)   Site Assessment Clean;Dry;Intact 07/30/20 0745   Patency Present;Thrill;Bruit 07/30/20 0745   Status Deaccessed 07/29/20 2010   Dressing Status Clean;Dry;Intact 10/18/18 0808   Site Condition No complications 07/30/20 0745   Dressing Open to air (None) 07/29/20 2010       Physical Exam  General: well developed, well nourished, no distress.   Head: normocephalic, atraumatic  Neurologic: Alert and oriented. Thought content appropriate.  GCS: Motor: 6/Verbal: 5/Eyes: 4 GCS Total: 15  Mental Status: Awake, Alert, Oriented x3  Cranial nerves: face symmetric, tongue midline, CN II-XII grossly intact. Hard of hearing   Eyes: pupils equal, round, reactive to light with accomodation, EOMI.   Sensory: intact to light touch throughout  Motor Strength: Moves all extremities spontaneously with good tone.  Full strength upper and lower extremities. No abnormal movements seen.     Strength  Deltoids Triceps Biceps Wrist Extension Wrist Flexion Hand    Upper: R 5/5 5/5 5/5 5/5 5/5 5/5    L 5/5 5/5 5/5 5/5 5/5 5/5     Iliopsoas Quadriceps Knee  Flexion Tibialis  anterior Gastro- cnemius EHL   Lower: R 5/5 5/5 5/5 5/5 5/5 5/5    L 5/5 5/5 5/5 5/5 5/5 5/5     DTR's - 2 + and symmetric in UE and LE  Pronator Drift: no drift noted  Finger-to-nose: Intact bilaterally  Aparicio: absent  Clonus: absent  Pulses: 2+ and symmetric radial and dorsalis pedis. No lower extremity edema      Significant Labs:  Recent Labs   Lab 07/07/22  0651 07/08/22  0428   * 139*    138   K 4.1 3.5    107   CO2 25 22*   BUN 24* 23*   CREATININE 1.3 1.3   CALCIUM 8.7 8.9     Recent Labs   Lab 07/07/22  0651 07/08/22 0428   WBC 4.56 4.72    HGB 11.2* 11.6*   HCT 33.1* 36.6*    181     Recent Labs   Lab 07/07/22  0651 07/08/22  0428   INR 1.1 1.1   APTT 41.7* 39.7*     Microbiology Results (last 7 days)       Procedure Component Value Units Date/Time    CSF culture [[id number]]     Order Status: No result Specimen: CSF (Spinal Fluid) from CSF Tap, Tube 2     AFB Culture & Smear [[id number]]     Order Status: No result Specimen: CSF (Spinal Fluid) from CSF Tap, Tube 3     CSF culture [[id number]] Collected: 06/30/22 1602    Order Status: Completed Specimen: CSF (Spinal Fluid) from CSF Tap, Tube 2 Updated: 07/04/22 0546     CSF CULTURE No Growth     Gram Stain Result No WBC's      Few yeast      Results called to and read back by:Leslee Andrews RN 06/30/2022  18:45    AFB Culture & Smear [[id number]] Collected: 06/30/22 1602    Order Status: Completed Specimen: CSF (Spinal Fluid) from CSF Tap, Tube 2 Updated: 07/01/22 2127     AFB Culture & Smear Culture in progress     AFB CULTURE STAIN No acid fast bacilli seen.    Cryptococcal antigen, CSF [[id number]]  (Abnormal) Collected: 06/30/22 1602    Order Status: Completed Specimen: CSF (Spinal Fluid) from CSF Tap, Tube 2 Updated: 07/01/22 1025     Crypto Ag, CSF Positive 1:8          All pertinent labs from the last 24 hours have been reviewed.    Significant Diagnostics:  I have reviewed all pertinent imaging results/findings within the past 24 hours.

## 2022-07-08 NOTE — ASSESSMENT & PLAN NOTE
- Last intracranial pressure of 34cm from LP at OSH  - Patient with papilledema, vomiting, AMS  - CTH w/developing hydrocephalus and signs of ventricular entrapment  - NSGY notified and consulted- no acute interventions at this time  - NCC notified- continue monitoring on floor  - S/p LP w/opening pressure of 35cm and closing pressure of 13cm 06/30/22  - S/p LP w/opening pressure of 27cm and closing pressure of 12cm 06/30/22  - Continue to monitor for signs and symptoms of worsening ICP  - Repeat CTH 07/01 relatively unchanged   -repeat CTH pending

## 2022-07-08 NOTE — SUBJECTIVE & OBJECTIVE
Subjective:   History of Present Illness:  56 y.o. female with a PMHx of HTN, kidney transplant in 2020, CKD3 admitted to [facility] on 06/22 with intractable HA starting about 2 months earlier and with dizziness, blurred vision, left-sided facial droop and dysarthria starting a few days before admission.    Per documentation   On admission /72, HR 69. Neurologic exam is remarkable for left-sided facial weakness and mild dysarthria.  Cr 1.32   LP (6/24) CSF Glu 6, CSF protein 93.  stain pos for yeast like organisms.  Spinal fluid culture pos for encapsulated yeast species  CT chest WO contrast pos for multiple indeterminate soft tissue masses in the left lower lobe with 2 smaller masses in the right lung which may be part of the same process.       On 06/26 patient started on amphotericin B liposomal (275 IV q.d.) and flucytosine (1500 p.o. q.6h) .  Hospitalization c/b the development of acute renal failure.  Cr 1.32 (6/22) > 1.4 (6/26) > 2.02 (6/27).  Transfer requested for higher level of care (KTM and ID).  Transfer diagnosis disseminated cryptococcal infection, TERESA, kidney XPL       Ms. Rivera is a 56 y.o. year old female who is status post Kidney Transplant - 4/5/2020  (#1).    Her maintenance immunosuppression consists of:   Immunosuppressants (From admission, onward)                Start     Stop Route Frequency Ordered    07/06/22 1800  tacrolimus capsule 3 mg         -- Oral 2 times daily 07/06/22 1628            Hospital Course:  Being treated for crypto meningitis     Interval History:  Repeat LP today with continued elevated opening pressure 25. CT performed post LP and pending decision on whether to place drain.  says doing better today, but minimal change in headache and variable hearing.    Past Medical, Surgical, Family, and Social History:   Unchanged from H&P.    Scheduled Meds:   acetaminophen  500 mg Oral Q24H    amphotericin B liposome (AMBISOME) IVPB  300 mg  Intravenous Q24H    calcitRIOL  0.5 mcg Oral Daily    diphenhydrAMINE  25 mg Oral Q24H    flucytosine  1,500 mg Oral Q6H    magnesium oxide  400 mg Oral Daily    multivitamin  1 tablet Oral Daily    NIFEdipine  90 mg Oral Daily    polyethylene glycol  17 g Oral BID    predniSONE  5 mg Oral Daily    senna-docusate 8.6-50 mg  1 tablet Oral BID    sodium bicarbonate  1,300 mg Oral TID    sodium chloride 0.9%  500 mL Intravenous Q24H    tacrolimus  3 mg Oral BID    topiramate  100 mg Oral BID     Continuous Infusions:   heparin (porcine) in D5W 21 Units/kg/hr (07/07/22 1209)     PRN Meds:acetaminophen, albuterol-ipratropium, bisacodyL, butalbital-acetaminophen-caffeine -40 mg, dextrose 5 %, glucose, glucose, heparin (PORCINE), heparin (PORCINE), hydrALAZINE, melatonin, naloxone, ondansetron, oxyCODONE-acetaminophen, sodium chloride 0.9%    Intake/Output - Last 3 Shifts         07/06 0700 07/07 0659 07/07 0700 07/08 0659 07/08 0700 07/09 0659    P.O. 950 100 640    Total Intake(mL/kg) 950 (13.2) 100 (1.4) 640 (8.9)    Urine (mL/kg/hr) 800 (0.5)      Stool 200      Total Output 1000      Net -50 +100 +640           Urine Occurrence  3 x     Stool Occurrence 1 x 2 x 6 x             Review of Systems   Constitutional:  Positive for activity change and fatigue.   HENT:  Positive for hearing loss.    Eyes: Negative.    Respiratory: Negative.     Cardiovascular:  Negative for leg swelling.   Gastrointestinal: Negative.    Endocrine: Negative.    Genitourinary: Negative.    Musculoskeletal: Negative.    Skin: Negative.    Neurological:  Positive for weakness and headaches (7-9/10). Negative for facial asymmetry.   Psychiatric/Behavioral: Negative.      Objective:     Vital Signs (Most Recent):  Temp: 98.8 °F (37.1 °C) (07/08/22 1519)  Pulse: (!) 58 (07/08/22 1519)  Resp: 18 (07/08/22 1519)  BP: (!) 187/75 (07/08/22 1519)  SpO2: 97 % (07/08/22 1519)   Vital Signs (24h Range):  Temp:  [97.6 °F (36.4 °C)-98.8 °F (37.1  °C)] 98.8 °F (37.1 °C)  Pulse:  [55-62] 58  Resp:  [18] 18  SpO2:  [95 %-98 %] 97 %  BP: (143-187)/(64-75) 187/75     Weight: 71.7 kg (158 lb 1.1 oz)  Height: 5' (152.4 cm)  Body mass index is 30.87 kg/m².    Physical Exam  Constitutional:       General: She is not in acute distress.     Appearance: She is obese. She is ill-appearing.   Eyes:      General: No scleral icterus.     Extraocular Movements: Extraocular movements intact.      Pupils: Pupils are equal, round, and reactive to light.   Cardiovascular:      Rate and Rhythm: Normal rate and regular rhythm.      Pulses: Normal pulses.      Heart sounds: No murmur heard.  Pulmonary:      Effort: Pulmonary effort is normal. No respiratory distress.   Abdominal:      General: There is no distension.      Palpations: Abdomen is soft.      Tenderness: There is no abdominal tenderness.   Musculoskeletal:      Right lower leg: No edema.      Left lower leg: No edema.   Skin:     Coloration: Skin is not jaundiced.   Neurological:      Mental Status: She is alert and oriented to person, place, and time.       Laboratory:  Labs within the past 24 hours have been reviewed.    Diagnostic Results:  None

## 2022-07-08 NOTE — ASSESSMENT & PLAN NOTE
- Cr 2.2 on day of transfer, has been steadily worsening over last few days (baseline ~1.2)  - KTM consulted, appreciate recs; likely prerenal versus medication related  - US transplant unremarkable for acute changes   - creatinine improved  - Continue sodium bicarb

## 2022-07-08 NOTE — PLAN OF CARE
ID staff plan of care    Repeat LP with OP of 27, WBC downtrending. Gram stain with yeast, CT head stable  She still has symptoms of Headache, decreased hearing.  neurosugery plans for VPS vs external drain on Monday  - continue ambisome/flucytosine for now, will follow  - discussed with team

## 2022-07-08 NOTE — PROCEDURES
Radiology Post-Procedure Note    Pre Op Diagnosis: cryptococcal meningitis    Post Op Diagnosis: Same    Procedure: lumbar puncture    Procedure performed by: Balwinder Nails MD; Marshall Martinez MD    Written Informed Consent Obtained: Yes    Specimen Removed: 16 mL CSF    CSF was clear. took out 16 ml  opening pressure 27 cm h2o  closing pressure 12 cm h2o    Estimated Blood Loss: Minimal    Findings: Following written informed consent and sterile prep and drape, a 22 gauge spinal needle was inserted at L3-L4 intralaminar space under fluoroscopic surveillance.  16 mL clear CSF removed and sent to the lab for further analysis.  There were no complications.    Patient tolerated procedure well.      Marshall Martinez MD  Radiology PGY-3  [facility]

## 2022-07-08 NOTE — PLAN OF CARE
[facility] - Telemetry Stepdown  Discharge Reassessment    Primary Care Provider: ANUJA Castellon MD    Expected Discharge Date: 7/11/2022    Reassessment (most recent)     Discharge Reassessment - 07/08/22 1312        Discharge Reassessment    Assessment Type Discharge Planning Reassessment     Discharge Plan A Home with family     Discharge Plan B Home Health     DME Needed Upon Discharge  other (see comments)   TBD    Discharge Barriers Identified None     Why the patient remains in the hospital Requires continued medical care        Post-Acute Status    Post-Acute Authorization Other     Other Status No Post-Acute Service Needs               Julia Young, RN  Ext [phone]

## 2022-07-08 NOTE — HPI
Mrs. Rivera is a 56yoF w/PMHx of kidney transplant (2020 [facility]) on Prograf and Cellcept admitted to [facility] on 06/22 with intractable HA starting about 2 months earlier and with dizziness, blurred vision, left-sided facial droop and dysarthria starting a few days before admission. 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 LP (6/24) with OP of 34,  stain pos for yeast like organisms and CSF culture pos for encapsulated yeast species. Ophthalmology was consulted for complaint of visual disturbance in the setting of elevated ICP.  No infectious nidus was found in retina or vitreous, however pt was found to have grade 3 palpilledema bilaterally. CTH performed 06/30 showed slight interval enlargement of the temporal horn of the right lateral ventricle since the recent MRI of 06/23/2022.  Repeat LP 06/30- opening pressure of 35cm- closing pressure of 13cm. Patient w/much improvement in signs and symptoms post LP. NCC evaluated, but given improvement in symptoms, NCC recommended continued observation on the floor. Antibiotic end date is today and primary team planning to repeat LP and CTH. Pt currently reports improvement in her HA and her visual symptoms, however continues with hearing loss and intermittent nausea. NSGY consulted for consideration of EVD placement for elevated ICPs.

## 2022-07-08 NOTE — SUBJECTIVE & OBJECTIVE
Interval History: Patient was seen and evaluated this am.  Lumbar puncture completed earlier this morning.  HA still present; no significant improvement. Denies nausea.  In terms of hearing loss, patient reports incremental improvement today.      No fevers, chills, night sweats, abd pain, vomiting, new vision changes.     Objective:     Vital Signs (Most Recent):  Temp: 97.6 °F (36.4 °C) (07/08/22 0751)  Pulse: (!) 56 (07/08/22 0751)  Resp: 18 (07/08/22 0751)  BP: (!) 151/66 (07/08/22 0751)  SpO2: 98 % (07/08/22 0751)   Vital Signs (24h Range):  Temp:  [97.6 °F (36.4 °C)-98.5 °F (36.9 °C)] 97.6 °F (36.4 °C)  Pulse:  [55-63] 56  Resp:  [18] 18  SpO2:  [94 %-98 %] 98 %  BP: (143-179)/(64-71) 151/66     Weight: 71.7 kg (158 lb 1.1 oz)  Body mass index is 30.87 kg/m².  No intake or output data in the 24 hours ending 07/08/22 1455     Physical Exam  Gen: in NAD, appears stated age  Neuro: awake and oriented to self, place, time  HEENT: NTNC, EOMI, PERRL, MMM  CVS: RRR, no m/r/g; S1/S2 auscultated with no S3 or S4; capillary refill < 2 sec  Resp: lungs CTAB, no w/r/r; no belabored breathing or accessory muscle use appreciated   Abd: BS+ in all 4 quadrants; NTND, soft to palpation; no organomegaly appreciated   Extrem: pulses full, equal, and regular over all 4 extremities; no swelling of BL UE or LE ext    Significant Labs: All pertinent labs within the past 24 hours have been reviewed.  Blood Culture: No results for input(s): LABBLOO in the last 48 hours.    CSF findings 06/30/22: <5glucose, 174 protein, 144 WBC, 3000 RBC, 37 lymph, 59 mono/macro. Repeat CSF w/1:8 cryptococcal antigen, CSF culture w/yeast    CBC:   Recent Labs   Lab 07/07/22  0651 07/08/22  0428   WBC 4.56 4.72   HGB 11.2* 11.6*   HCT 33.1* 36.6*    181     CMP:   Recent Labs   Lab 07/07/22  0651 07/08/22  0428    138   K 4.1 3.5    107   CO2 25 22*   * 139*   BUN 24* 23*   CREATININE 1.3 1.3   CALCIUM 8.7 8.9   PROT 5.5* 5.4*    ALBUMIN 3.1* 3.2*   BILITOT 0.3 0.5   ALKPHOS 53* 52*   AST 19 10   ALT 13 11   ANIONGAP 7* 9   EGFRNONAA 46.6* 46.6*     Magnesium:   No results for input(s): MG in the last 48 hours.    Urine Culture: No results for input(s): LABURIN in the last 48 hours.  Urine Studies:   No results for input(s): COLORU, APPEARANCEUA, PHUR, SPECGRAV, PROTEINUA, GLUCUA, KETONESU, BILIRUBINUA, OCCULTUA, NITRITE, UROBILINOGEN, LEUKOCYTESUR, RBCUA, WBCUA, BACTERIA, SQUAMEPITHEL, HYALINECASTS in the last 48 hours.    Invalid input(s): WRIGHTSUR      Significant Imaging: I have reviewed all pertinent imaging results/findings within the past 24 hours.    CTH 06/30/22:  FINDINGS:  Intracranial compartment:     Mild enlargement of the temporal horn of the right lateral ventricle, new from prior.  Remainder of the ventricular system is normal in size, stable.  Third ventricle diameter on the order of 0.3 cm.  Well-defined sulci remain present over the cerebral convexities.     The brain parenchyma appears otherwise within normal limits, and unchanged from priors.  No new parenchymal hemorrhage, edema or major vascular distribution infarct.  No intracranial mass effect or midline shift.     No new extra-axial blood or fluid collections.     Partially empty sella configuration.     Skull/extracranial contents (limited evaluation):     No displaced calvarial fracture.     Small volume right mastoid air cell fluid.     The visualized paranasal sinuses are essentially clear.     Impression:     Slight interval enlargement of the temporal horn of the right lateral ventricle since the recent MRI of 06/23/2022.  Appearance suggestive of early/developing hydrocephalus or ventricular entrapment.     No new hemorrhage, edema or infarction.     Partially empty sella configuration.  Nonspecific but configuration could be consistent with the reported history of a elevated intracranial pressure.    CTH 07/01/22:  FINDINGS:  Intracranial compartment:      Mild enlargement of the temporal horn of the right lateral ventricle similar to most recent CT head 06/30/2022, new compared to prior MRI brain 06/23/2022.  Third ventricle diameter measures approximately 4 mm.     The brain parenchyma appears within normal limits not significantly changed compared to prior exam.  No parenchymal mass, hemorrhage, edema or major vascular distribution infarct.     No extra-axial blood or fluid collections.     Partially empty sella configuration.     Skull/extracranial contents (limited evaluation):     No fracture. Paranasal sinuses are essentially clear.  Small volume fluid within the right mastoid air cells.     Impression:     Slight enlargement of the temporal horn of right lateral ventricle not significantly changed compared to most recent CT head 06/30/2022,  new compared to prior MRI brain 06/23/2022.  Clinical correlation and continued close follow-up is recommended as developing hydrocephalus or ventricular entrapment remains a concern.     No new acute infarct or hemorrhage.    Doppler US of LLE:  FINDINGS:  Left thigh veins: Near occlusive thrombus within the left popliteal vein.  The common femoral, femoral, and upper greater saphenous veins are patent and free of thrombus. The veins are normally compressible and have normal phasic flow and augmentation response.     Left calf veins: Partially occlusive thrombus within the posterior tibial veins.  Partially occlusive thrombus with 1 of the peroneal veins.  Anterior tibial veins are patent.     Contralateral CFV: The contralateral (right) common femoral vein is patent and free of thrombus.     Miscellaneous: None     Impression:     Deep venous thrombosis within the left popliteal, posterior tibial, and peroneal veins.

## 2022-07-08 NOTE — PROGRESS NOTES
[facility] - Telemetry Stepdown  Kidney Transplant  Progress Note      Reason for Follow-up: Reassessment of Kidney Transplant - 4/5/2020  (#1) recipient and management of immunosuppression.    ORGAN: LEFT KIDNEY    Donor Type: Donation after Brain Death    PHS Increased Risk: yes       Subjective:   History of Present Illness:  56 y.o. female with a PMHx of HTN, kidney transplant in 2020, CKD3 admitted to [facility] on 06/22 with intractable HA starting about 2 months earlier and with dizziness, blurred vision, left-sided facial droop and dysarthria starting a few days before admission.    Per documentation   On admission /72, HR 69. Neurologic exam is remarkable for left-sided facial weakness and mild dysarthria.  Cr 1.32   LP (6/24) CSF Glu 6, CSF protein 93.  stain pos for yeast like organisms.  Spinal fluid culture pos for encapsulated yeast species  CT chest WO contrast pos for multiple indeterminate soft tissue masses in the left lower lobe with 2 smaller masses in the right lung which may be part of the same process.       On 06/26 patient started on amphotericin B liposomal (275 IV q.d.) and flucytosine (1500 p.o. q.6h) .  Hospitalization c/b the development of acute renal failure.  Cr 1.32 (6/22) > 1.4 (6/26) > 2.02 (6/27).  Transfer requested for higher level of care (KTM and ID).  Transfer diagnosis disseminated cryptococcal infection, TERESA, kidney XPL       Ms. Rivera is a 56 y.o. year old female who is status post Kidney Transplant - 4/5/2020  (#1).    Her maintenance immunosuppression consists of:   Immunosuppressants (From admission, onward)                Start     Stop Route Frequency Ordered    07/06/22 1800  tacrolimus capsule 3 mg         -- Oral 2 times daily 07/06/22 8169            Hospital Course:  Being treated for crypto meningitis     Interval History:  Repeat LP today with continued elevated opening pressure 25. CT performed post LP and pending decision on whether to  place drain.  says doing better today, but minimal change in headache and variable hearing.    Past Medical, Surgical, Family, and Social History:   Unchanged from H&P.    Scheduled Meds:   acetaminophen  500 mg Oral Q24H    amphotericin B liposome (AMBISOME) IVPB  300 mg Intravenous Q24H    calcitRIOL  0.5 mcg Oral Daily    diphenhydrAMINE  25 mg Oral Q24H    flucytosine  1,500 mg Oral Q6H    magnesium oxide  400 mg Oral Daily    multivitamin  1 tablet Oral Daily    NIFEdipine  90 mg Oral Daily    polyethylene glycol  17 g Oral BID    predniSONE  5 mg Oral Daily    senna-docusate 8.6-50 mg  1 tablet Oral BID    sodium bicarbonate  1,300 mg Oral TID    sodium chloride 0.9%  500 mL Intravenous Q24H    tacrolimus  3 mg Oral BID    topiramate  100 mg Oral BID     Continuous Infusions:   heparin (porcine) in D5W 21 Units/kg/hr (07/07/22 1209)     PRN Meds:acetaminophen, albuterol-ipratropium, bisacodyL, butalbital-acetaminophen-caffeine -40 mg, dextrose 5 %, glucose, glucose, heparin (PORCINE), heparin (PORCINE), hydrALAZINE, melatonin, naloxone, ondansetron, oxyCODONE-acetaminophen, sodium chloride 0.9%    Intake/Output - Last 3 Shifts         07/06 0700  07/07 0659 07/07 0700 07/08 0659 07/08 0700  07/09 0659    P.O. 950 100 640    Total Intake(mL/kg) 950 (13.2) 100 (1.4) 640 (8.9)    Urine (mL/kg/hr) 800 (0.5)      Stool 200      Total Output 1000      Net -50 +100 +640           Urine Occurrence  3 x     Stool Occurrence 1 x 2 x 6 x             Review of Systems   Constitutional:  Positive for activity change and fatigue.   HENT:  Positive for hearing loss.    Eyes: Negative.    Respiratory: Negative.     Cardiovascular:  Negative for leg swelling.   Gastrointestinal: Negative.    Endocrine: Negative.    Genitourinary: Negative.    Musculoskeletal: Negative.    Skin: Negative.    Neurological:  Positive for weakness and headaches (7-9/10). Negative for facial asymmetry.    Psychiatric/Behavioral: Negative.      Objective:     Vital Signs (Most Recent):  Temp: 98.8 °F (37.1 °C) (07/08/22 1519)  Pulse: (!) 58 (07/08/22 1519)  Resp: 18 (07/08/22 1519)  BP: (!) 187/75 (07/08/22 1519)  SpO2: 97 % (07/08/22 1519)   Vital Signs (24h Range):  Temp:  [97.6 °F (36.4 °C)-98.8 °F (37.1 °C)] 98.8 °F (37.1 °C)  Pulse:  [55-62] 58  Resp:  [18] 18  SpO2:  [95 %-98 %] 97 %  BP: (143-187)/(64-75) 187/75     Weight: 71.7 kg (158 lb 1.1 oz)  Height: 5' (152.4 cm)  Body mass index is 30.87 kg/m².    Physical Exam  Constitutional:       General: She is not in acute distress.     Appearance: She is obese. She is ill-appearing.   Eyes:      General: No scleral icterus.     Extraocular Movements: Extraocular movements intact.      Pupils: Pupils are equal, round, and reactive to light.   Cardiovascular:      Rate and Rhythm: Normal rate and regular rhythm.      Pulses: Normal pulses.      Heart sounds: No murmur heard.  Pulmonary:      Effort: Pulmonary effort is normal. No respiratory distress.   Abdominal:      General: There is no distension.      Palpations: Abdomen is soft.      Tenderness: There is no abdominal tenderness.   Musculoskeletal:      Right lower leg: No edema.      Left lower leg: No edema.   Skin:     Coloration: Skin is not jaundiced.   Neurological:      Mental Status: She is alert and oriented to person, place, and time.       Laboratory:  Labs within the past 24 hours have been reviewed.    Diagnostic Results:  None    Assessment/Plan:     * Cryptococcal meningitis  ID following and is on ampho B and flucytosine   Seen by Neuro and Opthal    VTE (venous thromboembolism)  LLE popliteal VTE likely secondary to hospitalization and immobility  On heparin      Acute renal failure superimposed on stage 3 chronic kidney disease  TERESA on underlying CKD stage III   Likely pre-renal or medication related   BL 1.2 to 1.4 mg/dl   Now at baseline    Headache  Likely from increased intracranial  "pressure  Repeat LP with elevated pressure may require drain    Long-term use of immunosuppressant medication  Goal tacro 4-6 trough  Hold MMF  Continue 5  Continue home tacro 3/3      S/P kidney transplant  Kidney transplant 4/5/2020   Underlying ckd stage III  ESRD related to HTN  Continue immunosuppression as per problem "long term use of immunosuppression medication"            Roberto Eisenberg Jr., MD  Kidney Transplant  [facility] - Telemetry Stepdown  "

## 2022-07-08 NOTE — ASSESSMENT & PLAN NOTE
- Pt with intractable headache, facial droop, dysarthria (improved). 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  - Continue Amp B 5/mg kg Q24hrs and flucytosine 1500 mg Q6hrs for treatment.   - On chart review, amphotericin started on 06/24/22- 2wks of treatment 07/08/22  - Repeat LP completed 7/9; gram stain notable for yeast  -Transplant ID following- continue with current course- patient signs and symptoms much improved since time of admission, will hold of on steroids- no signs of IRIS, will continue to monitor for need for repeat LP  - KTM following; recommend  500mL normal saline with amphotericin

## 2022-07-09 NOTE — CLINICAL REVIEW
KTM Chart Check      Intake/Output Summary (Last 24 hours) at 7/9/2022 1021  Last data filed at 7/9/2022 0600  Gross per 24 hour   Intake 640 ml   Output 700 ml   Net -60 ml       Vitals:    07/09/22 0418 07/09/22 0557 07/09/22 0821 07/09/22 1013   BP: (!) 151/68  (!) 168/70    BP Location: Right leg  Right leg    Patient Position: Lying  Lying    Pulse: (!) 56  67    Resp: 17 17 18 18   Temp: 98 °F (36.7 °C)  98.5 °F (36.9 °C)    TempSrc: Oral  Oral    SpO2: 96%  (!) 94%    Weight:       Height:           Recent Labs   Lab 07/03/22  1002 07/04/22  0507 07/05/22  0423 07/07/22  0651 07/08/22  0428 07/09/22  0205   * 138 131* 136 138 138   K 3.2* 3.8 3.7 4.1 3.5 3.7    108 101 104 107 107   CO2 20* 23 24 25 22* 22*   BUN 25* 27* 23* 24* 23* 23*   CREATININE 1.5* 1.4 1.2 1.3 1.3 1.4   CALCIUM 9.1 8.9 9.0 8.7 8.9 9.1   PHOS 4.1 3.6 3.3  --   --   --      Encounter for Monitoring Immunosuppression post Transplant  Recent Labs   Lab 07/07/22  0651 07/08/22  0428 07/09/22  0205   Tacrolimus Lvl 3.9 L 5.0 7.3     -Target level for Tala is 4-6  -Level is NOT trough [given @2113 last night and drawn this AM 0205. Thus no change.  -Monitor for side effects and toxicities, given narrow therapeutic window and significant risk of AE     Please call KTM as needed; Will continue to follow.

## 2022-07-09 NOTE — PROGRESS NOTES
[facility] - Telemetry [facility] Medicine  Progress Note    Patient Name: Tala Rivera  MRN: [id number]  Patient Class: IP- Inpatient   Admission Date: 6/27/2022  Length of Stay: 12 days  Attending Physician: Joanne Braden MD  Primary Care Provider: ANUJA Castellon MD        Subjective:     Principal Problem:Cryptococcal meningitis        HPI:  Mrs. Rivera is a 56yoF w/PMHx of kidney XPL (2020 [facility]) on Prograf and Cellcept admitted to [facility] on 06/22 with intractable HA starting about 2 months earlier and with dizziness, blurred vision, left-sided facial droop and dysarthria starting a few days before admission.       On admission /72, HR 69. Neurologic exam is remarkable for left-sided facial weakness and mild dysarthria.  Labs (6/22) WBC 9.4, Hb 15.9, Plts 224, Na 137, K 3.3, CO2 25, BUN 24, Cr 1.32, AG 14, Glu 182 LFTs WNL. COVID neg     LP (6/24) CSF Glu 6, CSF protein 93. Qatari stain pos for yeast like organisms.  Spinal fluid culture pos for encapsulated yeast species.       CT chest WO contrast pos for multiple indeterminate soft tissue masses in the left lower lobe with 2 smaller masses in the right lung which may be part of the same process.       On 06/26 patient started on amphotericin B liposomal (275 IV q.d.) and flucytosine (1500 p.o. q.6h) .  Hospitalization c/b the development of acute renal failure.  Cr 1.32 (6/22) > 1.4 (6/26) > 2.02 (6/27).  Cellcept and prednisone have been held.       Transfer requested for higher level of care (KTM and ID).  Transfer diagnosis disseminated cryptococcal infection, TERESA, kidney XPL.    At time of my assessment, pt. Complains of headache which has been persistent for the last several days. She states that it is somewhat relieved by the fioricet and oxycodone that she was receiving at the outside hospital.      Overview/Hospital Course:  KTM, transplant ID, Neurology consulted upon admission. Per KTN, normal saline scheduled with  amphotericin infusions in addition to bicarb drip, prednisone increased, Prograf continued.  Creatinine down-trending and bicarb drip discontinued.  Per Neurology, outside hospital MR MRI reviewed and note that neuro deficits may take a few weeks to fully resolve; recommend limiting Fioricet and narcotics to minimize medication overuse/rebound headaches.  Per Transplant ID, continue ambisome/flucytosine with plan to repeat LP 2 weeks from 6/24.  Due to visual disturbance in the setting of fungal meningitis, ophthalmology consulted.  No infectious nidus found in retina or vitreous, however grade 3 disc edema bilaterally. Exam finding and subjective complaints explained by meningitis and papilledema.  Recommendation for repeating LP to reduce opening pressure per ID and Neurology in agreement.  On 06/29, patient hearing improved.  Remains with intermittent confusion per  at bedside.    CTH performed 06/30- Slight interval enlargement of the temporal horn of the right lateral ventricle since the recent MRI of 06/23/2022.  Appearance suggestive of early/developing hydrocephalus or ventricular entrapment. NSGY consulted. Per NSGY, recommended NCC transfer for further evaluation. Patient underwent repeat LP 06/30- opening pressure of 35cm- closing pressure of 13cm. Patient w/much improvement in signs and symptoms. CSF findings: <5glucose, 174 protein, 144 WBC, 3000 RBC, 37 lymph, 59 mono/macro. Repeat CSF w/1:8 cryptococcal antigen, CSF culture w/yeast. NCC evaluated, but patient with much improvement in signs and symptoms, and so NCC recommended continued observation on the floor. Discussed w/ID- stated to continue to monitor for worsening signs and symptoms- with closing pressure <25cm, no need for serial LP's at this time.     Doppler US performed on LLE- found to have DVT. Started on heparin drip. Planning for repeat LP 07/07.  Heparin drip paused and repeat lumbar puncture completed on 07/07.  Per Radiology,  CSF was clear to gout 16 mL total; opening pressure 27, closing pressure 12. Gram stain notable for few yeast.  Repeat CT head ordered with stable findings.  Neurosurgery consulted; Considering  shunt placement pending clearance of CSF and decrease in CSF protein.  Recommend ongoing serial LPs for now.      Interval History: Patient was seen and evaluated this am. HA still present; no significant improvement or any worsening.  Intermittent nausea.  Remains with a degree of hearing loss; no worsening or improvement.    No fevers, chills, night sweats, abd pain, vomiting, new vision changes.     Objective:     Vital Signs (Most Recent):  Temp: 98.5 °F (36.9 °C) (07/09/22 0821)  Pulse: 67 (07/09/22 0821)  Resp: 18 (07/09/22 1013)  BP: (!) 168/70 (07/09/22 0821)  SpO2: (!) 94 % (07/09/22 0821)   Vital Signs (24h Range):  Temp:  [96.3 °F (35.7 °C)-99 °F (37.2 °C)] 98.5 °F (36.9 °C)  Pulse:  [56-67] 67  Resp:  [17-19] 18  SpO2:  [94 %-97 %] 94 %  BP: (145-187)/(67-75) 168/70     Weight: 71.7 kg (158 lb 1.1 oz)  Body mass index is 30.87 kg/m².    Intake/Output Summary (Last 24 hours) at 7/9/2022 1317  Last data filed at 7/9/2022 0600  Gross per 24 hour   Intake 400 ml   Output 700 ml   Net -300 ml        Physical Exam  Gen: in NAD, appears stated age  Neuro: awake and oriented to self, place, time  HEENT: NTNC, EOMI, PERRL, MMM  CVS: RRR, no m/r/g; S1/S2 auscultated with no S3 or S4; capillary refill < 2 sec  Resp: lungs CTAB, no w/r/r; no belabored breathing or accessory muscle use appreciated   Abd: BS+ in all 4 quadrants; NTND, soft to palpation; no organomegaly appreciated   Extrem: pulses full, equal, and regular over all 4 extremities; no swelling of BL UE or LE ext    Significant Labs: All pertinent labs within the past 24 hours have been reviewed.  Blood Culture: No results for input(s): LABBLOO in the last 48 hours.    CSF findings 06/30/22: <5glucose, 174 protein, 144 WBC, 3000 RBC, 37 lymph, 59 mono/macro.  Repeat CSF w/1:8 cryptococcal antigen, CSF culture w/yeast    CBC:   Recent Labs   Lab 07/08/22  0428 07/09/22  0205   WBC 4.72 5.93   HGB 11.6* 11.2*   HCT 36.6* 34.0*    156     CMP:   Recent Labs   Lab 07/08/22  0428 07/09/22  0205    138   K 3.5 3.7    107   CO2 22* 22*   * 110   BUN 23* 23*   CREATININE 1.3 1.4   CALCIUM 8.9 9.1   PROT 5.4* 5.7*   ALBUMIN 3.2* 3.3*   BILITOT 0.5 0.5   ALKPHOS 52* 55   AST 10 11   ALT 11 11   ANIONGAP 9 9   EGFRNONAA 46.6* 42.6*     Magnesium:   No results for input(s): MG in the last 48 hours.    Urine Culture: No results for input(s): LABURIN in the last 48 hours.  Urine Studies:   No results for input(s): COLORU, APPEARANCEUA, PHUR, SPECGRAV, PROTEINUA, GLUCUA, KETONESU, BILIRUBINUA, OCCULTUA, NITRITE, UROBILINOGEN, LEUKOCYTESUR, RBCUA, WBCUA, BACTERIA, SQUAMEPITHEL, HYALINECASTS in the last 48 hours.    Invalid input(s): WRIGHTSUR      Significant Imaging: I have reviewed all pertinent imaging results/findings within the past 24 hours.    CT 06/30/22:  FINDINGS:  Intracranial compartment:     Mild enlargement of the temporal horn of the right lateral ventricle, new from prior.  Remainder of the ventricular system is normal in size, stable.  Third ventricle diameter on the order of 0.3 cm.  Well-defined sulci remain present over the cerebral convexities.     The brain parenchyma appears otherwise within normal limits, and unchanged from priors.  No new parenchymal hemorrhage, edema or major vascular distribution infarct.  No intracranial mass effect or midline shift.     No new extra-axial blood or fluid collections.     Partially empty sella configuration.     Skull/extracranial contents (limited evaluation):     No displaced calvarial fracture.     Small volume right mastoid air cell fluid.     The visualized paranasal sinuses are essentially clear.     Impression:     Slight interval enlargement of the temporal horn of the right lateral ventricle  since the recent MRI of 06/23/2022.  Appearance suggestive of early/developing hydrocephalus or ventricular entrapment.     No new hemorrhage, edema or infarction.     Partially empty sella configuration.  Nonspecific but configuration could be consistent with the reported history of a elevated intracranial pressure.    CTH 07/01/22:  FINDINGS:  Intracranial compartment:     Mild enlargement of the temporal horn of the right lateral ventricle similar to most recent CT head 06/30/2022, new compared to prior MRI brain 06/23/2022.  Third ventricle diameter measures approximately 4 mm.     The brain parenchyma appears within normal limits not significantly changed compared to prior exam.  No parenchymal mass, hemorrhage, edema or major vascular distribution infarct.     No extra-axial blood or fluid collections.     Partially empty sella configuration.     Skull/extracranial contents (limited evaluation):     No fracture. Paranasal sinuses are essentially clear.  Small volume fluid within the right mastoid air cells.     Impression:     Slight enlargement of the temporal horn of right lateral ventricle not significantly changed compared to most recent CT head 06/30/2022,  new compared to prior MRI brain 06/23/2022.  Clinical correlation and continued close follow-up is recommended as developing hydrocephalus or ventricular entrapment remains a concern.     No new acute infarct or hemorrhage.    Doppler US of LLE:  FINDINGS:  Left thigh veins: Near occlusive thrombus within the left popliteal vein.  The common femoral, femoral, and upper greater saphenous veins are patent and free of thrombus. The veins are normally compressible and have normal phasic flow and augmentation response.     Left calf veins: Partially occlusive thrombus within the posterior tibial veins.  Partially occlusive thrombus with 1 of the peroneal veins.  Anterior tibial veins are patent.     Contralateral CFV: The contralateral (right) common  femoral vein is patent and free of thrombus.     Miscellaneous: None     Impression:     Deep venous thrombosis within the left popliteal, posterior tibial, and peroneal veins.    Microbiology Results (last 7 days)     Procedure Component Value Units Date/Time    CSF culture [[id number]] Collected: 07/08/22 1019    Order Status: Completed Specimen: CSF (Spinal Fluid) from CSF Tap, Tube 2 Updated: 07/09/22 0737     CSF CULTURE No Growth to date     Gram Stain Result Cytospin indicates: Few yeast      No WBC's      Results called to and read back by: Lyndsay Granados 07/08/2022  13:13    AFB Culture & Smear [[id number]] Collected: 07/08/22 1019    Order Status: Sent Specimen: CSF (Spinal Fluid) from CSF Tap, Tube 2 Updated: 07/08/22 1046    CSF culture [[id number]] Collected: 06/30/22 1602    Order Status: Completed Specimen: CSF (Spinal Fluid) from CSF Tap, Tube 2 Updated: 07/04/22 0546     CSF CULTURE No Growth     Gram Stain Result No WBC's      Few yeast      Results called to and read back by:Leslee Andrews RN 06/30/2022  18:45          Assessment/Plan:      * Cryptococcal meningitis  - Pt with intractable headache, facial droop, dysarthria (improved). 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  - Continue Amp B 5/mg kg Q24hrs and flucytosine 1500 mg Q6hrs for treatment.   - On chart review, amphotericin started on 06/24/22- 2wks of treatment 07/08/22  - Repeat LP completed 7/9; gram stain notable for yeast; and amphotericin continued  -Transplant ID following- continue with current course- patient signs and symptoms much improved since time of admission, will hold of on steroids- no signs of IRIS, will continue to monitor for need for repeat LP  - KTM following; recommend  500mL normal saline with amphotericin     Acute renal failure superimposed on stage 3 chronic kidney disease  - Cr 2.2 on day of transfer, has been steadily worsening over last few days (baseline  "~1.2)  - KTM consulted, appreciate recs; likely prerenal versus medication related  - US transplant unremarkable for acute changes   - creatinine improved  - Continue sodium bicarb    VTE (venous thromboembolism)  - continue heparin drip for DVT of the LLE      Increased intracranial pressure  - Last intracranial pressure of 34cm from LP at OSH  - Patient with papilledema, vomiting, AMS  - CTH w/developing hydrocephalus and signs of ventricular entrapment  - NSGY notified and consulted- no acute interventions at this time  - NCC notified- continue monitoring on floor  - S/p LP w/opening pressure of 35cm and closing pressure of 13cm 06/30/22  - S/p LP w/opening pressure of 27cm and closing pressure of 12cm 06/30/22  - Continue to monitor for signs and symptoms of worsening ICP  - Repeat CTH 07/01 relatively unchanged   -repeat CTH 7/8 without significant change  -neurosurgery consulted and following; Considering  shunt placement pending clearance of CSF and decrease in CSF protein; Recommend continuing serial LPs for now      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  - Improving  - See papilledema-- spoke w/ophthalmology- expect papilledema to improve over the course of 2wks if infection remains well-controlled and ICP remains wnl      Bilateral papilledema due to raised intracranial pressure  - Ophthalmology evaluation with grade 3 papilledema in both eyes (longer she has elevated CSF pressure, the higher her risk of permanent optic nerve damage and vision change is.  There is a congested appearance to the veins in both eyes secondary to optic nerve congestion and indicates higher risk for ischemic event), 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  -"- 7/7 exam, vision stable OU, color plates full. Still 3+ ONH edema, and we do not expect this to rapidly improve as there is often delay of resolution of ONH edema following improvement in ICP "  - see increased intracranial pressure    Headache  - history of intractable headache  - continue topamax to 100mg BID   - " 1x dose of sumatriptan 07/04 w/no improvement      Hypokalemia  - PO repletion prn to goal      Long-term use of immunosuppressant medication  - Pt with opportunistic infection, see above. Holding mycophenolate   - KTM following, appreciate recs  Continue prograf per levels   Monitor levels to assess for toxicity  Continue prednisone ---> increased to 10mg daily   Hold MMF         S/P kidney transplant  - See TERESA. KTM consulted for further assistance  Kidney transplant 4/5/2020   Underlying ckd stage III  ESRD related to HTN        VTE Risk Mitigation (From admission, onward)         Ordered     heparin 25,000 units in dextrose 5% (100 units/ml) IV bolus from bag - ADDITIONAL PRN BOLUS - 60 units/kg  As needed (PRN)        Question:  Heparin Infusion Adjustment (DO NOT MODIFY ANSWER)  Answer:  \\[email or web address]\epic\Images\Pharmacy\HeparinInfusions\heparin HIGH INTENSITY nomogram for [facility] [id number]    07/03/22 1205     heparin 25,000 units in dextrose 5% (100 units/ml) IV bolus from bag - ADDITIONAL PRN BOLUS - 30 units/kg  As needed (PRN)        Question:  Heparin Infusion Adjustment (DO NOT MODIFY ANSWER)  Answer:  \\[email or web address]\epic\Images\Pharmacy\HeparinInfusions\heparin HIGH INTENSITY nomogram for [facility] [id number]    07/03/22 1205     heparin 25,000 units in dextrose 5% 250 mL (100 units/mL) infusion HIGH INTENSITY nomogram - [facility]  Continuous        Question Answer Comment   Heparin Infusion Adjustment (DO NOT MODIFY ANSWER) [email or web address]\epic\Images\Pharmacy\HeparinInfusions\heparin HIGH INTENSITY nomogram for [facility] [id number]    Begin at (in units/kg/hr) 18        07/03/22 1205     IP VTE HIGH RISK PATIENT  Once         06/27/22 2202     Place sequential compression device  Until discontinued         06/27/22 2202                Discharge Planning   JULITO: 7/13/2022     Code Status: Full Code   Is the patient medically ready for discharge?: No    Reason for patient still in hospital (select all that apply): Patient  trending condition, Laboratory test, Treatment, Consult recommendations and Pending disposition  Discharge Plan A: Home with family                  Joanne Braden MD  Department of Hospital Medicine   [facility] - Telemetry Stepdown

## 2022-07-09 NOTE — ASSESSMENT & PLAN NOTE
55 yo female s/p kidney transplant (2020) on Prograf, admitted with cryptococcal meningitis s/p 2 weeks of amphotericin B and flucytosine. LP x 3 with elevated OP and papilledema on ophthalmology exam. NSGY consulted for consideration of drain placement for elevated ICPs. Pt is currently neurologically stable. Of note, pt is on hep gtt for acute DVT    -Imaging and diagnostics reviewed   -CSF (6/24) OP34, Glu 6, protein 93. Macanese stain pos for yeast like organisms.  Cx pos for encapsulated yeast species.     -CSF (6/30): OP35,CP13. G<5, P174, 144 WBC, 3000 RBC, 37 lymph, 59 mono/macro. Repeat CSF w/1:8 cryptococcal antigen, cx w/yeast.   -CSF (7/8): OP27, CP12. G<15,P162, CSF cx pending, gram stain with yeast    -CTH 6/30 with slight enlargement of the temporal horn of right lateral ventricle compared to prior MRI brain 06/23    -CTH 7/8 without significant change  -Ophthalmology following. Last eval 7/7 with stable OU, color plates full. Still 3+ ONH edema  -ID: Pt continued on flucytosine and amphotericin B 7/8. F/u ongoing ID recommendations  -Considering  shunt palcement pending clearance of CSF and decrease in CSF protein  -She is tolerating serial LPs while being optimized for eventual shunt placement. Would recommend continuing serial LPs for now  -We will continue to follow. Please call NSGY with any decline in neuro exam. Would have a low threshold to transfer to ICU with any worsening visual deficits, decline in neuro exam or signs of worsening ICPs  -Discussed with Dr. Sorensen

## 2022-07-09 NOTE — NURSING
Notified MD Pili heparin GTT has not been running and results of aPTT received at 0344 was 23.4. Per MD, hold GTT for 24hrs from LP. Will pass along to day RN.

## 2022-07-09 NOTE — ASSESSMENT & PLAN NOTE
- Pt with intractable headache, facial droop, dysarthria (improved). 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  - Continue Amp B 5/mg kg Q24hrs and flucytosine 1500 mg Q6hrs for treatment.   - On chart review, amphotericin started on 06/24/22- 2wks of treatment 07/08/22  - Repeat LP completed 7/9; gram stain notable for yeast; and amphotericin continued  -Transplant ID following- continue with current course- patient signs and symptoms much improved since time of admission, will hold of on steroids- no signs of IRIS, will continue to monitor for need for repeat LP  - KTM following; recommend  500mL normal saline with amphotericin

## 2022-07-09 NOTE — ASSESSMENT & PLAN NOTE
- Last intracranial pressure of 34cm from LP at OSH  - Patient with papilledema, vomiting, AMS  - CTH w/developing hydrocephalus and signs of ventricular entrapment  - NSGY notified and consulted- no acute interventions at this time  - NCC notified- continue monitoring on floor  - S/p LP w/opening pressure of 35cm and closing pressure of 13cm 06/30/22  - S/p LP w/opening pressure of 27cm and closing pressure of 12cm 06/30/22  - Continue to monitor for signs and symptoms of worsening ICP  - Repeat CTH 07/01 relatively unchanged   -repeat CTH 7/8 without significant change  -neurosurgery consulted and following; Considering  shunt placement pending clearance of CSF and decrease in CSF protein; Recommend continuing serial LPs for now

## 2022-07-09 NOTE — PROGRESS NOTES
[facility] - Telemetry Stepdown  Neurosurgery  Progress Note    Subjective:     History of Present Illness: Mrs. Rivera is a 56yoF w/PMHx of kidney transplant (2020 [facility]) on Prograf and Cellcept admitted to [facility] on 06/22 with intractable HA starting about 2 months earlier and with dizziness, blurred vision, left-sided facial droop and dysarthria starting a few days before admission. 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 LP (6/24) with OP of 34,  stain pos for yeast like organisms and CSF culture pos for encapsulated yeast species. Ophthalmology was consulted for complaint of visual disturbance in the setting of elevated ICP.  No infectious nidus was found in retina or vitreous, however pt was found to have grade 3 palpilledema bilaterally. CTH performed 06/30 showed slight interval enlargement of the temporal horn of the right lateral ventricle since the recent MRI of 06/23/2022.  Repeat LP 06/30- opening pressure of 35cm- closing pressure of 13cm. Patient w/much improvement in signs and symptoms post LP. NCC evaluated, but given improvement in symptoms, NCC recommended continued observation on the floor. Antibiotic end date is today and primary team planning to repeat LP and CTH. Pt currently reports improvement in her HA and her visual symptoms, however continues with hearing loss and intermittent nausea. NSGY consulted for consideration of EVD placement for elevated ICPs.          Post-Op Info:  Procedure(s) (LRB):  INSERTION, SHUNT, VENTRICULOPERITONEAL - right frontal (Right)         Interval History: LP yesterday with OP 27, CP12. Gram stain with yeast. Protein 162. Pt reports continued HA this am. Denies worsening vision or new neuro deficits.     Medications:  Continuous Infusions:   heparin (porcine) in D5W 21 Units/kg/hr (07/07/22 1209)     Scheduled Meds:   acetaminophen  500 mg Oral  Q24H    amphotericin B liposome (AMBISOME) IVPB  300 mg Intravenous Q24H    calcitRIOL  0.5 mcg Oral Daily    diphenhydrAMINE  25 mg Oral Q24H    flucytosine  1,500 mg Oral Q6H    magnesium oxide  400 mg Oral Daily    multivitamin  1 tablet Oral Daily    NIFEdipine  90 mg Oral Daily    polyethylene glycol  17 g Oral BID    predniSONE  5 mg Oral Daily    senna-docusate 8.6-50 mg  1 tablet Oral BID    sodium bicarbonate  1,300 mg Oral TID    sodium chloride 0.9%  500 mL Intravenous Q24H    tacrolimus  3 mg Oral BID    topiramate  100 mg Oral BID     PRN Meds:acetaminophen, albuterol-ipratropium, bisacodyL, butalbital-acetaminophen-caffeine -40 mg, dextrose 5 %, glucose, glucose, heparin (PORCINE), heparin (PORCINE), hydrALAZINE, melatonin, naloxone, ondansetron, oxyCODONE-acetaminophen, sodium chloride 0.9%     Review of Systems  Objective:     Weight: 71.7 kg (158 lb 1.1 oz)  Body mass index is 30.87 kg/m².  Vital Signs (Most Recent):  Temp: 98.5 °F (36.9 °C) (07/09/22 0821)  Pulse: 67 (07/09/22 0821)  Resp: 18 (07/09/22 1013)  BP: (!) 168/70 (07/09/22 0821)  SpO2: (!) 94 % (07/09/22 0821)   Vital Signs (24h Range):  Temp:  [96.3 °F (35.7 °C)-99 °F (37.2 °C)] 98.5 °F (36.9 °C)  Pulse:  [56-67] 67  Resp:  [17-19] 18  SpO2:  [94 %-97 %] 94 %  BP: (145-187)/(67-75) 168/70                          Hemodialysis AV Graft Right forearm (Active)   Site Assessment Clean;Dry;Intact 07/30/20 0745   Patency Present;Thrill;Bruit 07/30/20 0745   Status Deaccessed 07/29/20 2010   Dressing Status Clean;Dry;Intact 10/18/18 0808   Site Condition No complications 07/30/20 0745   Dressing Open to air (None) 07/29/20 2010       Physical Exam  General: well developed, well nourished, no distress.   Head: normocephalic, atraumatic  Neurologic: Alert and oriented. Thought content appropriate.  GCS: Motor: 6/Verbal: 5/Eyes: 4 GCS Total: 15  Mental Status: Awake, Alert, Oriented x3  Cranial nerves: face symmetric, tongue  midline, CN II-XII grossly intact. Hard of hearing   Eyes: pupils equal, round, reactive to light with accomodation, EOMI.   Sensory: intact to light touch throughout  Motor Strength: Moves all extremities spontaneously with good tone.  Full strength upper and lower extremities. No abnormal movements seen.      Strength   Deltoids Triceps Biceps Wrist Extension Wrist Flexion Hand    Upper: R 5/5 5/5 5/5 5/5 5/5 5/5     L 5/5 5/5 5/5 5/5 5/5 5/5       Iliopsoas Quadriceps Knee  Flexion Tibialis  anterior Gastro- cnemius EHL   Lower: R 5/5 5/5 5/5 5/5 5/5 5/5     L 5/5 5/5 5/5 5/5 5/5 5/5      DTR's - 2 + and symmetric in UE and LE  Pronator Drift: no drift noted  Finger-to-nose: Intact bilaterally  Aparicio: absent  Clonus: absent  Pulses: 2+ and symmetric radial and dorsalis pedis. No lower extremity edema    Significant Labs:  Recent Labs   Lab 07/08/22 0428 07/09/22  0205   * 110    138   K 3.5 3.7    107   CO2 22* 22*   BUN 23* 23*   CREATININE 1.3 1.4   CALCIUM 8.9 9.1     Recent Labs   Lab 07/08/22 0428 07/09/22  0205   WBC 4.72 5.93   HGB 11.6* 11.2*   HCT 36.6* 34.0*    156     Recent Labs   Lab 07/08/22 0428 07/09/22 0205 07/09/22  0208   INR 1.1 1.1  --    APTT 39.7*  --  23.4     Microbiology Results (last 7 days)       Procedure Component Value Units Date/Time    CSF culture [[id number]] Collected: 07/08/22 1019    Order Status: Completed Specimen: CSF (Spinal Fluid) from CSF Tap, Tube 2 Updated: 07/09/22 0737     CSF CULTURE No Growth to date     Gram Stain Result Cytospin indicates: Few yeast      No WBC's      Results called to and read back by: Lyndsay Granados 07/08/2022  13:13    AFB Culture & Smear [[id number]] Collected: 07/08/22 1019    Order Status: Sent Specimen: CSF (Spinal Fluid) from CSF Tap, Tube 2 Updated: 07/08/22 1046    CSF culture [[id number]] Collected: 06/30/22 1602    Order Status: Completed Specimen: CSF (Spinal Fluid) from CSF Tap, Tube 2 Updated:  07/04/22 0546     CSF CULTURE No Growth     Gram Stain Result No WBC's      Few yeast      Results called to and read back by:Leslee Andrews RN 06/30/2022  18:45          All pertinent labs from the last 24 hours have been reviewed.    Significant Diagnostics:  I have reviewed all pertinent imaging results/findings within the past 24 hours.    Assessment/Plan:     * Cryptococcal meningitis  57 yo female s/p kidney transplant (2020) on Prograf, admitted with cryptococcal meningitis s/p 2 weeks of amphotericin B and flucytosine. LP x 3 with elevated OP and papilledema on ophthalmology exam. NSGY consulted for consideration of drain placement for elevated ICPs. Pt is currently neurologically stable. Of note, pt is on hep gtt for acute DVT    -Imaging and diagnostics reviewed   -CSF (6/24) OP34, Glu 6, protein 93.  stain pos for yeast like  organisms.  Cx pos for encapsulated yeast species.     -CSF (6/30): OP35,CP13. G<5, P174, 144 WBC, 3000 RBC, 37 lymph,  59 mono/macro. Repeat CSF w/1:8 cryptococcal antigen, cx w/yeast.   -CSF (7/8): OP27, CP12. G<15,P162, CSF cx pending, gram stain with  yeast    -CTH 6/30 with slight enlargement of the temporal horn of right lateral v entricle compared to prior MRI brain 06/23    -CTH 7/8 without significant change  -Ophthalmology following. Last eval 7/7 with stable OU, color plates full. Still 3+ ONH edema  -ID: Pt continued on flucytosine and amphotericin B 7/8. F/u ongoing ID recommendations  -Considering  shunt placement pending clearance of CSF and decrease in CSF protein  -She is tolerating serial LPs while being optimized for eventual shunt placement. Recommend continuing serial LPs for now  -We will continue to follow. Please call NSGY with any decline in neuro exam. Would have a low threshold to transfer to ICU with any worsening visual deficits, decline in neuro exam or signs of worsening ICPs  -Discussed with Dr. Edu Reynolds,  JEFFY  Neurosurgery  [facility] - Telemetry Stepdown

## 2022-07-09 NOTE — SUBJECTIVE & OBJECTIVE
Interval History: Patient was seen and evaluated this am. HA still present; no significant improvement or any worsening.  Intermittent nausea.  Remains with a degree of hearing loss; no worsening or improvement.    No fevers, chills, night sweats, abd pain, vomiting, new vision changes.     Objective:     Vital Signs (Most Recent):  Temp: 98.5 °F (36.9 °C) (07/09/22 0821)  Pulse: 67 (07/09/22 0821)  Resp: 18 (07/09/22 1013)  BP: (!) 168/70 (07/09/22 0821)  SpO2: (!) 94 % (07/09/22 0821)   Vital Signs (24h Range):  Temp:  [96.3 °F (35.7 °C)-99 °F (37.2 °C)] 98.5 °F (36.9 °C)  Pulse:  [56-67] 67  Resp:  [17-19] 18  SpO2:  [94 %-97 %] 94 %  BP: (145-187)/(67-75) 168/70     Weight: 71.7 kg (158 lb 1.1 oz)  Body mass index is 30.87 kg/m².    Intake/Output Summary (Last 24 hours) at 7/9/2022 1317  Last data filed at 7/9/2022 0600  Gross per 24 hour   Intake 400 ml   Output 700 ml   Net -300 ml        Physical Exam  Gen: in NAD, appears stated age  Neuro: awake and oriented to self, place, time  HEENT: NTNC, EOMI, PERRL, MMM  CVS: RRR, no m/r/g; S1/S2 auscultated with no S3 or S4; capillary refill < 2 sec  Resp: lungs CTAB, no w/r/r; no belabored breathing or accessory muscle use appreciated   Abd: BS+ in all 4 quadrants; NTND, soft to palpation; no organomegaly appreciated   Extrem: pulses full, equal, and regular over all 4 extremities; no swelling of BL UE or LE ext    Significant Labs: All pertinent labs within the past 24 hours have been reviewed.  Blood Culture: No results for input(s): LABBLOO in the last 48 hours.    CSF findings 06/30/22: <5glucose, 174 protein, 144 WBC, 3000 RBC, 37 lymph, 59 mono/macro. Repeat CSF w/1:8 cryptococcal antigen, CSF culture w/yeast    CBC:   Recent Labs   Lab 07/08/22  0428 07/09/22  0205   WBC 4.72 5.93   HGB 11.6* 11.2*   HCT 36.6* 34.0*    156     CMP:   Recent Labs   Lab 07/08/22  0428 07/09/22  0205    138   K 3.5 3.7    107   CO2 22* 22*   * 110   BUN  23* 23*   CREATININE 1.3 1.4   CALCIUM 8.9 9.1   PROT 5.4* 5.7*   ALBUMIN 3.2* 3.3*   BILITOT 0.5 0.5   ALKPHOS 52* 55   AST 10 11   ALT 11 11   ANIONGAP 9 9   EGFRNONAA 46.6* 42.6*     Magnesium:   No results for input(s): MG in the last 48 hours.    Urine Culture: No results for input(s): LABURIN in the last 48 hours.  Urine Studies:   No results for input(s): COLORU, APPEARANCEUA, PHUR, SPECGRAV, PROTEINUA, GLUCUA, KETONESU, BILIRUBINUA, OCCULTUA, NITRITE, UROBILINOGEN, LEUKOCYTESUR, RBCUA, WBCUA, BACTERIA, SQUAMEPITHEL, HYALINECASTS in the last 48 hours.    Invalid input(s): WRIGHTSUR      Significant Imaging: I have reviewed all pertinent imaging results/findings within the past 24 hours.    CTH 06/30/22:  FINDINGS:  Intracranial compartment:     Mild enlargement of the temporal horn of the right lateral ventricle, new from prior.  Remainder of the ventricular system is normal in size, stable.  Third ventricle diameter on the order of 0.3 cm.  Well-defined sulci remain present over the cerebral convexities.     The brain parenchyma appears otherwise within normal limits, and unchanged from priors.  No new parenchymal hemorrhage, edema or major vascular distribution infarct.  No intracranial mass effect or midline shift.     No new extra-axial blood or fluid collections.     Partially empty sella configuration.     Skull/extracranial contents (limited evaluation):     No displaced calvarial fracture.     Small volume right mastoid air cell fluid.     The visualized paranasal sinuses are essentially clear.     Impression:     Slight interval enlargement of the temporal horn of the right lateral ventricle since the recent MRI of 06/23/2022.  Appearance suggestive of early/developing hydrocephalus or ventricular entrapment.     No new hemorrhage, edema or infarction.     Partially empty sella configuration.  Nonspecific but configuration could be consistent with the reported history of a elevated intracranial  pressure.    CTH 07/01/22:  FINDINGS:  Intracranial compartment:     Mild enlargement of the temporal horn of the right lateral ventricle similar to most recent CT head 06/30/2022, new compared to prior MRI brain 06/23/2022.  Third ventricle diameter measures approximately 4 mm.     The brain parenchyma appears within normal limits not significantly changed compared to prior exam.  No parenchymal mass, hemorrhage, edema or major vascular distribution infarct.     No extra-axial blood or fluid collections.     Partially empty sella configuration.     Skull/extracranial contents (limited evaluation):     No fracture. Paranasal sinuses are essentially clear.  Small volume fluid within the right mastoid air cells.     Impression:     Slight enlargement of the temporal horn of right lateral ventricle not significantly changed compared to most recent CT head 06/30/2022,  new compared to prior MRI brain 06/23/2022.  Clinical correlation and continued close follow-up is recommended as developing hydrocephalus or ventricular entrapment remains a concern.     No new acute infarct or hemorrhage.    Doppler US of LLE:  FINDINGS:  Left thigh veins: Near occlusive thrombus within the left popliteal vein.  The common femoral, femoral, and upper greater saphenous veins are patent and free of thrombus. The veins are normally compressible and have normal phasic flow and augmentation response.     Left calf veins: Partially occlusive thrombus within the posterior tibial veins.  Partially occlusive thrombus with 1 of the peroneal veins.  Anterior tibial veins are patent.     Contralateral CFV: The contralateral (right) common femoral vein is patent and free of thrombus.     Miscellaneous: None     Impression:     Deep venous thrombosis within the left popliteal, posterior tibial, and peroneal veins.

## 2022-07-09 NOTE — SUBJECTIVE & OBJECTIVE
Interval History: LP yesterday with OP 27, CP12. Gram stain with yeast. Protein 162. Pt reports continued HA this am. Denies worsening vision or new neuro deficits.     Medications:  Continuous Infusions:   heparin (porcine) in D5W 21 Units/kg/hr (07/07/22 1209)     Scheduled Meds:   acetaminophen  500 mg Oral Q24H    amphotericin B liposome (AMBISOME) IVPB  300 mg Intravenous Q24H    calcitRIOL  0.5 mcg Oral Daily    diphenhydrAMINE  25 mg Oral Q24H    flucytosine  1,500 mg Oral Q6H    magnesium oxide  400 mg Oral Daily    multivitamin  1 tablet Oral Daily    NIFEdipine  90 mg Oral Daily    polyethylene glycol  17 g Oral BID    predniSONE  5 mg Oral Daily    senna-docusate 8.6-50 mg  1 tablet Oral BID    sodium bicarbonate  1,300 mg Oral TID    sodium chloride 0.9%  500 mL Intravenous Q24H    tacrolimus  3 mg Oral BID    topiramate  100 mg Oral BID     PRN Meds:acetaminophen, albuterol-ipratropium, bisacodyL, butalbital-acetaminophen-caffeine -40 mg, dextrose 5 %, glucose, glucose, heparin (PORCINE), heparin (PORCINE), hydrALAZINE, melatonin, naloxone, ondansetron, oxyCODONE-acetaminophen, sodium chloride 0.9%     Review of Systems  Objective:     Weight: 71.7 kg (158 lb 1.1 oz)  Body mass index is 30.87 kg/m².  Vital Signs (Most Recent):  Temp: 98.5 °F (36.9 °C) (07/09/22 0821)  Pulse: 67 (07/09/22 0821)  Resp: 18 (07/09/22 1013)  BP: (!) 168/70 (07/09/22 0821)  SpO2: (!) 94 % (07/09/22 0821)   Vital Signs (24h Range):  Temp:  [96.3 °F (35.7 °C)-99 °F (37.2 °C)] 98.5 °F (36.9 °C)  Pulse:  [56-67] 67  Resp:  [17-19] 18  SpO2:  [94 %-97 %] 94 %  BP: (145-187)/(67-75) 168/70                          Hemodialysis AV Graft Right forearm (Active)   Site Assessment Clean;Dry;Intact 07/30/20 0745   Patency Present;Thrill;Bruit 07/30/20 0745   Status Deaccessed 07/29/20 2010   Dressing Status Clean;Dry;Intact 10/18/18 0808   Site Condition No complications 07/30/20 0745   Dressing Open to air (None) 07/29/20 2010        Physical Exam  General: well developed, well nourished, no distress.   Head: normocephalic, atraumatic  Neurologic: Alert and oriented. Thought content appropriate.  GCS: Motor: 6/Verbal: 5/Eyes: 4 GCS Total: 15  Mental Status: Awake, Alert, Oriented x3  Cranial nerves: face symmetric, tongue midline, CN II-XII grossly intact. Hard of hearing   Eyes: pupils equal, round, reactive to light with accomodation, EOMI.   Sensory: intact to light touch throughout  Motor Strength: Moves all extremities spontaneously with good tone.  Full strength upper and lower extremities. No abnormal movements seen.      Strength   Deltoids Triceps Biceps Wrist Extension Wrist Flexion Hand    Upper: R 5/5 5/5 5/5 5/5 5/5 5/5     L 5/5 5/5 5/5 5/5 5/5 5/5       Iliopsoas Quadriceps Knee  Flexion Tibialis  anterior Gastro- cnemius EHL   Lower: R 5/5 5/5 5/5 5/5 5/5 5/5     L 5/5 5/5 5/5 5/5 5/5 5/5      DTR's - 2 + and symmetric in UE and LE  Pronator Drift: no drift noted  Finger-to-nose: Intact bilaterally  Aparicio: absent  Clonus: absent  Pulses: 2+ and symmetric radial and dorsalis pedis. No lower extremity edema    Significant Labs:  Recent Labs   Lab 07/08/22 0428 07/09/22 0205   * 110    138   K 3.5 3.7    107   CO2 22* 22*   BUN 23* 23*   CREATININE 1.3 1.4   CALCIUM 8.9 9.1     Recent Labs   Lab 07/08/22 0428 07/09/22  0205   WBC 4.72 5.93   HGB 11.6* 11.2*   HCT 36.6* 34.0*    156     Recent Labs   Lab 07/08/22 0428 07/09/22 0205 07/09/22  0208   INR 1.1 1.1  --    APTT 39.7*  --  23.4     Microbiology Results (last 7 days)       Procedure Component Value Units Date/Time    CSF culture [[id number]] Collected: 07/08/22 1019    Order Status: Completed Specimen: CSF (Spinal Fluid) from CSF Tap, Tube 2 Updated: 07/09/22 0737     CSF CULTURE No Growth to date     Gram Stain Result Cytospin indicates: Few yeast      No WBC's      Results called to and read back by: Lyndsay Granados 07/08/2022   13:13    AFB Culture & Smear [[id number]] Collected: 07/08/22 1019    Order Status: Sent Specimen: CSF (Spinal Fluid) from CSF Tap, Tube 2 Updated: 07/08/22 1046    CSF culture [[id number]] Collected: 06/30/22 1602    Order Status: Completed Specimen: CSF (Spinal Fluid) from CSF Tap, Tube 2 Updated: 07/04/22 0546     CSF CULTURE No Growth     Gram Stain Result No WBC's      Few yeast      Results called to and read back by:Leslee Andrews RN 06/30/2022  18:45          All pertinent labs from the last 24 hours have been reviewed.    Significant Diagnostics:  I have reviewed all pertinent imaging results/findings within the past 24 hours.

## 2022-07-10 NOTE — PLAN OF CARE
Problem: Adult Inpatient Plan of Care  Goal: Plan of Care Review  Outcome: Ongoing, Progressing  Goal: Patient-Specific Goal (Individualized)  Outcome: Ongoing, Progressing  Goal: Absence of Hospital-Acquired Illness or Injury  Outcome: Ongoing, Progressing  Goal: Optimal Comfort and Wellbeing  Outcome: Ongoing, Progressing  Goal: Readiness for Transition of Care  Outcome: Ongoing, Progressing     Problem: Fluid and Electrolyte Imbalance (Acute Kidney Injury/Impairment)  Goal: Fluid and Electrolyte Balance  Outcome: Ongoing, Progressing     Problem: Oral Intake Inadequate (Acute Kidney Injury/Impairment)  Goal: Optimal Nutrition Intake  Outcome: Ongoing, Progressing     Problem: Renal Function Impairment (Acute Kidney Injury/Impairment)  Goal: Effective Renal Function  Outcome: Ongoing, Progressing     Problem: Skin Injury Risk Increased  Goal: Skin Health and Integrity  Outcome: Ongoing, Progressing     Problem: Fall Injury Risk  Goal: Absence of Fall and Fall-Related Injury  Outcome: Ongoing, Progressing     Problem: Infection  Goal: Absence of Infection Signs and Symptoms  Outcome: Ongoing, Progressing

## 2022-07-10 NOTE — ASSESSMENT & PLAN NOTE
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 LP x 3 with elevated OP and papilledema on ophthalmology exam. NSGY consulted for consideration of drain placement for elevated ICPs. Pt is currently neurologically stable. Of note, pt is on hep gtt for acute DVT    -Imaging and diagnostics reviewed   -CSF (6/24) OP34, Glu 6, protein 93. [place] stain pos for yeast like organisms.  Cx pos for encapsulated yeast species.     -CSF (6/30): OP35,CP13. G<5, P174, 144 WBC, 3000 RBC, 37 lymph, 59 mono/macro. Repeat CSF w/1:8 cryptococcal antigen, cx w/yeast.   -CSF (7/8): OP27, CP12. G<15,P162, CSF cx pending, gram stain with yeast    -CTH 6/30 with slight enlargement of the temporal horn of right lateral ventricle compared to prior MRI brain 06/23    -CTH 7/8 without significant change  -Ophthalmology following. Last eval 7/7 with stable OU, color plates full. Still 3+ ONH edema  -ID: Pt continued on flucytosine and amphotericin B 7/8. F/u ongoing ID recommendations  -Considering  shunt placement pending clearance of CSF and decrease in CSF protein. Tentatively booked for tomorrow.   - CT stealth pending   - NPO after MN   - Hold hep gtt at MN, continue to trend PTT  -She is tolerating serial LPs while being optimized for eventual shunt placement. Would recommend continuing serial LPs for now  -We will continue to follow. Please call NSGY with any decline in neuro exam. Would have a low threshold to transfer to ICU with any worsening visual deficits, decline in neuro exam or signs of worsening ICPs  -Discussed with Dr. Sorensen

## 2022-07-10 NOTE — PROGRESS NOTES
[facility] - Telemetry [facility] Medicine  Progress Note    Patient Name: Tala Rivera  MRN: [id number]  Patient Class: IP- Inpatient   Admission Date: 6/27/2022  Length of Stay: 13 days  Attending Physician: Joanne Braden MD  Primary Care Provider: ANUJA Castellon MD        Subjective:     Principal Problem:Cryptococcal meningitis        HPI:  Mrs. Rivera is a 56yoF w/PMHx of kidney XPL (2020 [facility]) on Prograf and Cellcept admitted to [facility] on 06/22 with intractable HA starting about 2 months earlier and with dizziness, blurred vision, left-sided facial droop and dysarthria starting a few days before admission.       On admission /72, HR 69. Neurologic exam is remarkable for left-sided facial weakness and mild dysarthria.  Labs (6/22) WBC 9.4, Hb 15.9, Plts 224, Na 137, K 3.3, CO2 25, BUN 24, Cr 1.32, AG 14, Glu 182 LFTs WNL. COVID neg     LP (6/24) CSF Glu 6, CSF protein 93. Brazilian stain pos for yeast like organisms.  Spinal fluid culture pos for encapsulated yeast species.       CT chest WO contrast pos for multiple indeterminate soft tissue masses in the left lower lobe with 2 smaller masses in the right lung which may be part of the same process.       On 06/26 patient started on amphotericin B liposomal (275 IV q.d.) and flucytosine (1500 p.o. q.6h) .  Hospitalization c/b the development of acute renal failure.  Cr 1.32 (6/22) > 1.4 (6/26) > 2.02 (6/27).  Cellcept and prednisone have been held.       Transfer requested for higher level of care (KTM and ID).  Transfer diagnosis disseminated cryptococcal infection, TERESA, kidney XPL.    At time of my assessment, pt. Complains of headache which has been persistent for the last several days. She states that it is somewhat relieved by the fioricet and oxycodone that she was receiving at the outside hospital.      Overview/Hospital Course:  KTM, transplant ID, Neurology consulted upon admission. Per KTN, normal saline scheduled with  amphotericin infusions in addition to bicarb drip, prednisone increased, Prograf continued.  Creatinine down-trending and bicarb drip discontinued.  Per Neurology, outside hospital MR MRI reviewed and note that neuro deficits may take a few weeks to fully resolve; recommend limiting Fioricet and narcotics to minimize medication overuse/rebound headaches.  Per Transplant ID, continue ambisome/flucytosine with plan to repeat LP 2 weeks from 6/24.  Due to visual disturbance in the setting of fungal meningitis, ophthalmology consulted.  No infectious nidus found in retina or vitreous, however grade 3 disc edema bilaterally. Exam finding and subjective complaints explained by meningitis and papilledema.  Recommendation for repeating LP to reduce opening pressure per ID and Neurology in agreement.  On 06/29, patient hearing improved.  Remains with intermittent confusion per  at bedside.    CTH performed 06/30- Slight interval enlargement of the temporal horn of the right lateral ventricle since the recent MRI of 06/23/2022.  Appearance suggestive of early/developing hydrocephalus or ventricular entrapment. NSGY consulted. Per NSGY, recommended NCC transfer for further evaluation. Patient underwent repeat LP 06/30- opening pressure of 35cm- closing pressure of 13cm. Patient w/much improvement in signs and symptoms. CSF findings: <5glucose, 174 protein, 144 WBC, 3000 RBC, 37 lymph, 59 mono/macro. Repeat CSF w/1:8 cryptococcal antigen, CSF culture w/yeast. NCC evaluated, but patient with much improvement in signs and symptoms, and so NCC recommended continued observation on the floor. Discussed w/ID- stated to continue to monitor for worsening signs and symptoms- with closing pressure <25cm, no need for serial LP's at this time.     Doppler US performed on LLE- found to have DVT. Started on heparin drip. Planning for repeat LP 07/07.  Heparin drip paused and repeat lumbar puncture completed on 07/07.  Per Radiology,  CSF was clear to gout 16 mL total; opening pressure 27, closing pressure 12. Gram stain notable for few yeast.  Repeat CT head ordered with stable findings.  Neurosurgery consulted; Considering  shunt placement pending clearance of CSF and decrease in CSF protein.  Recommend ongoing serial LPs for now.      Interval History: Patient was seen and evaluated this am. HA still present; no significant improvement or any worsening; responsive to p.r.n. analgesia.  Intermittent nausea.  Remains with a degree of hearing loss; no worsening or improvement; endorses that intermittently but briefly improves.    No fevers, chills, night sweats, abd pain, vomiting, new vision changes.     Objective:     Vital Signs (Most Recent):  Temp: 98.3 °F (36.8 °C) (07/10/22 0749)  Pulse: 74 (07/10/22 0749)  Resp: 16 (07/10/22 0749)  BP: (!) 144/65 (07/10/22 0749)  SpO2: 96 % (07/10/22 0749)   Vital Signs (24h Range):  Temp:  [98.3 °F (36.8 °C)-98.7 °F (37.1 °C)] 98.3 °F (36.8 °C)  Pulse:  [60-99] 74  Resp:  [16-19] 16  SpO2:  [95 %-99 %] 96 %  BP: (143-188)/(63-89) 144/65     Weight: 71.7 kg (158 lb 1.1 oz)  Body mass index is 30.87 kg/m².    Intake/Output Summary (Last 24 hours) at 7/10/2022 1159  Last data filed at 7/10/2022 0600  Gross per 24 hour   Intake 520 ml   Output 850 ml   Net -330 ml        Physical Exam  Gen: in NAD, appears stated age  Neuro: awake and oriented to self, place, time  HEENT: NTNC, EOMI, PERRL, MMM  CVS: RRR, no m/r/g; S1/S2 auscultated with no S3 or S4; capillary refill < 2 sec  Resp: lungs CTAB, no w/r/r; no belabored breathing or accessory muscle use appreciated   Abd: BS+ in all 4 quadrants; NTND, soft to palpation; no organomegaly appreciated   Extrem: pulses full, equal, and regular over all 4 extremities; no swelling of BL UE or LE ext    Significant Labs: All pertinent labs within the past 24 hours have been reviewed.  Blood Culture: No results for input(s): LABBLOO in the last 48 hours.    CSF  findings 06/30/22: <5glucose, 174 protein, 144 WBC, 3000 RBC, 37 lymph, 59 mono/macro. Repeat CSF w/1:8 cryptococcal antigen, CSF culture w/yeast    CBC:   Recent Labs   Lab 07/09/22  0205 07/10/22  0328   WBC 5.93 6.25   HGB 11.2* 10.1*   HCT 34.0* 31.2*    119*     CMP:   Recent Labs   Lab 07/09/22  0205 07/10/22  0837    135*   K 3.7 4.1    106   CO2 22* 21*    152*   BUN 23* 21*   CREATININE 1.4 1.4   CALCIUM 9.1 8.6*   PROT 5.7* 5.4*   ALBUMIN 3.3* 3.3*   BILITOT 0.5 0.6   ALKPHOS 55 54*   AST 11 14   ALT 11 13   ANIONGAP 9 8   EGFRNONAA 42.6* 42.6*     Magnesium:   No results for input(s): MG in the last 48 hours.    Urine Culture: No results for input(s): LABURIN in the last 48 hours.  Urine Studies:   No results for input(s): COLORU, APPEARANCEUA, PHUR, SPECGRAV, PROTEINUA, GLUCUA, KETONESU, BILIRUBINUA, OCCULTUA, NITRITE, UROBILINOGEN, LEUKOCYTESUR, RBCUA, WBCUA, BACTERIA, SQUAMEPITHEL, HYALINECASTS in the last 48 hours.    Invalid input(s): WRIGHTSUR      Significant Imaging: I have reviewed all pertinent imaging results/findings within the past 24 hours.    CTH 06/30/22:  FINDINGS:  Intracranial compartment:     Mild enlargement of the temporal horn of the right lateral ventricle, new from prior.  Remainder of the ventricular system is normal in size, stable.  Third ventricle diameter on the order of 0.3 cm.  Well-defined sulci remain present over the cerebral convexities.     The brain parenchyma appears otherwise within normal limits, and unchanged from priors.  No new parenchymal hemorrhage, edema or major vascular distribution infarct.  No intracranial mass effect or midline shift.     No new extra-axial blood or fluid collections.     Partially empty sella configuration.     Skull/extracranial contents (limited evaluation):     No displaced calvarial fracture.     Small volume right mastoid air cell fluid.     The visualized paranasal sinuses are essentially clear.      Impression:     Slight interval enlargement of the temporal horn of the right lateral ventricle since the recent MRI of 06/23/2022.  Appearance suggestive of early/developing hydrocephalus or ventricular entrapment.     No new hemorrhage, edema or infarction.     Partially empty sella configuration.  Nonspecific but configuration could be consistent with the reported history of a elevated intracranial pressure.    CTH 07/01/22:  FINDINGS:  Intracranial compartment:     Mild enlargement of the temporal horn of the right lateral ventricle similar to most recent CT head 06/30/2022, new compared to prior MRI brain 06/23/2022.  Third ventricle diameter measures approximately 4 mm.     The brain parenchyma appears within normal limits not significantly changed compared to prior exam.  No parenchymal mass, hemorrhage, edema or major vascular distribution infarct.     No extra-axial blood or fluid collections.     Partially empty sella configuration.     Skull/extracranial contents (limited evaluation):     No fracture. Paranasal sinuses are essentially clear.  Small volume fluid within the right mastoid air cells.     Impression:     Slight enlargement of the temporal horn of right lateral ventricle not significantly changed compared to most recent CT head 06/30/2022,  new compared to prior MRI brain 06/23/2022.  Clinical correlation and continued close follow-up is recommended as developing hydrocephalus or ventricular entrapment remains a concern.     No new acute infarct or hemorrhage.    Doppler US of LLE:  FINDINGS:  Left thigh veins: Near occlusive thrombus within the left popliteal vein.  The common femoral, femoral, and upper greater saphenous veins are patent and free of thrombus. The veins are normally compressible and have normal phasic flow and augmentation response.     Left calf veins: Partially occlusive thrombus within the posterior tibial veins.  Partially occlusive thrombus with 1 of the peroneal veins.   Anterior tibial veins are patent.     Contralateral CFV: The contralateral (right) common femoral vein is patent and free of thrombus.     Miscellaneous: None     Impression:     Deep venous thrombosis within the left popliteal, posterior tibial, and peroneal veins.      Assessment/Plan:      * Cryptococcal meningitis  - Pt with intractable headache, facial droop, dysarthria (improved). 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  - Continue Amp B 5/mg kg Q24hrs and flucytosine 1500 mg Q6hrs for treatment.   - On chart review, amphotericin started on 06/24/22- 2wks of treatment 07/08/22  - Repeat LP completed 7/9; gram stain notable for yeast; and amphotericin continued  -Transplant ID following- continue with current course- patient signs and symptoms much improved since time of admission, will hold of on steroids- no signs of IRIS, will continue to monitor for need for repeat LP  - KTM following; recommend  500mL normal saline with amphotericin     Acute renal failure superimposed on stage 3 chronic kidney disease  - Cr 2.2 on day of transfer, has been steadily worsening over last few days (baseline ~1.2)  - KTM consulted, appreciate recs; likely prerenal versus medication related  - US transplant unremarkable for acute changes   - creatinine improved; stable  - Continue sodium bicarb    VTE (venous thromboembolism)  - continue heparin drip for DVT of the LLE      Increased intracranial pressure  - Last intracranial pressure of 34cm from LP at OSH  - Patient with papilledema, vomiting, AMS  - CTH w/developing hydrocephalus and signs of ventricular entrapment  - NSGY notified and consulted- no acute interventions at this time  - NCC notified- continue monitoring on floor  - S/p LP w/opening pressure of 35cm and closing pressure of 13cm 06/30/22  - S/p LP w/opening pressure of 27cm and closing pressure of 12cm 06/30/22  - Continue to monitor for signs and symptoms of  "worsening ICP  - Repeat CTH 07/01 relatively unchanged   -repeat CTH 7/8 without significant change  -neurosurgery consulted and following; Considering  shunt placement pending clearance of CSF and decrease in CSF protein; Recommend continuing serial LPs for now      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  - Improving  - See papilledema-- spoke w/ophthalmology- expect papilledema to improve over the course of 2wks if infection remains well-controlled and ICP remains wnl      Bilateral papilledema due to raised intracranial pressure  - Ophthalmology evaluation with grade 3 papilledema in both eyes (longer she has elevated CSF pressure, the higher her risk of permanent optic nerve damage and vision change is.  There is a congested appearance to the veins in both eyes secondary to optic nerve congestion and indicates higher risk for ischemic event), 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  -"- 7/7 exam, vision stable OU, color plates full. Still 3+ ONH edema, and we do not expect this to rapidly improve as there is often delay of resolution of ONH edema following improvement in ICP "  - see increased intracranial pressure    Headache  - history of intractable headache  - continue topamax to 100mg BID   - continue as needed Fioricet, oxycodone/acetaminophen, acetaminophen      Hypokalemia  - PO repletion prn to goal      Long-term use of immunosuppressant medication  - Pt with opportunistic infection, see above. Holding mycophenolate   - KTM following, appreciate recs  Continue prograf per levels   Monitor levels to assess for toxicity  Continue prednisone ---> increased to 10mg daily   Hold MMF         S/P kidney transplant  - See TERESA. KTM consulted for further assistance  Kidney transplant 4/5/2020   Underlying ckd stage III  ESRD related to HTN        VTE Risk Mitigation (From admission, onward)         Ordered     heparin 25,000 units in dextrose 5% (100 units/ml) IV bolus from bag - ADDITIONAL PRN BOLUS - 60 units/kg  As needed (PRN)        Question:  Heparin " Infusion Adjustment (DO NOT MODIFY ANSWER)  Answer:  [email or web address]\Images\Pharmacy\HeparinInfusions\heparin HIGH INTENSITY nomogram for [facility] [id number]    07/03/22 1205     heparin 25,000 units in dextrose 5% (100 units/ml) IV bolus from bag - ADDITIONAL PRN BOLUS - 30 units/kg  As needed (PRN)        Question:  Heparin Infusion Adjustment (DO NOT MODIFY ANSWER)  Answer:  [email or web address]\epic\Images\Pharmacy\HeparinInfusions\heparin HIGH INTENSITY nomogram for [facility] [id number]    07/03/22 1205     heparin 25,000 units in dextrose 5% 250 mL (100 units/mL) infusion HIGH INTENSITY nomogram - [facility]  Continuous        Question Answer Comment   Heparin Infusion Adjustment (DO NOT MODIFY ANSWER) [email or web address]\epic\Images\Pharmacy\HeparinInfusions\heparin HIGH INTENSITY nomogram for [facility] [id number]    Begin at (in units/kg/hr) 18        07/03/22 1205     IP VTE HIGH RISK PATIENT  Once         06/27/22 2202     Place sequential compression device  Until discontinued         06/27/22 2202                Discharge Planning   JULITO: 7/13/2022     Code Status: Full Code   Is the patient medically ready for discharge?: No    Reason for patient still in hospital (select all that apply): Patient trending condition, Laboratory test, Treatment and Consult recommendations  Discharge Plan A: Home with family                  Joanne Braden MD  Department of Hospital Medicine   [facility] - Telemetry Stepdown

## 2022-07-10 NOTE — ASSESSMENT & PLAN NOTE
- history of intractable headache  - continue topamax to 100mg BID   - continue as needed Fioricet, oxycodone/acetaminophen, acetaminophen

## 2022-07-10 NOTE — SUBJECTIVE & OBJECTIVE
Interval History: Patient was seen and evaluated this am. HA still present; no significant improvement or any worsening; responsive to p.r.n. analgesia.  Intermittent nausea.  Remains with a degree of hearing loss; no worsening or improvement; endorses that intermittently but briefly improves.    No fevers, chills, night sweats, abd pain, vomiting, new vision changes.     Objective:     Vital Signs (Most Recent):  Temp: 98.3 °F (36.8 °C) (07/10/22 0749)  Pulse: 74 (07/10/22 0749)  Resp: 16 (07/10/22 0749)  BP: (!) 144/65 (07/10/22 0749)  SpO2: 96 % (07/10/22 0749)   Vital Signs (24h Range):  Temp:  [98.3 °F (36.8 °C)-98.7 °F (37.1 °C)] 98.3 °F (36.8 °C)  Pulse:  [60-99] 74  Resp:  [16-19] 16  SpO2:  [95 %-99 %] 96 %  BP: (143-188)/(63-89) 144/65     Weight: 71.7 kg (158 lb 1.1 oz)  Body mass index is 30.87 kg/m².    Intake/Output Summary (Last 24 hours) at 7/10/2022 1159  Last data filed at 7/10/2022 0600  Gross per 24 hour   Intake 520 ml   Output 850 ml   Net -330 ml        Physical Exam  Gen: in NAD, appears stated age  Neuro: awake and oriented to self, place, time  HEENT: NTNC, EOMI, PERRL, MMM  CVS: RRR, no m/r/g; S1/S2 auscultated with no S3 or S4; capillary refill < 2 sec  Resp: lungs CTAB, no w/r/r; no belabored breathing or accessory muscle use appreciated   Abd: BS+ in all 4 quadrants; NTND, soft to palpation; no organomegaly appreciated   Extrem: pulses full, equal, and regular over all 4 extremities; no swelling of BL UE or LE ext    Significant Labs: All pertinent labs within the past 24 hours have been reviewed.  Blood Culture: No results for input(s): LABBLOO in the last 48 hours.    CSF findings 06/30/22: <5glucose, 174 protein, 144 WBC, 3000 RBC, 37 lymph, 59 mono/macro. Repeat CSF w/1:8 cryptococcal antigen, CSF culture w/yeast    CBC:   Recent Labs   Lab 07/09/22  0205 07/10/22  0328   WBC 5.93 6.25   HGB 11.2* 10.1*   HCT 34.0* 31.2*    119*     CMP:   Recent Labs   Lab 07/09/22  0205  07/10/22  0837    135*   K 3.7 4.1    106   CO2 22* 21*    152*   BUN 23* 21*   CREATININE 1.4 1.4   CALCIUM 9.1 8.6*   PROT 5.7* 5.4*   ALBUMIN 3.3* 3.3*   BILITOT 0.5 0.6   ALKPHOS 55 54*   AST 11 14   ALT 11 13   ANIONGAP 9 8   EGFRNONAA 42.6* 42.6*     Magnesium:   No results for input(s): MG in the last 48 hours.    Urine Culture: No results for input(s): LABURIN in the last 48 hours.  Urine Studies:   No results for input(s): COLORU, APPEARANCEUA, PHUR, SPECGRAV, PROTEINUA, GLUCUA, KETONESU, BILIRUBINUA, OCCULTUA, NITRITE, UROBILINOGEN, LEUKOCYTESUR, RBCUA, WBCUA, BACTERIA, SQUAMEPITHEL, HYALINECASTS in the last 48 hours.    Invalid input(s): WRIGHTSUR      Significant Imaging: I have reviewed all pertinent imaging results/findings within the past 24 hours.    CTH 06/30/22:  FINDINGS:  Intracranial compartment:     Mild enlargement of the temporal horn of the right lateral ventricle, new from prior.  Remainder of the ventricular system is normal in size, stable.  Third ventricle diameter on the order of 0.3 cm.  Well-defined sulci remain present over the cerebral convexities.     The brain parenchyma appears otherwise within normal limits, and unchanged from priors.  No new parenchymal hemorrhage, edema or major vascular distribution infarct.  No intracranial mass effect or midline shift.     No new extra-axial blood or fluid collections.     Partially empty sella configuration.     Skull/extracranial contents (limited evaluation):     No displaced calvarial fracture.     Small volume right mastoid air cell fluid.     The visualized paranasal sinuses are essentially clear.     Impression:     Slight interval enlargement of the temporal horn of the right lateral ventricle since the recent MRI of 06/23/2022.  Appearance suggestive of early/developing hydrocephalus or ventricular entrapment.     No new hemorrhage, edema or infarction.     Partially empty sella configuration.  Nonspecific but  configuration could be consistent with the reported history of a elevated intracranial pressure.    CTH 07/01/22:  FINDINGS:  Intracranial compartment:     Mild enlargement of the temporal horn of the right lateral ventricle similar to most recent CT head 06/30/2022, new compared to prior MRI brain 06/23/2022.  Third ventricle diameter measures approximately 4 mm.     The brain parenchyma appears within normal limits not significantly changed compared to prior exam.  No parenchymal mass, hemorrhage, edema or major vascular distribution infarct.     No extra-axial blood or fluid collections.     Partially empty sella configuration.     Skull/extracranial contents (limited evaluation):     No fracture. Paranasal sinuses are essentially clear.  Small volume fluid within the right mastoid air cells.     Impression:     Slight enlargement of the temporal horn of right lateral ventricle not significantly changed compared to most recent CT head 06/30/2022,  new compared to prior MRI brain 06/23/2022.  Clinical correlation and continued close follow-up is recommended as developing hydrocephalus or ventricular entrapment remains a concern.     No new acute infarct or hemorrhage.    Doppler US of LLE:  FINDINGS:  Left thigh veins: Near occlusive thrombus within the left popliteal vein.  The common femoral, femoral, and upper greater saphenous veins are patent and free of thrombus. The veins are normally compressible and have normal phasic flow and augmentation response.     Left calf veins: Partially occlusive thrombus within the posterior tibial veins.  Partially occlusive thrombus with 1 of the peroneal veins.  Anterior tibial veins are patent.     Contralateral CFV: The contralateral (right) common femoral vein is patent and free of thrombus.     Miscellaneous: None     Impression:     Deep venous thrombosis within the left popliteal, posterior tibial, and peroneal veins.

## 2022-07-10 NOTE — ASSESSMENT & PLAN NOTE
- Cr 2.2 on day of transfer, has been steadily worsening over last few days (baseline ~1.2)  - KTM consulted, appreciate recs; likely prerenal versus medication related  - US transplant unremarkable for acute changes   - creatinine improved; stable  - Continue sodium bicarb

## 2022-07-10 NOTE — PROGRESS NOTES
[facility] - Telemetry Stepdown  Neurosurgery  Progress Note    Subjective:     History of Present Illness: Mrs. Rivera is a 56yoF [place] [facility] [place] and Cellcept admitted to [facility] on 06/22 with intractable HA starting about 2 months earlier and with dizziness, blurred vision, left-sided facial droop and dysarthria starting a few days before admission. 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 (6/24) with OP of 34,  stain pos for yeast like organisms and CSF culture pos for encapsulated yeast species. Ophthalmology was consulted for complaint of visual disturbance in the setting of elevated ICP.  No infectious nidus was found in retina or vitreous, however pt was found to have grade 3 palpilledema bilaterally. CTH performed 06/30 showed slight interval enlargement of the temporal horn of the right lateral ventricle since the recent MRI of 06/23/2022.  Repeat LP 06/30- opening pressure of 35cm- closing pressure of 13cm. Patient w/much improvement in signs and symptoms post LP. NCC evaluated, but given improvement in symptoms, NCC recommended continued observation on the floor. Antibiotic end date is today and primary team planning to repeat LP and CTH. Pt currently reports improvement in her HA and her visual symptoms, however continues with hearing loss and intermittent nausea. NSGY consulted for consideration of EVD placement for elevated ICPs.          Post-Op Info:  Procedure(s) (LRB):  INSERTION, SHUNT, VENTRICULOPERITONEAL - right frontal (Right)         Interval History: NAEON. Continues on Hep gtt. Tentative VPS tomorrow although may need to delay given high protein content in CSF.     Medications:  Continuous Infusions:   heparin (porcine) in D5W 23 Units/kg/hr (07/09/22 2222)     Scheduled Meds:   acetaminophen  500 mg Oral Q24H    amphotericin B liposome  (AMBISOME) IVPB  300 mg Intravenous Q24H    calcitRIOL  0.5 mcg Oral Daily    diphenhydrAMINE  25 mg Oral Q24H    flucytosine  1,500 mg Oral Q6H    magnesium oxide  400 mg Oral Daily    multivitamin  1 tablet Oral Daily    NIFEdipine  90 mg Oral Daily    polyethylene glycol  17 g Oral BID    predniSONE  5 mg Oral Daily    senna-docusate 8.6-50 mg  1 tablet Oral BID    sodium bicarbonate  1,300 mg Oral TID    sodium chloride 0.9%  500 mL Intravenous Q24H    tacrolimus  3 mg Oral BID    topiramate  100 mg Oral BID     PRN Meds:acetaminophen, albuterol-ipratropium, bisacodyL, butalbital-acetaminophen-caffeine -40 mg, dextrose 5 %, glucose, glucose, heparin (PORCINE), heparin (PORCINE), hydrALAZINE, melatonin, naloxone, ondansetron, oxyCODONE-acetaminophen, sodium chloride 0.9%     Review of Systems  Objective:     Weight: 71.7 kg (158 lb 1.1 oz)  Body mass index is 30.87 kg/m².  Vital Signs (Most Recent):  Temp: 98.3 °F (36.8 °C) (07/10/22 0749)  Pulse: 74 (07/10/22 0749)  Resp: 16 (07/10/22 0749)  BP: (!) 144/65 (07/10/22 0749)  SpO2: 96 % (07/10/22 0749)   Vital Signs (24h Range):  Temp:  [98.3 °F (36.8 °C)-98.7 °F (37.1 °C)] 98.3 °F (36.8 °C)  Pulse:  [60-99] 74  Resp:  [16-19] 16  SpO2:  [95 %-99 %] 96 %  BP: (143-188)/(63-89) 144/65                          Hemodialysis AV Graft Right forearm (Active)   Site Assessment Clean;Dry;Intact 07/30/20 0745   Patency Present;Thrill;Bruit 07/30/20 0745   Status Deaccessed 07/29/20 2010   Dressing Status Clean;Dry;Intact 10/18/18 0808   Site Condition No complications 07/30/20 0745   Dressing Open to air (None) 07/29/20 2010       Physical Exam    Neurosurgery Physical Exam  Head: normocephalic, atraumatic  Neurologic: Alert and oriented. Thought content appropriate.  GCS: Motor: 6/Verbal: 5/Eyes: 4 GCS Total: 15  Mental Status: Awake, Alert, Oriented x3  Cranial nerves: face symmetric, tongue midline, CN II-XII grossly intact. Hard of hearing   Eyes:  pupils equal, round, reactive to light with accomodation, EOMI.   Sensory: intact to light touch throughout  Motor Strength: Moves all extremities spontaneously with good tone.  Full strength upper and lower extremities. No abnormal movements seen.      Strength   Deltoids Triceps Biceps Wrist Extension Wrist Flexion Hand    Upper: R 5/5 5/5 5/5 5/5 5/5 5/5     L 5/5 5/5 5/5 5/5 5/5 5/5       Iliopsoas Quadriceps Knee  Flexion Tibialis  anterior Gastro- cnemius EHL   Lower: R 5/5 5/5 5/5 5/5 5/5 5/5     L 5/5 5/5 5/5 5/5 5/5 5/5      DTR's - 2 + and symmetric in UE and LE  Pronator Drift: no drift noted  Finger-to-nose: Intact bilaterally  Aparicio: absent  Clonus: absent  Pulses: 2+ and symmetric radial and dorsalis pedis. No lower extremity edema    Significant Labs:  Recent Labs   Lab 07/09/22  0205 07/10/22  0837    152*    135*   K 3.7 4.1    106   CO2 22* 21*   BUN 23* 21*   CREATININE 1.4 1.4   CALCIUM 9.1 8.6*     Recent Labs   Lab 07/09/22  0205 07/10/22  0328   WBC 5.93 6.25   HGB 11.2* 10.1*   HCT 34.0* 31.2*    119*     Recent Labs   Lab 07/09/22  0205 07/09/22  0208 07/09/22  2149 07/10/22  0328 07/10/22  1226   INR 1.1  --   --  1.1  --    APTT  --    < > 38.3* 57.6*  57.6* 32.2*    < > = values in this interval not displayed.     Microbiology Results (last 7 days)       Procedure Component Value Units Date/Time    CSF culture [[id number]] Collected: 07/08/22 1019    Order Status: Completed Specimen: CSF (Spinal Fluid) from CSF Tap, Tube 2 Updated: 07/10/22 0744     CSF CULTURE No Growth to date     Gram Stain Result Cytospin indicates: Few yeast      No WBC's      Results called to and read back by: Lyndsay Granados 07/08/2022  13:13    AFB Culture & Smear [[id number]] Collected: 07/08/22 1019    Order Status: Completed Specimen: CSF (Spinal Fluid) from CSF Tap, Tube 2 Updated: 07/09/22 2127     AFB Culture & Smear Culture in progress    CSF culture [[id number]] Collected:  06/30/22 1602    Order Status: Completed Specimen: CSF (Spinal Fluid) from CSF Tap, Tube 2 Updated: 07/04/22 0546     CSF CULTURE No Growth     Gram Stain Result No WBC's      Few yeast      Results called to and read back by:Leslee Andrews RN 06/30/2022  18:45          All pertinent labs from the last 24 hours have been reviewed.    Significant Diagnostics:  I have reviewed and interpreted all pertinent imaging results/findings within the past 24 hours.    Assessment/Plan:     * Cryptococcal meningitis  57 yo female s/p kidney transplant (2020) on Prograf, admitted with cryptococcal meningitis s/p 2 weeks of amphotericin B and flucytosine. LP x 3 with elevated OP and papilledema on ophthalmology exam. NSGY consulted for consideration of drain placement for elevated ICPs. Pt is currently neurologically stable. Of note, pt is on hep gtt for acute DVT    -Imaging and diagnostics reviewed   -CSF (6/24) OP34, Glu 6, protein 93.  stain pos for yeast like organisms.  Cx pos for encapsulated yeast species.     -CSF (6/30): OP35,CP13. G<5, P174, 144 WBC, 3000 RBC, 37 lymph, 59 mono/macro. Repeat CSF w/1:8 cryptococcal antigen, cx w/yeast.   -CSF (7/8): OP27, CP12. G<15,P162, CSF cx pending, gram stain with yeast    -CTH 6/30 with slight enlargement of the temporal horn of right lateral ventricle compared to prior MRI brain 06/23    -CTH 7/8 without significant change  -Ophthalmology following. Last eval 7/7 with stable OU, color plates full. Still 3+ ONH edema  -ID: Pt continued on flucytosine and amphotericin B 7/8. F/u ongoing ID recommendations  -Considering  shunt placement pending clearance of CSF and decrease in CSF protein. Tentatively booked for tomorrow.   - CT stealth pending   - NPO after MN   - Hold hep gtt at MN, continue to trend PTT  -She is tolerating serial LPs while being optimized for eventual shunt placement. Would recommend continuing serial LPs for now  -We will continue to follow. Please  call NSGY with any decline in neuro exam. Would have a low threshold to transfer to ICU with any worsening visual deficits, decline in neuro exam or signs of worsening ICPs  -Discussed with Dr. Edu Smiley MD  Neurosurgery  [facility] - Telemetry Stepdown

## 2022-07-10 NOTE — SUBJECTIVE & OBJECTIVE
Interval History: NAEON. Continues on Hep gtt. Tentative VPS tomorrow although may need to delay given high protein content in CSF.     Medications:  Continuous Infusions:   heparin (porcine) in D5W 23 Units/kg/hr (07/09/22 2222)     Scheduled Meds:   acetaminophen  500 mg Oral Q24H    amphotericin B liposome (AMBISOME) IVPB  300 mg Intravenous Q24H    calcitRIOL  0.5 mcg Oral Daily    diphenhydrAMINE  25 mg Oral Q24H    flucytosine  1,500 mg Oral Q6H    magnesium oxide  400 mg Oral Daily    multivitamin  1 tablet Oral Daily    NIFEdipine  90 mg Oral Daily    polyethylene glycol  17 g Oral BID    predniSONE  5 mg Oral Daily    senna-docusate 8.6-50 mg  1 tablet Oral BID    sodium bicarbonate  1,300 mg Oral TID    sodium chloride 0.9%  500 mL Intravenous Q24H    tacrolimus  3 mg Oral BID    topiramate  100 mg Oral BID     PRN Meds:acetaminophen, albuterol-ipratropium, bisacodyL, butalbital-acetaminophen-caffeine -40 mg, dextrose 5 %, glucose, glucose, heparin (PORCINE), heparin (PORCINE), hydrALAZINE, melatonin, naloxone, ondansetron, oxyCODONE-acetaminophen, sodium chloride 0.9%     Review of Systems  Objective:     Weight: 71.7 kg (158 lb 1.1 oz)  Body mass index is 30.87 kg/m².  Vital Signs (Most Recent):  Temp: 98.3 °F (36.8 °C) (07/10/22 0749)  Pulse: 74 (07/10/22 0749)  Resp: 16 (07/10/22 0749)  BP: (!) 144/65 (07/10/22 0749)  SpO2: 96 % (07/10/22 0749)   Vital Signs (24h Range):  Temp:  [98.3 °F (36.8 °C)-98.7 °F (37.1 °C)] 98.3 °F (36.8 °C)  Pulse:  [60-99] 74  Resp:  [16-19] 16  SpO2:  [95 %-99 %] 96 %  BP: (143-188)/(63-89) 144/65                          Hemodialysis AV Graft Right forearm (Active)   Site Assessment Clean;Dry;Intact 07/30/20 0745   Patency Present;Thrill;Bruit 07/30/20 0745   Status Deaccessed 07/29/20 2010   Dressing Status Clean;Dry;Intact 10/18/18 0808   Site Condition No complications 07/30/20 0745   Dressing Open to air (None) 07/29/20 2010       Physical Exam    Neurosurgery  Physical Exam  Head: normocephalic, atraumatic  Neurologic: Alert and oriented. Thought content appropriate.  GCS: Motor: 6/Verbal: 5/Eyes: 4 GCS Total: 15  Mental Status: Awake, Alert, Oriented x3  Cranial nerves: face symmetric, tongue midline, CN II-XII grossly intact. Hard of hearing   Eyes: pupils equal, round, reactive to light with accomodation, EOMI.   Sensory: intact to light touch throughout  Motor Strength: Moves all extremities spontaneously with good tone.  Full strength upper and lower extremities. No abnormal movements seen.      Strength   Deltoids Triceps Biceps Wrist Extension Wrist Flexion Hand    Upper: R 5/5 5/5 5/5 5/5 5/5 5/5     L 5/5 5/5 5/5 5/5 5/5 5/5       Iliopsoas Quadriceps Knee  Flexion Tibialis  anterior Gastro- cnemius EHL   Lower: R 5/5 5/5 5/5 5/5 5/5 5/5     L 5/5 5/5 5/5 5/5 5/5 5/5      DTR's - 2 + and symmetric in UE and LE  Pronator Drift: no drift noted  Finger-to-nose: Intact bilaterally  Aparicio: absent  Clonus: absent  Pulses: 2+ and symmetric radial and dorsalis pedis. No lower extremity edema    Significant Labs:  Recent Labs   Lab 07/09/22  0205 07/10/22  0837    152*    135*   K 3.7 4.1    106   CO2 22* 21*   BUN 23* 21*   CREATININE 1.4 1.4   CALCIUM 9.1 8.6*     Recent Labs   Lab 07/09/22  0205 07/10/22  0328   WBC 5.93 6.25   HGB 11.2* 10.1*   HCT 34.0* 31.2*    119*     Recent Labs   Lab 07/09/22  0205 07/09/22  0208 07/09/22  2149 07/10/22  0328 07/10/22  1226   INR 1.1  --   --  1.1  --    APTT  --    < > 38.3* 57.6*  57.6* 32.2*    < > = values in this interval not displayed.     Microbiology Results (last 7 days)       Procedure Component Value Units Date/Time    CSF culture [[id number]] Collected: 07/08/22 1019    Order Status: Completed Specimen: CSF (Spinal Fluid) from CSF Tap, Tube 2 Updated: 07/10/22 0744     CSF CULTURE No Growth to date     Gram Stain Result Cytospin indicates: Few yeast      No WBC's      Results called to  and read back by: Lyndsay Granados 07/08/2022  13:13    AFB Culture & Smear [[id number]] Collected: 07/08/22 1019    Order Status: Completed Specimen: CSF (Spinal Fluid) from CSF Tap, Tube 2 Updated: 07/09/22 2127     AFB Culture & Smear Culture in progress    CSF culture [[id number]] Collected: 06/30/22 1602    Order Status: Completed Specimen: CSF (Spinal Fluid) from CSF Tap, Tube 2 Updated: 07/04/22 0546     CSF CULTURE No Growth     Gram Stain Result No WBC's      Few yeast      Results called to and read back by:Leslee Andrews RN 06/30/2022  18:45          All pertinent labs from the last 24 hours have been reviewed.    Significant Diagnostics:  I have reviewed and interpreted all pertinent imaging results/findings within the past 24 hours.

## 2022-07-11 NOTE — SUBJECTIVE & OBJECTIVE
Subjective:   History of Present Illness:  56 y.o. female with a PMHx of HTN, kidney transplant in 2020, CKD3 admitted to [facility] on 06/22 with intractable HA starting about 2 months earlier and with dizziness, blurred vision, left-sided facial droop and dysarthria starting a few days before admission.    Per documentation   On admission /72, HR 69. Neurologic exam is remarkable for left-sided facial weakness and mild dysarthria.  Cr 1.32   LP (6/24) CSF Glu 6, CSF protein 93.  stain pos for yeast like organisms.  Spinal fluid culture pos for encapsulated yeast species  CT chest WO contrast pos for multiple indeterminate soft tissue masses in the left lower lobe with 2 smaller masses in the right lung which may be part of the same process.       On 06/26 patient started on amphotericin B liposomal (275 IV q.d.) and flucytosine (1500 p.o. q.6h) .  Hospitalization c/b the development of acute renal failure.  Cr 1.32 (6/22) > 1.4 (6/26) > 2.02 (6/27).  Transfer requested for higher level of care (KTM and ID).  Transfer diagnosis disseminated cryptococcal infection, TERESA, kidney XPL       Ms. Rivera is a 56 y.o. year old female who is status post Kidney Transplant - 4/5/2020  (#1).    Her maintenance immunosuppression consists of:   Immunosuppressants (From admission, onward)                Start     Stop Route Frequency Ordered    07/06/22 1800  tacrolimus capsule 3 mg         -- Oral 2 times daily 07/06/22 1628            Hospital Course:  Being treated for crypto meningitis     Interval History:  Mild improvement in headaches. No improvement in hearing. Possible to OR today for shunt placement.    Past Medical, Surgical, Family, and Social History:   Unchanged from H&P.    Scheduled Meds:   acetaminophen  500 mg Oral Q24H    amphotericin B liposome (AMBISOME) IVPB  300 mg Intravenous Q24H    calcitRIOL  0.5 mcg Oral Daily    diphenhydrAMINE  25 mg Oral Q24H    flucytosine  1,500 mg Oral Q6H     magnesium oxide  400 mg Oral Daily    multivitamin  1 tablet Oral Daily    NIFEdipine  90 mg Oral Daily    polyethylene glycol  17 g Oral BID    predniSONE  5 mg Oral Daily    senna-docusate 8.6-50 mg  1 tablet Oral BID    sodium bicarbonate  1,300 mg Oral TID    sodium chloride 0.9%  500 mL Intravenous Q24H    tacrolimus  3 mg Oral BID    topiramate  100 mg Oral BID     Continuous Infusions:  PRN Meds:acetaminophen, albuterol-ipratropium, bisacodyL, butalbital-acetaminophen-caffeine -40 mg, dextrose 5 %, glucose, glucose, heparin (PORCINE), heparin (PORCINE), hydrALAZINE, melatonin, naloxone, ondansetron, oxyCODONE-acetaminophen, sodium chloride 0.9%    Intake/Output - Last 3 Shifts         07/09 0700  07/10 0659 07/10 0700 07/11 0659 07/11 0700 07/12 0659    P.O. 600 1500     Total Intake(mL/kg) 600 (8.4) 1500 (20.9)     Urine (mL/kg/hr) 850 (0.5)      Stool 0      Total Output 850      Net -250 +1500            Urine Occurrence  3 x     Stool Occurrence 5 x 1 x              Review of Systems   Constitutional:  Positive for activity change and fatigue.   HENT:  Positive for hearing loss.    Eyes: Negative.    Respiratory: Negative.     Cardiovascular:  Negative for leg swelling.   Gastrointestinal: Negative.    Endocrine: Negative.    Genitourinary: Negative.    Musculoskeletal: Negative.    Skin: Negative.    Neurological:  Positive for weakness and headaches (7-9/10). Negative for facial asymmetry.   Psychiatric/Behavioral: Negative.      Objective:     Vital Signs (Most Recent):  Temp: 98.1 °F (36.7 °C) (07/11/22 1537)  Pulse: 70 (07/11/22 1537)  Resp: 18 (07/11/22 1537)  BP: 134/61 (07/11/22 1537)  SpO2: 98 % (07/11/22 1537) Vital Signs (24h Range):  Temp:  [97.6 °F (36.4 °C)-98.7 °F (37.1 °C)] 98.1 °F (36.7 °C)  Pulse:  [64-78] 70  Resp:  [16-18] 18  SpO2:  [92 %-98 %] 98 %  BP: (126-177)/(54-86) 134/61     Weight: 71.7 kg (158 lb 1.1 oz)  Height: 5' (152.4 cm)  Body mass index is 30.87  kg/m².    Physical Exam  Constitutional:       General: She is not in acute distress.     Appearance: She is obese. She is ill-appearing.   Eyes:      General: No scleral icterus.     Extraocular Movements: Extraocular movements intact.      Pupils: Pupils are equal, round, and reactive to light.   Cardiovascular:      Rate and Rhythm: Normal rate and regular rhythm.      Pulses: Normal pulses.      Heart sounds: No murmur heard.  Pulmonary:      Effort: Pulmonary effort is normal. No respiratory distress.   Abdominal:      General: There is no distension.      Palpations: Abdomen is soft.      Tenderness: There is no abdominal tenderness.   Musculoskeletal:      Right lower leg: No edema.      Left lower leg: No edema.   Skin:     Coloration: Skin is not jaundiced.   Neurological:      Mental Status: She is alert and oriented to person, place, and time.       Laboratory:  Labs within the past 24 hours have been reviewed.    Diagnostic Results:  None

## 2022-07-11 NOTE — ASSESSMENT & PLAN NOTE
Likely from increased intracranial pressure  Repeat LP with elevated pressure  Possible shunt placement today per NSGY

## 2022-07-11 NOTE — ASSESSMENT & PLAN NOTE
- Last intracranial pressure of 34cm from LP at OSH  - Patient with papilledema, vomiting, AMS  - CTH w/developing hydrocephalus and signs of ventricular entrapment  - NSGY notified and consulted- no acute interventions at this time  - NCC notified- continue monitoring on floor  - S/p LP w/opening pressure of 35cm and closing pressure of 13cm 06/30/22  - S/p LP w/opening pressure of 27cm and closing pressure of 12cm 06/30/22  - Continue to monitor for signs and symptoms of worsening ICP  - Repeat CTH 07/01 relatively unchanged   -repeat CTH 7/8 without significant change  -neurosurgery consulted and following; tentative   shunt placement  7/11; CT still ordered

## 2022-07-11 NOTE — SUBJECTIVE & OBJECTIVE
Interval History: Patient was seen and evaluated this am. FLOWERS comes and goes; responding to analgesics; potentially not as intense as before per patient.  Intermittent nausea.  Remains with a degree of hearing loss; some improvement noted today.    No fevers, chills, night sweats, abd pain, vomiting, new vision changes.     Objective:     Vital Signs (Most Recent):  Temp: 98.3 °F (36.8 °C) (07/11/22 1145)  Pulse: 78 (07/11/22 1145)  Resp: 18 (07/11/22 1145)  BP: (!) 147/70 (07/11/22 1145)  SpO2: 98 % (07/11/22 1145)   Vital Signs (24h Range):  Temp:  [97.6 °F (36.4 °C)-98.7 °F (37.1 °C)] 98.3 °F (36.8 °C)  Pulse:  [64-78] 78  Resp:  [16-18] 18  SpO2:  [92 %-98 %] 98 %  BP: (126-177)/(54-86) 147/70     Weight: 71.7 kg (158 lb 1.1 oz)  Body mass index is 30.87 kg/m².    Intake/Output Summary (Last 24 hours) at 7/11/2022 1422  Last data filed at 7/10/2022 1800  Gross per 24 hour   Intake 1500 ml   Output --   Net 1500 ml        Physical Exam  Gen: in NAD, appears stated age  Neuro: awake and oriented to self, place, time  HEENT: NTNC, EOMI, PERRL, MMM  CVS: RRR, no m/r/g; S1/S2 auscultated with no S3 or S4; capillary refill < 2 sec  Resp: lungs CTAB, no w/r/r; no belabored breathing or accessory muscle use appreciated   Abd: BS+ in all 4 quadrants; NTND, soft to palpation; no organomegaly appreciated   Extrem: pulses full, equal, and regular over all 4 extremities; no swelling of BL UE or LE ext    Significant Labs: All pertinent labs within the past 24 hours have been reviewed.  Blood Culture: No results for input(s): LABBLOO in the last 48 hours.    Microbiology Results (last 7 days)       Procedure Component Value Units Date/Time    CSF culture [[id number]] Collected: 07/08/22 1019    Order Status: Completed Specimen: CSF (Spinal Fluid) from CSF Tap, Tube 2 Updated: 07/11/22 0757     CSF CULTURE No Growth to date     Gram Stain Result Cytospin indicates: Few yeast      No WBC's      Results called to and read back by:  Lyndsay Granados 07/08/2022  13:13    AFB Culture & Smear [[id number]] Collected: 07/08/22 1019    Order Status: Completed Specimen: CSF (Spinal Fluid) from CSF Tap, Tube 2 Updated: 07/09/22 2127     AFB Culture & Smear Culture in progress              CSF findings 06/30/22: <5glucose, 174 protein, 144 WBC, 3000 RBC, 37 lymph, 59 mono/macro. Repeat CSF w/1:8 cryptococcal antigen, CSF culture w/yeast    CBC:   Recent Labs   Lab 07/10/22  0328 07/11/22  0426   WBC 6.25 6.22   HGB 10.1* 9.3*   HCT 31.2* 29.0*   * 113*     CMP:   Recent Labs   Lab 07/10/22  0837 07/11/22 0426   * 134*   K 4.1 3.5    107   CO2 21* 22*   * 161*   BUN 21* 27*   CREATININE 1.4 1.4   CALCIUM 8.6* 8.1*   PROT 5.4* 4.5*   ALBUMIN 3.3* 2.5*   BILITOT 0.6 0.5   ALKPHOS 54* 43*   AST 14 11   ALT 13 9*   ANIONGAP 8 5*   EGFRNONAA 42.6* 42.6*     Magnesium:   Recent Labs   Lab 07/11/22 0426   MG 1.9       Urine Culture: No results for input(s): LABURIN in the last 48 hours.  Urine Studies:   No results for input(s): COLORU, APPEARANCEUA, PHUR, SPECGRAV, PROTEINUA, GLUCUA, KETONESU, BILIRUBINUA, OCCULTUA, NITRITE, UROBILINOGEN, LEUKOCYTESUR, RBCUA, WBCUA, BACTERIA, SQUAMEPITHEL, HYALINECASTS in the last 48 hours.    Invalid input(s): WRIGHTSUR      Significant Imaging: I have reviewed all pertinent imaging results/findings within the past 24 hours.    CTH 06/30/22:  FINDINGS:  Intracranial compartment:     Mild enlargement of the temporal horn of the right lateral ventricle, new from prior.  Remainder of the ventricular system is normal in size, stable.  Third ventricle diameter on the order of 0.3 cm.  Well-defined sulci remain present over the cerebral convexities.     The brain parenchyma appears otherwise within normal limits, and unchanged from priors.  No new parenchymal hemorrhage, edema or major vascular distribution infarct.  No intracranial mass effect or midline shift.     No new extra-axial blood or fluid  collections.     Partially empty sella configuration.     Skull/extracranial contents (limited evaluation):     No displaced calvarial fracture.     Small volume right mastoid air cell fluid.     The visualized paranasal sinuses are essentially clear.     Impression:     Slight interval enlargement of the temporal horn of the right lateral ventricle since the recent MRI of 06/23/2022.  Appearance suggestive of early/developing hydrocephalus or ventricular entrapment.     No new hemorrhage, edema or infarction.     Partially empty sella configuration.  Nonspecific but configuration could be consistent with the reported history of a elevated intracranial pressure.    CTH 07/01/22:  FINDINGS:  Intracranial compartment:     Mild enlargement of the temporal horn of the right lateral ventricle similar to most recent CT head 06/30/2022, new compared to prior MRI brain 06/23/2022.  Third ventricle diameter measures approximately 4 mm.     The brain parenchyma appears within normal limits not significantly changed compared to prior exam.  No parenchymal mass, hemorrhage, edema or major vascular distribution infarct.     No extra-axial blood or fluid collections.     Partially empty sella configuration.     Skull/extracranial contents (limited evaluation):     No fracture. Paranasal sinuses are essentially clear.  Small volume fluid within the right mastoid air cells.     Impression:     Slight enlargement of the temporal horn of right lateral ventricle not significantly changed compared to most recent CT head 06/30/2022,  new compared to prior MRI brain 06/23/2022.  Clinical correlation and continued close follow-up is recommended as developing hydrocephalus or ventricular entrapment remains a concern.     No new acute infarct or hemorrhage.    Doppler US of LLE:  FINDINGS:  Left thigh veins: Near occlusive thrombus within the left popliteal vein.  The common femoral, femoral, and upper greater saphenous veins are patent and  free of thrombus. The veins are normally compressible and have normal phasic flow and augmentation response.     Left calf veins: Partially occlusive thrombus within the posterior tibial veins.  Partially occlusive thrombus with 1 of the peroneal veins.  Anterior tibial veins are patent.     Contralateral CFV: The contralateral (right) common femoral vein is patent and free of thrombus.     Miscellaneous: None     Impression:     Deep venous thrombosis within the left popliteal, posterior tibial, and peroneal veins.

## 2022-07-11 NOTE — ASSESSMENT & PLAN NOTE
- Pt with intractable headache, facial droop, dysarthria (improved). 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  - Continue Amp B 5/mg kg Q24hrs and flucytosine 1500 mg Q6hrs for treatment.   - On chart review, amphotericin started on 06/24/22- 2wks of treatment 07/08/22  - Repeat LP completed 7/9; gram stain notable for yeast; and amphotericin continued; cultures no growth to date  -Transplant ID following- continue with current course- patient signs and symptoms much improved since time of admission, will hold of on steroids- no signs of IRIS, will continue to monitor for need for repeat LP  - KTM following; recommend  500mL normal saline with amphotericin

## 2022-07-11 NOTE — ASSESSMENT & PLAN NOTE
- history of intractable headache  - continue topamax to 100mg BID   - continue as needed Fioricet, oxycodone/acetaminophen, acetaminophen; headache responding to prns

## 2022-07-11 NOTE — PROGRESS NOTES
[facility] - Tele[facility] Medicine  Progress Note    Patient Name: Tala Rivera  MRN: [id number]  Patient Class: IP- Inpatient   Admission Date: 6/27/2022  Length of Stay: 14 days  Attending Physician: Joanne Braden MD  Primary Care Provider: ANUJA Castellon MD        Subjective:     Principal Problem:Cryptococcal meningitis        HPI:  Mrs. Rivera is a 56yoF w/PMHx of kidney XPL (2020 [facility]) on Prograf and Cellcept admitted to [facility] on 06/22 with intractable HA starting about 2 months earlier and with dizziness, blurred vision, left-sided facial droop and dysarthria starting a few days before admission.       On admission /72, HR 69. Neurologic exam is remarkable for left-sided facial weakness and mild dysarthria.  Labs (6/22) WBC 9.4, Hb 15.9, Plts 224, Na 137, K 3.3, CO2 25, BUN 24, Cr 1.32, AG 14, Glu 182 LFTs WNL. COVID neg     LP (6/24) CSF Glu 6, CSF protein 93. Chinese stain pos for yeast like organisms.  Spinal fluid culture pos for encapsulated yeast species.       CT chest WO contrast pos for multiple indeterminate soft tissue masses in the left lower lobe with 2 smaller masses in the right lung which may be part of the same process.       On 06/26 patient started on amphotericin B liposomal (275 IV q.d.) and flucytosine (1500 p.o. q.6h) .  Hospitalization c/b the development of acute renal failure.  Cr 1.32 (6/22) > 1.4 (6/26) > 2.02 (6/27).  Cellcept and prednisone have been held.       Transfer requested for higher level of care (KTM and ID).  Transfer diagnosis disseminated cryptococcal infection, TERESA, kidney XPL.    At time of my assessment, pt. Complains of headache which has been persistent for the last several days. She states that it is somewhat relieved by the fioricet and oxycodone that she was receiving at the outside hospital.      Overview/Hospital Course:  KTM, transplant ID, Neurology consulted upon admission. Per KTN, normal saline scheduled with  amphotericin infusions in addition to bicarb drip, prednisone increased, Prograf continued.  Creatinine down-trending and bicarb drip discontinued.  Per Neurology, outside hospital MR MRI reviewed and note that neuro deficits may take a few weeks to fully resolve; recommend limiting Fioricet and narcotics to minimize medication overuse/rebound headaches.  Per Transplant ID, continue ambisome/flucytosine with plan to repeat LP 2 weeks from 6/24.  Due to visual disturbance in the setting of fungal meningitis, ophthalmology consulted.  No infectious nidus found in retina or vitreous, however grade 3 disc edema bilaterally. Exam finding and subjective complaints explained by meningitis and papilledema.  Recommendation for repeating LP to reduce opening pressure per ID and Neurology in agreement.  On 06/29, patient hearing improved.  Remains with intermittent confusion per  at bedside.    CTH performed 06/30- Slight interval enlargement of the temporal horn of the right lateral ventricle since the recent MRI of 06/23/2022.  Appearance suggestive of early/developing hydrocephalus or ventricular entrapment. NSGY consulted. Per NSGY, recommended NCC transfer for further evaluation. Patient underwent repeat LP 06/30- opening pressure of 35cm- closing pressure of 13cm. Patient w/much improvement in signs and symptoms. CSF findings: <5glucose, 174 protein, 144 WBC, 3000 RBC, 37 lymph, 59 mono/macro. Repeat CSF w/1:8 cryptococcal antigen, CSF culture w/yeast. NCC evaluated, but patient with much improvement in signs and symptoms, and so NCC recommended continued observation on the floor. Discussed w/ID- stated to continue to monitor for worsening signs and symptoms- with closing pressure <25cm, no need for serial LP's at this time.     Doppler US performed on LLE- found to have DVT. Started on heparin drip. Planning for repeat LP 07/07.  Heparin drip paused and repeat lumbar puncture completed on 07/07.  Per Radiology,  CSF was clear to gout 16 mL total; opening pressure 27, closing pressure 12. Gram stain notable for few yeast.  Repeat CT head ordered with stable findings.  Neurosurgery consulted; Considering  shunt placement pending clearance of CSF and decrease in CSF protein.  Recommend ongoing serial LPs for now.      Interval History: Patient was seen and evaluated this am. FLOWERS comes and goes; responding to analgesics; potentially not as intense as before per patient.  Intermittent nausea.  Remains with a degree of hearing loss; some improvement noted today.    No fevers, chills, night sweats, abd pain, vomiting, new vision changes.     Objective:     Vital Signs (Most Recent):  Temp: 98.3 °F (36.8 °C) (07/11/22 1145)  Pulse: 78 (07/11/22 1145)  Resp: 18 (07/11/22 1145)  BP: (!) 147/70 (07/11/22 1145)  SpO2: 98 % (07/11/22 1145)   Vital Signs (24h Range):  Temp:  [97.6 °F (36.4 °C)-98.7 °F (37.1 °C)] 98.3 °F (36.8 °C)  Pulse:  [64-78] 78  Resp:  [16-18] 18  SpO2:  [92 %-98 %] 98 %  BP: (126-177)/(54-86) 147/70     Weight: 71.7 kg (158 lb 1.1 oz)  Body mass index is 30.87 kg/m².    Intake/Output Summary (Last 24 hours) at 7/11/2022 1422  Last data filed at 7/10/2022 1800  Gross per 24 hour   Intake 1500 ml   Output --   Net 1500 ml        Physical Exam  Gen: in NAD, appears stated age  Neuro: awake and oriented to self, place, time  HEENT: NTNC, EOMI, PERRL, MMM  CVS: RRR, no m/r/g; S1/S2 auscultated with no S3 or S4; capillary refill < 2 sec  Resp: lungs CTAB, no w/r/r; no belabored breathing or accessory muscle use appreciated   Abd: BS+ in all 4 quadrants; NTND, soft to palpation; no organomegaly appreciated   Extrem: pulses full, equal, and regular over all 4 extremities; no swelling of BL UE or LE ext    Significant Labs: All pertinent labs within the past 24 hours have been reviewed.  Blood Culture: No results for input(s): LABBLOO in the last 48 hours.    Microbiology Results (last 7 days)       Procedure Component  Value Units Date/Time    CSF culture [[id number]] Collected: 07/08/22 1019    Order Status: Completed Specimen: CSF (Spinal Fluid) from CSF Tap, Tube 2 Updated: 07/11/22 0757     CSF CULTURE No Growth to date     Gram Stain Result Cytospin indicates: Few yeast      No WBC's      Results called to and read back by: Lyndsay Granados 07/08/2022  13:13    AFB Culture & Smear [[id number]] Collected: 07/08/22 1019    Order Status: Completed Specimen: CSF (Spinal Fluid) from CSF Tap, Tube 2 Updated: 07/09/22 2127     AFB Culture & Smear Culture in progress              CSF findings 06/30/22: <5glucose, 174 protein, 144 WBC, 3000 RBC, 37 lymph, 59 mono/macro. Repeat CSF w/1:8 cryptococcal antigen, CSF culture w/yeast    CBC:   Recent Labs   Lab 07/10/22  0328 07/11/22  0426   WBC 6.25 6.22   HGB 10.1* 9.3*   HCT 31.2* 29.0*   * 113*     CMP:   Recent Labs   Lab 07/10/22  0837 07/11/22  0426   * 134*   K 4.1 3.5    107   CO2 21* 22*   * 161*   BUN 21* 27*   CREATININE 1.4 1.4   CALCIUM 8.6* 8.1*   PROT 5.4* 4.5*   ALBUMIN 3.3* 2.5*   BILITOT 0.6 0.5   ALKPHOS 54* 43*   AST 14 11   ALT 13 9*   ANIONGAP 8 5*   EGFRNONAA 42.6* 42.6*     Magnesium:   Recent Labs   Lab 07/11/22  0426   MG 1.9       Urine Culture: No results for input(s): LABURIN in the last 48 hours.  Urine Studies:   No results for input(s): COLORU, APPEARANCEUA, PHUR, SPECGRAV, PROTEINUA, GLUCUA, KETONESU, BILIRUBINUA, OCCULTUA, NITRITE, UROBILINOGEN, LEUKOCYTESUR, RBCUA, WBCUA, BACTERIA, SQUAMEPITHEL, HYALINECASTS in the last 48 hours.    Invalid input(s): WRIGHTSUR      Significant Imaging: I have reviewed all pertinent imaging results/findings within the past 24 hours.    CTH 06/30/22:  FINDINGS:  Intracranial compartment:     Mild enlargement of the temporal horn of the right lateral ventricle, new from prior.  Remainder of the ventricular system is normal in size, stable.  Third ventricle diameter on the order of 0.3 cm.  Well-defined  sulci remain present over the cerebral convexities.     The brain parenchyma appears otherwise within normal limits, and unchanged from priors.  No new parenchymal hemorrhage, edema or major vascular distribution infarct.  No intracranial mass effect or midline shift.     No new extra-axial blood or fluid collections.     Partially empty sella configuration.     Skull/extracranial contents (limited evaluation):     No displaced calvarial fracture.     Small volume right mastoid air cell fluid.     The visualized paranasal sinuses are essentially clear.     Impression:     Slight interval enlargement of the temporal horn of the right lateral ventricle since the recent MRI of 06/23/2022.  Appearance suggestive of early/developing hydrocephalus or ventricular entrapment.     No new hemorrhage, edema or infarction.     Partially empty sella configuration.  Nonspecific but configuration could be consistent with the reported history of a elevated intracranial pressure.    CTH 07/01/22:  FINDINGS:  Intracranial compartment:     Mild enlargement of the temporal horn of the right lateral ventricle similar to most recent CT head 06/30/2022, new compared to prior MRI brain 06/23/2022.  Third ventricle diameter measures approximately 4 mm.     The brain parenchyma appears within normal limits not significantly changed compared to prior exam.  No parenchymal mass, hemorrhage, edema or major vascular distribution infarct.     No extra-axial blood or fluid collections.     Partially empty sella configuration.     Skull/extracranial contents (limited evaluation):     No fracture. Paranasal sinuses are essentially clear.  Small volume fluid within the right mastoid air cells.     Impression:     Slight enlargement of the temporal horn of right lateral ventricle not significantly changed compared to most recent CT head 06/30/2022,  new compared to prior MRI brain 06/23/2022.  Clinical correlation and continued close follow-up is  recommended as developing hydrocephalus or ventricular entrapment remains a concern.     No new acute infarct or hemorrhage.    Doppler US of LLE:  FINDINGS:  Left thigh veins: Near occlusive thrombus within the left popliteal vein.  The common femoral, femoral, and upper greater saphenous veins are patent and free of thrombus. The veins are normally compressible and have normal phasic flow and augmentation response.     Left calf veins: Partially occlusive thrombus within the posterior tibial veins.  Partially occlusive thrombus with 1 of the peroneal veins.  Anterior tibial veins are patent.     Contralateral CFV: The contralateral (right) common femoral vein is patent and free of thrombus.     Miscellaneous: None     Impression:     Deep venous thrombosis within the left popliteal, posterior tibial, and peroneal veins.      Assessment/Plan:      * Cryptococcal meningitis  - Pt with intractable headache, facial droop, dysarthria (improved). 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  - Continue Amp B 5/mg kg Q24hrs and flucytosine 1500 mg Q6hrs for treatment.   - On chart review, amphotericin started on 06/24/22- 2wks of treatment 07/08/22  - Repeat LP completed 7/9; gram stain notable for yeast; and amphotericin continued; cultures no growth to date  -Transplant ID following- continue with current course- patient signs and symptoms much improved since time of admission, will hold of on steroids- no signs of IRIS, will continue to monitor for need for repeat LP  - KTM following; recommend  500mL normal saline with amphotericin     Acute renal failure superimposed on stage 3 chronic kidney disease  - Cr 2.2 on day of transfer, has been steadily worsening over last few days (baseline ~1.2)  - KTM consulted, appreciate recs; likely prerenal versus medication related  - US transplant unremarkable for acute changes   - creatinine improved; stable  - Continue sodium  "bicarb    VTE (venous thromboembolism)  - continue heparin drip for DVT of the LLE  -pause as needed in the setting of procedures      Increased intracranial pressure  - Last intracranial pressure of 34cm from LP at OSH  - Patient with papilledema, vomiting, AMS  - CTH w/developing hydrocephalus and signs of ventricular entrapment  - NSGY notified and consulted- no acute interventions at this time  - NCC notified- continue monitoring on floor  - S/p LP w/opening pressure of 35cm and closing pressure of 13cm 06/30/22  - S/p LP w/opening pressure of 27cm and closing pressure of 12cm 06/30/22  - Continue to monitor for signs and symptoms of worsening ICP  - Repeat CTH 07/01 relatively unchanged   -repeat CTH 7/8 without significant change  -neurosurgery consulted and following; tentative   shunt placement  7/11; CT still ordered      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  - Improving  - See papilledema-- spoke w/ophthalmology- expect papilledema to improve over the course of 2wks if infection remains well-controlled and ICP remains wnl      Bilateral papilledema due to raised intracranial pressure  - Ophthalmology evaluation with grade 3 papilledema in both eyes (longer she has elevated CSF pressure, the higher her risk of permanent optic nerve damage and vision change is.  There is a congested appearance to the veins in both eyes secondary to optic nerve congestion and indicates higher risk for ischemic event), 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  -"- 7/7 exam, vision stable OU, color plates full. Still 3+ ONH edema, and we do not expect this to rapidly improve as there is often delay of resolution of ONH edema following improvement in ICP "  - see increased intracranial pressure    Headache  - history of intractable headache  - continue topamax to 100mg BID   - continue as needed Fioricet, oxycodone/acetaminophen, acetaminophen; headache responding to prns      Hypokalemia  - PO repletion prn to goal      Long-term use of immunosuppressant medication  - Pt " with opportunistic infection, see above. Holding mycophenolate   - KTM following, appreciate recs  Continue prograf per levels   Monitor levels to assess for toxicity  Continue prednisone ---> increased to 10mg daily   Hold MMF         S/P kidney transplant  - See TERESA. KTM consulted for further assistance  Kidney transplant 4/5/2020   Underlying ckd stage III  ESRD related to HTN        VTE Risk Mitigation (From admission, onward)         Ordered     heparin 25,000 units in dextrose 5% (100 units/ml) IV bolus from bag - ADDITIONAL PRN BOLUS - 60 units/kg  As needed (PRN)        Question:  Heparin Infusion Adjustment (DO NOT MODIFY ANSWER)  Answer:  \\[email or web address]\[email or web address]\Images\Pharmacy\HeparinInfusions\heparin HIGH INTENSITY nomogram for [facility] [id number].pdf    07/03/22 1205     heparin 25,000 units in dextrose 5% (100 units/ml) IV bolus from bag - ADDITIONAL PRN BOLUS - 30 units/kg  As needed (PRN)        Question:  Heparin Infusion Adjustment (DO NOT MODIFY ANSWER)  Answer:  \\[email or web address]\epic\Images\Pharmacy\HeparinInfusions\heparin HIGH INTENSITY nomogram for [facility] [id number].pdf    07/03/22 1205     heparin 25,000 units in dextrose 5% 250 mL (100 units/mL) infusion HIGH INTENSITY nomogram - [facility]  Continuous        Question Answer Comment   Heparin Infusion Adjustment (DO NOT MODIFY ANSWER) \\[email or web address]\epic\Images\Pharmacy\HeparinInfusions\heparin HIGH INTENSITY nomogram for [facility] [id number].pdf    Begin at (in units/kg/hr) 18        07/03/22 1205     IP VTE HIGH RISK PATIENT  Once         06/27/22 2202     Place sequential compression device  Until discontinued         06/27/22 2202                Discharge Planning   JULITO: 7/13/2022     Code Status: Full Code   Is the patient medically ready for discharge?: No    Reason for patient still in hospital (select all that apply): Patient unstable, Patient trending condition, Treatment, Imaging and Consult recommendations  Discharge Plan A: Home with family                  Joanne Braden  MD  Department of Hospital Medicine   [facility] - Telemetry Stepdown

## 2022-07-11 NOTE — PLAN OF CARE
Problem: Adult Inpatient Plan of Care  Goal: Plan of Care Review  Outcome: Ongoing, Progressing  Goal: Patient-Specific Goal (Individualized)  Outcome: Ongoing, Progressing  Goal: Absence of Hospital-Acquired Illness or Injury  Outcome: Ongoing, Progressing  Goal: Optimal Comfort and Wellbeing  Outcome: Ongoing, Progressing  Goal: Readiness for Transition of Care  Outcome: Ongoing, Progressing     Alert and oriented x4. Vitals stable. Hep gtt turned stopped at midnight. NPO since midnight. Free from falls and injuries during shift. No concerns or requests voiced. Bed low with side rails up x2. Call bell within reach. Will continue plan of care.

## 2022-07-11 NOTE — PROGRESS NOTES
[facility] - Telemetry Stepdown  Kidney Transplant  Progress Note      Reason for Follow-up: Reassessment of Kidney Transplant - 4/5/2020  (#1) recipient and management of immunosuppression.    ORGAN: LEFT KIDNEY    Donor Type: Donation after Brain Death    PHS Increased Risk: yes         Subjective:   History of Present Illness:  56 y.o. female with a PMHx of HTN, kidney transplant in 2020, CKD3 admitted to [facility] on 06/22 with intractable HA starting about 2 months earlier and with dizziness, blurred vision, left-sided facial droop and dysarthria starting a few days before admission.    Per documentation   On admission /72, HR 69. Neurologic exam is remarkable for left-sided facial weakness and mild dysarthria.  Cr 1.32   LP (6/24) CSF Glu 6, CSF protein 93.  stain pos for yeast like organisms.  Spinal fluid culture pos for encapsulated yeast species  CT chest WO contrast pos for multiple indeterminate soft tissue masses in the left lower lobe with 2 smaller masses in the right lung which may be part of the same process.       On 06/26 patient started on amphotericin B liposomal (275 IV q.d.) and flucytosine (1500 p.o. q.6h) .  Hospitalization c/b the development of acute renal failure.  Cr 1.32 (6/22) > 1.4 (6/26) > 2.02 (6/27).  Transfer requested for higher level of care (KTM and ID).  Transfer diagnosis disseminated cryptococcal infection, TERESA, kidney XPL       Ms. Rivera is a 56 y.o. year old female who is status post Kidney Transplant - 4/5/2020  (#1).    Her maintenance immunosuppression consists of:   Immunosuppressants (From admission, onward)                Start     Stop Route Frequency Ordered    07/06/22 1800  tacrolimus capsule 3 mg         -- Oral 2 times daily 07/06/22 1628            Hospital Course:  Being treated for crypto meningitis     Interval History:  Mild improvement in headaches. No improvement in hearing. Possible to OR today for shunt placement.    Past Medical,  Surgical, Family, and Social History:   Unchanged from H&P.    Scheduled Meds:   acetaminophen  500 mg Oral Q24H    amphotericin B liposome (AMBISOME) IVPB  300 mg Intravenous Q24H    calcitRIOL  0.5 mcg Oral Daily    diphenhydrAMINE  25 mg Oral Q24H    flucytosine  1,500 mg Oral Q6H    magnesium oxide  400 mg Oral Daily    multivitamin  1 tablet Oral Daily    NIFEdipine  90 mg Oral Daily    polyethylene glycol  17 g Oral BID    predniSONE  5 mg Oral Daily    senna-docusate 8.6-50 mg  1 tablet Oral BID    sodium bicarbonate  1,300 mg Oral TID    sodium chloride 0.9%  500 mL Intravenous Q24H    tacrolimus  3 mg Oral BID    topiramate  100 mg Oral BID     Continuous Infusions:  PRN Meds:acetaminophen, albuterol-ipratropium, bisacodyL, butalbital-acetaminophen-caffeine -40 mg, dextrose 5 %, glucose, glucose, heparin (PORCINE), heparin (PORCINE), hydrALAZINE, melatonin, naloxone, ondansetron, oxyCODONE-acetaminophen, sodium chloride 0.9%    Intake/Output - Last 3 Shifts         07/09 0700  07/10 0659 07/10 0700 07/11 0659 07/11 0700 07/12 0659    P.O. 600 1500     Total Intake(mL/kg) 600 (8.4) 1500 (20.9)     Urine (mL/kg/hr) 850 (0.5)      Stool 0      Total Output 850      Net -250 +1500            Urine Occurrence  3 x     Stool Occurrence 5 x 1 x              Review of Systems   Constitutional:  Positive for activity change and fatigue.   HENT:  Positive for hearing loss.    Eyes: Negative.    Respiratory: Negative.     Cardiovascular:  Negative for leg swelling.   Gastrointestinal: Negative.    Endocrine: Negative.    Genitourinary: Negative.    Musculoskeletal: Negative.    Skin: Negative.    Neurological:  Positive for weakness and headaches (7-9/10). Negative for facial asymmetry.   Psychiatric/Behavioral: Negative.      Objective:     Vital Signs (Most Recent):  Temp: 98.1 °F (36.7 °C) (07/11/22 1537)  Pulse: 70 (07/11/22 1537)  Resp: 18 (07/11/22 1537)  BP: 134/61 (07/11/22 1537)  SpO2:  98 % (07/11/22 1537) Vital Signs (24h Range):  Temp:  [97.6 °F (36.4 °C)-98.7 °F (37.1 °C)] 98.1 °F (36.7 °C)  Pulse:  [64-78] 70  Resp:  [16-18] 18  SpO2:  [92 %-98 %] 98 %  BP: (126-177)/(54-86) 134/61     Weight: 71.7 kg (158 lb 1.1 oz)  Height: 5' (152.4 cm)  Body mass index is 30.87 kg/m².    Physical Exam  Constitutional:       General: She is not in acute distress.     Appearance: She is obese. She is ill-appearing.   Eyes:      General: No scleral icterus.     Extraocular Movements: Extraocular movements intact.      Pupils: Pupils are equal, round, and reactive to light.   Cardiovascular:      Rate and Rhythm: Normal rate and regular rhythm.      Pulses: Normal pulses.      Heart sounds: No murmur heard.  Pulmonary:      Effort: Pulmonary effort is normal. No respiratory distress.   Abdominal:      General: There is no distension.      Palpations: Abdomen is soft.      Tenderness: There is no abdominal tenderness.   Musculoskeletal:      Right lower leg: No edema.      Left lower leg: No edema.   Skin:     Coloration: Skin is not jaundiced.   Neurological:      Mental Status: She is alert and oriented to person, place, and time.       Laboratory:  Labs within the past 24 hours have been reviewed.    Diagnostic Results:  None    Assessment/Plan:     * Cryptococcal meningitis  ID following and is on ampho B and flucytosine   Seen by Neuro and Opthal and now NSGY    VTE (venous thromboembolism)  LLE popliteal VTE likely secondary to hospitalization and immobility  On heparin      Acute renal failure superimposed on stage 3 chronic kidney disease  TERESA on underlying CKD stage III   Likely pre-renal or medication related   BL 1.2 to 1.4 mg/dl   Now at baseline    Headache  Likely from increased intracranial pressure  Repeat LP with elevated pressure  Possible shunt placement today per NSGY    Long-term use of immunosuppressant medication  Goal tacro 4-6 trough  Hold MMF  Continue 5  Continue home tacro  "3/3      S/P kidney transplant  Kidney transplant 4/5/2020   Underlying ckd stage III  ESRD related to HTN  Continue immunosuppression as per problem "long term use of immunosuppression medication"          Roberto Eisenberg Jr., MD  Kidney Transplant  [facility] - Telemetry Stepdown  "

## 2022-07-12 NOTE — CLINICAL REVIEW
KTM Chart Review    No intake or output data in the 24 hours ending 07/12/22 1410    Vitals:    07/12/22 0019 07/12/22 0455 07/12/22 0746 07/12/22 1124   BP: (!) 149/66 (!) 144/65 (!) 143/65 138/65   BP Location:   Right leg Right leg   Patient Position:   Lying Lying   Pulse: 75 62 61 66   Resp: 17 17 16 18   Temp:  98.4 °F (36.9 °C) 98.2 °F (36.8 °C) 98.2 °F (36.8 °C)   TempSrc:   Oral Oral   SpO2: (!) 93% (!) 93% 96% 100%   Weight:       Height:           Recent Labs   Lab 07/10/22  0837 07/11/22  0426 07/12/22  0325   * 134* 134*   K 4.1 3.5 3.3*    107 108   CO2 21* 22* 20*   BUN 21* 27* 22*   CREATININE 1.4 1.4 1.2   CALCIUM 8.6* 8.1* 7.8*   PHOS  --  3.7  --        Immunosuppression Level - tacro 5.8    No acute issues identified. Please call KTM as needed; We will continue to follow.    Roberto Eisenberg Jr.

## 2022-07-12 NOTE — ASSESSMENT & PLAN NOTE
- Last intracranial pressure of 34cm from LP at OSH  - Patient with papilledema, vomiting, AMS  - CTH w/developing hydrocephalus and signs of ventricular entrapment  - NSGY notified and consulted- no acute interventions at this time  - NCC notified- continue monitoring on floor  - S/p LP w/opening pressure of 35cm and closing pressure of 13cm 06/30/22  - S/p LP w/opening pressure of 27cm and closing pressure of 12cm 06/30/22  - Continue to monitor for signs and symptoms of worsening ICP  - Repeat CTH 07/01 relatively unchanged   -repeat CTH 7/8 without significant change  -neurosurgery consulted and following; tentative  shunt placement 7/18 pending LP 7/13 results

## 2022-07-12 NOTE — PLAN OF CARE
[facility] - Telemetry Stepdown  Discharge Reassessment    Primary Care Provider: ANUJA Castellon MD    Expected Discharge Date: 7/15/2022    Reassessment (most recent)     Discharge Reassessment - 07/12/22 1551        Discharge Reassessment    Assessment Type Discharge Planning Reassessment     Discharge Plan A Home Health     Discharge Plan B Home with family     DME Needed Upon Discharge  other (see comments)   TBD    Discharge Barriers Identified None     Why the patient remains in the hospital Requires continued medical care               Julia Young RN  Ext [phone]

## 2022-07-12 NOTE — PROGRESS NOTES
[facility] - Telemetry [facility] Medicine  Progress Note    Patient Name: Tala Rivera  MRN: [id number]  Patient Class: IP- Inpatient   Admission Date: 6/27/2022  Length of Stay: 15 days  Attending Physician: Joanne Braden MD  Primary Care Provider: ANUJA Castellon MD        Subjective:     Principal Problem:Cryptococcal meningitis        HPI:  Mrs. Rivera is a 56yoF w/PMHx of kidney XPL (2020 [facility]) on Prograf and Cellcept admitted to [facility] on 06/22 with intractable HA starting about 2 months earlier and with dizziness, blurred vision, left-sided facial droop and dysarthria starting a few days before admission.       On admission /72, HR 69. Neurologic exam is remarkable for left-sided facial weakness and mild dysarthria.  Labs (6/22) WBC 9.4, Hb 15.9, Plts 224, Na 137, K 3.3, CO2 25, BUN 24, Cr 1.32, AG 14, Glu 182 LFTs WNL. COVID neg     LP (6/24) CSF Glu 6, CSF protein 93. Citizen of [place] stain pos for yeast like organisms.  Spinal fluid culture pos for encapsulated yeast species.       CT chest WO contrast pos for multiple indeterminate soft tissue masses in the left lower lobe with 2 smaller masses in the right lung which may be part of the same process.       On 06/26 patient started on amphotericin B liposomal (275 IV q.d.) and flucytosine (1500 p.o. q.6h) .  Hospitalization c/b the development of acute renal failure.  Cr 1.32 (6/22) > 1.4 (6/26) > 2.02 (6/27).  Cellcept and prednisone have been held.       Transfer requested for higher level of care (KTM and ID).  Transfer diagnosis disseminated cryptococcal infection, TERESA, kidney XPL.    At time of my assessment, pt. Complains of headache which has been persistent for the last several days. She states that it is somewhat relieved by the fioricet and oxycodone that she was receiving at the outside hospital.      Overview/Hospital Course:  KTM, transplant ID, Neurology consulted upon admission. Per KTN, normal saline scheduled with  amphotericin infusions in addition to bicarb drip, prednisone increased, Prograf continued.  Creatinine down-trending and bicarb drip discontinued.  Per Neurology, outside hospital MR MRI reviewed and note that neuro deficits may take a few weeks to fully resolve; recommend limiting Fioricet and narcotics to minimize medication overuse/rebound headaches.  Per Transplant ID, continue ambisome/flucytosine with plan to repeat LP 2 weeks from 6/24.  Due to visual disturbance in the setting of fungal meningitis, ophthalmology consulted.  No infectious nidus found in retina or vitreous, however grade 3 disc edema bilaterally. Exam finding and subjective complaints explained by meningitis and papilledema.  Recommendation for repeating LP to reduce opening pressure per ID and Neurology in agreement.  On 06/29, patient hearing improved.  Remains with intermittent confusion per  at bedside.    CTH performed 06/30- Slight interval enlargement of the temporal horn of the right lateral ventricle since the recent MRI of 06/23/2022.  Appearance suggestive of early/developing hydrocephalus or ventricular entrapment. NSGY consulted. Per NSGY, recommended NCC transfer for further evaluation. Patient underwent repeat LP 06/30- opening pressure of 35cm- closing pressure of 13cm. Patient w/much improvement in signs and symptoms. CSF findings: <5glucose, 174 protein, 144 WBC, 3000 RBC, 37 lymph, 59 mono/macro. Repeat CSF w/1:8 cryptococcal antigen, CSF culture w/yeast. NCC evaluated, but patient with much improvement in signs and symptoms, and so NCC recommended continued observation on the floor. Discussed w/ID- stated to continue to monitor for worsening signs and symptoms- with closing pressure <25cm, no need for serial LP's at this time.     Doppler US performed on LLE- found to have DVT. Started on heparin drip. Planning for repeat LP 07/07.  Heparin drip paused and repeat lumbar puncture completed on 07/07.  Per Radiology,  CSF was clear to gout 16 mL total; opening pressure 27, closing pressure 12. Gram stain notable for few yeast.  Repeat CT head ordered with stable findings.  Neurosurgery consulted; Considering  shunt placement pending clearance of CSF and decrease in CSF protein.  Recommend ongoing serial LPs for now.      Interval History: Patient was seen and evaluated this am. HA improving.  Intermittent nausea.  Remains with a degree of hearing loss.  Today, patient complains of diarrhea in addition to associated crampy abdominal pain.  Will hold stool regimen.    No fevers, chills, night sweats, abd pain, vomiting, new vision changes.     Objective:     Vital Signs (Most Recent):  Temp: 98.2 °F (36.8 °C) (07/12/22 1124)  Pulse: 66 (07/12/22 1124)  Resp: 18 (07/12/22 1124)  BP: 138/65 (07/12/22 1124)  SpO2: 100 % (07/12/22 1124)   Vital Signs (24h Range):  Temp:  [98.1 °F (36.7 °C)-98.4 °F (36.9 °C)] 98.2 °F (36.8 °C)  Pulse:  [61-75] 66  Resp:  [16-18] 18  SpO2:  [93 %-100 %] 100 %  BP: (130-149)/(61-66) 138/65     Weight: 71.7 kg (158 lb 1.1 oz)  Body mass index is 30.87 kg/m².  No intake or output data in the 24 hours ending 07/12/22 1347       Physical Exam  Gen: in NAD, appears stated age  Neuro: awake and oriented to self, place, time  HEENT: NTNC, EOMI, PERRL, MMM  CVS: RRR, no m/r/g; S1/S2 auscultated with no S3 or S4; capillary refill < 2 sec  Resp: lungs CTAB, no w/r/r; no belabored breathing or accessory muscle use appreciated   Abd: BS+ in all 4 quadrants; NTND, soft to palpation; no organomegaly appreciated   Extrem: pulses full, equal, and regular over all 4 extremities; no swelling of BL UE or LE ext    Significant Labs: All pertinent labs within the past 24 hours have been reviewed.  Blood Culture: No results for input(s): LABBLOO in the last 48 hours.    Microbiology Results (last 7 days)       Procedure Component Value Units Date/Time    CSF culture [[id number]] Collected: 07/08/22 1019    Order Status:  Completed Specimen: CSF (Spinal Fluid) from CSF Tap, Tube 2 Updated: 07/12/22 0730     CSF CULTURE No Growth to date     Gram Stain Result Cytospin indicates: Few yeast      No WBC's      Results called to and read back by: Lyndsay Granados 07/08/2022  13:13    AFB Culture & Smear [[id number]] Collected: 07/08/22 1019    Order Status: Completed Specimen: CSF (Spinal Fluid) from CSF Tap, Tube 2 Updated: 07/11/22 1440     AFB Culture & Smear Culture in progress     AFB CULTURE STAIN No acid fast bacilli seen.              CSF findings 06/30/22: <5glucose, 174 protein, 144 WBC, 3000 RBC, 37 lymph, 59 mono/macro. Repeat CSF w/1:8 cryptococcal antigen, CSF culture w/yeast    CBC:   Recent Labs   Lab 07/11/22  0426 07/11/22  1623 07/12/22  0325   WBC 6.22 6.46 4.43   HGB 9.3* 10.6* 9.4*   HCT 29.0* 33.0* 29.7*   * SEE COMMENT 97*     CMP:   Recent Labs   Lab 07/11/22  0426 07/12/22  0325   * 134*   K 3.5 3.3*    108   CO2 22* 20*   * 155*   BUN 27* 22*   CREATININE 1.4 1.2   CALCIUM 8.1* 7.8*   PROT 4.5* 4.5*   ALBUMIN 2.5* 2.5*   BILITOT 0.5 0.4   ALKPHOS 43* 43*   AST 11 9*   ALT 9* 9*   ANIONGAP 5* 6*   EGFRNONAA 42.6* 51.4*     Magnesium:   Recent Labs   Lab 07/11/22  0426   MG 1.9       Urine Culture: No results for input(s): LABURIN in the last 48 hours.  Urine Studies:   No results for input(s): COLORU, APPEARANCEUA, PHUR, SPECGRAV, PROTEINUA, GLUCUA, KETONESU, BILIRUBINUA, OCCULTUA, NITRITE, UROBILINOGEN, LEUKOCYTESUR, RBCUA, WBCUA, BACTERIA, SQUAMEPITHEL, HYALINECASTS in the last 48 hours.    Invalid input(s): WRIGHTSUR      Significant Imaging: I have reviewed all pertinent imaging results/findings within the past 24 hours.    CT 06/30/22:  FINDINGS:  Intracranial compartment:     Mild enlargement of the temporal horn of the right lateral ventricle, new from prior.  Remainder of the ventricular system is normal in size, stable.  Third ventricle diameter on the order of 0.3 cm.   Well-defined sulci remain present over the cerebral convexities.     The brain parenchyma appears otherwise within normal limits, and unchanged from priors.  No new parenchymal hemorrhage, edema or major vascular distribution infarct.  No intracranial mass effect or midline shift.     No new extra-axial blood or fluid collections.     Partially empty sella configuration.     Skull/extracranial contents (limited evaluation):     No displaced calvarial fracture.     Small volume right mastoid air cell fluid.     The visualized paranasal sinuses are essentially clear.     Impression:     Slight interval enlargement of the temporal horn of the right lateral ventricle since the recent MRI of 06/23/2022.  Appearance suggestive of early/developing hydrocephalus or ventricular entrapment.     No new hemorrhage, edema or infarction.     Partially empty sella configuration.  Nonspecific but configuration could be consistent with the reported history of a elevated intracranial pressure.    CTH 07/01/22:  FINDINGS:  Intracranial compartment:     Mild enlargement of the temporal horn of the right lateral ventricle similar to most recent CT head 06/30/2022, new compared to prior MRI brain 06/23/2022.  Third ventricle diameter measures approximately 4 mm.     The brain parenchyma appears within normal limits not significantly changed compared to prior exam.  No parenchymal mass, hemorrhage, edema or major vascular distribution infarct.     No extra-axial blood or fluid collections.     Partially empty sella configuration.     Skull/extracranial contents (limited evaluation):     No fracture. Paranasal sinuses are essentially clear.  Small volume fluid within the right mastoid air cells.     Impression:     Slight enlargement of the temporal horn of right lateral ventricle not significantly changed compared to most recent CT head 06/30/2022,  new compared to prior MRI brain 06/23/2022.  Clinical correlation and continued close  follow-up is recommended as developing hydrocephalus or ventricular entrapment remains a concern.     No new acute infarct or hemorrhage.    Doppler US of LLE:  FINDINGS:  Left thigh veins: Near occlusive thrombus within the left popliteal vein.  The common femoral, femoral, and upper greater saphenous veins are patent and free of thrombus. The veins are normally compressible and have normal phasic flow and augmentation response.     Left calf veins: Partially occlusive thrombus within the posterior tibial veins.  Partially occlusive thrombus with 1 of the peroneal veins.  Anterior tibial veins are patent.     Contralateral CFV: The contralateral (right) common femoral vein is patent and free of thrombus.     Miscellaneous: None     Impression:     Deep venous thrombosis within the left popliteal, posterior tibial, and peroneal veins.      Assessment/Plan:      * Cryptococcal meningitis  - Pt with intractable headache, facial droop, dysarthria (improved). 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  - Continue Amp B 5/mg kg Q24hrs and flucytosine 1500 mg Q6hrs for treatment.   - On chart review, amphotericin started on 06/24/22- 2wks of treatment 07/08/22  - Repeat LP completed 7/9; gram stain notable for yeast; and amphotericin continued; cultures no growth to date  -Transplant ID following- continue with current course- patient signs and symptoms much improved since time of admission, will hold of on steroids- no signs of IRIS, will continue to monitor for need for repeat LP  - KTM following; recommend  500mL normal saline with amphotericin     Acute renal failure superimposed on stage 3 chronic kidney disease  - Cr 2.2 on day of transfer, has been steadily worsening over last few days (baseline ~1.2)  - KTM consulted, appreciate recs; likely prerenal versus medication related  - US transplant unremarkable for acute changes   - creatinine improved; stable  - Continue  "sodium bicarb    VTE (venous thromboembolism)  - continue heparin drip for DVT of the LLE  -pause as needed in the setting of procedures      Increased intracranial pressure  - Last intracranial pressure of 34cm from LP at OSH  - Patient with papilledema, vomiting, AMS  - CTH w/developing hydrocephalus and signs of ventricular entrapment  - NSGY notified and consulted- no acute interventions at this time  - NCC notified- continue monitoring on floor  - S/p LP w/opening pressure of 35cm and closing pressure of 13cm 06/30/22  - S/p LP w/opening pressure of 27cm and closing pressure of 12cm 06/30/22  - Continue to monitor for signs and symptoms of worsening ICP  - Repeat CTH 07/01 relatively unchanged   -repeat CTH 7/8 without significant change  -neurosurgery consulted and following; tentative  shunt placement 7/18 pending LP 7/13 results      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  - Improving  - See papilledema-- spoke w/ophthalmology- expect papilledema to improve over the course of 2wks if infection remains well-controlled and ICP remains wnl      Bilateral papilledema due to raised intracranial pressure  - Ophthalmology evaluation with grade 3 papilledema in both eyes (longer she has elevated CSF pressure, the higher her risk of permanent optic nerve damage and vision change is.  There is a congested appearance to the veins in both eyes secondary to optic nerve congestion and indicates higher risk for ischemic event), 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  -"- 7/7 exam, vision stable OU, color plates full. Still 3+ ONH edema, and we do not expect this to rapidly improve as there is often delay of resolution of ONH edema following improvement in ICP "  - see increased intracranial pressure    Headache  - history of intractable headache  - continue topamax to 100mg BID   - continue as needed Fioricet, oxycodone/acetaminophen, acetaminophen; headache responding to prns      Hypokalemia  - PO repletion prn to goal      Long-term use of immunosuppressant " medication  - Pt with opportunistic infection, see above. Holding mycophenolate   - KTM following, appreciate recs  Continue prograf per levels   Monitor levels to assess for toxicity  Continue prednisone ---> increased to 10mg daily   Hold MMF         S/P kidney transplant  - See TERESA. KTM consulted for further assistance  Kidney transplant 4/5/2020   Underlying ckd stage III  ESRD related to HTN        VTE Risk Mitigation (From admission, onward)         Ordered     heparin 25,000 units in dextrose 5% 250 mL (100 units/mL) infusion HIGH INTENSITY nomogram - [facility]  Continuous        Question Answer Comment   Heparin Infusion Adjustment (DO NOT MODIFY ANSWER) [email or web address] HIGH INTENSITY nomogram for [facility] [email or web address]    Begin at (in units/kg/hr) 18        07/03/22 1205     IP VTE HIGH RISK PATIENT  Once         06/27/22 2202     Place sequential compression device  Until discontinued         06/27/22 2202                Discharge Planning   JULITO: 7/15/2022     Code Status: Full Code   Is the patient medically ready for discharge?: No    Reason for patient still in hospital (select all that apply): Patient trending condition, Laboratory test, Treatment, Imaging, Consult recommendations and Pending disposition  Discharge Plan A: Home with family                  Joanne Braden MD  Department of Hospital Medicine   [facility] - Telemetry Stepdown

## 2022-07-12 NOTE — PROGRESS NOTES
[facility] - Telemetry Stepdown  Neurosurgery  Progress Note    Subjective:     History of Present Illness: Mrs. Rivera is a 56yoF w/PMHx of kidney transplant (2020 [facility]) on Prograf and Cellcept admitted to [facility] on 06/22 with intractable HA starting about 2 months earlier and with dizziness, blurred vision, left-sided facial droop and dysarthria starting a few days before admission. 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 LP (6/24) with OP of 34,  stain pos for yeast like organisms and CSF culture pos for encapsulated yeast species. Ophthalmology was consulted for complaint of visual disturbance in the setting of elevated ICP.  No infectious nidus was found in retina or vitreous, however pt was found to have grade 3 palpilledema bilaterally. CTH performed 06/30 showed slight interval enlargement of the temporal horn of the right lateral ventricle since the recent MRI of 06/23/2022.  Repeat LP 06/30- opening pressure of 35cm- closing pressure of 13cm. Patient w/much improvement in signs and symptoms post LP. NCC evaluated, but given improvement in symptoms, NCC recommended continued observation on the floor. Antibiotic end date is today and primary team planning to repeat LP and CTH. Pt currently reports improvement in her HA and her visual symptoms, however continues with hearing loss and intermittent nausea. NSGY consulted for consideration of EVD placement for elevated ICPs.          Post-Op Info:  Procedure(s) (LRB):  INSERTION, SHUNT, VENTRICULOPERITONEAL - right frontal (Right)         Interval History: NAEON. Patient and her  endorse improvement in headaches over the past couple of days. Plan for LP tomorrow to re-eval opening pressure.    Medications:  Continuous Infusions:  Scheduled Meds:   acetaminophen  500 mg Oral Q24H    amphotericin B liposome (AMBISOME) IVPB  300 mg  Intravenous Q24H    calcitRIOL  0.5 mcg Oral Daily    diphenhydrAMINE  25 mg Oral Q24H    flucytosine  1,500 mg Oral Q6H    magnesium oxide  400 mg Oral Daily    multivitamin  1 tablet Oral Daily    NIFEdipine  90 mg Oral Daily    predniSONE  5 mg Oral Daily    sodium bicarbonate  1,300 mg Oral TID    sodium chloride 0.9%  500 mL Intravenous Q24H    tacrolimus  3 mg Oral BID    topiramate  100 mg Oral BID     PRN Meds:acetaminophen, albuterol-ipratropium, bisacodyL, butalbital-acetaminophen-caffeine -40 mg, dextrose 5 %, glucose, glucose, hydrALAZINE, melatonin, naloxone, ondansetron, oxyCODONE-acetaminophen, sodium chloride 0.9%     Review of Systems  Objective:     Weight: 71.7 kg (158 lb 1.1 oz)  Body mass index is 30.87 kg/m².  Vital Signs (Most Recent):  Temp: 98.2 °F (36.8 °C) (07/12/22 1124)  Pulse: 66 (07/12/22 1124)  Resp: 18 (07/12/22 1124)  BP: 138/65 (07/12/22 1124)  SpO2: 100 % (07/12/22 1124) Vital Signs (24h Range):  Temp:  [98.1 °F (36.7 °C)-98.4 °F (36.9 °C)] 98.2 °F (36.8 °C)  Pulse:  [61-75] 66  Resp:  [16-18] 18  SpO2:  [93 %-100 %] 100 %  BP: (130-149)/(61-66) 138/65                          Hemodialysis AV Graft Right forearm (Active)   Site Assessment Clean;Dry;Intact 07/30/20 0745   Patency Present;Thrill;Bruit 07/30/20 0745   Status Deaccessed 07/29/20 2010   Dressing Status Clean;Dry;Intact 10/18/18 0808   Site Condition No complications 07/30/20 0745   Dressing Open to air (None) 07/29/20 2010       Physical Exam    Neurosurgery Physical Exam  Head: normocephalic, atraumatic  Neurologic: Alert and oriented. Thought content appropriate.  GCS: Motor: 6/Verbal: 5/Eyes: 4 GCS Total: 15  Mental Status: Awake, Alert, Oriented x3  Cranial nerves: face symmetric, tongue midline, CN II-XII grossly intact. Hard of hearing   Eyes: pupils equal, round, reactive to light with accomodation, EOMI.   Sensory: intact to light touch throughout  Motor Strength: Moves all extremities  spontaneously with good tone.  Full strength upper and lower extremities. No abnormal movements seen.      Strength   Deltoids Triceps Biceps Wrist Extension Wrist Flexion Hand    Upper: R 5/5 5/5 5/5 5/5 5/5 5/5     L 5/5 5/5 5/5 5/5 5/5 5/5       Iliopsoas Quadriceps Knee  Flexion Tibialis  anterior Gastro- cnemius EHL   Lower: R 5/5 5/5 5/5 5/5 5/5 5/5     L 5/5 5/5 5/5 5/5 5/5 5/5      Pronator Drift: no drift noted  Finger-to-nose: Intact bilaterally  Aparicio: absent  Clonus: absent    Significant Labs:  Recent Labs   Lab 07/11/22 0426 07/12/22  0325   * 155*   * 134*   K 3.5 3.3*    108   CO2 22* 20*   BUN 27* 22*   CREATININE 1.4 1.2   CALCIUM 8.1* 7.8*   MG 1.9  --      Recent Labs   Lab 07/11/22 0426 07/11/22  1623 07/12/22  0325   WBC 6.22 6.46 4.43   HGB 9.3* 10.6* 9.4*   HCT 29.0* 33.0* 29.7*   * SEE COMMENT 97*     Recent Labs   Lab 07/11/22 0426 07/11/22  1948 07/12/22  0325 07/12/22  1256   INR 1.1  --   --   --    APTT 25.4 25.0 37.3* 25.2     Microbiology Results (last 7 days)       Procedure Component Value Units Date/Time    Gram stain [[id number]]     Order Status: No result Specimen: CSF (Spinal Fluid) from CSF Tap, Tube 3     CSF culture [[id number]]     Order Status: No result Specimen: CSF (Spinal Fluid) from CSF Tap, Tube 3     Cryptococcal antigen, CSF [[id number]]     Order Status: No result Specimen: CSF (Spinal Fluid)     AFB Culture & Smear [[id number]]     Order Status: No result Specimen: CSF (Spinal Fluid)     CSF culture [[id number]] Collected: 07/08/22 1019    Order Status: Completed Specimen: CSF (Spinal Fluid) from CSF Tap, Tube 2 Updated: 07/12/22 0730     CSF CULTURE No Growth to date     Gram Stain Result Cytospin indicates: Few yeast      No WBC's      Results called to and read back by: Lyndsay Granados 07/08/2022  13:13    AFB Culture & Smear [[id number]] Collected: 07/08/22 1019    Order Status: Completed Specimen: CSF (Spinal Fluid) from CSF Tap,  Tube 2 Updated: 07/11/22 1440     AFB Culture & Smear Culture in progress     AFB CULTURE STAIN No acid fast bacilli seen.          All pertinent labs from the last 24 hours have been reviewed.    Significant Diagnostics:  No results found in the last 24 hours.      Assessment/Plan:     * Cryptococcal meningitis  55 yo female s/p kidney transplant (2020) on Prograf, admitted with cryptococcal meningitis s/p 2 weeks of amphotericin B and flucytosine. LP x 3 with elevated OP and papilledema on ophthalmology exam. NSGY consulted for consideration of drain placement for elevated ICPs. Pt is currently neurologically stable. Of note, pt is on hep gtt for acute DVT    -Imaging and diagnostics reviewed   -CSF (6/24) OP34, Glu 6, protein 93. [place]ian stain pos for yeast like organisms.  Cx pos for encapsulated yeast species.     -CSF (6/30): OP35,CP13. G<5, P174, 144 WBC, 3000 RBC, 37 lymph, 59 mono/macro. Repeat CSF w/1:8 cryptococcal antigen, cx w/yeast.   -CSF (7/8): OP27, CP12. G<15,P162, CSF cx pending, gram stain with yeast    -CTH 6/30 with slight enlargement of the temporal horn of right lateral ventricle compared to prior MRI brain 06/23    -CTH 7/8 without significant change  -Ophthalmology following. Last eval 7/7 with stable OU, color plates full. Still 3+ ONH edema  -ID: Pt continued on flucytosine and amphotericin B 7/8. F/u ongoing ID recommendations  -Patient with improvement in headaches. Will hold off on VPS placement, LP tomorrow for opening pressure. VPS shunt moved tentatively to next Monday. If pressure normalized, shunt may not be needed.   -Heparin gtt to be held prior to LP tomorrow under fluoro  -We will continue to follow. Please call NSGY with any decline in neuro exam. Would have a low threshold to transfer to ICU with any worsening visual deficits, decline in neuro exam or signs of worsening ICPs  -Discussed with Dr. Edu Alexis, PAKandisC  Neurosurgery  [facility] - Telemetry  Stepdown

## 2022-07-12 NOTE — SUBJECTIVE & OBJECTIVE
Interval History: Patient was seen and evaluated this am. HA improving.  Intermittent nausea.  Remains with a degree of hearing loss.  Today, patient complains of diarrhea in addition to associated crampy abdominal pain.  Will hold stool regimen.    No fevers, chills, night sweats, abd pain, vomiting, new vision changes.     Objective:     Vital Signs (Most Recent):  Temp: 98.2 °F (36.8 °C) (07/12/22 1124)  Pulse: 66 (07/12/22 1124)  Resp: 18 (07/12/22 1124)  BP: 138/65 (07/12/22 1124)  SpO2: 100 % (07/12/22 1124)   Vital Signs (24h Range):  Temp:  [98.1 °F (36.7 °C)-98.4 °F (36.9 °C)] 98.2 °F (36.8 °C)  Pulse:  [61-75] 66  Resp:  [16-18] 18  SpO2:  [93 %-100 %] 100 %  BP: (130-149)/(61-66) 138/65     Weight: 71.7 kg (158 lb 1.1 oz)  Body mass index is 30.87 kg/m².  No intake or output data in the 24 hours ending 07/12/22 1347       Physical Exam  Gen: in NAD, appears stated age  Neuro: awake and oriented to self, place, time  HEENT: NTNC, EOMI, PERRL, MMM  CVS: RRR, no m/r/g; S1/S2 auscultated with no S3 or S4; capillary refill < 2 sec  Resp: lungs CTAB, no w/r/r; no belabored breathing or accessory muscle use appreciated   Abd: BS+ in all 4 quadrants; NTND, soft to palpation; no organomegaly appreciated   Extrem: pulses full, equal, and regular over all 4 extremities; no swelling of BL UE or LE ext    Significant Labs: All pertinent labs within the past 24 hours have been reviewed.  Blood Culture: No results for input(s): LABBLOO in the last 48 hours.    Microbiology Results (last 7 days)       Procedure Component Value Units Date/Time    CSF culture [[id number]] Collected: 07/08/22 1019    Order Status: Completed Specimen: CSF (Spinal Fluid) from CSF Tap, Tube 2 Updated: 07/12/22 0730     CSF CULTURE No Growth to date     Gram Stain Result Cytospin indicates: Few yeast      No WBC's      Results called to and read back by: Lyndsay Granados 07/08/2022  13:13    AFB Culture & Smear [[id number]] Collected: 07/08/22 1019     Order Status: Completed Specimen: CSF (Spinal Fluid) from CSF Tap, Tube 2 Updated: 07/11/22 1440     AFB Culture & Smear Culture in progress     AFB CULTURE STAIN No acid fast bacilli seen.              CSF findings 06/30/22: <5glucose, 174 protein, 144 WBC, 3000 RBC, 37 lymph, 59 mono/macro. Repeat CSF w/1:8 cryptococcal antigen, CSF culture w/yeast    CBC:   Recent Labs   Lab 07/11/22  0426 07/11/22  1623 07/12/22  0325   WBC 6.22 6.46 4.43   HGB 9.3* 10.6* 9.4*   HCT 29.0* 33.0* 29.7*   * SEE COMMENT 97*     CMP:   Recent Labs   Lab 07/11/22  0426 07/12/22  0325   * 134*   K 3.5 3.3*    108   CO2 22* 20*   * 155*   BUN 27* 22*   CREATININE 1.4 1.2   CALCIUM 8.1* 7.8*   PROT 4.5* 4.5*   ALBUMIN 2.5* 2.5*   BILITOT 0.5 0.4   ALKPHOS 43* 43*   AST 11 9*   ALT 9* 9*   ANIONGAP 5* 6*   EGFRNONAA 42.6* 51.4*     Magnesium:   Recent Labs   Lab 07/11/22  0426   MG 1.9       Urine Culture: No results for input(s): LABURIN in the last 48 hours.  Urine Studies:   No results for input(s): COLORU, APPEARANCEUA, PHUR, SPECGRAV, PROTEINUA, GLUCUA, KETONESU, BILIRUBINUA, OCCULTUA, NITRITE, UROBILINOGEN, LEUKOCYTESUR, RBCUA, WBCUA, BACTERIA, SQUAMEPITHEL, HYALINECASTS in the last 48 hours.    Invalid input(s): WRIGHTSUR      Significant Imaging: I have reviewed all pertinent imaging results/findings within the past 24 hours.    CTH 06/30/22:  FINDINGS:  Intracranial compartment:     Mild enlargement of the temporal horn of the right lateral ventricle, new from prior.  Remainder of the ventricular system is normal in size, stable.  Third ventricle diameter on the order of 0.3 cm.  Well-defined sulci remain present over the cerebral convexities.     The brain parenchyma appears otherwise within normal limits, and unchanged from priors.  No new parenchymal hemorrhage, edema or major vascular distribution infarct.  No intracranial mass effect or midline shift.     No new extra-axial blood or fluid  collections.     Partially empty sella configuration.     Skull/extracranial contents (limited evaluation):     No displaced calvarial fracture.     Small volume right mastoid air cell fluid.     The visualized paranasal sinuses are essentially clear.     Impression:     Slight interval enlargement of the temporal horn of the right lateral ventricle since the recent MRI of 06/23/2022.  Appearance suggestive of early/developing hydrocephalus or ventricular entrapment.     No new hemorrhage, edema or infarction.     Partially empty sella configuration.  Nonspecific but configuration could be consistent with the reported history of a elevated intracranial pressure.    CTH 07/01/22:  FINDINGS:  Intracranial compartment:     Mild enlargement of the temporal horn of the right lateral ventricle similar to most recent CT head 06/30/2022, new compared to prior MRI brain 06/23/2022.  Third ventricle diameter measures approximately 4 mm.     The brain parenchyma appears within normal limits not significantly changed compared to prior exam.  No parenchymal mass, hemorrhage, edema or major vascular distribution infarct.     No extra-axial blood or fluid collections.     Partially empty sella configuration.     Skull/extracranial contents (limited evaluation):     No fracture. Paranasal sinuses are essentially clear.  Small volume fluid within the right mastoid air cells.     Impression:     Slight enlargement of the temporal horn of right lateral ventricle not significantly changed compared to most recent CT head 06/30/2022,  new compared to prior MRI brain 06/23/2022.  Clinical correlation and continued close follow-up is recommended as developing hydrocephalus or ventricular entrapment remains a concern.     No new acute infarct or hemorrhage.    Doppler US of LLE:  FINDINGS:  Left thigh veins: Near occlusive thrombus within the left popliteal vein.  The common femoral, femoral, and upper greater saphenous veins are patent and  free of thrombus. The veins are normally compressible and have normal phasic flow and augmentation response.     Left calf veins: Partially occlusive thrombus within the posterior tibial veins.  Partially occlusive thrombus with 1 of the peroneal veins.  Anterior tibial veins are patent.     Contralateral CFV: The contralateral (right) common femoral vein is patent and free of thrombus.     Miscellaneous: None     Impression:     Deep venous thrombosis within the left popliteal, posterior tibial, and peroneal veins.

## 2022-07-12 NOTE — NURSING
POC reviewed with pt. A&Ox4. Room air. Heparin drip discontinued per orders. Safety checks performed. Bed in lowest position. Wheels locked. Call light in reach. TM.

## 2022-07-12 NOTE — SUBJECTIVE & OBJECTIVE
Interval History: NAEON. Patient and her  endorse improvement in headaches over the past couple of days. Plan for LP tomorrow to re-eval opening pressure.    Medications:  Continuous Infusions:  Scheduled Meds:   acetaminophen  500 mg Oral Q24H    amphotericin B liposome (AMBISOME) IVPB  300 mg Intravenous Q24H    calcitRIOL  0.5 mcg Oral Daily    diphenhydrAMINE  25 mg Oral Q24H    flucytosine  1,500 mg Oral Q6H    magnesium oxide  400 mg Oral Daily    multivitamin  1 tablet Oral Daily    NIFEdipine  90 mg Oral Daily    predniSONE  5 mg Oral Daily    sodium bicarbonate  1,300 mg Oral TID    sodium chloride 0.9%  500 mL Intravenous Q24H    tacrolimus  3 mg Oral BID    topiramate  100 mg Oral BID     PRN Meds:acetaminophen, albuterol-ipratropium, bisacodyL, butalbital-acetaminophen-caffeine -40 mg, dextrose 5 %, glucose, glucose, hydrALAZINE, melatonin, naloxone, ondansetron, oxyCODONE-acetaminophen, sodium chloride 0.9%     Review of Systems  Objective:     Weight: 71.7 kg (158 lb 1.1 oz)  Body mass index is 30.87 kg/m².  Vital Signs (Most Recent):  Temp: 98.2 °F (36.8 °C) (07/12/22 1124)  Pulse: 66 (07/12/22 1124)  Resp: 18 (07/12/22 1124)  BP: 138/65 (07/12/22 1124)  SpO2: 100 % (07/12/22 1124) Vital Signs (24h Range):  Temp:  [98.1 °F (36.7 °C)-98.4 °F (36.9 °C)] 98.2 °F (36.8 °C)  Pulse:  [61-75] 66  Resp:  [16-18] 18  SpO2:  [93 %-100 %] 100 %  BP: (130-149)/(61-66) 138/65                          Hemodialysis AV Graft Right forearm (Active)   Site Assessment Clean;Dry;Intact 07/30/20 0745   Patency Present;Thrill;Bruit 07/30/20 0745   Status Deaccessed 07/29/20 2010   Dressing Status Clean;Dry;Intact 10/18/18 0808   Site Condition No complications 07/30/20 0745   Dressing Open to air (None) 07/29/20 2010       Physical Exam    Neurosurgery Physical Exam  Head: normocephalic, atraumatic  Neurologic: Alert and oriented. Thought content appropriate.  GCS: Motor: 6/Verbal: 5/Eyes: 4 GCS Total:  15  Mental Status: Awake, Alert, Oriented x3  Cranial nerves: face symmetric, tongue midline, CN II-XII grossly intact. Hard of hearing   Eyes: pupils equal, round, reactive to light with accomodation, EOMI.   Sensory: intact to light touch throughout  Motor Strength: Moves all extremities spontaneously with good tone.  Full strength upper and lower extremities. No abnormal movements seen.      Strength   Deltoids Triceps Biceps Wrist Extension Wrist Flexion Hand    Upper: R 5/5 5/5 5/5 5/5 5/5 5/5     L 5/5 5/5 5/5 5/5 5/5 5/5       Iliopsoas Quadriceps Knee  Flexion Tibialis  anterior Gastro- cnemius EHL   Lower: R 5/5 5/5 5/5 5/5 5/5 5/5     L 5/5 5/5 5/5 5/5 5/5 5/5      Pronator Drift: no drift noted  Finger-to-nose: Intact bilaterally  Aparicio: absent  Clonus: absent    Significant Labs:  Recent Labs   Lab 07/11/22 0426 07/12/22  0325   * 155*   * 134*   K 3.5 3.3*    108   CO2 22* 20*   BUN 27* 22*   CREATININE 1.4 1.2   CALCIUM 8.1* 7.8*   MG 1.9  --      Recent Labs   Lab 07/11/22 0426 07/11/22  1623 07/12/22  0325   WBC 6.22 6.46 4.43   HGB 9.3* 10.6* 9.4*   HCT 29.0* 33.0* 29.7*   * SEE COMMENT 97*     Recent Labs   Lab 07/11/22 0426 07/11/22  1948 07/12/22  0325 07/12/22  1256   INR 1.1  --   --   --    APTT 25.4 25.0 37.3* 25.2     Microbiology Results (last 7 days)       Procedure Component Value Units Date/Time    Gram stain [[id number]]     Order Status: No result Specimen: CSF (Spinal Fluid) from CSF Tap, Tube 3     CSF culture [[id number]]     Order Status: No result Specimen: CSF (Spinal Fluid) from CSF Tap, Tube 3     Cryptococcal antigen, CSF [[id number]]     Order Status: No result Specimen: CSF (Spinal Fluid)     AFB Culture & Smear [[id number]]     Order Status: No result Specimen: CSF (Spinal Fluid)     CSF culture [[id number]] Collected: 07/08/22 1019    Order Status: Completed Specimen: CSF (Spinal Fluid) from CSF Tap, Tube 2 Updated: 07/12/22 6848     CSF  CULTURE No Growth to date     Gram Stain Result Cytospin indicates: Few yeast      No WBC's      Results called to and read back by: Lyndsay Granados 07/08/2022  13:13    AFB Culture & Smear [[id number]] Collected: 07/08/22 1019    Order Status: Completed Specimen: CSF (Spinal Fluid) from CSF Tap, Tube 2 Updated: 07/11/22 1440     AFB Culture & Smear Culture in progress     AFB CULTURE STAIN No acid fast bacilli seen.          All pertinent labs from the last 24 hours have been reviewed.    Significant Diagnostics:  No results found in the last 24 hours.

## 2022-07-12 NOTE — ASSESSMENT & PLAN NOTE
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 LP x 3 with elevated OP and papilledema on ophthalmology exam. NSGY consulted for consideration of drain placement for elevated ICPs. Pt is currently neurologically stable. Of note, pt is on hep gtt for acute DVT    -Imaging and diagnostics reviewed   -CSF (6/24) OP34, Glu 6, protein 93. [place] stain pos for yeast like organisms.  Cx pos for encapsulated yeast species.     -CSF (6/30): OP35,CP13. G<5, P174, 144 WBC, 3000 RBC, 37 lymph, 59 mono/macro. Repeat CSF w/1:8 cryptococcal antigen, cx w/yeast.   -CSF (7/8): OP27, CP12. G<15,P162, CSF cx pending, gram stain with yeast    -CTH 6/30 with slight enlargement of the temporal horn of right lateral ventricle compared to prior MRI brain 06/23    -CTH 7/8 without significant change  -Ophthalmology following. Last eval 7/7 with stable OU, color plates full. Still 3+ ONH edema  -ID: Pt continued on flucytosine and amphotericin B 7/8. F/u ongoing ID recommendations  -Patient with improvement in headaches. Will hold off on VPS placement, LP tomorrow for opening pressure. VPS shunt moved tentatively to next Monday. If pressure normalized, shunt may not be needed.   -Heparin gtt to be held prior to LP tomorrow under fluoro  -We will continue to follow. Please call NSGY with any decline in neuro exam. Would have a low threshold to transfer to ICU with any worsening visual deficits, decline in neuro exam or signs of worsening ICPs  -Discussed with Dr. Sorensen

## 2022-07-13 NOTE — ASSESSMENT & PLAN NOTE
- history of intractable headache  - continue topamax to 100mg BID, will consider wean as headache improved  - continue as needed Fioricet, oxycodone/acetaminophen, acetaminophen; headache responding to prns

## 2022-07-13 NOTE — ASSESSMENT & PLAN NOTE
55 yo female s/p kidney transplant (2020) on Prograf, admitted with cryptococcal meningitis s/p 2 weeks of amphotericin B and flucytosine. LP x 3 with elevated OP and papilledema on ophthalmology exam. NSGY consulted for consideration of drain placement for elevated ICPs. Pt is currently neurologically stable. Of note, pt is on hep gtt for acute DVT    -Imaging and diagnostics reviewed   -CSF (6/24) OP34, Glu 6, protein 93. [place] stain pos for yeast like organisms.  Cx pos for encapsulated yeast species.     -CSF (6/30): OP35,CP13. G<5, P174, 144 WBC, 3000 RBC, 37 lymph, 59 mono/macro. Repeat CSF w/1:8 cryptococcal antigen, cx w/yeast.   -CSF (7/8): OP27, CP12. G<15,P162, CSF cx pending, gram stain with yeast    -CTH 6/30 with slight enlargement of the temporal horn of right lateral ventricle compared to prior MRI brain 06/23    -CTH 7/8 without significant change  -Ophthalmology following. Last eval 7/7 with stable OU, color plates full. Still 3+ ONH edema  -ID: Pt continued on flucytosine and amphotericin B 7/8. F/u ongoing ID recommendations  -Patient with improvement in headaches. Will hold off on VPS placement, LP today for opening pressure. VPS shunt moved tentatively to next Monday. If pressure normalized, shunt may not be indicated.    -Heparin gtt held prior to LP.  -We will continue to follow. Please call NSGY with any decline in neuro exam. Would have a low threshold to transfer to ICU with any worsening visual deficits, decline in neuro exam or signs of worsening ICPs    Discussed with Dr. Sorensen

## 2022-07-13 NOTE — SUBJECTIVE & OBJECTIVE
Interval History: NAEON. Denies headaches this AM. Plan for LP today.     Medications:  Continuous Infusions:  Scheduled Meds:   acetaminophen  500 mg Oral Q24H    amphotericin B liposome (AMBISOME) IVPB  300 mg Intravenous Q24H    calcitRIOL  0.5 mcg Oral Daily    diphenhydrAMINE  25 mg Oral Q24H    flucytosine  1,500 mg Oral Q6H    multivitamin  1 tablet Oral Daily    NIFEdipine  90 mg Oral Daily    predniSONE  5 mg Oral Daily    sodium bicarbonate  1,300 mg Oral TID    sodium chloride 0.9%  500 mL Intravenous Q24H    tacrolimus  3 mg Oral BID    topiramate  100 mg Oral BID     PRN Meds:acetaminophen, albuterol-ipratropium, bisacodyL, butalbital-acetaminophen-caffeine -40 mg, dextrose 5 %, glucose, glucose, hydrALAZINE, iohexol, loperamide, melatonin, naloxone, ondansetron, oxyCODONE-acetaminophen, sodium chloride 0.9%     Review of Systems  Objective:     Weight: 71.7 kg (158 lb 1.1 oz)  Body mass index is 30.87 kg/m².  Vital Signs (Most Recent):  Temp: 98.5 °F (36.9 °C) (07/13/22 0752)  Pulse: 69 (07/13/22 0752)  Resp: 18 (07/13/22 0752)  BP: (!) 151/71 (07/13/22 0752)  SpO2: 96 % (07/13/22 0752)   Vital Signs (24h Range):  Temp:  [97.9 °F (36.6 °C)-98.7 °F (37.1 °C)] 98.5 °F (36.9 °C)  Pulse:  [58-81] 69  Resp:  [17-18] 18  SpO2:  [96 %-100 %] 96 %  BP: (135-205)/(61-81) 151/71                          Hemodialysis AV Graft Right forearm (Active)   Site Assessment Clean;Dry;Intact 07/30/20 0745   Patency Present;Thrill;Bruit 07/30/20 0745   Status Deaccessed 07/29/20 2010   Dressing Status Clean;Dry;Intact 10/18/18 0808   Site Condition No complications 07/30/20 0745   Dressing Open to air (None) 07/29/20 2010       Neurosurgery Physical Exam  Head: normocephalic, atraumatic  Neurologic: Alert and oriented. Thought content appropriate.  GCS: Motor: 6/Verbal: 5/Eyes: 4 GCS Total: 15  Mental Status: Awake, Alert, Oriented x3  Cranial nerves: face symmetric, tongue midline, CN II-XII grossly intact. Hard of  hearing   Eyes: pupils equal, round, reactive to light with accomodation, EOMI.   Sensory: intact to light touch throughout  Motor Strength: Moves all extremities spontaneously with good tone.  Full strength upper and lower extremities. No abnormal movements seen.      Strength   Deltoids Triceps Biceps Wrist Extension Wrist Flexion Hand    Upper: R 5/5 5/5 5/5 5/5 5/5 5/5     L 5/5 5/5 5/5 5/5 5/5 5/5       Iliopsoas Quadriceps Knee  Flexion Tibialis  anterior Gastro- cnemius EHL   Lower: R 5/5 5/5 5/5 5/5 5/5 5/5     L 5/5 5/5 5/5 5/5 5/5 5/5      Pronator Drift: no drift noted  Finger-to-nose: Intact bilaterally  Aparicio: absent  Clonus: absent    Significant Labs:  Recent Labs   Lab 07/12/22  0325 07/13/22  0349   * 146*   * 137   K 3.3* 3.9    111*   CO2 20* 19*   BUN 22* 17   CREATININE 1.2 1.0   CALCIUM 7.8* 8.1*     Recent Labs   Lab 07/11/22  1623 07/12/22  0325 07/13/22  0349   WBC 6.46 4.43 5.28   HGB 10.6* 9.4* 10.1*   HCT 33.0* 29.7* 31.3*   PLT SEE COMMENT 97* 113*     Recent Labs   Lab 07/11/22  1948 07/12/22  0325 07/12/22  1256 07/13/22  0822   INR  --   --   --  1.1   APTT 25.0 37.3* 25.2  --      Microbiology Results (last 7 days)       Procedure Component Value Units Date/Time    CSF culture [[id number]] Collected: 07/08/22 1019    Order Status: Completed Specimen: CSF (Spinal Fluid) from CSF Tap, Tube 2 Updated: 07/13/22 0721     CSF CULTURE No Growth     Gram Stain Result Cytospin indicates: Few yeast      No WBC's      Results called to and read back by: Lyndsay Granados 07/08/2022  13:13    Clostridium difficile EIA [[id number]] Collected: 07/12/22 1751    Order Status: Completed Specimen: Stool Updated: 07/12/22 2249     C. diff Antigen Negative     C difficile Toxins A+B, EIA Negative     Comment: Testing not recommended for children <24 months old.       E. coli 0157 antigen [[id number]] Collected: 07/12/22 1757    Order Status: No result Specimen: Stool Updated: 07/12/22  1752    Stool culture [[id number]] Collected: 07/12/22 1751    Order Status: Sent Specimen: Stool Updated: 07/12/22 1751    Gram stain [[id number]]     Order Status: No result Specimen: CSF (Spinal Fluid) from CSF Tap, Tube 3     CSF culture [[id number]]     Order Status: No result Specimen: CSF (Spinal Fluid) from CSF Tap, Tube 3     Cryptococcal antigen, CSF [[id number]]     Order Status: No result Specimen: CSF (Spinal Fluid)     AFB Culture & Smear [[id number]]     Order Status: No result Specimen: CSF (Spinal Fluid)     AFB Culture & Smear [[id number]] Collected: 07/08/22 1019    Order Status: Completed Specimen: CSF (Spinal Fluid) from CSF Tap, Tube 2 Updated: 07/11/22 1440     AFB Culture & Smear Culture in progress     AFB CULTURE STAIN No acid fast bacilli seen.          All pertinent labs from the last 24 hours have been reviewed.    Significant Diagnostics:  I have reviewed all pertinent imaging results/findings within the past 24 hours.

## 2022-07-13 NOTE — PROGRESS NOTES
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 Pt reports good news from LP today. Hearing decreased bilaterally, but denies any new/worsening visual changes, visual disturbances, such as flashes, floaters, or curtain-veil in visual field, and ocular discomfort OU.      Ocular examination/Dilated fundus examination:  Base Eye Exam     Visual Acuity (Snellen - Linear)       Right Left    Dist sc 20/25 20/20          Tonometry (Tonopen, 2:27 PM)       Right Left    Pressure 8 16          Pupils       Dark Light Shape React    Right 3 2 Round reactive    Left 3 2 Round reactive          Extraocular Movement       Right Left     Full, Ortho Full, Ortho          Neuro/Psych     Oriented x3: Yes          Dilation     Both eyes: 2.5% Phenylephrine @ 2:29 PM            Additional Notes    Color plates full OU     Slit Lamp and Fundus Exam     External Exam       Right Left    External Normal Normal          Slit Lamp Exam       Right Left    Lids/Lashes Normal Normal    Conjunctiva/Sclera White and quiet White and quiet    Cornea Clear Clear    Anterior Chamber Deep and formed Deep and formed    Iris Round and reactive Round and reactive    Lens 1+ NSC 1+ NSC    Anterior Vitreous Normal Normal          Fundus Exam       Right Left    Disc grade 2 disc edema, improving grade 3 disc edema, stable    Macula Normal Normal    Vessels venous tortuosity and engorgement venous tortuosity and engorgement    Periphery Normal Normal                  Assessment/Plan:     1. Cryptococcal Meningitis  2. Papilledema, both eyes  3. 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 (resolving)  - In setting of immunosuppression  - No infectious nidus found in retina or vitreous  - Initial exam w/ grade 3 disc edema (papilledema; opening pressure elevated) in both eyes  - 7/13 exam, vision stable OU,  color plates full.ONH edema slowly improving, and we do not expect this to rapidly improve as there is often delay of resolution of ONH edema following improvement in ICP   - Continue current treatment per neurology/ID/primary; no ophthalmic intervention at this time   - Recommend neuro-ophthalmology follow-up within 2 weeks of discharge for visual fields and nerve evaluation.        If there are further questions or concern for new/worsening visual changes, please page the on call ophthalmology resident.    Sarah Basurto MD   [facility] PGY-2  07/13/2022  2:30 PM

## 2022-07-13 NOTE — PROCEDURES
Radiology Post-Procedure Note    Pre Op Diagnosis: Cryptococcal Meningitis     Post Op Diagnosis: Same    Procedure: lumbar puncture    Procedure performed by: Celina Hein,  Tara Madrid and ANDRIA Short    Written Informed Consent Obtained: Yes    Specimen Removed: 10 mL CSF    Estimated Blood Loss: Minimal    Findings: Following written informed consent and sterile prep and drape, a 22 gauge spinal needle was inserted at L4 - L5 intralaminar space under fluoroscopic surveillance.  Clear CSF was identified spontaneously following removal of the stylet. 10 mL clear CSF removed and sent to the lab for further analysis.  There were no complications. Opening pressure was 21 cm H2O; closing pressure was 11 cm H20. Spinal needle removed in full following reinsertion of the stylet. Pt. Tolerated the procedure well.          Liz Hein MD  Radiology [facility]

## 2022-07-13 NOTE — ASSESSMENT & PLAN NOTE
- Last intracranial pressure of 34cm from LP at OSH  - Patient with papilledema, vomiting, AMS  - CTH w/developing hydrocephalus and signs of ventricular entrapment  - NSGY notified and consulted- no acute interventions at this time  - NCC notified- continue monitoring on floor  - S/p LP w/opening pressure of 35cm and closing pressure of 13cm 06/30/22  - S/p LP w/opening pressure of 27cm and closing pressure of 12cm 07/08/22  - Continue to monitor for signs and symptoms of worsening ICP  - Repeat CTH 07/01 relatively unchanged   -repeat CTH 7/8 without significant change  -S/p LP  w/opening pressure of 21cm and closing pressure of 11cm 07/13/22  -neurosurgery consulted and following; tentative  shunt placement 7/18 pending LP 7/13 results

## 2022-07-13 NOTE — PROGRESS NOTES
[facility] - Telemetry Stepdown  Adult Nutrition  Consult Note    SUMMARY     Recommendations    1. Continue current Regular diet + ONS. Encourage adequate PO intake as tolerated.  2. RD to monitor & follow-up.    Goals: Meet % EEN, EPN by RD f/u date  Nutrition Goal Status: progressing towards goal  Communication of RD Recs: reviewed with RN    Assessment and Plan    Nutrition Problem:  Inadequate energy intake    Related to (etiology):   Decreased appetite     Signs and Symptoms (as evidenced by):   PO intake 25%    Interventions(treatment strategy):  Collaboration of nutrition care w/ other providers  ONS    Nutrition Diagnosis Status:   New    Reason for Assessment    Reason For Assessment: RD follow-up  Diagnosis: other (see comments) (Meningitis)  Relevant Medical History: HTN, Kidney tx (2020), CKD  Interdisciplinary Rounds: did not attend    General Information Comments: At time of visit this AM, pt NPO for LP. Regular diet + ONS now resumed. Prior to NPO status, per RN documentation, pt w/ 25% PO intake. At initial RD assessment, PTA - pt reports fair appetite & UBW of 150# - chart review confirms. NFPE not warranted, pt appears nourished.  Nutrition Discharge Planning: Adequate PO intake    Nutrition/Diet History    Factors Affecting Nutritional Intake: decreased appetite    Anthropometrics    Temp: 98.7 °F (37.1 °C)  Height Method: Stated  Height: 5' (152.4 cm)  Height (inches): 60 in  Weight Method: Bed Scale (bed was zeroed prior to weight)  Weight: 71.7 kg (158 lb 1.1 oz)  Weight (lb): 158.07 lb  Ideal Body Weight (IBW), Female: 100 lb  % Ideal Body Weight, Female (lb): 158.07 %  BMI (Calculated): 30.9  BMI Grade: 30 - 34.9- obesity - grade I    Lab/Procedures/Meds    Pertinent Labs Reviewed: reviewed  Pertinent Labs Comments: -  Pertinent Medications Reviewed: reviewed  Pertinent Medications Comments: MVI    Estimated/Assessed Needs    Weight Used For Calorie Calculations: 71.7 kg (158 lb 1.1  oz)     Energy Calorie Requirements (kcal): 1549 kcal/d  Energy Need Method: [place]-St Jeor (1.25 PAL)     Protein Requirements: 72-86 g/d (1-1.2 g/kg)  Weight Used For Protein Calculations: 71.7 kg (158 lb 1.1 oz)     Estimated Fluid Requirement Method: other (see comments) (Per MD or 1 mL/kcal)  RDA Method (mL): 1549    Nutrition Prescription Ordered    Current Diet Order: Regular  Oral Nutrition Supplement: Boost Plus ONS    Evaluation of Received Nutrient/Fluid Intake    I/O: +7.9L since 6/29    Comments: LBM: 7/12    Tolerance: tolerating    Nutrition Risk    Level of Risk/Frequency of Follow-up:  (1x/week)     Monitor and Evaluation    Food and Nutrient Intake: energy intake, food and beverage intake  Food and Nutrient Adminstration: diet order  Physical Activity and Function: nutrition-related ADLs and IADLs  Anthropometric Measurements: weight, weight change  Biochemical Data, Medical Tests and Procedures: inflammatory profile, lipid profile, glucose/endocrine profile, gastrointestinal profile  Nutrition-Focused Physical Findings: overall appearance     Nutrition Follow-Up    RD Follow-up?: Yes

## 2022-07-13 NOTE — CLINICAL REVIEW
KTM Chart Review      Intake/Output Summary (Last 24 hours) at 7/13/2022 1756  Last data filed at 7/13/2022 0903  Gross per 24 hour   Intake 50 ml   Output 500 ml   Net -450 ml       Vitals:    07/13/22 0752 07/13/22 1233 07/13/22 1300 07/13/22 1529   BP: (!) 151/71 (!) 147/64  (!) 188/77   BP Location: Right arm Right arm  Left leg   Patient Position: Lying Lying  Lying   Pulse: 69 62  68   Resp: 18 18  18   Temp: 98.5 °F (36.9 °C) 98.7 °F (37.1 °C)  97.7 °F (36.5 °C)   TempSrc: Oral Oral  Oral   SpO2: 96% 97%  97%   Weight:   71.7 kg (158 lb 1.1 oz)    Height:   5' (1.524 m)        Recent Labs   Lab 07/11/22  0426 07/12/22  0325 07/13/22  0349   * 134* 137   K 3.5 3.3* 3.9    108 111*   CO2 22* 20* 19*   BUN 27* 22* 17   CREATININE 1.4 1.2 1.0   CALCIUM 8.1* 7.8* 8.1*   PHOS 3.7  --   --        Immunosuppression Level - 5.8    No acute issues identified. Please call KTM as needed; We will continue to follow.    Roberto Eisenberg Jr.

## 2022-07-13 NOTE — SUBJECTIVE & OBJECTIVE
Interval History: Patient was seen and evaluated this am. HA improved. Remains with a degree of hearing loss.  Diarrhea and associated crampy abdominal pain during episodes remains.  C diff negative, stool WBC without neutrophils, and CT abdomen unremarkable for acute etiology of diarrhea.    No fevers, nausea, chills, night sweats, abd pain, vomiting, new vision changes.     Objective:     Vital Signs (Most Recent):  Temp: 98.7 °F (37.1 °C) (07/13/22 1233)  Pulse: 62 (07/13/22 1233)  Resp: 18 (07/13/22 1233)  BP: (!) 147/64 (07/13/22 1233)  SpO2: 97 % (07/13/22 1233)   Vital Signs (24h Range):  Temp:  [97.9 °F (36.6 °C)-98.7 °F (37.1 °C)] 98.7 °F (37.1 °C)  Pulse:  [58-81] 62  Resp:  [17-18] 18  SpO2:  [96 %-100 %] 97 %  BP: (135-205)/(61-81) 147/64     Weight: 71.7 kg (158 lb 1.1 oz)  Body mass index is 30.87 kg/m².    Intake/Output Summary (Last 24 hours) at 7/13/2022 1445  Last data filed at 7/13/2022 0903  Gross per 24 hour   Intake 50 ml   Output 500 ml   Net -450 ml          Physical Exam  Gen: in NAD, appears stated age  Neuro: awake and oriented to self, place, time  HEENT: NTNC, EOMI, PERRL, MMM  CVS: RRR, no m/r/g; S1/S2 auscultated with no S3 or S4; capillary refill < 2 sec  Resp: lungs CTAB, no w/r/r; no belabored breathing or accessory muscle use appreciated   Abd: BS+ in all 4 quadrants; NTND, soft to palpation; no organomegaly appreciated   Extrem: pulses full, equal, and regular over all 4 extremities; no swelling of BL UE or LE ext    Significant Labs: All pertinent labs within the past 24 hours have been reviewed.  Blood Culture: No results for input(s): LABBLOO in the last 48 hours.    Microbiology Results (last 7 days)       Procedure Component Value Units Date/Time    CSF culture [[id number]] Collected: 07/13/22 1100    Order Status: Completed Specimen: CSF (Spinal Fluid) from CSF Tap, Tube 3 Updated: 07/13/22 1352     Gram Stain Result Cytospin indicates:      No WBC's      Few yeast       Results called to and read back by: Laura Carey RN  07/13/2022        13:51    AFB Culture & Smear [[id number]] Collected: 07/13/22 1100    Order Status: Sent Specimen: CSF (Spinal Fluid) from CSF Tap, Tube 3 Updated: 07/13/22 1146    Cryptococcal antigen, CSF [[id number]] Collected: 07/13/22 1100    Order Status: Sent Specimen: CSF (Spinal Fluid) from CSF Tap, Tube 3 Updated: 07/13/22 1145    Gram stain [[id number]] Collected: 07/13/22 1100    Order Status: Canceled Specimen: CSF (Spinal Fluid) from CSF Tap, Tube 3     CSF culture [[id number]] Collected: 07/08/22 1019    Order Status: Completed Specimen: CSF (Spinal Fluid) from CSF Tap, Tube 2 Updated: 07/13/22 0721     CSF CULTURE No Growth     Gram Stain Result Cytospin indicates: Few yeast      No WBC's      Results called to and read back by: Lyndsay Granados 07/08/2022  13:13    Clostridium difficile EIA [[id number]] Collected: 07/12/22 1751    Order Status: Completed Specimen: Stool Updated: 07/12/22 2249     C. diff Antigen Negative     C difficile Toxins A+B, EIA Negative     Comment: Testing not recommended for children <24 months old.       E. coli 0157 antigen [[id number]] Collected: 07/12/22 1751    Order Status: No result Specimen: Stool Updated: 07/12/22 1752    Stool culture [[id number]] Collected: 07/12/22 1751    Order Status: Sent Specimen: Stool Updated: 07/12/22 1751    AFB Culture & Smear [[id number]] Collected: 07/08/22 1019    Order Status: Completed Specimen: CSF (Spinal Fluid) from CSF Tap, Tube 2 Updated: 07/11/22 1440     AFB Culture & Smear Culture in progress     AFB CULTURE STAIN No acid fast bacilli seen.              CSF findings 06/30/22: <5glucose, 174 protein, 144 WBC, 3000 RBC, 37 lymph, 59 mono/macro. Repeat CSF w/1:8 cryptococcal antigen, CSF culture w/yeast    CBC:   Recent Labs   Lab 07/11/22  1623 07/12/22  0325 07/13/22  0349   WBC 6.46 4.43 5.28   HGB 10.6* 9.4* 10.1*   HCT 33.0* 29.7* 31.3*   PLT SEE COMMENT 97* 113*      CMP:   Recent Labs   Lab 07/12/22  0325 07/13/22  0349   * 137   K 3.3* 3.9    111*   CO2 20* 19*   * 146*   BUN 22* 17   CREATININE 1.2 1.0   CALCIUM 7.8* 8.1*   PROT 4.5* 4.8*   ALBUMIN 2.5* 2.6*   BILITOT 0.4 0.6   ALKPHOS 43* 43*   AST 9* 9*   ALT 9* 8*   ANIONGAP 6* 7*   EGFRNONAA 51.4* >60.0     Magnesium:   No results for input(s): MG in the last 48 hours.      Urine Culture: No results for input(s): LABURIN in the last 48 hours.  Urine Studies:   No results for input(s): COLORU, APPEARANCEUA, PHUR, SPECGRAV, PROTEINUA, GLUCUA, KETONESU, BILIRUBINUA, OCCULTUA, NITRITE, UROBILINOGEN, LEUKOCYTESUR, RBCUA, WBCUA, BACTERIA, SQUAMEPITHEL, HYALINECASTS in the last 48 hours.    Invalid input(s): WRIGHTSUR      Significant Imaging: I have reviewed all pertinent imaging results/findings within the past 24 hours.    CTH 06/30/22:  FINDINGS:  Intracranial compartment:     Mild enlargement of the temporal horn of the right lateral ventricle, new from prior.  Remainder of the ventricular system is normal in size, stable.  Third ventricle diameter on the order of 0.3 cm.  Well-defined sulci remain present over the cerebral convexities.     The brain parenchyma appears otherwise within normal limits, and unchanged from priors.  No new parenchymal hemorrhage, edema or major vascular distribution infarct.  No intracranial mass effect or midline shift.     No new extra-axial blood or fluid collections.     Partially empty sella configuration.     Skull/extracranial contents (limited evaluation):     No displaced calvarial fracture.     Small volume right mastoid air cell fluid.     The visualized paranasal sinuses are essentially clear.     Impression:     Slight interval enlargement of the temporal horn of the right lateral ventricle since the recent MRI of 06/23/2022.  Appearance suggestive of early/developing hydrocephalus or ventricular entrapment.     No new hemorrhage, edema or infarction.      Partially empty sella configuration.  Nonspecific but configuration could be consistent with the reported history of a elevated intracranial pressure.    CTH 07/01/22:  FINDINGS:  Intracranial compartment:     Mild enlargement of the temporal horn of the right lateral ventricle similar to most recent CT head 06/30/2022, new compared to prior MRI brain 06/23/2022.  Third ventricle diameter measures approximately 4 mm.     The brain parenchyma appears within normal limits not significantly changed compared to prior exam.  No parenchymal mass, hemorrhage, edema or major vascular distribution infarct.     No extra-axial blood or fluid collections.     Partially empty sella configuration.     Skull/extracranial contents (limited evaluation):     No fracture. Paranasal sinuses are essentially clear.  Small volume fluid within the right mastoid air cells.     Impression:     Slight enlargement of the temporal horn of right lateral ventricle not significantly changed compared to most recent CT head 06/30/2022,  new compared to prior MRI brain 06/23/2022.  Clinical correlation and continued close follow-up is recommended as developing hydrocephalus or ventricular entrapment remains a concern.     No new acute infarct or hemorrhage.    Doppler US of LLE:  FINDINGS:  Left thigh veins: Near occlusive thrombus within the left popliteal vein.  The common femoral, femoral, and upper greater saphenous veins are patent and free of thrombus. The veins are normally compressible and have normal phasic flow and augmentation response.     Left calf veins: Partially occlusive thrombus within the posterior tibial veins.  Partially occlusive thrombus with 1 of the peroneal veins.  Anterior tibial veins are patent.     Contralateral CFV: The contralateral (right) common femoral vein is patent and free of thrombus.     Miscellaneous: None     Impression:     Deep venous thrombosis within the left popliteal, posterior tibial, and peroneal  veins.

## 2022-07-13 NOTE — PLAN OF CARE
Problem: Adult Inpatient Plan of Care  Goal: Plan of Care Review  Outcome: Ongoing, Progressing  Goal: Patient-Specific Goal (Individualized)  Outcome: Ongoing, Progressing  Goal: Absence of Hospital-Acquired Illness or Injury  Outcome: Ongoing, Progressing  Goal: Optimal Comfort and Wellbeing  Outcome: Ongoing, Progressing  Goal: Readiness for Transition of Care  Outcome: Ongoing, Progressing     Problem: Fluid and Electrolyte Imbalance (Acute Kidney Injury/Impairment)  Goal: Fluid and Electrolyte Balance  Outcome: Ongoing, Progressing     Problem: Oral Intake Inadequate (Acute Kidney Injury/Impairment)  Goal: Optimal Nutrition Intake  Outcome: Ongoing, Progressing     Problem: Renal Function Impairment (Acute Kidney Injury/Impairment)  Goal: Effective Renal Function  Outcome: Ongoing, Progressing     Problem: Skin Injury Risk Increased  Goal: Skin Health and Integrity  Outcome: Ongoing, Progressing     Problem: Fall Injury Risk  Goal: Absence of Fall and Fall-Related Injury  Outcome: Ongoing, Progressing     Problem: Infection  Goal: Absence of Infection Signs and Symptoms  Outcome: Ongoing, Progressing   Patient is alert, oriented and conscious. Vital signs taken and recorded. SPO2 maintain in RA. Medication given as per order. Intake output recorded. Skin care done. Mobilization done in room. Will continue to monitor.

## 2022-07-13 NOTE — PLAN OF CARE
Recommendations     1. Continue current Regular diet + ONS. Encourage adequate PO intake as tolerated.  2. RD to monitor & follow-up.     Goals: Meet % EEN, EPN by RD f/u date  Nutrition Goal Status: progressing towards goal  Communication of RD Recs: reviewed with RN

## 2022-07-13 NOTE — PROGRESS NOTES
[facility] - Telemetry [facility] Medicine  Progress Note    Patient Name: Tala Rivera  MRN: [id number]  Patient Class: IP- Inpatient   Admission Date: 6/27/2022  Length of Stay: 16 days  Attending Physician: Joanne Braden MD  Primary Care Provider: ANUJA Castellon MD        Subjective:     Principal Problem:Cryptococcal meningitis        HPI:  Mrs. Rivera is a 56yoF w/PMHx of kidney XPL (2020 [facility]) on Prograf and Cellcept admitted to [facility] on 06/22 with intractable HA starting about 2 months earlier and with dizziness, blurred vision, left-sided facial droop and dysarthria starting a few days before admission.       On admission /72, HR 69. Neurologic exam is remarkable for left-sided facial weakness and mild dysarthria.  Labs (6/22) WBC 9.4, Hb 15.9, Plts 224, Na 137, K 3.3, CO2 25, BUN 24, Cr 1.32, AG 14, Glu 182 LFTs WNL. COVID neg     LP (6/24) CSF Glu 6, CSF protein 93. [place] stain pos for yeast like organisms.  Spinal fluid culture pos for encapsulated yeast species.       CT chest WO contrast pos for multiple indeterminate soft tissue masses in the left lower lobe with 2 smaller masses in the right lung which may be part of the same process.       On 06/26 patient started on amphotericin B liposomal (275 IV q.d.) and flucytosine (1500 p.o. q.6h) .  Hospitalization c/b the development of acute renal failure.  Cr 1.32 (6/22) > 1.4 (6/26) > 2.02 (6/27).  Cellcept and prednisone have been held.       Transfer requested for higher level of care (KTM and ID).  Transfer diagnosis disseminated cryptococcal infection, TERESA, kidney XPL.    At time of my assessment, pt. Complains of headache which has been persistent for the last several days. She states that it is somewhat relieved by the fioricet and oxycodone that she was receiving at the outside hospital.      Overview/Hospital Course:  KTM, transplant ID, Neurology consulted upon admission. Per KTN, normal saline scheduled with  amphotericin infusions in addition to bicarb drip, prednisone increased, Prograf continued.  Creatinine down-trending and bicarb drip discontinued.  Per Neurology, outside hospital MR MRI reviewed and note that neuro deficits may take a few weeks to fully resolve; recommend limiting Fioricet and narcotics to minimize medication overuse/rebound headaches.  Per Transplant ID, continue ambisome/flucytosine with plan to repeat LP 2 weeks from 6/24.  Due to visual disturbance in the setting of fungal meningitis, ophthalmology consulted.  No infectious nidus found in retina or vitreous, however grade 3 disc edema bilaterally. Exam finding and subjective complaints explained by meningitis and papilledema.  Recommendation for repeating LP to reduce opening pressure per ID and Neurology in agreement.  On 06/29, patient hearing improved.  Remains with intermittent confusion per  at bedside.    CTH performed 06/30- Slight interval enlargement of the temporal horn of the right lateral ventricle since the recent MRI of 06/23/2022.  Appearance suggestive of early/developing hydrocephalus or ventricular entrapment. NSGY consulted. Per NSGY, recommended NCC transfer for further evaluation. Patient underwent repeat LP 06/30- opening pressure of 35cm- closing pressure of 13cm. Patient w/much improvement in signs and symptoms. CSF findings: <5glucose, 174 protein, 144 WBC, 3000 RBC, 37 lymph, 59 mono/macro. Repeat CSF w/1:8 cryptococcal antigen, CSF culture w/yeast. NCC evaluated, but patient with much improvement in signs and symptoms, and so NCC recommended continued observation on the floor. Discussed w/ID- stated to continue to monitor for worsening signs and symptoms- with closing pressure <25cm, no need for serial LP's at this time.     Doppler US performed on LLE- found to have DVT. Started on heparin drip. Planning for repeat LP 07/07.  Heparin drip paused and repeat lumbar puncture completed on 07/07.  Per Radiology,  CSF was clear to gout 16 mL total; opening pressure 27, closing pressure 12. Gram stain notable for few yeast.  Repeat CT head ordered with stable findings.  Neurosurgery consulted; Considering  shunt placement pending clearance of CSF and decrease in CSF protein.  Recommend ongoing serial LPs for now.  Repeat lung bar puncture performed 7/13.  Opening pressure 21, closing pressure 11.       Interval History: Patient was seen and evaluated this am. HA improved. Remains with a degree of hearing loss.  Diarrhea and associated crampy abdominal pain during episodes remains.  C diff negative, stool WBC without neutrophils, and CT abdomen unremarkable for acute etiology of diarrhea.    No fevers, nausea, chills, night sweats, abd pain, vomiting, new vision changes.     Objective:     Vital Signs (Most Recent):  Temp: 98.7 °F (37.1 °C) (07/13/22 1233)  Pulse: 62 (07/13/22 1233)  Resp: 18 (07/13/22 1233)  BP: (!) 147/64 (07/13/22 1233)  SpO2: 97 % (07/13/22 1233)   Vital Signs (24h Range):  Temp:  [97.9 °F (36.6 °C)-98.7 °F (37.1 °C)] 98.7 °F (37.1 °C)  Pulse:  [58-81] 62  Resp:  [17-18] 18  SpO2:  [96 %-100 %] 97 %  BP: (135-205)/(61-81) 147/64     Weight: 71.7 kg (158 lb 1.1 oz)  Body mass index is 30.87 kg/m².    Intake/Output Summary (Last 24 hours) at 7/13/2022 1445  Last data filed at 7/13/2022 0903  Gross per 24 hour   Intake 50 ml   Output 500 ml   Net -450 ml          Physical Exam  Gen: in NAD, appears stated age  Neuro: awake and oriented to self, place, time  HEENT: NTNC, EOMI, PERRL, MMM  CVS: RRR, no m/r/g; S1/S2 auscultated with no S3 or S4; capillary refill < 2 sec  Resp: lungs CTAB, no w/r/r; no belabored breathing or accessory muscle use appreciated   Abd: BS+ in all 4 quadrants; NTND, soft to palpation; no organomegaly appreciated   Extrem: pulses full, equal, and regular over all 4 extremities; no swelling of BL UE or LE ext    Significant Labs: All pertinent labs within the past 24 hours have been  reviewed.  Blood Culture: No results for input(s): LABBLOO in the last 48 hours.    Microbiology Results (last 7 days)       Procedure Component Value Units Date/Time    CSF culture [[id number]] Collected: 07/13/22 1100    Order Status: Completed Specimen: CSF (Spinal Fluid) from CSF Tap, Tube 3 Updated: 07/13/22 1352     Gram Stain Result Cytospin indicates:      No WBC's      Few yeast      Results called to and read back by: aLura Carey RN  07/13/2022        13:51    AFB Culture & Smear [[id number]] Collected: 07/13/22 1100    Order Status: Sent Specimen: CSF (Spinal Fluid) from CSF Tap, Tube 3 Updated: 07/13/22 1146    Cryptococcal antigen, CSF [[id number]] Collected: 07/13/22 1100    Order Status: Sent Specimen: CSF (Spinal Fluid) from CSF Tap, Tube 3 Updated: 07/13/22 1145    Gram stain [[id number]] Collected: 07/13/22 1100    Order Status: Canceled Specimen: CSF (Spinal Fluid) from CSF Tap, Tube 3     CSF culture [[id number]] Collected: 07/08/22 1019    Order Status: Completed Specimen: CSF (Spinal Fluid) from CSF Tap, Tube 2 Updated: 07/13/22 0721     CSF CULTURE No Growth     Gram Stain Result Cytospin indicates: Few yeast      No WBC's      Results called to and read back by: Lyndsay Granados 07/08/2022  13:13    Clostridium difficile EIA [[id number]] Collected: 07/12/22 1751    Order Status: Completed Specimen: Stool Updated: 07/12/22 2249     C. diff Antigen Negative     C difficile Toxins A+B, EIA Negative     Comment: Testing not recommended for children <24 months old.       E. coli 0157 antigen [[id number]] Collected: 07/12/22 1751    Order Status: No result Specimen: Stool Updated: 07/12/22 1752    Stool culture [[id number]] Collected: 07/12/22 1751    Order Status: Sent Specimen: Stool Updated: 07/12/22 1751    AFB Culture & Smear [[id number]] Collected: 07/08/22 1019    Order Status: Completed Specimen: CSF (Spinal Fluid) from CSF Tap, Tube 2 Updated: 07/11/22 1440     AFB Culture & Smear Culture in  progress     AFB CULTURE STAIN No acid fast bacilli seen.              CSF findings 06/30/22: <5glucose, 174 protein, 144 WBC, 3000 RBC, 37 lymph, 59 mono/macro. Repeat CSF w/1:8 cryptococcal antigen, CSF culture w/yeast    CBC:   Recent Labs   Lab 07/11/22  1623 07/12/22  0325 07/13/22  0349   WBC 6.46 4.43 5.28   HGB 10.6* 9.4* 10.1*   HCT 33.0* 29.7* 31.3*   PLT SEE COMMENT 97* 113*     CMP:   Recent Labs   Lab 07/12/22  0325 07/13/22  0349   * 137   K 3.3* 3.9    111*   CO2 20* 19*   * 146*   BUN 22* 17   CREATININE 1.2 1.0   CALCIUM 7.8* 8.1*   PROT 4.5* 4.8*   ALBUMIN 2.5* 2.6*   BILITOT 0.4 0.6   ALKPHOS 43* 43*   AST 9* 9*   ALT 9* 8*   ANIONGAP 6* 7*   EGFRNONAA 51.4* >60.0     Magnesium:   No results for input(s): MG in the last 48 hours.      Urine Culture: No results for input(s): LABURIN in the last 48 hours.  Urine Studies:   No results for input(s): COLORU, APPEARANCEUA, PHUR, SPECGRAV, PROTEINUA, GLUCUA, KETONESU, BILIRUBINUA, OCCULTUA, NITRITE, UROBILINOGEN, LEUKOCYTESUR, RBCUA, WBCUA, BACTERIA, SQUAMEPITHEL, HYALINECASTS in the last 48 hours.    Invalid input(s): WRIGHTSUR      Significant Imaging: I have reviewed all pertinent imaging results/findings within the past 24 hours.    CTH 06/30/22:  FINDINGS:  Intracranial compartment:     Mild enlargement of the temporal horn of the right lateral ventricle, new from prior.  Remainder of the ventricular system is normal in size, stable.  Third ventricle diameter on the order of 0.3 cm.  Well-defined sulci remain present over the cerebral convexities.     The brain parenchyma appears otherwise within normal limits, and unchanged from priors.  No new parenchymal hemorrhage, edema or major vascular distribution infarct.  No intracranial mass effect or midline shift.     No new extra-axial blood or fluid collections.     Partially empty sella configuration.     Skull/extracranial contents (limited evaluation):     No displaced calvarial  fracture.     Small volume right mastoid air cell fluid.     The visualized paranasal sinuses are essentially clear.     Impression:     Slight interval enlargement of the temporal horn of the right lateral ventricle since the recent MRI of 06/23/2022.  Appearance suggestive of early/developing hydrocephalus or ventricular entrapment.     No new hemorrhage, edema or infarction.     Partially empty sella configuration.  Nonspecific but configuration could be consistent with the reported history of a elevated intracranial pressure.    CTH 07/01/22:  FINDINGS:  Intracranial compartment:     Mild enlargement of the temporal horn of the right lateral ventricle similar to most recent CT head 06/30/2022, new compared to prior MRI brain 06/23/2022.  Third ventricle diameter measures approximately 4 mm.     The brain parenchyma appears within normal limits not significantly changed compared to prior exam.  No parenchymal mass, hemorrhage, edema or major vascular distribution infarct.     No extra-axial blood or fluid collections.     Partially empty sella configuration.     Skull/extracranial contents (limited evaluation):     No fracture. Paranasal sinuses are essentially clear.  Small volume fluid within the right mastoid air cells.     Impression:     Slight enlargement of the temporal horn of right lateral ventricle not significantly changed compared to most recent CT head 06/30/2022,  new compared to prior MRI brain 06/23/2022.  Clinical correlation and continued close follow-up is recommended as developing hydrocephalus or ventricular entrapment remains a concern.     No new acute infarct or hemorrhage.    Doppler US of LLE:  FINDINGS:  Left thigh veins: Near occlusive thrombus within the left popliteal vein.  The common femoral, femoral, and upper greater saphenous veins are patent and free of thrombus. The veins are normally compressible and have normal phasic flow and augmentation response.     Left calf veins:  Partially occlusive thrombus within the posterior tibial veins.  Partially occlusive thrombus with 1 of the peroneal veins.  Anterior tibial veins are patent.     Contralateral CFV: The contralateral (right) common femoral vein is patent and free of thrombus.     Miscellaneous: None     Impression:     Deep venous thrombosis within the left popliteal, posterior tibial, and peroneal veins.      Assessment/Plan:      * Cryptococcal meningitis  - Pt with intractable headache, facial droop, dysarthria (improved). 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  - Continue Amp B 5/mg kg Q24hrs and flucytosine 1500 mg Q6hrs for treatment.   - On chart review, amphotericin started on 06/24/22- 2wks of treatment 07/08/22  - Repeat LP completed 7/9; gram stain notable for yeast; and amphotericin continued; cultures no growth to date  -Transplant ID following- continue with current course- patient signs and symptoms much improved since time of admission, will hold of on steroids- no signs of IRIS, will continue to monitor for need for repeat LP  - KTM following; recommend  500mL normal saline with amphotericin     Acute renal failure superimposed on stage 3 chronic kidney disease  - Cr 2.2 on day of transfer, has been steadily worsening over last few days (baseline ~1.2)  - KTM consulted, appreciate recs; likely prerenal versus medication related  - US transplant unremarkable for acute changes   - creatinine improved; stable  - Continue sodium bicarb    VTE (venous thromboembolism)  - continue heparin drip for DVT of the LLE  -pause as needed in the setting of procedures      Increased intracranial pressure  - Last intracranial pressure of 34cm from LP at OSH  - Patient with papilledema, vomiting, AMS  - CTH w/developing hydrocephalus and signs of ventricular entrapment  - NSGY notified and consulted- no acute interventions at this time  - NCC notified- continue monitoring on floor  -  "S/p LP w/opening pressure of 35cm and closing pressure of 13cm 06/30/22  - S/p LP w/opening pressure of 27cm and closing pressure of 12cm 07/08/22  - Continue to monitor for signs and symptoms of worsening ICP  - Repeat CTH 07/01 relatively unchanged   -repeat CTH 7/8 without significant change  -S/p LP  w/opening pressure of 21cm and closing pressure of 11cm 07/13/22  -neurosurgery consulted and following; tentative  shunt placement 7/18 pending LP 7/13 results      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  - Improving  - See papilledema-- spoke w/ophthalmology- expect papilledema to improve over the course of 2wks if infection remains well-controlled and ICP remains wnl      Bilateral papilledema due to raised intracranial pressure  - Ophthalmology evaluation with grade 3 papilledema in both eyes (longer she has elevated CSF pressure, the higher her risk of permanent optic nerve damage and vision change is.  There is a congested appearance to the veins in both eyes secondary to optic nerve congestion and indicates higher risk for ischemic event), 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  -"- 7/7 exam, vision stable OU, color plates full. Still 3+ ONH edema, and we do not expect this to rapidly improve as there is often delay of resolution of ONH edema following improvement in ICP "  - see increased intracranial pressure    Headache  - history of intractable headache  - continue topamax to 100mg BID, will consider wean as headache improved  - continue as needed Fioricet, oxycodone/acetaminophen, acetaminophen; headache responding to prns      Hypokalemia  - PO repletion prn to goal      Long-term use of immunosuppressant medication  - Pt with opportunistic infection, see above. Holding mycophenolate   - KTM following, appreciate recs  Continue prograf per levels   Monitor levels to assess for toxicity  Continue prednisone ---> increased to 10mg daily   Hold MMF         S/P kidney transplant  - See TERESA. KTM consulted for further assistance  Kidney transplant " 4/5/2020   Underlying ckd stage III  ESRD related to HTN        VTE Risk Mitigation (From admission, onward)         Ordered     heparin 25,000 units in dextrose 5% 250 mL (100 units/mL) infusion HIGH INTENSITY nomogram - [facility]  Continuous        Question Answer Comment   Heparin Infusion Adjustment (DO NOT MODIFY ANSWER) [email or web address] HIGH INTENSITY nomogram for [facility] [email or web address]    Begin at (in units/kg/hr) 18        07/03/22 1205     IP VTE HIGH RISK PATIENT  Once         06/27/22 2202     Place sequential compression device  Until discontinued         06/27/22 2202                Discharge Planning   JULITO: 7/14/2022     Code Status: Full Code   Is the patient medically ready for discharge?: No    Reason for patient still in hospital (select all that apply): Patient trending condition, Laboratory test and Consult recommendations  Discharge Plan A: Home Health                  Joanne Braden MD  Department of Hospital Medicine   [facility] - Telemetry Stepdown

## 2022-07-13 NOTE — PROGRESS NOTES
[facility] - Telemetry Stepdown  Neurosurgery  Progress Note    Subjective:     History of Present Illness: Mrs. Rivera is a 56yoF w/PMHx of [place]2020 [facility] [place] Prograf and Cellcept admitted to [facility] on 06/22 with intractable HA starting about 2 months earlier and with dizziness, blurred vision, left-sided facial droop and dysarthria starting a few days before admission. 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 LP (6/24) with OP of 34,  stain pos for yeast like organisms and CSF culture pos for encapsulated yeast species. Ophthalmology was consulted for complaint of visual disturbance in the setting of elevated ICP.  No infectious nidus was found in retina or vitreous, however pt was found to have grade 3 palpilledema bilaterally. CTH performed 06/30 showed slight interval enlargement of the temporal horn of the right lateral ventricle since the recent MRI of 06/23/2022.  Repeat LP 06/30- opening pressure of 35cm- closing pressure of 13cm. Patient w/much improvement in signs and symptoms post LP. NCC evaluated, but given improvement in symptoms, NCC recommended continued observation on the floor. Antibiotic end date is today and primary team planning to repeat LP and CTH. Pt currently reports improvement in her HA and her visual symptoms, however continues with hearing loss and intermittent nausea. NSGY consulted for consideration of EVD placement for elevated ICPs.          Post-Op Info:  Procedure(s) (LRB):  INSERTION, SHUNT, VENTRICULOPERITONEAL - right frontal (Right)         Interval History: BAILEYEON. Denies headaches this AM. Plan for LP today.     Medications:  Continuous Infusions:  Scheduled Meds:   acetaminophen  500 mg Oral Q24H    amphotericin B liposome (AMBISOME) IVPB  300 mg Intravenous Q24H    calcitRIOL  0.5 mcg Oral Daily    diphenhydrAMINE  25 mg Oral Q24H     flucytosine  1,500 mg Oral Q6H    multivitamin  1 tablet Oral Daily    NIFEdipine  90 mg Oral Daily    predniSONE  5 mg Oral Daily    sodium bicarbonate  1,300 mg Oral TID    sodium chloride 0.9%  500 mL Intravenous Q24H    tacrolimus  3 mg Oral BID    topiramate  100 mg Oral BID     PRN Meds:acetaminophen, albuterol-ipratropium, bisacodyL, butalbital-acetaminophen-caffeine -40 mg, dextrose 5 %, glucose, glucose, hydrALAZINE, iohexol, loperamide, melatonin, naloxone, ondansetron, oxyCODONE-acetaminophen, sodium chloride 0.9%     Review of Systems  Objective:     Weight: 71.7 kg (158 lb 1.1 oz)  Body mass index is 30.87 kg/m².  Vital Signs (Most Recent):  Temp: 98.5 °F (36.9 °C) (07/13/22 0752)  Pulse: 69 (07/13/22 0752)  Resp: 18 (07/13/22 0752)  BP: (!) 151/71 (07/13/22 0752)  SpO2: 96 % (07/13/22 0752)   Vital Signs (24h Range):  Temp:  [97.9 °F (36.6 °C)-98.7 °F (37.1 °C)] 98.5 °F (36.9 °C)  Pulse:  [58-81] 69  Resp:  [17-18] 18  SpO2:  [96 %-100 %] 96 %  BP: (135-205)/(61-81) 151/71                          Hemodialysis AV Graft Right forearm (Active)   Site Assessment Clean;Dry;Intact 07/30/20 0745   Patency Present;Thrill;Bruit 07/30/20 0745   Status Deaccessed 07/29/20 2010   Dressing Status Clean;Dry;Intact 10/18/18 0808   Site Condition No complications 07/30/20 0745   Dressing Open to air (None) 07/29/20 2010       Neurosurgery Physical Exam  Head: normocephalic, atraumatic  Neurologic: Alert and oriented. Thought content appropriate.  GCS: Motor: 6/Verbal: 5/Eyes: 4 GCS Total: 15  Mental Status: Awake, Alert, Oriented x3  Cranial nerves: face symmetric, tongue midline, CN II-XII grossly intact. Hard of hearing   Eyes: pupils equal, round, reactive to light with accomodation, EOMI.   Sensory: intact to light touch throughout  Motor Strength: Moves all extremities spontaneously with good tone.  Full strength upper and lower extremities. No abnormal movements seen.      Strength   Deltoids  Triceps Biceps Wrist Extension Wrist Flexion Hand    Upper: R 5/5 5/5 5/5 5/5 5/5 5/5     L 5/5 5/5 5/5 5/5 5/5 5/5       Iliopsoas Quadriceps Knee  Flexion Tibialis  anterior Gastro- cnemius EHL   Lower: R 5/5 5/5 5/5 5/5 5/5 5/5     L 5/5 5/5 5/5 5/5 5/5 5/5      Pronator Drift: no drift noted  Finger-to-nose: Intact bilaterally  Aparicio: absent  Clonus: absent    Significant Labs:  Recent Labs   Lab 07/12/22  0325 07/13/22  0349   * 146*   * 137   K 3.3* 3.9    111*   CO2 20* 19*   BUN 22* 17   CREATININE 1.2 1.0   CALCIUM 7.8* 8.1*     Recent Labs   Lab 07/11/22  1623 07/12/22  0325 07/13/22  0349   WBC 6.46 4.43 5.28   HGB 10.6* 9.4* 10.1*   HCT 33.0* 29.7* 31.3*   PLT SEE COMMENT 97* 113*     Recent Labs   Lab 07/11/22  1948 07/12/22  0325 07/12/22  1256 07/13/22  0822   INR  --   --   --  1.1   APTT 25.0 37.3* 25.2  --      Microbiology Results (last 7 days)       Procedure Component Value Units Date/Time    CSF culture [[id number]] Collected: 07/08/22 1019    Order Status: Completed Specimen: CSF (Spinal Fluid) from CSF Tap, Tube 2 Updated: 07/13/22 0721     CSF CULTURE No Growth     Gram Stain Result Cytospin indicates: Few yeast      No WBC's      Results called to and read back by: Lyndsay Granados 07/08/2022  13:13    Clostridium difficile EIA [[id number]] Collected: 07/12/22 1751    Order Status: Completed Specimen: Stool Updated: 07/12/22 2249     C. diff Antigen Negative     C difficile Toxins A+B, EIA Negative     Comment: Testing not recommended for children <24 months old.       E. coli 0157 antigen [[id number]] Collected: 07/12/22 1751    Order Status: No result Specimen: Stool Updated: 07/12/22 1752    Stool culture [[id number]] Collected: 07/12/22 1751    Order Status: Sent Specimen: Stool Updated: 07/12/22 1751    Gram stain [[id number]]     Order Status: No result Specimen: CSF (Spinal Fluid) from CSF Tap, Tube 3     CSF culture [[id number]]     Order Status: No result Specimen:  CSF (Spinal Fluid) from CSF Tap, Tube 3     Cryptococcal antigen, CSF [[id number]]     Order Status: No result Specimen: CSF (Spinal Fluid)     AFB Culture & Smear [[id number]]     Order Status: No result Specimen: CSF (Spinal Fluid)     AFB Culture & Smear [[id number]] Collected: 07/08/22 1019    Order Status: Completed Specimen: CSF (Spinal Fluid) from CSF Tap, Tube 2 Updated: 07/11/22 1440     AFB Culture & Smear Culture in progress     AFB CULTURE STAIN No acid fast bacilli seen.          All pertinent labs from the last 24 hours have been reviewed.    Significant Diagnostics:  I have reviewed all pertinent imaging results/findings within the past 24 hours.    Assessment/Plan:     * Cryptococcal meningitis  57 yo female s/p kidney transplant (2020) on Prograf, admitted with cryptococcal meningitis s/p 2 weeks of amphotericin B and flucytosine. LP x 3 with elevated OP and papilledema on ophthalmology exam. NSGY consulted for consideration of drain placement for elevated ICPs. Pt is currently neurologically stable. Of note, pt is on hep gtt for acute DVT    -Imaging and diagnostics reviewed   -CSF (6/24) OP34, Glu 6, protein 93. Danish stain pos for yeast like organisms.  Cx pos for encapsulated yeast species.     -CSF (6/30): OP35,CP13. G<5, P174, 144 WBC, 3000 RBC, 37 lymph, 59 mono/macro. Repeat CSF w/1:8 cryptococcal antigen, cx w/yeast.   -CSF (7/8): OP27, CP12. G<15,P162, CSF cx pending, gram stain with yeast    -CTH 6/30 with slight enlargement of the temporal horn of right lateral ventricle compared to prior MRI brain 06/23    -CTH 7/8 without significant change  -Ophthalmology following. Last eval 7/7 with stable OU, color plates full. Still 3+ ONH edema  -ID: Pt continued on flucytosine and amphotericin B 7/8. F/u ongoing ID recommendations  -Patient with improvement in headaches. Will hold off on VPS placement, LP today for opening pressure. VPS shunt moved tentatively to next Monday. If pressure  normalized, shunt may not be indicated.    -Heparin gtt held prior to LP.  -We will continue to follow. Please call NSGY with any decline in neuro exam. Would have a low threshold to transfer to ICU with any worsening visual deficits, decline in neuro exam or signs of worsening ICPs    Discussed with POLINA SalmonC  Neurosurgery  [facility] - Telemetry Stepdown

## 2022-07-14 PROBLEM — F41.9 ANXIETY: Status: ACTIVE | Noted: 2022-01-01

## 2022-07-14 NOTE — CLINICAL REVIEW
KT Chart Review      Intake/Output Summary (Last 24 hours) at 7/14/2022 1157  Last data filed at 7/14/2022 0800  Gross per 24 hour   Intake 400 ml   Output 300 ml   Net 100 ml       Vitals:    07/13/22 2018 07/14/22 0107 07/14/22 0555 07/14/22 0722   BP: (!) 154/68 (!) 141/76 (!) 150/80 (!) 191/75   BP Location: Right arm Right arm Right arm Right leg   Patient Position: Sitting Lying Lying Lying   Pulse: 101 94 97 109   Resp: 17 17 17 17   Temp: 98.3 °F (36.8 °C) 98.4 °F (36.9 °C) 98.3 °F (36.8 °C) 98.2 °F (36.8 °C)   TempSrc: Oral Oral Oral Oral   SpO2: 97% (!) 94% 95% (!) 94%   Weight:       Height:           Recent Labs   Lab 07/11/22  0426 07/12/22  0325 07/13/22  0349 07/14/22  0448   * 134* 137 136   K 3.5 3.3* 3.9 3.3*    108 111* 111*   CO2 22* 20* 19* 17*   BUN 27* 22* 17 16   CREATININE 1.4 1.2 1.0 1.1   CALCIUM 8.1* 7.8* 8.1* 8.1*   PHOS 3.7  --   --   --        Immunosuppression Level - 8.0  No change to medication today. Agree with KHCO3.      Roberto Eisenberg Jr.

## 2022-07-14 NOTE — NURSING
Pt and  in bath room.  stating pt had bowel movement and he was cleaning pt. He voiced need for gowns and towels. PCT notified of pt and husbands needs.

## 2022-07-14 NOTE — SUBJECTIVE & OBJECTIVE
Interval History: NAEON. Continues to deny headache. LP with opening pressure of 21 yesterday, will attempt one more LP tomorrow prior to proceeding with shunt. Neuro exam stable. Patient complaining of abdominal discomfort and multiple loose stools.     Medications:  Continuous Infusions:  Scheduled Meds:   acetaminophen  500 mg Oral Q24H    amphotericin B liposome (AMBISOME) IVPB  300 mg Intravenous Q24H    calcitRIOL  0.5 mcg Oral Daily    diphenhydrAMINE  25 mg Oral Q24H    flucytosine  1,500 mg Oral Q6H    multivitamin  1 tablet Oral Daily    NIFEdipine  90 mg Oral Daily    predniSONE  5 mg Oral Daily    sodium bicarbonate  1,300 mg Oral TID    sodium chloride 0.9%  500 mL Intravenous Q24H    tacrolimus  3 mg Oral BID    topiramate  75 mg Oral BID     PRN Meds:acetaminophen, albuterol-ipratropium, bisacodyL, butalbital-acetaminophen-caffeine -40 mg, dextrose 5 %, glucose, glucose, hydrALAZINE, iohexol, loperamide, melatonin, naloxone, ondansetron, oxyCODONE-acetaminophen, sodium chloride 0.9%     Review of Systems  Objective:     Weight: 71.7 kg (158 lb 1.1 oz)  Body mass index is 30.87 kg/m².  Vital Signs (Most Recent):  Temp: 98.2 °F (36.8 °C) (07/14/22 1205)  Pulse: 103 (07/14/22 1205)  Resp: 18 (07/14/22 1205)  BP: (!) 142/65 (07/14/22 1205)  SpO2: 96 % (07/14/22 1205)   Vital Signs (24h Range):  Temp:  [97.7 °F (36.5 °C)-98.4 °F (36.9 °C)] 98.2 °F (36.8 °C)  Pulse:  [] 103  Resp:  [17-18] 18  SpO2:  [94 %-97 %] 96 %  BP: (141-191)/(65-80) 142/65     Date 07/14/22 0700 - 07/15/22 0659   Shift 8202-4761 1435-9353 2917-4964 24 Hour Total   INTAKE   P.O. 100   100   Shift Total(mL/kg) 100(1.4)   100(1.4)   OUTPUT   Shift Total(mL/kg)       Weight (kg) 71.7 71.7 71.7 71.7                        Hemodialysis AV Graft Right forearm (Active)   Site Assessment Clean;Dry;Intact 07/30/20 0745   Patency Present;Thrill;Bruit 07/30/20 0745   Status Deaccessed 07/29/20 2010   Dressing Status Clean;Dry;Intact  10/18/18 0808   Site Condition No complications 07/30/20 0745   Dressing Open to air (None) 07/29/20 2010       Physical Exam    Neurosurgery Physical Exam  Head: normocephalic, atraumatic  Neurologic: Alert and oriented. Thought content appropriate.  GCS: Motor: 6/Verbal: 5/Eyes: 4 GCS Total: 15  Mental Status: Awake, Alert, Oriented x3  Cranial nerves: face symmetric, tongue midline, CN II-XII grossly intact. Hard of hearing   Eyes: pupils equal, round, reactive to light with accomodation, EOMI.   Sensory: intact to light touch throughout  Motor Strength: Moves all extremities spontaneously with good tone.  Full strength upper and lower extremities. No abnormal movements seen.      Strength   Deltoids Triceps Biceps Wrist Extension Wrist Flexion Hand    Upper: R 5/5 5/5 5/5 5/5 5/5 5/5     L 5/5 5/5 5/5 5/5 5/5 5/5       Iliopsoas Quadriceps Knee  Flexion Tibialis  anterior Gastro- cnemius EHL   Lower: R 5/5 5/5 5/5 5/5 5/5 5/5     L 5/5 5/5 5/5 5/5 5/5 5/5      Pronator Drift: no drift noted  Finger-to-nose: Intact bilaterally  Aparicio: absent  Clonus: absent  Significant Labs:  Recent Labs   Lab 07/13/22  0349 07/14/22  0448   * 183*    136   K 3.9 3.3*   * 111*   CO2 19* 17*   BUN 17 16   CREATININE 1.0 1.1   CALCIUM 8.1* 8.1*     Recent Labs   Lab 07/13/22  0349 07/14/22  0448   WBC 5.28 3.81*   HGB 10.1* 11.4*   HCT 31.3* 34.9*   * 115*     Recent Labs   Lab 07/12/22  1256 07/13/22  0822   INR  --  1.1   APTT 25.2  --      Microbiology Results (last 7 days)       Procedure Component Value Units Date/Time    Cryptococcal antigen, CSF [[id number]]  (Abnormal) Collected: 07/13/22 1100    Order Status: Completed Specimen: CSF (Spinal Fluid) from CSF Tap, Tube 3 Updated: 07/14/22 1012     Crypto Ag, CSF Positive >1:256    CSF culture [[id number]] Collected: 07/13/22 1100    Order Status: Completed Specimen: CSF (Spinal Fluid) from CSF Tap, Tube 3 Updated: 07/14/22 0737     CSF CULTURE  No Growth to date     Gram Stain Result Cytospin indicates:      No WBC's      Few yeast      Results called to and read back by: Laura Carey RN  07/13/2022        13:51    AFB Culture & Smear [[id number]] Collected: 07/13/22 1100    Order Status: Sent Specimen: CSF (Spinal Fluid) from CSF Tap, Tube 3 Updated: 07/13/22 1146    Gram stain [[id number]] Collected: 07/13/22 1100    Order Status: Canceled Specimen: CSF (Spinal Fluid) from CSF Tap, Tube 3     CSF culture [[id number]] Collected: 07/08/22 1019    Order Status: Completed Specimen: CSF (Spinal Fluid) from CSF Tap, Tube 2 Updated: 07/13/22 0721     CSF CULTURE No Growth     Gram Stain Result Cytospin indicates: Few yeast      No WBC's      Results called to and read back by: Lyndsay Granados 07/08/2022  13:13    Clostridium difficile EIA [[id number]] Collected: 07/12/22 1751    Order Status: Completed Specimen: Stool Updated: 07/12/22 2249     C. diff Antigen Negative     C difficile Toxins A+B, EIA Negative     Comment: Testing not recommended for children <24 months old.       E. coli 0157 antigen [[id number]] Collected: 07/12/22 1751    Order Status: No result Specimen: Stool Updated: 07/12/22 1752    Stool culture [[id number]] Collected: 07/12/22 1751    Order Status: Sent Specimen: Stool Updated: 07/12/22 1751    AFB Culture & Smear [[id number]] Collected: 07/08/22 1019    Order Status: Completed Specimen: CSF (Spinal Fluid) from CSF Tap, Tube 2 Updated: 07/11/22 1440     AFB Culture & Smear Culture in progress     AFB CULTURE STAIN No acid fast bacilli seen.          All pertinent labs from the last 24 hours have been reviewed.    Significant Diagnostics:  No results found in the last 24 hours.

## 2022-07-14 NOTE — PLAN OF CARE
Patient Relations updated SW and CM regarding spouse's current concerns and issues with patient's care; spouse reportedly upset at this time. SW and CM met with spouse at bedside (patient currently asleep) and provided supportive counseling. Spouse appeared to be less upset after speaking with SW and CM.    Annie Perkins, YVONNE  [facility]  Ext. [phone]

## 2022-07-14 NOTE — NURSING
Pt  upset stating he had to clean the pt up and no one was available to  room to hand him items to clean pt with. Supervisor was called, charge rn notifed primary rn of the  being upset. Primary rn brought  socks and facial tissue paper. Again pt  voiced no needs to primary rn.

## 2022-07-14 NOTE — PLAN OF CARE
Problem: Adult Inpatient Plan of Care  Goal: Plan of Care Review  Outcome: Ongoing, Progressing  Goal: Patient-Specific Goal (Individualized)  Outcome: Ongoing, Progressing  Goal: Absence of Hospital-Acquired Illness or Injury  Outcome: Ongoing, Progressing  Goal: Optimal Comfort and Wellbeing  Outcome: Ongoing, Progressing  Goal: Readiness for Transition of Care  Outcome: Ongoing, Progressing     Problem: Fluid and Electrolyte Imbalance (Acute Kidney Injury/Impairment)  Goal: Fluid and Electrolyte Balance  Outcome: Ongoing, Progressing     Problem: Oral Intake Inadequate (Acute Kidney Injury/Impairment)  Goal: Optimal Nutrition Intake  Outcome: Ongoing, Progressing     Problem: Renal Function Impairment (Acute Kidney Injury/Impairment)  Goal: Effective Renal Function  Outcome: Ongoing, Progressing     Problem: Skin Injury Risk Increased  Goal: Skin Health and Integrity  Outcome: Ongoing, Progressing     Problem: Fall Injury Risk  Goal: Absence of Fall and Fall-Related Injury  Outcome: Ongoing, Progressing     Problem: Infection  Goal: Absence of Infection Signs and Symptoms  Outcome: Ongoing, Progressing     Pt aaox4, no distress noted, very hard of hearing. Family at bedside. For assessment see flowsheet

## 2022-07-15 PROBLEM — R19.7 DIARRHEA: Status: ACTIVE | Noted: 2022-01-01

## 2022-07-15 PROBLEM — E63.9 POOR NUTRITION: Status: ACTIVE | Noted: 2022-01-01

## 2022-07-15 NOTE — PROGRESS NOTES
Pharmacist Renal Dose Adjustment Note    Tala Rivera is a 56 y.o. female being treated with the medication Flucytosine    Patient Data:    Vital Signs (Most Recent):  Temp: 98 °F (36.7 °C) (07/15/22 1137)  Pulse: 95 (07/15/22 1137)  Resp: 20 (07/15/22 1137)  BP: (!) 164/72 (07/15/22 1137)  SpO2: 96 % (07/15/22 1137)   Vital Signs (72h Range):  Temp:  [97.1 °F (36.2 °C)-98.7 °F (37.1 °C)]   Pulse:  []   Resp:  [16-20]   BP: (130-205)/(60-81)   SpO2:  [93 %-100 %]      Recent Labs   Lab 07/13/22  0349 07/14/22  0448 07/15/22  0500   CREATININE 1.0 1.1 1.6*     Serum creatinine: 1.6 mg/dL (H) 07/15/22 0500  Estimated creatinine clearance: 35.5 mL/min (A)    Flucytosine 1500 mg PO Q6h will be changed to flucytosine 1500 mg PO Q12h for CrCl < 40 mL/min    Pharmacist's Name: Julia Yeondam Roh  Pharmacist's Extension: [phone]

## 2022-07-15 NOTE — PROCEDURES
Radiology Post-Procedure Note    Pre Op Diagnosis: Fungal Meningitis    Post Op Diagnosis: Same    Procedure: lumbar puncture    Procedure performed by: Sadie COOPER, Liz Nichols, and Tara Madrid.     Written Informed Consent Obtained: Yes    Specimen Removed: 6 mL CSF    Estimated Blood Loss: Minimal    Opening pressure: 18.5 cm H20  Closing pressure: 10 cm H20    Findings: Following written informed consent and sterile prep and drape, a 22 gauge spinal needle was inserted at L4 - L5 intralaminar space under fluoroscopic surveillance.  6 mL clear CSF removed and sent to the lab for further analysis.  There were no complications.    Patient tolerated procedure well.    Liz Hein MD  Radiology, PGY II

## 2022-07-15 NOTE — ASSESSMENT & PLAN NOTE
- history of intractable headache  - Decrease topamax to 50mg BID   - continue as needed Fioricet, oxycodone/acetaminophen, acetaminophen; headache responding to prns

## 2022-07-15 NOTE — ASSESSMENT & PLAN NOTE
- Pt with intractable headache, facial droop, dysarthria (improved). 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  - Continue Amp B 5/mg kg Q24hrs and flucytosine 1500 mg Q6hrs for treatment.   - On chart review, amphotericin started on 06/24/22- 2wks of treatment 07/08/22  - Repeat LP completed 7/9; gram stain notable for yeast; and amphotericin continued; cultures no growth to date  - Spoke with ID and NSGY- will plan for repeat LP tomorrow, 07/15- will monitor for headache recurrence-  NSGY will re-evaluate for possible VPS on Monday, 07/18  -Transplant ID following- continue with current course- patient signs and symptoms much improved since time of admission, will hold of on steroids- no signs of IRIS, will continue to monitor for need for repeat LP  - KTM following; recommend  500mL normal saline with amphotericin

## 2022-07-15 NOTE — SUBJECTIVE & OBJECTIVE
Interval History: NAEON. Denies headaches. Reports mild abdominal pain. Plan for repeat LP today. Results to assist with decision for VPS placement on Monday.     Medications:  Continuous Infusions:   sodium bicarbonate drip 100 mL/hr at 07/15/22 0938     Scheduled Meds:   acetaminophen  500 mg Oral Q24H    amphotericin B liposome (AMBISOME) IVPB  300 mg Intravenous Q24H    calcitRIOL  0.5 mcg Oral Daily    dicyclomine  10 mg Oral TID    EScitalopram oxalate  10 mg Oral Daily    flucytosine  1,500 mg Oral Q6H    hydrOXYzine HCL  25 mg Oral QHS    multivitamin  1 tablet Oral Daily    NIFEdipine  90 mg Oral Daily    predniSONE  5 mg Oral Daily    sodium bicarbonate  1,300 mg Oral TID    sodium chloride 0.9%  500 mL Intravenous Q24H    [START ON 7/16/2022] tacrolimus  3 mg Oral Daily AM    And    tacrolimus  2 mg Oral Daily PM    topiramate  75 mg Oral BID     PRN Meds:acetaminophen, albuterol-ipratropium, bisacodyL, butalbital-acetaminophen-caffeine -40 mg, dextrose 5 %, glucose, glucose, hydrALAZINE, iohexol, loperamide, melatonin, naloxone, ondansetron, oxyCODONE-acetaminophen, sodium chloride 0.9%     Review of Systems  Objective:     Weight: 71.7 kg (158 lb 1.1 oz)  Body mass index is 30.87 kg/m².  Vital Signs (Most Recent):  Temp: 98 °F (36.7 °C) (07/15/22 1137)  Pulse: 95 (07/15/22 1137)  Resp: 20 (07/15/22 1137)  BP: (!) 164/72 (07/15/22 1137)  SpO2: 96 % (07/15/22 1137)   Vital Signs (24h Range):  Temp:  [97.1 °F (36.2 °C)-98.7 °F (37.1 °C)] 98 °F (36.7 °C)  Pulse:  [] 95  Resp:  [16-20] 20  SpO2:  [93 %-96 %] 96 %  BP: (130-164)/(60-72) 164/72     Date 07/15/22 0700 - 07/16/22 0659   Shift 7758-6498 7584-6732 9075-1535 24 Hour Total   INTAKE   P.O. 150   150   Shift Total(mL/kg) 150(2.1)   150(2.1)   OUTPUT   Shift Total(mL/kg)       Weight (kg) 71.7 71.7 71.7 71.7                        Hemodialysis AV Graft Right forearm (Active)   Site Assessment Clean;Dry;Intact 07/30/20 0745   Patency  Present;Thrill;Bruit 07/30/20 0745   Status Deaccessed 07/29/20 2010   Dressing Status Clean;Dry;Intact 10/18/18 0808   Site Condition No complications 07/30/20 0745   Dressing Open to air (None) 07/29/20 2010     Neurosurgery Physical Exam  Head: normocephalic, atraumatic  Neurologic: Alert and oriented. Thought content appropriate.  GCS: Motor: 6/Verbal: 5/Eyes: 4 GCS Total: 15  Mental Status: Awake, Alert, Oriented x3  Cranial nerves: face symmetric, tongue midline, CN II-XII grossly intact. Hard of hearing   Eyes: pupils equal, round, reactive to light with accomodation, EOMI.   Sensory: intact to light touch throughout  Motor Strength: Moves all extremities spontaneously with good tone.  Grossly full strength upper and lower extremities. No abnormal movements seen.         Pronator Drift: no drift noted  Finger-to-nose: Intact bilaterally  Significant Labs:  Recent Labs   Lab 07/14/22  0448 07/15/22  0500   * 227*    139   K 3.3* 4.1   * 113*   CO2 17* 17*   BUN 16 22*   CREATININE 1.1 1.6*   CALCIUM 8.1* 8.4*     Recent Labs   Lab 07/14/22  0448 07/15/22  0500   WBC 3.81* 2.78*   HGB 11.4* 11.3*   HCT 34.9* 33.8*   * 100*     No results for input(s): LABPT, INR, APTT in the last 48 hours.  Microbiology Results (last 7 days)       Procedure Component Value Units Date/Time    CSF culture [[id number]] Collected: 07/13/22 1100    Order Status: Completed Specimen: CSF (Spinal Fluid) from CSF Tap, Tube 3 Updated: 07/15/22 0722     CSF CULTURE No Growth to date     Gram Stain Result Cytospin indicates:      No WBC's      Few yeast      Results called to and read back by: Laura Carey RN  07/13/2022        13:51    AFB Culture & Smear [[id number]] Collected: 07/13/22 1100    Order Status: Completed Specimen: CSF (Spinal Fluid) from CSF Tap, Tube 3 Updated: 07/14/22 2127     AFB Culture & Smear Culture in progress     AFB CULTURE STAIN No acid fast bacilli seen.    Cryptococcal antigen,  CSF [[id number]]  (Abnormal) Collected: 07/13/22 1100    Order Status: Completed Specimen: CSF (Spinal Fluid) from CSF Tap, Tube 3 Updated: 07/14/22 1012     Crypto Ag, CSF Positive >1:256    Gram stain [[id number]] Collected: 07/13/22 1100    Order Status: Canceled Specimen: CSF (Spinal Fluid) from CSF Tap, Tube 3     CSF culture [[id number]] Collected: 07/08/22 1019    Order Status: Completed Specimen: CSF (Spinal Fluid) from CSF Tap, Tube 2 Updated: 07/13/22 0721     CSF CULTURE No Growth     Gram Stain Result Cytospin indicates: Few yeast      No WBC's      Results called to and read back by: Lyndsay Granados 07/08/2022  13:13    Clostridium difficile EIA [[id number]] Collected: 07/12/22 1751    Order Status: Completed Specimen: Stool Updated: 07/12/22 2249     C. diff Antigen Negative     C difficile Toxins A+B, EIA Negative     Comment: Testing not recommended for children <24 months old.       E. coli 0157 antigen [[id number]] Collected: 07/12/22 1751    Order Status: No result Specimen: Stool Updated: 07/12/22 1752    Stool culture [[id number]] Collected: 07/12/22 1751    Order Status: Sent Specimen: Stool Updated: 07/12/22 1751    AFB Culture & Smear [[id number]] Collected: 07/08/22 1019    Order Status: Completed Specimen: CSF (Spinal Fluid) from CSF Tap, Tube 2 Updated: 07/11/22 1440     AFB Culture & Smear Culture in progress     AFB CULTURE STAIN No acid fast bacilli seen.          All pertinent labs from the last 24 hours have been reviewed.    Significant Diagnostics:  I have reviewed all pertinent imaging results/findings within the past 24 hours.

## 2022-07-15 NOTE — PROGRESS NOTES
[facility] - Tele[facility] Medicine  Progress Note    Patient Name: Tala Rivera  MRN: [id number]  Patient Class: IP- Inpatient   Admission Date: 6/27/2022  Length of Stay: 17 days  Attending Physician: Keke Hicks MD  Primary Care Provider: ANUJA Castellon MD        Subjective:     Principal Problem:Cryptococcal meningitis        HPI:  Mrs. Rivera is a 56yoF w/PMHx of kidney XPL (2020 [facility]) on Prograf and Cellcept admitted to [facility] on 06/22 with intractable HA starting about 2 months earlier and with dizziness, blurred vision, left-sided facial droop and dysarthria starting a few days before admission.       On admission /72, HR 69. Neurologic exam is remarkable for left-sided facial weakness and mild dysarthria.  Labs (6/22) WBC 9.4, Hb 15.9, Plts 224, Na 137, K 3.3, CO2 25, BUN 24, Cr 1.32, AG 14, Glu 182 LFTs WNL. COVID neg     LP (6/24) CSF Glu 6, CSF protein 93. Wallisian stain pos for yeast like organisms.  Spinal fluid culture pos for encapsulated yeast species.       CT chest WO contrast pos for multiple indeterminate soft tissue masses in the left lower lobe with 2 smaller masses in the right lung which may be part of the same process.       On 06/26 patient started on amphotericin B liposomal (275 IV q.d.) and flucytosine (1500 p.o. q.6h) .  Hospitalization c/b the development of acute renal failure.  Cr 1.32 (6/22) > 1.4 (6/26) > 2.02 (6/27).  Cellcept and prednisone have been held.       Transfer requested for higher level of care (KTM and ID).  Transfer diagnosis disseminated cryptococcal infection, TERESA, kidney XPL.    At time of my assessment, pt. Complains of headache which has been persistent for the last several days. She states that it is somewhat relieved by the fioricet and oxycodone that she was receiving at the outside hospital.      Overview/Hospital Course:  KTM, transplant ID, Neurology consulted upon admission. Per KTN, normal saline scheduled  with amphotericin infusions in addition to bicarb drip, prednisone increased, Prograf continued.  Creatinine down-trending and bicarb drip discontinued.  Per Neurology, outside hospital MR MRI reviewed and note that neuro deficits may take a few weeks to fully resolve; recommend limiting Fioricet and narcotics to minimize medication overuse/rebound headaches.  Per Transplant ID, continue ambisome/flucytosine with plan to repeat LP 2 weeks from 6/24.  Due to visual disturbance in the setting of fungal meningitis, ophthalmology consulted.  No infectious nidus found in retina or vitreous, however grade 3 disc edema bilaterally. Exam finding and subjective complaints explained by meningitis and papilledema.  Recommendation for repeating LP to reduce opening pressure per ID and Neurology in agreement.  On 06/29, patient hearing improved.  Remains with intermittent confusion per  at bedside.    CTH performed 06/30- Slight interval enlargement of the temporal horn of the right lateral ventricle since the recent MRI of 06/23/2022.  Appearance suggestive of early/developing hydrocephalus or ventricular entrapment. NSGY consulted. Per NSGY, recommended NCC transfer for further evaluation. Patient underwent repeat LP 06/30- opening pressure of 35cm- closing pressure of 13cm. Patient w/much improvement in signs and symptoms. CSF findings: <5glucose, 174 protein, 144 WBC, 3000 RBC, 37 lymph, 59 mono/macro. Repeat CSF w/1:8 cryptococcal antigen, CSF culture w/yeast. NCC evaluated, but patient with much improvement in signs and symptoms, and so NCC recommended continued observation on the floor. Discussed w/ID- stated to continue to monitor for worsening signs and symptoms- with closing pressure <25cm, no need for serial LP's at this time.     Doppler US performed on LLE- found to have DVT. Started on heparin drip. Planning for repeat LP 07/07.  Heparin drip paused and repeat lumbar puncture completed on 07/07.  Per  Radiology, CSF was clear to gout 16 mL total; opening pressure 27, closing pressure 12. Gram stain notable for few yeast.  Repeat CT head ordered with stable findings.  Neurosurgery consulted; Considering  shunt placement pending clearance of CSF and decrease in CSF protein.  Recommend ongoing serial LPs for now.  Repeat lung bar puncture performed 7/13.  Opening pressure 21, closing pressure 11.       Interval History: Patient was seen and evaluated. Still with some nausea- gagging with meds and food. No vomiting. Still w/diarrhea. Headache resolved. No abd pain, chest pain, sob, cough, vision changes.     Objective:     Vital Signs (Most Recent):  Temp: 97.9 °F (36.6 °C) (07/14/22 2016)  Pulse: 106 (07/14/22 2016)  Resp: 19 (07/14/22 2016)  BP: (!) 149/67 (07/14/22 2016)  SpO2: 95 % (07/14/22 2016)   Vital Signs (24h Range):  Temp:  [97.1 °F (36.2 °C)-98.4 °F (36.9 °C)] 97.9 °F (36.6 °C)  Pulse:  [] 106  Resp:  [16-19] 19  SpO2:  [94 %-96 %] 95 %  BP: (141-191)/(65-80) 149/67     Weight: 71.7 kg (158 lb 1.1 oz)  Body mass index is 30.87 kg/m².    Intake/Output Summary (Last 24 hours) at 7/14/2022 6745  Last data filed at 7/14/2022 1616  Gross per 24 hour   Intake 100 ml   Output --   Net 100 ml      Physical Exam  Gen: in NAD, appears stated age  Neuro: AAOx3, motor, sensory, and strength grossly intact BL  HEENT: NTNC, EOMI, PERRL, MMM  CVS: RRR, no m/r/g; S1/S2 auscultated with no S3 or S4; capillary refill < 2 sec  Resp: lungs CTAB, no w/r/r; no belabored breathing or accessory muscle use appreciated   Abd: BS+ in all 4 quadrants; NTND, soft to palpation; no organomegaly appreciated   Extrem: pulses full, equal, and regular over all 4 extremities; swelling of BL LE, no swelling of BL UE      Significant Labs: All pertinent labs within the past 24 hours have been reviewed.  Blood Culture: No results for input(s): LABBLOO in the last 48 hours.  CBC:   Recent Labs   Lab 07/13/22  0349 07/14/22  0448   WBC  5.28 3.81*   HGB 10.1* 11.4*   HCT 31.3* 34.9*   * 115*     CMP:   Recent Labs   Lab 07/13/22  0349 07/14/22  0448    136   K 3.9 3.3*   * 111*   CO2 19* 17*   * 183*   BUN 17 16   CREATININE 1.0 1.1   CALCIUM 8.1* 8.1*   PROT 4.8* 5.0*   ALBUMIN 2.6* 2.7*   BILITOT 0.6 0.6   ALKPHOS 43* 45*   AST 9* 14   ALT 8* 12   ANIONGAP 7* 8   EGFRNONAA >60.0 57.1*     Prealbumin: No results for input(s): PREALBUMIN in the last 48 hours.  Troponin: No results for input(s): TROPONINI in the last 48 hours.  Urine Culture: No results for input(s): LABURIN in the last 48 hours.  Urine Studies: No results for input(s): COLORU, APPEARANCEUA, PHUR, SPECGRAV, PROTEINUA, GLUCUA, KETONESU, BILIRUBINUA, OCCULTUA, NITRITE, UROBILINOGEN, LEUKOCYTESUR, RBCUA, WBCUA, BACTERIA, SQUAMEPITHEL, HYALINECASTS in the last 48 hours.    Invalid input(s): WRIGHTSUR    Significant Imaging: I have reviewed all pertinent imaging results/findings within the past 24 hours.      Assessment/Plan:      * Cryptococcal meningitis  - Pt with intractable headache, facial droop, dysarthria (improved). 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  - Continue Amp B 5/mg kg Q24hrs and flucytosine 1500 mg Q6hrs for treatment.   - On chart review, amphotericin started on 06/24/22- 2wks of treatment 07/08/22  - Repeat LP completed 7/9; gram stain notable for yeast; and amphotericin continued; cultures no growth to date  - Spoke with ID and NSGY- will plan for repeat LP tomorrow, 07/15- will monitor for headache recurrence-  NSGY will re-evaluate for possible VPS on Monday, 07/18  -Transplant ID following- continue with current course- patient signs and symptoms much improved since time of admission, will hold of on steroids- no signs of IRIS, will continue to monitor for need for repeat LP  - KTM following; recommend  500mL normal saline with amphotericin     Increased intracranial pressure  - Last  "intracranial pressure of 34cm from LP at OSH  - Patient with papilledema, vomiting, AMS  - CTH w/developing hydrocephalus and signs of ventricular entrapment  - NSGY notified and consulted- no acute interventions at this time  - NCC notified- continue monitoring on floor  - S/p LP w/opening pressure of 35cm and closing pressure of 13cm 06/30/22  - S/p LP w/opening pressure of 27cm and closing pressure of 12cm 07/08/22  - Continue to monitor for signs and symptoms of worsening ICP  - Repeat CTH 07/01 relatively unchanged   -repeat CTH 7/8 without significant change  -S/p LP  w/opening pressure of 21cm and closing pressure of 11cm 07/13/22  -neurosurgery consulted and following; tentative  shunt placement 7/18 pending LP 7/13 results      Bilateral papilledema due to raised intracranial pressure  - Ophthalmology evaluation with grade 3 papilledema in both eyes (longer she has elevated CSF pressure, the higher her risk of permanent optic nerve damage and vision change is.  There is a congested appearance to the veins in both eyes secondary to optic nerve congestion and indicates higher risk for ischemic event), 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  -"- 7/7 exam, vision stable OU, color plates full. Still 3+ ONH edema, and we do not expect this to rapidly improve as there is often delay of resolution of ONH edema following improvement in ICP "  - see increased intracranial pressure    Acute renal failure superimposed on stage 3 chronic kidney disease  - Cr 2.2 on day of transfer, has been steadily worsening over last few days (baseline ~1.2)  - KTM consulted, appreciate recs; likely prerenal versus medication related  - US transplant unremarkable for acute changes   - creatinine improved; stable  - Continue sodium bicarb    Anxiety  - Begin lexapro 10mg qday       VTE (venous thromboembolism)  - continue heparin drip for DVT of the LLE  - pause as needed in the setting of procedures      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  - Improving  - See papilledema-- " spoke w/ophthalmology- expect papilledema to improve over the course of 2wks if infection remains well-controlled and ICP remains wnl      Headache  - history of intractable headache  - Will begin topamax wean- wean to 75mg BID today- metabolic acidosis possibly 2/2 topamax  - continue as needed Fioricet, oxycodone/acetaminophen, acetaminophen; headache responding to prns      Hypokalemia  - PO repletion prn to goal      Long-term use of immunosuppressant medication  - Pt with opportunistic infection, see above. Holding mycophenolate   - KTM following, appreciate recs  Continue prograf per levels   Monitor levels to assess for toxicity  Continue prednisone ---> increased to 10mg daily   Hold MMF         S/P kidney transplant  - See TERESA. KTM consulted for further assistance  Kidney transplant 4/5/2020   Underlying ckd stage III  ESRD related to HTN        VTE Risk Mitigation (From admission, onward)         Ordered     heparin 25,000 units in dextrose 5% 250 mL (100 units/mL) infusion HIGH INTENSITY nomogram - [facility]  Continuous        Question Answer Comment   Heparin Infusion Adjustment (DO NOT MODIFY ANSWER) [email or web address] HIGH INTENSITY nomogram for [facility] [email or web address]    Begin at (in units/kg/hr) 18        07/03/22 1205     IP VTE HIGH RISK PATIENT  Once         06/27/22 2202     Place sequential compression device  Until discontinued         06/27/22 2202                Discharge Planning   JULITO: 7/18/2022     Code Status: Full Code   Is the patient medically ready for discharge?: No    Reason for patient still in hospital (select all that apply): Patient trending condition  Discharge Plan A: Home Health                    Keke Hicks MD  Department of Hospital Medicine   [facility] - Telemetry Stepdown

## 2022-07-15 NOTE — PROGRESS NOTES
"[facility] - Telemetry Stepdown  Kidney Transplant  Progress Note      Reason for Follow-up: Reassessment of Kidney Transplant - 4/5/2020  (#1) recipient and management of immunosuppression.    ORGAN: LEFT KIDNEY    Donor Type: Donation after Brain Death    PHS Increased Risk: yes         Subjective:   History of Present Illness:  56 y.o. female with a PMHx of HTN, kidney transplant in 2020, CKD3 admitted to [facility] on 06/22 with intractable HA starting about 2 months earlier and with dizziness, blurred vision, left-sided facial droop and dysarthria starting a few days before admission.    Per documentation   On admission /72, HR 69. Neurologic exam is remarkable for left-sided facial weakness and mild dysarthria.  Cr 1.32   LP (6/24) CSF Glu 6, CSF protein 93.  stain pos for yeast like organisms.  Spinal fluid culture pos for encapsulated yeast species  CT chest WO contrast pos for multiple indeterminate soft tissue masses in the left lower lobe with 2 smaller masses in the right lung which may be part of the same process.       On 06/26 patient started on amphotericin B liposomal (275 IV q.d.) and flucytosine (1500 p.o. q.6h) .  Hospitalization c/b the development of acute renal failure.  Cr 1.32 (6/22) > 1.4 (6/26) > 2.02 (6/27).  Transfer requested for higher level of care (KTM and ID).  Transfer diagnosis disseminated cryptococcal infection, TERESA, kidney XPL       Ms. Rivera is a 56 y.o. year old female who is status post Kidney Transplant - 4/5/2020  (#1).    Her maintenance immunosuppression consists of:   Immunosuppressants (From admission, onward)                Start     Stop Route Frequency Ordered    07/16/22 0800  tacrolimus capsule 3 mg        "And" Linked Group Details    -- Oral Every morning 07/15/22 1058    07/15/22 1800  tacrolimus capsule 2 mg        "And" Linked Group Details    -- Oral Every evening 07/15/22 1058            Hospital Course:  Being treated for crypto " meningitis     Interval History:  Lots of stress for  and patient yesterday. LP today without issue. Patient able to hear me better today. Creatinine worse, but patient urinating well. Ordered fluids this morning.    Past Medical, Surgical, Family, and Social History:   Unchanged from H&P.    Scheduled Meds:   acetaminophen  500 mg Oral Q24H    amphotericin B liposome (AMBISOME) IVPB  300 mg Intravenous Q24H    calcitRIOL  0.5 mcg Oral Daily    EScitalopram oxalate  10 mg Oral Daily    flucytosine  1,500 mg Oral Q12H    heparin (PORCINE)  80 Units/kg (Adjusted) Intravenous Once    hydrOXYzine HCL  25 mg Oral QHS    multivitamin  1 tablet Oral Daily    NIFEdipine  90 mg Oral Daily    predniSONE  5 mg Oral Daily    sodium bicarbonate  1,300 mg Oral TID    sodium chloride 0.9%  500 mL Intravenous Q24H    [START ON 7/16/2022] tacrolimus  3 mg Oral Daily AM    And    tacrolimus  2 mg Oral Daily PM    topiramate  50 mg Oral BID     Continuous Infusions:   heparin (porcine) in D5W 18 Units/kg/hr (07/15/22 1659)    sodium bicarbonate drip 100 mL/hr at 07/15/22 0938     PRN Meds:acetaminophen, albuterol-ipratropium, bisacodyL, butalbital-acetaminophen-caffeine -40 mg, dextrose 5 %, glucose, glucose, heparin (PORCINE), heparin (PORCINE), hydrALAZINE, iohexol, loperamide, melatonin, naloxone, ondansetron, oxyCODONE-acetaminophen, sodium chloride 0.9%    Intake/Output - Last 3 Shifts         07/13 0700 07/14 0659 07/14 0700  07/15 0659 07/15 0700 07/16 0659    P.O. 450 350 250    I.V. (mL/kg)  20 (0.3)     Total Intake(mL/kg) 450 (6.3) 370 (5.2) 250 (3.9)    Urine (mL/kg/hr) 500 (0.3) 450 (0.3)     Stool 300 0     Total Output 800 450     Net -350 -80 +250           Urine Occurrence   2 x    Stool Occurrence 2 x 7 x 1 x             Review of Systems   Constitutional:  Positive for activity change and fatigue.   HENT:  Positive for hearing loss.    Eyes: Negative.    Respiratory: Negative.      Cardiovascular:  Negative for leg swelling.   Gastrointestinal:  Positive for diarrhea.   Endocrine: Negative.    Genitourinary: Negative.    Musculoskeletal: Negative.    Skin: Negative.    Neurological:  Positive for weakness and headaches (7-9/10). Negative for facial asymmetry.   Psychiatric/Behavioral: Negative.      Objective:     Vital Signs (Most Recent):  Temp: 98 °F (36.7 °C) (07/15/22 1549)  Pulse: 100 (07/15/22 1549)  Resp: 18 (07/15/22 1549)  BP: (!) 141/61 (07/15/22 1549)  SpO2: 97 % (07/15/22 1549)   Vital Signs (24h Range):  Temp:  [97.9 °F (36.6 °C)-98.7 °F (37.1 °C)] 98 °F (36.7 °C)  Pulse:  [] 100  Resp:  [17-20] 18  SpO2:  [93 %-97 %] 97 %  BP: (130-164)/(60-72) 141/61     Weight: 63.8 kg (140 lb 10.5 oz)  Height: 5' (152.4 cm)  Body mass index is 27.47 kg/m².    Physical Exam  Constitutional:       General: She is not in acute distress.     Appearance: She is obese. She is ill-appearing.   Eyes:      General: No scleral icterus.     Extraocular Movements: Extraocular movements intact.      Pupils: Pupils are equal, round, and reactive to light.   Cardiovascular:      Rate and Rhythm: Normal rate and regular rhythm.      Pulses: Normal pulses.      Heart sounds: No murmur heard.  Pulmonary:      Effort: Pulmonary effort is normal. No respiratory distress.   Abdominal:      General: There is no distension.      Palpations: Abdomen is soft.      Tenderness: There is no abdominal tenderness.   Musculoskeletal:      Right lower leg: No edema.      Left lower leg: No edema.   Skin:     Coloration: Skin is not jaundiced.   Neurological:      Mental Status: She is alert and oriented to person, place, and time.       Laboratory:  Labs within the past 24 hours have been reviewed.    Diagnostic Results:  None    Assessment/Plan:     * Meningitis due to fungal infection  ID following and is on ampho B and flucytosine   Seen by Neuro and Opthal and now NSGY    VTE (venous thromboembolism)  LLE  "popliteal VTE likely secondary to hospitalization and immobility  On heparin      Acute renal failure superimposed on stage 3 chronic kidney disease  TERESA on underlying CKD stage III   Likely pre-renal or medication related   BL 1.2 to 1.4 mg/dl   Slight increase today likely hemodynamic related with elevated tacro and small volume diarrhea with poor po intake  Given fluids, encourage oral intake    Headache  Likely from increased intracranial pressure  Repeat LP today    Long-term use of immunosuppressant medication  Goal tacro 4-6 trough  Hold MMF  Continue 5  Decrease tacro 2/2       S/P kidney transplant  Kidney transplant 4/5/2020   Underlying ckd stage III  ESRD related to HTN  Continue immunosuppression as per problem "long term use of immunosuppression medication"          Roberto Eisenberg Jr., MD  Kidney Transplant  [facility] - Telemetry Stepdown  "

## 2022-07-15 NOTE — PROGRESS NOTES
[facility] - Telemetry Stepdown  Neurosurgery  Progress Note    Subjective:     History of Present Illness: Mrs. Rivera is a 56yoF w/PMHx of [place] [facility] and Cellcept admitted to [facility] on 06/22 with intractable HA starting about 2 months earlier and with dizziness, blurred vision, left-sided facial droop and dysarthria starting a few days before admission. 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 (6/24) with OP of 34,  stain pos for yeast like organisms and CSF culture pos for encapsulated yeast species. Ophthalmology was consulted for complaint of visual disturbance in the setting of elevated ICP.  No infectious nidus was found in retina or vitreous, however pt was found to have grade 3 palpilledema bilaterally. CTH performed 06/30 showed slight interval enlargement of the temporal horn of the right lateral ventricle since the recent MRI of 06/23/2022.  Repeat LP 06/30- opening pressure of 35cm- closing pressure of 13cm. Patient w/much improvement in signs and symptoms post LP. NCC evaluated, but given improvement in symptoms, NCC recommended continued observation on the floor. Antibiotic end date is today and primary team planning to repeat LP and CTH. Pt currently reports improvement in her HA and her visual symptoms, however continues with hearing loss and intermittent nausea. NSGY consulted for consideration of EVD placement for elevated ICPs.          Post-Op Info:  Procedure(s) (LRB):  INSERTION, SHUNT, VENTRICULOPERITONEAL - right frontal (Right)         Interval History: NAEON. Denies headaches. Reports mild abdominal pain. Plan for repeat LP today. Results to assist with decision for VPS placement on Monday.     Medications:  Continuous Infusions:   sodium bicarbonate drip 100 mL/hr at 07/15/22 0938     Scheduled Meds:   acetaminophen  500 mg Oral Q24H     amphotericin B liposome (AMBISOME) IVPB  300 mg Intravenous Q24H    calcitRIOL  0.5 mcg Oral Daily    dicyclomine  10 mg Oral TID    EScitalopram oxalate  10 mg Oral Daily    flucytosine  1,500 mg Oral Q6H    hydrOXYzine HCL  25 mg Oral QHS    multivitamin  1 tablet Oral Daily    NIFEdipine  90 mg Oral Daily    predniSONE  5 mg Oral Daily    sodium bicarbonate  1,300 mg Oral TID    sodium chloride 0.9%  500 mL Intravenous Q24H    [START ON 7/16/2022] tacrolimus  3 mg Oral Daily AM    And    tacrolimus  2 mg Oral Daily PM    topiramate  75 mg Oral BID     PRN Meds:acetaminophen, albuterol-ipratropium, bisacodyL, butalbital-acetaminophen-caffeine -40 mg, dextrose 5 %, glucose, glucose, hydrALAZINE, iohexol, loperamide, melatonin, naloxone, ondansetron, oxyCODONE-acetaminophen, sodium chloride 0.9%     Review of Systems  Objective:     Weight: 71.7 kg (158 lb 1.1 oz)  Body mass index is 30.87 kg/m².  Vital Signs (Most Recent):  Temp: 98 °F (36.7 °C) (07/15/22 1137)  Pulse: 95 (07/15/22 1137)  Resp: 20 (07/15/22 1137)  BP: (!) 164/72 (07/15/22 1137)  SpO2: 96 % (07/15/22 1137)   Vital Signs (24h Range):  Temp:  [97.1 °F (36.2 °C)-98.7 °F (37.1 °C)] 98 °F (36.7 °C)  Pulse:  [] 95  Resp:  [16-20] 20  SpO2:  [93 %-96 %] 96 %  BP: (130-164)/(60-72) 164/72     Date 07/15/22 0700 - 07/16/22 0659   Shift 4539-0572 4773-6669 2708-2964 24 Hour Total   INTAKE   P.O. 150   150   Shift Total(mL/kg) 150(2.1)   150(2.1)   OUTPUT   Shift Total(mL/kg)       Weight (kg) 71.7 71.7 71.7 71.7                        Hemodialysis AV Graft Right forearm (Active)   Site Assessment Clean;Dry;Intact 07/30/20 0745   Patency Present;Thrill;Bruit 07/30/20 0745   Status Deaccessed 07/29/20 2010   Dressing Status Clean;Dry;Intact 10/18/18 0808   Site Condition No complications 07/30/20 0745   Dressing Open to air (None) 07/29/20 2010     Neurosurgery Physical Exam  Head: normocephalic, atraumatic  Neurologic: Alert and  oriented. Thought content appropriate.  GCS: Motor: 6/Verbal: 5/Eyes: 4 GCS Total: 15  Mental Status: Awake, Alert, Oriented x3  Cranial nerves: face symmetric, tongue midline, CN II-XII grossly intact. Hard of hearing   Eyes: pupils equal, round, reactive to light with accomodation, EOMI.   Sensory: intact to light touch throughout  Motor Strength: Moves all extremities spontaneously with good tone.  Grossly full strength upper and lower extremities. No abnormal movements seen.         Pronator Drift: no drift noted  Finger-to-nose: Intact bilaterally  Significant Labs:  Recent Labs   Lab 07/14/22 0448 07/15/22  0500   * 227*    139   K 3.3* 4.1   * 113*   CO2 17* 17*   BUN 16 22*   CREATININE 1.1 1.6*   CALCIUM 8.1* 8.4*     Recent Labs   Lab 07/14/22  0448 07/15/22  0500   WBC 3.81* 2.78*   HGB 11.4* 11.3*   HCT 34.9* 33.8*   * 100*     No results for input(s): LABPT, INR, APTT in the last 48 hours.  Microbiology Results (last 7 days)       Procedure Component Value Units Date/Time    CSF culture [[id number]] Collected: 07/13/22 1100    Order Status: Completed Specimen: CSF (Spinal Fluid) from CSF Tap, Tube 3 Updated: 07/15/22 0722     CSF CULTURE No Growth to date     Gram Stain Result Cytospin indicates:      No WBC's      Few yeast      Results called to and read back by: Laura Carey RN  07/13/2022        13:51    AFB Culture & Smear [[id number]] Collected: 07/13/22 1100    Order Status: Completed Specimen: CSF (Spinal Fluid) from CSF Tap, Tube 3 Updated: 07/14/22 2127     AFB Culture & Smear Culture in progress     AFB CULTURE STAIN No acid fast bacilli seen.    Cryptococcal antigen, CSF [[id number]]  (Abnormal) Collected: 07/13/22 1100    Order Status: Completed Specimen: CSF (Spinal Fluid) from CSF Tap, Tube 3 Updated: 07/14/22 1012     Crypto Ag, CSF Positive >1:256    Gram stain [[id number]] Collected: 07/13/22 1100    Order Status: Canceled Specimen: CSF (Spinal Fluid) from  CSF Tap, Tube 3     CSF culture [[id number]] Collected: 07/08/22 1019    Order Status: Completed Specimen: CSF (Spinal Fluid) from CSF Tap, Tube 2 Updated: 07/13/22 0721     CSF CULTURE No Growth     Gram Stain Result Cytospin indicates: Few yeast      No WBC's      Results called to and read back by: Lyndsay Granados 07/08/2022  13:13    Clostridium difficile EIA [[id number]] Collected: 07/12/22 1751    Order Status: Completed Specimen: Stool Updated: 07/12/22 2249     C. diff Antigen Negative     C difficile Toxins A+B, EIA Negative     Comment: Testing not recommended for children <24 months old.       E. coli 0157 antigen [[id number]] Collected: 07/12/22 1751    Order Status: No result Specimen: Stool Updated: 07/12/22 1752    Stool culture [[id number]] Collected: 07/12/22 1751    Order Status: Sent Specimen: Stool Updated: 07/12/22 1751    AFB Culture & Smear [[id number]] Collected: 07/08/22 1019    Order Status: Completed Specimen: CSF (Spinal Fluid) from CSF Tap, Tube 2 Updated: 07/11/22 1440     AFB Culture & Smear Culture in progress     AFB CULTURE STAIN No acid fast bacilli seen.          All pertinent labs from the last 24 hours have been reviewed.    Significant Diagnostics:  I have reviewed all pertinent imaging results/findings within the past 24 hours.    Assessment/Plan:     * Cryptococcal meningitis  57 yo female s/p kidney transplant (2020) on Prograf, admitted with cryptococcal meningitis s/p 2 weeks of amphotericin B and flucytosine. LP x 3 with elevated OP and papilledema on ophthalmology exam. NSGY consulted for consideration of drain placement for elevated ICPs. Pt is currently neurologically stable. Of note, pt is on hep gtt for acute DVT    -Imaging and diagnostics reviewed   -CSF (6/24) OP34, Glu 6, protein 93. [place]ian stain pos for yeast like organisms.  Cx pos for encapsulated yeast species.     -CSF (6/30): OP35,CP13. G<5, P174, 144 WBC, 3000 RBC, 37 lymph, 59 mono/macro. Repeat CSF w/1:8  cryptococcal antigen, cx w/yeast.   -CSF (7/8): OP27, CP12. G<15,P162, CSF cx pending, gram stain with yeast    -CSF (7/13): OP21, CP11. CSF Cx w few yeast. Cryptococcal antigen positive.   -CTH 6/30 with slight enlargement of the temporal horn of right lateral ventricle compared to prior MRI brain 06/23    -CTH 7/8 without significant change  -Ophthalmology following. Last eval 7/7 with stable OU, color plates full. Still 3+ ONH edema  -ID: Pt continued on flucytosine and amphotericin B 7/8. F/u ongoing ID recommendations  -Patient with improvement in headaches. LP on 7/13 with opening pressure of 21. Downtrending from prior LPs with opening pressure of 35 and then 27. Will attempt LP today to evaluate pressure. Will monitor for headaches over the weekend. VPS shunt  tentatively on for Monday. If pressure continues to normalize, shunt may not be indicated.    -We will continue to follow. Please call NSGY with any decline in neuro exam. Would have a low threshold to transfer to ICU with any worsening visual deficits, decline in neuro exam or signs of worsening ICPs    Discussed with Dr. Edu Marin PAKandisC  Neurosurgery  [facility] - Telemetry Stepdown

## 2022-07-15 NOTE — ASSESSMENT & PLAN NOTE
TERESA on underlying CKD stage III   Likely pre-renal or medication related   BL 1.2 to 1.4 mg/dl   Slight increase today likely hemodynamic related with elevated tacro and small volume diarrhea with poor po intake  Given fluids, encourage oral intake

## 2022-07-15 NOTE — ASSESSMENT & PLAN NOTE
"- Ophthalmology evaluation with grade 3 papilledema in both eyes (longer she has elevated CSF pressure, the higher her risk of permanent optic nerve damage and vision change is.  There is a congested appearance to the veins in both eyes secondary to optic nerve congestion and indicates higher risk for ischemic event), 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  -"7/7 exam, vision stable OU, color plates full. Still 3+ ONH edema, and we do not expect this to rapidly improve as there is often delay of resolution of ONH edema following improvement in ICP "  - see increased intracranial pressure  "

## 2022-07-15 NOTE — ASSESSMENT & PLAN NOTE
- bentyl can be associated with nausea- will stop today  - topamax can be associated with diarrhea- will continue with wean as patient has not been on 100mg BID for a prolonged period of time  - CT of abd w/o contrast for further evaluation  - No leukocytosis concerning for new infection  - continue loperamide  - c diff negative previously

## 2022-07-15 NOTE — PLAN OF CARE
[facility] - Telemetry Stepdown  Discharge Reassessment    Primary Care Provider: ANUJA Castellon MD    Expected Discharge Date: 7/18/2022    Reassessment (most recent)     Discharge Reassessment - 07/15/22 1512        Discharge Reassessment    Assessment Type Discharge Planning Reassessment     Did the patient's condition or plan change since previous assessment? Yes     Discharge Plan discussed with: Patient;Spouse/sig other     Communicated JULITO with patient/caregiver Yes     Discharge Plan A Home with family     Discharge Plan B Home Health     DME Needed Upon Discharge  other (see comments)   TBD    Discharge Barriers Identified None     Why the patient remains in the hospital Requires continued medical care        Post-Acute Status    Post-Acute Authorization Other     Other Status No Post-Acute Service Needs               Julia Young, RN  Ext [phone]

## 2022-07-15 NOTE — SUBJECTIVE & OBJECTIVE
Interval History: Patient was seen and evaluated. Nausea present- poor PO intake.  Will reach out to nutrition and perform daily weights. Still with diarrhea-  expresses frustration with current hospitalization. Discussed the need for repeat LP today, and then further input via NSGY about whether they  plan to proceed w/VPS vs. Not on Monday, 07/18. Headache resolved. No abd pain, chest pain, sob, cough, vision changes.     Objective:     Vital Signs (Most Recent):  Temp: 98 °F (36.7 °C) (07/15/22 1137)  Pulse: 95 (07/15/22 1137)  Resp: 20 (07/15/22 1137)  BP: (!) 164/72 (07/15/22 1137)  SpO2: 96 % (07/15/22 1137)   Vital Signs (24h Range):  Temp:  [97.1 °F (36.2 °C)-98.7 °F (37.1 °C)] 98 °F (36.7 °C)  Pulse:  [] 95  Resp:  [16-20] 20  SpO2:  [93 %-96 %] 96 %  BP: (130-164)/(60-72) 164/72     Weight: 71.7 kg (158 lb 1.1 oz)  Body mass index is 30.87 kg/m².    Intake/Output Summary (Last 24 hours) at 7/15/2022 1541  Last data filed at 7/15/2022 1230  Gross per 24 hour   Intake 520 ml   Output 450 ml   Net 70 ml      Physical Exam  Gen: in NAD, appears stated age  Neuro: AAOx3, motor, sensory, and strength grossly intact BL  HEENT: NTNC, EOMI, PERRL, MMM  CVS: RRR, systolic murmur appreciated (possibly flow murmur), no rubs/gallops, S1/S2 auscultated with no S3 or S4; capillary refill < 2 sec  Resp: lungs CTAB, no w/r/r; no belabored breathing or accessory muscle use appreciated   Abd: BS+ in all 4 quadrants; NTND, soft to palpation; no organomegaly appreciated   Extrem: pulses full, equal, and regular over all 4 extremities;mild swelling of BL LE, no swelling of BL UE    Significant Labs: All pertinent labs within the past 24 hours have been reviewed.  Blood Culture: No results for input(s): LABBLOO in the last 48 hours.  CBC:   Recent Labs   Lab 07/14/22 0448 07/15/22  0500   WBC 3.81* 2.78*   HGB 11.4* 11.3*   HCT 34.9* 33.8*   * 100*     CMP:   Recent Labs   Lab 07/14/22  0448 07/15/22  0500     139   K 3.3* 4.1   * 113*   CO2 17* 17*   * 227*   BUN 16 22*   CREATININE 1.1 1.6*   CALCIUM 8.1* 8.4*   PROT 5.0* 4.8*   ALBUMIN 2.7* 2.6*   BILITOT 0.6 0.7   ALKPHOS 45* 42*   AST 14 12   ALT 12 8*   ANIONGAP 8 9   EGFRNONAA 57.1* 36.3*     Prealbumin: No results for input(s): PREALBUMIN in the last 48 hours.  Troponin: No results for input(s): TROPONINI in the last 48 hours.  Urine Culture: No results for input(s): LABURIN in the last 48 hours.  Urine Studies: No results for input(s): COLORU, APPEARANCEUA, PHUR, SPECGRAV, PROTEINUA, GLUCUA, KETONESU, BILIRUBINUA, OCCULTUA, NITRITE, UROBILINOGEN, LEUKOCYTESUR, RBCUA, WBCUA, BACTERIA, SQUAMEPITHEL, HYALINECASTS in the last 48 hours.    Invalid input(s): BETITO    Significant Imaging: I have reviewed all pertinent imaging results/findings within the past 24 hours.    KUB:  FINDINGS:  Osseous structures are unremarkable.  There is no bowel distension.  Surgical change present within left abdomen.  No abnormal calcification.     Impression:     As above

## 2022-07-15 NOTE — ASSESSMENT & PLAN NOTE
55 yo female s/p kidney transplant (2020) on Prograf, admitted with cryptococcal meningitis s/p 2 weeks of amphotericin B and flucytosine. LP x 3 with elevated OP and papilledema on ophthalmology exam. NSGY consulted for consideration of drain placement for elevated ICPs. Pt is currently neurologically stable. Of note, pt is on hep gtt for acute DVT    -Imaging and diagnostics reviewed   -CSF (6/24) OP34, Glu 6, protein 93.  stain pos for yeast like organisms.  Cx pos for encapsulated yeast species.     -CSF (6/30): OP35,CP13. G<5, P174, 144 WBC, 3000 RBC, 37 lymph, 59 mono/macro. Repeat CSF w/1:8 cryptococcal antigen, cx w/yeast.   -CSF (7/8): OP27, CP12. G<15,P162, CSF cx pending, gram stain with yeast    -CSF (7/13): OP21, CP11. CSF Cx w few yeast. Cryptococcal antigen positive.   -CTH 6/30 with slight enlargement of the temporal horn of right lateral ventricle compared to prior MRI brain 06/23    -CTH 7/8 without significant change  -Ophthalmology following. Last eval 7/7 with stable OU, color plates full. Still 3+ ONH edema  -ID: Pt continued on flucytosine and amphotericin B 7/8. F/u ongoing ID recommendations  -Patient with improvement in headaches. LP on 7/13 with opening pressure of 21. Downtrending from prior LPs with opening pressure of 35 and then 27. Will attempt LP today to evaluate pressure. Will monitor for headaches over the weekend. VPS shunt  tentatively on for Monday. If pressure continues to normalize, shunt may not be indicated.    -We will continue to follow. Please call NSGY with any decline in neuro exam. Would have a low threshold to transfer to ICU with any worsening visual deficits, decline in neuro exam or signs of worsening ICPs    Discussed with Dr. Sorensen

## 2022-07-15 NOTE — ASSESSMENT & PLAN NOTE
"Requested Prescriptions   Pending Prescriptions Disp Refills     insulin lispro (HUMALOG KWIKPEN) 100 UNIT/ML injection 30 mL 3    Last Written Prescription Date:  07/20/2018  Last Fill Quantity: 30ml,  # refills: 3   Last office visit: 7/20/2018 with prescribing provider:  07/20/2018   Future Office Visit:     Sig: Use 3 units before breakfast, 6 units before lunch, and 16 units before dinner, plus correction of 1 unit per every 50 glucose over 125, averaging 3 for breakfast, 6 for lunch, 16 for dinner.    Short Acting Insulin Protocol Passed    9/7/2018  2:05 PM       Passed - Blood pressure less than 140/90 in past 6 months    BP Readings from Last 3 Encounters:   07/20/18 112/60   08/25/17 114/66   06/17/16 96/58                Passed - LDL on file in past 12 months    Recent Labs   Lab Test  07/20/18   0922   LDL  63            Passed - Microalbumin on file in past 12 months    Recent Labs   Lab Test  07/20/18   0929   MICROL  20   UMALCR  21.10            Passed - Serum creatinine on file in past 12 months    Recent Labs   Lab Test  07/20/18   0922   CR  0.68            Passed - HgbA1C in past 3 or 6 months    If HgbA1C is 8 or greater, it needs to be on file within the past 3 months.  If less than 8, must be on file within the past 6 months.     Recent Labs   Lab Test  07/20/18   0922   A1C  8.3*            Passed - Patient is age 18 or older       Passed - Recent (6 mo) or future (30 days) visit within the authorizing provider's specialty    Patient had office visit in the last 6 months or has a visit in the next 30 days with authorizing provider or within the authorizing provider's specialty.  See \"Patient Info\" tab in inbasket, or \"Choose Columns\" in Meds & Orders section of the refill encounter.              " - history of intractable headache  - Will begin topamax wean- wean to 75mg BID today- metabolic acidosis possibly 2/2 topamax  - continue as needed Fioricet, oxycodone/acetaminophen, acetaminophen; headache responding to prns

## 2022-07-15 NOTE — H&P
Inpatient Radiology Pre-procedure Note    History of Present Illness:  Tala Rivera is a 56 y.o. 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 She is being re-evaluated for opening pressure to determine further neurosurgical intervention. She presents for lumbar puncture under fluoro quidance.    Admission H&P reviewed.  Past Medical History:   Diagnosis Date    Anemia of renal disease     ESRD on dialysis     Dr. Muhammad     Essential hypertension     PD catheter dysfunction 10/18/2018    Secondary hyperparathyroidism of renal origin      Past Surgical History:   Procedure Laterality Date    AV FISTULA PLACEMENT Left     never matured    AV graft Right 2015     SECTION      x3; ; , and     COLONOSCOPY N/A 2020    Procedure: COLONOSCOPY;  Surgeon: Darrell Golden MD;  Location: [facility];  Service: Endoscopy;  Laterality: N/A;    GASTRIC BYPASS  2016    KIDNEY TRANSPLANT N/A 2020    Procedure: TRANSPLANT, KIDNEY;  Surgeon: Megan Garcia MD;  Location: [address];  Service: Transplant;  Laterality: N/A;    KIDNEY TRANSPLANT Right 2020    LAPAROSCOPIC REVISION OF PROCEDURE INVOLVING PERITONEAL DIALYSIS CATHETER N/A 10/18/2018    Procedure: REVISION OF PROCEDURE INVOLVING PERITONEAL DIALYSIS CATHETER, LAPAROSCOPIC;  Surgeon: Hair Jimenez MD;  Location: [facility];  Service: General;  Laterality: N/A;    PERITONEAL CATHETER INSERTION N/A 2018    Procedure: Laparoscopic INSERTION, CATHETER, INTRAPERITONEAL;  Surgeon: Hair Jimenez MD;  Location: [facility];  Service: General;  Laterality: N/A;    PERITONEAL CATHETER REMOVAL N/A 2018    Procedure: REMOVAL, CATHETER, DIALYSIS, PERITONEAL;  Surgeon: Hair Jimenez MD;  Location: [facility];  Service: General;  Laterality: N/A;    WOUND EXPLORATION N/A 2018    Procedure: EXPLORATION,  WOUND-Surgical Site Irrigation;  Surgeon: Hair Jimenez MD;  Location: [facility];  Service: General;  Laterality: N/A;       Review of Systems:   As documented in primary team H&P    Home Meds:   Prior to Admission medications    Medication Sig Start Date End Date Taking? Authorizing Provider   acetaminophen (TYLENOL) 325 MG tablet Take 2 tablets (650 mg total) by mouth every 6 (six) hours as needed (headache). 6/14/22   Celine Cano MD   butalbital-acetaminophen-caffeine -40 mg (FIORICET, ESGIC) -40 mg per tablet Take 1 tablet by mouth daily as needed (migraines). 6/14/22 7/14/22  Celine Cano MD   calcitRIOL (ROCALTROL) 0.25 MCG Cap Take 2 capsules (0.5 mcg total) by mouth once daily. 4/25/22      docusate sodium (COLACE) 100 MG capsule Take 1 capsule (100 mg total) by mouth 3 (three) times daily as needed for Constipation. 4/6/20   Ondina Frye MD   l-methylfolate-b2-b6-b12 (CEREFOLIN) 6-5-50-1 mg Tab Take 1 tablet by mouth once daily. 6/8/22   Lefty Griffith MD   magnesium oxide (MAG-OX) 400 mg (241.3 mg magnesium) tablet Take 1 tablet (400 mg total) by mouth once daily. 6/8/22   Lefty Griffith MD   mycophenolate (CELLCEPT) 250 mg Cap Take 2 capsules (500 mg total) by mouth 2 (two) times daily. 3/28/22 3/28/23  Caity Cagle NP   NIFEdipine (PROCARDIA-XL) 30 MG (OSM) 24 hr tablet TAKE ONE TABLET BY MOUTH TWICE DAILY hold if systolic blood pressure is less than 130 9/15/21   Ondina Frye MD   predniSONE (DELTASONE) 5 MG tablet Take 1 tablet (5 mg total) by mouth once daily. 4/25/22      sodium bicarbonate 650 MG tablet Take 2 tablets (1,300 mg total) by mouth 2 (two) times daily. 4/12/21 4/12/22  Lashonda Ritchie NP   tacrolimus (PROGRAF) 1 MG Cap Take 3 capsules (3 mg total) by mouth every morning AND 2 capsules (2 mg total) every evening. 4/7/22 4/7/23  Ondina Frye MD   topiramate (TOPAMAX) 25 MG tablet Take 1 tablet (25 mg total) by mouth 2 (two) times daily.  6/14/22   Celine Cano MD     Scheduled Meds:    acetaminophen  500 mg Oral Q24H    amphotericin B liposome (AMBISOME) IVPB  300 mg Intravenous Q24H    calcitRIOL  0.5 mcg Oral Daily    dicyclomine  10 mg Oral TID    EScitalopram oxalate  10 mg Oral Daily    flucytosine  1,500 mg Oral Q6H    hydrOXYzine HCL  25 mg Oral QHS    multivitamin  1 tablet Oral Daily    NIFEdipine  90 mg Oral Daily    predniSONE  5 mg Oral Daily    sodium bicarbonate  1,300 mg Oral TID    sodium chloride 0.9%  500 mL Intravenous Q24H    [START ON 7/16/2022] tacrolimus  3 mg Oral Daily AM    And    tacrolimus  2 mg Oral Daily PM    topiramate  75 mg Oral BID     Continuous Infusions:    sodium bicarbonate drip 100 mL/hr at 07/15/22 0938     PRN Meds:acetaminophen, albuterol-ipratropium, bisacodyL, butalbital-acetaminophen-caffeine -40 mg, dextrose 5 %, glucose, glucose, hydrALAZINE, iohexol, loperamide, melatonin, naloxone, ondansetron, oxyCODONE-acetaminophen, sodium chloride 0.9%  Anticoagulants/Antiplatelets: no anticoagulation    Allergies:   Review of patient's allergies indicates:   Allergen Reactions    Sulfa (sulfonamide antibiotics) Hives     Sedation Hx: have not been any systemic reactions    Labs:  Recent Labs   Lab 07/13/22  0822   INR 1.1       Recent Labs   Lab 07/15/22  0500   WBC 2.78*   HGB 11.3*   HCT 33.8*   MCV 93   *      Recent Labs   Lab 07/11/22  0426 07/12/22  0325 07/15/22  0500   *   < > 227*   *   < > 139   K 3.5   < > 4.1      < > 113*   CO2 22*   < > 17*   BUN 27*   < > 22*   CREATININE 1.4   < > 1.6*   CALCIUM 8.1*   < > 8.4*   MG 1.9  --   --    ALT 9*   < > 8*   AST 11   < > 12   ALBUMIN 2.5*   < > 2.6*   BILITOT 0.5   < > 0.7    < > = values in this interval not displayed.         Vitals:  Temp: 98 °F (36.7 °C) (07/15/22 1137)  Pulse: 95 (07/15/22 1137)  Resp: 20 (07/15/22 1137)  BP: (!) 164/72 (07/15/22 1137)  SpO2: 96 % (07/15/22 1137)     Physical  Exam:  General: no acute distress  Mental Status: alert and oriented to person, place and time  HEENT: normocephalic, atraumatic  Chest: unlabored breathing  Heart: regular heart rate  Abdomen: nondistended  Extremity: moves all extremities    Plan:   - Will attempt LP today for opening/closing pressure.    Sedation Plan: Local anesthetic    Liz Hein MD  Radiology, PGY II

## 2022-07-15 NOTE — PLAN OF CARE
Problem: Adult Inpatient Plan of Care  Goal: Plan of Care Review  Outcome: Ongoing, Progressing    Problem: Adult Inpatient Plan of Care  Goal: Patient-Specific Goal (Individualized)  Outcome: Ongoing, Progressing    Problem: Adult Inpatient Plan of Care  Goal: Optimal Comfort and Wellbeing  Outcome: Ongoing, Progressing       POC reviewed with pt. Answered all questions. Pt having intermittent episodes of anxiety when unable to hear what nurses/staff are trying to tell her. She is able to calm down once she is able to understand what care she is getting and what is going on around her. Pt asked for her  one time - nurse offered to call her  but pt denied. Continues to complain of nausea/abd cramping/diarrhea . Denies wanting anything for nausea. PRN diarrhea medication given. Bentyl added to medications to relieve abd cramps but pt states it helps mildly. Bed in lowest position, call light within reach, non skid socks on, bed alarm refused, instructed to call staff prior to getting up - pt verbalized understanding, instructed to use call light for other needs - pt verbalized understanding.

## 2022-07-15 NOTE — ASSESSMENT & PLAN NOTE
- Pt with intractable headache, facial droop, dysarthria (improved). 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  - Continue Amp B 5/mg kg Q24hrs and flucytosine 1500 mg Q6hrs for treatment.   - On chart review, amphotericin started on 06/24/22- 2wks of treatment 07/08/22  - Repeat LP completed 7/9; gram stain notable for yeast; and amphotericin continued; cultures no growth to date  - LP today w/opening pressure of 18.5 and closing pressure of 10-- NSGY will re-evaluate on Monday whether VPS is indicated or not  -Transplant ID following- continue with current course- patient signs and symptoms much improved since time of admission, will hold of on steroids- no signs of IRIS, will continue to monitor for need for repeat LP  - KTM following; recommend 500mL normal saline with amphotericin

## 2022-07-15 NOTE — PROGRESS NOTES
[facility] - Tele[facility] Medicine  Progress Note    Patient Name: Tala Rivera  MRN: [id number]  Patient Class: IP- Inpatient   Admission Date: 6/27/2022  Length of Stay: 18 days  Attending Physician: Keke Hicks MD  Primary Care Provider: ANUJA Castellon MD        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        HPI:  Mrs. Rivera is a 56yoF w/PMHx of kidney XPL (2020 [facility]) on Prograf and Cellcept admitted to [facility] on 06/22 with intractable HA starting about 2 months earlier and with dizziness, blurred vision, left-sided facial droop and dysarthria starting a few days before admission.       On admission /72, HR 69. Neurologic exam is remarkable for left-sided facial weakness and mild dysarthria.  Labs (6/22) WBC 9.4, Hb 15.9, Plts 224, Na 137, K 3.3, CO2 25, BUN 24, Cr 1.32, AG 14, Glu 182 LFTs WNL. COVID neg     LP (6/24) CSF Glu 6, CSF protein 93. [place] stain pos for yeast like organisms.  Spinal fluid culture pos for encapsulated yeast species.       CT chest WO contrast pos for multiple indeterminate soft tissue masses in the left lower lobe with 2 smaller masses in the right lung which may be part of the same process.       On 06/26 patient started on amphotericin B liposomal (275 IV q.d.) and flucytosine (1500 p.o. q.6h) .  Hospitalization c/b the development of acute renal failure.  Cr 1.32 (6/22) > 1.4 (6/26) > 2.02 (6/27).  Cellcept and prednisone have been held.       Transfer requested for higher level of care (KTM and ID).  Transfer diagnosis disseminated cryptococcal infection, TERESA, kidney XPL.    At time of my assessment, pt. Complains of headache which has been persistent for the last several days. She states that it is somewhat relieved by the fioricet and oxycodone that she was receiving at the outside hospital.      Overview/Hospital Course:  KTM, transplant ID, Neurology consulted upon admission. Per KTN, normal saline  scheduled with amphotericin infusions in addition to bicarb drip, prednisone increased, Prograf continued.  Creatinine down-trending and bicarb drip discontinued.  Per Neurology, outside hospital MR MRI reviewed and note that neuro deficits may take a few weeks to fully resolve; recommend limiting Fioricet and narcotics to minimize medication overuse/rebound headaches.  Per Transplant ID, continue ambisome/flucytosine with plan to repeat LP 2 weeks from 6/24.  Due to visual disturbance in the setting of fungal meningitis, ophthalmology consulted.  No infectious nidus found in retina or vitreous, however grade 3 disc edema bilaterally. Exam finding and subjective complaints explained by meningitis and papilledema.  Recommendation for repeating LP to reduce opening pressure per ID and Neurology in agreement.  On 06/29, patient hearing improved.  Remains with intermittent confusion per  at bedside.    CTH performed 06/30- Slight interval enlargement of the temporal horn of the right lateral ventricle since the recent MRI of 06/23/2022.  Appearance suggestive of early/developing hydrocephalus or ventricular entrapment. NSGY consulted. Per NSGY, recommended NCC transfer for further evaluation. Patient underwent repeat LP 06/30- opening pressure of 35cm- closing pressure of 13cm. Patient w/much improvement in signs and symptoms. CSF findings: <5glucose, 174 protein, 144 WBC, 3000 RBC, 37 lymph, 59 mono/macro. Repeat CSF w/1:8 cryptococcal antigen, CSF culture w/yeast. NCC evaluated, but patient with much improvement in signs and symptoms, and so NCC recommended continued observation on the floor. Discussed w/ID- stated to continue to monitor for worsening signs and symptoms- with closing pressure <25cm, no need for serial LP's at this time.     Doppler US performed on LLE- found to have DVT. Started on heparin drip. Planning for repeat LP 07/07.  Heparin drip paused and repeat lumbar puncture completed on 07/07.   Per Radiology, CSF was clear to gout 16 mL total; opening pressure 27, closing pressure 12. Gram stain notable for few yeast.  Repeat CT head ordered with stable findings.  Neurosurgery consulted; Considering  shunt placement pending clearance of CSF and decrease in CSF protein.  Recommend ongoing serial LPs for now.  Repeat lumbar puncture performed 7/13.  Opening pressure 21, closing pressure 11. Final repeat LP 07/15- opening pressure 18.5cm, closing pressure 10.     Patient had persistent abd pain and diarrhea- topamax tapered down. Bentyl held as this could also be associated with nausea. Patient was started on lexapro 10mg qday and hydroxyzine 25mg qhs for additional anti-anxiety medication. Psychology consulted.       Interval History: Patient was seen and evaluated. Nausea present- poor PO intake.  Will reach out to nutrition and perform daily weights. Still with diarrhea-  expresses frustration with current hospitalization. Discussed the need for repeat LP today, and then further input via NSGY about whether they  plan to proceed w/VPS vs. Not on Monday, 07/18. Headache resolved. No abd pain, chest pain, sob, cough, vision changes.     Objective:     Vital Signs (Most Recent):  Temp: 98 °F (36.7 °C) (07/15/22 1137)  Pulse: 95 (07/15/22 1137)  Resp: 20 (07/15/22 1137)  BP: (!) 164/72 (07/15/22 1137)  SpO2: 96 % (07/15/22 1137)   Vital Signs (24h Range):  Temp:  [97.1 °F (36.2 °C)-98.7 °F (37.1 °C)] 98 °F (36.7 °C)  Pulse:  [] 95  Resp:  [16-20] 20  SpO2:  [93 %-96 %] 96 %  BP: (130-164)/(60-72) 164/72     Weight: 71.7 kg (158 lb 1.1 oz)  Body mass index is 30.87 kg/m².    Intake/Output Summary (Last 24 hours) at 7/15/2022 1541  Last data filed at 7/15/2022 1230  Gross per 24 hour   Intake 520 ml   Output 450 ml   Net 70 ml      Physical Exam  Gen: in NAD, appears stated age  Neuro: AAOx3, motor, sensory, and strength grossly intact BL  HEENT: NTNC, EOMI, PERRL, MMM  CVS: RRR, systolic murmur  appreciated (possibly flow murmur), no rubs/gallops, S1/S2 auscultated with no S3 or S4; capillary refill < 2 sec  Resp: lungs CTAB, no w/r/r; no belabored breathing or accessory muscle use appreciated   Abd: BS+ in all 4 quadrants; NTND, soft to palpation; no organomegaly appreciated   Extrem: pulses full, equal, and regular over all 4 extremities;mild swelling of BL LE, no swelling of BL UE    Significant Labs: All pertinent labs within the past 24 hours have been reviewed.  Blood Culture: No results for input(s): LABBLOO in the last 48 hours.  CBC:   Recent Labs   Lab 07/14/22  0448 07/15/22  0500   WBC 3.81* 2.78*   HGB 11.4* 11.3*   HCT 34.9* 33.8*   * 100*     CMP:   Recent Labs   Lab 07/14/22  0448 07/15/22  0500    139   K 3.3* 4.1   * 113*   CO2 17* 17*   * 227*   BUN 16 22*   CREATININE 1.1 1.6*   CALCIUM 8.1* 8.4*   PROT 5.0* 4.8*   ALBUMIN 2.7* 2.6*   BILITOT 0.6 0.7   ALKPHOS 45* 42*   AST 14 12   ALT 12 8*   ANIONGAP 8 9   EGFRNONAA 57.1* 36.3*     Prealbumin: No results for input(s): PREALBUMIN in the last 48 hours.  Troponin: No results for input(s): TROPONINI in the last 48 hours.  Urine Culture: No results for input(s): LABURIN in the last 48 hours.  Urine Studies: No results for input(s): COLORU, APPEARANCEUA, PHUR, SPECGRAV, PROTEINUA, GLUCUA, KETONESU, BILIRUBINUA, OCCULTUA, NITRITE, UROBILINOGEN, LEUKOCYTESUR, RBCUA, WBCUA, BACTERIA, SQUAMEPITHEL, HYALINECASTS in the last 48 hours.    Invalid input(s): WRIGHTSUR    Significant Imaging: I have reviewed all pertinent imaging results/findings within the past 24 hours.    KUB:  FINDINGS:  Osseous structures are unremarkable.  There is no bowel distension.  Surgical change present within left abdomen.  No abnormal calcification.     Impression:     As above         Assessment/Plan:      * Meningitis due to fungal infection  - Pt with intractable headache, facial droop, dysarthria (improved). LP at OSH shows encapsulated  yeast on shahnaz ink stain concerning for fungal meningitis  - Continue Amp B 5/mg kg Q24hrs and flucytosine 1500 mg Q6hrs for treatment.   - On chart review, amphotericin started on 06/24/22- 2wks of treatment 07/08/22  - Repeat LP completed 7/9; gram stain notable for yeast; and amphotericin continued; cultures no growth to date  - LP today w/opening pressure of 18.5 and closing pressure of 10-- NSGY will re-evaluate on Monday whether VPS is indicated or not  -Transplant ID following- continue with current course- patient signs and symptoms much improved since time of admission, will hold of on steroids- no signs of IRIS, will continue to monitor for need for repeat LP  - KTM following; recommend 500mL normal saline with amphotericin     Increased intracranial pressure  - Last intracranial pressure of 34cm from LP at OSH  - Patient with papilledema, vomiting, AMS  - CTH w/developing hydrocephalus and signs of ventricular entrapment  - NSGY notified and consulted- no acute interventions at this time  - NCC notified- continue monitoring on floor  - S/p LP w/opening pressure of 35cm and closing pressure of 13cm 06/30/22  - S/p LP w/opening pressure of 27cm and closing pressure of 12cm 07/08/22  - Continue to monitor for signs and symptoms of worsening ICP  - Repeat CTH 07/01 relatively unchanged   - Repeat CTH 7/8 without significant change  - S/p LP  w/opening pressure of 21cm and closing pressure of 11cm 07/13/22  - S/p LP w/openiing pressure of 18.5cm and closing pressure of 10cm 07/15/22  - neurosurgery consulted and following; tentative  shunt placement 7/18     Bilateral papilledema due to raised intracranial pressure  - Ophthalmology evaluation with grade 3 papilledema in both eyes (longer she has elevated CSF pressure, the higher her risk of permanent optic nerve damage and vision change is.  There is a congested appearance to the veins in both eyes secondary to optic nerve congestion and indicates higher  "risk for ischemic event), 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  -"7/7 exam, vision stable OU, color plates full. Still 3+ ONH edema, and we do not expect this to rapidly improve as there is often delay of resolution of ONH edema following improvement in ICP "  - see increased intracranial pressure    Acute renal failure superimposed on stage 3 chronic kidney disease  - Cr 2.2 on day of transfer, has been steadily worsening over last few days (baseline ~1.2)  - KTM consulted, appreciate recs; likely prerenal versus medication related  - US transplant unremarkable for acute changes   - Creatinine worsened today  - Per KTM, patient started on sodium bicarb drip for metabolic acidosis   - Continue sodium bicarb    Poor nutrition  - poor PO intake 2/2   - Nutrition consulted  - Continue boost supplemental drinks    Diarrhea  - bentyl can be associated with nausea- will stop today  - topamax can be associated with diarrhea- will continue with wean as patient has not been on 100mg BID for a prolonged period of time  - CT of abd w/o contrast for further evaluation  - No leukocytosis concerning for new infection  - continue loperamide  - c diff negative previously    Anxiety  - Continue lexapro 10mg qday   - Hydroxyzine 25mg qhs    VTE (venous thromboembolism)  - continue heparin drip for DVT of the LLE  - pause as needed in the setting of procedures    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  - Improving  - See papilledema-- spoke w/ophthalmology- expect papilledema to improve over the course of 2wks if infection remains well-controlled and ICP remains wnl    Headache  - history of intractable headache  - Decrease topamax to 50mg BID   - continue as needed Fioricet, oxycodone/acetaminophen, acetaminophen; headache responding to prns    Hypokalemia  - PO repletion prn to goal    Long-term use of immunosuppressant medication  - Pt with opportunistic infection, see above. Holding mycophenolate   - KTM following, appreciate recs  Continue prograf per levels   Monitor " levels to assess for toxicity  Continue prednisone ---> increased to 10mg daily   Hold MMF     S/P kidney transplant  - See TERESA. KTM consulted for further assistance  Kidney transplant 4/5/2020   Underlying ckd stage III  ESRD related to HTN    VTE Risk Mitigation (From admission, onward)         Ordered     heparin 25,000 units in dextrose 5% (100 units/ml) IV bolus from bag - ADDITIONAL PRN BOLUS - 60 units/kg  As needed (PRN)        Question:  Heparin Infusion Adjustment (DO NOT MODIFY ANSWER)  Answer:  [email or web address]\Images\Pharmacy\HeparinInfusions\heparin HIGH INTENSITY nomogram for [facility] [id number]    07/15/22 1545     heparin 25,000 units in dextrose 5% (100 units/ml) IV bolus from bag - ADDITIONAL PRN BOLUS - 30 units/kg  As needed (PRN)        Question:  Heparin Infusion Adjustment (DO NOT MODIFY ANSWER)  Answer:  [email or web address]\Images\Pharmacy\HeparinInfusions\heparin HIGH INTENSITY nomogram for [facility] [id number]    07/15/22 1545     heparin 25,000 units in dextrose 5% (100 units/ml) IV bolus from bag INITIAL BOLUS  Once        Question:  Heparin Infusion Adjustment (DO NOT MODIFY ANSWER)  Answer:  [email or web address]\Images\Pharmacy\HeparinInfusions\heparin HIGH INTENSITY nomogram for [facility] [id number]    07/15/22 1545     heparin 25,000 units in dextrose 5% 250 mL (100 units/mL) infusion HIGH INTENSITY nomogram - [facility]  Continuous        Question Answer Comment   Heparin Infusion Adjustment (DO NOT MODIFY ANSWER) [email or web address]\Images\Pharmacy\HeparinInfusions\heparin HIGH INTENSITY nomogram for [facility] [id number]    Begin at (in units/kg/hr) 18        07/15/22 1545     heparin 25,000 units in dextrose 5% 250 mL (100 units/mL) infusion HIGH INTENSITY nomogram - [facility]  Continuous        Question Answer Comment   Heparin Infusion Adjustment (DO NOT MODIFY ANSWER) [email or web address]\Images\Pharmacy\HeparinInfusions\heparin HIGH INTENSITY nomogram for [facility] [id number]    Begin at (in units/kg/hr) 18         07/03/22 1205     IP VTE HIGH RISK PATIENT  Once         06/27/22 2202     Place sequential compression device  Until discontinued         06/27/22 2202                Discharge Planning   JULITO: 7/18/2022     Code Status: Full Code   Is the patient medically ready for discharge?: No    Reason for patient still in hospital (select all that apply): Patient trending condition  Discharge Plan A: Home with family                    Keke Hicks MD  Department of Hospital Medicine   [facility] - Telemetry Stepdown

## 2022-07-15 NOTE — SUBJECTIVE & OBJECTIVE
"  Subjective:   History of Present Illness:  56 y.o. female with a PMHx of HTN, kidney transplant in 2020, CKD3 admitted to [facility] on 06/22 with intractable HA starting about 2 months earlier and with dizziness, blurred vision, left-sided facial droop and dysarthria starting a few days before admission.    Per documentation   On admission /72, HR 69. Neurologic exam is remarkable for left-sided facial weakness and mild dysarthria.  Cr 1.32   LP (6/24) CSF Glu 6, CSF protein 93. [place] stain pos for yeast like organisms.  Spinal fluid culture pos for encapsulated yeast species  CT chest WO contrast pos for multiple indeterminate soft tissue masses in the left lower lobe with 2 smaller masses in the right lung which may be part of the same process.       On 06/26 patient started on amphotericin B liposomal (275 IV q.d.) and flucytosine (1500 p.o. q.6h) .  Hospitalization c/b the development of acute renal failure.  Cr 1.32 (6/22) > 1.4 (6/26) > 2.02 (6/27).  Transfer requested for higher level of care (KTM and ID).  Transfer diagnosis disseminated cryptococcal infection, TERESA, kidney XPL       Ms. Rivera is a 56 y.o. year old female who is status post Kidney Transplant - 4/5/2020  (#1).    Her maintenance immunosuppression consists of:   Immunosuppressants (From admission, onward)                Start     Stop Route Frequency Ordered    07/16/22 0800  tacrolimus capsule 3 mg        "And" Linked Group Details    -- Oral Every morning 07/15/22 1058    07/15/22 1800  tacrolimus capsule 2 mg        "And" Linked Group Details    -- Oral Every evening 07/15/22 1058            Hospital Course:  Being treated for crypto meningitis     Interval History:  Lots of stress for  and patient yesterday. LP today without issue. Patient able to hear me better today. Creatinine worse, but patient urinating well. Ordered fluids this morning.    Past Medical, Surgical, Family, and Social History:   Unchanged from " H&P.    Scheduled Meds:   acetaminophen  500 mg Oral Q24H    amphotericin B liposome (AMBISOME) IVPB  300 mg Intravenous Q24H    calcitRIOL  0.5 mcg Oral Daily    EScitalopram oxalate  10 mg Oral Daily    flucytosine  1,500 mg Oral Q12H    heparin (PORCINE)  80 Units/kg (Adjusted) Intravenous Once    hydrOXYzine HCL  25 mg Oral QHS    multivitamin  1 tablet Oral Daily    NIFEdipine  90 mg Oral Daily    predniSONE  5 mg Oral Daily    sodium bicarbonate  1,300 mg Oral TID    sodium chloride 0.9%  500 mL Intravenous Q24H    [START ON 7/16/2022] tacrolimus  3 mg Oral Daily AM    And    tacrolimus  2 mg Oral Daily PM    topiramate  50 mg Oral BID     Continuous Infusions:   heparin (porcine) in D5W 18 Units/kg/hr (07/15/22 1659)    sodium bicarbonate drip 100 mL/hr at 07/15/22 0938     PRN Meds:acetaminophen, albuterol-ipratropium, bisacodyL, butalbital-acetaminophen-caffeine -40 mg, dextrose 5 %, glucose, glucose, heparin (PORCINE), heparin (PORCINE), hydrALAZINE, iohexol, loperamide, melatonin, naloxone, ondansetron, oxyCODONE-acetaminophen, sodium chloride 0.9%    Intake/Output - Last 3 Shifts         07/13 0700  07/14 0659 07/14 0700  07/15 0659 07/15 0700  07/16 0659    P.O. 450 350 250    I.V. (mL/kg)  20 (0.3)     Total Intake(mL/kg) 450 (6.3) 370 (5.2) 250 (3.9)    Urine (mL/kg/hr) 500 (0.3) 450 (0.3)     Stool 300 0     Total Output 800 450     Net -350 -80 +250           Urine Occurrence   2 x    Stool Occurrence 2 x 7 x 1 x             Review of Systems   Constitutional:  Positive for activity change and fatigue.   HENT:  Positive for hearing loss.    Eyes: Negative.    Respiratory: Negative.     Cardiovascular:  Negative for leg swelling.   Gastrointestinal:  Positive for diarrhea.   Endocrine: Negative.    Genitourinary: Negative.    Musculoskeletal: Negative.    Skin: Negative.    Neurological:  Positive for weakness and headaches (7-9/10). Negative for facial asymmetry.   Psychiatric/Behavioral:  Negative.      Objective:     Vital Signs (Most Recent):  Temp: 98 °F (36.7 °C) (07/15/22 1549)  Pulse: 100 (07/15/22 1549)  Resp: 18 (07/15/22 1549)  BP: (!) 141/61 (07/15/22 1549)  SpO2: 97 % (07/15/22 1549)   Vital Signs (24h Range):  Temp:  [97.9 °F (36.6 °C)-98.7 °F (37.1 °C)] 98 °F (36.7 °C)  Pulse:  [] 100  Resp:  [17-20] 18  SpO2:  [93 %-97 %] 97 %  BP: (130-164)/(60-72) 141/61     Weight: 63.8 kg (140 lb 10.5 oz)  Height: 5' (152.4 cm)  Body mass index is 27.47 kg/m².    Physical Exam  Constitutional:       General: She is not in acute distress.     Appearance: She is obese. She is ill-appearing.   Eyes:      General: No scleral icterus.     Extraocular Movements: Extraocular movements intact.      Pupils: Pupils are equal, round, and reactive to light.   Cardiovascular:      Rate and Rhythm: Normal rate and regular rhythm.      Pulses: Normal pulses.      Heart sounds: No murmur heard.  Pulmonary:      Effort: Pulmonary effort is normal. No respiratory distress.   Abdominal:      General: There is no distension.      Palpations: Abdomen is soft.      Tenderness: There is no abdominal tenderness.   Musculoskeletal:      Right lower leg: No edema.      Left lower leg: No edema.   Skin:     Coloration: Skin is not jaundiced.   Neurological:      Mental Status: She is alert and oriented to person, place, and time.       Laboratory:  Labs within the past 24 hours have been reviewed.    Diagnostic Results:  None

## 2022-07-15 NOTE — ASSESSMENT & PLAN NOTE
- Cr 2.2 on day of transfer, has been steadily worsening over last few days (baseline ~1.2)  - KTM consulted, appreciate recs; likely prerenal versus medication related  - US transplant unremarkable for acute changes   - Creatinine worsened today  - Per KTM, patient started on sodium bicarb drip for metabolic acidosis   - Continue sodium bicarb

## 2022-07-15 NOTE — PLAN OF CARE
Problem: Adult Inpatient Plan of Care  Goal: Absence of Hospital-Acquired Illness or Injury  Outcome: Ongoing, Progressing  Intervention: Identify and Manage Fall Risk  Flowsheets (Taken 7/15/2022 1715)  Safety Promotion/Fall Prevention:   assistive device/personal item within reach   side rails raised x 2   lighting adjusted   medications reviewed  Plan of care was reviewed with the pt. AAOx4. VSS. PRN nausea and antidiarrhea meds were given. Intake and output was documented. XR of the ABD and Lumbar puncture was done. Heparin gtt was started. Safety precautions were in placed.

## 2022-07-15 NOTE — ASSESSMENT & PLAN NOTE
- Last intracranial pressure of 34cm from LP at OSH  - Patient with papilledema, vomiting, AMS  - CTH w/developing hydrocephalus and signs of ventricular entrapment  - NSGY notified and consulted- no acute interventions at this time  - NCC notified- continue monitoring on floor  - S/p LP w/opening pressure of 35cm and closing pressure of 13cm 06/30/22  - S/p LP w/opening pressure of 27cm and closing pressure of 12cm 07/08/22  - Continue to monitor for signs and symptoms of worsening ICP  - Repeat CTH 07/01 relatively unchanged   - Repeat CTH 7/8 without significant change  - S/p LP  w/opening pressure of 21cm and closing pressure of 11cm 07/13/22  - S/p LP w/openiing pressure of 18.5cm and closing pressure of 10cm 07/15/22  - neurosurgery consulted and following; tentative  shunt placement 7/18

## 2022-07-16 NOTE — SUBJECTIVE & OBJECTIVE
Interval History: Patient was seen and evaluated. Poor PO intake, nausea, vomiting- discussed with  and nephrology- starting PPN today. No fevers in the past 24hrs. Still with some diarrhea. 200mL UOP in the past 24hrs. No chest pain, sob, cough, headache, vision changes.     Objective:     Vital Signs (Most Recent):  Temp: 97.7 °F (36.5 °C) (07/16/22 1533)  Pulse: 101 (07/16/22 1533)  Resp: 16 (07/16/22 1533)  BP: (!) 149/86 (07/16/22 1533)  SpO2: 98 % (07/16/22 1533)   Vital Signs (24h Range):  Temp:  [96 °F (35.6 °C)-99 °F (37.2 °C)] 97.7 °F (36.5 °C)  Pulse:  [] 101  Resp:  [15-18] 16  SpO2:  [92 %-99 %] 98 %  BP: (124-149)/(58-93) 149/86     Weight: 63.8 kg (140 lb 10.5 oz)  Body mass index is 27.47 kg/m².    Intake/Output Summary (Last 24 hours) at 7/16/2022 1559  Last data filed at 7/16/2022 1325  Gross per 24 hour   Intake 150 ml   Output 200 ml   Net -50 ml      Physical Exam  Gen: in NAD, appears stated age  Neuro: AAOx3, motor, sensory, and strength grossly intact BL  HEENT: NTNC, EOMI, PERRL, MMM  CVS: RRR, systolic murmur appreciated (possibly flow murmur), no rubs/gallops, S1/S2 auscultated with no S3 or S4; capillary refill < 2 sec  Resp: lungs CTAB, no w/r/r; no belabored breathing or accessory muscle use appreciated   Abd: BS+ in all 4 quadrants; NTND, soft to palpation; no organomegaly appreciated   Extrem: pulses full, equal, and regular over all 4 extremities;no swelling of BL UE or LE- BL LE wrapped in ACE wrap.      Significant Labs: All pertinent labs within the past 24 hours have been reviewed.  Blood Culture: No results for input(s): LABBLOO in the last 48 hours.  CBC:   Recent Labs   Lab 07/15/22  0500 07/15/22  1611 07/16/22  0517   WBC 2.78* 2.81* 2.16*  2.16*   HGB 11.3* 11.4* 10.6*  10.6*   HCT 33.8* 34.8* 31.7*  31.7*   * 110* 96*  96*     CMP:   Recent Labs   Lab 07/15/22  0500 07/16/22  0517    136   K 4.1 3.2*   * 108   CO2 17* 20*   * 217*    BUN 22* 30*   CREATININE 1.6* 1.5*   CALCIUM 8.4* 7.9*   PROT 4.8* 4.3*   ALBUMIN 2.6* 2.3*   BILITOT 0.7 0.6   ALKPHOS 42* 39*   AST 12 10   ALT 8* 7*   ANIONGAP 9 8   EGFRNONAA 36.3* 39.2*     Prealbumin: No results for input(s): PREALBUMIN in the last 48 hours.  Troponin: No results for input(s): TROPONINI in the last 48 hours.  Urine Culture: No results for input(s): LABURIN in the last 48 hours.  Urine Studies: No results for input(s): COLORU, APPEARANCEUA, PHUR, SPECGRAV, PROTEINUA, GLUCUA, KETONESU, BILIRUBINUA, OCCULTUA, NITRITE, UROBILINOGEN, LEUKOCYTESUR, RBCUA, WBCUA, BACTERIA, SQUAMEPITHEL, HYALINECASTS in the last 48 hours.    Invalid input(s): WRIGHTSUR    Significant Imaging: I have reviewed all pertinent imaging results/findings within the past 24 hours.    KUB:  FINDINGS:  Osseous structures are unremarkable.  There is no bowel distension.  Surgical change present within left abdomen.  No abnormal calcification.     Impression:     As above    CT A/P:  FINDINGS:  CHEST:     Lungs/Pleura: Unchanged appearance of multiple solid pulmonary nodules within the left lower lobe compared to CT abdomen pelvis 07/12/2022.  The largest nodule measures 2.4 cm (series 2, image 13).  2 mm nodule right middle lobe series 2, image 1 similar.  No focal consolidation.  No pneumothorax.  No pleural effusion.     Heart: Normal size.  New small pericardial effusion.     Thoracic soft tissues: Unremarkable     ABDOMEN:     Liver: Enlarged measuring 20.6 cm.  No focal hepatic abnormality.     Gallbladder/Bile ducts: Gallbladder is distended without inflammatory changes.  No intrahepatic or extrahepatic biliary ductal dilatation.     Spleen:Unremarkable.     Stomach: Postoperative changes of the stomach, gastric bypass.     Pancreas: No mass or peripancreatic fat stranding.     Adrenals: Unremarkable.     Renal/Ureters: Native kidneys are atrophic.  Left renal simple cyst.  No hydronephrosis.  The ureters are normal in  course and caliber. The urinary bladder decompressed without focal abnormality.     Transplant kidney within the right lower quadrant.  No hydronephrosis.     Reproductive: Uterus is unremarkable.  No adnexal mass.     Bowel: Loops of small bowel show no evidence of obstruction or inflammation.  Postop changes of a distal segment of the small bowel within the left lower quadrant.  Loops of large bowel are partially distended with liquid stool.  Majority the colon is somewhat featureless.  No evidence of obstruction.  The appendix is not confidently visualized; however, no inflammatory changes in the expected location of the appendix.     Peritoneum: No free fluid.  No intraperitoneal free air.     Lymph Nodes: No pathologic blessing enlargement in the abdomen or pelvis.     Vasculature: Abdominal aorta is normal in course and caliber.  Mild calcifications of the abdominal aorta.     Bones: Degenerative changes of the spine, including diffuse idiopathic skeletal hyperostosis of the thoracic spine.  No acute fractures or osseous destructive lesions.     Soft Tissues: Soft tissue thickening of the subcutaneous tissues of the anterior abdomen, adjacent prior surgery.     Impression:     Liquid stool throughout the colon.  Colon is somewhat featureless suggesting chronic inflammatory process.     Hepatomegaly.     New small pericardial effusion.     Unchanged appearance of lower lobe pulmonary nodules as detailed above.

## 2022-07-16 NOTE — SUBJECTIVE & OBJECTIVE
Interval History: pt seen today for follow up, no events, family notes decreased PO intake. Pt still w/ minimal hearing. Downtrending cell counts on CBC, likely 2/2 flucytosine. TERESA w/ Cr 1.5 today, ? Ampho vs. Alternative cause (primary team investigating.    Review of Systems   HENT:  Positive for hearing loss.    Allergic/Immunologic: Positive for immunocompromised state.   Objective:     Vital Signs (Most Recent):  Temp: 96 °F (35.6 °C) (07/16/22 1108)  Pulse: (!) 120 (07/16/22 1108)  Resp: 16 (07/16/22 1108)  BP: (!) 135/93 (07/16/22 1108)  SpO2: 99 % (07/16/22 1108)   Vital Signs (24h Range):  Temp:  [96 °F (35.6 °C)-99 °F (37.2 °C)] 96 °F (35.6 °C)  Pulse:  [] 120  Resp:  [15-18] 16  SpO2:  [92 %-99 %] 99 %  BP: (124-141)/(58-93) 135/93     Weight: 63.8 kg (140 lb 10.5 oz)  Body mass index is 27.47 kg/m².    Estimated Creatinine Clearance: 35.7 mL/min (A) (based on SCr of 1.5 mg/dL (H)).    Physical Exam  Constitutional:       General: She is not in acute distress.     Appearance: Normal appearance. She is well-developed. She is not diaphoretic.   HENT:      Head: Normocephalic and atraumatic.      Right Ear: External ear normal.      Left Ear: External ear normal.      Nose: Nose normal.   Eyes:      General: No scleral icterus.        Right eye: No discharge.         Left eye: No discharge.      Extraocular Movements: Extraocular movements intact.      Conjunctiva/sclera: Conjunctivae normal.   Pulmonary:      Effort: Pulmonary effort is normal. No respiratory distress.      Breath sounds: No stridor.   Musculoskeletal:         General: Normal range of motion.   Skin:     Findings: No erythema or rash.   Neurological:      Mental Status: She is alert and oriented to person, place, and time. Mental status is at baseline.      Cranial Nerves: No cranial nerve deficit.      Comments: Hearing loss       Significant Labs: CBC:   Recent Labs   Lab 07/15/22  0500 07/15/22  1611 07/16/22  0517   WBC 2.78*  2.81* 2.16*  2.16*   HGB 11.3* 11.4* 10.6*  10.6*   HCT 33.8* 34.8* 31.7*  31.7*   * 110* 96*  96*     CMP:   Recent Labs   Lab 07/15/22  0500 07/16/22  0517    136   K 4.1 3.2*   * 108   CO2 17* 20*   * 217*   BUN 22* 30*   CREATININE 1.6* 1.5*   CALCIUM 8.4* 7.9*   PROT 4.8* 4.3*   ALBUMIN 2.6* 2.3*   BILITOT 0.7 0.6   ALKPHOS 42* 39*   AST 12 10   ALT 8* 7*   ANIONGAP 9 8   EGFRNONAA 36.3* 39.2*       Significant Imaging: I have reviewed all pertinent imaging results/findings within the past 24 hours.

## 2022-07-16 NOTE — PROGRESS NOTES
[facility] - Telemetry Stepdown  Neurosurgery  Progress Note    Subjective:     History of Present Illness: Mrs. Rivera is a 56yoF w/[place]x [place] [facility] and Cellcept admitted to [facility] on 06/22 with intractable HA starting about 2 months earlier and with dizziness, blurred vision, left-sided facial droop and dysarthria starting a few days before admission. 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 (6/24) with OP of 34,  stain pos for yeast like organisms and CSF culture pos for encapsulated yeast species. Ophthalmology was consulted for complaint of visual disturbance in the setting of elevated ICP.  No infectious nidus was found in retina or vitreous, however pt was found to have grade 3 palpilledema bilaterally. CTH performed 06/30 showed slight interval enlargement of the temporal horn of the right lateral ventricle since the recent MRI of 06/23/2022.  Repeat LP 06/30- opening pressure of 35cm- closing pressure of 13cm. Patient w/much improvement in signs and symptoms post LP. NCC evaluated, but given improvement in symptoms, NCC recommended continued observation on the floor. Antibiotic end date is today and primary team planning to repeat LP and CTH. Pt currently reports improvement in her HA and her visual symptoms, however continues with hearing loss and intermittent nausea. NSGY consulted for consideration of EVD placement for elevated ICPs.          Post-Op Info:  Procedure(s) (LRB):  INSERTION, SHUNT, VENTRICULOPERITONEAL - right frontal (Right)         Interval History: 7/16: patient's  denied any headaches, NAEON. Said patient slept all through the night soundly    Medications:  Continuous Infusions:   heparin (porcine) in D5W 22 Units/kg/hr (07/16/22 0911)    sodium bicarbonate drip 100 mL/hr at 07/16/22 1029    Standard [employer] ADULT TPN  for patient with NO electrolyte abnormality or NO renal dysfunction (PERIPHERAL)       Scheduled Meds:   acetaminophen  500 mg Oral Q24H    calcitRIOL  0.5 mcg Oral Daily    EScitalopram oxalate  10 mg Oral Daily    fluconazole  400 mg Oral Daily    hydrOXYzine HCL  25 mg Oral QHS    multivitamin  1 tablet Oral Daily    NIFEdipine  90 mg Oral Daily    potassium chloride  20 mEq Oral Once    predniSONE  5 mg Oral Daily    sodium bicarbonate  1,300 mg Oral TID    topiramate  50 mg Oral BID     PRN Meds:acetaminophen, albuterol-ipratropium, bisacodyL, dextrose 5 %, glucose, glucose, heparin (PORCINE), heparin (PORCINE), iohexol, loperamide, melatonin, naloxone, ondansetron, oxyCODONE-acetaminophen, sodium chloride 0.9%     Review of Systems  Objective:     Weight: 63.8 kg (140 lb 10.5 oz)  Body mass index is 27.47 kg/m².  Vital Signs (Most Recent):  Temp: 96 °F (35.6 °C) (07/16/22 1108)  Pulse: (!) 120 (07/16/22 1108)  Resp: 16 (07/16/22 1108)  BP: (!) 135/93 (07/16/22 1108)  SpO2: 99 % (07/16/22 1108)   Vital Signs (24h Range):  Temp:  [96 °F (35.6 °C)-99 °F (37.2 °C)] 96 °F (35.6 °C)  Pulse:  [] 120  Resp:  [15-18] 16  SpO2:  [92 %-99 %] 99 %  BP: (124-141)/(58-93) 135/93     Date 07/16/22 0700 - 07/17/22 0659   Shift 3767-1222 7227-4547 4029-9295 24 Hour Total   INTAKE   P.O. 0   0   Shift Total(mL/kg) 0(0)   0(0)   OUTPUT   Shift Total(mL/kg)       Weight (kg) 63.8 63.8 63.8 63.8                        Hemodialysis AV Graft Right forearm (Active)   Site Assessment Clean;Dry;Intact 07/30/20 0745   Patency Present;Thrill;Bruit 07/30/20 0745   Status Deaccessed 07/29/20 2010   Dressing Status Clean;Dry;Intact 10/18/18 0808   Site Condition No complications 07/30/20 0745   Dressing Open to air (None) 07/29/20 2010       Physical Exam    Neurosurgery Physical Exam    Head: normocephalic, atraumatic  Neurologic: Alert and oriented. Thought content appropriate.  GCS: Motor: 6/Verbal: 5/Eyes: 4 GCS Total:  15  Mental Status: Awake, Alert, Oriented x3  Cranial nerves: face symmetric, tongue midline, CN II-XII grossly intact. Hard of hearing   Eyes: pupils equal, round, reactive to light with accomodation, EOMI.   Sensory: intact to light touch throughout  Motor Strength: Moves all extremities spontaneously with good tone.  Grossly full strength upper and lower extremities. No abnormal movements seen.         Pronator Drift: no drift noted  Finger-to-nose: Intact bilaterally    Significant Labs:  Recent Labs   Lab 07/15/22  0500 07/16/22  0517   * 217*    136   K 4.1 3.2*   * 108   CO2 17* 20*   BUN 22* 30*   CREATININE 1.6* 1.5*   CALCIUM 8.4* 7.9*     Recent Labs   Lab 07/15/22  0500 07/15/22  1611 07/16/22  0517   WBC 2.78* 2.81* 2.16*  2.16*   HGB 11.3* 11.4* 10.6*  10.6*   HCT 33.8* 34.8* 31.7*  31.7*   * 110* 96*  96*     Recent Labs   Lab 07/15/22  1611 07/15/22  2256 07/16/22  0655   INR 1.3*  --   --    APTT 25.8 37.2* 38.5*     Microbiology Results (last 7 days)       Procedure Component Value Units Date/Time    CSF culture [[id number]] Collected: 07/15/22 1508    Order Status: Completed Specimen: CSF (Spinal Fluid) from CSF Tap, Tube 3 Updated: 07/16/22 0721     CSF CULTURE No Growth to date     Gram Stain Result Cytospin indicates:      No WBC's      Few  yeast      Results called to and read back by:Diana Ann RN 07/15/2022  17:48    CSF culture [[id number]] Collected: 07/13/22 1100    Order Status: Completed Specimen: CSF (Spinal Fluid) from CSF Tap, Tube 3 Updated: 07/16/22 0715     CSF CULTURE No Growth to date     Gram Stain Result Cytospin indicates:      No WBC's      Few yeast      Results called to and read back by: Laura Carey RN  07/13/2022        13:51    AFB Culture & Smear [[id number]] Collected: 07/15/22 1508    Order Status: Sent Specimen: CSF (Spinal Fluid) from CSF Tap, Tube 3 Updated: 07/15/22 1605    Gram stain [[id number]] Collected: 07/15/22 1504     Order Status: Canceled Specimen: CSF (Spinal Fluid) from CSF Tap, Tube 3     AFB Culture & Smear [[id number]] Collected: 07/13/22 1100    Order Status: Completed Specimen: CSF (Spinal Fluid) from CSF Tap, Tube 3 Updated: 07/14/22 2127     AFB Culture & Smear Culture in progress     AFB CULTURE STAIN No acid fast bacilli seen.    Cryptococcal antigen, CSF [[id number]]  (Abnormal) Collected: 07/13/22 1100    Order Status: Completed Specimen: CSF (Spinal Fluid) from CSF Tap, Tube 3 Updated: 07/14/22 1012     Crypto Ag, CSF Positive >1:256    Gram stain [[id number]] Collected: 07/13/22 1100    Order Status: Canceled Specimen: CSF (Spinal Fluid) from CSF Tap, Tube 3     CSF culture [[id number]] Collected: 07/08/22 1019    Order Status: Completed Specimen: CSF (Spinal Fluid) from CSF Tap, Tube 2 Updated: 07/13/22 0721     CSF CULTURE No Growth     Gram Stain Result Cytospin indicates: Few yeast      No WBC's      Results called to and read back by: Lyndsay Granados 07/08/2022  13:13    Clostridium difficile EIA [[id number]] Collected: 07/12/22 1751    Order Status: Completed Specimen: Stool Updated: 07/12/22 2249     C. diff Antigen Negative     C difficile Toxins A+B, EIA Negative     Comment: Testing not recommended for children <24 months old.       E. coli 0157 antigen [[id number]] Collected: 07/12/22 1751    Order Status: No result Specimen: Stool Updated: 07/12/22 1752    Stool culture [[id number]] Collected: 07/12/22 1751    Order Status: Sent Specimen: Stool Updated: 07/12/22 1751    AFB Culture & Smear [[id number]] Collected: 07/08/22 1019    Order Status: Completed Specimen: CSF (Spinal Fluid) from CSF Tap, Tube 2 Updated: 07/11/22 1440     AFB Culture & Smear Culture in progress     AFB CULTURE STAIN No acid fast bacilli seen.          All pertinent labs from the last 24 hours have been reviewed.    Significant Diagnostics:  I have reviewed and interpreted all pertinent imaging results/findings within the past 24  hours.  US Transplant Kidney With Doppler    Result Date: 7/16/2022  Satisfactory sonographic appearance of the renal transplant, noting improvement in all renal arterial resistive indices since the prior ultrasound 06/28/2022. Electronically signed by resident: Dayday Thorpe Date:    07/16/2022 Time:    10:19 Electronically signed by: Abiel Pelaez MD Date:    07/16/2022 Time:    11:44    FL Lumbar Puncture (xpd)    Result Date: 7/15/2022  Technically successful lumbar puncture under fluoroscopic guidance. The opening pressure was 18.5 cm H2O. The closing pressure was 10 cm H2O. Electronically signed by resident: Liz Hein Date:    07/15/2022 Time:    16:09 Electronically signed by: Simone Noel MD Date:    07/15/2022 Time:    17:23       Assessment/Plan:     * Meningitis due to fungal infection  57 yo female s/p kidney transplant (2020) on Prograf, admitted with cryptococcal meningitis s/p 2 weeks of amphotericin B and flucytosine. LP x 3 with elevated OP and papilledema on ophthalmology exam. NSGY consulted for consideration of drain placement for elevated ICPs. Pt is currently neurologically stable. Of note, pt is on hep gtt for acute DVT    -Imaging and diagnostics reviewed   -CSF (6/24) OP34, Glu 6, protein 93. Danish stain pos for yeast like organisms.  Cx pos for encapsulated yeast species.     -CSF (6/30): OP35,CP13. G<5, P174, 144 WBC, 3000 RBC, 37 lymph, 59 mono/macro. Repeat CSF w/1:8 cryptococcal antigen, cx w/yeast.   -CSF (7/8): OP27, CP12. G<15,P162, CSF cx pending, gram stain with yeast    -CSF (7/13): OP21, CP11. CSF Cx w few yeast. Cryptococcal antigen positive.   -CTH 6/30 with slight enlargement of the temporal horn of right lateral ventricle compared to prior MRI brain 06/23    -CTH 7/8 without significant change  -Ophthalmology following. Last eval 7/7 with stable OU, color plates full. Still 3+ ONH edema  -ID: Pt continued on flucytosine and amphotericin B 7/8. F/u ongoing ID  recommendations  -Patient with improvement in headaches. LP on 7/13 with opening pressure of 21. Downtrending from prior LPs with opening pressure of 35 and then 27. LP 7/15 OP 18.5, CP 10. Will monitor for headaches over the weekend. VPS shunt  tentatively on for Monday. If pressure continues to normalize, shunt may not be indicated.    -We will continue to follow. Please call NSGY with any decline in neuro exam. Would have a low threshold to transfer to ICU with any worsening visual deficits, decline in neuro exam or signs of worsening ICPs    Discussed with Dr. Edu Gupta MD  Neurosurgery  [facility] - Telemetry Stepdown

## 2022-07-16 NOTE — ASSESSMENT & PLAN NOTE
55 yo female s/p kidney transplant (2020) on Prograf, admitted with cryptococcal meningitis s/p 2 weeks of amphotericin B and flucytosine. LP x 3 with elevated OP and papilledema on ophthalmology exam. NSGY consulted for consideration of drain placement for elevated ICPs. Pt is currently neurologically stable. Of note, pt is on hep gtt for acute DVT    -Imaging and diagnostics reviewed   -CSF (6/24) OP34, Glu 6, protein 93.  stain pos for yeast like organisms.  Cx pos for encapsulated yeast species.     -CSF (6/30): OP35,CP13. G<5, P174, 144 WBC, 3000 RBC, 37 lymph, 59 mono/macro. Repeat CSF w/1:8 cryptococcal antigen, cx w/yeast.   -CSF (7/8): OP27, CP12. G<15,P162, CSF cx pending, gram stain with yeast    -CSF (7/13): OP21, CP11. CSF Cx w few yeast. Cryptococcal antigen positive.   -CTH 6/30 with slight enlargement of the temporal horn of right lateral ventricle compared to prior MRI brain 06/23    -CTH 7/8 without significant change  -Ophthalmology following. Last eval 7/7 with stable OU, color plates full. Still 3+ ONH edema  -ID: Pt continued on flucytosine and amphotericin B 7/8. F/u ongoing ID recommendations  -Patient with improvement in headaches. LP on 7/13 with opening pressure of 21. Downtrending from prior LPs with opening pressure of 35 and then 27. LP 7/15 OP 18.5, CP 10. Will monitor for headaches over the weekend. VPS shunt  tentatively on for Monday. If pressure continues to normalize, shunt may not be indicated.    -We will continue to follow. Please call NSGY with any decline in neuro exam. Would have a low threshold to transfer to ICU with any worsening visual deficits, decline in neuro exam or signs of worsening ICPs    Discussed with Dr. Sorensen

## 2022-07-16 NOTE — CLINICAL REVIEW
KTM Chart Review      Intake/Output Summary (Last 24 hours) at 7/16/2022 1201  Last data filed at 7/16/2022 0800  Gross per 24 hour   Intake 250 ml   Output 200 ml   Net 50 ml       Vitals:    07/16/22 0037 07/16/22 0338 07/16/22 0800 07/16/22 1108   BP: 129/60 (!) 124/59 (!) 132/58 (!) 135/93   BP Location: Right leg Right leg Right leg Right leg   Patient Position: Lying Lying Lying Lying   Pulse: 106 97 101 (!) 120   Resp: 17 18 15 16   Temp: 98.2 °F (36.8 °C) 98 °F (36.7 °C) 99 °F (37.2 °C) 96 °F (35.6 °C)   TempSrc: Oral Oral Oral Axillary   SpO2: (!) 94% (!) 92% 96% 99%   Weight:       Height:           Recent Labs   Lab 07/11/22  0426 07/12/22  0325 07/14/22  0448 07/15/22  0500 07/16/22  0517   *   < > 136 139 136   K 3.5   < > 3.3* 4.1 3.2*      < > 111* 113* 108   CO2 22*   < > 17* 17* 20*   BUN 27*   < > 16 22* 30*   CREATININE 1.4   < > 1.1 1.6* 1.5*   CALCIUM 8.1*   < > 8.1* 8.4* 7.9*   PHOS 3.7  --   --   --   --     < > = values in this interval not displayed.       Immunosuppression Level - 17.2  Patient last night medications likely delivered to patient this morning by . Regardless, holding tacro because fluconazole is being started.  With elevated creatinine, ordered CMV, BK, kidney ultrasound, UA, UPCR, Nando. Continue IV fluids.    Please call KTM as needed; We will continue to follow.    Roberto Eisenberg Jr.

## 2022-07-16 NOTE — HOSPITAL COURSE
7/16: patient's  denied any headaches, NAEON. Said patient slept all through the night soundly  7/17: no HA per , NAEON. Not able to hear out of L ear today, per  hearing of L ear comes and goes. VPS still booked for Monday in case needed  7/18: NAEON. Continues to deny headache and vision changes. Complaining of abdominal pain, abdomen non tender. Last opening pressure on 7/15 was 18.5. VPS cancelled.

## 2022-07-16 NOTE — ASSESSMENT & PLAN NOTE
56F h/o HTN, gastric bypass, ESRD 2/2 HTN s/p DBDKT 4/5/20 (basiliximab induction,, CMV D+/R+, tacro, MMF), COVID19 7/2020, recently discharged 6/2022 for 2 months of headaches, now admitted to [facility] with intractable headaches, dizziness, blurred vision, left sided facial droop, MRI brain negative 6/23, s/p LP 6/24 with OP of 34cm, CSF: WBC unable to quantitate due to infereing cellular structures, gluc 6, protein 93, shahnaz ink positive for encapsulated yeast and cultures also growing yeast, CT chest with b/l pulmonary nodules started on ambisome, flucytosine 6/26 course notable for TERESA (Cr 1.3->2.0) now transferred to [facility] for further care. Headaches much improved, emesis has improved, eating food, serum crypto Ag positive, ophthalmology exam with papilledema. Pt required serial LP, now w/ most recent LP on 7/16 w/ opening pressure downtrending to 18. Pt endorses improvement in headaches, no vision changes. Still has not recovered hearing (baseline deafness in R ear per , however L ear hearing loss is new since infection started). Most recent crypto ag > 1:256, suggesting early low titers were related to prozone effect, and is not representative of worsening infection. Last positive culture was 6/24 at outside hospital. All cultures at [facility] remain negative. Yeast remain present on gram stain, likely representing dead organisms. NSGY still following for possible need for  shunt placement.    Recommendations:   - pt has completed 20 days of induction w/ ampho + flucytosine   - developing progressive cytopenias, likely 2/2 flucytosine  - also w/ TERESA, ampho vs. Decreased PO intake  - given developing toxicities and normalizing opening pressure, will transition to fluconazole consolidation therapy today  - will start at 400mg daily - if renal function worsens, will need to adjust  - check EKG for updated QTc  - anticipated course: consolidation fluconazole for at least 8 weeks (EOT 9/10), with  step down to fluconazole maintenance therapy, to be maintained for at least 12 months  - follow up neurosurgical plans    ID will continue to follow

## 2022-07-16 NOTE — ASSESSMENT & PLAN NOTE
- likely from flucytosine- w/associated eosinophila  - Discussed with ID- will stop and begin fluconazole instead  - ECG w/QTc of 425  - Daily ECG to be performed  - will monitor WBC, RBC, Plt trend  - retic, ldh, b12, folate, ldh sent

## 2022-07-16 NOTE — PLAN OF CARE
Problem: Adult Inpatient Plan of Care  Goal: Plan of Care Review  Outcome: Ongoing, Progressing     Problem: Adult Inpatient Plan of Care  Goal: Patient-Specific Goal (Individualized)  Outcome: Ongoing, Progressing     Problem: Adult Inpatient Plan of Care  Goal: Absence of Hospital-Acquired Illness or Injury  Outcome: Ongoing, Progressing     POC reviewed with pt and spouse at bedside. Answered all questions. A&Ox4. Pt's  upset because he states that MD's are trying to keep him out of the loop and place the shunt even though he states his wife does not want it. He states that following LP today, no one came and discussed the results with him. On call MD notified. ID MD able to call  via phone and discuss POC with .  at bedside satisfied currently. Charge nurse updated. Pt noted a decrease in BM's throughout day, stating only having 2 today. Still having diarrhea. Complains of mild abd cramps - improved from last night. Continues to complain of nausea - PRN medication given. Denies HA. Denies pain. Bed in lowest position, call light within reach, non skid socks on, bed alarm refused,  remains at bedside, instructed to call staff for needs with call light - pt verbalized understanding.

## 2022-07-16 NOTE — PLAN OF CARE
ID following peripherally  Noted improvement in opening pressure to 18 on today's LP  Gram stain continues to show yeast, not unexpected  Cultures remain negative  Repeat antigen is increased >1:256 - this does not represent worsening infection, but likely demonstrates initial prozone effect on first LP (titer 1:8) 2/2 extremely high titers of antigen  Will continue amphotericin + flucytosine induction  Follow up NSGY plans - if no need for shunt, will transition pt to consolidation therapy    Call with questions    Nahed Key DO  Transplant Infectious Disease     back pain/complains of pain/discomfort

## 2022-07-16 NOTE — SUBJECTIVE & OBJECTIVE
Interval History: 7/16: patient's  denied any headaches, NAEON. Said patient slept all through the night soundly    Medications:  Continuous Infusions:   heparin (porcine) in D5W 22 Units/kg/hr (07/16/22 0911)    sodium bicarbonate drip 100 mL/hr at 07/16/22 1029    Standard [facility] ADULT TPN for patient with NO electrolyte abnormality or NO renal dysfunction (PERIPHERAL)       Scheduled Meds:   acetaminophen  500 mg Oral Q24H    calcitRIOL  0.5 mcg Oral Daily    EScitalopram oxalate  10 mg Oral Daily    fluconazole  400 mg Oral Daily    hydrOXYzine HCL  25 mg Oral QHS    multivitamin  1 tablet Oral Daily    NIFEdipine  90 mg Oral Daily    potassium chloride  20 mEq Oral Once    predniSONE  5 mg Oral Daily    sodium bicarbonate  1,300 mg Oral TID    topiramate  50 mg Oral BID     PRN Meds:acetaminophen, albuterol-ipratropium, bisacodyL, dextrose 5 %, glucose, glucose, heparin (PORCINE), heparin (PORCINE), iohexol, loperamide, melatonin, naloxone, ondansetron, oxyCODONE-acetaminophen, sodium chloride 0.9%     Review of Systems  Objective:     Weight: 63.8 kg (140 lb 10.5 oz)  Body mass index is 27.47 kg/m².  Vital Signs (Most Recent):  Temp: 96 °F (35.6 °C) (07/16/22 1108)  Pulse: (!) 120 (07/16/22 1108)  Resp: 16 (07/16/22 1108)  BP: (!) 135/93 (07/16/22 1108)  SpO2: 99 % (07/16/22 1108)   Vital Signs (24h Range):  Temp:  [96 °F (35.6 °C)-99 °F (37.2 °C)] 96 °F (35.6 °C)  Pulse:  [] 120  Resp:  [15-18] 16  SpO2:  [92 %-99 %] 99 %  BP: (124-141)/(58-93) 135/93     Date 07/16/22 0700 - 07/17/22 0659   Shift 3280-0424 0569-8481 1898-8269 24 Hour Total   INTAKE   P.O. 0   0   Shift Total(mL/kg) 0(0)   0(0)   OUTPUT   Shift Total(mL/kg)       Weight (kg) 63.8 63.8 63.8 63.8                        Hemodialysis AV Graft Right forearm (Active)   Site Assessment Clean;Dry;Intact 07/30/20 0745   Patency Present;Thrill;Bruit 07/30/20 0745   Status Deaccessed 07/29/20 2010   Dressing Status Clean;Dry;Intact  10/18/18 0808   Site Condition No complications 07/30/20 0745   Dressing Open to air (None) 07/29/20 2010       Physical Exam    Neurosurgery Physical Exam    Head: normocephalic, atraumatic  Neurologic: Alert and oriented. Thought content appropriate.  GCS: Motor: 6/Verbal: 5/Eyes: 4 GCS Total: 15  Mental Status: Awake, Alert, Oriented x3  Cranial nerves: face symmetric, tongue midline, CN II-XII grossly intact. Hard of hearing   Eyes: pupils equal, round, reactive to light with accomodation, EOMI.   Sensory: intact to light touch throughout  Motor Strength: Moves all extremities spontaneously with good tone.  Grossly full strength upper and lower extremities. No abnormal movements seen.         Pronator Drift: no drift noted  Finger-to-nose: Intact bilaterally    Significant Labs:  Recent Labs   Lab 07/15/22  0500 07/16/22  0517   * 217*    136   K 4.1 3.2*   * 108   CO2 17* 20*   BUN 22* 30*   CREATININE 1.6* 1.5*   CALCIUM 8.4* 7.9*     Recent Labs   Lab 07/15/22  0500 07/15/22  1611 07/16/22  0517   WBC 2.78* 2.81* 2.16*  2.16*   HGB 11.3* 11.4* 10.6*  10.6*   HCT 33.8* 34.8* 31.7*  31.7*   * 110* 96*  96*     Recent Labs   Lab 07/15/22  1611 07/15/22  2256 07/16/22  0655   INR 1.3*  --   --    APTT 25.8 37.2* 38.5*     Microbiology Results (last 7 days)       Procedure Component Value Units Date/Time    CSF culture [[id number]] Collected: 07/15/22 1508    Order Status: Completed Specimen: CSF (Spinal Fluid) from CSF Tap, Tube 3 Updated: 07/16/22 0721     CSF CULTURE No Growth to date     Gram Stain Result Cytospin indicates:      No WBC's      Few  yeast      Results called to and read back by:Diana Ann RN 07/15/2022  17:48    CSF culture [[id number]] Collected: 07/13/22 1100    Order Status: Completed Specimen: CSF (Spinal Fluid) from CSF Tap, Tube 3 Updated: 07/16/22 0715     CSF CULTURE No Growth to date     Gram Stain Result Cytospin indicates:      No WBC's      Few yeast       Results called to and read back by: Laura Carey RN  07/13/2022        13:51    AFB Culture & Smear [[id number]] Collected: 07/15/22 1508    Order Status: Sent Specimen: CSF (Spinal Fluid) from CSF Tap, Tube 3 Updated: 07/15/22 1606    Gram stain [[id number]] Collected: 07/15/22 1508    Order Status: Canceled Specimen: CSF (Spinal Fluid) from CSF Tap, Tube 3     AFB Culture & Smear [[id number]] Collected: 07/13/22 1100    Order Status: Completed Specimen: CSF (Spinal Fluid) from CSF Tap, Tube 3 Updated: 07/14/22 2127     AFB Culture & Smear Culture in progress     AFB CULTURE STAIN No acid fast bacilli seen.    Cryptococcal antigen, CSF [[id number]]  (Abnormal) Collected: 07/13/22 1100    Order Status: Completed Specimen: CSF (Spinal Fluid) from CSF Tap, Tube 3 Updated: 07/14/22 1012     Crypto Ag, CSF Positive >1:256    Gram stain [[id number]] Collected: 07/13/22 1100    Order Status: Canceled Specimen: CSF (Spinal Fluid) from CSF Tap, Tube 3     CSF culture [[id number]] Collected: 07/08/22 1019    Order Status: Completed Specimen: CSF (Spinal Fluid) from CSF Tap, Tube 2 Updated: 07/13/22 0721     CSF CULTURE No Growth     Gram Stain Result Cytospin indicates: Few yeast      No WBC's      Results called to and read back by: Lyndsay Granados 07/08/2022  13:13    Clostridium difficile EIA [[id number]] Collected: 07/12/22 1751    Order Status: Completed Specimen: Stool Updated: 07/12/22 2249     C. diff Antigen Negative     C difficile Toxins A+B, EIA Negative     Comment: Testing not recommended for children <24 months old.       E. coli 0157 antigen [[id number]] Collected: 07/12/22 1751    Order Status: No result Specimen: Stool Updated: 07/12/22 1752    Stool culture [[id number]] Collected: 07/12/22 1751    Order Status: Sent Specimen: Stool Updated: 07/12/22 1751    AFB Culture & Smear [[id number]] Collected: 07/08/22 1019    Order Status: Completed Specimen: CSF (Spinal Fluid) from CSF Tap, Tube 2 Updated:  07/11/22 1440     AFB Culture & Smear Culture in progress     AFB CULTURE STAIN No acid fast bacilli seen.          All pertinent labs from the last 24 hours have been reviewed.    Significant Diagnostics:  I have reviewed and interpreted all pertinent imaging results/findings within the past 24 hours.  US Transplant Kidney With Doppler    Result Date: 7/16/2022  Satisfactory sonographic appearance of the renal transplant, noting improvement in all renal arterial resistive indices since the prior ultrasound 06/28/2022. Electronically signed by resident: Dayday Thorpe Date:    07/16/2022 Time:    10:19 Electronically signed by: Abiel Pelaez MD Date:    07/16/2022 Time:    11:44    FL Lumbar Puncture (xpd)    Result Date: 7/15/2022  Technically successful lumbar puncture under fluoroscopic guidance. The opening pressure was 18.5 cm H2O. The closing pressure was 10 cm H2O. Electronically signed by resident: Liz Hein Date:    07/15/2022 Time:    16:09 Electronically signed by: Simone Noel MD Date:    07/15/2022 Time:    17:23

## 2022-07-16 NOTE — CONSULTS
Consult recieved regarding poor PO intake 2/2 nausea.    Pt has been seen by member of RD team 1x/week since admit. In attempt to maximize PO intake, diet has been liberalized to regular and ONS (Boost Plus TID, chocolate flavor) has been added to aid in kcal/protein intake. Pt encouraged to place meal orders to ensure she is receiving meals that fit her preferences. Noted ondansetron ordered prn for nausea/vomiting. If no improvement from anti-emetic, pt may benefit from nutrition support. Please consult if more aggressive nutrition intervention is needed prior to f/u visit.    Follow up: 7/21/22  Thanks!    Colleen Higuera, MAXIMILIANO, LDN

## 2022-07-16 NOTE — ASSESSMENT & PLAN NOTE
- Cr 2.2 on day of transfer, has been steadily worsening over last few days (baseline ~1.2)  - KTM consulted, appreciate recs; likely prerenal versus medication related  - US transplant unremarkable for acute changes   - Creatinine unchanged  - Per KTM,continue sodium bicarb drip for metabolic acidosis   - Continue sodium bicarb

## 2022-07-16 NOTE — ASSESSMENT & PLAN NOTE
- history of intractable headache  - Continue topamax 50mg BID - plan to decrease to 25mg qday in 4d  - continue as needed Fioricet, oxycodone/acetaminophen, acetaminophen; headache responding to prns

## 2022-07-16 NOTE — ASSESSMENT & PLAN NOTE
- Pt with intractable headache, facial droop, dysarthria (improved). 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  - Continue Amp B 5/mg kg Q24hrs--> stop flucytosine due to suspected drug-induced pancytopenia- will begin fluconazole 400mg qday  - On chart review, amphotericin started on 06/24/22- 2wks of treatment 07/08/22  - Repeat LP completed 7/9; gram stain notable for yeast; and amphotericin continued; cultures no growth to date  - LP 07/15 w/opening pressure of 18.5 and closing pressure of 10-- NSGY will re-evaluate on Monday whether VPS is indicated or not  -Transplant ID following- continue with current course- patient signs and symptoms much improved since time of admission, will hold of on steroids- no signs of IRIS, will continue to monitor for need for repeat LP  - KTM following; recommend 500mL normal saline with amphotericin

## 2022-07-16 NOTE — ASSESSMENT & PLAN NOTE
- poor PO intake 2/2   - Nutrition consulted  - Begin PPN  - Added scheduled zofran - QTc 425 today   - Gastric emptying study  Ordered   - Continue boost supplemental drinks

## 2022-07-16 NOTE — PROGRESS NOTES
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 Rivera is a 56yoF w/PMHx of kidney XPL (2020 [facility]) on Prograf and Cellcept admitted to [facility] on 06/22 with intractable HA starting about 2 months earlier and with dizziness, blurred vision, left-sided facial droop and dysarthria starting a few days before admission.       On admission /72, HR 69. Neurologic exam is remarkable for left-sided facial weakness and mild dysarthria.  Labs (6/22) WBC 9.4, Hb 15.9, Plts 224, Na 137, K 3.3, CO2 25, BUN 24, Cr 1.32, AG 14, Glu 182 LFTs WNL. COVID neg     LP (6/24) CSF Glu 6, CSF protein 93. Nigerien stain pos for yeast like organisms.  Spinal fluid culture pos for encapsulated yeast species.       CT chest WO contrast pos for multiple indeterminate soft tissue masses in the left lower lobe with 2 smaller masses in the right lung which may be part of the same process.       On 06/26 patient started on amphotericin B liposomal (275 IV q.d.) and flucytosine (1500 p.o. q.6h) .  Hospitalization c/b the development of acute renal failure.  Cr 1.32 (6/22) > 1.4 (6/26) > 2.02 (6/27).  Cellcept and prednisone have been held.       Transfer requested for higher level of care (KTM and ID).  Transfer diagnosis disseminated cryptococcal infection, TERESA, kidney XPL.    At time of my assessment, pt. Complains of headache which has been persistent for the last several days. She states that it is somewhat relieved by the fioricet and oxycodone that she was receiving at the outside hospital.      Overview/Hospital Course:  KTM, transplant ID, Neurology consulted upon admission. Per KTN, normal saline  scheduled with amphotericin infusions in addition to bicarb drip, prednisone increased, Prograf continued.  Creatinine down-trending and bicarb drip discontinued.  Per Neurology, outside hospital MR MRI reviewed and note that neuro deficits may take a few weeks to fully resolve; recommend limiting Fioricet and narcotics to minimize medication overuse/rebound headaches.  Per Transplant ID, continue ambisome/flucytosine with plan to repeat LP 2 weeks from 6/24.  Due to visual disturbance in the setting of fungal meningitis, ophthalmology consulted.  No infectious nidus found in retina or vitreous, however grade 3 disc edema bilaterally. Exam finding and subjective complaints explained by meningitis and papilledema.  Recommendation for repeating LP to reduce opening pressure per ID and Neurology in agreement.  On 06/29, patient hearing improved.  Remains with intermittent confusion per  at bedside.    CTH performed 06/30- Slight interval enlargement of the temporal horn of the right lateral ventricle since the recent MRI of 06/23/2022.  Appearance suggestive of early/developing hydrocephalus or ventricular entrapment. NSGY consulted. Per NSGY, recommended NCC transfer for further evaluation. Patient underwent repeat LP 06/30- opening pressure of 35cm- closing pressure of 13cm. Patient w/much improvement in signs and symptoms. CSF findings: <5glucose, 174 protein, 144 WBC, 3000 RBC, 37 lymph, 59 mono/macro. Repeat CSF w/1:8 cryptococcal antigen, CSF culture w/yeast. NCC evaluated, but patient with much improvement in signs and symptoms, and so NCC recommended continued observation on the floor. Discussed w/ID- stated to continue to monitor for worsening signs and symptoms- with closing pressure <25cm, no need for serial LP's at this time.     Doppler US performed on LLE- found to have DVT. Started on heparin drip. Planning for repeat LP 07/07.  Heparin drip paused and repeat lumbar puncture completed on 07/07.   Per Radiology, CSF was clear to gout 16 mL total; opening pressure 27, closing pressure 12. Gram stain notable for few yeast.  Repeat CT head ordered with stable findings.  Neurosurgery consulted; Considering  shunt placement pending clearance of CSF and decrease in CSF protein.  Recommend ongoing serial LPs for now.  Repeat lumbar puncture performed 7/13.  Opening pressure 21, closing pressure 11. Final repeat LP 07/15- opening pressure 18.5cm, closing pressure 10.     Patient had persistent abd pain and diarrhea- topamax tapered down. Bentyl held as this could also be associated with nausea. Patient was started on lexapro 10mg qday and hydroxyzine 25mg qhs for additional anti-anxiety medication. Psychology consulted.       Interval History: Patient was seen and evaluated. Poor PO intake, nausea, vomiting- discussed with  and nephrology- starting PPN today. No fevers in the past 24hrs. Still with some diarrhea. 200mL UOP in the past 24hrs. No chest pain, sob, cough, headache, vision changes.     Objective:     Vital Signs (Most Recent):  Temp: 97.7 °F (36.5 °C) (07/16/22 1533)  Pulse: 101 (07/16/22 1533)  Resp: 16 (07/16/22 1533)  BP: (!) 149/86 (07/16/22 1533)  SpO2: 98 % (07/16/22 1533)   Vital Signs (24h Range):  Temp:  [96 °F (35.6 °C)-99 °F (37.2 °C)] 97.7 °F (36.5 °C)  Pulse:  [] 101  Resp:  [15-18] 16  SpO2:  [92 %-99 %] 98 %  BP: (124-149)/(58-93) 149/86     Weight: 63.8 kg (140 lb 10.5 oz)  Body mass index is 27.47 kg/m².    Intake/Output Summary (Last 24 hours) at 7/16/2022 1559  Last data filed at 7/16/2022 1325  Gross per 24 hour   Intake 150 ml   Output 200 ml   Net -50 ml      Physical Exam  Gen: in NAD, appears stated age  Neuro: AAOx3, motor, sensory, and strength grossly intact BL  HEENT: NTNC, EOMI, PERRL, MMM  CVS: RRR, systolic murmur appreciated (possibly flow murmur), no rubs/gallops, S1/S2 auscultated with no S3 or S4; capillary refill < 2 sec  Resp: lungs CTAB, no w/r/r; no  belabored breathing or accessory muscle use appreciated   Abd: BS+ in all 4 quadrants; NTND, soft to palpation; no organomegaly appreciated   Extrem: pulses full, equal, and regular over all 4 extremities;no swelling of BL UE or LE- BL LE wrapped in ACE wrap.      Significant Labs: All pertinent labs within the past 24 hours have been reviewed.  Blood Culture: No results for input(s): LABBLOO in the last 48 hours.  CBC:   Recent Labs   Lab 07/15/22  0500 07/15/22  1611 07/16/22  0517   WBC 2.78* 2.81* 2.16*  2.16*   HGB 11.3* 11.4* 10.6*  10.6*   HCT 33.8* 34.8* 31.7*  31.7*   * 110* 96*  96*     CMP:   Recent Labs   Lab 07/15/22  0500 07/16/22  0517    136   K 4.1 3.2*   * 108   CO2 17* 20*   * 217*   BUN 22* 30*   CREATININE 1.6* 1.5*   CALCIUM 8.4* 7.9*   PROT 4.8* 4.3*   ALBUMIN 2.6* 2.3*   BILITOT 0.7 0.6   ALKPHOS 42* 39*   AST 12 10   ALT 8* 7*   ANIONGAP 9 8   EGFRNONAA 36.3* 39.2*     Prealbumin: No results for input(s): PREALBUMIN in the last 48 hours.  Troponin: No results for input(s): TROPONINI in the last 48 hours.  Urine Culture: No results for input(s): LABURIN in the last 48 hours.  Urine Studies: No results for input(s): COLORU, APPEARANCEUA, PHUR, SPECGRAV, PROTEINUA, GLUCUA, KETONESU, BILIRUBINUA, OCCULTUA, NITRITE, UROBILINOGEN, LEUKOCYTESUR, RBCUA, WBCUA, BACTERIA, SQUAMEPITHEL, HYALINECASTS in the last 48 hours.    Invalid input(s): WRIGHTSUR    Significant Imaging: I have reviewed all pertinent imaging results/findings within the past 24 hours.    KUB:  FINDINGS:  Osseous structures are unremarkable.  There is no bowel distension.  Surgical change present within left abdomen.  No abnormal calcification.     Impression:     As above    CT A/P:  FINDINGS:  CHEST:     Lungs/Pleura: Unchanged appearance of multiple solid pulmonary nodules within the left lower lobe compared to CT abdomen pelvis 07/12/2022.  The largest nodule measures 2.4 cm (series 2, image 13).  2  mm nodule right middle lobe series 2, image 1 similar.  No focal consolidation.  No pneumothorax.  No pleural effusion.     Heart: Normal size.  New small pericardial effusion.     Thoracic soft tissues: Unremarkable     ABDOMEN:     Liver: Enlarged measuring 20.6 cm.  No focal hepatic abnormality.     Gallbladder/Bile ducts: Gallbladder is distended without inflammatory changes.  No intrahepatic or extrahepatic biliary ductal dilatation.     Spleen:Unremarkable.     Stomach: Postoperative changes of the stomach, gastric bypass.     Pancreas: No mass or peripancreatic fat stranding.     Adrenals: Unremarkable.     Renal/Ureters: Native kidneys are atrophic.  Left renal simple cyst.  No hydronephrosis.  The ureters are normal in course and caliber. The urinary bladder decompressed without focal abnormality.     Transplant kidney within the right lower quadrant.  No hydronephrosis.     Reproductive: Uterus is unremarkable.  No adnexal mass.     Bowel: Loops of small bowel show no evidence of obstruction or inflammation.  Postop changes of a distal segment of the small bowel within the left lower quadrant.  Loops of large bowel are partially distended with liquid stool.  Majority the colon is somewhat featureless.  No evidence of obstruction.  The appendix is not confidently visualized; however, no inflammatory changes in the expected location of the appendix.     Peritoneum: No free fluid.  No intraperitoneal free air.     Lymph Nodes: No pathologic blessing enlargement in the abdomen or pelvis.     Vasculature: Abdominal aorta is normal in course and caliber.  Mild calcifications of the abdominal aorta.     Bones: Degenerative changes of the spine, including diffuse idiopathic skeletal hyperostosis of the thoracic spine.  No acute fractures or osseous destructive lesions.     Soft Tissues: Soft tissue thickening of the subcutaneous tissues of the anterior abdomen, adjacent prior surgery.     Impression:     Liquid stool  throughout the colon.  Colon is somewhat featureless suggesting chronic inflammatory process.     Hepatomegaly.     New small pericardial effusion.     Unchanged appearance of lower lobe pulmonary nodules as detailed above.      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  - Stop amphotericin and flucytosine due to suspected drug-induced pancytopenia- will begin fluconazole 400mg qday  - On chart review, amphotericin started on 06/24/22- 2wks of treatment 07/08/22  - Repeat LP completed 7/9; gram stain notable for yeast; and amphotericin continued; cultures no growth to date  - LP 07/15 w/opening pressure of 18.5 and closing pressure of 10-- NSGY will re-evaluate on Monday whether VPS is indicated or not  -Transplant ID following- continue with current course- patient signs and symptoms much improved since time of admission, will hold of on steroids- no signs of IRIS, will continue to monitor for need for repeat LP  - KTM following; recommend 500mL normal saline with amphotericin     Increased intracranial pressure  - Last intracranial pressure of 34cm from LP at OSH  - Patient with papilledema, vomiting, AMS  - CTH w/developing hydrocephalus and signs of ventricular entrapment  - NSGY notified and consulted- no acute interventions at this time  - NCC notified- continue monitoring on floor  - S/p LP w/opening pressure of 35cm and closing pressure of 13cm 06/30/22  - S/p LP w/opening pressure of 27cm and closing pressure of 12cm 07/08/22  - Continue to monitor for signs and symptoms of worsening ICP  - Repeat CTH 07/01 relatively unchanged   - Repeat CTH 7/8 without significant change  - S/p LP  w/opening pressure of 21cm and closing pressure of 11cm 07/13/22  - S/p LP w/openiing pressure of 18.5cm and closing pressure of 10cm 07/15/22  -  "neurosurgery consulted and following; tentative  shunt placement 7/18     Bilateral papilledema due to raised intracranial pressure  - Ophthalmology evaluation with grade 3 papilledema in both eyes (longer she has elevated CSF pressure, the higher her risk of permanent optic nerve damage and vision change is.  There is a congested appearance to the veins in both eyes secondary to optic nerve congestion and indicates higher risk for ischemic event), 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  -"7/7 exam, vision stable OU, color plates full. Still 3+ ONH edema, and we do not expect this to rapidly improve as there is often delay of resolution of ONH edema following improvement in ICP "  - see increased intracranial pressure    Acute renal failure superimposed on stage 3 chronic kidney disease  - Cr 2.2 on day of transfer, has been steadily worsening over last few days (baseline ~1.2)  - KTM consulted, appreciate recs; likely prerenal versus medication related  - US transplant unremarkable for acute changes   - Creatinine unchanged  - Per KTM,continue sodium bicarb drip for metabolic acidosis   - Continue sodium bicarb    Poor nutrition  - poor PO intake 2/2   - Nutrition consulted  - Begin PPN  - Added scheduled zofran - QTc 425 today   - Gastric emptying study  Ordered   - Continue boost supplemental drinks    Diarrhea  - bentyl can be associated with nausea- stopped  - Stool studies sent  - topamax can be associated with diarrhea- will continue with wean as patient has not been on 100mg BID for a prolonged period of time  - CT of abd w/o contrast with liquid stool throughout intestine   - No leukocytosis concerning for new infection  - continue loperamide  - c diff negative 07/12    Anxiety  - Continue lexapro 10mg qday   - Hydroxyzine 25mg qhs  - Consult psychology     VTE (venous thromboembolism)  - continue heparin drip for DVT of the LLE  - pause as needed in the setting of procedures    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  - Improving  - See papilledema-- " spoke w/ophthalmology- expect papilledema to improve over the course of 2wks if infection remains well-controlled and ICP remains wnl    Headache  - history of intractable headache  - Continue topamax 50mg BID - plan to decrease to 25mg qday in 4d  - continue as needed Fioricet, oxycodone/acetaminophen, acetaminophen; headache responding to prns    Drug-induced pancytopenia  - likely from flucytosine and/or amphotericin- w/associated eosinophila  - Discussed with ID- will stop and begin fluconazole instead  - ECG w/QTc of 425  - Daily ECG to be performed  - will monitor WBC, RBC, Plt trend  - retic, ldh, b12, folate, ldh sent     Hypokalemia  - PO repletion prn to goal    Long-term use of immunosuppressant medication  - Pt with opportunistic infection, see above. Holding mycophenolate   - KTM following, appreciate recs  Continue prograf per levels   Monitor levels to assess for toxicity  Continue prednisone ---> increased to 10mg daily   Hold MMF     S/P kidney transplant  - See TERESA. KTM consulted for further assistance  Kidney transplant 4/5/2020   Underlying ckd stage III  ESRD related to HTN      VTE Risk Mitigation (From admission, onward)         Ordered     heparin 25,000 units in dextrose 5% (100 units/ml) IV bolus from bag - ADDITIONAL PRN BOLUS - 60 units/kg  As needed (PRN)        Question:  Heparin Infusion Adjustment (DO NOT MODIFY ANSWER)  Answer:  \[email or web address]\[email or web address]\Images\Pharmacy\HeparinInfusions\heparin HIGH INTENSITY nomogram for [facility] [email or web address]    07/15/22 1545     heparin 25,000 units in dextrose 5% (100 units/ml) IV bolus from bag - ADDITIONAL PRN BOLUS - 30 units/kg  As needed (PRN)        Question:  Heparin Infusion Adjustment (DO NOT MODIFY ANSWER)  Answer:  \[email or web address]\[email or web address]\Images\Pharmacy\HeparinInfusions\heparin HIGH INTENSITY nomogram for [facility] [id number]    07/15/22 1545     heparin 25,000 units in dextrose 5% 250 mL (100 units/mL) infusion HIGH INTENSITY nomogram - [facility]  Continuous         Question Answer Comment   Heparin Infusion Adjustment (DO NOT MODIFY ANSWER) [email or web address]\[email or web address]\Images\Pharmacy\HeparinInfusions\heparin HIGH INTENSITY nomogram for [facility] [id number]    Begin at (in units/kg/hr) 18        07/15/22 1545     heparin 25,000 units in dextrose 5% 250 mL (100 units/mL) infusion HIGH INTENSITY nomogram - [facility]  Continuous        Question Answer Comment   Heparin Infusion Adjustment (DO NOT MODIFY ANSWER) [email or web address]\[email or web address]\Images\Pharmacy\HeparinInfusions\heparin HIGH INTENSITY nomogram for [facility] [id number]    Begin at (in units/kg/hr) 18 07/03/22 1205     IP VTE HIGH RISK PATIENT  Once         06/27/22 2202     Place sequential compression device  Until discontinued         06/27/22 2202                Discharge Planning   JULITO: 7/18/2022     Code Status: Full Code   Is the patient medically ready for discharge?: No    Reason for patient still in hospital (select all that apply): Patient trending condition  Discharge Plan A: Home with family                    Keke Hicks MD  Department of Hospital Medicine   [facility] - Telemetry Stepdown

## 2022-07-16 NOTE — ASSESSMENT & PLAN NOTE
- bentyl can be associated with nausea- stopped  - Stool studies sent  - topamax can be associated with diarrhea- will continue with wean as patient has not been on 100mg BID for a prolonged period of time  - CT of abd w/o contrast with liquid stool throughout intestine   - No leukocytosis concerning for new infection  - continue loperamide  - c diff negative 07/12

## 2022-07-16 NOTE — PROGRESS NOTES
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  56F h/o HTN, gastric bypass, ESRD 2/2 HTN s/p DBDKT 4/5/20 (basiliximab induction,, CMV D+/R+, tacro, MMF), COVID19 7/2020, recently discharged 6/2022 for 2 months of headaches, now admitted to [facility] with intractable headaches, dizziness, blurred vision, left sided facial droop, MRI brain negative 6/23, s/p LP 6/24 with OP of 34cm, CSF: WBC unable to quantitate due to infereing cellular structures, gluc 6, protein 93, shahnaz ink positive for encapsulated yeast and cultures also growing yeast, CT chest with b/l pulmonary nodules started on ambisome, flucytosine 6/26 course notable for TERESA (Cr 1.3->2.0) now transferred to [facility] for further care. Headaches much improved, emesis has improved, eating food, serum crypto Ag positive, ophthalmology exam with papilledema. Pt required serial LP, now w/ most recent LP on 7/16 w/ opening pressure downtrending to 18. Pt endorses improvement in headaches, no vision changes. Still has not recovered hearing (baseline deafness in R ear per , however L ear hearing loss is new since infection started). Most recent crypto ag > 1:256, suggesting early low titers were related to prozone effect, and is not representative of worsening infection. Last positive culture was 6/24 at outside hospital. All cultures at [facility] remain negative. Yeast remain present on gram stain, likely representing dead organisms. NSGY still following for possible need for  shunt placement.    Recommendations:   - pt has completed 20 days of induction w/ ampho + flucytosine   - developing progressive cytopenias, likely 2/2 flucytosine  -  also w/ TERESA, ampho vs. Decreased PO intake  - given developing toxicities and normalizing opening pressure, will transition to fluconazole consolidation therapy today  - will start at 400mg daily - if renal function worsens, will need to adjust  - check EKG for updated QTc  - anticipated course: consolidation fluconazole for at least 8 weeks (EOT 9/10), with step down to fluconazole maintenance therapy, to be maintained for at least 12 months  - follow up neurosurgical plans    ID will continue to follow        Anticipated Disposition: TBD    Thank you for your consult. I will follow-up with patient. Please contact us if you have any additional questions.    Nahed Key DO  Transplant Infectious Disease    Time: 35 minutes   50% of time spent on face-to-face counseling and coordination of care. Counseling included review of test results, diagnosis, and treatment plan with patient and/or family.        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    HPI: 57 y/o F h/o HTN, gastric bypass, ESRD 2/2 HTN s/p DBDKT 4/5/20 (basiliximab induction,, CMV D+/R+, tacro, MMF), COVID19 7/2020, recently discharged 6/2022 for 2 months of headaches, now admitted to [facility] with intractable headaches, dizziness, blurred vision, left sided facial droop, s/p LP 6/24 with CSF: WBC unable to quantitate due to infereing cellular structures, gluc 6, protein 93, shahnaz ink positive for yeast and cultures also growing yeast, CT chest with b/l pulmonary nodules started on ambisome, flucytosine 6/26 course notable for TERESA (Cr 1.3->2.0) now transferred to [facility] for further care. She states HA and n/v have slightly improved but still not feeling well    Interval History: pt seen today for follow up, no events, family notes decreased PO intake. Pt still w/ minimal hearing. Downtrending cell counts on CBC, likely 2/2 flucytosine. TERESA w/ Cr 1.5 today, ? Ampho vs. Alternative cause (primary team  investigating.    Review of Systems   HENT:  Positive for hearing loss.    Allergic/Immunologic: Positive for immunocompromised state.   Objective:     Vital Signs (Most Recent):  Temp: 96 °F (35.6 °C) (07/16/22 1108)  Pulse: (!) 120 (07/16/22 1108)  Resp: 16 (07/16/22 1108)  BP: (!) 135/93 (07/16/22 1108)  SpO2: 99 % (07/16/22 1108)   Vital Signs (24h Range):  Temp:  [96 °F (35.6 °C)-99 °F (37.2 °C)] 96 °F (35.6 °C)  Pulse:  [] 120  Resp:  [15-18] 16  SpO2:  [92 %-99 %] 99 %  BP: (124-141)/(58-93) 135/93     Weight: 63.8 kg (140 lb 10.5 oz)  Body mass index is 27.47 kg/m².    Estimated Creatinine Clearance: 35.7 mL/min (A) (based on SCr of 1.5 mg/dL (H)).    Physical Exam  Constitutional:       General: She is not in acute distress.     Appearance: Normal appearance. She is well-developed. She is not diaphoretic.   HENT:      Head: Normocephalic and atraumatic.      Right Ear: External ear normal.      Left Ear: External ear normal.      Nose: Nose normal.   Eyes:      General: No scleral icterus.        Right eye: No discharge.         Left eye: No discharge.      Extraocular Movements: Extraocular movements intact.      Conjunctiva/sclera: Conjunctivae normal.   Pulmonary:      Effort: Pulmonary effort is normal. No respiratory distress.      Breath sounds: No stridor.   Musculoskeletal:         General: Normal range of motion.   Skin:     Findings: No erythema or rash.   Neurological:      Mental Status: She is alert and oriented to person, place, and time. Mental status is at baseline.      Cranial Nerves: No cranial nerve deficit.      Comments: Hearing loss       Significant Labs: CBC:   Recent Labs   Lab 07/15/22  0500 07/15/22  1611 07/16/22  0517   WBC 2.78* 2.81* 2.16*  2.16*   HGB 11.3* 11.4* 10.6*  10.6*   HCT 33.8* 34.8* 31.7*  31.7*   * 110* 96*  96*     CMP:   Recent Labs   Lab 07/15/22  0500 07/16/22  0517    136   K 4.1 3.2*   * 108   CO2 17* 20*   * 217*   BUN  22* 30*   CREATININE 1.6* 1.5*   CALCIUM 8.4* 7.9*   PROT 4.8* 4.3*   ALBUMIN 2.6* 2.3*   BILITOT 0.7 0.6   ALKPHOS 42* 39*   AST 12 10   ALT 8* 7*   ANIONGAP 9 8   EGFRNONAA 36.3* 39.2*       Significant Imaging: I have reviewed all pertinent imaging results/findings within the past 24 hours.

## 2022-07-17 NOTE — SUBJECTIVE & OBJECTIVE
Interval History: 7/17: no FLOWERS per , NAEON. Not able to hear out of L ear today, per  hearing of L ear comes and goes. VPS still booked for Monday in case needed    Medications:  Continuous Infusions:   heparin (porcine) in D5W 22 Units/kg/hr (07/17/22 1125)    Standard Custom Day One ADULT TPN for patient with NO electrolyte abnormality or NO renal dysfunction (PERIPHERAL) 42 mL/hr at 07/16/22 2140    TPN ADULT PERIPHERAL CUSTOM       Scheduled Meds:   acetaminophen  500 mg Oral Q24H    calcitRIOL  0.5 mcg Oral Daily    EScitalopram oxalate  10 mg Oral Daily    fluconazole  400 mg Oral Daily    hydrOXYzine HCL  25 mg Oral QHS    Lactobacillus rhamnosus GG  1 capsule Oral Daily    multivitamin  1 tablet Oral Daily    NIFEdipine  90 mg Oral Daily    ondansetron  8 mg Oral Q8H    predniSONE  5 mg Oral Daily    sodium bicarbonate  1,300 mg Oral TID    topiramate  50 mg Oral BID     PRN Meds:acetaminophen, albuterol-ipratropium, bisacodyL, glucose, glucose, heparin (PORCINE), heparin (PORCINE), iohexol, loperamide, melatonin, naloxone, ondansetron, oxyCODONE-acetaminophen, sodium chloride 0.9%     Review of Systems  Objective:     Weight: 63.8 kg (140 lb 10.5 oz)  Body mass index is 27.47 kg/m².  Vital Signs (Most Recent):  Temp: 97.7 °F (36.5 °C) (07/17/22 1543)  Pulse: 100 (07/17/22 1543)  Resp: 16 (07/17/22 1543)  BP: (!) 140/67 (07/17/22 1543)  SpO2: 97 % (07/17/22 1543)   Vital Signs (24h Range):  Temp:  [97.7 °F (36.5 °C)-98.7 °F (37.1 °C)] 97.7 °F (36.5 °C)  Pulse:  [] 100  Resp:  [16-18] 16  SpO2:  [94 %-97 %] 97 %  BP: (101-140)/(50-67) 140/67     Date 07/17/22 0700 - 07/18/22 0659   Shift 3008-6517 3050-1645 2761-3641 24 Hour Total   INTAKE   P.O. 100   100   Shift Total(mL/kg) 100(1.6)   100(1.6)   OUTPUT   Shift Total(mL/kg)       Weight (kg) 63.8 63.8 63.8 63.8                        Hemodialysis AV Graft Right forearm (Active)   Site Assessment Clean;Dry;Intact 07/30/20 0745   Patency  Present;Thrill;Bruit 07/30/20 0745   Status Deaccessed 07/29/20 2010   Dressing Status Clean;Dry;Intact 10/18/18 0808   Site Condition No complications 07/30/20 0745   Dressing Open to air (None) 07/29/20 2010       Physical Exam    Neurosurgery Physical Exam    Head: normocephalic, atraumatic  Neurologic: Alert and oriented. Thought content appropriate.  GCS: Motor: 6/Verbal: 5/Eyes: 4 GCS Total: 15  Mental Status: Awake, Alert, Oriented x3  Cranial nerves: face symmetric, tongue midline, CN II-XII grossly intact. Hard of hearing   Eyes: pupils equal, round, reactive to light with accomodation, EOMI.   Sensory: intact to light touch throughout  Motor Strength: Moves all extremities spontaneously with good tone.  Grossly full strength upper and lower extremities. No abnormal movements seen.         Pronator Drift: no drift noted  Finger-to-nose: Intact bilaterally    Significant Labs:  Recent Labs   Lab 07/16/22  0517 07/17/22  1029 07/17/22  1532   *  --  284*     --  135*   K 3.2*  --  3.3*     --  98   CO2 20*  --  25   BUN 30*  --  46*   CREATININE 1.5*  --  2.1*   CALCIUM 7.9*  --  8.0*   MG  --  1.8  --      Recent Labs   Lab 07/16/22  0517 07/17/22  1030   WBC 2.16*  2.16* 1.06*  1.06*   HGB 10.6*  10.6* 9.8*  9.8*   HCT 31.7*  31.7* 29.2*  29.2*   PLT 96*  96* 112*  112*     Recent Labs   Lab 07/17/22  0028 07/17/22  1029 07/17/22  1532   APTT 33.3* 34.7*  34.5* 40.7*     Microbiology Results (last 7 days)       Procedure Component Value Units Date/Time    CSF culture [[id number]] Collected: 07/15/22 1508    Order Status: Completed Specimen: CSF (Spinal Fluid) from CSF Tap, Tube 3 Updated: 07/17/22 0708     CSF CULTURE No Growth to date     Gram Stain Result Cytospin indicates:      No WBC's      Few  yeast      Results called to and read back by:Diana Ann RN 07/15/2022  17:48    CSF culture [[id number]] Collected: 07/13/22 1100    Order Status: Completed Specimen: CSF (Spinal  Fluid) from CSF Tap, Tube 3 Updated: 07/17/22 0704     CSF CULTURE No Growth to date     Gram Stain Result Cytospin indicates:      No WBC's      Few yeast      Results called to and read back by: Laura Carey RN  07/13/2022        13:51    E. coli 0157 antigen [[id number]] Collected: 07/16/22 0630    Order Status: No result Specimen: Stool Updated: 07/16/22 2127    Stool culture [[id number]] Collected: 07/16/22 0630    Order Status: Sent Specimen: Stool Updated: 07/16/22 2127    AFB Culture & Smear [[id number]] Collected: 07/15/22 1508    Order Status: Completed Specimen: CSF (Spinal Fluid) from CSF Tap, Tube 3 Updated: 07/16/22 2127     AFB Culture & Smear Culture in progress    Gram stain [[id number]] Collected: 07/15/22 1508    Order Status: Canceled Specimen: CSF (Spinal Fluid) from CSF Tap, Tube 3     AFB Culture & Smear [[id number]] Collected: 07/13/22 1100    Order Status: Completed Specimen: CSF (Spinal Fluid) from CSF Tap, Tube 3 Updated: 07/14/22 2127     AFB Culture & Smear Culture in progress     AFB CULTURE STAIN No acid fast bacilli seen.    Cryptococcal antigen, CSF [[id number]]  (Abnormal) Collected: 07/13/22 1100    Order Status: Completed Specimen: CSF (Spinal Fluid) from CSF Tap, Tube 3 Updated: 07/14/22 1012     Crypto Ag, CSF Positive >1:256    Gram stain [[id number]] Collected: 07/13/22 1100    Order Status: Canceled Specimen: CSF (Spinal Fluid) from CSF Tap, Tube 3     CSF culture [[id number]] Collected: 07/08/22 1019    Order Status: Completed Specimen: CSF (Spinal Fluid) from CSF Tap, Tube 2 Updated: 07/13/22 0721     CSF CULTURE No Growth     Gram Stain Result Cytospin indicates: Few yeast      No WBC's      Results called to and read back by: Lyndsay Granados 07/08/2022  13:13    Clostridium difficile EIA [[id number]] Collected: 07/12/22 1751    Order Status: Completed Specimen: Stool Updated: 07/12/22 2249     C. diff Antigen Negative     C difficile Toxins A+B, EIA Negative     Comment:  Testing not recommended for children <24 months old.       E. coli 0157 antigen [[id number]] Collected: 07/12/22 1751    Order Status: No result Specimen: Stool Updated: 07/12/22 1752    Stool culture [[id number]] Collected: 07/12/22 1751    Order Status: Sent Specimen: Stool Updated: 07/12/22 1751    AFB Culture & Smear [[id number]] Collected: 07/08/22 1019    Order Status: Completed Specimen: CSF (Spinal Fluid) from CSF Tap, Tube 2 Updated: 07/11/22 1440     AFB Culture & Smear Culture in progress     AFB CULTURE STAIN No acid fast bacilli seen.          All pertinent labs from the last 24 hours have been reviewed.    Significant Diagnostics:  I have reviewed and interpreted all pertinent imaging results/findings within the past 24 hours.  No results found in the last 24 hours.

## 2022-07-17 NOTE — ASSESSMENT & PLAN NOTE
- history of intractable headache  - Continue topamax 50mg BID - plan to decrease to 25mg qday in 3d  - continue as needed Fioricet, oxycodone/acetaminophen, acetaminophen; headache responding to prns

## 2022-07-17 NOTE — PLAN OF CARE
Problem: Adult Inpatient Plan of Care  Goal: Absence of Hospital-Acquired Illness or Injury  Outcome: Ongoing, Progressing  Intervention: Identify and Manage Fall Risk  Flowsheets (Taken 7/17/2022 1822)  Safety Promotion/Fall Prevention:   assistive device/personal item within reach   side rails raised x 2   lighting adjusted   medications reviewed  Plan of care was reviewed with the pt. Maksim. VSS. Cardiac monitoring was done. Continuous Heparin gtt is running at 22units/kg/hr. TPN was paused do to midline leaking. MD was notified. Picc team was able to replace with a picc line. CXR was done. Intake and output was documented. No major changes throughout the day. Safety precautions were in placed.

## 2022-07-17 NOTE — ASSESSMENT & PLAN NOTE
- Pt with intractable headache, facial droop, dysarthria (improved). 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  - Stopped ampho and flucytosine due to suspected drug-induced pancytopenia  - Continue fluconazole 400mg qday  - On chart review, amphotericin started on 06/24/22- 2wks of treatment 07/08/22  - Repeat LP completed 7/9; gram stain notable for yeast; and amphotericin continued; cultures no growth to date  - LP 07/15 w/opening pressure of 18.5 and closing pressure of 10-- NSGY will re-evaluate on Monday whether VPS is indicated or not  -Transplant ID following- continue with current course- patient signs and symptoms much improved since time of admission, will hold of on steroids- no signs of IRIS, will continue to monitor for need for repeat LP  - KTM following; recommend 500mL normal saline with amphotericin

## 2022-07-17 NOTE — ASSESSMENT & PLAN NOTE
- likely from flucytosine- w/associated eosinophila  - Heme/Onc consulted- agree with likely drug-induced pancytopenia, no need for bone marrow biopsy at this time  - ECG w/QTc of 425  - Daily ECG to be performed  - will monitor WBC, RBC, Plt trend  - retic, ldh, b12, folate, ldh- wnl  - Heparin antibodies sent  - Obtain fibrinogen

## 2022-07-17 NOTE — PROCEDURES
Tala Rivera is a 56 y.o. female patient.    Temp: 97.7 °F (36.5 °C) (07/17/22 1543)  Pulse: 100 (07/17/22 1543)  Resp: 16 (07/17/22 1543)  BP: (!) 140/67 (07/17/22 1543)  SpO2: 97 % (07/17/22 1543)  Weight: 63.8 kg (140 lb 10.5 oz) (07/15/22 1652)  Height: 5' (152.4 cm) (07/13/22 1300)    PICC  Date/Time: 7/17/2022 3:45 PM  Performed by: Bryson Ramsay RN  Consent Done: Yes  Time out: Immediately prior to procedure a time out was called to verify the correct patient, procedure, equipment, support staff and site/side marked as required  Indications: med administration and vascular access  Anesthesia: local infiltration  Local anesthetic: lidocaine 1% without epinephrine  Anesthetic Total (mL): 5  Description of findings: picc  Preparation: skin prepped with chlorhexidine (without alcohol)  Skin prep agent dried: skin prep agent completely dried prior to procedure  Sterile barriers: all five maximum sterile barriers used - cap, mask, sterile gown, sterile gloves, and large sterile sheet  Hand hygiene: hand hygiene performed prior to central venous catheter insertion  Location details: left basilic  Catheter type: double lumen  Catheter size: 5 Fr  Catheter Length: 38cm    Ultrasound guidance: yes  Vessel Caliber: medium and patent, compressibility normal  Vascular Doppler: not done  Needle advanced into vessel with real time Ultrasound guidance.  Guidewire confirmed in vessel.  Sterile sheath used.  no esophageal manometryNumber of attempts: 1  Post-procedure: blood return through all ports, chlorhexidine patch and sterile dressing applied  Estimated blood loss (mL): 0  Specimens: No  Implants: No  Assessment: placement verified by x-ray, successful placement and tip termination  Complications: none          Name Bryson Ramsay RN  7/17/2022

## 2022-07-17 NOTE — ASSESSMENT & PLAN NOTE
- Cr 2.2 on day of transfer, has been steadily worsening over last few days (baseline ~1.2)  - KTM consulted, appreciate recs; likely prerenal versus medication related  - US transplant unremarkable for acute changes   - Creatinine pending   - Continue sodium bicarb

## 2022-07-17 NOTE — PROGRESS NOTES
[facility] - Telemetry Stepdown  Neurosurgery  Progress Note    Subjective:     History of Present Illness: Mrs. Rivera is a 56yoF w/[place]Hx [place] [facility] and Cellcept admitted to [facility] on 06/22 with intractable HA starting about 2 months earlier and with dizziness, blurred vision, left-sided facial droop and dysarthria starting a few days before admission. 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 [place] (6/24) with OP of 34,  stain pos for yeast like organisms and CSF culture pos for encapsulated yeast species. Ophthalmology was consulted for complaint of visual disturbance in the setting of elevated ICP.  No infectious nidus was found in retina or vitreous, however pt was found to have grade 3 palpilledema bilaterally. CTH performed 06/30 showed slight interval enlargement of the temporal horn of the right lateral ventricle since the recent MRI of 06/23/2022.  Repeat LP 06/30- opening pressure of 35cm- closing pressure of 13cm. Patient w/much improvement in signs and symptoms post LP. NCC evaluated, but given improvement in symptoms, NCC recommended continued observation on the floor. Antibiotic end date is today and primary team planning to repeat LP and CTH. Pt currently reports improvement in her HA and her visual symptoms, however continues with hearing loss and intermittent nausea. NSGY consulted for consideration of EVD placement for elevated ICPs.          Post-Op Info:  Procedure(s) (LRB):  INSERTION, SHUNT, VENTRICULOPERITONEAL - right frontal (Right)         Interval History: 7/17: no FLOWERS per , NAEON. Not able to hear out of L ear today, per  hearing of L ear comes and goes. VPS still booked for Monday in case needed    Medications:  Continuous Infusions:   heparin (porcine) in D5W 22 Units/kg/hr (07/17/22 1125)    Standard Custom Day One ADULT TPN  for patient with NO electrolyte abnormality or NO renal dysfunction (PERIPHERAL) 42 mL/hr at 07/16/22 2140    TPN ADULT PERIPHERAL CUSTOM       Scheduled Meds:   acetaminophen  500 mg Oral Q24H    calcitRIOL  0.5 mcg Oral Daily    EScitalopram oxalate  10 mg Oral Daily    fluconazole  400 mg Oral Daily    hydrOXYzine HCL  25 mg Oral QHS    Lactobacillus rhamnosus GG  1 capsule Oral Daily    multivitamin  1 tablet Oral Daily    NIFEdipine  90 mg Oral Daily    ondansetron  8 mg Oral Q8H    predniSONE  5 mg Oral Daily    sodium bicarbonate  1,300 mg Oral TID    topiramate  50 mg Oral BID     PRN Meds:acetaminophen, albuterol-ipratropium, bisacodyL, glucose, glucose, heparin (PORCINE), heparin (PORCINE), iohexol, loperamide, melatonin, naloxone, ondansetron, oxyCODONE-acetaminophen, sodium chloride 0.9%     Review of Systems  Objective:     Weight: 63.8 kg (140 lb 10.5 oz)  Body mass index is 27.47 kg/m².  Vital Signs (Most Recent):  Temp: 97.7 °F (36.5 °C) (07/17/22 1543)  Pulse: 100 (07/17/22 1543)  Resp: 16 (07/17/22 1543)  BP: (!) 140/67 (07/17/22 1543)  SpO2: 97 % (07/17/22 1543)   Vital Signs (24h Range):  Temp:  [97.7 °F (36.5 °C)-98.7 °F (37.1 °C)] 97.7 °F (36.5 °C)  Pulse:  [] 100  Resp:  [16-18] 16  SpO2:  [94 %-97 %] 97 %  BP: (101-140)/(50-67) 140/67     Date 07/17/22 0700 - 07/18/22 0659   Shift 0538-7304 9154-0309 4494-2980 24 Hour Total   INTAKE   P.O. 100   100   Shift Total(mL/kg) 100(1.6)   100(1.6)   OUTPUT   Shift Total(mL/kg)       Weight (kg) 63.8 63.8 63.8 63.8                        Hemodialysis AV Graft Right forearm (Active)   Site Assessment Clean;Dry;Intact 07/30/20 0745   Patency Present;Thrill;Bruit 07/30/20 0745   Status Deaccessed 07/29/20 2010   Dressing Status Clean;Dry;Intact 10/18/18 0808   Site Condition No complications 07/30/20 0745   Dressing Open to air (None) 07/29/20 2010       Physical Exam    Neurosurgery Physical Exam    Head: normocephalic,  atraumatic  Neurologic: Alert and oriented. Thought content appropriate.  GCS: Motor: 6/Verbal: 5/Eyes: 4 GCS Total: 15  Mental Status: Awake, Alert, Oriented x3  Cranial nerves: face symmetric, tongue midline, CN II-XII grossly intact. Hard of hearing   Eyes: pupils equal, round, reactive to light with accomodation, EOMI.   Sensory: intact to light touch throughout  Motor Strength: Moves all extremities spontaneously with good tone.  Grossly full strength upper and lower extremities. No abnormal movements seen.         Pronator Drift: no drift noted  Finger-to-nose: Intact bilaterally    Significant Labs:  Recent Labs   Lab 07/16/22  0517 07/17/22  1029 07/17/22  1532   *  --  284*     --  135*   K 3.2*  --  3.3*     --  98   CO2 20*  --  25   BUN 30*  --  46*   CREATININE 1.5*  --  2.1*   CALCIUM 7.9*  --  8.0*   MG  --  1.8  --      Recent Labs   Lab 07/16/22  0517 07/17/22  1030   WBC 2.16*  2.16* 1.06*  1.06*   HGB 10.6*  10.6* 9.8*  9.8*   HCT 31.7*  31.7* 29.2*  29.2*   PLT 96*  96* 112*  112*     Recent Labs   Lab 07/17/22  0028 07/17/22  1029 07/17/22  1532   APTT 33.3* 34.7*  34.5* 40.7*     Microbiology Results (last 7 days)       Procedure Component Value Units Date/Time    CSF culture [[id number]] Collected: 07/15/22 1508    Order Status: Completed Specimen: CSF (Spinal Fluid) from CSF Tap, Tube 3 Updated: 07/17/22 0708     CSF CULTURE No Growth to date     Gram Stain Result Cytospin indicates:      No WBC's      Few  yeast      Results called to and read back by:Diana Ann RN 07/15/2022  17:48    CSF culture [[id number]] Collected: 07/13/22 1100    Order Status: Completed Specimen: CSF (Spinal Fluid) from CSF Tap, Tube 3 Updated: 07/17/22 0704     CSF CULTURE No Growth to date     Gram Stain Result Cytospin indicates:      No WBC's      Few yeast      Results called to and read back by: Laura Carey RN  07/13/2022        13:51    E. coli 0157 antigen [[id number]]  Collected: 07/16/22 0630    Order Status: No result Specimen: Stool Updated: 07/16/22 2127    Stool culture [[id number]] Collected: 07/16/22 0630    Order Status: Sent Specimen: Stool Updated: 07/16/22 2127    AFB Culture & Smear [[id number]] Collected: 07/15/22 1508    Order Status: Completed Specimen: CSF (Spinal Fluid) from CSF Tap, Tube 3 Updated: 07/16/22 2127     AFB Culture & Smear Culture in progress    Gram stain [[id number]] Collected: 07/15/22 1508    Order Status: Canceled Specimen: CSF (Spinal Fluid) from CSF Tap, Tube 3     AFB Culture & Smear [[id number]] Collected: 07/13/22 1100    Order Status: Completed Specimen: CSF (Spinal Fluid) from CSF Tap, Tube 3 Updated: 07/14/22 2127     AFB Culture & Smear Culture in progress     AFB CULTURE STAIN No acid fast bacilli seen.    Cryptococcal antigen, CSF [[id number]]  (Abnormal) Collected: 07/13/22 1100    Order Status: Completed Specimen: CSF (Spinal Fluid) from CSF Tap, Tube 3 Updated: 07/14/22 1012     Crypto Ag, CSF Positive >1:256    Gram stain [[id number]] Collected: 07/13/22 1100    Order Status: Canceled Specimen: CSF (Spinal Fluid) from CSF Tap, Tube 3     CSF culture [[id number]] Collected: 07/08/22 1019    Order Status: Completed Specimen: CSF (Spinal Fluid) from CSF Tap, Tube 2 Updated: 07/13/22 0721     CSF CULTURE No Growth     Gram Stain Result Cytospin indicates: Few yeast      No WBC's      Results called to and read back by: Lyndsay Granados 07/08/2022  13:13    Clostridium difficile EIA [[id number]] Collected: 07/12/22 1751    Order Status: Completed Specimen: Stool Updated: 07/12/22 2249     C. diff Antigen Negative     C difficile Toxins A+B, EIA Negative     Comment: Testing not recommended for children <24 months old.       E. coli 0157 antigen [[id number]] Collected: 07/12/22 1751    Order Status: No result Specimen: Stool Updated: 07/12/22 1752    Stool culture [[id number]] Collected: 07/12/22 1751    Order Status: Sent Specimen: Stool Updated:  07/12/22 1751    AFB Culture & Smear [[id number]] Collected: 07/08/22 1019    Order Status: Completed Specimen: CSF (Spinal Fluid) from CSF Tap, Tube 2 Updated: 07/11/22 1440     AFB Culture & Smear Culture in progress     AFB CULTURE STAIN No acid fast bacilli seen.          All pertinent labs from the last 24 hours have been reviewed.    Significant Diagnostics:  I have reviewed and interpreted all pertinent imaging results/findings within the past 24 hours.  No results found in the last 24 hours.     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 LP x 3 with elevated OP and papilledema on ophthalmology exam. NSGY consulted for consideration of drain placement for elevated ICPs. Pt is currently neurologically stable. Of note, pt is on hep gtt for acute DVT    -Imaging and diagnostics reviewed   -CSF (6/24) OP34, Glu 6, protein 93.  stain pos for yeast like organisms.  Cx pos for encapsulated yeast species.     -CSF (6/30): OP35,CP13. G<5, P174, 144 WBC, 3000 RBC, 37 lymph, 59 mono/macro. Repeat CSF w/1:8 cryptococcal antigen, cx w/yeast.   -CSF (7/8): OP27, CP12. G<15,P162, CSF cx pending, gram stain with yeast    -CSF (7/13): OP21, CP11. CSF Cx w few yeast. Cryptococcal antigen positive.   -CTH 6/30 with slight enlargement of the temporal horn of right lateral ventricle compared to prior MRI brain 06/23    -CTH 7/8 without significant change  -Ophthalmology following. Last eval 7/7 with stable OU, color plates full. Still 3+ ONH edema  -ID: Pt continued on flucytosine and amphotericin B 7/8. F/u ongoing ID recommendations  -Patient with improvement in headaches. LP on 7/13 with opening pressure of 21. Downtrending from prior LPs with opening pressure of 35 and then 27. LP 7/15 OP 18.5, CP 10. Will monitor for headaches over the weekend. VPS shunt tentatively  on for Monday. If pressure continues to normalize, shunt may not be indicated.    -We will continue to follow. Please call NSGY with any decline in neuro exam. Would have a low threshold to transfer to ICU with any worsening visual deficits, decline in neuro exam or signs of worsening ICPs    Discussed with Dr. Edu Gupta MD  Neurosurgery  [facility] - Telemetry Stepdown

## 2022-07-17 NOTE — ASSESSMENT & PLAN NOTE
- poor PO intake 2/2   - Nutrition consulted  - Continue PPN  - Continue scheduled zofran - QTc 425 today   - Gastric emptying study ordered   - Continue boost supplemental drinks

## 2022-07-17 NOTE — CLINICAL REVIEW
KTM Chart Review      Intake/Output Summary (Last 24 hours) at 7/17/2022 1245  Last data filed at 7/17/2022 0856  Gross per 24 hour   Intake 1300 ml   Output 300 ml   Net 1000 ml       Vitals:    07/17/22 0028 07/17/22 0420 07/17/22 0850 07/17/22 1213   BP: (!) 102/50 103/63 129/62 (!) 115/51   BP Location: Right leg Right leg Right leg    Patient Position: Lying Lying Sitting Lying   Pulse: 95 98 99 82   Resp: 17 18 18 18   Temp: 97.9 °F (36.6 °C) 97.8 °F (36.6 °C) 97.8 °F (36.6 °C) 98 °F (36.7 °C)   TempSrc: Oral Axillary Oral Oral   SpO2: (!) 94% (!) 94% 97% 95%   Weight:       Height:           Recent Labs   Lab 07/11/22  0426 07/12/22  0325 07/14/22  0448 07/15/22  0500 07/16/22  0517 07/17/22  1029   *   < > 136 139 136  --    K 3.5   < > 3.3* 4.1 3.2*  --       < > 111* 113* 108  --    CO2 22*   < > 17* 17* 20*  --    BUN 27*   < > 16 22* 30*  --    CREATININE 1.4   < > 1.1 1.6* 1.5*  --    CALCIUM 8.1*   < > 8.1* 8.4* 7.9*  --    PHOS 3.7  --   --   --   --  3.7    < > = values in this interval not displayed.       Immunosuppression Level - 13.2    Continue to hold tacro    Please call KTM as needed; We will continue to follow.    Roberto Eisenberg Jr.

## 2022-07-17 NOTE — PLAN OF CARE
Chart reviewed  CBC w/ worsening cytopenia - suspect this is from flucytosine, but recommend hematology eval  Continue fluconazole 400mg daily - repeat renal function today, if continues to worsen, may need to decrease to 200mg  Monitor prograf while on fluconazole  CSF cultures remain NGTD   NSGY following - final determination on need for shunt is TBD    Nahed Prakasho DO  Transplant Infectious Disease

## 2022-07-17 NOTE — PROGRESS NOTES
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 Rivera is a 56yoF w/PMHx of kidney XPL (2020 [facility]) on Prograf and Cellcept admitted to [facility] on 06/22 with intractable HA starting about 2 months earlier and with dizziness, blurred vision, left-sided facial droop and dysarthria starting a few days before admission.       On admission /72, HR 69. Neurologic exam is remarkable for left-sided facial weakness and mild dysarthria.  Labs (6/22) WBC 9.4, Hb 15.9, Plts 224, Na 137, K 3.3, CO2 25, BUN 24, Cr 1.32, AG 14, Glu 182 LFTs WNL. COVID neg     LP (6/24) CSF Glu 6, CSF protein 93. Zimbabwean stain pos for yeast like organisms.  Spinal fluid culture pos for encapsulated yeast species.       CT chest WO contrast pos for multiple indeterminate soft tissue masses in the left lower lobe with 2 smaller masses in the right lung which may be part of the same process.       On 06/26 patient started on amphotericin B liposomal (275 IV q.d.) and flucytosine (1500 p.o. q.6h) .  Hospitalization c/b the development of acute renal failure.  Cr 1.32 (6/22) > 1.4 (6/26) > 2.02 (6/27).  Cellcept and prednisone have been held.       Transfer requested for higher level of care (KTM and ID).  Transfer diagnosis disseminated cryptococcal infection, TERESA, kidney XPL.    At time of my assessment, pt. Complains of headache which has been persistent for the last several days. She states that it is somewhat relieved by the fioricet and oxycodone that she was receiving at the outside hospital.      Overview/Hospital Course:  KTM, transplant ID, Neurology consulted upon admission. Per KTN, normal saline  scheduled with amphotericin infusions in addition to bicarb drip, prednisone increased, Prograf continued.  Creatinine down-trending and bicarb drip discontinued.  Per Neurology, outside hospital MR MRI reviewed and note that neuro deficits may take a few weeks to fully resolve; recommend limiting Fioricet and narcotics to minimize medication overuse/rebound headaches.  Per Transplant ID, continue ambisome/flucytosine with plan to repeat LP 2 weeks from 6/24.  Due to visual disturbance in the setting of fungal meningitis, ophthalmology consulted.  No infectious nidus found in retina or vitreous, however grade 3 disc edema bilaterally. Exam finding and subjective complaints explained by meningitis and papilledema.  Recommendation for repeating LP to reduce opening pressure per ID and Neurology in agreement.  On 06/29, patient hearing improved.  Remains with intermittent confusion per  at bedside.    CTH performed 06/30- Slight interval enlargement of the temporal horn of the right lateral ventricle since the recent MRI of 06/23/2022.  Appearance suggestive of early/developing hydrocephalus or ventricular entrapment. NSGY consulted. Per NSGY, recommended NCC transfer for further evaluation. Patient underwent repeat LP 06/30- opening pressure of 35cm- closing pressure of 13cm. Patient w/much improvement in signs and symptoms. CSF findings: <5glucose, 174 protein, 144 WBC, 3000 RBC, 37 lymph, 59 mono/macro. Repeat CSF w/1:8 cryptococcal antigen, CSF culture w/yeast. NCC evaluated, but patient with much improvement in signs and symptoms, and so NCC recommended continued observation on the floor. Discussed w/ID- stated to continue to monitor for worsening signs and symptoms- with closing pressure <25cm, no need for serial LP's at this time.     Doppler US performed on LLE- found to have DVT. Started on heparin drip. Planning for repeat LP 07/07.  Heparin drip paused and repeat lumbar puncture completed on 07/07.   Per Radiology, CSF was clear to gout 16 mL total; opening pressure 27, closing pressure 12. Gram stain notable for few yeast.  Repeat CT head ordered with stable findings.  Neurosurgery consulted; Considering  shunt placement pending clearance of CSF and decrease in CSF protein.  Recommend ongoing serial LPs for now.  Repeat lumbar puncture performed 7/13.  Opening pressure 21, closing pressure 11. Final repeat LP 07/15- opening pressure 18.5cm, closing pressure 10.     Patient had persistent abd pain and diarrhea- topamax tapered down. Bentyl held as this could also be associated with nausea. Patient was started on lexapro 10mg qday and hydroxyzine 25mg qhs for additional anti-anxiety medication. Psychology consulted.       Interval History: Patient was seen and evaluated. Per - worsening hearing for patient. Patient is refusing to eat 2/2 nausea- will only take a few bites at a time. Scheduled zofran started yesterday, 07/16. Received PPN overnight. No chest pain, sob, cough, headache, vision changes.     Objective:     Vital Signs (Most Recent):  Temp: 97.7 °F (36.5 °C) (07/17/22 1543)  Pulse: 100 (07/17/22 1543)  Resp: 16 (07/17/22 1543)  BP: (!) 140/67 (07/17/22 1543)  SpO2: 97 % (07/17/22 1543)   Vital Signs (24h Range):  Temp:  [97.7 °F (36.5 °C)-98.7 °F (37.1 °C)] 97.7 °F (36.5 °C)  Pulse:  [] 100  Resp:  [16-18] 16  SpO2:  [94 %-97 %] 97 %  BP: (101-140)/(50-67) 140/67     Weight: 63.8 kg (140 lb 10.5 oz)  Body mass index is 27.47 kg/m².    Intake/Output Summary (Last 24 hours) at 7/17/2022 5569  Last data filed at 7/17/2022 0856  Gross per 24 hour   Intake 1300 ml   Output 300 ml   Net 1000 ml      Physical Exam  Gen: in NAD, appears stated age  Neuro: AAOx3, motor, sensory, and strength grossly intact BL  HEENT: NTNC, EOMI, PERRL, MMM  CVS: RRR, systolic murmur appreciated (possibly flow murmur), no rubs/gallops, S1/S2 auscultated with no S3 or S4; capillary refill < 2 sec  Resp: lungs  CTAB, no w/r/r; no belabored breathing or accessory muscle use appreciated   Abd: BS+ in all 4 quadrants; NTND, soft to palpation; no organomegaly appreciated   Extrem: pulses full, equal, and regular over all 4 extremities;no swelling of BL UE or LE- BL LE wrapped in ACE wrap.      Significant Labs: All pertinent labs within the past 24 hours have been reviewed.  Blood Culture: No results for input(s): LABBLOO in the last 48 hours.  CBC:   Recent Labs   Lab 07/15/22  1611 07/16/22  0517 07/17/22  1030   WBC 2.81* 2.16*  2.16* 1.06*  1.06*   HGB 11.4* 10.6*  10.6* 9.8*  9.8*   HCT 34.8* 31.7*  31.7* 29.2*  29.2*   * 96*  96* 112*  112*     CMP:   Recent Labs   Lab 07/16/22  0517      K 3.2*      CO2 20*   *   BUN 30*   CREATININE 1.5*   CALCIUM 7.9*   PROT 4.3*   ALBUMIN 2.3*   BILITOT 0.6   ALKPHOS 39*   AST 10   ALT 7*   ANIONGAP 8   EGFRNONAA 39.2*     Prealbumin: No results for input(s): PREALBUMIN in the last 48 hours.  Troponin: No results for input(s): TROPONINI in the last 48 hours.  Urine Culture: No results for input(s): LABURIN in the last 48 hours.  Urine Studies:   Recent Labs   Lab 07/16/22  1532   COLORU Carey   APPEARANCEUA Hazy*   PHUR 8.0   SPECGRAV 1.020   PROTEINUA 1+*   GLUCUA 2+*   KETONESU Negative   BILIRUBINUA Negative   OCCULTUA Negative   NITRITE Negative   LEUKOCYTESUR 1+*   RBCUA 1   WBCUA 4   BACTERIA None   SQUAMEPITHEL 0   HYALINECASTS 3*       Significant Imaging: I have reviewed all pertinent imaging results/findings within the past 24 hours.    KUB:  FINDINGS:  Osseous structures are unremarkable.  There is no bowel distension.  Surgical change present within left abdomen.  No abnormal calcification.     Impression:     As above    CT A/P:  FINDINGS:  CHEST:     Lungs/Pleura: Unchanged appearance of multiple solid pulmonary nodules within the left lower lobe compared to CT abdomen pelvis 07/12/2022.  The largest nodule measures 2.4 cm (series 2,  image 13).  2 mm nodule right middle lobe series 2, image 1 similar.  No focal consolidation.  No pneumothorax.  No pleural effusion.     Heart: Normal size.  New small pericardial effusion.     Thoracic soft tissues: Unremarkable     ABDOMEN:     Liver: Enlarged measuring 20.6 cm.  No focal hepatic abnormality.     Gallbladder/Bile ducts: Gallbladder is distended without inflammatory changes.  No intrahepatic or extrahepatic biliary ductal dilatation.     Spleen:Unremarkable.     Stomach: Postoperative changes of the stomach, gastric bypass.     Pancreas: No mass or peripancreatic fat stranding.     Adrenals: Unremarkable.     Renal/Ureters: Native kidneys are atrophic.  Left renal simple cyst.  No hydronephrosis.  The ureters are normal in course and caliber. The urinary bladder decompressed without focal abnormality.     Transplant kidney within the right lower quadrant.  No hydronephrosis.     Reproductive: Uterus is unremarkable.  No adnexal mass.     Bowel: Loops of small bowel show no evidence of obstruction or inflammation.  Postop changes of a distal segment of the small bowel within the left lower quadrant.  Loops of large bowel are partially distended with liquid stool.  Majority the colon is somewhat featureless.  No evidence of obstruction.  The appendix is not confidently visualized; however, no inflammatory changes in the expected location of the appendix.     Peritoneum: No free fluid.  No intraperitoneal free air.     Lymph Nodes: No pathologic blessing enlargement in the abdomen or pelvis.     Vasculature: Abdominal aorta is normal in course and caliber.  Mild calcifications of the abdominal aorta.     Bones: Degenerative changes of the spine, including diffuse idiopathic skeletal hyperostosis of the thoracic spine.  No acute fractures or osseous destructive lesions.     Soft Tissues: Soft tissue thickening of the subcutaneous tissues of the anterior abdomen, adjacent prior surgery.     Impression:      Liquid stool throughout the colon.  Colon is somewhat featureless suggesting chronic inflammatory process.     Hepatomegaly.     New small pericardial effusion.     Unchanged appearance of lower lobe pulmonary nodules as detailed above.      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  - Stopped ampho and flucytosine due to suspected drug-induced pancytopenia  - Continue fluconazole 400mg qday  - On chart review, amphotericin started on 06/24/22- 2wks of treatment 07/08/22  - Repeat LP completed 7/9; gram stain notable for yeast; and amphotericin continued; cultures no growth to date  - LP 07/15 w/opening pressure of 18.5 and closing pressure of 10-- NSGY will re-evaluate on Monday whether VPS is indicated or not  -Transplant ID following- continue with current course- patient signs and symptoms much improved since time of admission, will hold of on steroids- no signs of IRIS, will continue to monitor for need for repeat LP  - KTM following; recommend 500mL normal saline with amphotericin     Increased intracranial pressure  - Last intracranial pressure of 34cm from LP at OSH  - Patient with papilledema, vomiting, AMS  - CTH w/developing hydrocephalus and signs of ventricular entrapment  - NSGY notified and consulted- no acute interventions at this time  - NCC notified- continue monitoring on floor  - S/p LP w/opening pressure of 35cm and closing pressure of 13cm 06/30/22  - S/p LP w/opening pressure of 27cm and closing pressure of 12cm 07/08/22  - Continue to monitor for signs and symptoms of worsening ICP  - Repeat CTH 07/01 relatively unchanged   - Repeat CTH 7/8 without significant change  - S/p LP  w/opening pressure of 21cm and closing pressure of 11cm 07/13/22  - S/p LP w/openiing pressure of 18.5cm and closing pressure of 10cm  "07/15/22  - neurosurgery consulted and following; tentative  shunt placement 7/18     Bilateral papilledema due to raised intracranial pressure  - Ophthalmology evaluation with grade 3 papilledema in both eyes (longer she has elevated CSF pressure, the higher her risk of permanent optic nerve damage and vision change is.  There is a congested appearance to the veins in both eyes secondary to optic nerve congestion and indicates higher risk for ischemic event), 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  -"7/7 exam, vision stable OU, color plates full. Still 3+ ONH edema, and we do not expect this to rapidly improve as there is often delay of resolution of ONH edema following improvement in ICP "  - see increased intracranial pressure    Acute renal failure superimposed on stage 3 chronic kidney disease  - Cr 2.2 on day of transfer, has been steadily worsening over last few days (baseline ~1.2)  - KTM consulted, appreciate recs; likely prerenal versus medication related  - US transplant unremarkable for acute changes   - Creatinine pending   - Continue sodium bicarb    Poor nutrition  - poor PO intake 2/2   - Nutrition consulted  - Continue PPN  - Continue scheduled zofran - QTc 425 today   - Gastric emptying study ordered   - Continue boost supplemental drinks    Diarrhea  - bentyl can be associated with nausea- stopped  - Stool studies sent  - topamax can be associated with diarrhea- will continue with wean as patient has not been on 100mg BID for a prolonged period of time  - CT of abd w/o contrast with liquid stool throughout intestine   - No leukocytosis concerning for new infection  - continue loperamide  - c diff negative 07/12    Anxiety  - Continue lexapro 10mg qday   - Hydroxyzine 25mg qhs  - Consult psychology     VTE (venous thromboembolism)  - continue heparin drip for DVT of the LLE  - pause as needed in the setting of procedures    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  - Improving  - See papilledema-- spoke w/ophthalmology- expect papilledema to " improve over the course of 2wks if infection remains well-controlled and ICP remains wnl    Headache  - history of intractable headache  - Continue topamax 50mg BID - plan to decrease to 25mg qday in 3d  - continue as needed Fioricet, oxycodone/acetaminophen, acetaminophen; headache responding to prns    Drug-induced pancytopenia  - likely from flucytosine- w/associated eosinophila  - Heme/Onc consulted- agree with likely drug-induced pancytopenia, no need for bone marrow biopsy at this time  - ECG w/QTc of 425  - Daily ECG to be performed  - will monitor WBC, RBC, Plt trend  - retic, ldh, b12, folate, ldh- wnl  - Heparin antibodies sent  - Obtain fibrinogen     Hypokalemia  - PO repletion prn to goal    Long-term use of immunosuppressant medication  - Pt with opportunistic infection, see above. Holding mycophenolate   - KTM following, appreciate recs  Continue prograf per levels   Monitor levels to assess for toxicity  Continue prednisone ---> increased to 10mg daily   Hold MMF     S/P kidney transplant  - See TERESA. KTM consulted for further assistance  Kidney transplant 4/5/2020   Underlying ckd stage III  ESRD related to HTN    VTE Risk Mitigation (From admission, onward)         Ordered     heparin 25,000 units in dextrose 5% (100 units/ml) IV bolus from bag - ADDITIONAL PRN BOLUS - 60 units/kg  As needed (PRN)        Question:  Heparin Infusion Adjustment (DO NOT MODIFY ANSWER)  Answer:  \[email or web address]\Images\Pharmacy\HeparinInfusions\heparin HIGH INTENSITY nomogram for [facility] [email or web address]    07/15/22 1545     heparin 25,000 units in dextrose 5% (100 units/ml) IV bolus from bag - ADDITIONAL PRN BOLUS - 30 units/kg  As needed (PRN)        Question:  Heparin Infusion Adjustment (DO NOT MODIFY ANSWER)  Answer:  \[email or web address]\Images\Pharmacy\HeparinInfusions\heparin HIGH INTENSITY nomogram for [facility] [id number]    07/15/22 1545     heparin 25,000 units in dextrose 5% 250 mL (100 units/mL) infusion HIGH INTENSITY  nomogram - [facility]  Continuous        Question Answer Comment   Heparin Infusion Adjustment (DO NOT MODIFY ANSWER) [email or web address]\Images\Pharmacy\HeparinInfusions\heparin HIGH INTENSITY nomogram for [facility] [id number]    Begin at (in units/kg/hr) 18        07/15/22 1545     IP VTE HIGH RISK PATIENT  Once         06/27/22 2202     Place sequential compression device  Until discontinued         06/27/22 2202                Discharge Planning   JULITO: 7/18/2022     Code Status: Full Code   Is the patient medically ready for discharge?: No    Reason for patient still in hospital (select all that apply): Patient trending condition  Discharge Plan A: Home with family                Keke Hicks MD  Department of Hospital Medicine   [facility] - Telemetry Stepdown

## 2022-07-17 NOTE — HPI
Patient is a 56-year-old female with history of kidney transplant on Prograf and CellCept who was transferred from outside hospital for intractable headache.  Patient diagnosed with fungal pneumonia with cryptococcus.  Patient was started on induction amphotericin B and flucytosine on 6/26/22 which was discontinued on 7/16/22 due to probable drug-induced pancytopenia.    Patient reports having hearing loss. Denies fevers, chills, n/v, abd pain, bruising and bleeding.      VS normal   Labs with pancytopenia starting in the last several days.   Normal B12, Folate and RC. Elevated PT/INR.   Heparin antibodies sent

## 2022-07-17 NOTE — ASSESSMENT & PLAN NOTE
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 LP x 3 with elevated OP and papilledema on ophthalmology exam. NSGY consulted for consideration of drain placement for elevated ICPs. Pt is currently neurologically stable. Of note, pt is on hep gtt for acute DVT    -Imaging and diagnostics reviewed   -CSF (6/24) OP34, Glu 6, protein 93.  stain pos for yeast like organisms.  Cx pos for encapsulated yeast species.     -CSF (6/30): OP35,CP13. G<5, P174, 144 WBC, 3000 RBC, 37 lymph, 59 mono/macro. Repeat CSF w/1:8 cryptococcal antigen, cx w/yeast.   -CSF (7/8): OP27, CP12. G<15,P162, CSF cx pending, gram stain with yeast    -CSF (7/13): OP21, CP11. CSF Cx w few yeast. Cryptococcal antigen positive.   -CTH 6/30 with slight enlargement of the temporal horn of right lateral ventricle compared to prior MRI brain 06/23    -CTH 7/8 without significant change  -Ophthalmology following. Last eval 7/7 with stable OU, color plates full. Still 3+ ONH edema  -ID: Pt continued on flucytosine and amphotericin B 7/8. F/u ongoing ID recommendations  -Patient with improvement in headaches. LP on 7/13 with opening pressure of 21. Downtrending from prior LPs with opening pressure of 35 and then 27. LP 7/15 OP 18.5, CP 10. Will monitor for headaches over the weekend. VPS shunt tentatively on for Monday. If pressure continues to normalize, shunt may not be indicated.    -We will continue to follow. Please call NSGY with any decline in neuro exam. Would have a low threshold to transfer to ICU with any worsening visual deficits, decline in neuro exam or signs of worsening ICPs    Discussed with Dr. Sorensen

## 2022-07-17 NOTE — SUBJECTIVE & OBJECTIVE
Medications:  Continuous Infusions:   heparin (porcine) in D5W 22 Units/kg/hr (22 1125)    Standard Custom Day One ADULT TPN for patient with NO electrolyte abnormality or NO renal dysfunction (PERIPHERAL) 42 mL/hr at 22 2140     Scheduled Meds:   acetaminophen  500 mg Oral Q24H    calcitRIOL  0.5 mcg Oral Daily    EScitalopram oxalate  10 mg Oral Daily    fluconazole  400 mg Oral Daily    hydrOXYzine HCL  25 mg Oral QHS    Lactobacillus rhamnosus GG  1 capsule Oral Daily    multivitamin  1 tablet Oral Daily    NIFEdipine  90 mg Oral Daily    ondansetron  8 mg Oral Q8H    predniSONE  5 mg Oral Daily    sodium bicarbonate  1,300 mg Oral TID    topiramate  50 mg Oral BID     PRN Meds:acetaminophen, albuterol-ipratropium, bisacodyL, glucose, glucose, heparin (PORCINE), heparin (PORCINE), iohexol, loperamide, melatonin, naloxone, ondansetron, oxyCODONE-acetaminophen, sodium chloride 0.9%     Review of patient's allergies indicates:   Allergen Reactions    Sulfa (sulfonamide antibiotics) Hives        Past Medical History:   Diagnosis Date    Anemia of renal disease     ESRD on dialysis     Dr. Muhammad     Essential hypertension     PD catheter dysfunction 10/18/2018    Secondary hyperparathyroidism of renal origin      Past Surgical History:   Procedure Laterality Date    AV FISTULA PLACEMENT Left     never matured    AV graft Right 2015     SECTION      x3; ; , and     COLONOSCOPY N/A 2020    Procedure: COLONOSCOPY;  Surgeon: Darrell Golden MD;  Location: [facility];  Service: Endoscopy;  Laterality: N/A;    GASTRIC BYPASS  2016    KIDNEY TRANSPLANT N/A 2020    Procedure: TRANSPLANT, KIDNEY;  Surgeon: Megan Garcia MD;  Location: [address];  Service: Transplant;  Laterality: N/A;    KIDNEY TRANSPLANT Right 2020    LAPAROSCOPIC REVISION OF PROCEDURE INVOLVING PERITONEAL DIALYSIS CATHETER N/A 10/18/2018    Procedure: REVISION OF PROCEDURE INVOLVING  PERITONEAL DIALYSIS CATHETER, LAPAROSCOPIC;  Surgeon: Hair Jimenez MD;  Location: [facility];  Service: General;  Laterality: N/A;    PERITONEAL CATHETER INSERTION N/A 9/4/2018    Procedure: Laparoscopic INSERTION, CATHETER, INTRAPERITONEAL;  Surgeon: Hair Jimenez MD;  Location: [facility];  Service: General;  Laterality: N/A;    PERITONEAL CATHETER REMOVAL N/A 12/14/2018    Procedure: REMOVAL, CATHETER, DIALYSIS, PERITONEAL;  Surgeon: Hair Jimenez MD;  Location: [facility] OR;  Service: General;  Laterality: N/A;    WOUND EXPLORATION N/A 12/14/2018    Procedure: EXPLORATION, WOUND-Surgical Site Irrigation;  Surgeon: Hair Jimenez MD;  Location: [facility] OR;  Service: General;  Laterality: N/A;     Family History       Problem Relation (Age of Onset)    Diabetes Mother    Down syndrome Son    Heart disease Mother    Hypertension Mother    Kidney disease Mother    Lupus Sister          Tobacco Use    Smoking status: Never Smoker    Smokeless tobacco: Never Used   Substance and Sexual Activity    Alcohol use: No    Drug use: No    Sexual activity: Yes     Partners: Male       Review of Systems  As per HPI  Objective:     Vital Signs (Most Recent):  Temp: 98 °F (36.7 °C) (07/17/22 1213)  Pulse: 82 (07/17/22 1213)  Resp: 18 (07/17/22 1213)  BP: (!) 115/51 (07/17/22 1213)  SpO2: 95 % (07/17/22 1213)   Vital Signs (24h Range):  Temp:  [97.7 °F (36.5 °C)-98.7 °F (37.1 °C)] 98 °F (36.7 °C)  Pulse:  [] 82  Resp:  [16-18] 18  SpO2:  [94 %-98 %] 95 %  BP: (101-149)/(50-86) 115/51     Weight: 63.8 kg (140 lb 10.5 oz)  Body mass index is 27.47 kg/m².  Body surface area is 1.64 meters squared.      Intake/Output Summary (Last 24 hours) at 7/17/2022 1245  Last data filed at 7/17/2022 0856  Gross per 24 hour   Intake 1300 ml   Output 300 ml   Net 1000 ml       Physical Exam  Alert awake oriented x3  Regular rate and rhythm  Lungs clear to auscultation bilaterally  Abdomen soft nontender  No lower extremity edema    Significant Labs:    All pertinent labs from the last 24 hours have been reviewed.    Diagnostic Results:  I have reviewed all pertinent imaging results/findings within the past 24 hours.

## 2022-07-17 NOTE — CONSULTS
[facility] - Telemetry Stepdown  Hematology/Oncology  Consult Note    Patient Name: Tala Rivera  MRN: [id number]  Admission Date: 6/27/2022  Hospital Length of Stay: 20 days  Code Status: Full Code   Attending Provider: Keke Hicks MD  Consulting Provider: Ciarra Unger MD  Primary Care Physician: ANUJA Castellon MD  Principal Problem:Meningitis due to fungal infection    Inpatient consult to Hematology/Oncology  Consult performed by: Ciarra Unger MD  Consult ordered by: Keke Hicks MD        Subjective:     HPI:  Patient is a 56-year-old female with history of kidney transplant on Prograf and CellCept who was transferred from outside hospital for intractable headache.  Patient diagnosed with fungal pneumonia with cryptococcus.  Patient was started on induction amphotericin B and flucytosine on 6/26/22 which was discontinued on 7/16/22 due to probable drug-induced pancytopenia.    Patient reports having hearing loss. Denies fevers, chills, n/v, abd pain, bruising and bleeding.      VS normal   Labs with pancytopenia starting in the last several days.   Normal B12, Folate and RC. Elevated PT/INR.   Heparin antibodies sent          Medications:  Continuous Infusions:   heparin (porcine) in D5W 22 Units/kg/hr (07/17/22 1125)    Standard Custom Day One ADULT TPN for patient with NO electrolyte abnormality or NO renal dysfunction (PERIPHERAL) 42 mL/hr at 07/16/22 2140     Scheduled Meds:   acetaminophen  500 mg Oral Q24H    calcitRIOL  0.5 mcg Oral Daily    EScitalopram oxalate  10 mg Oral Daily    fluconazole  400 mg Oral Daily    hydrOXYzine HCL  25 mg Oral QHS    Lactobacillus rhamnosus GG  1 capsule Oral Daily    multivitamin  1 tablet Oral Daily    NIFEdipine  90 mg Oral Daily    ondansetron  8 mg Oral Q8H    predniSONE  5 mg Oral Daily    sodium bicarbonate  1,300 mg Oral TID    topiramate  50 mg Oral BID     PRN Meds:acetaminophen, albuterol-ipratropium, bisacodyL, glucose, glucose,  heparin (PORCINE), heparin (PORCINE), iohexol, loperamide, melatonin, naloxone, ondansetron, oxyCODONE-acetaminophen, sodium chloride 0.9%     Review of patient's allergies indicates:   Allergen Reactions    Sulfa (sulfonamide antibiotics) Hives        Past Medical History:   Diagnosis Date    Anemia of renal disease     ESRD on dialysis     Dr. Muhammad     Essential hypertension     PD catheter dysfunction 10/18/2018    Secondary hyperparathyroidism of renal origin      Past Surgical History:   Procedure Laterality Date    AV FISTULA PLACEMENT Left     never matured    AV graft Right 2015     SECTION      x3; ; , and     COLONOSCOPY N/A 2020    Procedure: COLONOSCOPY;  Surgeon: Darrell Golden MD;  Location: [facility] ENDO;  Service: Endoscopy;  Laterality: N/A;    GASTRIC BYPASS      KIDNEY TRANSPLANT N/A 2020    Procedure: TRANSPLANT, KIDNEY;  Surgeon: Megan Garcia MD;  Location: [address];  Service: Transplant;  Laterality: N/A;    KIDNEY TRANSPLANT Right 2020    LAPAROSCOPIC REVISION OF PROCEDURE INVOLVING PERITONEAL DIALYSIS CATHETER N/A 10/18/2018    Procedure: REVISION OF PROCEDURE INVOLVING PERITONEAL DIALYSIS CATHETER, LAPAROSCOPIC;  Surgeon: Hair Jimenez MD;  Location: [facility];  Service: General;  Laterality: N/A;    PERITONEAL CATHETER INSERTION N/A 2018    Procedure: Laparoscopic INSERTION, CATHETER, INTRAPERITONEAL;  Surgeon: Hair Jimenez MD;  Location: [facility];  Service: General;  Laterality: N/A;    PERITONEAL CATHETER REMOVAL N/A 2018    Procedure: REMOVAL, CATHETER, DIALYSIS, PERITONEAL;  Surgeon: Hair Jimenez MD;  Location: [facility];  Service: General;  Laterality: N/A;    WOUND EXPLORATION N/A 2018    Procedure: EXPLORATION, WOUND-Surgical Site Irrigation;  Surgeon: Hair Jimenez MD;  Location: [facility];  Service: General;  Laterality: N/A;     Family History       Problem Relation (Age of Onset)     Diabetes Mother    Down syndrome Son    Heart disease Mother    Hypertension Mother    Kidney disease Mother    Lupus Sister          Tobacco Use    Smoking status: Never Smoker    Smokeless tobacco: Never Used   Substance and Sexual Activity    Alcohol use: No    Drug use: No    Sexual activity: Yes     Partners: Male       Review of Systems  As per HPI  Objective:     Vital Signs (Most Recent):  Temp: 98 °F (36.7 °C) (07/17/22 1213)  Pulse: 82 (07/17/22 1213)  Resp: 18 (07/17/22 1213)  BP: (!) 115/51 (07/17/22 1213)  SpO2: 95 % (07/17/22 1213)   Vital Signs (24h Range):  Temp:  [97.7 °F (36.5 °C)-98.7 °F (37.1 °C)] 98 °F (36.7 °C)  Pulse:  [] 82  Resp:  [16-18] 18  SpO2:  [94 %-98 %] 95 %  BP: (101-149)/(50-86) 115/51     Weight: 63.8 kg (140 lb 10.5 oz)  Body mass index is 27.47 kg/m².  Body surface area is 1.64 meters squared.      Intake/Output Summary (Last 24 hours) at 7/17/2022 1245  Last data filed at 7/17/2022 0856  Gross per 24 hour   Intake 1300 ml   Output 300 ml   Net 1000 ml       Physical Exam  Alert awake oriented x3  Regular rate and rhythm  Lungs clear to auscultation bilaterally  Abdomen soft nontender  No lower extremity edema    Significant Labs:   All pertinent labs from the last 24 hours have been reviewed.    Diagnostic Results:  I have reviewed all pertinent imaging results/findings within the past 24 hours.    Assessment/Plan:     Pancytopenia  Patient is a 56-year-old female with history of kidney transplant on Prograf and CellCept who was transferred from outside hospital for intractable headache.  Patient diagnosed with fungal pneumonia with cryptococcus.  Patient was started on induction amphotericin B and flucytosine on 6/26/22 which was discontinued on 7/16/22 due to probable drug-induced pancytopenia.    Pancytopenia starting in the last several days. Unlikely to be nutritional w/ normal B12 and folate. No lab evidence of hemolysis, very low risk for TTP. Elevated PT/INR.    Would recommend fibrinogen to complete DIC eval.   Heparin antibodies sent.  Peripheral smear reviewed w/o evidence of platelet clumping, atypical lymphocytes and hemolysis.     Pancytopenia likely drug induced, could also be component of infection, although, this seems to be improved.     Would not pursue further intervention with bone marrow biopsy.     Thank you for your consult. I will sign off. Please contact us if you have any additional questions.    Ciarra Unger MD  Hematology/Oncology  [facility] - Telemetry Stepdown      ATTENDING NOTE, HEMATOLOGY CONSULT TEAM    As above; Patient seen and examined, chart reviewed.  Peripheral smear reviewed.  Briefly, this is 56-year-old kidney transplant recipient who was admitted with cryptococcal meningitis.  She was treated with liposomal amphotericin and flucytosine, however, over the last 4 days she developed progressively worsening leukopenia, hence the consult for our Service.    On examination, she appears comfortable, in NAD. She is hard of hearing  Lungs are clear to auscultation.  Abdomen is soft, nontender.  There is no adenopathy  Labs reviewed.  CBC today shows what a white count of 1,000 per cubic mm, hemoglobin 9.8 grams/deciliter and platelets 112,000 per cubic mm.  Her leukopenia developed within the last 4 days, and it is most likely medication induced, presumably from flucytosine and/or amphoptericin. Flucytosine was held 2 days ago and amphotericin was held yesterday. Currently on fluconazole.    RECOMMEND  Follow CBC daily  No need for bone marrow biopsy.  We will follow with you.      Apollo Wood MD

## 2022-07-18 PROBLEM — I95.9 HYPOTENSION: Status: ACTIVE | Noted: 2022-01-01

## 2022-07-18 NOTE — PROGRESS NOTES
[facility] - Telemetry Stepdown  Infectious Disease  Progress Note    Patient Name: Tala Rivera  MRN: [id number]  Admission Date: 6/27/2022  Length of Stay: 21 days  Attending Physician: Keke Hicks MD  Primary Care Provider: ANUJA Castellon MD    Isolation Status: No active isolations  Assessment/Plan:      * Meningitis due to fungal infection  56F h/o HTN, gastric bypass, ESRD 2/2 HTN s/p DBDKT 4/5/20 (basiliximab induction,, CMV D+/R+, tacro, MMF), COVID19 7/2020, recently discharged 6/2022 for 2 months of headaches, now admitted to [facility] with intractable headaches, dizziness, blurred vision, left sided facial droop, MRI brain negative 6/23, s/p LP 6/24 with OP of 34cm, CSF: WBC unable to quantitate due to infereing cellular structures, gluc 6, protein 93, shahnaz ink positive for encapsulated yeast and cultures also growing yeast, CT chest with b/l pulmonary nodules started on ambisome, flucytosine 6/26 course notable for TERESA (Cr 1.3->2.0) now transferred to [facility] for further care. Headaches much improved, emesis has improved, eating food, serum crypto Ag positive, ophthalmology exam with papilledema. Pt required serial LP, now w/ most recent LP on 7/16 w/ opening pressure downtrending to 18. Pt endorses improvement in headaches, no vision changes. Still has not recovered hearing (baseline deafness in R ear per , however L ear hearing loss is new since infection started). Most recent crypto ag > 1:256, suggesting early low titers were related to prozone effect, and is not representative of worsening infection. Last positive culture was 6/24 at outside hospital. All cultures at [facility] remain negative. Yeast remain present on gram stain, likely representing dead organisms. NSGY still following for possible need for  shunt placement.  C/o epigastric pain and diarrhea    Recommendations:   - given immunocompromise and complaints would consider evaluation for EGD and cscope.   - pt has  completed 20 days of induction w/ ampho + flucytosine   - developing progressive cytopenias, likely 2/2 flucytosine   - also w/ TERESA, ampho vs. Decreased PO intake  - given developing toxicities and normalizing opening pressure, continue fluconazole consolidation therapy today  - anticipated course: consolidation fluconazole for at least 8 weeks (EOT 9/10), with step down to fluconazole maintenance therapy, to be maintained for at least 12 months  - follow up neurosurgical plans    ID will continue to follow        Anticipated Disposition: pending    Thank you for your consult. I will follow-up with patient. Please contact us if you have any additional questions.    Damien Alexis MD  Infectious Disease  [facility] - Telemetry Stepdown    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    HPI: 55 y/o F h/o HTN, gastric bypass, ESRD 2/2 HTN s/p DBDKT 4/5/20 (basiliximab induction,, CMV D+/R+, tacro, MMF), COVID19 7/2020, recently discharged 6/2022 for 2 months of headaches, now admitted to [facility] with intractable headaches, dizziness, blurred vision, left sided facial droop, s/p LP 6/24 with CSF: WBC unable to quantitate due to infereing cellular structures, gluc 6, protein 93, shahnaz ink positive for yeast and cultures also growing yeast, CT chest with b/l pulmonary nodules started on ambisome, flucytosine 6/26 course notable for TERESA (Cr 1.3->2.0) now transferred to [facility] for further care. She states HA and n/v have slightly improved but still not feeling well    Interval History: c/o epigastric pain and diarrhea    Review of Systems   Constitutional:  Positive for fatigue.   HENT:  Positive for hearing loss.    Gastrointestinal:  Positive for abdominal pain and diarrhea.   Allergic/Immunologic: Positive for immunocompromised state.   Objective:     Vital Signs (Most Recent):  Temp: 97.1 °F (36.2 °C) (07/18/22 1623)  Pulse: 107 (07/18/22 1623)  Resp: 18 (07/18/22 1623)  BP: (!) 110/58  (07/18/22 1623)  SpO2: 100 % (07/18/22 1623)   Vital Signs (24h Range):  Temp:  [96.9 °F (36.1 °C)-99.3 °F (37.4 °C)] 97.1 °F (36.2 °C)  Pulse:  [] 107  Resp:  [16-18] 18  SpO2:  [90 %-100 %] 100 %  BP: ()/(45-58) 110/58     Weight: 60 kg (132 lb 4.4 oz)  Body mass index is 25.83 kg/m².    Estimated Creatinine Clearance: 23.7 mL/min (A) (based on SCr of 2.2 mg/dL (H)).    Physical Exam  Constitutional:       General: She is not in acute distress.     Appearance: Normal appearance. She is well-developed. She is not diaphoretic.   HENT:      Head: Normocephalic and atraumatic.      Right Ear: External ear normal.      Left Ear: External ear normal.      Nose: Nose normal.   Eyes:      General: No scleral icterus.        Right eye: No discharge.         Left eye: No discharge.      Extraocular Movements: Extraocular movements intact.      Conjunctiva/sclera: Conjunctivae normal.   Pulmonary:      Effort: Pulmonary effort is normal. No respiratory distress.      Breath sounds: No stridor.   Musculoskeletal:         General: Normal range of motion.   Skin:     Findings: No erythema or rash.   Neurological:      Mental Status: She is alert and oriented to person, place, and time. Mental status is at baseline.      Cranial Nerves: No cranial nerve deficit.      Comments: Hearing loss       Significant Labs: CBC:   Recent Labs   Lab 07/17/22  1030 07/18/22  0449   WBC 1.06*  1.06* 0.83*  0.83*   HGB 9.8*  9.8* 9.4*  9.4*   HCT 29.2*  29.2* 28.2*  28.2*   *  112* 116*  116*       CMP:   Recent Labs   Lab 07/17/22  1532 07/18/22  0449   * 135*   K 3.3* 3.3*   CL 98 97   CO2 25 25   * 215*   BUN 46* 53*   CREATININE 2.1* 2.2*   CALCIUM 8.0* 8.2*   PROT 4.8*  --    ALBUMIN 2.5*  --    BILITOT 1.0  --    ALKPHOS 45*  --    AST 7*  --    ALT <5*  --    ANIONGAP 12 13   EGFRNONAA 26.1* 24.7*         Significant Imaging: I have reviewed all pertinent imaging results/findings within the past  24 hours.

## 2022-07-18 NOTE — ASSESSMENT & PLAN NOTE
Her hypotension and tachycardia and leukopenia are concerning given prior elevated BP  CTH benign  CTAP with featureless colon  CXR benign  Consider bacterial blood cultures

## 2022-07-18 NOTE — ASSESSMENT & PLAN NOTE
56F h/o HTN, gastric bypass, ESRD 2/2 HTN s/p DBDKT 4/5/20 (basiliximab induction,, CMV D+/R+, tacro, MMF), COVID19 7/2020, recently discharged 6/2022 for 2 months of headaches, now admitted to [facility] with intractable headaches, dizziness, blurred vision, left sided facial droop, MRI brain negative 6/23, s/p LP 6/24 with OP of 34cm, CSF: WBC unable to quantitate due to infereing cellular structures, gluc 6, protein 93, shahnaz ink positive for encapsulated yeast and cultures also growing yeast, CT chest with b/l pulmonary nodules started on ambisome, flucytosine 6/26 course notable for TERESA (Cr 1.3->2.0) now [place] [facility] [place]. Headaches much improved, emesis has improved, eating food, serum crypto Ag positive, ophthalmology exam with papilledema. Pt required serial LP, now w/ most recent LP on 7/16 w/ opening pressure downtrending to 18. Pt endorses improvement in headaches, no vision changes. Still has not recovered hearing (baseline deafness in R ear per , however L ear hearing loss is new since infection started). Most recent crypto ag > 1:256, suggesting early low titers were related to prozone effect, and is not representative of worsening infection. Last positive culture was 6/24 at outside hospital. All cultures at [facility] remain negative. Yeast remain present on gram stain, likely representing dead organisms. NSGY still following for possible need for  shunt placement.  C/o epigastric pain and diarrhea    Recommendations:   - given immunocompromise and complaints would consider evaluation for EGD and cscope.   - pt has completed 20 days of induction w/ ampho + flucytosine   - developing progressive cytopenias, likely 2/2 flucytosine   - also w/ TERESA, ampho vs. Decreased PO intake  - given developing toxicities and normalizing opening pressure, continue fluconazole consolidation therapy today  - anticipated course: consolidation fluconazole for at least 8 weeks (EOT 9/10),  with step down to fluconazole maintenance therapy, to be maintained for at least 12 months  - follow up neurosurgical plans    ID will continue to follow

## 2022-07-18 NOTE — SUBJECTIVE & OBJECTIVE
Interval History: NAEON. Continues to deny headache and vision changes. Complaining of abdominal pain, abdomen non tender. Last opening pressure on 7/15 was 18.5. VPS cancelled.     Medications:  Continuous Infusions:   TPN ADULT PERIPHERAL CUSTOM 60 mL/hr at 07/17/22 2124     Scheduled Meds:   acetaminophen  500 mg Oral Q24H    calcitRIOL  0.5 mcg Oral Daily    EScitalopram oxalate  10 mg Oral Daily    fluconazole  400 mg Oral Daily    hydrOXYzine HCL  25 mg Oral QHS    Lactobacillus rhamnosus GG  1 capsule Oral Daily    multivitamin  1 tablet Oral Daily    NIFEdipine  90 mg Oral Daily    ondansetron  8 mg Oral Q8H    predniSONE  5 mg Oral Daily    sodium bicarbonate  1,300 mg Oral TID    topiramate  50 mg Oral BID     PRN Meds:acetaminophen, albuterol-ipratropium, bisacodyL, glucose, glucose, iohexol, loperamide, melatonin, naloxone, ondansetron, oxyCODONE-acetaminophen, sodium chloride 0.9%     Review of Systems  Objective:     Weight: 60 kg (132 lb 4.4 oz)  Body mass index is 25.83 kg/m².  Vital Signs (Most Recent):  Temp: 97.9 °F (36.6 °C) (07/18/22 0737)  Pulse: 94 (07/18/22 0737)  Resp: 18 (07/18/22 0737)  BP: (!) 98/45 (07/18/22 0737)  SpO2: (!) 90 % (07/18/22 0737)   Vital Signs (24h Range):  Temp:  [97.7 °F (36.5 °C)-99.3 °F (37.4 °C)] 97.9 °F (36.6 °C)  Pulse:  [] 94  Resp:  [16-18] 18  SpO2:  [90 %-97 %] 90 %  BP: ()/(45-67) 98/45                          Hemodialysis AV Graft Right forearm (Active)   Site Assessment Clean;Dry;Intact 07/30/20 0745   Patency Present;Thrill;Bruit 07/30/20 0745   Status Deaccessed 07/29/20 2010   Dressing Status Clean;Dry;Intact 10/18/18 0808   Site Condition No complications 07/30/20 0745   Dressing Open to air (None) 07/29/20 2010       Physical Exam    Neurosurgery Physical Exam  Head: normocephalic, atraumatic  Neurologic: Alert and oriented. Thought content appropriate.  GCS: Motor: 6/Verbal: 5/Eyes: 4 GCS Total: 15  Mental Status: Awake, Alert, Oriented  x3  Cranial nerves: face symmetric, tongue midline, CN II-XII grossly intact. Hard of hearing   Eyes: pupils equal, round, reactive to light with accomodation, EOMI.   Sensory: intact to light touch throughout  Motor Strength: Moves all extremities spontaneously with good tone.  Grossly full strength upper and lower extremities. No abnormal movements seen.         Pronator Drift: no drift noted  Finger-to-nose: Intact bilaterally    Significant Labs:  Recent Labs   Lab 07/17/22  1029 07/17/22  1532 07/18/22  0449   GLU  --  284* 215*   NA  --  135* 135*   K  --  3.3* 3.3*   CL  --  98 97   CO2  --  25 25   BUN  --  46* 53*   CREATININE  --  2.1* 2.2*   CALCIUM  --  8.0* 8.2*   MG 1.8  --   --      Recent Labs   Lab 07/17/22  1030 07/18/22  0449   WBC 1.06*  1.06* 0.83*  0.83*   HGB 9.8*  9.8* 9.4*  9.4*   HCT 29.2*  29.2* 28.2*  28.2*   *  112* 116*  116*     Recent Labs   Lab 07/17/22  1029 07/17/22  1532 07/17/22 2043 07/17/22  2344   INR  --   --  1.2  --    APTT 34.7*  34.5* 40.7*  --  33.2*     Microbiology Results (last 7 days)       Procedure Component Value Units Date/Time    CSF culture [[id number]] Collected: 07/13/22 1100    Order Status: Completed Specimen: CSF (Spinal Fluid) from CSF Tap, Tube 3 Updated: 07/18/22 0718     CSF CULTURE No Growth to date     Gram Stain Result Cytospin indicates:      No WBC's      Few yeast      Results called to and read back by: Laura Carey RN  07/13/2022        13:51    CSF culture [[id number]] Collected: 07/15/22 1508    Order Status: Completed Specimen: CSF (Spinal Fluid) from CSF Tap, Tube 3 Updated: 07/18/22 0717     CSF CULTURE No Growth to date     Gram Stain Result Cytospin indicates:      No WBC's      Few  yeast      Results called to and read back by:Diana Ann RN 07/15/2022  17:48    E. coli 0157 antigen [[id number]] Collected: 07/16/22 0630    Order Status: No result Specimen: Stool Updated: 07/16/22 2127    Stool culture [[id number]]  Collected: 07/16/22 0630    Order Status: Sent Specimen: Stool Updated: 07/16/22 2127    AFB Culture & Smear [[id number]] Collected: 07/15/22 1508    Order Status: Completed Specimen: CSF (Spinal Fluid) from CSF Tap, Tube 3 Updated: 07/16/22 2127     AFB Culture & Smear Culture in progress    Gram stain [[id number]] Collected: 07/15/22 1508    Order Status: Canceled Specimen: CSF (Spinal Fluid) from CSF Tap, Tube 3     AFB Culture & Smear [[id number]] Collected: 07/13/22 1100    Order Status: Completed Specimen: CSF (Spinal Fluid) from CSF Tap, Tube 3 Updated: 07/14/22 2127     AFB Culture & Smear Culture in progress     AFB CULTURE STAIN No acid fast bacilli seen.    Cryptococcal antigen, CSF [[id number]]  (Abnormal) Collected: 07/13/22 1100    Order Status: Completed Specimen: CSF (Spinal Fluid) from CSF Tap, Tube 3 Updated: 07/14/22 1012     Crypto Ag, CSF Positive >1:256    Gram stain [[id number]] Collected: 07/13/22 1100    Order Status: Canceled Specimen: CSF (Spinal Fluid) from CSF Tap, Tube 3     CSF culture [[id number]] Collected: 07/08/22 1019    Order Status: Completed Specimen: CSF (Spinal Fluid) from CSF Tap, Tube 2 Updated: 07/13/22 0721     CSF CULTURE No Growth     Gram Stain Result Cytospin indicates: Few yeast      No WBC's      Results called to and read back by: Lyndsay Granados 07/08/2022  13:13    Clostridium difficile EIA [[id number]] Collected: 07/12/22 1751    Order Status: Completed Specimen: Stool Updated: 07/12/22 2249     C. diff Antigen Negative     C difficile Toxins A+B, EIA Negative     Comment: Testing not recommended for children <24 months old.       E. coli 0157 antigen [[id number]] Collected: 07/12/22 1751    Order Status: No result Specimen: Stool Updated: 07/12/22 1752    Stool culture [[id number]] Collected: 07/12/22 1751    Order Status: Sent Specimen: Stool Updated: 07/12/22 1751    AFB Culture & Smear [[id number]] Collected: 07/08/22 1019    Order Status: Completed Specimen: CSF  (Spinal Fluid) from CSF Tap, Tube 2 Updated: 07/11/22 1440     AFB Culture & Smear Culture in progress     AFB CULTURE STAIN No acid fast bacilli seen.          All pertinent labs from the last 24 hours have been reviewed.    Significant Diagnostics:  X-Ray Chest 1 View    Result Date: 7/17/2022  As above. Electronically signed by: Bull Kramer MD Date:    07/17/2022 Time:    18:11

## 2022-07-18 NOTE — ASSESSMENT & PLAN NOTE
ID following and was on ampho B and flucytosine which have been transitioned to fluconazole  Seen by Neuro and Opthal and now NSGY

## 2022-07-18 NOTE — PLAN OF CARE
Problem: Adult Inpatient Plan of Care  Goal: Absence of Hospital-Acquired Illness or Injury  Outcome: Ongoing, Progressing  Intervention: Prevent Skin Injury  Flowsheets (Taken 7/18/2022 7540)  Body Position: position changed independently  Skin Protection:   adhesive use limited   incontinence pads utilized  Plan of care was reviewed with the pt. Maksim. STEPHANIE. Cardiac monitoring was done. CT scan was done. Mas was placed in today. Intake and output was documented. No major changes throughout the day. Safety precautions were in placed.

## 2022-07-18 NOTE — SUBJECTIVE & OBJECTIVE
Subjective:   History of Present Illness:  56 y.o. female with a PMHx of HTN, kidney transplant in 2020, CKD3 admitted [facility]/22 with intractable HA starting about 2 months earlier and with dizziness, blurred vision, left-sided facial droop and dysarthria starting a few days before admission.    Per documentation   On admission /72, HR 69. Neurologic exam is remarkable for left-sided facial weakness and mild dysarthria.  Cr 1.32   LP (6/24) CSF Glu 6, CSF protein 93. Niuean stain pos for yeast like organisms.  Spinal fluid culture pos for encapsulated yeast species  CT chest WO contrast pos for multiple indeterminate soft tissue masses in the left lower lobe with 2 smaller masses in the right lung which may be part of the same process.       On 06/26 patient started on amphotericin B liposomal (275 IV q.d.) and flucytosine (1500 p.o. q.6h) .  Hospitalization c/b the development of acute renal failure.  Cr 1.32 (6/22) > 1.4 (6/26) > 2.02 (6/27).  Transfer requested for higher level of care (KTM and ID).  Transfer diagnosis disseminated cryptococcal infection, TERESA, kidney XPL       Ms. Rivera is a 56 y.o. year old female who is status post Kidney Transplant - 4/5/2020  (#1).    Her maintenance immunosuppression consists of:   Immunosuppressants (From admission, onward)                None            Hospital Course:  Being treated for crypto meningitis     Interval History:  Poor motivation. Able to hear when speaking loud. Was able to sit up and drink water and boost without issue. Uses IS without issue. All is without motivation and required me to urge her on.    Past Medical, Surgical, Family, and Social History:   Unchanged from H&P.    Scheduled Meds:   acetaminophen  500 mg Oral Q24H    calcitRIOL  0.5 mcg Oral Daily    EScitalopram oxalate  10 mg Oral Daily    fluconazole  400 mg Oral Daily    hydrOXYzine HCL  25 mg Oral QHS    Lactobacillus rhamnosus GG  1 capsule Oral Daily     multivitamin  1 tablet Oral Daily    NIFEdipine  90 mg Oral Daily    ondansetron  8 mg Oral Q8H    predniSONE  5 mg Oral Daily    sodium bicarbonate  1,300 mg Oral TID    topiramate  50 mg Oral BID     Continuous Infusions:   TPN ADULT PERIPHERAL CUSTOM 60 mL/hr at 07/17/22 2124     PRN Meds:acetaminophen, albuterol-ipratropium, bisacodyL, glucose, glucose, iohexol, loperamide, melatonin, naloxone, ondansetron, oxyCODONE-acetaminophen, sodium chloride 0.9%    Intake/Output - Last 3 Shifts         07/16 0700 07/17 0659 07/17 0700 07/18 0659 07/18 0700 07/19 0659    P.O. 150 500     I.V. (mL/kg) 1050 (16.5)      Total Intake(mL/kg) 1200 (18.8) 500 (8.3)     Urine (mL/kg/hr) 300 (0.2)      Total Output 300      Net +900 +500            Urine Occurrence  4 x     Stool Occurrence  3 x              Review of Systems   Constitutional:  Positive for activity change, appetite change and fatigue.   HENT:  Positive for hearing loss.    Eyes: Negative.    Respiratory: Negative.     Cardiovascular:  Negative for leg swelling.   Gastrointestinal:  Positive for diarrhea (small volume).   Endocrine: Negative.    Genitourinary: Negative.    Musculoskeletal: Negative.    Skin: Negative.    Neurological:  Positive for weakness and headaches (7-9/10). Negative for facial asymmetry.   Psychiatric/Behavioral:  Positive for dysphoric mood.     Objective:     Vital Signs (Most Recent):  Temp: 96.9 °F (36.1 °C) (07/18/22 1145)  Pulse: (!) 112 (07/18/22 1145)  Resp: 18 (07/18/22 1145)  BP: (!) 98/45 (07/18/22 0737)  SpO2: 98 % (07/18/22 1145)   Vital Signs (24h Range):  Temp:  [96.9 °F (36.1 °C)-99.3 °F (37.4 °C)] 96.9 °F (36.1 °C)  Pulse:  [] 112  Resp:  [16-18] 18  SpO2:  [90 %-98 %] 98 %  BP: ()/(45-67) 98/45     Weight: 60 kg (132 lb 4.4 oz)  Height: 5' (152.4 cm)  Body mass index is 25.83 kg/m².    Physical Exam  Constitutional:       General: She is not in acute distress.     Appearance: She is obese. She is  ill-appearing.   Eyes:      General: No scleral icterus.     Extraocular Movements: Extraocular movements intact.      Pupils: Pupils are equal, round, and reactive to light.   Cardiovascular:      Rate and Rhythm: Normal rate and regular rhythm.      Pulses: Normal pulses.      Heart sounds: No murmur heard.  Pulmonary:      Effort: Pulmonary effort is normal. No respiratory distress.   Abdominal:      General: There is no distension.      Palpations: Abdomen is soft.      Tenderness: There is no abdominal tenderness.   Musculoskeletal:      Right lower leg: No edema.      Left lower leg: No edema.   Skin:     Coloration: Skin is not jaundiced.   Neurological:      Mental Status: She is alert and oriented to person, place, and time.       Laboratory:  Labs within the past 24 hours have been reviewed.    Diagnostic Results:  US - Kidney: Results for orders placed during the hospital encounter of 06/27/22    US Transplant Kidney With Doppler    Narrative  EXAMINATION:  US TRANSPLANT KIDNEY WITH DOPPLER    CLINICAL HISTORY:  lisa;    TECHNIQUE:  Real time gray scale and doppler ultrasound was performed over the patient's renal allograft.    COMPARISON:  CT abdomen pelvis 07/16/2022, 07/12/2022; transplant kidney ultrasound 06/28/2022, 04/06/2020    FINDINGS:  Patient status-post renal transplant approximately 2 years ago.  The transplant lies in the right lower quadrant and measures 10.6 cm.    The renal transplant demonstrates no focal parenchymal abnormality. No transplant hydronephrosis. No peritransplant fluid.    Renal arterial resistive indices are as follows: Interlobar 0.74 (previously 0.82), segmental Up 0.72 (previously 0.76), segmental Mid 0.74 (previously 0.80), segmental Low 0.81 (previously 0.85).  No evidence of a tardus parvus waveform. The maximum velocity in the main renal artery is 234 cm/sec (previously 272 cm/sec).  The maximum velocity in the iliac artery measures 158 cm/sec.  The RA/iliac ratio  measures 1.5.    The renal transplant venous system is unremarkable.    The bladder is decompressed at the time of exam and not well evaluated.    Impression  Satisfactory sonographic appearance of the renal transplant, noting improvement in all renal arterial resistive indices since the prior ultrasound 06/28/2022.    Electronically signed by resident: Dayday Thorpe  Date:    07/16/2022  Time:    10:19    Electronically signed by: Abiel Pelaez MD  Date:    07/16/2022  Time:    11:44

## 2022-07-18 NOTE — ASSESSMENT & PLAN NOTE
TERESA on underlying CKD stage III that resolved and repeat intrahospital, the most recent TERESA likely secondary CNI toxicity  Likely pre-renal or medication related   BL 1.2 to 1.4 mg/dl   Kidney ultrasound benign  UA, UPCR, Nando benign, repeat benign  Prior CMV negative  BK pending  Do not suspect rejection at this time and for now recommend continued hydration  Mas for urine quantification for 24 hours

## 2022-07-18 NOTE — PLAN OF CARE
Problem: Adult Inpatient Plan of Care  Goal: Optimal Comfort and Wellbeing  Outcome: Ongoing, Not Progressing  Goal: Readiness for Transition of Care  Outcome: Ongoing, Not Progressing     Problem: Oral Intake Inadequate (Acute Kidney Injury/Impairment)  Goal: Optimal Nutrition Intake  Outcome: Ongoing, Not Progressing     Problem: Infection  Goal: Absence of Infection Signs and Symptoms  Outcome: Ongoing, Not Progressing     Problem: Adult Inpatient Plan of Care  Goal: Plan of Care Review  Outcome: Ongoing, Progressing  Goal: Patient-Specific Goal (Individualized)  Outcome: Ongoing, Progressing  Goal: Absence of Hospital-Acquired Illness or Injury  Outcome: Ongoing, Progressing     Problem: Fluid and Electrolyte Imbalance (Acute Kidney Injury/Impairment)  Goal: Fluid and Electrolyte Balance  Outcome: Ongoing, Progressing     Problem: Renal Function Impairment (Acute Kidney Injury/Impairment)  Goal: Effective Renal Function  Outcome: Ongoing, Progressing     Problem: Skin Injury Risk Increased  Goal: Skin Health and Integrity  Outcome: Ongoing, Progressing     Problem: Fall Injury Risk  Goal: Absence of Fall and Fall-Related Injury  Outcome: Ongoing, Progressing     Pt stable through night w/o complaints. Spouse is bedside. Pt slept most of night. Hearing and vision has had no change. Pt has been repositioning herself in bed and spouse has been helping with ambulating to bathroom and back to bed.

## 2022-07-18 NOTE — ASSESSMENT & PLAN NOTE
Likely from increased intracranial pressure  Has improved with repeated LP and today did not have one

## 2022-07-18 NOTE — ASSESSMENT & PLAN NOTE
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 LP x 3 with elevated OP and papilledema on ophthalmology exam. NSGY consulted for consideration of drain placement for elevated ICPs. Pt is currently neurologically stable. Of note, pt is on hep gtt for acute DVT    -Imaging and diagnostics reviewed   -CSF (6/24) OP34, Glu 6, protein 93.  stain pos for yeast like organisms.  Cx pos for encapsulated yeast species.     -CSF (6/30): OP35,CP13. G<5, P174, 144 WBC, 3000 RBC, 37 lymph, 59 mono/macro. Repeat CSF w/1:8 cryptococcal antigen, cx w/yeast.   -CSF (7/8): OP27, CP12. G<15,P162, CSF cx pending, gram stain with yeast    -CSF (7/13): OP21, CP11. CSF Cx w few yeast. Cryptococcal antigen positive.   -CTH 6/30 with slight enlargement of the temporal horn of right lateral ventricle compared to prior MRI brain 06/23    -CTH 7/8 without significant change   - CSF (7/15): OP 18.5, CP 10. CSF cx NGTD, Few yeast on gram stain   -Ophthalmology following. Last eval 7/7 with stable OU, color plates full. Still 3+ ONH edema  -ID: Pt continued on flucytosine and amphotericin B 7/8. F/u ongoing ID recommendations  -Patient with improvement in headaches. Latest LP on 7/15 with opening pressure of 18.5. Downtrending from prior LPs. Continues to deny headache.  With normalized pressure and lack of neurological symptoms do not believe there is an indication for  shunt at this time. Should the patient develop any new neurological symptoms, please reach out to the NSGY team. Red flag symptoms also discussed with patient and her . They voiced understanding.     NSGY will sign off at this time. Please contact with any questions or concerns.     Discussed with Dr. Sorensen

## 2022-07-18 NOTE — PROGRESS NOTES
[facility] - Telemetry Stepdown  Kidney Transplant  Progress Note      Reason for Follow-up: Reassessment of Kidney Transplant - 4/5/2020  (#1) recipient and management of immunosuppression.    ORGAN: LEFT KIDNEY    Donor Type: Donation after Brain Death    PHS Increased Risk: yes       Subjective:   History of Present Illness:  56 y.o. female with a PMHx of HTN, kidney transplant in 2020, CKD3 admitted to [facility] on 06/22 with intractable HA starting about 2 months earlier and with dizziness, blurred vision, left-sided facial droop and dysarthria starting a few days before admission.    Per documentation   On admission /72, HR 69. Neurologic exam is remarkable for left-sided facial weakness and mild dysarthria.  Cr 1.32   LP (6/24) CSF Glu 6, CSF protein 93.  stain pos for yeast like organisms.  Spinal fluid culture pos for encapsulated yeast species  CT chest WO contrast pos for multiple indeterminate soft tissue masses in the left lower lobe with 2 smaller masses in the right lung which may be part of the same process.       On 06/26 patient started on amphotericin B liposomal (275 IV q.d.) and flucytosine (1500 p.o. q.6h) .  Hospitalization c/b the development of acute renal failure.  Cr 1.32 (6/22) > 1.4 (6/26) > 2.02 (6/27).  Transfer requested for higher level of care (KTM and ID).  Transfer diagnosis disseminated cryptococcal infection, TERESA, kidney XPL       Ms. Rivera is a 56 y.o. year old female who is status post Kidney Transplant - 4/5/2020  (#1).    Her maintenance immunosuppression consists of:   Immunosuppressants (From admission, onward)                None            Hospital Course:  Being treated for crypto meningitis     Interval History:  Poor motivation. Able to hear when speaking loud. Was able to sit up and drink water and boost without issue. Uses IS without issue. All is without motivation and required me to urge her on.    Past Medical, Surgical, Family, and Social  History:   Unchanged from H&P.    Scheduled Meds:   acetaminophen  500 mg Oral Q24H    calcitRIOL  0.5 mcg Oral Daily    EScitalopram oxalate  10 mg Oral Daily    fluconazole  400 mg Oral Daily    hydrOXYzine HCL  25 mg Oral QHS    Lactobacillus rhamnosus GG  1 capsule Oral Daily    multivitamin  1 tablet Oral Daily    NIFEdipine  90 mg Oral Daily    ondansetron  8 mg Oral Q8H    predniSONE  5 mg Oral Daily    sodium bicarbonate  1,300 mg Oral TID    topiramate  50 mg Oral BID     Continuous Infusions:   TPN ADULT PERIPHERAL CUSTOM 60 mL/hr at 07/17/22 2124     PRN Meds:acetaminophen, albuterol-ipratropium, bisacodyL, glucose, glucose, iohexol, loperamide, melatonin, naloxone, ondansetron, oxyCODONE-acetaminophen, sodium chloride 0.9%    Intake/Output - Last 3 Shifts         07/16 0700 07/17 0659 07/17 0700 07/18 0659 07/18 0700 07/19 0659    P.O. 150 500     I.V. (mL/kg) 1050 (16.5)      Total Intake(mL/kg) 1200 (18.8) 500 (8.3)     Urine (mL/kg/hr) 300 (0.2)      Total Output 300      Net +900 +500            Urine Occurrence  4 x     Stool Occurrence  3 x              Review of Systems   Constitutional:  Positive for activity change, appetite change and fatigue.   HENT:  Positive for hearing loss.    Eyes: Negative.    Respiratory: Negative.     Cardiovascular:  Negative for leg swelling.   Gastrointestinal:  Positive for diarrhea (small volume).   Endocrine: Negative.    Genitourinary: Negative.    Musculoskeletal: Negative.    Skin: Negative.    Neurological:  Positive for weakness and headaches (7-9/10). Negative for facial asymmetry.   Psychiatric/Behavioral:  Positive for dysphoric mood.     Objective:     Vital Signs (Most Recent):  Temp: 96.9 °F (36.1 °C) (07/18/22 1145)  Pulse: (!) 112 (07/18/22 1145)  Resp: 18 (07/18/22 1145)  BP: (!) 98/45 (07/18/22 0737)  SpO2: 98 % (07/18/22 1145)   Vital Signs (24h Range):  Temp:  [96.9 °F (36.1 °C)-99.3 °F (37.4 °C)] 96.9 °F (36.1 °C)  Pulse:   [] 112  Resp:  [16-18] 18  SpO2:  [90 %-98 %] 98 %  BP: ()/(45-67) 98/45     Weight: 60 kg (132 lb 4.4 oz)  Height: 5' (152.4 cm)  Body mass index is 25.83 kg/m².    Physical Exam  Constitutional:       General: She is not in acute distress.     Appearance: She is obese. She is ill-appearing.   Eyes:      General: No scleral icterus.     Extraocular Movements: Extraocular movements intact.      Pupils: Pupils are equal, round, and reactive to light.   Cardiovascular:      Rate and Rhythm: Normal rate and regular rhythm.      Pulses: Normal pulses.      Heart sounds: No murmur heard.  Pulmonary:      Effort: Pulmonary effort is normal. No respiratory distress.   Abdominal:      General: There is no distension.      Palpations: Abdomen is soft.      Tenderness: There is no abdominal tenderness.   Musculoskeletal:      Right lower leg: No edema.      Left lower leg: No edema.   Skin:     Coloration: Skin is not jaundiced.   Neurological:      Mental Status: She is alert and oriented to person, place, and time.       Laboratory:  Labs within the past 24 hours have been reviewed.    Diagnostic Results:  US - Kidney: Results for orders placed during the hospital encounter of 06/27/22    US Transplant Kidney With Doppler    Narrative  EXAMINATION:  US TRANSPLANT KIDNEY WITH DOPPLER    CLINICAL HISTORY:  lisa;    TECHNIQUE:  Real time gray scale and doppler ultrasound was performed over the patient's renal allograft.    COMPARISON:  CT abdomen pelvis 07/16/2022, 07/12/2022; transplant kidney ultrasound 06/28/2022, 04/06/2020    FINDINGS:  Patient status-post renal transplant approximately 2 years ago.  The transplant lies in the right lower quadrant and measures 10.6 cm.    The renal transplant demonstrates no focal parenchymal abnormality. No transplant hydronephrosis. No peritransplant fluid.    Renal arterial resistive indices are as follows: Interlobar 0.74 (previously 0.82), segmental Up 0.72 (previously  0.76), segmental Mid 0.74 (previously 0.80), segmental Low 0.81 (previously 0.85).  No evidence of a tardus parvus waveform. The maximum velocity in the main renal artery is 234 cm/sec (previously 272 cm/sec).  The maximum velocity in the iliac artery measures 158 cm/sec.  The RA/iliac ratio measures 1.5.    The renal transplant venous system is unremarkable.    The bladder is decompressed at the time of exam and not well evaluated.    Impression  Satisfactory sonographic appearance of the renal transplant, noting improvement in all renal arterial resistive indices since the prior ultrasound 06/28/2022.    Electronically signed by resident: Dayday Thorpe  Date:    07/16/2022  Time:    10:19    Electronically signed by: Abiel Pelaez MD  Date:    07/16/2022  Time:    11:44    Assessment/Plan:     * Meningitis due to fungal infection  ID following and was on ampho B and flucytosine which have been transitioned to fluconazole  Seen by Neuro and Opthal and now NSGY    Hypotension  Her hypotension and tachycardia and leukopenia are concerning given prior elevated BP  CTH benign  CTAP with featureless colon  CXR benign  Consider bacterial blood cultures    VTE (venous thromboembolism)  LLE popliteal VTE likely secondary to hospitalization and immobility  On heparin      Acute renal failure superimposed on stage 3 chronic kidney disease  TERESA on underlying CKD stage III that resolved and repeat intrahospital, the most recent TERESA likely secondary CNI toxicity  Likely pre-renal or medication related   BL 1.2 to 1.4 mg/dl   Kidney ultrasound benign  UA, UPCR, Nando benign, repeat benign  Prior CMV negative  BK pending  Do not suspect rejection at this time and for now recommend continued hydration  Mas for urine quantification for 24 hours      Headache  Likely from increased intracranial pressure  Has improved with repeated LP and today did not have one    Drug-induced pancytopenia  Most likely drug induce  Seen by hem/onc  "and ID  Prior CMV negative  Repeated CMV, EBV, parvo      Long-term use of immunosuppressant medication  Goal tacro 4-6 trough  Hold MMF  Continue 5  Holding tacro      S/P kidney transplant  Kidney transplant 4/5/2020   Underlying ckd stage III  ESRD related to HTN  Continue immunosuppression as per problem "long term use of immunosuppression medication"          Roberto Eisenberg Jr., MD  Kidney Transplant  [facility] - Telemetry Stepdown  "

## 2022-07-18 NOTE — SUBJECTIVE & OBJECTIVE
Interval History: c/o epigastric pain and diarrhea    Review of Systems   Constitutional:  Positive for fatigue.   HENT:  Positive for hearing loss.    Gastrointestinal:  Positive for abdominal pain and diarrhea.   Allergic/Immunologic: Positive for immunocompromised state.   Objective:     Vital Signs (Most Recent):  Temp: 97.1 °F (36.2 °C) (07/18/22 1623)  Pulse: 107 (07/18/22 1623)  Resp: 18 (07/18/22 1623)  BP: (!) 110/58 (07/18/22 1623)  SpO2: 100 % (07/18/22 1623)   Vital Signs (24h Range):  Temp:  [96.9 °F (36.1 °C)-99.3 °F (37.4 °C)] 97.1 °F (36.2 °C)  Pulse:  [] 107  Resp:  [16-18] 18  SpO2:  [90 %-100 %] 100 %  BP: ()/(45-58) 110/58     Weight: 60 kg (132 lb 4.4 oz)  Body mass index is 25.83 kg/m².    Estimated Creatinine Clearance: 23.7 mL/min (A) (based on SCr of 2.2 mg/dL (H)).    Physical Exam  Constitutional:       General: She is not in acute distress.     Appearance: Normal appearance. She is well-developed. She is not diaphoretic.   HENT:      Head: Normocephalic and atraumatic.      Right Ear: External ear normal.      Left Ear: External ear normal.      Nose: Nose normal.   Eyes:      General: No scleral icterus.        Right eye: No discharge.         Left eye: No discharge.      Extraocular Movements: Extraocular movements intact.      Conjunctiva/sclera: Conjunctivae normal.   Pulmonary:      Effort: Pulmonary effort is normal. No respiratory distress.      Breath sounds: No stridor.   Musculoskeletal:         General: Normal range of motion.   Skin:     Findings: No erythema or rash.   Neurological:      Mental Status: She is alert and oriented to person, place, and time. Mental status is at baseline.      Cranial Nerves: No cranial nerve deficit.      Comments: Hearing loss       Significant Labs: CBC:   Recent Labs   Lab 07/17/22  1030 07/18/22  0449   WBC 1.06*  1.06* 0.83*  0.83*   HGB 9.8*  9.8* 9.4*  9.4*   HCT 29.2*  29.2* 28.2*  28.2*   *  112* 116*  116*        CMP:   Recent Labs   Lab 07/17/22  1532 07/18/22  0449   * 135*   K 3.3* 3.3*   CL 98 97   CO2 25 25   * 215*   BUN 46* 53*   CREATININE 2.1* 2.2*   CALCIUM 8.0* 8.2*   PROT 4.8*  --    ALBUMIN 2.5*  --    BILITOT 1.0  --    ALKPHOS 45*  --    AST 7*  --    ALT <5*  --    ANIONGAP 12 13   EGFRNONAA 26.1* 24.7*         Significant Imaging: I have reviewed all pertinent imaging results/findings within the past 24 hours.

## 2022-07-19 PROBLEM — R65.21 SEPTIC SHOCK: Status: ACTIVE | Noted: 2022-01-01

## 2022-07-19 PROBLEM — I26.99 ACUTE PULMONARY EMBOLISM: Status: ACTIVE | Noted: 2022-01-01

## 2022-07-19 PROBLEM — A41.9 SEPTIC SHOCK: Status: ACTIVE | Noted: 2022-01-01

## 2022-07-19 PROBLEM — J96.01 ACUTE HYPOXEMIC RESPIRATORY FAILURE: Status: ACTIVE | Noted: 2022-01-01

## 2022-07-19 NOTE — ASSESSMENT & PLAN NOTE
- bentyl can be associated with nausea- stopped  - Stool studies- WBCs present, occult blood as well  - Per ID, patient w/H/o CMV positivity- never treated  - Consult GI- with dypshagia and diarrhea, possible need for EGD and/or C-scope for further evaluation of CMV infection vs. Other   - topamax can be associated with diarrhea- will continue with wean as patient has not been on 100mg BID for a prolonged period of time  - CT of abd w/o contrast with liquid stool throughout intestine   - No leukocytosis concerning for new infection  - continue loperamide  - c diff negative 07/12

## 2022-07-19 NOTE — ASSESSMENT & PLAN NOTE
S/P transplant 4/5/2020. ESRD related to HTN. CKD stage 3.     -- KTM following. Appreciate assistance.

## 2022-07-19 NOTE — SUBJECTIVE & OBJECTIVE
Past Medical History:   Diagnosis Date    Anemia of renal disease     ESRD on dialysis     Dr. Muhammad     Essential hypertension     PD catheter dysfunction 10/18/2018    Secondary hyperparathyroidism of renal origin        Past Surgical History:   Procedure Laterality Date    AV FISTULA PLACEMENT Left     never matured    AV graft Right 2015     SECTION      x3; ; , and     COLONOSCOPY N/A 2020    Procedure: COLONOSCOPY;  Surgeon: Darrell Golden MD;  Location: [facility] END[facility];  Service: Endoscopy;  Laterality: N/A;    GASTRIC BYPASS  2016    KIDNEY TRANSPLANT N/A 2020    Procedure: TRANSPLANT, KIDNEY;  Surgeon: Megan Garcia MD;  Location: [address];  Service: Transplant;  Laterality: N/A;    KIDNEY TRANSPLANT Right 2020    LAPAROSCOPIC REVISION OF PROCEDURE INVOLVING PERITONEAL DIALYSIS CATHETER N/A 10/18/2018    Procedure: REVISION OF PROCEDURE INVOLVING PERITONEAL DIALYSIS CATHETER, LAPAROSCOPIC;  Surgeon: Hair Jimenez MD;  Location: [facility];  Service: General;  Laterality: N/A;    PERITONEAL CATHETER INSERTION N/A 2018    Procedure: Laparoscopic INSERTION, CATHETER, INTRAPERITONEAL;  Surgeon: Hair Jimenez MD;  Location: [facility];  Service: General;  Laterality: N/A;    PERITONEAL CATHETER REMOVAL N/A 2018    Procedure: REMOVAL, CATHETER, DIALYSIS, PERITONEAL;  Surgeon: Hair Jimenez MD;  Location: [facility];  Service: General;  Laterality: N/A;    WOUND EXPLORATION N/A 2018    Procedure: EXPLORATION, WOUND-Surgical Site Irrigation;  Surgeon: Hair Jimenez MD;  Location: [facility];  Service: General;  Laterality: N/A;       Review of patient's allergies indicates:   Allergen Reactions    Sulfa (sulfonamide antibiotics) Hives     Family History       Problem Relation (Age of Onset)    Diabetes Mother    Down syndrome Son    Heart disease Mother    Hypertension Mother    Kidney disease Mother    Lupus Sister          Tobacco Use    Smoking status:  Never Smoker    Smokeless tobacco: Never Used   Substance and Sexual Activity    Alcohol use: No    Drug use: No    Sexual activity: Yes     Partners: Male     Review of Systems   Reason unable to perform ROS: Patient in rapid at time of evaluatin, transfered to icu.   Objective:     Vital Signs (Most Recent):  Temp: 97.7 °F (36.5 °C) (07/19/22 0808)  Pulse: (!) 123 (07/19/22 0840)  Resp: (!) 40 (07/19/22 0840)  BP: (!) 56/41 (07/19/22 0840)  SpO2: (!) 94 % (07/19/22 0840)   Vital Signs (24h Range):  Temp:  [96.9 °F (36.1 °C)-98 °F (36.7 °C)] 97.7 °F (36.5 °C)  Pulse:  [107-125] 123  Resp:  [18-45] 40  SpO2:  [93 %-100 %] 94 %  BP: ()/(33-75) 56/41     Weight: 60 kg (132 lb 4.4 oz) (07/18/22 0600)  Body mass index is 25.83 kg/m².      Intake/Output Summary (Last 24 hours) at 7/19/2022 0850  Last data filed at 7/19/2022 0830  Gross per 24 hour   Intake 610 ml   Output --   Net 610 ml       Lines/Drains/Airways       Peripherally Inserted Central Catheter Line  Duration             PICC Double Lumen 07/17/22 1545 left basilic 1 day              Central Venous Catheter Line  Duration                  Hemodialysis AV Graft Right forearm -- days              Drain  Duration                  Urethral Catheter 07/18/22 1609 Coude 16 Fr. <1 day              Peripheral Intravenous Line  Duration                  Peripheral IV - Single Lumen 07/05/22 1510 20 G;1 3/4 in Left;Anterior Forearm 13 days                    Physical Exam  Vitals and nursing note reviewed.   Unable to obtain, see above ROS    Significant Labs:  All pertinent lab results from the last 24 hours have been reviewed.  Recent Lab Results  (Last 5 results in the past 24 hours)        07/19/22  0741   07/19/22  0728   07/19/22  0604   07/19/22  0603   07/19/22  0050        Allens Test   Pass             Anion Gap 19               aPTT 41.9  Comment: aPTT therapeutic range = 39-69 seconds               Site   RR             BUN 72               Calcium  8.4               Chloride 94               CO2 15               Creatinine 3.4               DelSys   NRB             eGFR if  16.8               eGFR if non  14.6  Comment: Calculation used to obtain the estimated glomerular filtration  rate (eGFR) is the CKD-EPI equation.                  Fibrinogen       478         Glucose 160               Hematocrit 29.6       30.7         Hemoglobin 9.8       10.0         INR 1.6  Comment: Coumadin Therapy:  2.0 - 3.0 for INR for all indicators except mechanical heart valves  and antiphospholipid syndromes which should use 2.5 - 3.5.                 Lactate, Giovanni >12.0  Comment: Falsely low lactic acid results can be found in samples   containing >=13.0 mg/dL total bilirubin and/or >=3.5 mg/dL   direct bilirubin.  *Critical value notification by ml__ with confirmation of receipt to  caitlin mcleod rn___ at  Date_07/19___Time_08:38 am___                 Magnesium       2.4         MCH 30.3       30.7         MCHC 33.1       32.6         MCV 92       94         Mode   SPONT             MPV 12.8       13.0         Phosphorus       4.0         Platelets 112       103         POC BE   -13             POC HCO3   15.6             POC Hematocrit   30             POC Ionized Calcium   1.17             POC PCO2   42.1             POC PH   7.177             POC PO2   38             POC Potassium   3.3             POC SATURATED O2   58             POC Sodium   128             POC TCO2   17             POCT Glucose     230     271       Potassium 3.8               Protime 16.3               RBC 3.23       3.26         RDW 13.4       13.4         Sample   ARTERIAL             Sodium 128               Triglycerides       222  Comment: The National Cholesterol Education Program (NCEP) has set the  following guidelines (reference values) for triglycerides:  Normal......................<150 mg/dL  Borderline High.............150-199  mg/dL  High........................200-499 mg/dL           WBC 1.12  Comment: WBC  critical result(s) called and verbal readback obtained from   NURSE EDNA  by LISANDRA 07/19/2022 08:39         0.83  Comment: WBC  critical result(s) called and verbal readback obtained from   NURSE EDNA by HUAN 07/19/2022 08:24                                  Significant Imaging:  Imaging results within the past 24 hours have been reviewed.

## 2022-07-19 NOTE — SUBJECTIVE & OBJECTIVE
Interval History: Patient was seen and evaluated. Had discussion with  this am- he is concerned that patient can actually hear what others are saying and is slowly giving up on eating and drinking. When speaking with patient, she reports abd pain, diarrhea (that is improving- per ). No vomiting seen, just dry-heaving. Had discussions with Nephrology and ID. Holding anti-hypertensives. Continuing w/TPN. With 's encouragement this am, improvement in PO intake this am w/no painful swallowing. Per ID, concern that patient could have CMV esophagitis/colitis- on CT of abd/pelvis- no signs of colitis. Will reach out to GI. Low suspicon for [place]- as patient appears hemodynamically stable.     No chest pain, sob, cough, headache, vision changes.     Objective:     Vital Signs (Most Recent):  Temp: 98 °F (36.7 °C) (07/18/22 1954)  Pulse: (!) 122 (07/18/22 1954)  Resp: 18 (07/18/22 1954)  BP: (!) 147/75 (07/18/22 1954)  SpO2: 99 % (07/18/22 1954)   Vital Signs (24h Range):  Temp:  [96.9 °F (36.1 °C)-99.3 °F (37.4 °C)] 98 °F (36.7 °C)  Pulse:  [] 122  Resp:  [16-18] 18  SpO2:  [90 %-100 %] 99 %  BP: ()/(45-75) 147/75     Weight: 60 kg (132 lb 4.4 oz)  Body mass index is 25.83 kg/m².    Intake/Output Summary (Last 24 hours) at 7/18/2022 2053  Last data filed at 7/18/2022 1846  Gross per 24 hour   Intake 560 ml   Output --   Net 560 ml      Physical Exam  Gen: in NAD, appears stated age  Neuro: AAOx3, motor, sensory, and strength grossly intact BL  HEENT: NTNC, EOMI, PERRL, MMM  CVS: RRR, systolic murmur appreciated (possibly flow murmur), no rubs/gallops, S1/S2 auscultated with no S3 or S4; capillary refill < 2 sec  Resp: lungs CTAB, no w/r/r; no belabored breathing or accessory muscle use appreciated   Abd: BS+ in all 4 quadrants; diffuse TTP, non-distended, soft to palpation; no organomegaly appreciated   Extrem: pulses full, equal, and regular over all 4 extremities;no swelling of BL UE  or LE- BL LE wrapped in ACE wrap.      Significant Labs: All pertinent labs within the past 24 hours have been reviewed.  Blood Culture: No results for input(s): LABBLOO in the last 48 hours.  CBC:   Recent Labs   Lab 07/17/22  1030 07/18/22  0449   WBC 1.06*  1.06* 0.83*  0.83*   HGB 9.8*  9.8* 9.4*  9.4*   HCT 29.2*  29.2* 28.2*  28.2*   *  112* 116*  116*     CMP:   Recent Labs   Lab 07/17/22  1532 07/18/22  0449   * 135*   K 3.3* 3.3*   CL 98 97   CO2 25 25   * 215*   BUN 46* 53*   CREATININE 2.1* 2.2*   CALCIUM 8.0* 8.2*   PROT 4.8*  --    ALBUMIN 2.5*  --    BILITOT 1.0  --    ALKPHOS 45*  --    AST 7*  --    ALT <5*  --    ANIONGAP 12 13   EGFRNONAA 26.1* 24.7*     Prealbumin: No results for input(s): PREALBUMIN in the last 48 hours.  Troponin: No results for input(s): TROPONINI in the last 48 hours.  Urine Culture: No results for input(s): LABURIN in the last 48 hours.  Urine Studies:   No results for input(s): COLORU, APPEARANCEUA, PHUR, SPECGRAV, PROTEINUA, GLUCUA, KETONESU, BILIRUBINUA, OCCULTUA, NITRITE, UROBILINOGEN, LEUKOCYTESUR, RBCUA, WBCUA, BACTERIA, SQUAMEPITHEL, HYALINECASTS in the last 48 hours.    Invalid input(s): WRIGHTSUR      Significant Imaging: I have reviewed all pertinent imaging results/findings within the past 24 hours.    KUB:  FINDINGS:  Osseous structures are unremarkable.  There is no bowel distension.  Surgical change present within left abdomen.  No abnormal calcification.     Impression:     As above    CT A/P:  FINDINGS:  CHEST:     Lungs/Pleura: Unchanged appearance of multiple solid pulmonary nodules within the left lower lobe compared to CT abdomen pelvis 07/12/2022.  The largest nodule measures 2.4 cm (series 2, image 13).  2 mm nodule right middle lobe series 2, image 1 similar.  No focal consolidation.  No pneumothorax.  No pleural effusion.     Heart: Normal size.  New small pericardial effusion.     Thoracic soft tissues: Unremarkable      ABDOMEN:     Liver: Enlarged measuring 20.6 cm.  No focal hepatic abnormality.     Gallbladder/Bile ducts: Gallbladder is distended without inflammatory changes.  No intrahepatic or extrahepatic biliary ductal dilatation.     Spleen:Unremarkable.     Stomach: Postoperative changes of the stomach, gastric bypass.     Pancreas: No mass or peripancreatic fat stranding.     Adrenals: Unremarkable.     Renal/Ureters: Native kidneys are atrophic.  Left renal simple cyst.  No hydronephrosis.  The ureters are normal in course and caliber. The urinary bladder decompressed without focal abnormality.     Transplant kidney within the right lower quadrant.  No hydronephrosis.     Reproductive: Uterus is unremarkable.  No adnexal mass.     Bowel: Loops of small bowel show no evidence of obstruction or inflammation.  Postop changes of a distal segment of the small bowel within the left lower quadrant.  Loops of large bowel are partially distended with liquid stool.  Majority the colon is somewhat featureless.  No evidence of obstruction.  The appendix is not confidently visualized; however, no inflammatory changes in the expected location of the appendix.     Peritoneum: No free fluid.  No intraperitoneal free air.     Lymph Nodes: No pathologic blessing enlargement in the abdomen or pelvis.     Vasculature: Abdominal aorta is normal in course and caliber.  Mild calcifications of the abdominal aorta.     Bones: Degenerative changes of the spine, including diffuse idiopathic skeletal hyperostosis of the thoracic spine.  No acute fractures or osseous destructive lesions.     Soft Tissues: Soft tissue thickening of the subcutaneous tissues of the anterior abdomen, adjacent prior surgery.     Impression:     Liquid stool throughout the colon.  Colon is somewhat featureless suggesting chronic inflammatory process.     Hepatomegaly.     New small pericardial effusion.     Unchanged appearance of lower lobe pulmonary nodules as detailed  above.

## 2022-07-19 NOTE — ASSESSMENT & PLAN NOTE
LLE popliteal VTE likely secondary to hospitalization and immobility  Was on heparin  Significant tachycardia concerning for PE

## 2022-07-19 NOTE — ASSESSMENT & PLAN NOTE
-- 20 day course of amphotericin and flucytosine completed. Was stopped due to pancytopenias   -- ID, transplant ID, and KTM following. Appreciate assistance   -- Fluconazole   -- Has had multiple LPs while inpatient. NSGY evaluated. No need for shunt.

## 2022-07-19 NOTE — SUBJECTIVE & OBJECTIVE
Past Medical History:   Diagnosis Date    Anemia of renal disease     ESRD on dialysis     Dr. Muhammad     Essential hypertension     PD catheter dysfunction 10/18/2018    Secondary hyperparathyroidism of renal origin        Past Surgical History:   Procedure Laterality Date    AV FISTULA PLACEMENT Left     never matured    AV graft Right 2015     SECTION      x3; ; , and     COLONOSCOPY N/A 2020    Procedure: COLONOSCOPY;  Surgeon: Darrell Golden MD;  Location: [facility] END[facility];  Service: Endoscopy;  Laterality: N/A;    GASTRIC BYPASS  2016    KIDNEY TRANSPLANT N/A 2020    Procedure: TRANSPLANT, KIDNEY;  Surgeon: Megan Garcia MD;  Location: [address];  Service: Transplant;  Laterality: N/A;    KIDNEY TRANSPLANT Right 2020    LAPAROSCOPIC REVISION OF PROCEDURE INVOLVING PERITONEAL DIALYSIS CATHETER N/A 10/18/2018    Procedure: REVISION OF PROCEDURE INVOLVING PERITONEAL DIALYSIS CATHETER, LAPAROSCOPIC;  Surgeon: Hair Jimenez MD;  Location: [facility];  Service: General;  Laterality: N/A;    PERITONEAL CATHETER INSERTION N/A 2018    Procedure: Laparoscopic INSERTION, CATHETER, INTRAPERITONEAL;  Surgeon: Hair Jimenez MD;  Location: [facility];  Service: General;  Laterality: N/A;    PERITONEAL CATHETER REMOVAL N/A 2018    Procedure: REMOVAL, CATHETER, DIALYSIS, PERITONEAL;  Surgeon: Hair Jimenez MD;  Location: [facility];  Service: General;  Laterality: N/A;    WOUND EXPLORATION N/A 2018    Procedure: EXPLORATION, WOUND-Surgical Site Irrigation;  Surgeon: Hair Jimenez MD;  Location: [facility];  Service: General;  Laterality: N/A;       Review of patient's allergies indicates:   Allergen Reactions    Sulfa (sulfonamide antibiotics) Hives       PTA Medications   Medication Sig    acetaminophen (TYLENOL) 325 MG tablet Take 2 tablets (650 mg total) by mouth every 6 (six) hours as needed (headache).    [] butalbital-acetaminophen-caffeine -40 mg  (FIORICET, ESGIC) -40 mg per tablet Take 1 tablet by mouth daily as needed (migraines).    calcitRIOL (ROCALTROL) 0.25 MCG Cap Take 2 capsules (0.5 mcg total) by mouth once daily.    docusate sodium (COLACE) 100 MG capsule Take 1 capsule (100 mg total) by mouth 3 (three) times daily as needed for Constipation.    l-methylfolate-b2-b6-b12 (CEREFOLIN) 6-5-50-1 mg Tab Take 1 tablet by mouth once daily.    magnesium oxide (MAG-OX) 400 mg (241.3 mg magnesium) tablet Take 1 tablet (400 mg total) by mouth once daily.    mycophenolate (CELLCEPT) 250 mg Cap Take 2 capsules (500 mg total) by mouth 2 (two) times daily.    NIFEdipine (PROCARDIA-XL) 30 MG (OSM) 24 hr tablet TAKE ONE TABLET BY MOUTH TWICE DAILY hold if systolic blood pressure is less than 130    predniSONE (DELTASONE) 5 MG tablet Take 1 tablet (5 mg total) by mouth once daily.    sodium bicarbonate 650 MG tablet Take 2 tablets (1,300 mg total) by mouth 2 (two) times daily.    tacrolimus (PROGRAF) 1 MG Cap Take 3 capsules (3 mg total) by mouth every morning AND 2 capsules (2 mg total) every evening.    topiramate (TOPAMAX) 25 MG tablet Take 1 tablet (25 mg total) by mouth 2 (two) times daily.     Family History       Problem Relation (Age of Onset)    Diabetes Mother    Down syndrome Son    Heart disease Mother    Hypertension Mother    Kidney disease Mother    Lupus Sister          Tobacco Use    Smoking status: Never Smoker    Smokeless tobacco: Never Used   Substance and Sexual Activity    Alcohol use: No    Drug use: No    Sexual activity: Yes     Partners: Male     Review of Systems   Unable to perform ROS: Intubated   Objective:     Vital Signs (Most Recent):  Temp: 97.7 °F (36.5 °C) (07/19/22 0808)  Pulse: 98 (07/19/22 1600)  Resp: (!) 28 (07/19/22 1600)  BP: 123/79 (07/19/22 1600)  SpO2: (!) 94 % (07/19/22 1600)   Vital Signs (24h Range):  Temp:  [97.7 °F (36.5 °C)-98 °F (36.7 °C)] 97.7 °F (36.5 °C)  Pulse:  [] 98  Resp:  [12-45] 28  SpO2:  [81  %-100 %] 94 %  BP: ()/(24-79) 123/79  Arterial Line BP: (119-129)/(46-48) 119/48     Weight: 60 kg (132 lb 4.4 oz)  Body mass index is 25.83 kg/m².    SpO2: (!) 94 %  O2 Device (Oxygen Therapy): ventilator      Intake/Output Summary (Last 24 hours) at 7/19/2022 1727  Last data filed at 7/19/2022 0830  Gross per 24 hour   Intake 230 ml   Output --   Net 230 ml       Lines/Drains/Airways       Peripherally Inserted Central Catheter Line  Duration             PICC Double Lumen 07/17/22 1545 left basilic 2 days              Central Venous Catheter Line  Duration                  Hemodialysis AV Graft Right forearm -- days    Percutaneous Central Line Insertion/Assessment - Triple Lumen  07/19/22 1003 right internal jugular <1 day              Drain  Duration                  Urethral Catheter 07/18/22 1609 Coude 16 Fr. 1 day         NG/OG Tube 07/19/22 0936 orogastric 14 Fr. Right mouth <1 day              Airway  Duration                  Airway - Non-Surgical 07/19/22 0928 Endotracheal Tube <1 day              Arterial Line  Duration             Arterial Line 07/19/22 1235 Left Femoral <1 day              Peripheral Intravenous Line  Duration                  Peripheral IV - Single Lumen 07/05/22 1510 20 G;1 3/4 in Left;Anterior Forearm 14 days                    Physical Exam  Vitals and nursing note reviewed.   Constitutional:       Comments: Intubated , sedated   HENT:      Head: Atraumatic.   Cardiovascular:      Rate and Rhythm: Tachycardia present.   Pulmonary:      Effort: No respiratory distress.      Comments: intubated  Neurological:      Comments: sedated     Significant Labs: All pertinent lab results from the last 24 hours have been reviewed. and   Recent Lab Results  (Last 5 results in the past 24 hours)        07/19/22  1529   07/19/22  1414   07/19/22  1413   07/19/22  1038   07/19/22  0909        Albumin     1.9           Alkaline Phosphatase     67           Allens Test N/A               ALT      243           Anion Gap     21           Aniso     Slight           AST     405           Bands     13.0           Basophilic Stippling     Occasional           Basophil %     0.0  Comment: CORRECTED RESULT; previously reported as 2.0 on %DDDDDDDD% at %TTT%.  [C]           BILIRUBIN TOTAL     1.8  Comment: For infants and newborns, interpretation of results should be based  on gestational age, weight and in agreement with clinical  observations.    Premature Infant recommended reference ranges:  Up to 24 hours.............<8.0 mg/dL  Up to 48 hours............<12.0 mg/dL  3-5 days..................<15.0 mg/dL  6-29 days.................<15.0 mg/dL             Site [place]               BUN     71           Calcium     8.4           Chloride     93           CO2     14           CPK     229           Creatinine     3.3           DelSys Adult Vent               Differential Method     Manual           Dohle Bodies     Present           eGFR if      17.4           eGFR if non      15.1  Comment: Calculation used to obtain the estimated glomerular filtration  rate (eGFR) is the CKD-EPI equation.              Eosinophil %     4.0  Comment: CORRECTED RESULT; previously reported as 2.0 on %DDDDDDDD% at %TTT%.  [C]           FiO2 100               Large/Giant Platelets     Present           Glucose     225           Gran %     36.0  Comment: CORRECTED RESULT; previously reported as 55.2 on %DDDDDDDD% at %TTT%.  [C]           Group & Rh     O POS           Hematocrit     33.1           Hemoglobin     10.4           Heparin Anti-Xa         <0.10  Comment: Expected therapeutic range for Unfractionated heparin (UFH)  is 0.3-0.7 IU/mL.  The therapeutic range for low molecular weight heparins   (LMWH) varies with the type and , but is   typically between 0.4 and 1.1 IU/mL.         Hypo     Occasional           Immature Grans (Abs)     CANCELED  Comment: Mild elevation in immature  granulocytes is non specific and   can be seen in a variety of conditions including stress response,   acute inflammation, trauma and pregnancy. Correlation with other   laboratory and clinical findings is essential.    Result canceled by the ancillary.             Immature Granulocytes     CANCELED  Comment: Result canceled by the ancillary.           INDIRECT JULIUS     NEG           LD     1,079  Comment: Results are increased in hemolyzed samples.           Lymph %     26.0  Comment: CORRECTED RESULT; previously reported as 24.5 on %DDDDDDDD% at %TTT%.  [C]           MCH     31.0           MCHC     31.4           MCV     99           Metamyelocytes     6.0           Mode AC/PRVC               Mono %     8.0  Comment: CORRECTED RESULT; previously reported as 10.2 on %DDDDDDDD% at %TTT%.  [C]           MPV     13.4           Myelocytes     5.0           nRBC     9           Ovalocytes     Occasional           PEEP 10               Platelet Estimate     Decreased           Platelets     102           POC BE -20               POC HCO3 12.8               POC PCO2 60.7               POC PH 6.931               POC PO2 115               POC SATURATED O2 94               POC TCO2 15               POCT Glucose   227     164         Poikilocytosis     Slight           Poly     Occasional           Potassium     5.0           Promyelocytes     2.0           PROTEIN TOTAL     4.2           Rate 16               RBC     3.36           RDW     13.8           Sample ARTERIAL               Sodium     128           Teardrop Cells     Occasional           Toxic Granulation     Present           Vt 345               WBC     1.47  Comment: WBC critical result(s) called and verbal readback obtained from   Elis Miller Rn  by CHAZ 07/19/2022 15:18                                     [C] - Corrected Result               Significant Imaging: CT scan: CT ABDOMEN PELVIS WITH CONTRAST: No results found for this visit on 06/27/22. and CT  ABDOMEN PELVIS WITHOUT CONTRAST:   Results for orders placed or performed during the hospital encounter of 06/27/22   CT Abdomen Pelvis  Without Contrast    Narrative    EXAMINATION:  CT ABDOMEN PELVIS WITHOUT CONTRAST    CLINICAL HISTORY:  Abdominal pain, acute, nonlocalized;    TECHNIQUE:  The patient was surveyed from the lung bases through the pelvis.  No contrast was administered. The data was reconstructed for coronal, sagittal, and axial images.    COMPARISON:  CT abdomen pelvis 07/12/2022; U/S transplant kidney with Doppler 06/28/2022    FINDINGS:  CHEST:    Lungs/Pleura: Unchanged appearance of multiple solid pulmonary nodules within the left lower lobe compared to CT abdomen pelvis 07/12/2022.  The largest nodule measures 2.4 cm (series 2, image 13).  2 mm nodule right middle lobe series 2, image 1 similar.  No focal consolidation.  No pneumothorax.  No pleural effusion.    Heart: Normal size.  New small pericardial effusion.    Thoracic soft tissues: Unremarkable    ABDOMEN:    Liver: Enlarged measuring 20.6 cm.  No focal hepatic abnormality.    Gallbladder/Bile ducts: Gallbladder is distended without inflammatory changes.  No intrahepatic or extrahepatic biliary ductal dilatation.    Spleen:Unremarkable.    Stomach: Postoperative changes of the stomach, gastric bypass.    Pancreas: No mass or peripancreatic fat stranding.    Adrenals: Unremarkable.    Renal/Ureters: Native kidneys are atrophic.  Left renal simple cyst.  No hydronephrosis.  The ureters are normal in course and caliber. The urinary bladder decompressed without focal abnormality.    Transplant kidney within the right lower quadrant.  No hydronephrosis.    Reproductive: Uterus is unremarkable.  No adnexal mass.    Bowel: Loops of small bowel show no evidence of obstruction or inflammation.  Postop changes of a distal segment of the small bowel within the left lower quadrant.  Loops of large bowel are partially distended with liquid stool.   Majority the colon is somewhat featureless.  No evidence of obstruction.  The appendix is not confidently visualized; however, no inflammatory changes in the expected location of the appendix.    Peritoneum: No free fluid.  No intraperitoneal free air.    Lymph Nodes: No pathologic blessing enlargement in the abdomen or pelvis.    Vasculature: Abdominal aorta is normal in course and caliber.  Mild calcifications of the abdominal aorta.    Bones: Degenerative changes of the spine, including diffuse idiopathic skeletal hyperostosis of the thoracic spine.  No acute fractures or osseous destructive lesions.    Soft Tissues: Soft tissue thickening of the subcutaneous tissues of the anterior abdomen, adjacent prior surgery.      Impression    Liquid stool throughout the colon.  Colon is somewhat featureless suggesting chronic inflammatory process.    Hepatomegaly.    New small pericardial effusion.    Unchanged appearance of lower lobe pulmonary nodules as detailed above.    Electronically signed by resident: Marshall Martinez  Date:    07/16/2022  Time:    11:03    Electronically signed by: Alonso Seo MD  Date:    07/16/2022  Time:    12:51         CTA Chest Non coronary    EXAMINATION:  CTA CHEST ABDOMEN PELVIS     CLINICAL HISTORY:  Septic shock with LA > 12 now on vasopressors, known DVT off anticoagulation, abdominal distention and tenderness;     TECHNIQUE:  Axial images of the chest, abdomen, and pelvis were acquired  after the use of 100 cc [id number] IV contrast.  Coronal and sagittal reconstructions were also obtained     COMPARISON:  CT abdomen pelvis 07/16/2022     FINDINGS:  Devices: Left upper extremity PICC line with tip in the SVC.  Right IJ central line with tip at the superior atriocaval junction.  Endotracheal tube with tip approximately 0.8 cm above the mariano.     Thoracic soft tissues: 2.3 cm right hepatic hypodensity.  No axillary lymphadenopathy.     Aorta: Thoracic aorta is normal in caliber and contour  with no significant calcific atherosclerosis.     Heart: Normal in size. No pericardial effusion. No significant calcific coronary atherosclerosis.  No significant dilation of the right ventricle     Valerie/Mediastinum: No mediastinal or hilar lymphadenopathy. Saddle pulmonary embolus extending into multiple bilateral segmental branches noting that timing is not optimized to evaluate the pulmonary arteries.     Lungs: Respiratory motion artifact limits assessment of the pulmonary parenchyma.  Trachea and bronchi are patent.  Dependent atelectasis in the bilateral lower lobes with bilateral areas of non enhancement concerning for pulmonary infarcts (for example axial series 2, image 240).  No pleural fluid or pneumothorax.  Few scattered pulmonary opacities measuring up to 0.5 cm (axial series 2, image 212).     Liver: Enlarged measuring 22.2 cm in craniocaudal dimension.  Not well enhancing.  No focal hepatic lesion.  No portal venous gas.  There is reflux of contrast into the hepatic veins, a nonspecific finding.     Gallbladder: No calcified gallstones.     Bile Ducts: No evidence of dilated ducts.     Pancreas: No mass or peripancreatic fat stranding.     Spleen: Not well enhancing.     Esophagus: Enteric tube within the esophagus.     Esophagus/Stomach/Duodenum: Enteric tube within the esophagus however appears to be extraluminal at the level of the GE junction/gastric pouch coursing along the stomach and left peritoneal space (axial series 2, image 328).  Postoperative change of Ish-en-Y gastric bypass.  Excluded stomach and duodenum are dilated with air and fluid.     Adrenals: Bilateral adrenal glands appear hyperenhancing.     Kidneys/Ureters:     Native bilateral kidneys atrophic.  No significant enhancement.  No hydronephrosis or nephrolithiasis. No ureteral dilatation. Left simple cysts.     Postoperative change of right lower quadrant renal transplant.  No renal stones or hydronephrosis of the transplant  kidney.     Bladder: Decompressed around Mas catheter with air in the bladder.     Reproductive organs: Unremarkable.     Bowel/Mesentery: Multiple dilated air and fluid-filled loops of small bowel measuring up to 4.2 cm.  No discrete transition point with gradual decrease in caliber (around axial series 2, image 84) and few decompressed loops of distal ileum.  There is pneumatosis involving several loops of small bowel in the left abdomen.  Colon is relatively decompressed.  Colon demonstrates no focal wall thickening.     Peritoneum: Intraperitoneal course of enteric tube along the stomach and left peritoneal space with fluid collection around its distal aspect containing multiple foci of air and scattered foci of air along its course.  Few additional scattered foci of free air beneath the left hemidiaphragm.  Few scattered foci of air in the central mesentery (for example axial series 2, image 539), free air versus mesenteric venous gas.  Additional scattered free fluid along the right pericolic gutter and within the pelvis.     Lymph nodes: No lymphadenopathy.     Abdominal wall:  Unremarkable.     Vasculature: No aneurysm. No significant calcific atherosclerosis.  IVC appears collapsed.  Aorta and its visceral branches (celiac, SMA, bilateral renal arteries, and DIDI are relatively small in caliber but patent.  No evidence of SMA thrombus.     Bones: No acute fracture. No suspicious osseous lesions.     Impression:     1. Saddle pulmonary embolus extending into multiple bilateral segmental branches.  Right ventricle does not appear significantly dilated although there is reflux of contrast into the hepatic veins which can be seen with right-sided heart failure.  Echocardiography would be more sensitive to evaluate for right heart strain.  Dependent atelectasis in the bilateral lower lobes with areas of nonenhancement concerning for pulmonary infarcts.  2. Enteric tube appears to course through the GE  junction/gastric pouch into the peritoneal cavity terminating along the left peritoneal space with intraperitoneal fluid and air along its course as detailed above.  Postoperative change of Ish-en-Y gastric bypass.  3. Multiple dilated air and fluid-filled loops of small bowel several of which demonstrate pneumatosis.  No discrete transition point to suggest high-grade bowel obstruction.  Possible mesenteric venous gas versus free air within the central mesentery.  No portal venous gas.  4. Small caliber abdominal aorta/visceral branches, collapsed IVC, hyperenhancement of the bilateral adrenal glands, and poor enhancement of the solid organs.  Overall findings suggestive of CT hypoperfusion complex.  5. Few scattered pulmonary opacities measuring up to 0.5 cm.  Follow-up noncontrast CT recommended once acute issues resolved.  6. Additional findings as above.  This report was flagged in Epic as abnormal.     The critical information above regarding the malpositioned enteric tube, saddle pulmonary embolus, and pneumatosis was relayed directly by Filippo Curry MD by telephone to Alba Mahoney MD on 7/19/2022 at 15:44.     Electronically signed by resident: Filippo Curry  Date:                                            07/19/2022  Time:                                           15:39     Electronically signed by: Fco Eldridge MD  Date:                                            07/19/2022  Time:                                           17:07

## 2022-07-19 NOTE — ASSESSMENT & PLAN NOTE
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

## 2022-07-19 NOTE — PROGRESS NOTES
Therapy with vancomycin and consult discontinued by provider.  Pharmacy will sign off.    Santana Spear, PharmD  Ext: [phone]

## 2022-07-19 NOTE — HPI
"Mrs. Rivera is a 56 year old female with significant PMH notable for HTN, kidney transplant on prograf and cellcept, and anemia of chronic disease who was admitted to [facility] on  with intractable headache with associated dizziness, blurry vision, L facial droop and dysarthria. She underwent LP on  with CSF Glu 6, CSF protein 93, Cuban stain positive for yeast like organisms -->  culture pos for encapsulated yeast species. CT chest at OSH with "multiple indeterminate soft tissue masses in the LLL with 2 smaller masses in the RL." She was started on Amphotericin B and Flucytosine. She then had worsening renal failure. At this time, transfer was requested for higher level of care and KTM and ID evaluation.     Upon arrival to [facility]: KTM, ID, and neurology consulted. 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 Has had multiple lumbar puncture's while in patient. Due to visual disturbance in the setting of fungal meningitis, ophthalmology consulted.  No infectious nidus found in retina or vitreous, however grade 3 disc edema bilaterally. Per chart review: "CTH performed - Slight interval enlargement of the temporal horn of the right lateral ventricle since the recent MRI of 2022.  Appearance suggestive of early/developing hydrocephalus or ventricular entrapment." Neurosurgery consulted; Considering  shunt placement pending clearance of CSF and decrease in CSF protein.     Course further complicated by LLE DVT --> heparin drip started, however, intermittently held due to multiple Lps.    Critical care consulted during rapid response  for hypoxemia. At time of evaluation patient on NRB with the following AB.17/42/38/15. At this time she is endorsing worsening shortness of breath. Upon arrival to ICU patient profoundly hypotensive. Vasopressors initiated and attempt to place arterial line. Lactic acid > 12 with worsening " respiratory status, so the decision was made to proceed with intubation. Patient intubated in 1 attempt and placed on mechanical ventilation.

## 2022-07-19 NOTE — H&P
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 Rivera is a 56 year old female with significant PMH notable for HTN, kidney transplant on prograf and cellcept, and anemia of chronic disease who was admitted to [facility] on 06/22 with intractable headache with associated dizziness, blurry vision, L facial droop and dysarthria. She underwent LP on 06/24 with CSF Glu 6, CSF protein 93, Belarusian stain positive for yeast like organisms -->  culture pos for encapsulated yeast species. CT chest at OSH with "multiple indeterminate soft tissue masses in the LLL with 2 smaller masses in the RL." She was started on Amphotericin B and Flucytosine. She then had worsening renal failure. At this time, transfer was requested for higher level of care and KTM and ID evaluation.     Upon arrival to C: KTM, ID, and neurology consulted. 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 Has had multiple lumbar puncture's while in patient. Due to visual disturbance in the setting of fungal meningitis, ophthalmology consulted.  No infectious nidus found in retina or vitreous, however grade 3 disc edema bilaterally. Per chart review: "CTH performed 06/30- Slight interval enlargement of the temporal horn of the right lateral ventricle since the recent MRI of 06/23/2022.  Appearance suggestive of early/developing hydrocephalus or ventricular entrapment." Neurosurgery consulted; Considering  shunt placement pending clearance of CSF and decrease in CSF protein.     Course further complicated by LLE DVT --> heparin drip started, however, intermittently held due to " multiple Lps.    Critical care consulted during rapid response  for hypoxemia. At time of evaluation patient on NRB with the following AB.17/42/38/15. At this time she is endorsing worsening shortness of breath. Upon arrival to ICU patient profoundly hypotensive. Vasopressors initiated and attempt to place arterial line. Lactic acid > 12 with worsening respiratory status, so the decision was made to proceed with intubation. Patient intubated in 1 attempt and placed on mechanical ventilation.       Hospital/ICU Course:  No notes on file     Past Medical History:   Diagnosis Date    Anemia of renal disease     ESRD on dialysis     Dr. Muhammad     Essential hypertension     PD catheter dysfunction 10/18/2018    Secondary hyperparathyroidism of renal origin        Past Surgical History:   Procedure Laterality Date    AV FISTULA PLACEMENT Left     never matured    AV graft Right 2015     SECTION      x3; ; , and     COLONOSCOPY N/A 2020    Procedure: COLONOSCOPY;  Surgeon: Darrell Golden MD;  Location: [facility];  Service: Endoscopy;  Laterality: N/A;    GASTRIC BYPASS      KIDNEY TRANSPLANT N/A 2020    Procedure: TRANSPLANT, KIDNEY;  Surgeon: eMgan Garcia MD;  Location: [address];  Service: Transplant;  Laterality: N/A;    KIDNEY TRANSPLANT Right 2020    LAPAROSCOPIC REVISION OF PROCEDURE INVOLVING PERITONEAL DIALYSIS CATHETER N/A 10/18/2018    Procedure: REVISION OF PROCEDURE INVOLVING PERITONEAL DIALYSIS CATHETER, LAPAROSCOPIC;  Surgeon: Hair Jimenez MD;  Location: [facility];  Service: General;  Laterality: N/A;    PERITONEAL CATHETER INSERTION N/A 2018    Procedure: Laparoscopic INSERTION, CATHETER, INTRAPERITONEAL;  Surgeon: Hair Jimenez MD;  Location: [facility];  Service: General;  Laterality: N/A;    PERITONEAL CATHETER REMOVAL N/A 2018    Procedure: REMOVAL, CATHETER, DIALYSIS, PERITONEAL;  Surgeon: Hair Jimenez MD;   Location: [facility];  Service: General;  Laterality: N/A;    WOUND EXPLORATION N/A 12/14/2018    Procedure: EXPLORATION, WOUND-Surgical Site Irrigation;  Surgeon: Hair Jimenez MD;  Location: [facility];  Service: General;  Laterality: N/A;       Review of patient's allergies indicates:   Allergen Reactions    Sulfa (sulfonamide antibiotics) Hives       Family History       Problem Relation (Age of Onset)    Diabetes Mother    Down syndrome Son    Heart disease Mother    Hypertension Mother    Kidney disease Mother    Lupus Sister          Tobacco Use    Smoking status: Never Smoker    Smokeless tobacco: Never Used   Substance and Sexual Activity    Alcohol use: No    Drug use: No    Sexual activity: Yes     Partners: Male      Review of Systems   Unable to perform ROS: Acuity of condition   Objective:     Vital Signs (Most Recent):  Temp: 97.7 °F (36.5 °C) (07/19/22 0808)  Pulse: (!) 125 (07/19/22 0855)  Resp: (!) 42 (07/19/22 0855)  BP: (!) 75/35 (bruce is being attempted;) (07/19/22 0855)  SpO2: (!) 94 % (07/19/22 0855)   Vital Signs (24h Range):  Temp:  [96.9 °F (36.1 °C)-98 °F (36.7 °C)] 97.7 °F (36.5 °C)  Pulse:  [107-125] 125  Resp:  [18-45] 42  SpO2:  [93 %-100 %] 94 %  BP: ()/(28-75) 75/35   Weight: 60 kg (132 lb 4.4 oz)  Body mass index is 25.83 kg/m².      Intake/Output Summary (Last 24 hours) at 7/19/2022 0943  Last data filed at 7/19/2022 0830  Gross per 24 hour   Intake 610 ml   Output --   Net 610 ml       Physical Exam  Vitals and nursing note reviewed.   Constitutional:       General: She is in acute distress.      Appearance: She is ill-appearing.   HENT:      Mouth/Throat:      Mouth: Mucous membranes are dry.      Pharynx: Oropharynx is clear. No oropharyngeal exudate.   Eyes:      General: No scleral icterus.  Cardiovascular:      Rate and Rhythm: Regular rhythm. Tachycardia present.      Heart sounds: No murmur heard.    No gallop.   Pulmonary:      Effort: Respiratory distress  present.      Comments: On non re breather   Abdominal:      General: There is distension.      Tenderness: There is abdominal tenderness.   Genitourinary:     Comments: Mas patent and draining dark urine  Musculoskeletal:      Right lower leg: Edema present.      Left lower leg: Edema present.      Comments: RUE AV fistula. LUE PICC.    Skin:     Findings: Bruising present.   Neurological:      GCS: GCS eye subscore is 4. GCS verbal subscore is 5. GCS motor subscore is 6.       Vents:  Oxygen Concentration (%): 100 (07/19/22 0850)  Lines/Drains/Airways       Peripherally Inserted Central Catheter Line  Duration             PICC Double Lumen 07/17/22 1545 left basilic 1 day              Central Venous Catheter Line  Duration                  Hemodialysis AV Graft Right forearm -- days              Drain  Duration                  Urethral Catheter 07/18/22 1609 Coude 16 Fr. <1 day              Peripheral Intravenous Line  Duration                  Peripheral IV - Single Lumen 07/05/22 1510 20 G;1 3/4 in Left;Anterior Forearm 13 days                  Significant Labs:    CBC/Anemia Profile:  Recent Labs   Lab 07/18/22  0449 07/19/22  0603 07/19/22  0728 07/19/22  0741   WBC 0.83*  0.83* 0.83*  --  1.12*   HGB 9.4*  9.4* 10.0*  --  9.8*   HCT 28.2*  28.2* 30.7* 30* 29.6*   *  116* 103*  --  112*   MCV 93  93 94  --  92   RDW 13.2  13.2 13.4  --  13.4        Chemistries:  Recent Labs   Lab 07/17/22  1029 07/17/22  1532 07/18/22  0449 07/19/22  0603 07/19/22  0741   NA  --  135* 135*  --  128*   K  --  3.3* 3.3*  --  3.8   CL  --  98 97  --  94*   CO2  --  25 25  --  15*   BUN  --  46* 53*  --  72*   CREATININE  --  2.1* 2.2*  --  3.4*   CALCIUM  --  8.0* 8.2*  --  8.4*   ALBUMIN  --  2.5*  --   --   --    PROT  --  4.8*  --   --   --    BILITOT  --  1.0  --   --   --    ALKPHOS  --  45*  --   --   --    ALT  --  <5*  --   --   --    AST  --  7*  --   --   --    MG 1.8  --   --  2.4  --    PHOS 3.7  --    "--  4.0  --        All pertinent labs within the past 24 hours have been reviewed.    Significant Imaging: I have reviewed all pertinent imaging results/findings within the past 24 hours.    Assessment/Plan:     Neuro  Increased intracranial pressure  -- NSGY, NCC, and neuro evaluated. Neuro continuing to follow.   -- CTH with developing hydrocephalus and signs of ventricular entrapment     Per Chart documentation:   - S/p LP w/opening pressure of 35cm and closing pressure of 13cm 06/30/22  - S/p LP w/opening pressure of 27cm and closing pressure of 12cm 07/08/22  - Continue to monitor for signs and symptoms of worsening ICP  - Repeat CTH 07/01 relatively unchanged   - Repeat CTH 7/8 without significant change  - S/p LP  w/opening pressure of 21cm and closing pressure of 11cm 07/13/22  - S/p LP w/openiing pressure of 18.5cm and closing pressure of 10cm 07/15/22  - NSGY consulted- initially planned for VPS 07/18, but with improvement in opening pressure and headache, no need for VPS at this time    Headache  -- Given Topamax while inpatient   -- PRN Fioricet, oxy/hydro, APAP - holding in setting of worsening mental status this AM     Ophtho  Bilateral papilledema due to raised intracranial pressure  Ophthalmology evaluation with grade 3 papilledema in both eyes (longer she has elevated CSF pressure, the higher her risk of permanent optic nerve damage and vision change is.  There is a congested appearance to the veins in both eyes secondary to optic nerve congestion and indicates higher risk for ischemic event), 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  -"7/7 exam, vision stable OU, color plates full. Still 3+ ONH edema, and we do not expect this to rapidly improve as there is often delay of resolution of ONH edema following improvement in ICP "    Pulmonary  Acute hypoxemic respiratory failure  Patient with Hypoxic Respiratory failure which is Acute.  she is not on home oxygen.     Supplemental oxygen was provided and noted- Vent Mode: " A/C  Oxygen Concentration (%):  [100] 100  Resp Rate Total:  [16 br/min-28 br/min] 28 br/min  Vt Set:  [345 mL] 345 mL  PEEP/CPAP:  [5 cmH20-10 cmH20] 10 cmH20  Mean Airway Pressure:  [8.8 ehC00-85 cmH20] 14 cmH20.     Signs/symptoms of respiratory failure include- tachypnea, increased work of breathing and respiratory distressContributing diagnoses includes - Pulmonary Embolus Labs and images were reviewed. Patient Has recent ABG, which has been reviewed.     -- STAT CTA with PE   -- Worsening acidosis -- giving Bicarb, adjusting ventilator. LA > 12. PH 6.9  -- Follow up repeat labs   -- See advanced care planning documentation     Renal/  Acute renal failure superimposed on stage 3 chronic kidney disease  Estimated Creatinine Clearance: 15.8 mL/min (A) (based on SCr of 3.3 mg/dL (H)).    1.2 at admission - baseline. Has been up trending     -- KTM following. Appreciate assistance   -- Transplant U/S without acute changes   -- CMP daily   -- Mas with strict I&O   -- Bicarb for acidosis - was TID PO -- giving IV push in setting of acute decompensation   -- Follow up ABG   -- Renally dose all medications to appropriate GFR   -- Avoid nephrotoxins   -- Daily [place] levels    Hypokalemia  -- Mg > 2, Phos > 3, K > 4. Replete PRN   -- Mas with strict I&O   -- Diarrhea     S/P kidney transplant  S/P transplant 4/5/2020. ESRD related to HTN. CKD stage 3.     -- KTM following. Appreciate assistance.     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 Was stopped due to pancytopenias   -- ID, transplant ID, and KTM following. Appreciate assistance   -- Fluconazole   -- Has had multiple LPs while inpatient. NSGY evaluated. No need for shunt.     Hematology  Acute pulmonary embolism  56 year old female with multiple medical problems with rapid response activated 07/19 AM in setting of respiratory distress, hypoxemia, and hypotension. Worsening acidosis with pH 6.9 and  CO2 60. Two vasopressor agents for BP support. STAT CTA CAP with multiple findings, notable saddle pulmonary embolus extending into multiple bilateral segmental branches    -- Please see separate advanced care planning documentation    VTE (venous thromboembolism)  07/02: Deep venous thrombosis within the left popliteal, posterior tibial, and peroneal veins    Heparin gtt --> held for multiple LPs intermittently     Oncology  Drug-induced pancytopenia  Heme/Onc evaluated - feel pancytopenias are drug related. No bone marrow biopsy completed.     -- CBC daily   -- Further workup with Retic, LDH, [place], B12 were previously normal   -- Monitoring daily     Endocrine  Poor nutrition  -- TPN with Lipids     GI  Diarrhea  -- C. Diff negative   -- Stool studies sent   -- GI consulted and following. Appreciate assistance.   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 Spoke with ID. Will start gancyclovir.   -- Electrolyte replacement PRN     Palliative Care  Long-term use of immunosuppressant medication  Home: Mycophenolate     -- Held in the setting of acute illness   -- KTM following   -- Prednisone 10mg daily --> changed to stress dose steroid dosing considering decompensation   -- Prograf levels         Critical Care Daily Checklist:    A: Awake: RASS Goal/Actual Goal:    Actual:     B: Spontaneous Breathing Trial Performed?     C: SAT & SBT Coordinated?  N/A                      D: Delirium: CAM-ICU     E: Early Mobility Performed? No   F: Feeding Goal: Goals: Meet % EEN, EPN by RD f/u date  Status: Nutrition Goal Status: progressing towards goal   Current Diet Order   No orders of the defined types were placed in this encounter.      AS: Analgesia/Sedation Propofol   T: Thromboembolic Prophylaxis Holding in setting of bleeding from OGT. See surgery documentation   H: HOB > 300 Yes   U: Stress Ulcer Prophylaxis (if needed) Protonix    G: Glucose Control Q4H    B: Bowel Function Stool Occurrence: 2   I: Indwelling Catheter  (Lines & Underwood) Necessity ETT, PICC, CVC, arterial line, underwood    D: De-escalation of Antimicrobials/Pharmacotherapies     Plan for the day/ETD Admit to ICU, CT CAP/head, BP support     Code Status:  Family/Goals of Care: DNR  See ACP documentation      Critical Care Time: 74 minutes    Plan of care reviewed multiple times with Dr. Mckoy and Dr. Bocanegra. Attestation to follow.     Critical secondary to Patient has a condition that poses threat to life and bodily function: acute hypoxemic respiratory failure, shock, renal failure      Critical care was time spent personally by me on the following activities: development of treatment plan with patient or surrogate and bedside caregivers, discussions with consultants, evaluation of patient's response to treatment, examination of patient, ordering and performing treatments and interventions, ordering and review of laboratory studies, ordering and review of radiographic studies, pulse oximetry, re-evaluation of patient's condition. This critical care time did not overlap with that of any other provider or involve time for any procedures.     Bartolome Hutton, NP  Critical Care Medicine  [facility] - Cardiac Medical ICU

## 2022-07-19 NOTE — CONSULTS
Patient seen and evaluated by critical care medicine during rapid response. To be admitted to ICU for further management. Full H&P to follow.     NIKITA Olmos  Pulmonary Critical Care Medicine   07/19/2022

## 2022-07-19 NOTE — ASSESSMENT & PLAN NOTE
Most likely drug induce  Seen by hem/onc and ID  Prior CMV negative  Repeat CMV detectible at low level  EBV, parvo pending; were negative prior  Recommend filgrastim 480 daily for 3 days

## 2022-07-19 NOTE — ASSESSMENT & PLAN NOTE
-- NSGY, NCC, and neuro evaluated. Neuro continuing to follow.   -- CTH with developing hydrocephalus and signs of ventricular entrapment     Per Chart documentation:   - S/p LP w/opening pressure of 35cm and closing pressure of 13cm 06/30/22  - S/p LP w/opening pressure of 27cm and closing pressure of 12cm 07/08/22  - Continue to monitor for signs and symptoms of worsening ICP  - Repeat CTH 07/01 relatively unchanged   - Repeat CTH 7/8 without significant change  - S/p LP  w/opening pressure of 21cm and closing pressure of 11cm 07/13/22  - S/p LP w/openiing pressure of 18.5cm and closing pressure of 10cm 07/15/22  - NSGY consulted- initially planned for VPS 07/18, but with improvement in opening pressure and headache, no need for VPS at this time

## 2022-07-19 NOTE — CONSULTS
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     Inpatient consult to Interventional Cardiology  Consult performed by: Geoffrey Lo MD  Consult ordered by: Bartolome Hutton NP  Reason for consult: pulmonary embolism  Assessment/Recommendations: Please see A/P        Subjective:     Reason for consult:  Pulmonary embolism     HPI:  Patient is intubated so history obtained as per chart. Patient is a 56-year-old woman with history of kidney transplant (2020 [facility]) on Prograf and Cellcept admitted to [facility] on 06/22 with intractable HA starting about 2 months prior + dizziness, blurred vision, left-sided facial droop and dysarthria starting a few days before admission. Patient underwent evaluation with LP (6/24) CSF Glu 6, CSF protein 93.  stain pos for yeast like organisms.  Spinal fluid culture pos for encapsulated yeast species. Consistent with cryptococcal meningitis. CT chest WO contrast pos for multiple indeterminate soft tissue masses in the left lower lobe with 2 smaller masses in the right lung which may be part of the same process. On 06/26 patient started on amphotericin B liposomal (275 IV q.d.) and flucytosine (1500 p.o. q.6h) .  Hospitalization c/b the development of acute renal failure.  Cr 1.32 (6/22) > 1.4 (6/26) > 2.02 (6/27).  Cellcept and prednisone have been held.  Transfer requested for higher level of care (KTM and ID).  Transfer diagnosis disseminated cryptococcal infection, TERESA, kidney XPL. Patient decompensated over the4 course of 2 weeks with pancytopenia,  increased pressor support (Levo 1.68 mcg/kg/min + vaso 0.04 mcg/kg/min), and is intubated due to acute hypoxic respiratory failure.        Past Medical History:   Diagnosis Date    Anemia of renal disease     ESRD on dialysis     Dr. Muhammad     Essential hypertension     PD catheter dysfunction 10/18/2018    Secondary hyperparathyroidism of renal origin        Past Surgical History:   Procedure Laterality Date    AV FISTULA PLACEMENT Left     never matured    AV graft Right 2015     SECTION      x3; ; , and     COLONOSCOPY N/A 2020    Procedure: COLONOSCOPY;  Surgeon: Darrell Golden MD;  Location: [facility] ENDO;  Service: Endoscopy;  Laterality: N/A;    GASTRIC BYPASS  2016    KIDNEY TRANSPLANT N/A 2020    Procedure: TRANSPLANT, KIDNEY;  Surgeon: Megan Garcia MD;  Location: [address];  Service: Transplant;  Laterality: N/A;    KIDNEY TRANSPLANT Right 2020    LAPAROSCOPIC REVISION OF PROCEDURE INVOLVING PERITONEAL DIALYSIS CATHETER N/A 10/18/2018    Procedure: REVISION OF PROCEDURE INVOLVING PERITONEAL DIALYSIS CATHETER, LAPAROSCOPIC;  Surgeon: Hair Jimenez MD;  Location: [facility];  Service: General;  Laterality: N/A;    PERITONEAL CATHETER INSERTION N/A 2018    Procedure: Laparoscopic INSERTION, CATHETER, INTRAPERITONEAL;  Surgeon: Hair Jimenez MD;  Location: [facility];  Service: General;  Laterality: N/A;    PERITONEAL CATHETER REMOVAL N/A 2018    Procedure: REMOVAL, CATHETER, DIALYSIS, PERITONEAL;  Surgeon: Hair Jimenez MD;  Location: [facility];  Service: General;  Laterality: N/A;    WOUND EXPLORATION N/A 2018    Procedure: EXPLORATION, WOUND-Surgical Site Irrigation;  Surgeon: Hair Jimenez MD;  Location: [facility];  Service: General;  Laterality: N/A;       Review of patient's allergies indicates:   Allergen Reactions    Sulfa (sulfonamide antibiotics) Hives       PTA Medications   Medication Sig    acetaminophen (TYLENOL) 325  MG tablet Take 2 tablets (650 mg total) by mouth every 6 (six) hours as needed (headache).    [] butalbital-acetaminophen-caffeine -40 mg (FIORICET, ESGIC) -40 mg per tablet Take 1 tablet by mouth daily as needed (migraines).    calcitRIOL (ROCALTROL) 0.25 MCG Cap Take 2 capsules (0.5 mcg total) by mouth once daily.    docusate sodium (COLACE) 100 MG capsule Take 1 capsule (100 mg total) by mouth 3 (three) times daily as needed for Constipation.    l-methylfolate-b2-b6-b12 (CEREFOLIN) 6-5-50-1 mg Tab Take 1 tablet by mouth once daily.    magnesium oxide (MAG-OX) 400 mg (241.3 mg magnesium) tablet Take 1 tablet (400 mg total) by mouth once daily.    mycophenolate (CELLCEPT) 250 mg Cap Take 2 capsules (500 mg total) by mouth 2 (two) times daily.    NIFEdipine (PROCARDIA-XL) 30 MG (OSM) 24 hr tablet TAKE ONE TABLET BY MOUTH TWICE DAILY hold if systolic blood pressure is less than 130    predniSONE (DELTASONE) 5 MG tablet Take 1 tablet (5 mg total) by mouth once daily.    sodium bicarbonate 650 MG tablet Take 2 tablets (1,300 mg total) by mouth 2 (two) times daily.    tacrolimus (PROGRAF) 1 MG Cap Take 3 capsules (3 mg total) by mouth every morning AND 2 capsules (2 mg total) every evening.    topiramate (TOPAMAX) 25 MG tablet Take 1 tablet (25 mg total) by mouth 2 (two) times daily.     Family History       Problem Relation (Age of Onset)    Diabetes Mother    Down syndrome Son    Heart disease Mother    Hypertension Mother    Kidney disease Mother    Lupus Sister          Tobacco Use    Smoking status: Never Smoker    Smokeless tobacco: Never Used   Substance and Sexual Activity    Alcohol use: No    Drug use: No    Sexual activity: Yes     Partners: Male     Review of Systems   Unable to perform ROS: Intubated   Objective:     Vital Signs (Most Recent):  Temp: 97.7 °F (36.5 °C) (22 0808)  Pulse: 98 (22 1600)  Resp: (!) 28 (22 1600)  BP: 123/79 (22  1600)  SpO2: (!) 94 % (07/19/22 1600)   Vital Signs (24h Range):  Temp:  [97.7 °F (36.5 °C)-98 °F (36.7 °C)] 97.7 °F (36.5 °C)  Pulse:  [] 98  Resp:  [12-45] 28  SpO2:  [81 %-100 %] 94 %  BP: ()/(24-79) 123/79  Arterial Line BP: (119-129)/(46-48) 119/48     Weight: 60 kg (132 lb 4.4 oz)  Body mass index is 25.83 kg/m².    SpO2: (!) 94 %  O2 Device (Oxygen Therapy): ventilator      Intake/Output Summary (Last 24 hours) at 7/19/2022 1727  Last data filed at 7/19/2022 0830  Gross per 24 hour   Intake 230 ml   Output --   Net 230 ml       Lines/Drains/Airways       Peripherally Inserted Central Catheter Line  Duration             PICC Double Lumen 07/17/22 1545 left basilic 2 days              Central Venous Catheter Line  Duration                  Hemodialysis AV Graft Right forearm -- days    Percutaneous Central Line Insertion/Assessment - Triple Lumen  07/19/22 1003 right internal jugular <1 day              Drain  Duration                  Urethral Catheter 07/18/22 1609 Coude 16 Fr. 1 day         NG/OG Tube 07/19/22 0936 orogastric 14 Fr. Right mouth <1 day              Airway  Duration                  Airway - Non-Surgical 07/19/22 0928 Endotracheal Tube <1 day              Arterial Line  Duration             Arterial Line 07/19/22 1235 Left Femoral <1 day              Peripheral Intravenous Line  Duration                  Peripheral IV - Single Lumen 07/05/22 1510 20 G;1 3/4 in Left;Anterior Forearm 14 days                    Physical Exam  Vitals and nursing note reviewed.   Constitutional:       Comments: Intubated , sedated   HENT:      Head: Atraumatic.   Cardiovascular:      Rate and Rhythm: Tachycardia present.   Pulmonary:      Effort: No respiratory distress.      Comments: intubated  Neurological:      Comments: sedated     Significant Labs: All pertinent lab results from the last 24 hours have been reviewed. and   Recent Lab Results  (Last 5 results in the past 24 hours)         07/19/22  1529   07/19/22  1414   07/19/22  1413   07/19/22  1038   07/19/22  0909        Albumin     1.9           Alkaline Phosphatase     67           Allens Test N/A               ALT     243           Anion Gap     21           Aniso     Slight           AST     405           Bands     13.0           Basophilic Stippling     Occasional           Basophil %     0.0  Comment: CORRECTED RESULT; previously reported as 2.0 on %DDDDDDDD% at %TTT%.  [C]           BILIRUBIN TOTAL     1.8  Comment: For infants and newborns, interpretation of results should be based  on gestational age, weight and in agreement with clinical  observations.    Premature Infant recommended reference ranges:  Up to 24 hours.............<8.0 mg/dL  Up to 48 hours............<12.0 mg/dL  3-5 days..................<15.0 mg/dL  6-29 days.................<15.0 mg/dL             Site [place]               BUN     71           Calcium     8.4           Chloride     93           CO2     14           CPK     229           Creatinine     3.3           DelSys Adult Vent               Differential Method     Manual           Dohle Bodies     Present           eGFR if      17.4           eGFR if non      15.1  Comment: Calculation used to obtain the estimated glomerular filtration  rate (eGFR) is the CKD-EPI equation.              Eosinophil %     4.0  Comment: CORRECTED RESULT; previously reported as 2.0 on %DDDDDDDD% at %TTT%.  [C]           FiO2 100               Large/Giant Platelets     Present           Glucose     225           Gran %     36.0  Comment: CORRECTED RESULT; previously reported as 55.2 on %DDDDDDDD% at %TTT%.  [C]           Group & Rh     O POS           Hematocrit     33.1           Hemoglobin     10.4           Heparin Anti-Xa         <0.10  Comment: Expected therapeutic range for Unfractionated heparin (UFH)  is 0.3-0.7 IU/mL.  The therapeutic range for low molecular weight heparins   (LMWH) varies  with the type and , but is   typically between 0.4 and 1.1 IU/mL.         Hypo     Occasional           Immature Grans (Abs)     CANCELED  Comment: Mild elevation in immature granulocytes is non specific and   can be seen in a variety of conditions including stress response,   acute inflammation, trauma and pregnancy. Correlation with other   laboratory and clinical findings is essential.    Result canceled by the ancillary.             Immature Granulocytes     CANCELED  Comment: Result canceled by the ancillary.           INDIRECT JULIUS     NEG           LD     1,079  Comment: Results are increased in hemolyzed samples.           Lymph %     26.0  Comment: CORRECTED RESULT; previously reported as 24.5 on %DDDDDDDD% at %TTT%.  [C]           MCH     31.0           MCHC     31.4           MCV     99           Metamyelocytes     6.0           Mode AC/PRVC               Mono %     8.0  Comment: CORRECTED RESULT; previously reported as 10.2 on %DDDDDDDD% at %TTT%.  [C]           MPV     13.4           Myelocytes     5.0           nRBC     9           Ovalocytes     Occasional           PEEP 10               Platelet Estimate     Decreased           Platelets     102           POC BE -20               POC HCO3 12.8               POC PCO2 60.7               POC PH 6.931               POC PO2 115               POC SATURATED O2 94               POC TCO2 15               POCT Glucose   227     164         Poikilocytosis     Slight           Poly     Occasional           Potassium     5.0           Promyelocytes     2.0           PROTEIN TOTAL     4.2           Rate 16               RBC     3.36           RDW     13.8           Sample ARTERIAL               Sodium     128           Teardrop Cells     Occasional           Toxic Granulation     Present           Vt 345               WBC     1.47  Comment: WBC critical result(s) called and verbal readback obtained from   Elis Miller Rn  by CHAZ 07/19/2022 15:18                                      [C] - Corrected Result               Significant Imaging: CT scan: CT ABDOMEN PELVIS WITH CONTRAST: No results found for this visit on 06/27/22. and CT ABDOMEN PELVIS WITHOUT CONTRAST:   Results for orders placed or performed during the hospital encounter of 06/27/22   CT Abdomen Pelvis  Without Contrast    Narrative    EXAMINATION:  CT ABDOMEN PELVIS WITHOUT CONTRAST    CLINICAL HISTORY:  Abdominal pain, acute, nonlocalized;    TECHNIQUE:  The patient was surveyed from the lung bases through the pelvis.  No contrast was administered. The data was reconstructed for coronal, sagittal, and axial images.    COMPARISON:  CT abdomen pelvis 07/12/2022; U/S transplant kidney with Doppler 06/28/2022    FINDINGS:  CHEST:    Lungs/Pleura: Unchanged appearance of multiple solid pulmonary nodules within the left lower lobe compared to CT abdomen pelvis 07/12/2022.  The largest nodule measures 2.4 cm (series 2, image 13).  2 mm nodule right middle lobe series 2, image 1 similar.  No focal consolidation.  No pneumothorax.  No pleural effusion.    Heart: Normal size.  New small pericardial effusion.    Thoracic soft tissues: Unremarkable    ABDOMEN:    Liver: Enlarged measuring 20.6 cm.  No focal hepatic abnormality.    Gallbladder/Bile ducts: Gallbladder is distended without inflammatory changes.  No intrahepatic or extrahepatic biliary ductal dilatation.    Spleen:Unremarkable.    Stomach: Postoperative changes of the stomach, gastric bypass.    Pancreas: No mass or peripancreatic fat stranding.    Adrenals: Unremarkable.    Renal/Ureters: Native kidneys are atrophic.  Left renal simple cyst.  No hydronephrosis.  The ureters are normal in course and caliber. The urinary bladder decompressed without focal abnormality.    Transplant kidney within the right lower quadrant.  No hydronephrosis.    Reproductive: Uterus is unremarkable.  No adnexal mass.    Bowel: Loops of small bowel show no evidence of  obstruction or inflammation.  Postop changes of a distal segment of the small bowel within the left lower quadrant.  Loops of large bowel are partially distended with liquid stool.  Majority the colon is somewhat featureless.  No evidence of obstruction.  The appendix is not confidently visualized; however, no inflammatory changes in the expected location of the appendix.    Peritoneum: No free fluid.  No intraperitoneal free air.    Lymph Nodes: No pathologic blessing enlargement in the abdomen or pelvis.    Vasculature: Abdominal aorta is normal in course and caliber.  Mild calcifications of the abdominal aorta.    Bones: Degenerative changes of the spine, including diffuse idiopathic skeletal hyperostosis of the thoracic spine.  No acute fractures or osseous destructive lesions.    Soft Tissues: Soft tissue thickening of the subcutaneous tissues of the anterior abdomen, adjacent prior surgery.      Impression    Liquid stool throughout the colon.  Colon is somewhat featureless suggesting chronic inflammatory process.    Hepatomegaly.    New small pericardial effusion.    Unchanged appearance of lower lobe pulmonary nodules as detailed above.    Electronically signed by resident: Marshall Martinez  Date:    07/16/2022  Time:    11:03    Electronically signed by: Alonso Seo MD  Date:    07/16/2022  Time:    12:51         CTA Chest Non coronary    EXAMINATION:  CTA CHEST ABDOMEN PELVIS     CLINICAL HISTORY:  Septic shock with LA > 12 now on vasopressors, known DVT off anticoagulation, abdominal distention and tenderness;     TECHNIQUE:  Axial images of the chest, abdomen, and pelvis were acquired  after the use of 100 cc [id number] IV contrast.  Coronal and sagittal reconstructions were also obtained     COMPARISON:  CT abdomen pelvis 07/16/2022     FINDINGS:  Devices: Left upper extremity PICC line with tip in the SVC.  Right IJ central line with tip at the superior atriocaval junction.  Endotracheal tube with tip  approximately 0.8 cm above the mariano.     Thoracic soft tissues: 2.3 cm right hepatic hypodensity.  No axillary lymphadenopathy.     Aorta: Thoracic aorta is normal in caliber and contour with no significant calcific atherosclerosis.     Heart: Normal in size. No pericardial effusion. No significant calcific coronary atherosclerosis.  No significant dilation of the right ventricle     Valerie/Mediastinum: No mediastinal or hilar lymphadenopathy. Saddle pulmonary embolus extending into multiple bilateral segmental branches noting that timing is not optimized to evaluate the pulmonary arteries.     Lungs: Respiratory motion artifact limits assessment of the pulmonary parenchyma.  Trachea and bronchi are patent.  Dependent atelectasis in the bilateral lower lobes with bilateral areas of non enhancement concerning for pulmonary infarcts (for example axial series 2, image 240).  No pleural fluid or pneumothorax.  Few scattered pulmonary opacities measuring up to 0.5 cm (axial series 2, image 212).     Liver: Enlarged measuring 22.2 cm in craniocaudal dimension.  Not well enhancing.  No focal hepatic lesion.  No portal venous gas.  There is reflux of contrast into the hepatic veins, a nonspecific finding.     Gallbladder: No calcified gallstones.     Bile Ducts: No evidence of dilated ducts.     Pancreas: No mass or peripancreatic fat stranding.     Spleen: Not well enhancing.     Esophagus: Enteric tube within the esophagus.     Esophagus/Stomach/Duodenum: Enteric tube within the esophagus however appears to be extraluminal at the level of the GE junction/gastric pouch coursing along the stomach and left peritoneal space (axial series 2, image 328).  Postoperative change of Ish-en-Y gastric bypass.  Excluded stomach and duodenum are dilated with air and fluid.     Adrenals: Bilateral adrenal glands appear hyperenhancing.     Kidneys/Ureters:     Native bilateral kidneys atrophic.  No significant enhancement.  No  hydronephrosis or nephrolithiasis. No ureteral dilatation. Left simple cysts.     Postoperative change of right lower quadrant renal transplant.  No renal stones or hydronephrosis of the transplant kidney.     Bladder: Decompressed around Mas catheter with air in the bladder.     Reproductive organs: Unremarkable.     Bowel/Mesentery: Multiple dilated air and fluid-filled loops of small bowel measuring up to 4.2 cm.  No discrete transition point with gradual decrease in caliber (around axial series 2, image 84) and few decompressed loops of distal ileum.  There is pneumatosis involving several loops of small bowel in the left abdomen.  Colon is relatively decompressed.  Colon demonstrates no focal wall thickening.     Peritoneum: Intraperitoneal course of enteric tube along the stomach and left peritoneal space with fluid collection around its distal aspect containing multiple foci of air and scattered foci of air along its course.  Few additional scattered foci of free air beneath the left hemidiaphragm.  Few scattered foci of air in the central mesentery (for example axial series 2, image 539), free air versus mesenteric venous gas.  Additional scattered free fluid along the right pericolic gutter and within the pelvis.     Lymph nodes: No lymphadenopathy.     Abdominal wall:  Unremarkable.     Vasculature: No aneurysm. No significant calcific atherosclerosis.  IVC appears collapsed.  Aorta and its visceral branches (celiac, SMA, bilateral renal arteries, and DIDI are relatively small in caliber but patent.  No evidence of SMA thrombus.     Bones: No acute fracture. No suspicious osseous lesions.     Impression:     1. Saddle pulmonary embolus extending into multiple bilateral segmental branches.  Right ventricle does not appear significantly dilated although there is reflux of contrast into the hepatic veins which can be seen with right-sided heart failure.  Echocardiography would be more sensitive to evaluate  for right heart strain.  Dependent atelectasis in the bilateral lower lobes with areas of nonenhancement concerning for pulmonary infarcts.  2. Enteric tube appears to course through the GE junction/gastric pouch into the peritoneal cavity terminating along the left peritoneal space with intraperitoneal fluid and air along its course as detailed above.  Postoperative change of Ish-en-Y gastric bypass.  3. Multiple dilated air and fluid-filled loops of small bowel several of which demonstrate pneumatosis.  No discrete transition point to suggest high-grade bowel obstruction.  Possible mesenteric venous gas versus free air within the central mesentery.  No portal venous gas.  4. Small caliber abdominal aorta/visceral branches, collapsed IVC, hyperenhancement of the bilateral adrenal glands, and poor enhancement of the solid organs.  Overall findings suggestive of CT hypoperfusion complex.  5. Few scattered pulmonary opacities measuring up to 0.5 cm.  Follow-up noncontrast CT recommended once acute issues resolved.  6. Additional findings as above.  This report was flagged in Epic as abnormal.     The critical information above regarding the malpositioned enteric tube, saddle pulmonary embolus, and pneumatosis was relayed directly by Filippo Curry MD by telephone to Alba Mahoney MD on 7/19/2022 at 15:44.     Electronically signed by resident: Filippo Curry  Date:                                            07/19/2022  Time:                                           15:39     Electronically signed by: Fco Eldridge MD  Date:                                            07/19/2022  Time:                                           17:07      Assessment and Plan:     Acute pulmonary embolism  Patient is a 56 year old male with history of [facility] that presented as a transfer from OSH. Acute hypoxic respiratory failure currently intubated. PH 6.9 pCo2 60. Patient is currently on Levophed 1.68  mcg/kg/min + Vaso 0.04 mcg/kg/min. Patient is currently too hemodynamically unstable to bring to the cath lab for cathter directed thrombectomy. Considering pancytopenia (likely drug related from ampho + flucytosine), we would recommend heparin gtt for now as tolerated and strongly would recommend goals of care discussion as prognosis is poor.   We wish the best for the patient.          VTE Risk Mitigation (From admission, onward)    None          Thank you for your consult. We will sign off. Please contact us if you have any additional questions.    Plan of care was discussed with staff, Dr Sheehan.     Geoffrey Lo MD  Interventional Cardiology PGY5  [facility] - Cardiac Medical ICU

## 2022-07-19 NOTE — CARE UPDATE
RAPID RESPONSE NURSE AI ALERT       AI alert received.    Chart Reviewed: 07/19/2022, 6:46 AM    MRN: [id number]  Bed: 8081/8081 A    Dx: Meningitis due to fungal infection    Tala Rivera has a past medical history of Anemia of renal disease, ESRD on dialysis, Essential hypertension, PD catheter dysfunction, and Secondary hyperparathyroidism of renal origin.    Last VS: BP (!) 85/38   Pulse (!) 125   Temp 97.9 °F (36.6 °C)   Resp (!) 21   Ht 5' (1.524 m)   Wt 60 kg (132 lb 4.4 oz)   LMP  (LMP Unknown)   SpO2 100%   BMI 25.83 kg/m²     24H Vital Sign Range:  Temp:  [96.9 °F (36.1 °C)-98 °F (36.7 °C)]   Pulse:  []   Resp:  [18-45]   BP: ()/(38-75)   SpO2:  [90 %-100 %]     Level of Consciousness (AVPU): alert    Recent Labs     07/17/22  1030 07/18/22  0449   WBC 1.06*  1.06* 0.83*  0.83*   HGB 9.8*  9.8* 9.4*  9.4*   HCT 29.2*  29.2* 28.2*  28.2*   *  112* 116*  116*       Recent Labs     07/17/22  1029 07/17/22  1532 07/18/22  0449   NA  --  135* 135*   K  --  3.3* 3.3*   CL  --  98 97   CO2  --  25 25   CREATININE  --  2.1* 2.2*   GLU  --  284* 215*   PHOS 3.7  --   --    MG 1.8  --   --         No results for input(s): PH, PCO2, PO2, HCO3, POCSATURATED, BE in the last 72 hours.     OXYGEN:  Flow (L/min): 2     O2 Device (Oxygen Therapy): nasal cannula    MEWS score: 3    Bedside RN, Shelly contacted. BP soft, team ordering 250 cc LR bolus.  RN rechecking BP in arm. Instructed to call [phone] for further concerns or assistance.    Abelardo De Dios RN

## 2022-07-19 NOTE — SUBJECTIVE & OBJECTIVE
Subjective:   History of Present Illness:  56 y.o. female with a PMHx of HTN, kidney transplant in 2020, CKD3 admitted to [facility] on 06/22 with intractable HA starting about 2 months earlier and with dizziness, blurred vision, left-sided facial droop and dysarthria starting a few days before admission.    Per documentation   On admission /72, HR 69. Neurologic exam is remarkable for left-sided facial weakness and mild dysarthria.  Cr 1.32   LP (6/24) CSF Glu 6, CSF protein 93. Citizen of the [place] stain pos for yeast like organisms.  Spinal fluid culture pos for encapsulated yeast species  CT chest WO contrast pos for multiple indeterminate soft tissue masses in the left lower lobe with 2 smaller masses in the right lung which may be part of the same process.       On 06/26 patient started on amphotericin B liposomal (275 IV q.d.) and flucytosine (1500 p.o. q.6h) .  Hospitalization c/b the development of acute renal failure.  Cr 1.32 (6/22) > 1.4 (6/26) > 2.02 (6/27).  Transfer requested for higher level of care (KTM and ID).  Transfer diagnosis disseminated cryptococcal infection, TERESA, kidney XPL       Ms. Rivera is a 56 y.o. year old female who is status post Kidney Transplant - 4/5/2020  (#1).    Her maintenance immunosuppression consists of:   Immunosuppressants (From admission, onward)                None            Hospital Course:  Being treated for crypto meningitis     Interval History:  Abdominal distention, shortness of breath, and hypotension overnight. Transferred to ICU, intubated, cultures obtained, placed on fluids, pressers, and broad spectrum antibiotics.    Past Medical, Surgical, Family, and Social History:   Unchanged from H&P.    Scheduled Meds:   ceFEPime (MAXIPIME) IVPB  2 g Intravenous Q24H    fluconazole (DIFLUCAN) IV (PEDS and ADULTS)  400 mg Intravenous Q24H    lactated ringers  1,000 mL Intravenous Once    metronidazole  500 mg Intravenous Q8H    pantoprazole  40 mg Intravenous  Daily    phenylephrine HCl in 0.9% NaCl        [START ON 7/20/2022] predniSONE  5 mg Per OG tube Daily    sodium bicarbonate  1,300 mg Per OG tube TID    vancomycin (VANCOCIN) IVPB  20 mg/kg Intravenous Once     Continuous Infusions:   fentanyl      NORepinephrine bitartrate-D5W 1.2 mcg/kg/min (07/19/22 1034)    propofoL      vasopressin       PRN Meds:sodium chloride, acetaminophen, albuterol-ipratropium, bisacodyL, dextrose 10%, glucagon (human recombinant), glucose, glucose, insulin aspart U-100, iohexol, loperamide, melatonin, naloxone, ondansetron, oxyCODONE-acetaminophen, promethazine (PHENERGAN) IVPB, sodium chloride 0.9%, Pharmacy to dose Vancomycin consult **AND** vancomycin - pharmacy to dose    Intake/Output - Last 3 Shifts         07/17 0700 07/18 0659 07/18 0700 07/19 0659 07/19 0700 07/20 0659    P.O. 500 560     I.V. (mL/kg)   50 (0.8)    Total Intake(mL/kg) 500 (8.3) 560 (9.3) 50 (0.8)    Urine (mL/kg/hr)       Total Output       Net +500 +560 +50           Urine Occurrence 4 x 4 x     Stool Occurrence 3 x 2 x              Review of Systems   Unable to perform ROS: Intubated    Objective:     Vital Signs (Most Recent):  Temp: 97.7 °F (36.5 °C) (07/19/22 0808)  Pulse: (!) 119 (07/19/22 1120)  Resp: 18 (07/19/22 1120)  BP: (!) 140/54 (07/19/22 1120)  SpO2: 100 % (07/19/22 1120)   Vital Signs (24h Range):  Temp:  [97.1 °F (36.2 °C)-98 °F (36.7 °C)] 97.7 °F (36.5 °C)  Pulse:  [107-127] 119  Resp:  [17-45] 18  SpO2:  [93 %-100 %] 100 %  BP: ()/(28-75) 140/54     Weight: 60 kg (132 lb 4.4 oz)  Height: 5' (152.4 cm)  Body mass index is 25.83 kg/m².    Physical Exam  Constitutional:       General: She is not in acute distress.     Appearance: She is ill-appearing.      Comments: sedated   Eyes:      General: No scleral icterus.     Extraocular Movements: Extraocular movements intact.      Pupils: Pupils are equal, round, and reactive to light.   Cardiovascular:      Rate and Rhythm: Tachycardia  present.      Heart sounds: Murmur heard.   Pulmonary:      Comments: intubated  Abdominal:      General: There is distension.   Musculoskeletal:      Right lower leg: No edema.      Left lower leg: No edema.   Skin:     Coloration: Skin is not jaundiced.       Laboratory:  Labs within the past 24 hours have been reviewed.    Diagnostic Results:  None  CXR reviewed

## 2022-07-19 NOTE — CONSULTS
[facility] - Cardiac Medical ICU  Gastroenterology  Consult Note    Patient Name: Tala Rivera  MRN: [id number]  Admission Date: 6/27/2022  Hospital Length of Stay: 22 days  Code Status: Full Code   Attending Provider: Kylie North MD  Consulting Provider: Santana Topete MD  Primary Care Physician: ANUJA Castellon MD  Principal Problem:Meningitis due to fungal infection    Inpatient consult to Gastroenterology  Consult performed by: Santana Topete MD  Consult ordered by: Keke Hicks MD  Reason for consult: Dysphagia and diarrhea in setting of CMV concern for esophagitis/colitis  Assessment/Recommendations: Patient is a 57 yo F history of Kidney transplant 2020, h/o  untreated CMV. Currently being treated for fungal meningitis, consulted for dysphagia and diarrhea, concern for esophagitis/colitis. Unable to assess as patient was in rapid due to hypotension, respiratory distress at time of exam. Patient transferred to ICU.     Continue supportive care measurments, Ok to start empiric treatment for CMV per ID reqs.  Please re-consult when patient stable for possible EGD.        Subjective:     HPI: Mrs. Rivera is a 57 yo F with history of kidney transplant (2020 [facility]) on progrraf and cellcept initially admitted 2 months ago for for HA, dizziness, blurred vision, left facial droop and dysarthria. LP w/ cryptococcus, patient completed 20 days course of ampho + flucytosine. Currently on fluconazole 400 mg. Hospitalization complicated by development of TERESA. Patient has persistent abdominal pain pain, diarrhea, nausea, dry heaving, decresed PO intake.    As of today, reports of improved symptoms with encouragement from significant other, PO intake increased w/ no odynophagia.     **HPI obtained from chart check, unable to interview patient do to worsened clinical status at time of consultation**      Past Medical History:   Diagnosis Date    Anemia of renal disease     ESRD on dialysis     Dr. Muhammad      Essential hypertension     PD catheter dysfunction 10/18/2018    Secondary hyperparathyroidism of renal origin        Past Surgical History:   Procedure Laterality Date    AV FISTULA PLACEMENT Left     never matured    AV graft Right 2015     SECTION      x3; ; , and     COLONOSCOPY N/A 2020    Procedure: COLONOSCOPY;  Surgeon: Darrell Golden MD;  Location: [facility] END[facility];  Service: Endoscopy;  Laterality: N/A;    GASTRIC BYPASS  2016    KIDNEY TRANSPLANT N/A 2020    Procedure: TRANSPLANT, KIDNEY;  Surgeon: Megan Garcia MD;  Location: [address];  Service: Transplant;  Laterality: N/A;    KIDNEY TRANSPLANT Right 2020    LAPAROSCOPIC REVISION OF PROCEDURE INVOLVING PERITONEAL DIALYSIS CATHETER N/A 10/18/2018    Procedure: REVISION OF PROCEDURE INVOLVING PERITONEAL DIALYSIS CATHETER, LAPAROSCOPIC;  Surgeon: Hair Jimenez MD;  Location: [facility];  Service: General;  Laterality: N/A;    PERITONEAL CATHETER INSERTION N/A 2018    Procedure: Laparoscopic INSERTION, CATHETER, INTRAPERITONEAL;  Surgeon: Hair Jimenez MD;  Location: [facility];  Service: General;  Laterality: N/A;    PERITONEAL CATHETER REMOVAL N/A 2018    Procedure: REMOVAL, CATHETER, DIALYSIS, PERITONEAL;  Surgeon: Hair Jimenez MD;  Location: [facility];  Service: General;  Laterality: N/A;    WOUND EXPLORATION N/A 2018    Procedure: EXPLORATION, WOUND-Surgical Site Irrigation;  Surgeon: Hair Jimenez MD;  Location: [facility];  Service: General;  Laterality: N/A;       Review of patient's allergies indicates:   Allergen Reactions    Sulfa (sulfonamide antibiotics) Hives     Family History       Problem Relation (Age of Onset)    Diabetes Mother    Down syndrome Son    Heart disease Mother    Hypertension Mother    Kidney disease Mother    Lupus Sister          Tobacco Use    Smoking status: Never Smoker    Smokeless tobacco: Never Used   Substance and Sexual Activity    Alcohol  use: No    Drug use: No    Sexual activity: Yes     Partners: Male     Review of Systems   Reason unable to perform ROS: Unable to obtain due to patient altered mental satus  Objective:     Vital Signs (Most Recent):  Temp: 97.7 °F (36.5 °C) (07/19/22 0808)  Pulse: (!) 123 (07/19/22 0840)  Resp: (!) 40 (07/19/22 0840)  BP: (!) 56/41 (07/19/22 0840)  SpO2: (!) 94 % (07/19/22 0840)   Vital Signs (24h Range):  Temp:  [96.9 °F (36.1 °C)-98 °F (36.7 °C)] 97.7 °F (36.5 °C)  Pulse:  [107-125] 123  Resp:  [18-45] 40  SpO2:  [93 %-100 %] 94 %  BP: ()/(33-75) 56/41     Weight: 60 kg (132 lb 4.4 oz) (07/18/22 0600)  Body mass index is 25.83 kg/m².      Intake/Output Summary (Last 24 hours) at 7/19/2022 0850  Last data filed at 7/19/2022 0830  Gross per 24 hour   Intake 610 ml   Output --   Net 610 ml       Lines/Drains/Airways       Peripherally Inserted Central Catheter Line  Duration             PICC Double Lumen 07/17/22 1545 left basilic 1 day              Central Venous Catheter Line  Duration                  Hemodialysis AV Graft Right forearm -- days              Drain  Duration                  Urethral Catheter 07/18/22 1609 Coude 16 Fr. <1 day              Peripheral Intravenous Line  Duration                  Peripheral IV - Single Lumen 07/05/22 1510 20 G;1 3/4 in Left;Anterior Forearm 13 days                    Physical Exam  Vitals and nursing note reviewed.   Unable to obtain, see above ROS    Significant Labs:  All pertinent lab results from the last 24 hours have been reviewed.  Recent Lab Results  (Last 5 results in the past 24 hours)        07/19/22  0741   07/19/22  0728   07/19/22  0604   07/19/22  0603   07/19/22  0050        Allens Test   Pass             Anion Gap 19               aPTT 41.9  Comment: aPTT therapeutic range = 39-69 seconds               Site   RR             BUN 72               Calcium 8.4               Chloride 94               CO2 15               Creatinine 3.4                Geoffrey   NRB             eGFR if  16.8               eGFR if non  14.6  Comment: Calculation used to obtain the estimated glomerular filtration  rate (eGFR) is the CKD-EPI equation.                  Fibrinogen       478         Glucose 160               Hematocrit 29.6       30.7         Hemoglobin 9.8       10.0         INR 1.6  Comment: Coumadin Therapy:  2.0 - 3.0 for INR for all indicators except mechanical heart valves  and antiphospholipid syndromes which should use 2.5 - 3.5.                 Lactate, Giovanni >12.0  Comment: Falsely low lactic acid results can be found in samples   containing >=13.0 mg/dL total bilirubin and/or >=3.5 mg/dL   direct bilirubin.  *Critical value notification by ml__ with confirmation of receipt to  caitlin mcleod rn___ at  Date_07/19___Time_08:38 am___                 Magnesium       2.4         MCH 30.3       30.7         MCHC 33.1       32.6         MCV 92       94         Mode   SPONT             MPV 12.8       13.0         Phosphorus       4.0         Platelets 112       103         POC BE   -13             POC HCO3   15.6             POC Hematocrit   30             POC Ionized Calcium   1.17             POC PCO2   42.1             POC PH   7.177             POC PO2   38             POC Potassium   3.3             POC SATURATED O2   58             POC Sodium   128             POC TCO2   17             POCT Glucose     230     271       Potassium 3.8               Protime 16.3               RBC 3.23       3.26         RDW 13.4       13.4         Sample   ARTERIAL             Sodium 128               Triglycerides       222  Comment: The National Cholesterol Education Program (NCEP) has set the  following guidelines (reference values) for triglycerides:  Normal......................<150 mg/dL  Borderline High.............150-199 mg/dL  High........................200-499 mg/dL           WBC 1.12  Comment: WBC  critical result(s) called and verbal  readback obtained from   CHING ISRAELNURSE  by LISANDRA 07/19/2022 08:39         0.83  Comment: WBC  critical result(s) called and verbal readback obtained from   NURSE EDNA by LISANDRA 07/19/2022 08:24                                  Significant Imaging:  Imaging results within the past 24 hours have been reviewed.    Assessment/Plan:     Unable to assess patient given patients clinical status at time of consult. Patient transferred to ICU.     - Continue supportive care  - OK for CMV treatment per ID reqs  - When patient stabilized, re-consult for possible EGD    Thank you for your consult. Please contact for any further questions.    Santana Toepte MD  Gastroenterology  [facility] - Cardiac Medical ICU

## 2022-07-19 NOTE — PT/OT/SLP PROGRESS
Physical Therapy      Patient Name:  Tala Rivera   MRN:  [id number]    Patient not seen today secondary to Transfer to ICU, await clearance. Per notes, patient was transferred to ICU for low BP.  Please reorder PT if a new PT need arises.  Thank you.

## 2022-07-19 NOTE — SIGNIFICANT EVENT
Death Note  Hospital Medicine  [facility]            I was called to bedside on 7/19/2022 at 18:03. Nursing at beside, nursing supervisor notified. Family at bedside.     Patient is not responding to verbal or tactile stimuli. No heart or breath sounds on auscultation. No respirations. No palpable pulses. Patient does not have a pupillary or corneal reflex. Patient's pupils are fixed and dilated.      Time of death is 7/19/2022  at 18:05        Preliminary cause of Death: pulmonary emoblism    The family has been informed of the death and condolences given. The  has been notified  for further support.         Signing Physician:    Rc Kennedy MD   [facility] Contact  Email: [email or web address]  Phone: [phone]

## 2022-07-19 NOTE — ASSESSMENT & PLAN NOTE
- Pt with intractable headache, facial droop, dysarthria (improved). 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  - Stopped ampho and flucytosine due to suspected drug-induced pancytopenia  - Continue fluconazole 400mg qday per ID   - On chart review, amphotericin started on 06/24/22- 2wks of treatment 07/08/22  - Repeat LP completed 7/9; gram stain notable for yeast; and amphotericin continued; cultures no growth to date  - LP 07/15 w/opening pressure of 18.5 and closing pressure of 10--per NSGY- no need for VPS  -Transplant ID following- continue with current course- patient signs and symptoms much improved since time of admission, will hold of on steroids- no signs of IRIS, will continue to monitor for need for repeat LP  - KTM following; recommend 500mL normal saline with amphotericin

## 2022-07-19 NOTE — PLAN OF CARE
[facility] - Cardiac Medical ICU  Discharge Reassessment    Primary Care Provider: ANUJA Castellon MD    Expected Discharge Date: 7/22/2022    Reassessment (most recent)     Discharge Reassessment - 07/19/22 1140        Discharge Reassessment    Assessment Type Discharge Planning Reassessment     Did the patient's condition or plan change since previous assessment? Yes     Discharge Plan discussed with: Spouse/sig other     Name(s) and Number(s) Kathy Rivera, spouse/cp# [phone]/hm# [phone]     Communicated JULITO with patient/caregiver No;Date not available/Unable to determine     Discharge Plan A Other   Pt is intubated,ESRD, Vasopressin, propofol, and fentanyl gtt    Discharge Plan B Long-term acute care facility (LTAC)     DME Needed Upon Discharge  other (see comments)   TBD    Discharge Barriers Identified None     Why the patient remains in the hospital Requires continued medical care        Post-Acute Status    Discharge Delays None known at this time               Patient not stable for discharge @ this time.  SW will continue to follow patient's progress to discharge from MICU.  SW remains available for any family concerns or needs.    Rickie Banks, YVONNE  [facility]  X [phone]

## 2022-07-19 NOTE — ASSESSMENT & PLAN NOTE
- Continue lexapro 10mg qday   - Hydroxyzine 25mg qhs  - Consult psychology - awaiting official evaluation

## 2022-07-19 NOTE — RESPIRATORY THERAPY
RAPID RESPONSE RESPIRATORY THERAPY NOTE             Code Status: Full Code   : 1965  Bed: 6065/6065 A:   MRN: [id number]  Time page Received: called by RN  Time Rapid Response RT at Bedside: 705  Time Rapid Response RT left Bedside: 800    SITUATION    Evaluated patient for: respiratory     BACKGROUND    Why is the patient in the hospital?: Meningitis due to fungal infection    Patient has a past medical history of Anemia of renal disease, ESRD on dialysis, Essential hypertension, PD catheter dysfunction, and Secondary hyperparathyroidism of renal origin.    24 Hours Vitals Range:  Temp:  [96.9 °F (36.1 °C)-98 °F (36.7 °C)]   Pulse:  [107-125]   Resp:  [18-45]   BP: ()/(38-75)   SpO2:  [98 %-100 %]     Labs:    Recent Labs     22  1029 22  1532 22  0449 22  0603   NA  --  135* 135*  --    K  --  3.3* 3.3*  --    CL  --  98 97  --    CO2  --  25 25  --    CREATININE  --  2.1* 2.2*  --    GLU  --  284* 215*  --    PHOS 3.7  --   --  4.0   MG 1.8  --   --  2.4        Recent Labs     22  0728   PH 7.177*   PCO2 42.1   PO2 38*   HCO3 15.6*   POCSATURATED 58*   BE -13       ASSESSMENT/INTERVENTIONS    Upon arrival in room pt on 15L NRB. ABG attempted x2. Doppler used for abg. Results are above. Head bobbing and accessory muscle use noted. CXR obtained. Pt transferred to ICU.    Last Vitals: Temp: 97.7 °F (36.5 °C) (808)  Pulse: 124 (801)  Resp: 34 (801)  BP: 80/42 (719)  SpO2: 100 % (630)  Level of Consciousness: Level of Consciousness (AVPU): alert  Respiratory Effort: Respiratory Effort: Normal, Unlabored  Expansion/Accessory Muscle Usage: Expansion/Accessory Muscles/Retractions: no use of accessory muscles, no retractions, expansion symmetric  All Lung Field Breath Sounds: All Lung Fields Breath Sounds: clear  O2 Device/Concentration: Flow (L/min): 15, Oxygen Concentration (%): 100, O2 Device (Oxygen Therapy): Non Rebreather Mask  NIPPV: No  Surgical airway: No Vent: No  ETCO2 monitored:    Ambu at bedside: Ambu bag with the patient?: Yes, Adult Ambu    Active Orders   Respiratory Care    Inhalation Treatment Q6H PRN     Frequency: Q6H PRN     Number of Occurrences: Until Specified    Oxygen Continuous     Frequency: Continuous     Number of Occurrences: Until Specified     Order Questions:      Device type: High flow      Device: Comfort Flow      FiO2%:       LPM: 10-60      Titrate O2 per Oxygen Titration Protocol: Yes      To maintain SpO2 goal of: >= 90%      Notify MD of: Inability to achieve desired SpO2; Sudden change in patient status and requires 20% increase in FiO2; Patient requires >60% FiO2    POCT ARTERIAL BLOOD GAS Blood Gas     Frequency: Once     Number of Occurrences: 1 Occurrences     Order Comments: Notify Physician if: see parameters below.       Order Questions:      Component: Blood Gas       RECOMMENDATIONS  ?  We recommend: RRT Recs: Continue POC per primary team.    ESCALATION    POC and orders reviewed with bedside RTjolene.    FOLLOW-UP    Disposition: Tx in ICU bed 6065.    Please call back the Rapid Response RT, Shauna Last RRT at x [phone] for any questions or concerns.

## 2022-07-19 NOTE — HPI
Patient is intubated so history obtained as per chart. Patient is a 56-year-old woman with history of kidney transplant (2020 [facility]) on Prograf and Cellcept admitted to [facility] on 06/22 with intractable HA starting about 2 months prior + dizziness, blurred vision, left-sided facial droop and dysarthria starting a few days before admission. Patient underwent evaluation with LP (6/24) CSF Glu 6, CSF protein 93.  stain pos for yeast like organisms.  Spinal fluid culture pos for encapsulated yeast species. Consistent with cryptococcal meningitis. CT chest WO contrast pos for multiple indeterminate soft tissue masses in the left lower lobe with 2 smaller masses in the right lung which may be part of the same process. On 06/26 patient started on amphotericin B liposomal (275 IV q.d.) and flucytosine (1500 p.o. q.6h) .  Hospitalization c/b the development of acute renal failure.  Cr 1.32 (6/22) > 1.4 (6/26) > 2.02 (6/27).  Cellcept and prednisone have been held.  Transfer requested for higher level of care (KTM and ID).  Transfer diagnosis disseminated cryptococcal infection, TERESA, kidney XPL. Patient decompensated over the4 course of 2 weeks with pancytopenia, increased pressor support (Levo 1.68 mcg/kg/min + vaso 0.04 mcg/kg/min), and is intubated due to acute hypoxic respiratory failure.

## 2022-07-19 NOTE — ASSESSMENT & PLAN NOTE
- Cr 2.2 on day of transfer, has been steadily worsening over last few days (baseline ~1.2)  - KTM consulted, appreciate recs; likely prerenal versus medication related  - US transplant unremarkable for acute changes   - Creatinine worsening- 2.2  - Continue TPN   - Place underwood for better I's and O's  - Possibly 2/2 tacro toxicity  - Continue sodium bicarb

## 2022-07-19 NOTE — ASSESSMENT & PLAN NOTE
Home: Mycophenolate     -- Held in the setting of acute illness   -- KTM following   -- Prednisone 10mg daily --> changed to stress dose steroid dosing considering decompensation   -- Prograf levels

## 2022-07-19 NOTE — SUBJECTIVE & OBJECTIVE
Interval History: acute onset SOB, hypoxic respiratory failure, hypotension, now intubated on pressors in ICU, with elevated lactate    Review of Systems   Unable to perform ROS: Intubated   Objective:     Vital Signs (Most Recent):  Temp: 97.7 °F (36.5 °C) (07/19/22 0808)  Pulse: (!) 119 (07/19/22 1120)  Resp: 18 (07/19/22 1120)  BP: (!) 140/54 (07/19/22 1120)  SpO2: 100 % (07/19/22 1120)   Vital Signs (24h Range):  Temp:  [97.1 °F (36.2 °C)-98 °F (36.7 °C)] 97.7 °F (36.5 °C)  Pulse:  [107-127] 119  Resp:  [17-45] 18  SpO2:  [93 %-100 %] 100 %  BP: ()/(28-75) 140/54     Weight: 60 kg (132 lb 4.4 oz)  Body mass index is 25.83 kg/m².    Estimated Creatinine Clearance: 15.3 mL/min (A) (based on SCr of 3.4 mg/dL (H)).    Physical Exam  Constitutional:       Appearance: She is well-developed.      Comments: Intubated sedated   HENT:      Head: Normocephalic and atraumatic.   Eyes:      Pupils: Pupils are equal, round, and reactive to light.   Cardiovascular:      Rate and Rhythm: Normal rate.   Pulmonary:      Effort: Pulmonary effort is normal.      Breath sounds: Normal breath sounds.   Abdominal:      General: Bowel sounds are normal.      Palpations: Abdomen is soft.   Musculoskeletal:         General: No tenderness.      Cervical back: Normal range of motion and neck supple.   Skin:     General: Skin is warm and dry.   Neurological:      Comments: sedated       Significant Labs: CBC:   Recent Labs   Lab 07/18/22  0449 07/19/22  0603 07/19/22  0728 07/19/22  0741   WBC 0.83*  0.83* 0.83*  --  1.12*   HGB 9.4*  9.4* 10.0*  --  9.8*   HCT 28.2*  28.2* 30.7* 30* 29.6*   *  116* 103*  --  112*     CMP:   Recent Labs   Lab 07/17/22  1532 07/18/22  0449 07/19/22  0741   * 135* 128*   K 3.3* 3.3* 3.8   CL 98 97 94*   CO2 25 25 15*   * 215* 160*   BUN 46* 53* 72*   CREATININE 2.1* 2.2* 3.4*   CALCIUM 8.0* 8.2* 8.4*   PROT 4.8*  --   --    ALBUMIN 2.5*  --   --    BILITOT 1.0  --   --    ALKPHOS  45*  --   --    AST 7*  --   --    ALT <5*  --   --    ANIONGAP 12 13 19*   EGFRNONAA 26.1* 24.7* 14.6*       Significant Imaging: I have reviewed all pertinent imaging results/findings within the past 24 hours.

## 2022-07-19 NOTE — ACP (ADVANCE CARE PLANNING)
Advance Care Planning     Date: 07/19/2022    In light of the patients advanced and life limiting illness,I engaged the the family in a conversation about the patient's preferences for care  at the very end of life. The patient wishes to have a natural, peaceful death.  Along those lines, the patient does not wish to have CPR or other invasive treatments performed when her heart and/or breathing stops. I communicated to the family that a DNR order would be placed in her medical record to reflect this preference.  I spent a total of 25 minutes engaging the patient in this advance care planning discussion.    [facility]  I engaged the family,  - Mr. Kathy Rivera, in a conversation about advance care planning and we specifically addressed what the goals of care would be moving forward, in light of the patient's change in clinical status, specifically pulmonary embolus, pneumatosis, and shock requiring multiple vasopressors.  We did specifically address the patient's likely prognosis, which is poor.  We explored the patient's values and preferences for future care.  The family endorses that what is most important right now is to focus on symptom/pain control and comfort and QOL     Accordingly, we have decided that the best plan to meet the patient's goals includes discontinuing treatment    I did not explain the role for hospice care at this stage of the patient's illness, including its ability to help the patient live with the best quality of life possible.  We will not be making a hospice referral.    I spent a total of 25 minutes engaging the patient in this advance care planning discussion.    REECE Hutton, [facility]  Pulmonary Critical Care Medicine   07/19/2022

## 2022-07-19 NOTE — PROGRESS NOTES
Pharmacokinetic Initial Assessment: IV Vancomycin    Assessment/Plan:    Initiate intravenous vancomycin with loading dose of 1250 mg once with subsequent doses when random concentrations are less than 20 mcg/mL  Desired empiric serum trough concentration is 10 to 20 mcg/mL  Draw vancomycin random level on 7/20 at 0300.  Pharmacy will continue to follow and monitor vancomycin.      Please contact pharmacy at extension [phone] with any questions regarding this assessment.     Thank you for the consult,   Cira Moreno       Patient brief summary:  Tala Rivera is a 56 y.o. female initiated on antimicrobial therapy with IV Vancomycin for treatment of suspected bacteremia    Drug Allergies:   Review of patient's allergies indicates:   Allergen Reactions    Sulfa (sulfonamide antibiotics) Hives       Actual Body Weight:   60 kg    Renal Function:   Estimated Creatinine Clearance: 15.3 mL/min (A) (based on SCr of 3.4 mg/dL (H)).,     Dialysis Method (if applicable):  N/A    CBC (last 72 hours):  Recent Labs   Lab Result Units 07/17/22  1030 07/18/22  0449 07/19/22  0603 07/19/22  0741   WBC K/uL 1.06*  1.06* 0.83*  0.83* 0.83* 1.12*   Hemoglobin g/dL 9.8*  9.8* 9.4*  9.4* 10.0* 9.8*   Hematocrit % 29.2*  29.2* 28.2*  28.2* 30.7* 29.6*   Platelets K/uL 112*  112* 116*  116* 103* 112*   Gran % % 48.0  48.0 48.2  48.2 56.7 56.2   Lymph % % 21.0  21.0 27.7  27.7 32.5 28.6   Mono % % 10.0  10.0 15.7*  15.7* 6.0 8.0   Eosinophil % % 4.0  4.0 6.0  6.0 2.4 3.6   Basophil % % 1.0  1.0 1.2  1.2 1.2 1.8   Differential Method  Manual  Manual Automated  Automated Automated Automated       Metabolic Panel (last 72 hours):  Recent Labs   Lab Result Units 07/16/22  1531 07/16/22  1532 07/17/22  1029 07/17/22  1532 07/18/22  0449 07/19/22  0603 07/19/22  0741   Sodium mmol/L  --   --   --  135* 135*  --  128*   Sodium, Urine mmol/L 53  --   --   --   --   --   --    Potassium mmol/L  --   --   --  3.3* 3.3*  --  3.8    Chloride mmol/L  --   --   --  98 97  --  94*   CO2 mmol/L  --   --   --  25 25  --  15*   Glucose mg/dL  --   --   --  284* 215*  --  160*   Glucose, UA   --  2+*  --   --   --   --   --    BUN mg/dL  --   --   --  46* 53*  --  72*   Creatinine mg/dL  --   --   --  2.1* 2.2*  --  3.4*   Creatinine, Urine mg/dL 102.0  102.0  --   --   --   --   --   --    Albumin g/dL  --   --   --  2.5*  --   --   --    Total Bilirubin mg/dL  --   --   --  1.0  --   --   --    Alkaline Phosphatase U/L  --   --   --  45*  --   --   --    AST U/L  --   --   --  7*  --   --   --    ALT U/L  --   --   --  <5*  --   --   --    Magnesium mg/dL  --   --  1.8  --   --  2.4  --    Phosphorus mg/dL  --   --  3.7  --   --  4.0  --        Drug levels (last 3 results):  No results for input(s): VANCOMYCINRA, VANCORANDOM, VANCOMYCINPE, VANCOPEAK, VANCOMYCINTR, VANCOTROUGH in the last 72 hours.    Microbiologic Results:  Microbiology Results (last 7 days)     Procedure Component Value Units Date/Time    Blood culture [[id number]]     Order Status: No result Specimen: Blood     Blood culture [[id number]]     Order Status: No result Specimen: Blood     CSF culture [[id number]] Collected: 07/13/22 1100    Order Status: Completed Specimen: CSF (Spinal Fluid) from CSF Tap, Tube 3 Updated: 07/19/22 0714     CSF CULTURE No Growth     Gram Stain Result Cytospin indicates:      No WBC's      Few yeast      Results called to and read back by: Laura Carey RN  07/13/2022        13:51    CSF culture [[id number]] Collected: 07/15/22 1508    Order Status: Completed Specimen: CSF (Spinal Fluid) from CSF Tap, Tube 3 Updated: 07/19/22 0714     CSF CULTURE No Growth to date     Gram Stain Result Cytospin indicates:      No WBC's      Few  yeast      Results called to and read back by:Diana Ann RN 07/15/2022  17:48    AFB Culture & Smear [[id number]] Collected: 07/15/22 1508    Order Status: Completed Specimen: CSF (Spinal Fluid) from CSF Tap, Tube 3  Updated: 07/18/22 1409     AFB Culture & Smear Culture in progress     AFB CULTURE STAIN No acid fast bacilli seen.    E. coli 0157 antigen [[id number]] Collected: 07/16/22 0630    Order Status: Completed Specimen: Stool Updated: 07/18/22 1258     Shiga Toxin 1 E.coli Negative     Shiga Toxin 2 E.coli Negative    Stool culture [[id number]] Collected: 07/16/22 0630    Order Status: Sent Specimen: Stool Updated: 07/16/22 2127    Gram stain [[id number]] Collected: 07/15/22 1508    Order Status: Canceled Specimen: CSF (Spinal Fluid) from CSF Tap, Tube 3     AFB Culture & Smear [[id number]] Collected: 07/13/22 1100    Order Status: Completed Specimen: CSF (Spinal Fluid) from CSF Tap, Tube 3 Updated: 07/14/22 2127     AFB Culture & Smear Culture in progress     AFB CULTURE STAIN No acid fast bacilli seen.    Cryptococcal antigen, CSF [[id number]]  (Abnormal) Collected: 07/13/22 1100    Order Status: Completed Specimen: CSF (Spinal Fluid) from CSF Tap, Tube 3 Updated: 07/14/22 1012     Crypto Ag, CSF Positive >1:256    Gram stain [[id number]] Collected: 07/13/22 1100    Order Status: Canceled Specimen: CSF (Spinal Fluid) from CSF Tap, Tube 3     CSF culture [[id number]] Collected: 07/08/22 1019    Order Status: Completed Specimen: CSF (Spinal Fluid) from CSF Tap, Tube 2 Updated: 07/13/22 0721     CSF CULTURE No Growth     Gram Stain Result Cytospin indicates: Few yeast      No WBC's      Results called to and read back by: Lyndsay Granados 07/08/2022  13:13    Clostridium difficile EIA [[id number]] Collected: 07/12/22 1751    Order Status: Completed Specimen: Stool Updated: 07/12/22 2249     C. diff Antigen Negative     C difficile Toxins A+B, EIA Negative     Comment: Testing not recommended for children <24 months old.       E. coli 0157 antigen [[id number]] Collected: 07/12/22 1751    Order Status: No result Specimen: Stool Updated: 07/12/22 1752    Stool culture [[id number]] Collected: 07/12/22 1751    Order Status: Sent Specimen:  Stool Updated: 07/12/22 9111

## 2022-07-19 NOTE — ASSESSMENT & PLAN NOTE
Septic shock   CXR benign  CTAP with featureless colon may need to be repeated now with worsening abdominal distension and septic shock  Lact > 12 with metabolic acidosis  Bacterial cultures and broad spec abx

## 2022-07-19 NOTE — ASSESSMENT & PLAN NOTE
- poor PO intake 2/2   - Nutrition consulted  - Continue TPN  - Continue scheduled zofran - QTc 437 today   - Holding off on gastric emptying study at this time  - Continue boost supplemental drinks

## 2022-07-19 NOTE — ASSESSMENT & PLAN NOTE
Likely ischemic ATN secondary to CNI toxicity with hypotension and now on vasopressors  BL 1.2 to 1.4 mg/dl   Kidney ultrasound benign  UA, UPCR, Nando benign, repeat benign  Prior CMV negative  BK pending  Do not suspect rejection at this time and for now recommend continued hydration followed by matching I/O  Mas catheter with strict I/O  Repeat UA, UC, Nando

## 2022-07-19 NOTE — PROCEDURES
"Tala Rivera is a 56 y.o. female patient.    Temp: 97.7 °F (36.5 °C) (22 0808)  Pulse: (!) 125 (22 08)  Resp: (!) 42 (22)  BP: (!) 75/35 (bruce is being attempted;) (22)  SpO2: (!) 94 % (22)  Weight: 60 kg (132 lb 4.4 oz) (22 0600)  Height: 5' (152.4 cm) (22 1300)       Intubation    Date/Time: 2022 9:43 AM  Location procedure was performed: [facility] CRITICAL CARE MEDICINE  Performed by: Alba Mahoney MD  Authorized by: Nate Mckoy MD   Assisting provider: Nate Mckoy MD  Pre-operative diagnosis: Acute Respiratory Failure  Post-operative diagnosis: Acute Respiratory Failure  Consent Done: Yes  Consent: Verbal consent obtained.  Risks and benefits: risks, benefits and alternatives were discussed  Consent given by: spouse  Procedure consent: procedure consent matches procedure scheduled  Relevant documents: relevant documents present and verified  Test results: test results available and properly labeled  Imaging studies: imaging studies available  Required items: required blood products, implants, devices, and special equipment available  Patient identity confirmed: , MRN and name  Time out: Immediately prior to procedure a "time out" was called to verify the correct patient, procedure, equipment, support staff and site/side marked as required.  Indications: respiratory failure  Intubation method: direct  Patient status: paralyzed (RSI)  Preoxygenation: bag valve mask  Sedatives: etomidate  Paralytic: rocuronium  Laryngoscope size: Mac 3  Tube size: 7.5 mm  Tube type: cuffed  Number of attempts: 1  Cricoid pressure: yes  Cords visualized: yes  Post-procedure assessment: CO2 detector  Breath sounds: equal and absent over the epigastrium  Cuff inflated: yes  ETT to lip: 23 cm  Tube secured with: ETT treviño  Patient tolerance: Patient tolerated the procedure well with no immediate complications          2022  "

## 2022-07-19 NOTE — ASSESSMENT & PLAN NOTE
-- Given Topamax while inpatient   -- PRN Fioricet, oxy/hydro, APAP - holding in setting of worsening mental status this AM

## 2022-07-19 NOTE — ASSESSMENT & PLAN NOTE
56 year old female with multiple medical problems with rapid response activated 07/19 AM in setting of respiratory distress, hypoxemia, and hypotension. Worsening acidosis with pH 6.9 and CO2 60. Two vasopressor agents for BP support. STAT CTA CAP with multiple findings, notable saddle pulmonary embolus extending into multiple bilateral segmental branches    -- Please see separate advanced care planning documentation

## 2022-07-19 NOTE — ASSESSMENT & PLAN NOTE
- likely from decrease in oral intake, decrease in mobility  - holding nifedipine  - PT to re-evaluate

## 2022-07-19 NOTE — SUBJECTIVE & OBJECTIVE
Past Medical History:   Diagnosis Date    Anemia of renal disease     ESRD on dialysis     Dr. Muhammad     Essential hypertension     PD catheter dysfunction 10/18/2018    Secondary hyperparathyroidism of renal origin        Past Surgical History:   Procedure Laterality Date    AV FISTULA PLACEMENT Left     never matured    AV graft Right 2015     SECTION      x3; ; , and     COLONOSCOPY N/A 2020    Procedure: COLONOSCOPY;  Surgeon: Darrell Golden MD;  Location: [facility] ENDO;  Service: Endoscopy;  Laterality: N/A;    GASTRIC BYPASS  2016    KIDNEY TRANSPLANT N/A 2020    Procedure: TRANSPLANT, KIDNEY;  Surgeon: Megan Garcia MD;  Location: [address];  Service: Transplant;  Laterality: N/A;    KIDNEY TRANSPLANT Right 2020    LAPAROSCOPIC REVISION OF PROCEDURE INVOLVING PERITONEAL DIALYSIS CATHETER N/A 10/18/2018    Procedure: REVISION OF PROCEDURE INVOLVING PERITONEAL DIALYSIS CATHETER, LAPAROSCOPIC;  Surgeon: Hair Jimenez MD;  Location: [facility];  Service: General;  Laterality: N/A;    PERITONEAL CATHETER INSERTION N/A 2018    Procedure: Laparoscopic INSERTION, CATHETER, INTRAPERITONEAL;  Surgeon: Hair Jimenez MD;  Location: [facility];  Service: General;  Laterality: N/A;    PERITONEAL CATHETER REMOVAL N/A 2018    Procedure: REMOVAL, CATHETER, DIALYSIS, PERITONEAL;  Surgeon: Hair Jimenez MD;  Location: [facility];  Service: General;  Laterality: N/A;    WOUND EXPLORATION N/A 2018    Procedure: EXPLORATION, WOUND-Surgical Site Irrigation;  Surgeon: Hair Jimenez MD;  Location: [facility];  Service: General;  Laterality: N/A;       Review of patient's allergies indicates:   Allergen Reactions    Sulfa (sulfonamide antibiotics) Hives       Family History       Problem Relation (Age of Onset)    Diabetes Mother    Down syndrome Son    Heart disease Mother    Hypertension Mother    Kidney disease Mother    Lupus Sister          Tobacco Use    Smoking status:  Never Smoker    Smokeless tobacco: Never Used   Substance and Sexual Activity    Alcohol use: No    Drug use: No    Sexual activity: Yes     Partners: Male      Review of Systems   Unable to perform ROS: Acuity of condition   Objective:     Vital Signs (Most Recent):  Temp: 97.7 °F (36.5 °C) (07/19/22 0808)  Pulse: (!) 125 (07/19/22 0855)  Resp: (!) 42 (07/19/22 0855)  BP: (!) 75/35 (bruce is being attempted;) (07/19/22 0855)  SpO2: (!) 94 % (07/19/22 0855)   Vital Signs (24h Range):  Temp:  [96.9 °F (36.1 °C)-98 °F (36.7 °C)] 97.7 °F (36.5 °C)  Pulse:  [107-125] 125  Resp:  [18-45] 42  SpO2:  [93 %-100 %] 94 %  BP: ()/(28-75) 75/35   Weight: 60 kg (132 lb 4.4 oz)  Body mass index is 25.83 kg/m².      Intake/Output Summary (Last 24 hours) at 7/19/2022 0927  Last data filed at 7/19/2022 0830  Gross per 24 hour   Intake 610 ml   Output --   Net 610 ml       Physical Exam  Vitals and nursing note reviewed.   Constitutional:       General: She is in acute distress.      Appearance: She is ill-appearing.   HENT:      Mouth/Throat:      Mouth: Mucous membranes are dry.      Pharynx: Oropharynx is clear. No oropharyngeal exudate.   Eyes:      General: No scleral icterus.  Cardiovascular:      Rate and Rhythm: Regular rhythm. Tachycardia present.      Heart sounds: No murmur heard.    No gallop.   Pulmonary:      Effort: Respiratory distress present.      Comments: On non re breather   Abdominal:      General: There is distension.      Tenderness: There is abdominal tenderness.   Genitourinary:     Comments: Mas patent and draining dark urine  Musculoskeletal:      Right lower leg: Edema present.      Left lower leg: Edema present.      Comments: RUE AV fistula. LUE PICC.    Skin:     Findings: Bruising present.   Neurological:      GCS: GCS eye subscore is 4. GCS verbal subscore is 5. GCS motor subscore is 6.       Vents:  Oxygen Concentration (%): 100 (07/19/22 0850)  Lines/Drains/Airways       Peripherally  Inserted Central Catheter Line  Duration             PICC Double Lumen 07/17/22 1545 left basilic 1 day              Central Venous Catheter Line  Duration                  Hemodialysis AV Graft Right forearm -- days              Drain  Duration                  Urethral Catheter 07/18/22 1609 Coude 16 Fr. <1 day              Peripheral Intravenous Line  Duration                  Peripheral IV - Single Lumen 07/05/22 1510 20 G;1 3/4 in Left;Anterior Forearm 13 days                  Significant Labs:    CBC/Anemia Profile:  Recent Labs   Lab 07/18/22  0449 07/19/22  0603 07/19/22  0728 07/19/22  0741   WBC 0.83*  0.83* 0.83*  --  1.12*   HGB 9.4*  9.4* 10.0*  --  9.8*   HCT 28.2*  28.2* 30.7* 30* 29.6*   *  116* 103*  --  112*   MCV 93  93 94  --  92   RDW 13.2  13.2 13.4  --  13.4        Chemistries:  Recent Labs   Lab 07/17/22  1029 07/17/22  1532 07/18/22 0449 07/19/22  0603 07/19/22  0741   NA  --  135* 135*  --  128*   K  --  3.3* 3.3*  --  3.8   CL  --  98 97  --  94*   CO2  --  25 25  --  15*   BUN  --  46* 53*  --  72*   CREATININE  --  2.1* 2.2*  --  3.4*   CALCIUM  --  8.0* 8.2*  --  8.4*   ALBUMIN  --  2.5*  --   --   --    PROT  --  4.8*  --   --   --    BILITOT  --  1.0  --   --   --    ALKPHOS  --  45*  --   --   --    ALT  --  <5*  --   --   --    AST  --  7*  --   --   --    MG 1.8  --   --  2.4  --    PHOS 3.7  --   --  4.0  --        All pertinent labs within the past 24 hours have been reviewed.    Significant Imaging: I have reviewed all pertinent imaging results/findings within the past 24 hours.

## 2022-07-19 NOTE — ASSESSMENT & PLAN NOTE
"Ophthalmology evaluation with grade 3 papilledema in both eyes (longer she has elevated CSF pressure, the higher her risk of permanent optic nerve damage and vision change is.  There is a congested appearance to the veins in both eyes secondary to optic nerve congestion and indicates higher risk for ischemic event), 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  -"7/7 exam, vision stable OU, color plates full. Still 3+ ONH edema, and we do not expect this to rapidly improve as there is often delay of resolution of ONH edema following improvement in ICP "  "

## 2022-07-19 NOTE — ASSESSMENT & PLAN NOTE
56F h/o HTN, gastric bypass, ESRD 2/2 HTN s/p DBDKT 4/5/20 (basiliximab induction,, CMV D+/R+, tacro, MMF), COVID19 7/2020, recently discharged 6/2022 for 2 months of headaches, now admitted to [facility] with intractable headaches, dizziness, blurred vision, left sided facial droop, MRI brain negative 6/23, s/p LP 6/24 with OP of 34cm, CSF: WBC unable to quantitate due to infereing cellular structures, gluc 6, protein 93, shahnaz ink positive for encapsulated yeast and cultures also growing yeast, CT chest with b/l pulmonary nodules started on ambisome, flucytosine 6/26 course notable for TERESA (Cr 1.3->2.0) now transferred to [facility] [place] [place] for further care. Headaches much improved, emesis has improved, eating food, serum crypto Ag positive, ophthalmology exam with papilledema. Pt required serial LP, now w/ most recent LP on 7/16 w/ opening pressure downtrending to 18. Pt endorses improvement in headaches, no vision changes. Still has not recovered hearing (baseline deafness in R ear per , however L ear hearing loss is new since infection started). Most recent crypto ag > 1:256, suggesting early low titers were related to prozone effect, and is not representative of worsening infection. Last positive culture was 6/24 at outside hospital. All cultures at [facility] [place] [place] remain negative. Yeast remain present on gram stain, likely representing dead organisms. NSGY still following for possible need for  shunt placement.  C/o epigastric pain and diarrhea, now this morning with acute onset SOB, hypoxic respiratory failure, hypotension, now intubated on pressors in ICU, with elevated lactate    Recommendations:   - continue vancomycin, cefepime, fluc would add metronidazole  -     ID will continue to follow

## 2022-07-19 NOTE — ASSESSMENT & PLAN NOTE
Patient with Hypoxic Respiratory failure which is Acute.  she is not on home oxygen.     Supplemental oxygen was provided and noted- Vent Mode: A/C  Oxygen Concentration (%):  [100] 100  Resp Rate Total:  [16 br/min-28 br/min] 28 br/min  Vt Set:  [345 mL] 345 mL  PEEP/CPAP:  [5 cmH20-10 cmH20] 10 cmH20  Mean Airway Pressure:  [8.8 tlE70-07 cmH20] 14 cmH20.     Signs/symptoms of respiratory failure include- tachypnea, increased work of breathing and respiratory distressContributing diagnoses includes - Pulmonary Embolus Labs and images were reviewed. Patient Has recent ABG, which has been reviewed.     -- STAT CTA with PE   -- Worsening acidosis -- giving Bicarb, adjusting ventilator. LA > 12. PH 6.9  -- Follow up repeat labs   -- See advanced care planning documentation

## 2022-07-19 NOTE — ASSESSMENT & PLAN NOTE
07/02: Deep venous thrombosis within the left popliteal, posterior tibial, and peroneal veins    Heparin gtt --> held for multiple LPs intermittently

## 2022-07-19 NOTE — PROCEDURES
Tala Rivera is a 56 y.o. female patient.    Temp: 97.7 °F (36.5 °C) (07/19/22 0808)  Pulse: (!) 119 (07/19/22 1120)  Resp: 18 (07/19/22 1120)  BP: (!) 140/54 (07/19/22 1120)  SpO2: 100 % (07/19/22 1120)  Weight: 60 kg (132 lb 4.4 oz) (07/18/22 0600)  Height: 5' (152.4 cm) (07/13/22 1300)       Central Line    Date/Time: 7/19/2022 11:51 AM  Performed by: Nate Mckoy MD  Authorized by: Nate Mckoy MD     Location procedure was performed:  [facility] CARDIAC MEDICAL ICU ([facility]ICU)  Consent Done ?:  Yes  Time out complete?: Verified correct patient, procedure, equipment, staff, and site/side    Indications:  Vascular access and med administration  Preparation:  Skin prepped with ChloraPrep  Skin prep agent dried: Skin prep agent completely dried prior to procedure    Sterile barriers: All five maximal sterile barriers used - gloves, gown, cap, mask and large sterile sheet    Hand hygiene: Hand hygiene performed immediately prior to central venous catheter insertion    Location:  Right internal jugular  Catheter type:  Triple lumen  Catheter size:  7 Fr  Inserted Catheter Length (cm):  16  Ultrasound guidance: Yes    Vessel Caliber:  Medium   patent  Comprressibility:  Normal  Needle advanced into vessel with real time ultrasound guidance.    Steril sheath on probe.    Sterile gel used.  Manometry: No    Number of attempts:  1  Securement:  Chlorhexidine patch, line sutured, sterile dressing applied and blood return through all ports  Technical Procedures Used:  Seldinger   Complications: No    Estimated blood loss (mL): <5.  Specimens: No    XRay:  Placement verified by x-ray, no pneumothorax on x-ray and successful placement  Adverse Events:  NoneTermination Site: superior vena cava        Nate Mckoy M.D.  Rapid Response/Critical Care  Department of Pulmonary Medicine  [facility]        N.B.: Portions of this note was dictated using M*Modal Fluency Direct--there may be voice recognition  errors occasionally missed on review.

## 2022-07-19 NOTE — CONSULTS
[facility] - Cardiac Medical ICU  General Surgery  Consult Note    Consults  Subjective:     Chief Complaint/Reason for Admission:   Meningitis, Saddle PE, Gastric perforation.     History of Present Illness:   55 yo F with history of Ish en Y, kidney transplant (2020 OM) on progrraf and cellcept initially admitted 2 months ago for for HA, dizziness, blurred vision, left facial droop and dysarthria, currently being treated for fungal meningitis. Today the patient had an episode of hypotension and respiratory distress. He was intubated and taken to the ICU for higher level of care. A CTA was done that showed a saddle PE, a gastric perforation made by the OG tube, pneumoperitoneum, pneumatosis, and distended bowel.     At the moment the patient is on vaso and levo 1.28, intubated on an Fio@ 100%, pH of 6.9. Cardiology was consulted for the PE, and general surgery was consulted for the gastric perforation.     No current facility-administered medications on file prior to encounter.     Current Outpatient Medications on File Prior to Encounter   Medication Sig    acetaminophen (TYLENOL) 325 MG tablet Take 2 tablets (650 mg total) by mouth every 6 (six) hours as needed (headache).    calcitRIOL (ROCALTROL) 0.25 MCG Cap Take 2 capsules (0.5 mcg total) by mouth once daily.    docusate sodium (COLACE) 100 MG capsule Take 1 capsule (100 mg total) by mouth 3 (three) times daily as needed for Constipation.    l-methylfolate-b2-b6-b12 (CEREFOLIN) 6-5-50-1 mg Tab Take 1 tablet by mouth once daily.    magnesium oxide (MAG-OX) 400 mg (241.3 mg magnesium) tablet Take 1 tablet (400 mg total) by mouth once daily.    mycophenolate (CELLCEPT) 250 mg Cap Take 2 capsules (500 mg total) by mouth 2 (two) times daily.    NIFEdipine (PROCARDIA-XL) 30 MG (OSM) 24 hr tablet TAKE ONE TABLET BY MOUTH TWICE DAILY hold if systolic blood pressure is less than 130    predniSONE (DELTASONE) 5 MG tablet Take 1 tablet (5 mg total) by mouth once  daily.    sodium bicarbonate 650 MG tablet Take 2 tablets (1,300 mg total) by mouth 2 (two) times daily.    tacrolimus (PROGRAF) 1 MG Cap Take 3 capsules (3 mg total) by mouth every morning AND 2 capsules (2 mg total) every evening.    topiramate (TOPAMAX) 25 MG tablet Take 1 tablet (25 mg total) by mouth 2 (two) times daily.       Review of patient's allergies indicates:   Allergen Reactions    Sulfa (sulfonamide antibiotics) Hives       Past Medical History:   Diagnosis Date    Anemia of renal disease     ESRD on dialysis     Dr. Muhammad     Essential hypertension     PD catheter dysfunction 10/18/2018    Secondary hyperparathyroidism of renal origin      Past Surgical History:   Procedure Laterality Date    AV FISTULA PLACEMENT Left     never matured    AV graft Right 2015     SECTION      x3; ; , and     COLONOSCOPY N/A 2020    Procedure: COLONOSCOPY;  Surgeon: Darrell Golden MD;  Location: [facility];  Service: Endoscopy;  Laterality: N/A;    GASTRIC BYPASS  2016    KIDNEY TRANSPLANT N/A 2020    Procedure: TRANSPLANT, KIDNEY;  Surgeon: Megan Garcia MD;  Location: [address];  Service: Transplant;  Laterality: N/A;    KIDNEY TRANSPLANT Right 2020    LAPAROSCOPIC REVISION OF PROCEDURE INVOLVING PERITONEAL DIALYSIS CATHETER N/A 10/18/2018    Procedure: REVISION OF PROCEDURE INVOLVING PERITONEAL DIALYSIS CATHETER, LAPAROSCOPIC;  Surgeon: Hair Jimenez MD;  Location: [facility];  Service: General;  Laterality: N/A;    PERITONEAL CATHETER INSERTION N/A 2018    Procedure: Laparoscopic INSERTION, CATHETER, INTRAPERITONEAL;  Surgeon: Hair Jimenez MD;  Location: [facility];  Service: General;  Laterality: N/A;    PERITONEAL CATHETER REMOVAL N/A 2018    Procedure: REMOVAL, CATHETER, DIALYSIS, PERITONEAL;  Surgeon: Hair Jimenez MD;  Location: [facility];  Service: General;  Laterality: N/A;    WOUND EXPLORATION N/A 2018    Procedure:  EXPLORATION, WOUND-Surgical Site Irrigation;  Surgeon: Hair Jimenez MD;  Location: [facility];  Service: General;  Laterality: N/A;     Family History     Problem Relation (Age of Onset)    Diabetes Mother    Down syndrome Son    Heart disease Mother    Hypertension Mother    Kidney disease Mother    Lupus Sister        Tobacco Use    Smoking status: Never Smoker    Smokeless tobacco: Never Used   Substance and Sexual Activity    Alcohol use: No    Drug use: No    Sexual activity: Yes     Partners: Male     Review of Systems   Unable to perform ROS: Intubated     Objective:     Vital Signs (Most Recent):  Temp: 97.7 °F (36.5 °C) (07/19/22 0808)  Pulse: 98 (07/19/22 1600)  Resp: (!) 28 (07/19/22 1600)  BP: 123/79 (07/19/22 1600)  SpO2: (!) 94 % (07/19/22 1600) Vital Signs (24h Range):  Temp:  [97.7 °F (36.5 °C)-98 °F (36.7 °C)] 97.7 °F (36.5 °C)  Pulse:  [] 98  Resp:  [12-45] 28  SpO2:  [81 %-100 %] 94 %  BP: ()/(24-79) 123/79  Arterial Line BP: (119-129)/(46-48) 119/48     Weight: 60 kg (132 lb 4.4 oz)  Body mass index is 25.83 kg/m².      Intake/Output Summary (Last 24 hours) at 7/19/2022 1726  Last data filed at 7/19/2022 0830  Gross per 24 hour   Intake 230 ml   Output --   Net 230 ml       Physical Exam  Constitutional:       Appearance: She is ill-appearing.      Comments: Sedated    HENT:      Head: Normocephalic.   Cardiovascular:      Rate and Rhythm: Normal rate.   Pulmonary:      Effort: Pulmonary effort is normal. No respiratory distress.      Comments: Intubated  Vent Type:  (7/19/2022  3:16 PM)  Vent Mode: A/C  Oxygen Concentration (%):  (100) 100  Resp Rate Total:  (16 br/min-28 br/min) 28 br/min  Vt Set:  (345 mL) 345 mL  PEEP/CPAP:  (5 cmH20-10 cmH20) 10 cmH20  Mean Airway Pressure:  (8.8 rpT89-37 cmH20) 14 cmH20    Abdominal:      General: There is distension.      Palpations: Abdomen is soft.      Tenderness: There is no abdominal tenderness.      Comments: Distended     Skin:     General: Skin is warm.      Capillary Refill: Capillary refill takes 2 to 3 seconds.         Significant Labs:  CBC:   Recent Labs   Lab 07/19/22  1413   WBC 1.47*   RBC 3.36*   HGB 10.4*   HCT 33.1*   *   MCV 99*   MCH 31.0   MCHC 31.4*     CMP:   Recent Labs   Lab 07/19/22  1413   *   CALCIUM 8.4*   ALBUMIN 1.9*   PROT 4.2*   *   K 5.0   CO2 14*   CL 93*   BUN 71*   CREATININE 3.3*   ALKPHOS 67   *   *   BILITOT 1.8*       Significant Diagnostics:  I have reviewed all pertinent imaging results/findings within the past 24 hours.    Assessment/Plan:     57 yo F with history of Ish en Y, kidney transplant (2020 OM) on progrraf and cellcept initially admitted 2 months ago for for HA, dizziness, blurred vision, left facial droop and dysarthria, currently being treated for fungal meningitis. Today the patient developed a Saddle PE, during code a OG was placed that caused a gastric perforation.     - Patient was discussed with primary team, family elected to withdraw care.   - Please call general surgery for any questions or concerns.     Thank you for your consult. I will sign off. Please contact us if you have any additional questions.    Anton Weeks MD  General Surgery  [facility] - Cardiac Medical ICU

## 2022-07-19 NOTE — EICU
12:28  Called into room via elert for arterial line insertion.  Physician verification done for Mukul Bennett w/ Pulmonary/CC.  12:30  Time-out completed using proper pt identifiers.   12:35  Left femoral arterial line inserted per Dr. Curtis using u/s guidance.  Secured & sutured.  Pt tolerated procedure well.

## 2022-07-19 NOTE — PROGRESS NOTES
[facility] - Cardiac Medical ICU  Kidney Transplant  Progress Note      Reason for Follow-up: Reassessment of Kidney Transplant - 4/5/2020  (#1) recipient and management of immunosuppression.    ORGAN: LEFT KIDNEY    Donor Type: Donation after Brain Death    PHS Increased Risk: yes       Subjective:   History of Present Illness:  56 y.o. female with a PMHx of HTN, kidney transplant in 2020, CKD3 admitted to [facility] on 06/22 with intractable HA starting about 2 months earlier and with dizziness, blurred vision, left-sided facial droop and dysarthria starting a few days before admission.    Per documentation   On admission /72, HR 69. Neurologic exam is remarkable for left-sided facial weakness and mild dysarthria.  Cr 1.32   LP (6/24) CSF Glu 6, CSF protein 93. Brazilian stain pos for yeast like organisms.  Spinal fluid culture pos for encapsulated yeast species  CT chest WO contrast pos for multiple indeterminate soft tissue masses in the left lower lobe with 2 smaller masses in the right lung which may be part of the same process.       On 06/26 patient started on amphotericin B liposomal (275 IV q.d.) and flucytosine (1500 p.o. q.6h) .  Hospitalization c/b the development of acute renal failure.  Cr 1.32 (6/22) > 1.4 (6/26) > 2.02 (6/27).  Transfer requested for higher level of care (KTM and ID).  Transfer diagnosis disseminated cryptococcal infection, TERESA, kidney XPL       Ms. Rivera is a 56 y.o. year old female who is status post Kidney Transplant - 4/5/2020  (#1).    Her maintenance immunosuppression consists of:   Immunosuppressants (From admission, onward)                None            Hospital Course:  Being treated for crypto meningitis     Interval History:  Abdominal distention, shortness of breath, and hypotension overnight. Transferred to ICU, intubated, cultures obtained, placed on fluids, pressers, and broad spectrum antibiotics.    Past Medical, Surgical, Family, and Social History:    Unchanged from H&P.    Scheduled Meds:   ceFEPime (MAXIPIME) IVPB  2 g Intravenous Q24H    fluconazole (DIFLUCAN) IV (PEDS and ADULTS)  400 mg Intravenous Q24H    lactated ringers  1,000 mL Intravenous Once    metronidazole  500 mg Intravenous Q8H    pantoprazole  40 mg Intravenous Daily    phenylephrine HCl in 0.9% NaCl        [START ON 7/20/2022] predniSONE  5 mg Per OG tube Daily    sodium bicarbonate  1,300 mg Per OG tube TID    vancomycin (VANCOCIN) IVPB  20 mg/kg Intravenous Once     Continuous Infusions:   fentanyl      NORepinephrine bitartrate-D5W 1.2 mcg/kg/min (07/19/22 1034)    propofoL      vasopressin       PRN Meds:sodium chloride, acetaminophen, albuterol-ipratropium, bisacodyL, dextrose 10%, glucagon (human recombinant), glucose, glucose, insulin aspart U-100, iohexol, loperamide, melatonin, naloxone, ondansetron, oxyCODONE-acetaminophen, promethazine (PHENERGAN) IVPB, sodium chloride 0.9%, Pharmacy to dose Vancomycin consult **AND** vancomycin - pharmacy to dose    Intake/Output - Last 3 Shifts         07/17 0700 07/18 0659 07/18 0700 07/19 0659 07/19 0700 07/20 0659    P.O. 500 560     I.V. (mL/kg)   50 (0.8)    Total Intake(mL/kg) 500 (8.3) 560 (9.3) 50 (0.8)    Urine (mL/kg/hr)       Total Output       Net +500 +560 +50           Urine Occurrence 4 x 4 x     Stool Occurrence 3 x 2 x              Review of Systems   Unable to perform ROS: Intubated    Objective:     Vital Signs (Most Recent):  Temp: 97.7 °F (36.5 °C) (07/19/22 0808)  Pulse: (!) 119 (07/19/22 1120)  Resp: 18 (07/19/22 1120)  BP: (!) 140/54 (07/19/22 1120)  SpO2: 100 % (07/19/22 1120)   Vital Signs (24h Range):  Temp:  [97.1 °F (36.2 °C)-98 °F (36.7 °C)] 97.7 °F (36.5 °C)  Pulse:  [107-127] 119  Resp:  [17-45] 18  SpO2:  [93 %-100 %] 100 %  BP: ()/(28-75) 140/54     Weight: 60 kg (132 lb 4.4 oz)  Height: 5' (152.4 cm)  Body mass index is 25.83 kg/m².    Physical Exam  Constitutional:       General: She is  not in acute distress.     Appearance: She is ill-appearing.      Comments: sedated   Eyes:      General: No scleral icterus.     Extraocular Movements: Extraocular movements intact.      Pupils: Pupils are equal, round, and reactive to light.   Cardiovascular:      Rate and Rhythm: Tachycardia present.      Heart sounds: Murmur heard.   Pulmonary:      Comments: intubated  Abdominal:      General: There is distension.   Musculoskeletal:      Right lower leg: No edema.      Left lower leg: No edema.   Skin:     Coloration: Skin is not jaundiced.       Laboratory:  Labs within the past 24 hours have been reviewed.    Diagnostic Results:  None  CXR reviewed     Assessment/Plan:     * Meningitis due to fungal infection  ID following and was on ampho B and flucytosine which have been transitioned to fluconazole  Seen by Neuro and Opthal and now NSGY    Acute hypoxemic respiratory failure  On IMV  Per primary      Septic shock  Septic shock   CXR benign  CTAP with featureless colon may need to be repeated now with worsening abdominal distension and septic shock  Lact > 12 with metabolic acidosis  Bacterial cultures and broad spec abx    VTE (venous thromboembolism)  LLE popliteal VTE likely secondary to hospitalization and immobility  Was on heparin  Significant tachycardia concerning for PE      Acute renal failure superimposed on stage 3 chronic kidney disease  Likely ischemic ATN secondary to CNI toxicity with hypotension and now on vasopressors  BL 1.2 to 1.4 mg/dl   Kidney ultrasound benign  UA, UPCR, Nando benign, repeat benign  Prior CMV negative  BK pending  Do not suspect rejection at this time and for now recommend continued hydration followed by matching I/O  Mas catheter with strict I/O  Repeat UA, UC, Nando    Headache  Likely from increased intracranial pressure  Has improved with repeated LP    Drug-induced pancytopenia  Most likely drug induce  Seen by hem/onc and ID  Prior CMV negative  Repeat CMV  "detectible at low level  EBV, parvo pending; were negative prior  Recommend filgrastim 480 daily for 3 days      Long-term use of immunosuppressant medication  Hold MMF  Hold tacro  Please start stress dose steroids instead of prednisone      S/P kidney transplant  Kidney transplant 4/5/2020   Underlying ckd stage III  ESRD related to HTN  Continue immunosuppression as per problem "long term use of immunosuppression medication"          Roberto Eisenberg Jr., MD  Kidney Transplant  [facility] - Cardiac Medical ICU  "

## 2022-07-19 NOTE — DISCHARGE SUMMARY
"Death Note  Critical Care Medicine      Admit Date: 2022    Date of Death: 2022    Time of Death: 1805    Attending Physician: Nate Mckoy MD    Principal Diagnoses: Meningitis due to fungal infection    Preliminary Cause of Death: Pulmonary Embolism     Secondary Diagnoses:   Active Hospital Problems    Diagnosis  POA    *Meningitis due to fungal infection [G02]  Unknown    Acute hypoxemic respiratory failure [J96.01]  Unknown    Acute pulmonary embolism [I26.99]  Unknown    Septic shock [A41.9, R65.21]  Unknown    Diarrhea [R19.7]  Unknown    Poor nutrition [E63.9]  Unknown    Anxiety [F41.9]  Unknown    VTE (venous thromboembolism) [I82.90]  No    Increased intracranial pressure [G93.2]  Yes    Bilateral papilledema due to raised intracranial pressure [H47.11]  Yes    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 [G51.0]  Yes    Acute renal failure superimposed on stage 3 chronic kidney disease [N17.9, N18.30]  Yes    Headache [R51.9]  Yes    Hypokalemia [E87.6]  Yes    Drug-induced pancytopenia [D61.811]  Unknown    Long-term use of immunosuppressant medication [Z79.899]  Not Applicable    S/P kidney transplant [Z94.0]  Not Applicable      Resolved Hospital Problems   No resolved problems to display.        Discharged Condition:     HPI:  Mrs. Rivera is a 56 year old female with significant PMH notable for HTN, kidney transplant on prograf and cellcept, and anemia of chronic disease who was admitted to [facility] on  with intractable headache with associated dizziness, blurry vision, L facial droop and dysarthria. She underwent LP on  with CSF Glu 6, CSF protein 93,  stain positive for yeast like organisms -->  culture pos for encapsulated yeast species. CT chest at OSH with "multiple indeterminate soft tissue masses in the LLL with 2 smaller masses in the RL." She was started on Amphotericin B and Flucytosine. She then had worsening renal failure. At this time, transfer was " "requested for higher level of care and KTM and ID evaluation.     Upon arrival to [facility]: KTM, ID, and neurology consulted. 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 Has had multiple lumbar puncture's while in patient. Due to visual disturbance in the setting of fungal meningitis, ophthalmology consulted.  No infectious nidus found in retina or vitreous, however grade 3 disc edema bilaterally. Per chart review: "CTH performed - Slight interval enlargement of the temporal horn of the right lateral ventricle since the recent MRI of 2022.  Appearance suggestive of early/developing hydrocephalus or ventricular entrapment." Neurosurgery consulted; Considering  shunt placement pending clearance of CSF and decrease in CSF protein.     Course further complicated by LLE DVT --> heparin drip started, however, intermittently held due to multiple Lps.    Critical care consulted during rapid response  for hypoxemia. At time of evaluation patient on NRB with the following AB.17/42/38/15. At this time she is endorsing worsening shortness of breath. Upon arrival to ICU patient profoundly hypotensive. Vasopressors initiated and attempt to place arterial line. Lactic acid > 12 with worsening respiratory status, so the decision was made to proceed with intubation. Patient intubated in 1 attempt and placed on mechanical ventilation.       Hospital/ICU Course:    Please see most recent hospital course from  progress note as patient stepped up to ICU today as rapid response during acute deterioration.     See advanced care planning documentation 22 @ 5:25 pm.     Withdrawal of life support per family request. Patient surrounded by loved ones. IV medications available for comfort, dyspnea, and pain control. Patient pronounced  at 1805. Condolences offered to family.     REECE Hutton [facility]  Pulmonary Critical Care Medicine   2022   "

## 2022-07-19 NOTE — PROGRESS NOTES
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 Rivera is a 56yoF w/PMHx of kidney XPL (2020 [facility]) on Prograf and Cellcept admitted to [facility] on 06/22 with intractable HA starting about 2 months earlier and with dizziness, blurred vision, left-sided facial droop and dysarthria starting a few days before admission.       On admission /72, HR 69. Neurologic exam is remarkable for left-sided facial weakness and mild dysarthria.  Labs (6/22) WBC 9.4, Hb 15.9, Plts 224, Na 137, K 3.3, CO2 25, BUN 24, Cr 1.32, AG 14, Glu 182 LFTs WNL. COVID neg     LP (6/24) CSF Glu 6, CSF protein 93. Guinean stain pos for yeast like organisms.  Spinal fluid culture pos for encapsulated yeast species.       CT chest WO contrast pos for multiple indeterminate soft tissue masses in the left lower lobe with 2 smaller masses in the right lung which may be part of the same process.       On 06/26 patient started on amphotericin B liposomal (275 IV q.d.) and flucytosine (1500 p.o. q.6h) .  Hospitalization c/b the development of acute renal failure.  Cr 1.32 (6/22) > 1.4 (6/26) > 2.02 (6/27).  Cellcept and prednisone have been held.       Transfer requested for higher level of care (KTM and ID).  Transfer diagnosis disseminated cryptococcal infection, TERESA, kidney XPL.    At time of my assessment, pt. Complains of headache which has been persistent for the last several days. She states that it is somewhat relieved by the fioricet and oxycodone that she was receiving at the outside hospital.      Overview/Hospital Course:  KTM, transplant ID, Neurology consulted upon admission. Per KTN, normal saline  scheduled with amphotericin infusions in addition to bicarb drip, prednisone increased, Prograf continued.  Creatinine down-trending and bicarb drip discontinued.  Per Neurology, outside hospital MR MRI reviewed and note that neuro deficits may take a few weeks to fully resolve; recommend limiting Fioricet and narcotics to minimize medication overuse/rebound headaches.  Per Transplant ID, continue ambisome/flucytosine with plan to repeat LP 2 weeks from 6/24.  Due to visual disturbance in the setting of fungal meningitis, ophthalmology consulted.  No infectious nidus found in retina or vitreous, however grade 3 disc edema bilaterally. Exam finding and subjective complaints explained by meningitis and papilledema.  Recommendation for repeating LP to reduce opening pressure per ID and Neurology in agreement.  On 06/29, patient hearing improved.  Remains with intermittent confusion per  at bedside.    CTH performed 06/30- Slight interval enlargement of the temporal horn of the right lateral ventricle since the recent MRI of 06/23/2022.  Appearance suggestive of early/developing hydrocephalus or ventricular entrapment. NSGY consulted. Per NSGY, recommended NCC transfer for further evaluation. Patient underwent repeat LP 06/30- opening pressure of 35cm- closing pressure of 13cm. Patient w/much improvement in signs and symptoms. CSF findings: <5glucose, 174 protein, 144 WBC, 3000 RBC, 37 lymph, 59 mono/macro. Repeat CSF w/1:8 cryptococcal antigen, CSF culture w/yeast. NCC evaluated, but patient with much improvement in signs and symptoms, and so NCC recommended continued observation on the floor. Discussed w/ID- stated to continue to monitor for worsening signs and symptoms- with closing pressure <25cm, no need for serial LP's at this time.     Doppler US performed on LLE- found to have DVT. Started on heparin drip. Planning for repeat LP 07/07.  Heparin drip paused and repeat lumbar puncture completed on 07/07.   Per Radiology, CSF was clear to gout 16 mL total; opening pressure 27, closing pressure 12. Gram stain notable for few yeast.  Repeat CT head ordered with stable findings.  Neurosurgery consulted; Considering  shunt placement pending clearance of CSF and decrease in CSF protein.  Recommend ongoing serial LPs for now.  Repeat lumbar puncture performed 7/13.  Opening pressure 21, closing pressure 11. Final repeat LP 07/15- opening pressure 18.5cm, closing pressure 10.     Patient had persistent abd pain and diarrhea- topamax tapered down. Bentyl held as this could also be associated with nausea. Patient was started on lexapro 10mg qday and hydroxyzine 25mg qhs for additional anti-anxiety medication. Psychology consulted.       Interval History: Patient was seen and evaluated. Had discussion with  this am- he is concerned that patient can actually hear what others are saying and is slowly giving up on eating and drinking. When speaking with patient, she reports abd pain, diarrhea (that is improving- per ). No vomiting seen, just dry-heaving. Had discussions with Nephrology and ID. Holding anti-hypertensives. Continuing w/TPN. With 's encouragement this am, improvement in PO intake this am w/no painful swallowing. Per ID, concern that patient could have CMV esophagitis/colitis- on CT of abd/pelvis- no signs of colitis. Will reach out to GI. Low suspicon for [place]- as patient appears hemodynamically stable.     No chest pain, sob, cough, headache, vision changes.     Objective:     Vital Signs (Most Recent):  Temp: 98 °F (36.7 °C) (07/18/22 1954)  Pulse: (!) 122 (07/18/22 1954)  Resp: 18 (07/18/22 1954)  BP: (!) 147/75 (07/18/22 1954)  SpO2: 99 % (07/18/22 1954)   Vital Signs (24h Range):  Temp:  [96.9 °F (36.1 °C)-99.3 °F (37.4 °C)] 98 °F (36.7 °C)  Pulse:  [] 122  Resp:  [16-18] 18  SpO2:  [90 %-100 %] 99 %  BP: ()/(45-75) 147/75     Weight: 60 kg (132 lb 4.4 oz)  Body mass index  is 25.83 kg/m².    Intake/Output Summary (Last 24 hours) at 7/18/2022 2053  Last data filed at 7/18/2022 1846  Gross per 24 hour   Intake 560 ml   Output --   Net 560 ml      Physical Exam  Gen: in NAD, appears stated age  Neuro: AAOx3, motor, sensory, and strength grossly intact BL  HEENT: NTNC, EOMI, PERRL, MMM  CVS: RRR, systolic murmur appreciated (possibly flow murmur), no rubs/gallops, S1/S2 auscultated with no S3 or S4; capillary refill < 2 sec  Resp: lungs CTAB, no w/r/r; no belabored breathing or accessory muscle use appreciated   Abd: BS+ in all 4 quadrants; diffuse TTP, non-distended, soft to palpation; no organomegaly appreciated   Extrem: pulses full, equal, and regular over all 4 extremities;no swelling of BL UE or LE- BL LE wrapped in ACE wrap.      Significant Labs: All pertinent labs within the past 24 hours have been reviewed.  Blood Culture: No results for input(s): LABBLOO in the last 48 hours.  CBC:   Recent Labs   Lab 07/17/22  1030 07/18/22  0449   WBC 1.06*  1.06* 0.83*  0.83*   HGB 9.8*  9.8* 9.4*  9.4*   HCT 29.2*  29.2* 28.2*  28.2*   *  112* 116*  116*     CMP:   Recent Labs   Lab 07/17/22  1532 07/18/22  0449   * 135*   K 3.3* 3.3*   CL 98 97   CO2 25 25   * 215*   BUN 46* 53*   CREATININE 2.1* 2.2*   CALCIUM 8.0* 8.2*   PROT 4.8*  --    ALBUMIN 2.5*  --    BILITOT 1.0  --    ALKPHOS 45*  --    AST 7*  --    ALT <5*  --    ANIONGAP 12 13   EGFRNONAA 26.1* 24.7*     Prealbumin: No results for input(s): PREALBUMIN in the last 48 hours.  Troponin: No results for input(s): TROPONINI in the last 48 hours.  Urine Culture: No results for input(s): LABURIN in the last 48 hours.  Urine Studies:   No results for input(s): COLORU, APPEARANCEUA, PHUR, SPECGRAV, PROTEINUA, GLUCUA, KETONESU, BILIRUBINUA, OCCULTUA, NITRITE, UROBILINOGEN, LEUKOCYTESUR, RBCUA, WBCUA, BACTERIA, SQUAMEPITHEL, HYALINECASTS in the last 48 hours.    Invalid input(s): WRIGHTSUR      Significant  Imaging: I have reviewed all pertinent imaging results/findings within the past 24 hours.    KUB:  FINDINGS:  Osseous structures are unremarkable.  There is no bowel distension.  Surgical change present within left abdomen.  No abnormal calcification.     Impression:     As above    CT A/P:  FINDINGS:  CHEST:     Lungs/Pleura: Unchanged appearance of multiple solid pulmonary nodules within the left lower lobe compared to CT abdomen pelvis 07/12/2022.  The largest nodule measures 2.4 cm (series 2, image 13).  2 mm nodule right middle lobe series 2, image 1 similar.  No focal consolidation.  No pneumothorax.  No pleural effusion.     Heart: Normal size.  New small pericardial effusion.     Thoracic soft tissues: Unremarkable     ABDOMEN:     Liver: Enlarged measuring 20.6 cm.  No focal hepatic abnormality.     Gallbladder/Bile ducts: Gallbladder is distended without inflammatory changes.  No intrahepatic or extrahepatic biliary ductal dilatation.     Spleen:Unremarkable.     Stomach: Postoperative changes of the stomach, gastric bypass.     Pancreas: No mass or peripancreatic fat stranding.     Adrenals: Unremarkable.     Renal/Ureters: Native kidneys are atrophic.  Left renal simple cyst.  No hydronephrosis.  The ureters are normal in course and caliber. The urinary bladder decompressed without focal abnormality.     Transplant kidney within the right lower quadrant.  No hydronephrosis.     Reproductive: Uterus is unremarkable.  No adnexal mass.     Bowel: Loops of small bowel show no evidence of obstruction or inflammation.  Postop changes of a distal segment of the small bowel within the left lower quadrant.  Loops of large bowel are partially distended with liquid stool.  Majority the colon is somewhat featureless.  No evidence of obstruction.  The appendix is not confidently visualized; however, no inflammatory changes in the expected location of the appendix.     Peritoneum: No free fluid.  No intraperitoneal  free air.     Lymph Nodes: No pathologic blessing enlargement in the abdomen or pelvis.     Vasculature: Abdominal aorta is normal in course and caliber.  Mild calcifications of the abdominal aorta.     Bones: Degenerative changes of the spine, including diffuse idiopathic skeletal hyperostosis of the thoracic spine.  No acute fractures or osseous destructive lesions.     Soft Tissues: Soft tissue thickening of the subcutaneous tissues of the anterior abdomen, adjacent prior surgery.     Impression:     Liquid stool throughout the colon.  Colon is somewhat featureless suggesting chronic inflammatory process.     Hepatomegaly.     New small pericardial effusion.     Unchanged appearance of lower lobe pulmonary nodules as detailed above.      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  - Stopped ampho and flucytosine due to suspected drug-induced pancytopenia  - Continue fluconazole 400mg qday per ID   - On chart review, amphotericin started on 06/24/22- 2wks of treatment 07/08/22  - Repeat LP completed 7/9; gram stain notable for yeast; and amphotericin continued; cultures no growth to date  - LP 07/15 w/opening pressure of 18.5 and closing pressure of 10--per NSGY- no need for VPS  -Transplant ID following- continue with current course- patient signs and symptoms much improved since time of admission, will hold of on steroids- no signs of IRIS, will continue to monitor for need for repeat LP  - KTM following; recommend 500mL normal saline with amphotericin     Increased intracranial pressure  - Last intracranial pressure of 34cm from LP at OSH  - Patient with papilledema, vomiting, AMS  - CTH w/developing hydrocephalus and signs of ventricular entrapment  - NSGY notified and consulted- no acute interventions at this time  - NCC notified- continue  "monitoring on floor  - S/p LP w/opening pressure of 35cm and closing pressure of 13cm 06/30/22  - S/p LP w/opening pressure of 27cm and closing pressure of 12cm 07/08/22  - Continue to monitor for signs and symptoms of worsening ICP  - Repeat CTH 07/01 relatively unchanged   - Repeat CTH 7/8 without significant change  - S/p LP  w/opening pressure of 21cm and closing pressure of 11cm 07/13/22  - S/p LP w/openiing pressure of 18.5cm and closing pressure of 10cm 07/15/22  - NSGY consulted- initially planned for VPS 07/18, but with improvement in opening pressure and headache, no need for VPS at this time    Bilateral papilledema due to raised intracranial pressure  - Ophthalmology evaluation with grade 3 papilledema in both eyes (longer she has elevated CSF pressure, the higher her risk of permanent optic nerve damage and vision change is.  There is a congested appearance to the veins in both eyes secondary to optic nerve congestion and indicates higher risk for ischemic event), 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  -"7/7 exam, vision stable OU, color plates full. Still 3+ ONH edema, and we do not expect this to rapidly improve as there is often delay of resolution of ONH edema following improvement in ICP "  - see increased intracranial pressure    Acute renal failure superimposed on stage 3 chronic kidney disease  - Cr 2.2 on day of transfer, has been steadily worsening over last few days (baseline ~1.2)  - KTM consulted, appreciate recs; likely prerenal versus medication related  - US transplant unremarkable for acute changes   - Creatinine worsening- 2.2  - Continue TPN   - Place underwood for better I's and O's  - Possibly 2/2 tacro toxicity  - Continue sodium bicarb    Hypotension  - likely from decrease in oral intake, decrease in mobility  - holding nifedipine  - PT to re-evaluate    Poor nutrition  - poor PO intake 2/2   - Nutrition consulted  - Continue TPN  - Continue scheduled zofran - QTc 437 today   - Holding off on gastric " emptying study at this time  - Continue boost supplemental drinks    Diarrhea  - bentyl can be associated with nausea- stopped  - Stool studies- WBCs present, occult blood as well  - Per ID, patient w/H/o CMV positivity- never treated  - Consult GI- with dypshagia and diarrhea, possible need for EGD and/or C-scope for further evaluation of CMV infection vs. Other   - topamax can be associated with diarrhea- will continue with wean as patient has not been on 100mg BID for a prolonged period of time  - CT of abd w/o contrast with liquid stool throughout intestine   - No leukocytosis concerning for new infection  - continue loperamide  - c diff negative 07/12    Anxiety  - Continue lexapro 10mg qday   - Hydroxyzine 25mg qhs  - Consult psychology - awaiting official evaluation    VTE (venous thromboembolism)  - continue heparin drip for DVT of the LLE--> plan to de-escalate to eliquis pending any possible GI interventions   - pause as needed in the setting of procedures    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  - Improving  - See papilledema-- spoke w/ophthalmology- expect papilledema to improve over the course of 2wks if infection remains well-controlled and ICP remains wnl      Headache  - history of intractable headache  - Continue topamax 50mg BID - plan to decrease to 25mg qday in 2d  - continue as needed Fioricet, oxycodone/acetaminophen, acetaminophen; headache responding to prns      Drug-induced pancytopenia  - likely from flucytosine- w/associated eosinophila  - Heme/Onc consulted- agree with likely drug-induced pancytopenia, no need for bone marrow biopsy at this time-- reported that pancytopenia can take up to 1wk to resolve; no need for prophylactic antibiotics at this time- would consider it if neutropenia of <500 continues for 4-5 days  - ECG w/QTc of 425  - Daily ECG to be performed  - will monitor WBC, RBC, Plt trend  - retic, ldh, b12, folate, ldh- wnl  - Heparin antibodies sent  - Obtain fibrinogen     Hypokalemia  - PO  repletion prn to goal      Long-term use of immunosuppressant medication  - Pt with opportunistic infection, see above. Holding mycophenolate   - KTM following, appreciate recs  Continue prograf per levels   Monitor levels to assess for toxicity  Continue prednisone ---> increased to 10mg daily   Hold MMF     S/P kidney transplant  - See TERESA. KTM consulted for further assistance  Kidney transplant 4/5/2020   Underlying ckd stage III  ESRD related to HTN    VTE Risk Mitigation (From admission, onward)         Ordered     IP VTE HIGH RISK PATIENT  Once         06/27/22 2202     Place sequential compression device  Until discontinued         06/27/22 2202                Discharge Planning   JULITO: 7/22/2022     Code Status: Full Code   Is the patient medically ready for discharge?: No    Reason for patient still in hospital (select all that apply): Patient trending condition  Discharge Plan A: Home with family                    Keke Hicks MD  Department of Hospital Medicine   [facility] - Telemetry Stepdown

## 2022-07-19 NOTE — ASSESSMENT & PLAN NOTE
Heme/Onc evaluated - feel pancytopenias are drug related. No bone marrow biopsy completed.     -- CBC daily   -- Further workup with Retic, LDH, [place], B12 were previously normal   -- Monitoring daily

## 2022-07-19 NOTE — CODE/ RAPID DOCUMENTATION
RAPID RESPONSE NURSE NOTE        Admit Date: 2022  LOS: 22  Code Status: Full Code   Date of Consult: 2022  : 1965  Age: 56 y.o.  Weight:   Wt Readings from Last 1 Encounters:   22 60 kg (132 lb 4.4 oz)     Sex: female  Race: White   Bed: 8081/8081 A:   MRN: [id number]  Time Rapid Response Team page Received: not paged   Time Rapid Response Team at Bedside: 705  Time Rapid Response Team left Bedside: 0800  Was the patient discharged from an ICU this admission? No   Was the patient discharged from a PACU within last 24 hours? No   Did the patient receive conscious sedation/general anesthesia in last 24 hours? No  Was the patient in the ED within the past 24 hours? No  Was the patient on NIPPV within the past 24 hours? No   Did this progress into an ARC or CPA: no  Attending Physician: Keke Hicks MD  Primary Service: [facility] MED D       SITUATION    Notified by charge RN via phone call.  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    Last Vitals:  Temp: 97.9 °F (36.6 °C) (624)  Pulse: 123 (719)  Resp: 30 (713)  BP: 80/42 (719)  SpO2: 100 % (630)    24 Hours Vitals Range:  Temp:  [96.9 °F (36.1 °C)-98 °F (36.7 °C)]   Pulse:  []   Resp:  [18-45]   BP: ()/(38-75)   SpO2:  [90 %-100 %]     Labs:  Recent Labs     22  1030 22  0449   WBC 1.06*  1.06* 0.83*  0.83*   HGB 9.8*  9.8* 9.4*  9.4*   HCT 29.2*  29.2* 28.2*  28.2*   *  112* 116*  116*       Recent Labs     22  1029 22  1532 22  0449 22  0603   NA  --  135* 135*  --    K  --  3.3* 3.3*  --    CL  --  98 97  --    CO2  --  25 25   --    CREATININE  --  2.1* 2.2*  --    GLU  --  284* 215*  --    PHOS 3.7  --   --  4.0   MG 1.8  --   --  2.4        No results for input(s): PH, PCO2, PO2, HCO3, POCSATURATED, BE in the last 72 hours.     ASSESSMENT    Physical Exam  Vitals and nursing note reviewed.   Constitutional:       General: She is awake.      Appearance: She is ill-appearing.      Comments: NRB in place    Cardiovascular:      Rate and Rhythm: Tachycardia present.   Pulmonary:      Effort: Tachypnea and respiratory distress present.      Breath sounds: Examination of the left-middle field reveals decreased breath sounds. Examination of the left-lower field reveals decreased breath sounds. Decreased breath sounds present.   Neurological:      Mental Status: She is alert.     Called to bedside to evaluate patient for increase in work of breathing and SOB. Dr. Diego at bedside. Tachypnea noted, diminished breath sounds in lower left lobes otherwise clear breath sounds. ABG and CXR obtained. Labs ordered. pH 7.1. CCM consulted. Decision made to accept to ICU.     INTERVENTIONS    The patient was seen for a Cardiac problem. Staff concerns included hypotension. The following interventions were performed: BMP, Magnesium and portable chest x-ray.    RECOMMENDATIONS    We recommend: transfer to ICU     PROVIDER ESCALATION    Orders received and case discussed with Dr. Mckoy.    Disposition: Tx in ICU bed 6065.    FOLLOW UP    Bedside RNShelly updated on plan of care. Instructed to call the Rapid Response Nurse, Dawn Burgos RN at [phone] for additional questions or concerns.

## 2022-07-19 NOTE — ASSESSMENT & PLAN NOTE
-- C. Diff negative   -- Stool studies sent   -- GI consulted and following. Appreciate assistance.   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 Spoke with ID. Will start gancyclovir.   -- Electrolyte replacement PRN

## 2022-07-19 NOTE — ASSESSMENT & PLAN NOTE
- continue heparin drip for DVT of the LLE--> plan to de-escalate to eliquis pending any possible GI interventions   - pause as needed in the setting of procedures

## 2022-07-19 NOTE — ASSESSMENT & PLAN NOTE
- history of intractable headache  - Continue topamax 50mg BID - plan to decrease to 25mg qday in 2d  - continue as needed Fioricet, oxycodone/acetaminophen, acetaminophen; headache responding to prns

## 2022-07-19 NOTE — EICU
08:32  Called into room via elert for arterial line insertion.  Physician verification done for  Bartolome Dumont & Nate Wolf w/ IM / Pulmonary / CC.  08:34  Time-out completed w/ bedside RN.  08:49  Left radial arterial line attempted per CHAS Hutton x2 w/o success using u/s guidance.  08:53  Left radial arterial line attempted per Dr. Mckoy w/o success using u/s guidance.  09:20  Physician verification done for Alba Loco w/ X-[facility]/Jose Pulm/CCM & Nate Wolf w/ IM/Pulmonary/CC for intubation.  09:27  Etomidate 20 mg IV followed by Rocuronium 60 mg IV given per bedside RN as ordered.  09:28  Intubated w/ #7.5 oral ETT per Dr. Mahoney using u/s guidance.  ETCO2 color change noted & bilateral breath sounds confirmed per bedside team.  Secured at 23 at the lips. CXR ordered.  09:36  #14 Fr OGT inserted per bedside RN.  Pt tolerated procedures well.   09:57  Unsuccessful attempt at right arterial femoral line per CHAS Hutton using u/s guidance.  10:03  Right IJ TLC inserted per Dr. Mckoy using u/s guidance.  Blood return noted to all ports.  Secured & sutured.  10:10  Right femoral arterial line insertion x2 attempted per CHAS Hutton using u/s guidance w/o success.  10:18  Right femoral arterial line attempted per Dr. Mckoy using u/s guidance w/o success.  Procedure aborted.  Pt resting quiet on ventilator support.  B/P 106/42, , RR 16. POx 94%.

## 2022-07-19 NOTE — NURSING
Nurse to bedside to do blood work, pt noted to be tachypneic. Blood work completed. VS taken. BP 85/38 on right leg. Taken on left leg with same results. Pulse ox monitor placed on ear with a reading of 97%. 2LNC placed for comfort. MD notified of latest VS along with pt assessment. Rapid nurses called to update. Dr. Diego stated we could take BP in left arm  - unable to obtain BP automatically or manually on left arm. Rapid nurses updated. Stated they would call back and update me on POC. Spo2 unable to be read on any site. NRB placed on pt - still unable to obtain spo2 & BP.  RR to bedside. MD to bedside. Stat CXR completed. Two 250 ml LR bolus given. ABG completed. Labs collected.  Critical care to bedside to consult. Pt to MICU. Bedside report given to MAXIMUS Gillis.

## 2022-07-19 NOTE — ASSESSMENT & PLAN NOTE
Estimated Creatinine Clearance: 15.8 mL/min (A) (based on SCr of 3.3 mg/dL (H)).    1.2 at admission - baseline. Has been up trending     -- KTM following. Appreciate assistance   -- Transplant U/S without acute changes   -- CMP daily   -- Mas with strict I&O   -- Bicarb for acidosis - was TID PO -- giving IV push in setting of acute decompensation   -- Follow up ABG   -- Renally dose all medications to appropriate GFR   -- Avoid nephrotoxins   -- Daily Tacro levels

## 2022-07-19 NOTE — ASSESSMENT & PLAN NOTE
- Last intracranial pressure of 34cm from LP at OSH  - Patient with papilledema, vomiting, AMS  - CTH w/developing hydrocephalus and signs of ventricular entrapment  - NSGY notified and consulted- no acute interventions at this time  - NCC notified- continue monitoring on floor  - S/p LP w/opening pressure of 35cm and closing pressure of 13cm 06/30/22  - S/p LP w/opening pressure of 27cm and closing pressure of 12cm 07/08/22  - Continue to monitor for signs and symptoms of worsening ICP  - Repeat CTH 07/01 relatively unchanged   - Repeat CTH 7/8 without significant change  - S/p LP  w/opening pressure of 21cm and closing pressure of 11cm 07/13/22  - S/p LP w/openiing pressure of 18.5cm and closing pressure of 10cm 07/15/22  - NSGY consulted- initially planned for VPS 07/18, but with improvement in opening pressure and headache, no need for VPS at this time

## 2022-07-19 NOTE — ASSESSMENT & PLAN NOTE
Patient is a 56 year old male with [place] [facility] [place] as a transfer from OSH. Acute hypoxic respiratory failure currently intubated. PH 6.9 pCo2 60. Patient is currently on Levophed 1.68 mcg/kg/min + Vaso 0.04 mcg/kg/min. Patient is currently too hemodynamically unstable to bring to the cath lab for cathter directed thrombectomy. Considering pancytopenia (likely drug related from ampho + flucytosine), we would recommend heparin gtt for now as tolerated and strongly would recommend goals of care discussion as prognosis is poor.   We wish the best for the patient.

## 2022-07-20 LAB
BACTERIA STL CULT: NORMAL
BKV DNA SERPL NAA+PROBE-ACNC: <125 COPIES/ML
BKV DNA SERPL NAA+PROBE-LOG#: <2.1 LOG (10) COPIES/ML
BKV DNA SERPL QL NAA+PROBE: NOT DETECTED
EBV DNA SERPL NAA+PROBE-ACNC: NORMAL IU/ML
FAT STL QL: NORMAL
NEUTRAL FAT STL QL: NORMAL

## 2022-07-20 NOTE — NURSING
SILVIA Hutton NP and Dr Mckoy were at bedside almost tho whole morning and then REECE Hutton in the afternoon. As critical values came in (LA>12; WBW-1.1; etc they were made aware.  Also after ABG resulted with pH=6.9 bicarb was given and then repeated.

## 2022-07-20 NOTE — NURSING
0740Rapid Response RNGayle called to give brief history of events and brief PMH.   0745 Pt arrived -transported on floor bed with portable tele in progress, RT at bedside -pt on NRB; Rapid response RNx2 and RT and floor RNShelly.    Pt was transferred to unit bed and connected to unit telemetry. Pt was tachypneic; BP not able to register.  Multiple sites attempted but limited r/t R FA fistula (no thrill nor bruit upon initial assessment); JARED PICC. (L leg ultimately became most reliable until Jeannine placed.)    Radial pulses barely palpable.  Pt cool. Abd distended and firm.   SILVIA Watkins NP and Dr Mckoy at bedside were made aware.  Team spoke with pt's  (Kathy) and family. Pt was alert and aware, struggling to breathe.  She agreed to be intubated and MD spoke with pt's /family who were in agreement since pt was struggling.    0952 Pt once again voiced desire to be intubated and induction meds were given as charted.      Consent was also received to place TLC and [place]. See EICU note RE: placement.    Immediately, post intubation and OGT placement by MD using glidescope, it was noted that thin pink clear fluid was backing up into OGT.  OK'd to put to LIS per SILVIA Hutton NP and Dr Mckoy who was made aware of drainage which totaled 200ml in just a few minutes.  GI was consulted; protonix given as charted.  Heparin was never bolused nor started r/t bleed.  Fentanyl was not started r/t pt was -5 RASS and labile BP.  Propofol as charted.  Levo and Vaso infusing as charted (were started earlier per MARS).  RX for CT were noted but pt was too unstable to travel. Also, no jeannine was obtained and cuff BP was not reliable.  Dr Curtis was able to assist team in placing L fem [place] on first attempt without difficulty (pt had had 1 L LR infused as well as better BP.  Previous attempts, pt had less volume and BP was low.  See EICU note.)    Family visited and was oriented to room and updated to pt's condition -  aware of need to go to CT in hopes of getting answers as to pt's problem.     Once Jeannine was established and pt seemed stable on Levo and Vaso, decision was made to go to CT with johana Olmos RN, John RN float; Jemima, RT on portable monitor with portable vent functioning.   Pt tolerated CT well and returned to unit in same manner with the same team.  Pt ws reconnected to unit's monitor and OGT to LIS.  Now, pt's drainage seemed to be same consistency(thin) and color (serous. No clots) as her jeannine blood.  SILVIA Hutton NP aware and at bedside.    Results of CT led to consults to Gen Surgery and Interventional Cardiology.     Team spoke with pt's family.    172 Pt made a DNR and withdrawal of care since CT showed issues that were not treatable.    As charted in EPIC Morphine and Versed were given as charted and with family present at bedside and  said a prayer, pt ws extubated and OGT was pulled.  SILVIA Hutton was present outside room and aware when pt passed. Family stayed to grieve in room then some went to waiting area.  Family showed an immense amount of love for pt as well as  was very upset with knowing his wife is passing but understood nothing more could be due.  Pt became asystole quickly once gtts (Levo, Vaso) were turned off (per comfort care/withdraawal of care orders)    180 pt ws pronounced by Dr Rc Kennedy. sinan Hodges was called and came to meet family, prayed, got needed info for  home.     DONN and Darleen (Jesse, On Call) reporting was completed.  Post mortem care completed (underwood d/c'd but lines left in place r/t potential bleeding. Pt's head was wrapped in hopes to contain any blood that may drain from mouth.        Post mortem care was completed by this RN and pm RN Hair HAGER

## 2022-07-21 LAB
B19V DNA # SPEC NAA+PROBE: NOT DETECTED COPIES/ML
BACTERIA CSF CULT: NO GROWTH
ELASTASE 1, FECAL: 97 MCG/G
GRAM STN SPEC: NORMAL
SPECIMEN SOURCE: NORMAL

## 2022-07-22 LAB — CALPROTECTIN STL-MCNT: 311.2 MCG/G

## 2022-07-24 LAB
BACTERIA BLD CULT: NORMAL
H PYLORI AG STL QL IA: NOT DETECTED
SPECIMEN SOURCE: NORMAL

## 2022-07-27 LAB — O+P STL MICRO: NORMAL

## 2022-08-18 LAB
ACID FAST MOD KINY STN SPEC: NORMAL
MYCOBACTERIUM SPEC QL CULT: NORMAL

## 2022-08-26 LAB
ACID FAST MOD KINY STN SPEC: NORMAL
MYCOBACTERIUM SPEC QL CULT: NORMAL

## 2022-08-31 LAB
ACID FAST MOD KINY STN SPEC: NORMAL
MYCOBACTERIUM SPEC QL CULT: NORMAL

## 2022-09-02 LAB
ACID FAST MOD KINY STN SPEC: NORMAL
MYCOBACTERIUM SPEC QL CULT: NORMAL

## 2023-01-31 NOTE — PROGRESS NOTES
Kidney Transplant Recipient Reevalulation    Referring Physician: Elias Jon  Current Nephrologist: Elias Jon  Waitlist Status: active  Dialysis Start Date: 2/5/2016    Subjective:     CC:  Annual reassessment of kidney transplant candidacy.    HPI:  Ms. Rivera is a 51 y.o. year old White female with ESRD secondary to HTN.  She has been on the wait list for a kidney transplant at [facility] since 2/5/2016. Patient is currently on hemodialysis started on 2/5/16. Patient is dialyzing on MWF schedule.  Patient reports that she is tolerating dialysis well.. She has a RUE AV graft. Patient was seen in initial RR clinic on 7/19/16. She denies any recent hospitalizations or ED visits. She had gastric bypass surgery on 7/28/16.     She had echo, CXR, and renal US earlier today which all are acceptable.     She will go to the gym two to three times a week - for ~60 to 90 minutes at a time. No stairs to climb at home. She will go grocery shopping and can walk around without difficulty. She will do household chores and continues to work as . With the activity that she does she denies symptoms of chest pain, SOB, claudication.     Review of Systems   Constitutional: Denies fever/chills, fatigue, night sweats  EENT: Denies vision problems, hearing problems, trouble swallowing.   Respiratory: Denies shortness of breath, dyspnea on exertion, orthopnea, wheezing, hemoptysis, denies known TB exposure or history of positive TB skin test  Cardiovascular: Denies chest pain, palpitations, history of MI, history of stroke, history of DVT  Gastrointestinal: +occasional constipation; Denies abdominal pain, nausea/vomiting/diarrhea. Denies history of GI bleeding or ulcers.   Genitourinary: +residual UOP >2L/day; Denies history of kidney stones, recurrent UTI's, history of urinary obstruction, hematuria, dysuria, urinary frequency, incontinence  Musculoskeletal: +leg cramps at night after dialysis couple times a week; Denies  "trouble moving extremities, denies joint pain or swelling  Skin: Denies history of skin cancer, denies rash, ulcerations  Heme/onc: Denies any history of cancer, denies history of coagulopathies or bleeding disorders  Endocrine: +lost 65 lbs since July 2016 clinic visit; s/p gastric bypass 7/28/16; Denies thyroid disease  Neurological: Denies tremors, seizures, dizziness, headaches, peripheral neuropathy  Psychiatric: Denies anxiety, depression. Denies hallucinations or delusions.    Medications:  Current Outpatient Prescriptions   Medication Sig Dispense Refill    calcium acetate 667 mg (169 mg calcium)/5 mL Soln Take 667 mg by mouth 3 (three) times daily after meals.      lanthanum (FOSRENOL) 1000 MG chewable tablet Take 1,000 mg by mouth 3 (three) times daily with meals.      lidocaine-prilocaine (EMLA) cream APPLY SMALL AMOUNT TO THE AFFECTED AREA PRIOR TO dialysis THREE TIMES A WEEK  3    sevelamer carbonate (RENVELA) 800 mg Tab Take by mouth 3 (three) times daily with meals. 2 tabs with breaskfast, 4 tabs with lunch, 4 tabs with dinner       No current facility-administered medications for this visit.      Potential donors: son was seen earlier today for donor evaluation but he was denied secondary to weight     Objective:   body mass index is 25.41 kg/m².   BP (!) 143/67 (BP Location: Right arm, Patient Position: Sitting, BP Method: Small (Automatic))   Pulse (!) 52   Temp 98.1 °F (36.7 °C) (Oral)   Resp 16   Ht 5' 1" (1.549 m)   Wt 61 kg (134 lb 7.7 oz)   SpO2 99%   BMI 25.41 kg/m²     Physical Exam   General: No acute distress, well groomed, alert and oriented x 3  HEENT: Normocephalic, atraumatic, PERRLA, sclera anicteric, conjunctiva/corneas clear, EOM's intact bilaterally, external inspection of ears and nose normal, moist mucous membranes, no oral ulcerations/lesions   Neck: Supple, symmetrical, trachea midline, no masses, no carotid bruits, no JVD, thyroid is normal without nodules or " enlargement   Respiratory: Clear to auscultation bilaterally, respirations unlabored, no rales/rhonchi/wheezing   Cardiovacular: Regular rate and rhythm, S1, S2 normal, no murmurs, no rubs or gallops   Gastrointestinal: Soft, non-tender, bowel sounds normal, no masses, no palpable organomegaly; soft, reducible umbilical hernia  Extremities: No clubbing or cyanosis of upper extremities bilaterally, no pedal edema bilaterally; +2 bilateral radial pulses  Skin: warm and dry; no rash on exposed skin  Lymph nodes: Cervical and supraclavicular nodes normal   Neurologic: No focal neurologic deficits.   Musculoskeletal: moves all extremities without difficulty, FROM, 5/5 strength, ambulates without an assistive device  Psychiatric: Normal mood and affect. Responds appropriately to questions.  Access: RUE AVG +thrill/bruit; LUE AVF +thrill/bruit       Labs:  Lab Results   Component Value Date    WBC 4.17 07/19/2016    HGB 11.8 (L) 07/19/2016    HCT 37.3 07/19/2016     07/19/2016    K 4.1 07/19/2016    CL 96 07/19/2016    CO2 34 (H) 07/19/2016    BUN 18 07/19/2016    CREATININE 3.5 (H) 07/19/2016    EGFRNONAA 14.7 (A) 07/19/2016    CALCIUM 9.4 07/19/2016    PHOS 2.7 07/19/2016    ALBUMIN 3.8 07/19/2016    AST 20 07/19/2016    ALT 26 07/19/2016    .0 (H) 08/02/2016       No results found for: PREALBUMIN, BILIRUBINUA, GGT, AMYLASE, LIPASE, PROTEINUA, NITRITE, RBCUA, WBCUA    No results found for: HLAABCTYPE    Lab Results   Component Value Date    CPRA 71 05/08/2017    QP3CABV A1,B76,A23,A24,A80,A36,A3,A11,A29 05/08/2017    CIABCLM WEAK B*15:02 01/11/2017    CIIAB DRB1*16:01,DRB1*04:04 02/06/2017    ABCMT WEAK DR16,DR4 05/08/2017       Labs were reviewed with the patient.    Pre-transplant Workup:   Reviewed with the patient.    Assessment:     1. ESRD on dialysis     2. Anemia of renal disease    3. Essential hypertension    4. Secondary hyperparathyroidism of renal origin    5. Awaiting organ transplant status         Plan:     Transplant Candidacy:   Ms. Rivera is a suitable kidney transplant candidate.  She remains in overall stable health, and will remain active on the transplant list.    Exercise: reminded Tala of the importance of regular exercise for weight management, blood sugar and blood pressure management.  I also explained exercise has been shown to improve cardiovascular health, energy level, and sleep hygiene.  Lastly, I advised her that cardiovascular complications are leading cause of death and regular exercise can help lower this risk.    Encouraged her to have any other potential donors contact the living donor coordinator.     Karena Colin MD       Follow-up:   In addition to the tests noted in the plan, Ms. Rivera will continue to have reevaluation as per the standing pre-kidney transplant protocol:  1. Monthly blood for PRA  2. Annual return to clinic, except HIV positive, > 65 years of age, or pancreas transplant candidates who will be scheduled to see transplant every 6 months while in pre-transplant phase  3. Annual re-testing: CXR, EKG, yearly mammograms for women over 40 and PSA for males over 40, cardiology follow-up as recommended by initial cardiology pre-transplant evaluation  4. Renal ultrasound every 2 years  5. Baseline colonoscopy after age 50 and repeated as recommended    UNOS Patient Status  Functional Status: 70% - Cares for self: unable to carry on normal activity or active work  Physical Capacity: No Limitations   Monitor BMP, CBC, glucose, lytes

## 2025-06-23 NOTE — SUBJECTIVE & OBJECTIVE
Interval History: Patient was seen and evaluated. Per - worsening hearing for patient. Patient is refusing to eat 2/2 nausea- will only take a few bites at a time. Scheduled zofran started yesterday, 07/16. Received PPN overnight. No chest pain, sob, cough, headache, vision changes.     Objective:     Vital Signs (Most Recent):  Temp: 97.7 °F (36.5 °C) (07/17/22 1543)  Pulse: 100 (07/17/22 1543)  Resp: 16 (07/17/22 1543)  BP: (!) 140/67 (07/17/22 1543)  SpO2: 97 % (07/17/22 1543)   Vital Signs (24h Range):  Temp:  [97.7 °F (36.5 °C)-98.7 °F (37.1 °C)] 97.7 °F (36.5 °C)  Pulse:  [] 100  Resp:  [16-18] 16  SpO2:  [94 %-97 %] 97 %  BP: (101-140)/(50-67) 140/67     Weight: 63.8 kg (140 lb 10.5 oz)  Body mass index is 27.47 kg/m².    Intake/Output Summary (Last 24 hours) at 7/17/2022 1557  Last data filed at 7/17/2022 0856  Gross per 24 hour   Intake 1300 ml   Output 300 ml   Net 1000 ml      Physical Exam  Gen: in NAD, appears stated age  Neuro: AAOx3, motor, sensory, and strength grossly intact BL  HEENT: NTNC, EOMI, PERRL, MMM  CVS: RRR, systolic murmur appreciated (possibly flow murmur), no rubs/gallops, S1/S2 auscultated with no S3 or S4; capillary refill < 2 sec  Resp: lungs CTAB, no w/r/r; no belabored breathing or accessory muscle use appreciated   Abd: BS+ in all 4 quadrants; NTND, soft to palpation; no organomegaly appreciated   Extrem: pulses full, equal, and regular over all 4 extremities;no swelling of BL UE or LE- BL LE wrapped in ACE wrap.      Significant Labs: All pertinent labs within the past 24 hours have been reviewed.  Blood Culture: No results for input(s): LABBLOO in the last 48 hours.  CBC:   Recent Labs   Lab 07/15/22  1611 07/16/22  0517 07/17/22  1030   WBC 2.81* 2.16*  2.16* 1.06*  1.06*   HGB 11.4* 10.6*  10.6* 9.8*  9.8*   HCT 34.8* 31.7*  31.7* 29.2*  29.2*   * 96*  96* 112*  112*     CMP:   Recent Labs   Lab 07/16/22  0517      K 3.2*      CO2 20*  Notified dr. Lam via perfect serve re: manual bp 78/34.     *   BUN 30*   CREATININE 1.5*   CALCIUM 7.9*   PROT 4.3*   ALBUMIN 2.3*   BILITOT 0.6   ALKPHOS 39*   AST 10   ALT 7*   ANIONGAP 8   EGFRNONAA 39.2*     Prealbumin: No results for input(s): PREALBUMIN in the last 48 hours.  Troponin: No results for input(s): TROPONINI in the last 48 hours.  Urine Culture: No results for input(s): LABURIN in the last 48 hours.  Urine Studies:   Recent Labs   Lab 07/16/22  1532   COLORU Carey   APPEARANCEUA Hazy*   PHUR 8.0   SPECGRAV 1.020   PROTEINUA 1+*   GLUCUA 2+*   KETONESU Negative   BILIRUBINUA Negative   OCCULTUA Negative   NITRITE Negative   LEUKOCYTESUR 1+*   RBCUA 1   WBCUA 4   BACTERIA None   SQUAMEPITHEL 0   HYALINECASTS 3*       Significant Imaging: I have reviewed all pertinent imaging results/findings within the past 24 hours.    KUB:  FINDINGS:  Osseous structures are unremarkable.  There is no bowel distension.  Surgical change present within left abdomen.  No abnormal calcification.     Impression:     As above    CT A/P:  FINDINGS:  CHEST:     Lungs/Pleura: Unchanged appearance of multiple solid pulmonary nodules within the left lower lobe compared to CT abdomen pelvis 07/12/2022.  The largest nodule measures 2.4 cm (series 2, image 13).  2 mm nodule right middle lobe series 2, image 1 similar.  No focal consolidation.  No pneumothorax.  No pleural effusion.     Heart: Normal size.  New small pericardial effusion.     Thoracic soft tissues: Unremarkable     ABDOMEN:     Liver: Enlarged measuring 20.6 cm.  No focal hepatic abnormality.     Gallbladder/Bile ducts: Gallbladder is distended without inflammatory changes.  No intrahepatic or extrahepatic biliary ductal dilatation.     Spleen:Unremarkable.     Stomach: Postoperative changes of the stomach, gastric bypass.     Pancreas: No mass or peripancreatic fat stranding.     Adrenals: Unremarkable.     Renal/Ureters: Native kidneys are atrophic.  Left renal simple cyst.  No hydronephrosis.  The ureters are  normal in course and caliber. The urinary bladder decompressed without focal abnormality.     Transplant kidney within the right lower quadrant.  No hydronephrosis.     Reproductive: Uterus is unremarkable.  No adnexal mass.     Bowel: Loops of small bowel show no evidence of obstruction or inflammation.  Postop changes of a distal segment of the small bowel within the left lower quadrant.  Loops of large bowel are partially distended with liquid stool.  Majority the colon is somewhat featureless.  No evidence of obstruction.  The appendix is not confidently visualized; however, no inflammatory changes in the expected location of the appendix.     Peritoneum: No free fluid.  No intraperitoneal free air.     Lymph Nodes: No pathologic blessing enlargement in the abdomen or pelvis.     Vasculature: Abdominal aorta is normal in course and caliber.  Mild calcifications of the abdominal aorta.     Bones: Degenerative changes of the spine, including diffuse idiopathic skeletal hyperostosis of the thoracic spine.  No acute fractures or osseous destructive lesions.     Soft Tissues: Soft tissue thickening of the subcutaneous tissues of the anterior abdomen, adjacent prior surgery.     Impression:     Liquid stool throughout the colon.  Colon is somewhat featureless suggesting chronic inflammatory process.     Hepatomegaly.     New small pericardial effusion.     Unchanged appearance of lower lobe pulmonary nodules as detailed above.

## (undated) DEVICE — SEE MEDLINE ITEM 152621

## (undated) DEVICE — SUT PROLENE 5-0 36IN C-1

## (undated) DEVICE — PACK UNIVERSAL SPLIT II

## (undated) DEVICE — SUT SILK 3-0 STRANDS 30IN

## (undated) DEVICE — SEE MEDLINE ITEM 152496

## (undated) DEVICE — ELECTRODE REM PLYHSV RETURN 9

## (undated) DEVICE — PUNCH AORTIC 4.0MM 6/CASE

## (undated) DEVICE — SUT ETHILON 3-0 PS2 18 BLK

## (undated) DEVICE — SOL NS 1000CC

## (undated) DEVICE — SUT 3-0 12-18IN SILK

## (undated) DEVICE — SUT SILK 3-0 SH 18IN BLACK

## (undated) DEVICE — SET IRR URLGY 2LINE UNIV SPIKE

## (undated) DEVICE — SUT 7/0 18IN PROLENE BL MO

## (undated) DEVICE — HEMOSTAT SURGICEL NU-KNIT 6X9

## (undated) DEVICE — SEE MEDLINE ITEM 156900

## (undated) DEVICE — PLUG CATHETER STERILE FOLEY

## (undated) DEVICE — SEE MEDLINE ITEM 146347

## (undated) DEVICE — DRAPE SLUSH WARMER WITH DISC

## (undated) DEVICE — SUT 4-0 12-18IN SILK BLACK

## (undated) DEVICE — Device

## (undated) DEVICE — FOLEY BLLN 20FR 3WAY 5CC

## (undated) DEVICE — SUT 1 36IN PDS II VIO MONO

## (undated) DEVICE — TRAY FOLEY 16FR INFECTION CONT

## (undated) DEVICE — SUT SILK 2-0 STRANDS 30IN

## (undated) DEVICE — SEE MEDLINE ITEM 146417

## (undated) DEVICE — CLIPPER BLADE MOD 4406 (CAREF)

## (undated) DEVICE — SET DECANTER MEDICHOICE

## (undated) DEVICE — STAPLER SKIN PROXIMATE WIDE

## (undated) DEVICE — SUT PDS BV 6-0

## (undated) DEVICE — SUT 4-0 12-30IN SILK

## (undated) DEVICE — SUT PROLENE 6-0 BV-1 30IN

## (undated) DEVICE — TOWEL OR XRAY WHITE 17X26IN

## (undated) DEVICE — SUT 2-0 12-18IN SILK

## (undated) DEVICE — CLIP SPRING 6MM

## (undated) DEVICE — SYR ONLY LUER LOCK 20CC